# Patient Record
Sex: FEMALE | Race: WHITE | NOT HISPANIC OR LATINO | Employment: OTHER | URBAN - NONMETROPOLITAN AREA
[De-identification: names, ages, dates, MRNs, and addresses within clinical notes are randomized per-mention and may not be internally consistent; named-entity substitution may affect disease eponyms.]

---

## 2017-02-08 ENCOUNTER — OFFICE VISIT (OUTPATIENT)
Dept: GASTROENTEROLOGY | Facility: CLINIC | Age: 72
End: 2017-02-08

## 2017-02-08 VITALS
HEIGHT: 64 IN | WEIGHT: 143 LBS | BODY MASS INDEX: 24.41 KG/M2 | DIASTOLIC BLOOD PRESSURE: 58 MMHG | HEART RATE: 62 BPM | TEMPERATURE: 96.4 F | SYSTOLIC BLOOD PRESSURE: 118 MMHG | OXYGEN SATURATION: 99 %

## 2017-02-08 DIAGNOSIS — K63.5 COLON POLYPS: Primary | ICD-10-CM

## 2017-02-08 DIAGNOSIS — R20.8 RECTAL BURNING: ICD-10-CM

## 2017-02-08 DIAGNOSIS — I25.10 CORONARY ARTERY DISEASE INVOLVING NATIVE CORONARY ARTERY OF NATIVE HEART WITHOUT ANGINA PECTORIS: ICD-10-CM

## 2017-02-08 PROCEDURE — S0260 H&P FOR SURGERY: HCPCS | Performed by: NURSE PRACTITIONER

## 2017-02-08 RX ORDER — TRAZODONE HYDROCHLORIDE 50 MG/1
TABLET ORAL
COMMUNITY

## 2017-02-08 RX ORDER — CLOPIDOGREL BISULFATE 75 MG/1
75 TABLET ORAL DAILY
COMMUNITY
Start: 2017-01-22 | End: 2017-03-01 | Stop reason: HOSPADM

## 2017-02-08 RX ORDER — ATORVASTATIN CALCIUM 80 MG/1
80 TABLET, FILM COATED ORAL DAILY
COMMUNITY
Start: 2016-11-14

## 2017-02-08 RX ORDER — ISOSORBIDE MONONITRATE 120 MG/1
120 TABLET, EXTENDED RELEASE ORAL DAILY
COMMUNITY
Start: 2016-11-14

## 2017-02-08 RX ORDER — ZOLPIDEM TARTRATE 10 MG/1
TABLET ORAL
COMMUNITY

## 2017-02-08 RX ORDER — SODIUM, POTASSIUM,MAG SULFATES 17.5-3.13G
2 SOLUTION, RECONSTITUTED, ORAL ORAL ONCE
Qty: 2 BOTTLE | Refills: 0 | Status: SHIPPED | OUTPATIENT
Start: 2017-02-08 | End: 2017-02-08

## 2017-02-08 RX ORDER — AMITRIPTYLINE HYDROCHLORIDE 25 MG/1
25 TABLET, FILM COATED ORAL NIGHTLY
COMMUNITY
Start: 2017-01-31

## 2017-02-08 RX ORDER — RANOLAZINE 500 MG/1
500 TABLET, FILM COATED, EXTENDED RELEASE ORAL 2 TIMES DAILY
COMMUNITY
Start: 2016-12-14

## 2017-02-08 RX ORDER — NEBIVOLOL 5 MG/1
TABLET ORAL
COMMUNITY
Start: 2016-08-11

## 2017-02-08 NOTE — PROGRESS NOTES
Chief Complaint   Patient presents with   • Colon Cancer Screening     Patient is here today for colon recall. Patient states having pressure and burning sensation in anal area the last month. She states that normal when she passed gas there is not much odor but here recently there has been.     Subjective   HPI  Kelin Suarez is a 71 y.o. female who presents as a referral for preventative maintenance. She has no complaints of nausea or vomiting. No change in bowels. No wt loss. No BRBPR. No melena. There is n family hx for colon cancer. No abdominal pain. Last colonoscopy 7/20/12 with colon polyps and diverticulosis.   Past Medical History   Diagnosis Date   • Asthma    • CAD (coronary artery disease)    • Colon polyps    • Diabetes mellitus    • Diverticulosis    • Epigastric abdominal pain    • GERD (gastroesophageal reflux disease)    • Hyperlipidemia    • Hypertension    • Rectal bleeding      Past Surgical History   Procedure Laterality Date   • Hysterectomy     • Coronary angioplasty with stent placement     • Sinus surgery     • Appendectomy     • Cyst removal       pilonidal cyst   • Tonsillectomy     • Hemorrhoidectomy     • Cervical fusion     • Back surgery     • Nasal septum surgery     • Colonoscopy  07/20/2012   • Upper gastrointestinal endoscopy  09/20/2006       Current Outpatient Prescriptions:   •  amitriptyline (ELAVIL) 25 MG tablet, Take 25 mg by mouth Every Night., Disp: , Rfl:   •  aspirin 81 MG tablet, Take 81 mg by mouth daily.  , Disp: , Rfl:   •  atorvastatin (LIPITOR) 80 MG tablet, Take 80 mg by mouth Daily., Disp: , Rfl:   •  choline fenofibrate (TRILIPIX) 135 MG capsule, Take 135 mg by mouth daily.  , Disp: , Rfl:   •  clopidogrel (PLAVIX) 75 MG tablet, Take 75 mg by mouth Daily., Disp: , Rfl:   •  isosorbide mononitrate (IMDUR) 120 MG 24 hr tablet, Take 120 mg by mouth Daily., Disp: , Rfl:   •  metFORMIN (GLUCOPHAGE) 500 MG tablet, Take 500 mg by mouth Daily With Breakfast., Disp: ,  "Rfl:   •  nebivolol (BYSTOLIC) 5 MG tablet, Take 1 tablet by mouth daily, Disp: , Rfl:   •  RANEXA 500 MG 12 hr tablet, Take 500 mg by mouth 2 (Two) Times a Day., Disp: , Rfl:   •  traZODone (DESYREL) 50 MG tablet, Take 50 mg by mouth nightly., Disp: , Rfl:   •  zolpidem (AMBIEN) 10 MG tablet, Take 5 mg by mouth nightly , Disp: , Rfl:   •  sodium-potassium-magnesium sulfates (SUPREP BOWEL PREP) solution oral solution, Take 2 bottles by mouth 1 (One) Time for 1 dose. Split dose prep as directed by office instructions provided.  2 bottles = one kit., Disp: 2 bottle, Rfl: 0  Allergies   Allergen Reactions   • Other      narcotics   • Codeine      Social History     Social History   • Marital status: Unknown     Spouse name: N/A   • Number of children: N/A   • Years of education: N/A     Occupational History   • Not on file.     Social History Main Topics   • Smoking status: Never Smoker   • Smokeless tobacco: Not on file   • Alcohol use No   • Drug use: Not on file   • Sexual activity: Not on file     Other Topics Concern   • Not on file     Social History Narrative     Family History   Problem Relation Age of Onset   • Colon polyps Mother    • Colon polyps Maternal Grandfather        REVIEW OF SYSTEMS  General: well appearing, no fever chills or sweats, no unexplained wt loss  HEENT: no acute visual or hearing disturbances  Cardiovascular: No chest pain or palpitations  Pulmonary: No shortness of breath, coughing, wheezing or hemoptysis  : No burning, urgency, hematuria, or dysuria  Musculoskeletal: No joint pain or stiffness  Peripheral: no edema  Skin: No lesions or rashes  Neuro: No dizziness, headaches, stroke, syncope  Endocrine: No hot or cold intolerances  Hematological: No blood dyscrasias    Objective   Vitals:    02/08/17 0911   BP: 118/58   Pulse: 62   Temp: 96.4 °F (35.8 °C)   SpO2: 99%   Weight: 143 lb (64.9 kg)   Height: 64\" (162.6 cm)     Body mass index is 24.55 kg/(m^2).    PHYSICAL EXAM  General: " age appropriate well nourished well appearing, no acute distress  Head: normocephalic and atraumatic  Global assessment-supple  Neck-No JVD noted, no lymphadenopathy  Pulmonary-clear to auscultation bilaterally, normal respiratory effort  Cardiovascular-normal rate and rhythm, normal heart sounds, S1 and S2 noted  Abdomen-soft, non tender, non distended, normal bowel sounds all 4 quadrants, no hepatosplenomegaly noted  Extremities-No clubbing cyanosis or edema  Neuro-Non focal, converses appropriately, awake, alert, oriented    Imaging Results (most recent)     None        Assessment/Plan   Kelin was seen today for colon cancer screening.    Diagnoses and all orders for this visit:    Colon polyps  -     Case request    Rectal burning  -     Case request    Coronary artery disease involving native coronary artery of native heart without angina pectoris  Comments:  On Plavix for stent placement 1/25/16 per Dr.Mark Ovalles. Hx of CABG as well    Other orders  -     sodium-potassium-magnesium sulfates (SUPREP BOWEL PREP) solution oral solution; Take 2 bottles by mouth 1 (One) Time for 1 dose. Split dose prep as directed by office instructions provided.  2 bottles = one kit.      COLONOSCOPY WITH ANESTHESIA (N/A)  No Follow-up on file.  There are no Patient Instructions on file for this visit.    All risks, benefits, alternatives, and indications of colonoscopy procedure have been discussed with the patient. Risks to include perforation of the colon requiring possible surgery or colostomy, risk of bleeding from biopsies or removal of colon tissue, possibility of missing a colon polyp or cancer, or adverse drug reaction.  Benefits to include the diagnosis and management of disease of the colon and rectum. Alternatives to include barium enema, radiographic evaluation, lab testing or no intervention. Pt verbalizes understanding and agrees.

## 2017-02-10 ENCOUNTER — OFFICE VISIT (OUTPATIENT)
Dept: CARDIOLOGY | Age: 72
End: 2017-02-10
Payer: MEDICARE

## 2017-02-10 VITALS
SYSTOLIC BLOOD PRESSURE: 122 MMHG | HEIGHT: 65 IN | DIASTOLIC BLOOD PRESSURE: 58 MMHG | WEIGHT: 143 LBS | BODY MASS INDEX: 23.82 KG/M2 | HEART RATE: 82 BPM

## 2017-02-10 DIAGNOSIS — Z95.5 S/P RIGHT CORONARY ARTERY (RCA) STENT PLACEMENT: ICD-10-CM

## 2017-02-10 DIAGNOSIS — I25.10 CORONARY ARTERY DISEASE INVOLVING NATIVE CORONARY ARTERY OF NATIVE HEART WITHOUT ANGINA PECTORIS: Primary | ICD-10-CM

## 2017-02-10 DIAGNOSIS — I10 ESSENTIAL HYPERTENSION: ICD-10-CM

## 2017-02-10 PROCEDURE — 3014F SCREEN MAMMO DOC REV: CPT | Performed by: CLINICAL NURSE SPECIALIST

## 2017-02-10 PROCEDURE — G8420 CALC BMI NORM PARAMETERS: HCPCS | Performed by: CLINICAL NURSE SPECIALIST

## 2017-02-10 PROCEDURE — G8427 DOCREV CUR MEDS BY ELIG CLIN: HCPCS | Performed by: CLINICAL NURSE SPECIALIST

## 2017-02-10 PROCEDURE — 1123F ACP DISCUSS/DSCN MKR DOCD: CPT | Performed by: CLINICAL NURSE SPECIALIST

## 2017-02-10 PROCEDURE — G8399 PT W/DXA RESULTS DOCUMENT: HCPCS | Performed by: CLINICAL NURSE SPECIALIST

## 2017-02-10 PROCEDURE — G8598 ASA/ANTIPLAT THER USED: HCPCS | Performed by: CLINICAL NURSE SPECIALIST

## 2017-02-10 PROCEDURE — G8484 FLU IMMUNIZE NO ADMIN: HCPCS | Performed by: CLINICAL NURSE SPECIALIST

## 2017-02-10 PROCEDURE — 4040F PNEUMOC VAC/ADMIN/RCVD: CPT | Performed by: CLINICAL NURSE SPECIALIST

## 2017-02-10 PROCEDURE — 1036F TOBACCO NON-USER: CPT | Performed by: CLINICAL NURSE SPECIALIST

## 2017-02-10 PROCEDURE — 99213 OFFICE O/P EST LOW 20 MIN: CPT | Performed by: CLINICAL NURSE SPECIALIST

## 2017-02-10 PROCEDURE — 3017F COLORECTAL CA SCREEN DOC REV: CPT | Performed by: CLINICAL NURSE SPECIALIST

## 2017-02-10 PROCEDURE — 1090F PRES/ABSN URINE INCON ASSESS: CPT | Performed by: CLINICAL NURSE SPECIALIST

## 2017-02-16 ENCOUNTER — TELEPHONE (OUTPATIENT)
Dept: GASTROENTEROLOGY | Facility: CLINIC | Age: 72
End: 2017-02-16

## 2017-02-16 NOTE — TELEPHONE ENCOUNTER
I have received Plavix clearance from Dr. Ovalles. I have scanned it to you under media in her chart.    Dr. Ovalles wants a date so I scheduled her colon for 3/1.

## 2017-02-17 NOTE — TELEPHONE ENCOUNTER
I left her a message letting her know that I have received Plavix clearance from Dr. Ovalles. Reminded her that the last dose is 2/23 and to call back if she has any questions.

## 2017-02-17 NOTE — TELEPHONE ENCOUNTER
OK to schedule procedure now that clearance to hold antiplatelet/anticoagulatant has been obtained.    Mery Escalera MD

## 2017-02-28 ENCOUNTER — ANESTHESIA EVENT (OUTPATIENT)
Dept: GASTROENTEROLOGY | Facility: HOSPITAL | Age: 72
End: 2017-02-28

## 2017-03-01 ENCOUNTER — ANESTHESIA (OUTPATIENT)
Dept: GASTROENTEROLOGY | Facility: HOSPITAL | Age: 72
End: 2017-03-01

## 2017-03-01 ENCOUNTER — HOSPITAL ENCOUNTER (OUTPATIENT)
Facility: HOSPITAL | Age: 72
Setting detail: HOSPITAL OUTPATIENT SURGERY
Discharge: HOME | End: 2017-03-01
Attending: INTERNAL MEDICINE | Admitting: INTERNAL MEDICINE

## 2017-03-01 ENCOUNTER — TELEPHONE (OUTPATIENT)
Dept: GASTROENTEROLOGY | Facility: CLINIC | Age: 72
End: 2017-03-01

## 2017-03-01 VITALS
SYSTOLIC BLOOD PRESSURE: 146 MMHG | WEIGHT: 141 LBS | OXYGEN SATURATION: 97 % | RESPIRATION RATE: 16 BRPM | DIASTOLIC BLOOD PRESSURE: 61 MMHG | TEMPERATURE: 97 F | HEART RATE: 60 BPM | HEIGHT: 64 IN | BODY MASS INDEX: 24.07 KG/M2

## 2017-03-01 PROCEDURE — G0105 COLORECTAL SCRN; HI RISK IND: HCPCS | Performed by: INTERNAL MEDICINE

## 2017-03-01 PROCEDURE — 25010000002 PROPOFOL 10 MG/ML EMULSION: Performed by: NURSE ANESTHETIST, CERTIFIED REGISTERED

## 2017-03-01 RX ORDER — PROPOFOL 10 MG/ML
VIAL (ML) INTRAVENOUS AS NEEDED
Status: DISCONTINUED | OUTPATIENT
Start: 2017-03-01 | End: 2017-03-01 | Stop reason: SURG

## 2017-03-01 RX ORDER — SODIUM CHLORIDE 9 MG/ML
100 INJECTION, SOLUTION INTRAVENOUS CONTINUOUS
Status: DISCONTINUED | OUTPATIENT
Start: 2017-03-01 | End: 2017-03-01 | Stop reason: HOSPADM

## 2017-03-01 RX ORDER — SODIUM CHLORIDE 0.9 % (FLUSH) 0.9 %
1-10 SYRINGE (ML) INJECTION AS NEEDED
Status: DISCONTINUED | OUTPATIENT
Start: 2017-03-01 | End: 2017-03-01 | Stop reason: HOSPADM

## 2017-03-01 RX ORDER — LIDOCAINE HYDROCHLORIDE 20 MG/ML
INJECTION, SOLUTION INFILTRATION; PERINEURAL AS NEEDED
Status: DISCONTINUED | OUTPATIENT
Start: 2017-03-01 | End: 2017-03-01 | Stop reason: SURG

## 2017-03-01 RX ADMIN — PROPOFOL 30 MG: 10 INJECTION, EMULSION INTRAVENOUS at 11:12

## 2017-03-01 RX ADMIN — PROPOFOL 30 MG: 10 INJECTION, EMULSION INTRAVENOUS at 11:11

## 2017-03-01 RX ADMIN — LIDOCAINE HYDROCHLORIDE 40 MG: 20 INJECTION, SOLUTION INFILTRATION; PERINEURAL at 11:10

## 2017-03-01 RX ADMIN — PROPOFOL 30 MG: 10 INJECTION, EMULSION INTRAVENOUS at 11:17

## 2017-03-01 RX ADMIN — PROPOFOL 30 MG: 10 INJECTION, EMULSION INTRAVENOUS at 11:24

## 2017-03-01 RX ADMIN — PROPOFOL 30 MG: 10 INJECTION, EMULSION INTRAVENOUS at 11:19

## 2017-03-01 RX ADMIN — SODIUM CHLORIDE 100 ML/HR: 9 INJECTION, SOLUTION INTRAVENOUS at 11:04

## 2017-03-01 RX ADMIN — PROPOFOL 50 MG: 10 INJECTION, EMULSION INTRAVENOUS at 11:10

## 2017-03-01 RX ADMIN — PROPOFOL 30 MG: 10 INJECTION, EMULSION INTRAVENOUS at 11:15

## 2017-03-01 RX ADMIN — PROPOFOL 30 MG: 10 INJECTION, EMULSION INTRAVENOUS at 11:13

## 2017-03-01 RX ADMIN — PROPOFOL 30 MG: 10 INJECTION, EMULSION INTRAVENOUS at 11:22

## 2017-03-01 NOTE — PLAN OF CARE
Problem: GI Endoscopy (Adult)  Goal: Signs and Symptoms of Listed Potential Problems Will be Absent or Manageable (GI Endoscopy)  Outcome: Outcome(s) achieved Date Met:  03/01/17

## 2017-03-01 NOTE — ANESTHESIA PREPROCEDURE EVALUATION
Anesthesia Evaluation     Patient summary reviewed and Nursing notes reviewed   history of anesthetic complications: PONV  NPO Status: > 8 hours   Airway   Mallampati: II  TM distance: >3 FB  Neck ROM: full  Dental      Pulmonary    (+) asthma,   Cardiovascular   Exercise tolerance: excellent (>7 METS)    (+) hypertension well controlled, CAD, CABG 3-6 Months, cardiac stents within the past 12 months       Neuro/Psych  GI/Hepatic/Renal/Endo    (+)  GERD well controlled, diabetes mellitus type 2 well controlled,     Musculoskeletal (-) negative ROS    Abdominal    Substance History - negative use     OB/GYN negative ob/gyn ROS         Other - negative ROS                                   Anesthesia Plan    ASA 3     general   total IV anesthesia  intravenous induction   Anesthetic plan and risks discussed with patient.

## 2017-03-01 NOTE — PLAN OF CARE
Problem: Patient Care Overview (Adult)  Goal: Plan of Care Review  Outcome: Ongoing (interventions implemented as appropriate)    03/01/17 1123   Coping/Psychosocial Response Interventions   Plan Of Care Reviewed With patient   Patient Care Overview   Progress no change   Outcome Evaluation   Outcome Summary/Follow up Plan tolerating well

## 2017-03-01 NOTE — PLAN OF CARE
Problem: Patient Care Overview (Adult)  Goal: Plan of Care Review  Outcome: Outcome(s) achieved Date Met:  03/01/17

## 2017-03-01 NOTE — ANESTHESIA POSTPROCEDURE EVALUATION
Patient: Kelin Suarez    Procedure Summary     Date Anesthesia Start Anesthesia Stop Room / Location    03/01/17 1107 1125  [facility] /  [facility]       Procedure Diagnosis Surgeon Provider    COLONOSCOPY WITH ANESTHESIA (N/A ) Colon polyps; Rectal burning  (Colon polyps [K63.5]; Rectal burning [K62.89]) MD Ryan Hazel CRNA          Anesthesia Type: general  Last vitals  /56 (03/01/17 1135)    Temp 97 °F (36.1 °C) (03/01/17 1051)    Pulse 60 (03/01/17 1135)   Resp 16 (03/01/17 1135)    SpO2 99 % (03/01/17 1135)      Post Anesthesia Care and Evaluation    Patient location during evaluation: PHASE II  Patient participation: complete - patient participated  Level of consciousness: awake and alert  Pain score: 0  Pain management: adequate  Airway patency: patent  Anesthetic complications: No anesthetic complications  PONV Status: none  Cardiovascular status: acceptable  Respiratory status: acceptable  Hydration status: acceptable

## 2017-03-01 NOTE — PLAN OF CARE
Problem: Patient Care Overview (Adult)  Goal: Adult Individualization and Mutuality  Outcome: Outcome(s) achieved Date Met:  03/01/17

## 2017-03-01 NOTE — PLAN OF CARE
Problem: Patient Care Overview (Adult)  Goal: Discharge Needs Assessment  Outcome: Outcome(s) achieved Date Met:  03/01/17

## 2017-04-04 ENCOUNTER — HOSPITAL ENCOUNTER (OUTPATIENT)
Dept: WOMENS IMAGING | Age: 72
Discharge: HOME | End: 2017-04-04
Payer: MEDICARE

## 2017-04-04 DIAGNOSIS — Z12.31 ENCOUNTER FOR SCREENING MAMMOGRAM FOR MALIGNANT NEOPLASM OF BREAST: ICD-10-CM

## 2017-04-04 PROCEDURE — 77063 BREAST TOMOSYNTHESIS BI: CPT

## 2017-06-11 DIAGNOSIS — E78.5 HYPERLIPIDEMIA, UNSPECIFIED HYPERLIPIDEMIA TYPE: ICD-10-CM

## 2017-06-12 DIAGNOSIS — R07.9 CHEST PAIN, UNSPECIFIED TYPE: ICD-10-CM

## 2017-06-12 DIAGNOSIS — I25.10 CORONARY ARTERY DISEASE INVOLVING NATIVE HEART WITHOUT ANGINA PECTORIS, UNSPECIFIED VESSEL OR LESION TYPE: ICD-10-CM

## 2017-06-12 RX ORDER — RANOLAZINE 500 MG/1
TABLET, FILM COATED, EXTENDED RELEASE ORAL
Qty: 180 TABLET | Refills: 2 | Status: ON HOLD | OUTPATIENT
Start: 2017-06-12 | End: 2017-08-25 | Stop reason: HOSPADM

## 2017-06-12 RX ORDER — ATORVASTATIN CALCIUM 80 MG/1
TABLET, FILM COATED ORAL
Qty: 90 TABLET | Refills: 0 | Status: SHIPPED | OUTPATIENT
Start: 2017-06-12 | End: 2017-09-10 | Stop reason: SDUPTHER

## 2017-06-26 RX ORDER — NEBIVOLOL HYDROCHLORIDE 5 MG/1
TABLET ORAL
Qty: 90 TABLET | Refills: 3 | Status: SHIPPED | OUTPATIENT
Start: 2017-06-26 | End: 2018-09-10 | Stop reason: SDUPTHER

## 2017-07-03 DIAGNOSIS — R07.9 CHEST PAIN, UNSPECIFIED TYPE: ICD-10-CM

## 2017-07-03 RX ORDER — ISOSORBIDE MONONITRATE 120 MG/1
120 TABLET, EXTENDED RELEASE ORAL DAILY
Qty: 90 TABLET | Refills: 3 | Status: SHIPPED | OUTPATIENT
Start: 2017-07-03 | End: 2018-07-07 | Stop reason: SDUPTHER

## 2017-08-23 ENCOUNTER — OFFICE VISIT (OUTPATIENT)
Dept: CARDIOLOGY | Age: 72
End: 2017-08-23
Payer: MEDICARE

## 2017-08-23 ENCOUNTER — APPOINTMENT (OUTPATIENT)
Dept: GENERAL RADIOLOGY | Age: 72
End: 2017-08-23
Attending: INTERNAL MEDICINE
Payer: MEDICARE

## 2017-08-23 ENCOUNTER — HOSPITAL ENCOUNTER (OUTPATIENT)
Dept: CARDIAC CATH/INVASIVE PROCEDURES | Age: 72
Setting detail: SPECIMEN
Discharge: HOME | End: 2017-08-23
Attending: INTERNAL MEDICINE
Payer: MEDICARE

## 2017-08-23 ENCOUNTER — HOSPITAL ENCOUNTER (OUTPATIENT)
Age: 72
Setting detail: OBSERVATION
Discharge: HOME | End: 2017-08-25
Attending: INTERNAL MEDICINE | Admitting: INTERNAL MEDICINE
Payer: MEDICARE

## 2017-08-23 VITALS
WEIGHT: 144 LBS | BODY MASS INDEX: 23.99 KG/M2 | SYSTOLIC BLOOD PRESSURE: 144 MMHG | HEIGHT: 65 IN | DIASTOLIC BLOOD PRESSURE: 74 MMHG | HEART RATE: 70 BPM

## 2017-08-23 DIAGNOSIS — I25.10 ASHD (ARTERIOSCLEROTIC HEART DISEASE): ICD-10-CM

## 2017-08-23 DIAGNOSIS — R07.9 CHEST PAIN IN ADULT: Primary | ICD-10-CM

## 2017-08-23 LAB
ANION GAP SERPL CALCULATED.3IONS-SCNC: 18 MMOL/L (ref 7–19)
BASOPHILS ABSOLUTE: 0 K/UL (ref 0–0.2)
BASOPHILS RELATIVE PERCENT: 1 % (ref 0–1)
BUN BLDV-MCNC: 19 MG/DL (ref 8–23)
CALCIUM SERPL-MCNC: 9.4 MG/DL (ref 8.8–10.2)
CHLORIDE BLD-SCNC: 105 MMOL/L (ref 98–111)
CO2: 19 MMOL/L (ref 22–29)
CREAT SERPL-MCNC: 0.9 MG/DL (ref 0.5–0.9)
EOSINOPHILS ABSOLUTE: 0.1 K/UL (ref 0–0.6)
EOSINOPHILS RELATIVE PERCENT: 3.8 % (ref 0–5)
GFR NON-AFRICAN AMERICAN: >60
GLUCOSE BLD-MCNC: 134 MG/DL (ref 70–99)
GLUCOSE BLD-MCNC: 84 MG/DL (ref 74–109)
GLUCOSE BLD-MCNC: 88 MG/DL (ref 70–99)
GLUCOSE BLD-MCNC: 96 MG/DL (ref 70–99)
HCT VFR BLD CALC: 34.3 % (ref 37–47)
HEMOGLOBIN: 11.1 G/DL (ref 12–16)
LV EF: 53 %
LVEF MODALITY: NORMAL
LYMPHOCYTES ABSOLUTE: 0.8 K/UL (ref 1.1–4.5)
LYMPHOCYTES RELATIVE PERCENT: 28.3 % (ref 20–40)
MCH RBC QN AUTO: 30.2 PG (ref 27–31)
MCHC RBC AUTO-ENTMCNC: 32.4 G/DL (ref 33–37)
MCV RBC AUTO: 93.5 FL (ref 81–99)
MONOCYTES ABSOLUTE: 0.3 K/UL (ref 0–0.9)
MONOCYTES RELATIVE PERCENT: 10.8 % (ref 0–10)
NEUTROPHILS ABSOLUTE: 1.6 K/UL (ref 1.5–7.5)
NEUTROPHILS RELATIVE PERCENT: 55.1 % (ref 50–65)
PDW BLD-RTO: 13.6 % (ref 11.5–14.5)
PERFORMED ON: ABNORMAL
PERFORMED ON: NORMAL
PERFORMED ON: NORMAL
PLATELET # BLD: 162 K/UL (ref 130–400)
PMV BLD AUTO: 9.4 FL (ref 9.4–12.3)
POTASSIUM SERPL-SCNC: 4.5 MMOL/L (ref 3.5–5)
RBC # BLD: 3.67 M/UL (ref 4.2–5.4)
SODIUM BLD-SCNC: 142 MMOL/L (ref 136–145)
TROPONIN: <0.01 NG/ML (ref 0–0.03)
TROPONIN: <0.01 NG/ML (ref 0–0.03)
WBC # BLD: 2.9 K/UL (ref 4.8–10.8)

## 2017-08-23 PROCEDURE — 71020 XR CHEST STANDARD TWO VW: CPT

## 2017-08-23 PROCEDURE — 96374 THER/PROPH/DIAG INJ IV PUSH: CPT

## 2017-08-23 PROCEDURE — 3014F SCREEN MAMMO DOC REV: CPT | Performed by: INTERNAL MEDICINE

## 2017-08-23 PROCEDURE — 4040F PNEUMOC VAC/ADMIN/RCVD: CPT | Performed by: INTERNAL MEDICINE

## 2017-08-23 PROCEDURE — 1036F TOBACCO NON-USER: CPT | Performed by: INTERNAL MEDICINE

## 2017-08-23 PROCEDURE — 82948 REAGENT STRIP/BLOOD GLUCOSE: CPT

## 2017-08-23 PROCEDURE — 85025 COMPLETE CBC W/AUTO DIFF WBC: CPT

## 2017-08-23 PROCEDURE — 93923 UPR/LXTR ART STDY 3+ LVLS: CPT

## 2017-08-23 PROCEDURE — 96375 TX/PRO/DX INJ NEW DRUG ADDON: CPT

## 2017-08-23 PROCEDURE — 84484 ASSAY OF TROPONIN QUANT: CPT

## 2017-08-23 PROCEDURE — 93005 ELECTROCARDIOGRAM TRACING: CPT

## 2017-08-23 PROCEDURE — 99213 OFFICE O/P EST LOW 20 MIN: CPT | Performed by: INTERNAL MEDICINE

## 2017-08-23 PROCEDURE — 1123F ACP DISCUSS/DSCN MKR DOCD: CPT | Performed by: INTERNAL MEDICINE

## 2017-08-23 PROCEDURE — 93000 ELECTROCARDIOGRAM COMPLETE: CPT | Performed by: INTERNAL MEDICINE

## 2017-08-23 PROCEDURE — 1090F PRES/ABSN URINE INCON ASSESS: CPT | Performed by: INTERNAL MEDICINE

## 2017-08-23 PROCEDURE — 2580000003 HC RX 258: Performed by: INTERNAL MEDICINE

## 2017-08-23 PROCEDURE — 6370000000 HC RX 637 (ALT 250 FOR IP): Performed by: INTERNAL MEDICINE

## 2017-08-23 PROCEDURE — G8420 CALC BMI NORM PARAMETERS: HCPCS | Performed by: INTERNAL MEDICINE

## 2017-08-23 PROCEDURE — 2500000003 HC RX 250 WO HCPCS: Performed by: INTERNAL MEDICINE

## 2017-08-23 PROCEDURE — 80048 BASIC METABOLIC PNL TOTAL CA: CPT

## 2017-08-23 PROCEDURE — G8399 PT W/DXA RESULTS DOCUMENT: HCPCS | Performed by: INTERNAL MEDICINE

## 2017-08-23 PROCEDURE — G0378 HOSPITAL OBSERVATION PER HR: HCPCS

## 2017-08-23 PROCEDURE — G8427 DOCREV CUR MEDS BY ELIG CLIN: HCPCS | Performed by: INTERNAL MEDICINE

## 2017-08-23 PROCEDURE — G8598 ASA/ANTIPLAT THER USED: HCPCS | Performed by: INTERNAL MEDICINE

## 2017-08-23 PROCEDURE — 3017F COLORECTAL CA SCREEN DOC REV: CPT | Performed by: INTERNAL MEDICINE

## 2017-08-23 PROCEDURE — 36415 COLL VENOUS BLD VENIPUNCTURE: CPT

## 2017-08-23 PROCEDURE — 6360000002 HC RX W HCPCS: Performed by: INTERNAL MEDICINE

## 2017-08-23 PROCEDURE — 93306 TTE W/DOPPLER COMPLETE: CPT

## 2017-08-23 RX ORDER — RANOLAZINE 500 MG/1
500 TABLET, EXTENDED RELEASE ORAL 2 TIMES DAILY
Status: DISCONTINUED | OUTPATIENT
Start: 2017-08-23 | End: 2017-08-25 | Stop reason: HOSPADM

## 2017-08-23 RX ORDER — SODIUM CHLORIDE 9 MG/ML
INJECTION, SOLUTION INTRAVENOUS CONTINUOUS
Status: DISCONTINUED | OUTPATIENT
Start: 2017-08-23 | End: 2017-08-24

## 2017-08-23 RX ORDER — NITROGLYCERIN 20 MG/100ML
5 INJECTION INTRAVENOUS CONTINUOUS
Status: DISCONTINUED | OUTPATIENT
Start: 2017-08-23 | End: 2017-08-25 | Stop reason: HOSPADM

## 2017-08-23 RX ORDER — FENOFIBRATE 160 MG/1
160 TABLET ORAL DAILY
Status: DISCONTINUED | OUTPATIENT
Start: 2017-08-23 | End: 2017-08-25 | Stop reason: HOSPADM

## 2017-08-23 RX ORDER — ALPRAZOLAM 0.25 MG/1
0.25 TABLET ORAL DAILY PRN
Status: DISCONTINUED | OUTPATIENT
Start: 2017-08-23 | End: 2017-08-25 | Stop reason: HOSPADM

## 2017-08-23 RX ORDER — ALBUTEROL SULFATE 90 UG/1
2 AEROSOL, METERED RESPIRATORY (INHALATION) EVERY 6 HOURS PRN
Status: DISCONTINUED | OUTPATIENT
Start: 2017-08-23 | End: 2017-08-25 | Stop reason: HOSPADM

## 2017-08-23 RX ORDER — TRAZODONE HYDROCHLORIDE 50 MG/1
50 TABLET ORAL NIGHTLY
Status: DISCONTINUED | OUTPATIENT
Start: 2017-08-23 | End: 2017-08-25 | Stop reason: HOSPADM

## 2017-08-23 RX ORDER — NEBIVOLOL 5 MG/1
5 TABLET ORAL NIGHTLY
Status: DISCONTINUED | OUTPATIENT
Start: 2017-08-23 | End: 2017-08-25 | Stop reason: HOSPADM

## 2017-08-23 RX ORDER — AMITRIPTYLINE HYDROCHLORIDE 25 MG/1
25 TABLET, FILM COATED ORAL NIGHTLY
Status: DISCONTINUED | OUTPATIENT
Start: 2017-08-23 | End: 2017-08-25 | Stop reason: HOSPADM

## 2017-08-23 RX ORDER — ASPIRIN 81 MG/1
81 TABLET, CHEWABLE ORAL DAILY
Status: DISCONTINUED | OUTPATIENT
Start: 2017-08-23 | End: 2017-08-24

## 2017-08-23 RX ORDER — NEBIVOLOL 5 MG/1
5 TABLET ORAL DAILY
Status: DISCONTINUED | OUTPATIENT
Start: 2017-08-23 | End: 2017-08-23

## 2017-08-23 RX ORDER — NITROGLYCERIN 0.4 MG/1
0.4 TABLET SUBLINGUAL EVERY 5 MIN PRN
Status: DISCONTINUED | OUTPATIENT
Start: 2017-08-23 | End: 2017-08-25 | Stop reason: HOSPADM

## 2017-08-23 RX ORDER — CYCLOBENZAPRINE HCL 10 MG
10 TABLET ORAL 2 TIMES DAILY PRN
Status: DISCONTINUED | OUTPATIENT
Start: 2017-08-23 | End: 2017-08-25 | Stop reason: HOSPADM

## 2017-08-23 RX ORDER — ATORVASTATIN CALCIUM 80 MG/1
80 TABLET, FILM COATED ORAL NIGHTLY
Status: DISCONTINUED | OUTPATIENT
Start: 2017-08-23 | End: 2017-08-25 | Stop reason: HOSPADM

## 2017-08-23 RX ORDER — MEPERIDINE HYDROCHLORIDE 25 MG/ML
50 INJECTION INTRAMUSCULAR; INTRAVENOUS; SUBCUTANEOUS EVERY 4 HOURS PRN
Status: DISCONTINUED | OUTPATIENT
Start: 2017-08-23 | End: 2017-08-25 | Stop reason: HOSPADM

## 2017-08-23 RX ORDER — ACETAMINOPHEN 325 MG/1
650 TABLET ORAL EVERY 4 HOURS PRN
Status: DISCONTINUED | OUTPATIENT
Start: 2017-08-23 | End: 2017-08-24

## 2017-08-23 RX ORDER — ONDANSETRON 2 MG/ML
4 INJECTION INTRAMUSCULAR; INTRAVENOUS EVERY 6 HOURS PRN
Status: DISCONTINUED | OUTPATIENT
Start: 2017-08-23 | End: 2017-08-24

## 2017-08-23 RX ORDER — ZOLPIDEM TARTRATE 5 MG/1
5 TABLET ORAL NIGHTLY
Status: DISCONTINUED | OUTPATIENT
Start: 2017-08-23 | End: 2017-08-25 | Stop reason: HOSPADM

## 2017-08-23 RX ORDER — MEPERIDINE HYDROCHLORIDE 25 MG/ML
25 INJECTION INTRAMUSCULAR; INTRAVENOUS; SUBCUTANEOUS EVERY 4 HOURS PRN
Status: DISCONTINUED | OUTPATIENT
Start: 2017-08-23 | End: 2017-08-25 | Stop reason: HOSPADM

## 2017-08-23 RX ORDER — ISOSORBIDE MONONITRATE 60 MG/1
120 TABLET, EXTENDED RELEASE ORAL DAILY
Status: DISCONTINUED | OUTPATIENT
Start: 2017-08-23 | End: 2017-08-25 | Stop reason: HOSPADM

## 2017-08-23 RX ORDER — PROMETHAZINE HYDROCHLORIDE 25 MG/1
25 TABLET ORAL EVERY 6 HOURS PRN
Status: DISCONTINUED | OUTPATIENT
Start: 2017-08-23 | End: 2017-08-25 | Stop reason: HOSPADM

## 2017-08-23 RX ADMIN — NEBIVOLOL HYDROCHLORIDE 5 MG: 5 TABLET ORAL at 22:53

## 2017-08-23 RX ADMIN — SODIUM CHLORIDE: 9 INJECTION, SOLUTION INTRAVENOUS at 19:57

## 2017-08-23 RX ADMIN — RANOLAZINE 500 MG: 500 TABLET, FILM COATED, EXTENDED RELEASE ORAL at 20:12

## 2017-08-23 RX ADMIN — TRAZODONE HYDROCHLORIDE 50 MG: 50 TABLET ORAL at 22:53

## 2017-08-23 RX ADMIN — SODIUM CHLORIDE: 9 INJECTION, SOLUTION INTRAVENOUS at 12:05

## 2017-08-23 RX ADMIN — ZOLPIDEM TARTRATE 5 MG: 5 TABLET, FILM COATED ORAL at 22:53

## 2017-08-23 RX ADMIN — NITROGLYCERIN 5 MCG/MIN: 20 INJECTION INTRAVENOUS at 11:59

## 2017-08-23 RX ADMIN — ATORVASTATIN CALCIUM 80 MG: 80 TABLET, FILM COATED ORAL at 20:12

## 2017-08-23 RX ADMIN — ONDANSETRON 4 MG: 2 INJECTION INTRAMUSCULAR; INTRAVENOUS at 12:37

## 2017-08-23 RX ADMIN — MEPERIDINE HYDROCHLORIDE 50 MG: 25 INJECTION, SOLUTION INTRAMUSCULAR; INTRAVENOUS; SUBCUTANEOUS at 11:51

## 2017-08-23 RX ADMIN — AMITRIPTYLINE HYDROCHLORIDE 25 MG: 25 TABLET, FILM COATED ORAL at 20:12

## 2017-08-23 ASSESSMENT — PAIN SCALES - GENERAL
PAINLEVEL_OUTOF10: 6
PAINLEVEL_OUTOF10: 0
PAINLEVEL_OUTOF10: 2

## 2017-08-24 LAB
GLUCOSE BLD-MCNC: 101 MG/DL (ref 70–99)
GLUCOSE BLD-MCNC: 143 MG/DL (ref 70–99)
GLUCOSE BLD-MCNC: 167 MG/DL (ref 70–99)
GLUCOSE BLD-MCNC: 87 MG/DL (ref 70–99)
INR BLD: 1.09 (ref 0.88–1.18)
PERFORMED ON: ABNORMAL
PERFORMED ON: NORMAL
PROTHROMBIN TIME: 14 SEC (ref 12–14.6)
TROPONIN: <0.01 NG/ML (ref 0–0.03)

## 2017-08-24 PROCEDURE — C1887 CATHETER, GUIDING: HCPCS

## 2017-08-24 PROCEDURE — 6370000000 HC RX 637 (ALT 250 FOR IP): Performed by: INTERNAL MEDICINE

## 2017-08-24 PROCEDURE — 82948 REAGENT STRIP/BLOOD GLUCOSE: CPT

## 2017-08-24 PROCEDURE — 36415 COLL VENOUS BLD VENIPUNCTURE: CPT

## 2017-08-24 PROCEDURE — 2720000000 HC MISC SURG SUPPLY STERILE $0-50

## 2017-08-24 PROCEDURE — 84484 ASSAY OF TROPONIN QUANT: CPT

## 2017-08-24 PROCEDURE — C1760 CLOSURE DEV, VASC: HCPCS

## 2017-08-24 PROCEDURE — C1725 CATH, TRANSLUMIN NON-LASER: HCPCS

## 2017-08-24 PROCEDURE — 2720000001 HC MISC SURG SUPPLY STERILE $51-500

## 2017-08-24 PROCEDURE — 6360000002 HC RX W HCPCS

## 2017-08-24 PROCEDURE — C1769 GUIDE WIRE: HCPCS

## 2017-08-24 PROCEDURE — 92920 PRQ TRLUML C ANGIOP 1ART&/BR: CPT | Performed by: INTERNAL MEDICINE

## 2017-08-24 PROCEDURE — 2580000003 HC RX 258: Performed by: INTERNAL MEDICINE

## 2017-08-24 PROCEDURE — 85610 PROTHROMBIN TIME: CPT

## 2017-08-24 PROCEDURE — G0378 HOSPITAL OBSERVATION PER HR: HCPCS

## 2017-08-24 PROCEDURE — 93459 L HRT ART/GRFT ANGIO: CPT | Performed by: INTERNAL MEDICINE

## 2017-08-24 PROCEDURE — 2709999900 HC NON-CHARGEABLE SUPPLY

## 2017-08-24 RX ORDER — SODIUM CHLORIDE 9 MG/ML
INJECTION, SOLUTION INTRAVENOUS CONTINUOUS
Status: DISCONTINUED | OUTPATIENT
Start: 2017-08-24 | End: 2017-08-25 | Stop reason: HOSPADM

## 2017-08-24 RX ORDER — CLOPIDOGREL BISULFATE 75 MG/1
75 TABLET ORAL DAILY
Status: DISCONTINUED | OUTPATIENT
Start: 2017-08-25 | End: 2017-08-25 | Stop reason: HOSPADM

## 2017-08-24 RX ORDER — ONDANSETRON 2 MG/ML
4 INJECTION INTRAMUSCULAR; INTRAVENOUS EVERY 6 HOURS PRN
Status: DISCONTINUED | OUTPATIENT
Start: 2017-08-24 | End: 2017-08-25 | Stop reason: HOSPADM

## 2017-08-24 RX ORDER — ACETAMINOPHEN 325 MG/1
650 TABLET ORAL EVERY 4 HOURS PRN
Status: DISCONTINUED | OUTPATIENT
Start: 2017-08-24 | End: 2017-08-25 | Stop reason: HOSPADM

## 2017-08-24 RX ORDER — SODIUM CHLORIDE 0.9 % (FLUSH) 0.9 %
10 SYRINGE (ML) INJECTION PRN
Status: DISCONTINUED | OUTPATIENT
Start: 2017-08-24 | End: 2017-08-25 | Stop reason: HOSPADM

## 2017-08-24 RX ORDER — SODIUM CHLORIDE 0.9 % (FLUSH) 0.9 %
10 SYRINGE (ML) INJECTION EVERY 12 HOURS SCHEDULED
Status: DISCONTINUED | OUTPATIENT
Start: 2017-08-24 | End: 2017-08-25 | Stop reason: HOSPADM

## 2017-08-24 RX ORDER — ASPIRIN 81 MG/1
81 TABLET ORAL DAILY
Status: DISCONTINUED | OUTPATIENT
Start: 2017-08-25 | End: 2017-08-25 | Stop reason: HOSPADM

## 2017-08-24 RX ADMIN — TRAZODONE HYDROCHLORIDE 50 MG: 50 TABLET ORAL at 22:52

## 2017-08-24 RX ADMIN — ASPIRIN 81 MG CHEWABLE TABLET 81 MG: 81 TABLET CHEWABLE at 08:32

## 2017-08-24 RX ADMIN — ATORVASTATIN CALCIUM 80 MG: 80 TABLET, FILM COATED ORAL at 20:09

## 2017-08-24 RX ADMIN — ISOSORBIDE MONONITRATE 120 MG: 60 TABLET, EXTENDED RELEASE ORAL at 08:32

## 2017-08-24 RX ADMIN — SODIUM CHLORIDE: 9 INJECTION, SOLUTION INTRAVENOUS at 13:00

## 2017-08-24 RX ADMIN — RANOLAZINE 500 MG: 500 TABLET, FILM COATED, EXTENDED RELEASE ORAL at 20:09

## 2017-08-24 RX ADMIN — RANOLAZINE 500 MG: 500 TABLET, FILM COATED, EXTENDED RELEASE ORAL at 08:32

## 2017-08-24 RX ADMIN — AMITRIPTYLINE HYDROCHLORIDE 25 MG: 25 TABLET, FILM COATED ORAL at 20:09

## 2017-08-24 RX ADMIN — Medication 10 ML: at 20:09

## 2017-08-24 RX ADMIN — FENOFIBRATE 160 MG: 160 TABLET ORAL at 08:32

## 2017-08-24 RX ADMIN — ZOLPIDEM TARTRATE 5 MG: 5 TABLET, FILM COATED ORAL at 22:52

## 2017-08-24 RX ADMIN — NEBIVOLOL HYDROCHLORIDE 5 MG: 5 TABLET ORAL at 22:52

## 2017-08-24 RX ADMIN — SODIUM CHLORIDE: 9 INJECTION, SOLUTION INTRAVENOUS at 03:25

## 2017-08-24 ASSESSMENT — PAIN SCALES - GENERAL
PAINLEVEL_OUTOF10: 0
PAINLEVEL_OUTOF10: 0

## 2017-08-25 VITALS
HEART RATE: 61 BPM | DIASTOLIC BLOOD PRESSURE: 68 MMHG | HEIGHT: 64 IN | SYSTOLIC BLOOD PRESSURE: 137 MMHG | RESPIRATION RATE: 16 BRPM | WEIGHT: 145 LBS | BODY MASS INDEX: 24.75 KG/M2 | OXYGEN SATURATION: 96 % | TEMPERATURE: 98.2 F

## 2017-08-25 LAB
ANION GAP SERPL CALCULATED.3IONS-SCNC: 9 MMOL/L (ref 7–19)
BUN BLDV-MCNC: 18 MG/DL (ref 8–23)
CALCIUM SERPL-MCNC: 8.7 MG/DL (ref 8.8–10.2)
CHLORIDE BLD-SCNC: 108 MMOL/L (ref 98–111)
CO2: 24 MMOL/L (ref 22–29)
CREAT SERPL-MCNC: 1.2 MG/DL (ref 0.5–0.9)
EKG P AXIS: 72 DEGREES
EKG P-R INTERVAL: 170 MS
EKG Q-T INTERVAL: 467 MS
EKG QRS DURATION: 100 MS
EKG QTC CALCULATION (BAZETT): 455 MS
EKG T AXIS: 152 DEGREES
GFR NON-AFRICAN AMERICAN: 44
GLUCOSE BLD-MCNC: 112 MG/DL (ref 70–99)
GLUCOSE BLD-MCNC: 122 MG/DL (ref 74–109)
HCT VFR BLD CALC: 29.7 % (ref 37–47)
HEMOGLOBIN: 9.6 G/DL (ref 12–16)
MCH RBC QN AUTO: 30.3 PG (ref 27–31)
MCHC RBC AUTO-ENTMCNC: 32.3 G/DL (ref 33–37)
MCV RBC AUTO: 93.7 FL (ref 81–99)
PDW BLD-RTO: 13.6 % (ref 11.5–14.5)
PERFORMED ON: ABNORMAL
PLATELET # BLD: 165 K/UL (ref 130–400)
PMV BLD AUTO: 8.8 FL (ref 9.4–12.3)
POTASSIUM SERPL-SCNC: 4.1 MMOL/L (ref 3.5–5)
RBC # BLD: 3.17 M/UL (ref 4.2–5.4)
SODIUM BLD-SCNC: 141 MMOL/L (ref 136–145)
TROPONIN: <0.01 NG/ML (ref 0–0.03)
WBC # BLD: 3.8 K/UL (ref 4.8–10.8)

## 2017-08-25 PROCEDURE — 93005 ELECTROCARDIOGRAM TRACING: CPT

## 2017-08-25 PROCEDURE — 80048 BASIC METABOLIC PNL TOTAL CA: CPT

## 2017-08-25 PROCEDURE — G0378 HOSPITAL OBSERVATION PER HR: HCPCS

## 2017-08-25 PROCEDURE — 2580000003 HC RX 258: Performed by: INTERNAL MEDICINE

## 2017-08-25 PROCEDURE — 85027 COMPLETE CBC AUTOMATED: CPT

## 2017-08-25 PROCEDURE — 6370000000 HC RX 637 (ALT 250 FOR IP): Performed by: INTERNAL MEDICINE

## 2017-08-25 PROCEDURE — 36415 COLL VENOUS BLD VENIPUNCTURE: CPT

## 2017-08-25 PROCEDURE — 82948 REAGENT STRIP/BLOOD GLUCOSE: CPT

## 2017-08-25 PROCEDURE — 84484 ASSAY OF TROPONIN QUANT: CPT

## 2017-08-25 PROCEDURE — 99217 PR OBSERVATION CARE DISCHARGE MANAGEMENT: CPT | Performed by: INTERNAL MEDICINE

## 2017-08-25 RX ORDER — CLOPIDOGREL BISULFATE 75 MG/1
75 TABLET ORAL DAILY
Qty: 30 TABLET | Refills: 3 | Status: SHIPPED | OUTPATIENT
Start: 2017-08-25 | End: 2018-02-09 | Stop reason: ALTCHOICE

## 2017-08-25 RX ADMIN — ASPIRIN 81 MG: 81 TABLET, COATED ORAL at 08:52

## 2017-08-25 RX ADMIN — FENOFIBRATE 160 MG: 160 TABLET ORAL at 08:52

## 2017-08-25 RX ADMIN — RANOLAZINE 500 MG: 500 TABLET, FILM COATED, EXTENDED RELEASE ORAL at 08:52

## 2017-08-25 RX ADMIN — CLOPIDOGREL BISULFATE 75 MG: 75 TABLET ORAL at 08:52

## 2017-08-25 RX ADMIN — Medication 10 ML: at 08:52

## 2017-08-25 RX ADMIN — ISOSORBIDE MONONITRATE 120 MG: 60 TABLET, EXTENDED RELEASE ORAL at 08:52

## 2017-08-28 ENCOUNTER — TELEPHONE (OUTPATIENT)
Dept: CARDIOLOGY | Age: 72
End: 2017-08-28

## 2017-09-01 ENCOUNTER — HOSPITAL ENCOUNTER (OUTPATIENT)
Dept: CARDIAC REHAB | Age: 72
Setting detail: THERAPIES SERIES
Discharge: HOME | End: 2017-09-01
Payer: MEDICARE

## 2017-09-01 PROCEDURE — 93798 PHYS/QHP OP CAR RHAB W/ECG: CPT

## 2017-09-06 ENCOUNTER — HOSPITAL ENCOUNTER (OUTPATIENT)
Dept: CARDIAC REHAB | Age: 72
Setting detail: THERAPIES SERIES
Discharge: HOME | End: 2017-09-06
Payer: MEDICARE

## 2017-09-06 PROCEDURE — 93798 PHYS/QHP OP CAR RHAB W/ECG: CPT

## 2017-09-06 RX ORDER — NITROGLYCERIN 0.4 MG/1
TABLET SUBLINGUAL
Qty: 25 TABLET | Refills: 3 | Status: SHIPPED | OUTPATIENT
Start: 2017-09-06 | End: 2018-11-20 | Stop reason: SDUPTHER

## 2017-09-08 ENCOUNTER — HOSPITAL ENCOUNTER (OUTPATIENT)
Dept: CARDIAC REHAB | Age: 72
Setting detail: THERAPIES SERIES
Discharge: HOME | End: 2017-09-08
Payer: MEDICARE

## 2017-09-08 PROCEDURE — 93798 PHYS/QHP OP CAR RHAB W/ECG: CPT

## 2017-09-10 DIAGNOSIS — E78.5 HYPERLIPIDEMIA, UNSPECIFIED HYPERLIPIDEMIA TYPE: ICD-10-CM

## 2017-09-11 ENCOUNTER — HOSPITAL ENCOUNTER (OUTPATIENT)
Dept: CARDIAC REHAB | Age: 72
Setting detail: THERAPIES SERIES
Discharge: HOME | End: 2017-09-11
Payer: MEDICARE

## 2017-09-11 PROCEDURE — 93798 PHYS/QHP OP CAR RHAB W/ECG: CPT

## 2017-09-11 RX ORDER — ATORVASTATIN CALCIUM 80 MG/1
TABLET, FILM COATED ORAL
Qty: 90 TABLET | Refills: 0 | Status: SHIPPED | OUTPATIENT
Start: 2017-09-11 | End: 2017-12-11 | Stop reason: SDUPTHER

## 2017-09-13 ENCOUNTER — HOSPITAL ENCOUNTER (OUTPATIENT)
Dept: CARDIAC REHAB | Age: 72
Setting detail: THERAPIES SERIES
Discharge: HOME | End: 2017-09-13
Payer: MEDICARE

## 2017-09-13 PROCEDURE — 93798 PHYS/QHP OP CAR RHAB W/ECG: CPT

## 2017-09-15 ENCOUNTER — HOSPITAL ENCOUNTER (OUTPATIENT)
Dept: CARDIAC REHAB | Age: 72
Setting detail: THERAPIES SERIES
Discharge: HOME | End: 2017-09-15
Payer: MEDICARE

## 2017-09-15 PROCEDURE — 93798 PHYS/QHP OP CAR RHAB W/ECG: CPT

## 2017-09-18 ENCOUNTER — HOSPITAL ENCOUNTER (OUTPATIENT)
Dept: CARDIAC REHAB | Age: 72
Setting detail: THERAPIES SERIES
Discharge: HOME | End: 2017-09-18
Payer: MEDICARE

## 2017-09-18 PROCEDURE — 93798 PHYS/QHP OP CAR RHAB W/ECG: CPT

## 2017-09-22 ENCOUNTER — HOSPITAL ENCOUNTER (OUTPATIENT)
Dept: CARDIAC REHAB | Age: 72
Setting detail: THERAPIES SERIES
Discharge: HOME | End: 2017-09-22
Payer: MEDICARE

## 2017-09-22 PROCEDURE — 93798 PHYS/QHP OP CAR RHAB W/ECG: CPT

## 2017-09-25 ENCOUNTER — HOSPITAL ENCOUNTER (OUTPATIENT)
Dept: CARDIAC REHAB | Age: 72
Setting detail: THERAPIES SERIES
Discharge: HOME | End: 2017-09-25
Payer: MEDICARE

## 2017-09-25 PROCEDURE — 93798 PHYS/QHP OP CAR RHAB W/ECG: CPT

## 2017-09-27 ENCOUNTER — HOSPITAL ENCOUNTER (OUTPATIENT)
Dept: CARDIAC REHAB | Age: 72
Setting detail: THERAPIES SERIES
Discharge: HOME | End: 2017-09-27
Payer: MEDICARE

## 2017-09-27 PROCEDURE — 93798 PHYS/QHP OP CAR RHAB W/ECG: CPT

## 2017-09-29 ENCOUNTER — HOSPITAL ENCOUNTER (OUTPATIENT)
Dept: CARDIAC REHAB | Age: 72
Setting detail: THERAPIES SERIES
Discharge: HOME | End: 2017-09-29
Payer: MEDICARE

## 2017-09-29 PROCEDURE — 93798 PHYS/QHP OP CAR RHAB W/ECG: CPT

## 2017-10-02 ENCOUNTER — HOSPITAL ENCOUNTER (OUTPATIENT)
Dept: CARDIAC REHAB | Age: 72
Setting detail: THERAPIES SERIES
Discharge: HOME | End: 2017-10-02
Payer: MEDICARE

## 2017-10-02 PROCEDURE — 93798 PHYS/QHP OP CAR RHAB W/ECG: CPT

## 2017-10-04 ENCOUNTER — HOSPITAL ENCOUNTER (OUTPATIENT)
Dept: CARDIAC REHAB | Age: 72
Setting detail: THERAPIES SERIES
Discharge: HOME | End: 2017-10-04
Payer: MEDICARE

## 2017-10-04 PROCEDURE — 93798 PHYS/QHP OP CAR RHAB W/ECG: CPT

## 2017-10-06 ENCOUNTER — HOSPITAL ENCOUNTER (OUTPATIENT)
Dept: CARDIAC REHAB | Age: 72
Setting detail: THERAPIES SERIES
Discharge: HOME | End: 2017-10-06
Payer: MEDICARE

## 2017-10-06 ENCOUNTER — OFFICE VISIT (OUTPATIENT)
Dept: CARDIOLOGY | Age: 72
End: 2017-10-06
Payer: MEDICARE

## 2017-10-06 VITALS
DIASTOLIC BLOOD PRESSURE: 80 MMHG | HEART RATE: 108 BPM | WEIGHT: 146 LBS | BODY MASS INDEX: 24.32 KG/M2 | SYSTOLIC BLOOD PRESSURE: 128 MMHG | HEIGHT: 65 IN

## 2017-10-06 DIAGNOSIS — I25.118 CORONARY ARTERY DISEASE OF NATIVE ARTERY OF NATIVE HEART WITH STABLE ANGINA PECTORIS (HCC): Primary | ICD-10-CM

## 2017-10-06 DIAGNOSIS — R53.83 MALAISE AND FATIGUE: ICD-10-CM

## 2017-10-06 DIAGNOSIS — R53.81 MALAISE AND FATIGUE: ICD-10-CM

## 2017-10-06 DIAGNOSIS — E78.2 MIXED HYPERLIPIDEMIA: ICD-10-CM

## 2017-10-06 DIAGNOSIS — I10 ESSENTIAL HYPERTENSION: ICD-10-CM

## 2017-10-06 PROCEDURE — 3014F SCREEN MAMMO DOC REV: CPT | Performed by: CLINICAL NURSE SPECIALIST

## 2017-10-06 PROCEDURE — G8598 ASA/ANTIPLAT THER USED: HCPCS | Performed by: CLINICAL NURSE SPECIALIST

## 2017-10-06 PROCEDURE — 1123F ACP DISCUSS/DSCN MKR DOCD: CPT | Performed by: CLINICAL NURSE SPECIALIST

## 2017-10-06 PROCEDURE — 1036F TOBACCO NON-USER: CPT | Performed by: CLINICAL NURSE SPECIALIST

## 2017-10-06 PROCEDURE — 99214 OFFICE O/P EST MOD 30 MIN: CPT | Performed by: CLINICAL NURSE SPECIALIST

## 2017-10-06 PROCEDURE — G8420 CALC BMI NORM PARAMETERS: HCPCS | Performed by: CLINICAL NURSE SPECIALIST

## 2017-10-06 PROCEDURE — 93798 PHYS/QHP OP CAR RHAB W/ECG: CPT

## 2017-10-06 PROCEDURE — 1090F PRES/ABSN URINE INCON ASSESS: CPT | Performed by: CLINICAL NURSE SPECIALIST

## 2017-10-06 PROCEDURE — G8484 FLU IMMUNIZE NO ADMIN: HCPCS | Performed by: CLINICAL NURSE SPECIALIST

## 2017-10-06 PROCEDURE — G8428 CUR MEDS NOT DOCUMENT: HCPCS | Performed by: CLINICAL NURSE SPECIALIST

## 2017-10-06 PROCEDURE — G8399 PT W/DXA RESULTS DOCUMENT: HCPCS | Performed by: CLINICAL NURSE SPECIALIST

## 2017-10-06 PROCEDURE — 93000 ELECTROCARDIOGRAM COMPLETE: CPT | Performed by: CLINICAL NURSE SPECIALIST

## 2017-10-06 PROCEDURE — 3017F COLORECTAL CA SCREEN DOC REV: CPT | Performed by: CLINICAL NURSE SPECIALIST

## 2017-10-06 PROCEDURE — 4040F PNEUMOC VAC/ADMIN/RCVD: CPT | Performed by: CLINICAL NURSE SPECIALIST

## 2017-10-06 ASSESSMENT — ENCOUNTER SYMPTOMS
COUGH: 0
SHORTNESS OF BREATH: 1
NAUSEA: 0
ORTHOPNEA: 0
BLOOD IN STOOL: 0
HEARTBURN: 0
BLURRED VISION: 0
VOMITING: 0

## 2017-10-06 NOTE — PATIENT INSTRUCTIONS
Followup With Dr. Derinda Siemens and [facility] for now  Call for worsening symptoms    Call with any questions or concerns  Follow up with Nikkie Castaneda MD for non cardiac problems  Report any new problems  Cardiovascular Fitness-Exercise as tolerated. Strive for 15 minutes of exercise most days of the week. Cardiac / Healthy Diet  Continue current medications as directed  Continue plan of treatment  It is always recommended that you bring your medications bottles with you to each visit - this is for your safety! How to take:  NITROGLYCERIN (Nitrostat) 0.4 mg tablets, sublingual.  Nitroglycerin is in a group of drugs called nitrates. Nitroglycerin dilates (widens) blood vessels, making it easier for blood to flow through them and easier for the heart to pump. Dosing Guidelines for Nitroglycerin Tablets  · At the start of an angina (chest pain) attack, place one tablet under the tongue or between the cheek and gum. Do not swallow or chew the tablet; let it dissolve on its own. If necessary, a second and third tablet may be used, with five minutes between using each tablet. If you use a third tablet and your chest pain continues, it is time to seek immediate medical attention. Call 911 immediately and have someone drive you to the emergency room. You may be having a heart attack or other serious heart problem. · To prevent angina from exercise or stress, use 1 tablet 5 to 10 minutes before the activity. Angina: Care Instructions  Your Care Instructions    You have a problem called angina. Angina happens when there is not enough blood flow to your heart muscle. Angina is a sign of coronary artery disease (CAD). CAD occurs when blood vessels that supply the heart become narrowed. Having CAD increases your risk of a heart attack. Chest pain or pressure is the most common symptom of angina.  But some people have other symptoms, like:  · Pain, pressure, or a strange diet that is low in saturated fat and salt, and is high in fiber. Talk to your doctor or a dietitian about healthy eating. · Stay at a healthy weight. Or lose weight if you need to. Activity  · Talk to your doctor about a level of activity that is safe for you. · If an activity causes angina symptoms, stop and rest.  When should you call for help? Call 911 anytime you think you may need emergency care. For example, call if:  · You passed out (lost consciousness). · You have symptoms of a heart attack. These may include:  ¨ Chest pain or pressure, or a strange feeling in the chest.  ¨ Sweating. ¨ Shortness of breath. ¨ Nausea or vomiting. ¨ Pain, pressure, or a strange feeling in the back, neck, jaw, or upper belly or in one or both shoulders or arms. ¨ Lightheadedness or sudden weakness. ¨ A fast or irregular heartbeat. After you call 911, the  may tell you to chew 1 adult-strength or 2 to 4 low-dose aspirin. Wait for an ambulance. Do not try to drive yourself. · You have angina symptoms that do not go away with rest or are not getting better within 5 minutes after you take a dose of nitroglycerin. Call your doctor now or seek immediate medical care if:  · You are having angina symptoms more often than usual, or they are different or worse than usual.  · You feel dizzy or lightheaded, or you feel like you may faint. Watch closely for changes in your health, and be sure to contact your doctor if you have any problems. Where can you learn more? Go to https://Leafjose.[employer]. org and sign in to your BuddyBounce account. Enter H129 in the [facility] box to learn more about \"Angina: Care Instructions. \"     If you do not have an account, please click on the \"Sign Up Now\" link. Current as of: April 3, 2017  Content Version: 11.3  © 8964-1833 [employer]. Care instructions adapted under license by [facility] ([facility]).  If you have questions about a medical condition or

## 2017-10-06 NOTE — PROGRESS NOTES
[facility], [address] [place] [address], [id number]  Phone: [phone]  Fax: [phone]    OFFICE VISIT:  10/6/2017    Melo Castaneda - : 1945    Reason For Visit:  Dung Marques is a 67 y.o. female who is here for hospital follow-up status post heart cath    HPI   Patient is here for hospital follow-up status post heart cath patient had a cutting balloon angioplasty to her LAD diagonal. She states overall her anginal symptoms improved. Her Ranexa was [facility] at [facility] as well as her Imdur. She had to call the office back because she was having some chest pressure and Imdur was restarted. Symptoms seem to have improved somewhat since restarting the Imdur. She is attending cardiac rehab. She is experiencing a lot of fatigue and she states she's had to decrease the amount of exercise she is doing. She also suffers with fibromyalgia and lupus which can cause fatigue. There is no sign of fluid retention such as weight gain, orthopnea, PND, edema. She denies any palpitations    Liza Fernandez MD is PCP.   Melo Castaneda has the following history as recorded in [facility]:    Patient Active Problem List    Diagnosis Date Noted    Coronary artery disease of native artery of native heart with stable angina pectoris [facility])      Priority: High    Malaise and fatigue 10/06/2017    Chest pressure     Fatigue 2016    S/P right coronary artery (RCA) stent placement 2016    Hx of CABG 2016    CAD (coronary artery disease)     Hyperlipidemia     Hypertension     CAD (coronary artery disease)     Chest pain     Osteoarthritis     GERD (gastroesophageal reflux disease)     Asthma     Glucose intolerance (impaired glucose tolerance)     Depression with anxiety      Past Medical History:   Diagnosis Date    Asthma     CAD (coronary artery disease)     Chest pain     Depression with anxiety     Diabetes mellitus ([facility] [address]     GERD (gastroesophageal Cigarettes     Quit date: 2/26/1967    Smokeless tobacco: Never Used    Alcohol use No      Current Outpatient Prescriptions   Medication Sig Dispense Refill    atorvastatin (LIPITOR) 80 MG tablet TAKE 1 TABLET NIGHTLY 90 tablet 0    NITROSTAT 0.4 MG SL tablet DISSOLVE 1 TABLET UNDER THE TONGUE EVERY 5 MINUTES AS NEEDED FOR CHEST PAIN 25 tablet 3    clopidogrel (PLAVIX) 75 MG tablet Take 1 tablet by mouth daily 30 tablet 3    isosorbide mononitrate (IMDUR) 120 MG extended release tablet Take 1 tablet by mouth daily 90 tablet 3    BYSTOLIC 5 MG tablet TAKE 1 TABLET DAILY 90 tablet 3    Cyanocobalamin (VITAMIN B 12 PO) Take 1,000 mcg by mouth daily      amitriptyline (ELAVIL) 10 MG tablet Take 25 mg by mouth nightly       metFORMIN (GLUCOPHAGE) 500 MG tablet Take 500 mg by mouth 2 times daily (with meals)       promethazine (PHENERGAN) 25 MG tablet Take 25 mg by mouth every 6 hours as needed for Nausea      cyclobenzaprine (FLEXERIL) 10 MG tablet Take 10 mg by mouth 2 times daily as needed for Muscle spasms.  ALPRAZolam (XANAX) 0.25 MG tablet Take 0.25 mg by mouth daily as needed (takes 1/2 tab)       albuterol (PROVENTIL HFA;VENTOLIN HFA) 108 (90 BASE) MCG/ACT inhaler Inhale 2 puffs into the lungs every 6 hours as needed.  traZODone (DESYREL) 50 MG tablet Take 50 mg by mouth nightly.  choline fenofibrate (TRILIPIX) 135 MG CPDR Take 135 mg by mouth daily.  aspirin 81 MG tablet Take 81 mg by mouth daily.  zolpidem (AMBIEN) 10 MG tablet Take 5 mg by mouth nightly        No current facility-administered medications for this visit. Allergies: Codeine; Darvocet [propoxyphene n-acetaminophen]; Hydrocodone-acetaminophen; Morphine; and Percocet [oxycodone-acetaminophen]    Review of Systems  Review of Systems   Constitutional: Positive for malaise/fatigue (Increasing). Negative for chills and fever. Eyes: Negative for blurred vision.    Respiratory: Positive for shortness of

## 2017-10-06 NOTE — MR AVS SNAPSHOT
Rehab [phone]    11/6/2017 7:30 AM [facility] PHASE II PROVIDER CLASS [facility] [phone]    11/8/2017 7:30 AM [facility] PHASE II PROVIDER CLASS [facility] [phone]    11/10/2017 7:30 AM [facility] PHASE II PROVIDER CLASS [facility] [phone]    11/13/2017 7:30 AM [facility] PHASE II PROVIDER CLASS [facility] [phone]    11/15/2017 7:30 AM [facility] PHASE II PROVIDER CLASS [facility] [phone]    11/17/2017 7:30 AM [facility] PHASE II PROVIDER CLASS [facility] [phone]    11/20/2017 7:30 AM [facility] PHASE II PROVIDER CLASS [facility] [phone]    11/22/2017 7:30 AM [facility] PHASE II PROVIDER CLASS [facility] [phone]         Information from Your Visit        Department     Name Address Phone Fax    [facility] [address]      You Were Seen for:         Comments    Essential hypertension   [[id number]]         Vital Signs     Blood Pressure Pulse Height Weight Body Mass Index Smoking Status    128/80 108 5' 5\" (1.651 m) 146 lb (66.2 kg) 24.3 kg/m2 Former Smoker      Instructions    Followup With Dr. Aniyah Abrams and same meds for now  Call for worsening symptoms    Call with any questions or concerns  Follow up with Harper Berger MD for non cardiac problems  Report any new problems  Cardiovascular Fitness-Exercise as tolerated. Strive for 15 minutes of exercise most days of the week. Cardiac / Healthy Diet  Continue current medications as directed  Continue plan of treatment  It is always recommended that you bring your medications bottles with you to each visit - this is for your safety! How to take:  NITROGLYCERIN (Nitrostat) 0.4 mg tablets, sublingual.  Nitroglycerin is in a group of drugs called nitrates.  Nitroglycerin The doctor has checked you carefully, but problems can develop later. If you notice any problems or new symptoms, get medical treatment right away. Follow-up care is a key part of your treatment and safety. Be sure to make and go to all appointments, and call your doctor if you are having problems. It's also a good idea to know your test results and keep a list of the medicines you take. How can you care for yourself at home? Medicines  · If your doctor has given you nitroglycerin for angina symptoms, keep it with you at all times. If you have symptoms, sit down and rest, and take the first dose of nitroglycerin as directed. If your symptoms get worse or are not getting better within 5 minutes, call 911 right away. Stay on the phone. The emergency  will give you further instructions. · If your doctor advises it, take 1 low-dose aspirin a day to prevent heart attack. · Be safe with medicines. Take your medicines exactly as prescribed. Call your doctor if you think you are having a problem with your medicine. You will get more details on the specific medicines your doctor prescribes. Lifestyle changes  · Do not smoke. If you need help quitting, talk to your doctor about stop-smoking programs and medicines. These can increase your chances of quitting for good. · Eat a heart-healthy diet that is low in saturated fat and salt, and is high in fiber. Talk to your doctor or a dietitian about healthy eating. · Stay at a healthy weight. Or lose weight if you need to. Activity  · Talk to your doctor about a level of activity that is safe for you. · If an activity causes angina symptoms, stop and rest.  When should you call for help? Call 911 anytime you think you may need emergency care. For example, call if:  · You passed out (lost consciousness). · You have symptoms of a heart attack. These may include:  ¨ Chest pain or pressure, or a strange feeling in the chest.  ¨ Sweating. ¨ Shortness of breath. ¨ Nausea or vomiting. ¨ Pain, pressure, or a strange feeling in the back, neck, jaw, or upper belly or in one or both shoulders or arms. ¨ Lightheadedness or sudden weakness. ¨ A fast or irregular heartbeat. After you call 911, the  may tell you to chew 1 adult-strength or 2 to 4 low-dose aspirin. Wait for an ambulance. Do not try to drive yourself. · You have angina symptoms that do not go away with rest or are not getting better within 5 minutes after you take a dose of nitroglycerin. Call your doctor now or seek immediate medical care if:  · You are having angina symptoms more often than usual, or they are different or worse than usual.  · You feel dizzy or lightheaded, or you feel like you may faint. Watch closely for changes in your health, and be sure to contact your doctor if you have any problems. Where can you learn more? Go to https://Zlio.[employer]uRev. org and sign in to your Plexisoft account. Enter H129 in the [employer] box to learn more about \"Angina: Care Instructions. \"     If you do not have an account, please click on the \"Sign Up Now\" link. Current as of: April 3, 2017  Content Version: 11.3  © 8548-2415 [employer]. Care instructions adapted under license by [facility] ([facility]). If you have questions about a medical condition or this instruction, always ask your healthcare professional. [place] [address] any warranty or liability for your use of this information.               Medications and Orders      Your Current Medications Are              atorvastatin (LIPITOR) 80 MG tablet TAKE 1 TABLET NIGHTLY    NITROSTAT 0.4 MG SL tablet DISSOLVE 1 TABLET UNDER THE TONGUE EVERY 5 MINUTES AS NEEDED FOR CHEST PAIN    clopidogrel (PLAVIX) 75 MG tablet Take 1 tablet by mouth daily    isosorbide mononitrate (IMDUR) 120 MG extended release tablet Take 1 tablet by mouth daily    BYSTOLIC 5 MG tablet TAKE 1 TABLET DAILY

## 2017-10-09 ENCOUNTER — HOSPITAL ENCOUNTER (OUTPATIENT)
Dept: CARDIAC REHAB | Age: 72
Setting detail: THERAPIES SERIES
Discharge: HOME | End: 2017-10-09
Payer: MEDICARE

## 2017-10-09 PROCEDURE — 93798 PHYS/QHP OP CAR RHAB W/ECG: CPT

## 2017-10-18 ENCOUNTER — HOSPITAL ENCOUNTER (OUTPATIENT)
Dept: CARDIAC REHAB | Age: 72
Setting detail: THERAPIES SERIES
Discharge: HOME | End: 2017-10-18
Payer: MEDICARE

## 2017-10-18 PROCEDURE — 93798 PHYS/QHP OP CAR RHAB W/ECG: CPT

## 2017-10-20 ENCOUNTER — HOSPITAL ENCOUNTER (OUTPATIENT)
Dept: CARDIAC REHAB | Age: 72
Setting detail: THERAPIES SERIES
Discharge: HOME | End: 2017-10-20
Payer: MEDICARE

## 2017-10-20 PROCEDURE — 93798 PHYS/QHP OP CAR RHAB W/ECG: CPT

## 2017-10-23 ENCOUNTER — HOSPITAL ENCOUNTER (OUTPATIENT)
Dept: CARDIAC REHAB | Age: 72
Setting detail: THERAPIES SERIES
Discharge: HOME | End: 2017-10-23
Payer: MEDICARE

## 2017-10-23 PROCEDURE — 93798 PHYS/QHP OP CAR RHAB W/ECG: CPT

## 2017-10-25 ENCOUNTER — HOSPITAL ENCOUNTER (OUTPATIENT)
Dept: CARDIAC REHAB | Age: 72
Setting detail: THERAPIES SERIES
Discharge: HOME | End: 2017-10-25
Payer: MEDICARE

## 2017-10-25 PROCEDURE — 93798 PHYS/QHP OP CAR RHAB W/ECG: CPT

## 2017-10-27 ENCOUNTER — HOSPITAL ENCOUNTER (OUTPATIENT)
Dept: CARDIAC REHAB | Age: 72
Setting detail: THERAPIES SERIES
Discharge: HOME | End: 2017-10-27
Payer: MEDICARE

## 2017-10-27 PROCEDURE — 93798 PHYS/QHP OP CAR RHAB W/ECG: CPT

## 2017-10-30 ENCOUNTER — HOSPITAL ENCOUNTER (OUTPATIENT)
Dept: CARDIAC REHAB | Age: 72
Setting detail: THERAPIES SERIES
Discharge: HOME | End: 2017-10-30
Payer: MEDICARE

## 2017-10-30 PROCEDURE — 93798 PHYS/QHP OP CAR RHAB W/ECG: CPT

## 2017-11-03 ENCOUNTER — HOSPITAL ENCOUNTER (OUTPATIENT)
Dept: CARDIAC REHAB | Age: 72
Setting detail: THERAPIES SERIES
Discharge: HOME | End: 2017-11-03
Payer: MEDICARE

## 2017-11-03 PROCEDURE — 93798 PHYS/QHP OP CAR RHAB W/ECG: CPT

## 2017-11-06 ENCOUNTER — HOSPITAL ENCOUNTER (OUTPATIENT)
Dept: CARDIAC REHAB | Age: 72
Setting detail: THERAPIES SERIES
Discharge: HOME | End: 2017-11-06
Payer: MEDICARE

## 2017-11-06 PROCEDURE — 93798 PHYS/QHP OP CAR RHAB W/ECG: CPT

## 2017-11-07 ENCOUNTER — TELEPHONE (OUTPATIENT)
Dept: CARDIOLOGY | Age: 72
End: 2017-11-07

## 2017-11-07 NOTE — TELEPHONE ENCOUNTER
Talked with Dior Strickland and advised patient to hold off on cardiac rehab tomorrow due to problems she is having and come to the office at 10:15. Patient voiced understanding.

## 2017-11-08 ENCOUNTER — OFFICE VISIT (OUTPATIENT)
Dept: CARDIOLOGY | Age: 72
End: 2017-11-08
Payer: MEDICARE

## 2017-11-08 VITALS
WEIGHT: 146 LBS | BODY MASS INDEX: 24.32 KG/M2 | DIASTOLIC BLOOD PRESSURE: 88 MMHG | HEIGHT: 65 IN | SYSTOLIC BLOOD PRESSURE: 130 MMHG | HEART RATE: 74 BPM

## 2017-11-08 DIAGNOSIS — R42 DIZZINESS: ICD-10-CM

## 2017-11-08 DIAGNOSIS — R07.89 OTHER CHEST PAIN: Primary | ICD-10-CM

## 2017-11-08 PROCEDURE — G8598 ASA/ANTIPLAT THER USED: HCPCS | Performed by: INTERNAL MEDICINE

## 2017-11-08 PROCEDURE — 1036F TOBACCO NON-USER: CPT | Performed by: INTERNAL MEDICINE

## 2017-11-08 PROCEDURE — 4040F PNEUMOC VAC/ADMIN/RCVD: CPT | Performed by: INTERNAL MEDICINE

## 2017-11-08 PROCEDURE — G8420 CALC BMI NORM PARAMETERS: HCPCS | Performed by: INTERNAL MEDICINE

## 2017-11-08 PROCEDURE — 99213 OFFICE O/P EST LOW 20 MIN: CPT | Performed by: INTERNAL MEDICINE

## 2017-11-08 PROCEDURE — G8427 DOCREV CUR MEDS BY ELIG CLIN: HCPCS | Performed by: INTERNAL MEDICINE

## 2017-11-08 PROCEDURE — 3014F SCREEN MAMMO DOC REV: CPT | Performed by: INTERNAL MEDICINE

## 2017-11-08 PROCEDURE — 1090F PRES/ABSN URINE INCON ASSESS: CPT | Performed by: INTERNAL MEDICINE

## 2017-11-08 PROCEDURE — G8399 PT W/DXA RESULTS DOCUMENT: HCPCS | Performed by: INTERNAL MEDICINE

## 2017-11-08 PROCEDURE — G8484 FLU IMMUNIZE NO ADMIN: HCPCS | Performed by: INTERNAL MEDICINE

## 2017-11-08 PROCEDURE — 1123F ACP DISCUSS/DSCN MKR DOCD: CPT | Performed by: INTERNAL MEDICINE

## 2017-11-08 PROCEDURE — 3017F COLORECTAL CA SCREEN DOC REV: CPT | Performed by: INTERNAL MEDICINE

## 2017-11-08 NOTE — PROGRESS NOTES
CARD[facility]OLOG[facility]  [facility], [address] [place] [address] Drive, [address] [place] [address] Street, [address] [place]  The following was transcribed by Amor Simon M.T. Cipriano Elaine : 1945, 67 y.o. Female        Office Visit:  2017    Chief Complaint   Patient presents with    Follow-up     pt states chest pain and dizzy. close to passing out on monday     Chest Pain    Dizziness      HISTORY OF PRESENT ILLNESS  Ms. Cipriano Elaine is seen here for coronary artery disease and hypertension. Katie Ha presents having had chest pain again, starting a few weeks ago, they are exertional in nature and have been associated with exertional lightheadedness. She describes no overt heart failure and no yane syncope. She has noted no tachy- or perico-arrhythmia.        Patient Active Problem List   Diagnosis Code    Chest pain R07.9    Osteoarthritis M19.90    GERD (gastroesophageal reflux disease) K21.9    Asthma J45.909    Glucose intolerance (impaired glucose tolerance) R73.02    Depression with anxiety F41.8    CAD (coronary artery disease) I25.10    Hyperlipidemia E78.5    Hypertension I10    CAD (coronary artery disease) I25.10    Fatigue R53.83    S/P right coronary artery (RCA) stent placement Z95.5    Hx of CABG Z95.1    Chest pressure R07.89    Coronary artery disease of native artery of native heart with stable angina pectoris (HCC) I25.118    Malaise and fatigue R53.81, R53.83     Past Medical History:   Diagnosis Date    Asthma     CAD (coronary artery disease)     Chest pain     Depression with anxiety     Diabetes mellitus (HCC)     GERD (gastroesophageal reflux disease)     Glucose intolerance (impaired glucose tolerance)     Hyperlipidemia     Cholesterol management per Griselda Hills M.D.    Hypertension     Lupus (systemic lupus erythematosus) ([facility] [address]     Malaise and fatigue 10/6/2017    Movement disorder     Sees pain management for neck pain and previous surgery    Osteoarthritis     S/P CABG x 3 03/05/2013    PACABG X 3, LIMA-LAD, SVG0OM, SVG-DIAG, RT EVH, DR Raghav Agarwal     Past Surgical History:   Procedure Laterality Date    APPENDECTOMY  1965    BACK SURGERY      CARDIAC CATHETERIZATION  5/18/11    EF 60%    CARDIAC CATHETERIZATION  1/25/16    drug eluting stent to RCA    CARDIAC CATHETERIZATION  1/25/2016    CARDIAC CATHETERIZATION  2/19/16    CARDIAC CATHETERIZATION  08/24/2017    COLON SURGERY  1992    Polypectomy - 2    COLON SURGERY  2003    Polypectomy - 4    COLON SURGERY  11/2008    Polypectomy - 2    COLON SURGERY  8/2012    Polypectomy - 3    COLONOSCOPY      CORONARY ANGIOPLASTY  08/2017    cutting balloon angioplasty LAD diagonal    CORONARY ANGIOPLASTY WITH STENT PLACEMENT  3/22/2007    CORONARY ANGIOPLASTY WITH STENT PLACEMENT  8/2007    CORONARY ARTERY BYPASS GRAFT  3/5/2013    PACABG X 3, LIMA-LAD, SVG0OM, SVG-DIAG, RT EVH, DR PATEL    COSMETIC SURGERY      deviated septum and rhinoplasty    CYST REMOVAL  1970    ENDOSCOPY, COLON, DIAGNOSTIC      HEMORRHOID SURGERY  1987    HYSTERECTOMY  1983    Partial    NASAL SEPTUM SURGERY  1977    SINUS SURGERY  2003    Nasal Polyps Removed    SPINE SURGERY  3/6/2006    Lumbar Laminectomy L4&5    SPINE SURGERY  3/6/2006    Cervical Fusion - C4,5,6    TONSILLECTOMY  1968      Family History   Problem Relation Age of Onset    Heart Disease Mother     High Blood Pressure Mother     Diabetes Mother     High Cholesterol Mother     Diabetes Brother      Social History   Substance Use Topics    Smoking status: Former Smoker     Packs/day: 1.00     Years: 1.00     Types: Cigarettes     Quit date: 2/26/1967    Smokeless tobacco: Never Used    Alcohol use No      Allergies   Allergen Reactions    Codeine Nausea Only    Darvocet [Propoxyphene N-Acetaminophen]     Hydrocodone-Acetaminophen Nausea Only    Morphine Nausea Only    Percocet [Oxycodone-Acetaminophen] Nausea Only     Outpatient Widely patent coronary bypass grafts ×3 including internal mammary to the LAD and individual vein grafts to an obtuse marginal branch and diagonal branch. 6.  Successful percutaneous coronary intervention with cutting balloon angioplasty to an unbypassed but previously stented LAD diagonal branch.     REVIEW OF SYSTEMS  Constitutional:  Negative for diaphoresis, fever, appetite change or unexpected weight change. HENT:  Negative for nosebleeds, facial swelling, rhinorrhea and neck stiffness. RESPIRATORY:  Negative shortness of breath, cough or sputum production. No wheezing or stridor. CARDIOVASCULAR:  Positive for chest pain and lightheadedness. There is no overt heart failure or syncope. GASTROINTESTINAL:   Negative for abdominal distention. GENITOURINARY:  Negative for dysuria, urgency and frequency. MUSCULOSKELETAL:   Negative for myalgia, arthralgia and gait problem. SKIN:  Negative for color change, pallor, rash and wound. NEUROLOGICAL:   Negative for numbness and headaches. Negative for speech difficulty. HEMATOLOGICAL:   Negative for bruising and bleeding easily. PSYCHIATRIC/BEHAVIORAL:   No excessive anxiety or confusion. Except as noted in the HPI, all other systems are negative. PHYSICAL EXAMINATION  GENERAL:  Alert and oriented x3 in no apparent distress. Short-term and long-term memory intact. Judgment intact. Oriented to time, place and person. No depression, anxiety or agitation. Vital Signs:  /88   Pulse 74   Ht 5' 4.5\" (1.638 m)   Wt 146 lb (66.2 kg)   BMI 24.67 kg/m²    HEAD:  Normocephalic without evidence of old or recent trauma. EYES:  Sclerae clear. Conjunctivae pink. Pupils equal and round. NOSE:  Negative nasal discharge or epistaxis. THROAT:  No lesions on lips or buccal mucosa. NECK:  Supple without mass or JVD. Carotid pulses 2+ to palpation bilaterally without bruit. No thyromegaly noted.     CHEST:  Equal bilateral

## 2017-11-09 RX ORDER — RANOLAZINE 500 MG/1
500 TABLET, EXTENDED RELEASE ORAL 2 TIMES DAILY
Qty: 60 TABLET | Refills: 3 | Status: SHIPPED | OUTPATIENT
Start: 2017-11-09 | End: 2018-04-05 | Stop reason: SDUPTHER

## 2017-11-15 ENCOUNTER — OFFICE VISIT (OUTPATIENT)
Dept: CARDIOLOGY | Age: 72
End: 2017-11-15
Payer: MEDICARE

## 2017-11-15 ENCOUNTER — TELEPHONE (OUTPATIENT)
Dept: CARDIOLOGY | Age: 72
End: 2017-11-15

## 2017-11-15 VITALS
WEIGHT: 149 LBS | DIASTOLIC BLOOD PRESSURE: 68 MMHG | BODY MASS INDEX: 24.83 KG/M2 | SYSTOLIC BLOOD PRESSURE: 128 MMHG | HEART RATE: 65 BPM | HEIGHT: 65 IN

## 2017-11-15 DIAGNOSIS — G45.8 OTHER SPECIFIED TRANSIENT CEREBRAL ISCHEMIAS: Primary | ICD-10-CM

## 2017-11-15 DIAGNOSIS — I10 ESSENTIAL HYPERTENSION: ICD-10-CM

## 2017-11-15 DIAGNOSIS — I25.10 ATHEROSCLEROTIC CARDIOVASCULAR DISEASE: Primary | ICD-10-CM

## 2017-11-15 PROCEDURE — 1090F PRES/ABSN URINE INCON ASSESS: CPT | Performed by: INTERNAL MEDICINE

## 2017-11-15 PROCEDURE — 1123F ACP DISCUSS/DSCN MKR DOCD: CPT | Performed by: INTERNAL MEDICINE

## 2017-11-15 PROCEDURE — 3014F SCREEN MAMMO DOC REV: CPT | Performed by: INTERNAL MEDICINE

## 2017-11-15 PROCEDURE — G8419 CALC BMI OUT NRM PARAM NOF/U: HCPCS | Performed by: INTERNAL MEDICINE

## 2017-11-15 PROCEDURE — G8427 DOCREV CUR MEDS BY ELIG CLIN: HCPCS | Performed by: INTERNAL MEDICINE

## 2017-11-15 PROCEDURE — 4040F PNEUMOC VAC/ADMIN/RCVD: CPT | Performed by: INTERNAL MEDICINE

## 2017-11-15 PROCEDURE — 1036F TOBACCO NON-USER: CPT | Performed by: INTERNAL MEDICINE

## 2017-11-15 PROCEDURE — G8399 PT W/DXA RESULTS DOCUMENT: HCPCS | Performed by: INTERNAL MEDICINE

## 2017-11-15 PROCEDURE — G8484 FLU IMMUNIZE NO ADMIN: HCPCS | Performed by: INTERNAL MEDICINE

## 2017-11-15 PROCEDURE — 99213 OFFICE O/P EST LOW 20 MIN: CPT | Performed by: INTERNAL MEDICINE

## 2017-11-15 PROCEDURE — 3017F COLORECTAL CA SCREEN DOC REV: CPT | Performed by: INTERNAL MEDICINE

## 2017-11-15 PROCEDURE — G8598 ASA/ANTIPLAT THER USED: HCPCS | Performed by: INTERNAL MEDICINE

## 2017-11-16 ENCOUNTER — TELEPHONE (OUTPATIENT)
Dept: NEUROLOGY | Age: 72
End: 2017-11-16

## 2017-11-16 NOTE — TELEPHONE ENCOUNTER
Called and spoke with patient, advised Dr. Christina Hutchinson has put in a referral for Neuro and they will be contacting with appointment. Patient verbally understood.

## 2017-11-16 NOTE — PROGRESS NOTES
CARDIOLOGY ASSOCIATES  [facility], [address] [place] [address] Drive, [address] [place] [address] Street, [address] [place]  The following was transcribed by Gerri Howell M.T. Ihsan Castro : 1945, 67 y.o. Female        Office Visit:  11/15/2017    Chief Complaint   Patient presents with    2 Week Follow-Up     On  we re-added Ranexa.  Chest Pain    Dizziness      HISTORY OF PRESENT ILLNESS  Ms. Ihsan Castro is seen here for coronary artery disease and hypertension. Emelina Laughter presents today continuing to have chest discomfort with exertion suggestive of myocardial ischemia. Unfortunately, she is now having some problems with placing her words. She is noting some left upper extremity incoordination. She is having no overt heart failure, no syncope, and no lasting neurological changes.             Patient Active Problem List   Diagnosis Code    Chest pain R07.9    Osteoarthritis M19.90    GERD (gastroesophageal reflux disease) K21.9    Asthma J45.909    Glucose intolerance (impaired glucose tolerance) R73.02    Depression with anxiety F41.8    CAD (coronary artery disease) I25.10    Hyperlipidemia E78.5    Hypertension I10    CAD (coronary artery disease) I25.10    Fatigue R53.83    S/P right coronary artery (RCA) stent placement Z95.5    Hx of CABG Z95.1    Chest pressure R07.89    Coronary artery disease of native artery of native heart with stable angina pectoris (HCC) I25.118    Malaise and fatigue R53.81, R53.83     Past Medical History:   Diagnosis Date    Asthma     CAD (coronary artery disease)     Chest pain     Depression with anxiety     Diabetes mellitus (HCC)     GERD (gastroesophageal reflux disease)     Glucose intolerance (impaired glucose tolerance)     Hyperlipidemia     Cholesterol management per Madai Cooper M.D.    Hypertension     Lupus (systemic lupus erythematosus) ([facility] [address]     Malaise and fatigue 10/6/2017    Movement disorder     Sees pain management for neck epistaxis. THROAT:  No lesions on lips or buccal mucosa. NECK:  Supple without mass or JVD. Carotid pulses 2+ to palpation bilaterally without bruit. No thyromegaly noted. CHEST:  Equal bilateral expansion. RESPIRATORY:  The lungs are clear to auscultation. Normal respiratory effort. CARDIOVASCULAR: The heart demonstrates regular rhythm with a normal rate. No audible murmurs or gallops. ABDOMEN:  Soft, nontender. Exhibits no distension. Bowel sounds are normal.   UPPER EXTREMITY EVALUATION:  Radial pulses palpable bilaterally. No cyanosis, clubbing or edema. LOWER EXTREMITY EVALUATION:  Femoral, popliteal, dorsalis pedis, and posterior tibialis pulses 2+ to palpation bilaterally. No cyanosis, clubbing, or peripheral edema. SKIN:  Warm and dry. MUSCULOSKELETAL:  Normal muscle strength and tone. SKIN:  Warm, dry. NEUROLOGIC:  Cranial nerves II through XII are grossly intact. IMPRESSION / PLAN  1. Recent neurological changes. 2.  We've discussed discussing with Dr. Dawna Del Rosario versus going to the emergency room. They are going to speak with her and see when she gets cleared from a neurological standpoint we will proceed with cardiac catheterization. 3.  We have scheduled her to return in one month.         __________________________________  Lady Zamora. Annika Vazquez M.D., Ph.D., F.A.C.C. [address] [facility]    cc (pcp):  Nikkie Castaneda MD

## 2017-11-29 ENCOUNTER — OFFICE VISIT (OUTPATIENT)
Dept: NEUROLOGY | Age: 72
End: 2017-11-29
Payer: MEDICARE

## 2017-11-29 ENCOUNTER — TELEPHONE (OUTPATIENT)
Dept: NEUROLOGY | Age: 72
End: 2017-11-29

## 2017-11-29 ENCOUNTER — TELEPHONE (OUTPATIENT)
Dept: CARDIOLOGY | Age: 72
End: 2017-11-29

## 2017-11-29 VITALS
HEIGHT: 65 IN | WEIGHT: 147 LBS | HEART RATE: 68 BPM | DIASTOLIC BLOOD PRESSURE: 75 MMHG | SYSTOLIC BLOOD PRESSURE: 120 MMHG | BODY MASS INDEX: 24.49 KG/M2

## 2017-11-29 DIAGNOSIS — R53.83 MALAISE AND FATIGUE: ICD-10-CM

## 2017-11-29 DIAGNOSIS — I25.118 CORONARY ARTERY DISEASE OF NATIVE ARTERY OF NATIVE HEART WITH STABLE ANGINA PECTORIS (HCC): ICD-10-CM

## 2017-11-29 DIAGNOSIS — R47.01 APHASIA: ICD-10-CM

## 2017-11-29 DIAGNOSIS — R47.01 APHASIA: Primary | ICD-10-CM

## 2017-11-29 DIAGNOSIS — R42 VERTIGO: ICD-10-CM

## 2017-11-29 DIAGNOSIS — R53.81 MALAISE AND FATIGUE: ICD-10-CM

## 2017-11-29 DIAGNOSIS — R27.0 ATAXIA: ICD-10-CM

## 2017-11-29 LAB
HCT VFR BLD CALC: 38.1 % (ref 37–47)
P2Y12 RESULT: 91 PRU (ref 194–418)
PLATELET # BLD: 194 K/UL (ref 130–400)
T4 FREE: 1.3 NG/DL (ref 0.9–1.7)
TSH SERPL DL<=0.05 MIU/L-ACNC: 3.27 UIU/ML (ref 0.27–4.2)
VITAMIN B-12: 1660 PG/ML (ref 211–946)

## 2017-11-29 PROCEDURE — 99204 OFFICE O/P NEW MOD 45 MIN: CPT | Performed by: PSYCHIATRY & NEUROLOGY

## 2017-11-29 PROCEDURE — 4040F PNEUMOC VAC/ADMIN/RCVD: CPT | Performed by: PSYCHIATRY & NEUROLOGY

## 2017-11-29 PROCEDURE — 3014F SCREEN MAMMO DOC REV: CPT | Performed by: PSYCHIATRY & NEUROLOGY

## 2017-11-29 PROCEDURE — G8484 FLU IMMUNIZE NO ADMIN: HCPCS | Performed by: PSYCHIATRY & NEUROLOGY

## 2017-11-29 PROCEDURE — 1036F TOBACCO NON-USER: CPT | Performed by: PSYCHIATRY & NEUROLOGY

## 2017-11-29 PROCEDURE — G8420 CALC BMI NORM PARAMETERS: HCPCS | Performed by: PSYCHIATRY & NEUROLOGY

## 2017-11-29 PROCEDURE — G8399 PT W/DXA RESULTS DOCUMENT: HCPCS | Performed by: PSYCHIATRY & NEUROLOGY

## 2017-11-29 PROCEDURE — G8598 ASA/ANTIPLAT THER USED: HCPCS | Performed by: PSYCHIATRY & NEUROLOGY

## 2017-11-29 PROCEDURE — 1090F PRES/ABSN URINE INCON ASSESS: CPT | Performed by: PSYCHIATRY & NEUROLOGY

## 2017-11-29 PROCEDURE — 1123F ACP DISCUSS/DSCN MKR DOCD: CPT | Performed by: PSYCHIATRY & NEUROLOGY

## 2017-11-29 PROCEDURE — G8427 DOCREV CUR MEDS BY ELIG CLIN: HCPCS | Performed by: PSYCHIATRY & NEUROLOGY

## 2017-11-29 PROCEDURE — 3017F COLORECTAL CA SCREEN DOC REV: CPT | Performed by: PSYCHIATRY & NEUROLOGY

## 2017-11-29 RX ORDER — CETIRIZINE HYDROCHLORIDE 10 MG/1
10 TABLET ORAL PRN
COMMUNITY
End: 2019-08-27

## 2017-11-29 NOTE — PROGRESS NOTES
Review of Systems    Constitutional - No fever or chills. No diaphoresis Yes significant fatigue. HENT - No tinnitus Yes significant hearing loss. Eyes - Yes sudden vision change or eye pain  Respiratory - Yes significant shortness of breath or cough  Cardiovascular - Yes chest pain No palpitations or significant leg swelling  Gastrointestinal - no abdominal swelling or pain. Genitourinary - Yes difficulty urinating, dysuria  Musculoskeletal - Yes back pain or myalgia. Skin - no color change or rash  Neurologic - No seizures. Yes lateralizing weakness. Hematologic - no easy bruising or excessive bleeding. Psychiatric - no severe anxiety or nervousness. All other review of systems are negative.

## 2017-11-29 NOTE — PROGRESS NOTES
Chief Complaint   Patient presents with    New Patient     Referred by Cardiology for transient cerebral ischemia     Blurred Vision     Patient reports running into walls     Altered Mental Status     Patient reports not being able to form sentences and finding the correct words        Daily Maurice is a 67y.o. year old female who is seen for evaluation of Imbalance and aphasia. The patient and daughter indicated that she has had issues with Ménière's disease with intermittent vertigo in the past. Over the last 3 months she has noticed some worsening issues with her dizziness and some issues with word finding. She has significant coronary artery disease and has multiple coronary stents. She denies any focal weakness, numbness, or dysphagia. She does have chronic ringing in the years. When she walks he may veer to one side may fall backwards. She does get some intermittent vertigo with movement.     Active Ambulatory Problems     Diagnosis Date Noted    Chest pain     Osteoarthritis     GERD (gastroesophageal reflux disease)     Asthma     Glucose intolerance (impaired glucose tolerance)     Depression with anxiety     CAD (coronary artery disease)     Hyperlipidemia     Hypertension     CAD (coronary artery disease)     Fatigue 02/25/2016    S/P right coronary artery (RCA) stent placement 02/25/2016    Hx of CABG 02/25/2016    Chest pressure     Coronary artery disease of native artery of native heart with stable angina pectoris ([facility] [address]     Malaise and fatigue 10/06/2017    Aphasia 11/29/2017    Ataxia 11/29/2017    Vertigo 11/29/2017     Resolved Ambulatory Problems     Diagnosis Date Noted    No Resolved Ambulatory Problems     Past Medical History:   Diagnosis Date    Asthma     CAD (coronary artery disease)     Chest pain     Depression with anxiety     Diabetes mellitus (HCC)     GERD (gastroesophageal reflux disease)     Glucose intolerance (impaired glucose tolerance)     name: N/A    Number of children: N/A    Years of education: N/A     Occupational History    Not on file. Social History Main Topics    Smoking status: Former Smoker     Packs/day: 1.00     Years: 1.00     Types: Cigarettes     Quit date: 2/26/1967    Smokeless tobacco: Never Used    Alcohol use No    Drug use: No    Sexual activity: Not Currently     Other Topics Concern    Not on file     Social History Narrative    No narrative on file       Review of Systems     Constitutional - No fever or chills. No diaphoresis Yes significant fatigue. HENT - No tinnitus Yes significant hearing loss. Eyes - Yes sudden vision change or eye pain  Respiratory - Yes significant shortness of breath or cough  Cardiovascular - Yes chest pain No palpitations or significant leg swelling  Gastrointestinal - no abdominal swelling or pain. Genitourinary - Yes difficulty urinating, dysuria  Musculoskeletal - Yes back pain or myalgia. Skin - no color change or rash  Neurologic - No seizures. Yes lateralizing weakness. Hematologic - no easy bruising or excessive bleeding. Psychiatric - no severe anxiety or nervousness. All other review of systems are negative.       Current Outpatient Prescriptions   Medication Sig Dispense Refill    cetirizine (ZYRTEC) 10 MG tablet Take 10 mg by mouth daily      ranolazine (RANEXA) 500 MG extended release tablet Take 1 tablet by mouth 2 times daily 60 tablet 3    atorvastatin (LIPITOR) 80 MG tablet TAKE 1 TABLET NIGHTLY 90 tablet 0    NITROSTAT 0.4 MG SL tablet DISSOLVE 1 TABLET UNDER THE TONGUE EVERY 5 MINUTES AS NEEDED FOR CHEST PAIN 25 tablet 3    clopidogrel (PLAVIX) 75 MG tablet Take 1 tablet by mouth daily 30 tablet 3    isosorbide mononitrate (IMDUR) 120 MG extended release tablet Take 1 tablet by mouth daily 90 tablet 3    BYSTOLIC 5 MG tablet TAKE 1 TABLET DAILY 90 tablet 3    Cyanocobalamin (VITAMIN B 12 PO) Take 1,000 mcg by mouth daily      amitriptyline (ELAVIL) TSH without Reflex      T4, Free      Methylmalonic Acid, Serum   4. Coronary artery disease of native artery of native heart with stable angina pectoris (HCC) I25.118 414.01 MRI Brain W WO Contrast     413.9 CTA Head W Con And CTA Neck W Con      P2Y12      Vitamin B12      TSH without Reflex      T4, Free      Methylmalonic Acid, Serum   5. Malaise and fatigue R53.81 780.79 MRI Brain W WO Contrast    R53.83  CTA Head W Con And CTA Neck W Con      P2Y12      Vitamin B12      TSH without Reflex      T4, Free      Methylmalonic Acid, Serum       Her neurological examination today was significant for some slight slowness of movement with a cautious gait. She had no clear evidence of ataxia. She had no focal weakness or sensory abnormalities. Her speech was slightly hesitant but she was fluent. . Based upon her history and examination certainly small stroke could result in her language difficulties. Her dizziness and vertigo and imbalance I suspect her most likely related to her Ménière's disease. She does have a history of lupus. At this time she will be scheduled for an MRI of the brain, CTA of the head and neck along with blood work as noted below including Plavix inhibition assay. The patient indicated understanding of the management plan. She is to call after testing to determine if further management is warranted. Plan    Orders Placed This Encounter   Procedures    MRI Brain W WO Contrast    CTA Head W Con And CTA Neck W Con    P2Y12    Vitamin B12    TSH without Reflex    T4, Free    Methylmalonic Acid, Serum       No orders of the defined types were placed in this encounter. Return if symptoms worsen or fail to improve.

## 2017-11-29 NOTE — TELEPHONE ENCOUNTER
Her p2y12 was 94 which is indicative of platelet inhibition which is what we what it to be.  Continue plavix

## 2017-11-29 NOTE — TELEPHONE ENCOUNTER
Called patient and gave her the results. She is going to call Dr. Javan Kovacs office and see if they want her to do something different with her plavix.

## 2017-11-29 NOTE — TELEPHONE ENCOUNTER
----- Message from Nathalia Chen MD sent at 11/29/2017  1:03 PM CST -----  Can you let patient know plavix inhibition assay was low.  They should check with their cardiologist if they need to do something different with their plavix

## 2017-11-30 ENCOUNTER — TELEPHONE (OUTPATIENT)
Dept: NEUROSURGERY | Age: 72
End: 2017-11-30

## 2017-11-30 NOTE — TELEPHONE ENCOUNTER
Called patient and advised labs were reviewed by MD and the level of the p2y12 was 94 which is indicative of platelet inhibition and that is where they want it to be. Advised to continue plavix. Patient voiced understanding.

## 2017-12-01 LAB — METHYLMALONIC ACID: 0.12 UMOL/L (ref 0–0.4)

## 2017-12-11 DIAGNOSIS — E78.5 HYPERLIPIDEMIA, UNSPECIFIED HYPERLIPIDEMIA TYPE: ICD-10-CM

## 2017-12-11 RX ORDER — ATORVASTATIN CALCIUM 80 MG/1
TABLET, FILM COATED ORAL
Qty: 90 TABLET | Refills: 0 | Status: SHIPPED | OUTPATIENT
Start: 2017-12-11 | End: 2018-04-10 | Stop reason: SDUPTHER

## 2017-12-12 ENCOUNTER — HOSPITAL ENCOUNTER (OUTPATIENT)
Dept: MRI IMAGING | Age: 72
Discharge: HOME | End: 2017-12-12
Payer: MEDICARE

## 2017-12-12 ENCOUNTER — HOSPITAL ENCOUNTER (OUTPATIENT)
Dept: CT IMAGING | Age: 72
Discharge: HOME | End: 2017-12-12
Payer: MEDICARE

## 2017-12-12 DIAGNOSIS — R53.81 MALAISE AND FATIGUE: ICD-10-CM

## 2017-12-12 DIAGNOSIS — R53.83 MALAISE AND FATIGUE: ICD-10-CM

## 2017-12-12 DIAGNOSIS — R47.01 APHASIA: ICD-10-CM

## 2017-12-12 DIAGNOSIS — R42 VERTIGO: ICD-10-CM

## 2017-12-12 DIAGNOSIS — I25.118 CORONARY ARTERY DISEASE OF NATIVE ARTERY OF NATIVE HEART WITH STABLE ANGINA PECTORIS (HCC): ICD-10-CM

## 2017-12-12 DIAGNOSIS — R27.0 ATAXIA: ICD-10-CM

## 2017-12-12 DIAGNOSIS — I77.9 CAROTID ARTERY DISEASE, UNSPECIFIED LATERALITY (HCC): Primary | ICD-10-CM

## 2017-12-12 LAB
GFR NON-AFRICAN AMERICAN: 49
PERFORMED ON: ABNORMAL
POC CREATININE: 1.1 MG/DL (ref 0.3–1.3)
POC SAMPLE TYPE: ABNORMAL

## 2017-12-12 PROCEDURE — 6360000004 HC RX CONTRAST MEDICATION: Performed by: PSYCHIATRY & NEUROLOGY

## 2017-12-12 PROCEDURE — 70496 CT ANGIOGRAPHY HEAD: CPT

## 2017-12-12 PROCEDURE — A9577 INJ MULTIHANCE: HCPCS | Performed by: PSYCHIATRY & NEUROLOGY

## 2017-12-12 PROCEDURE — 82565 ASSAY OF CREATININE: CPT

## 2017-12-12 PROCEDURE — 70498 CT ANGIOGRAPHY NECK: CPT

## 2017-12-12 PROCEDURE — 70553 MRI BRAIN STEM W/O & W/DYE: CPT

## 2017-12-12 RX ADMIN — IOPAMIDOL 90 ML: 755 INJECTION, SOLUTION INTRAVENOUS at 09:06

## 2017-12-12 RX ADMIN — GADOBENATE DIMEGLUMINE 13 ML: 529 INJECTION, SOLUTION INTRAVENOUS at 10:44

## 2017-12-13 ENCOUNTER — OFFICE VISIT (OUTPATIENT)
Dept: CARDIOLOGY | Age: 72
End: 2017-12-13
Payer: MEDICARE

## 2017-12-13 VITALS
BODY MASS INDEX: 24.83 KG/M2 | WEIGHT: 149 LBS | HEIGHT: 65 IN | HEART RATE: 72 BPM | DIASTOLIC BLOOD PRESSURE: 78 MMHG | SYSTOLIC BLOOD PRESSURE: 120 MMHG

## 2017-12-13 DIAGNOSIS — I10 ESSENTIAL HYPERTENSION: ICD-10-CM

## 2017-12-13 DIAGNOSIS — I25.10 ASHD (ARTERIOSCLEROTIC HEART DISEASE): ICD-10-CM

## 2017-12-13 DIAGNOSIS — R07.89 OTHER CHEST PAIN: Primary | ICD-10-CM

## 2017-12-13 PROCEDURE — G8419 CALC BMI OUT NRM PARAM NOF/U: HCPCS | Performed by: INTERNAL MEDICINE

## 2017-12-13 PROCEDURE — 99213 OFFICE O/P EST LOW 20 MIN: CPT | Performed by: INTERNAL MEDICINE

## 2017-12-13 PROCEDURE — G8484 FLU IMMUNIZE NO ADMIN: HCPCS | Performed by: INTERNAL MEDICINE

## 2017-12-13 PROCEDURE — 3014F SCREEN MAMMO DOC REV: CPT | Performed by: INTERNAL MEDICINE

## 2017-12-13 PROCEDURE — 3017F COLORECTAL CA SCREEN DOC REV: CPT | Performed by: INTERNAL MEDICINE

## 2017-12-13 PROCEDURE — G8598 ASA/ANTIPLAT THER USED: HCPCS | Performed by: INTERNAL MEDICINE

## 2017-12-13 PROCEDURE — 1090F PRES/ABSN URINE INCON ASSESS: CPT | Performed by: INTERNAL MEDICINE

## 2017-12-13 PROCEDURE — 4040F PNEUMOC VAC/ADMIN/RCVD: CPT | Performed by: INTERNAL MEDICINE

## 2017-12-13 PROCEDURE — G8427 DOCREV CUR MEDS BY ELIG CLIN: HCPCS | Performed by: INTERNAL MEDICINE

## 2017-12-13 PROCEDURE — 1123F ACP DISCUSS/DSCN MKR DOCD: CPT | Performed by: INTERNAL MEDICINE

## 2017-12-13 PROCEDURE — G8399 PT W/DXA RESULTS DOCUMENT: HCPCS | Performed by: INTERNAL MEDICINE

## 2017-12-13 PROCEDURE — 1036F TOBACCO NON-USER: CPT | Performed by: INTERNAL MEDICINE

## 2017-12-13 NOTE — PATIENT INSTRUCTIONS
[place] at the [address] [facility] and [address] [facility] located on the first floor of [place] [address] through hospital main entrance and turn immediately to your left. Patient's contact number:  [phone] (home)      Lexiscan Stress Test      Lexiscan (regadenoson injection) is a prescription drug given through an IV line that increases blood flow through the arteries of the heart during a cardiac nuclear stress test.     There are two parts to a Lexiscan stress test: the rest portion and the exercise portion. For the rest portion, a radioactive tracer is injected into your arm through the IV. After 30 to 60 minutes, the process of imaging will begin. A nuclear camera will be placed on your chest area and images are taken for the next 15 to 20 minutes. For the exercise portion, a nurse will attach EKG electrodes to your chest to monitor your heart rate. The drug [place]ilo D[place] is administered to simulate stress on the heart. Your heart rhythm will then be monitored for the next few minutes. Your blood pressure will also be monitored throughout the exercise portion. [place] through the exercise portion, a second round of radioactive tracer is injected into your body. Your heart rate and EKG will be monitored for another few minutes after administering the drug. Test Preparation:     Bring a list of your current medications. Do not take any of your medications the morning of the test, but bring all morning medications with you as you will take them after the stress portion of the test is completed.  Do not eat Bananas 24 hours prior to test.     No caffeine 24 hours prior to the testing. This includes: coffee, pop/soda, chocolate, cold medications, etc.  Any product that might contain caffeine.  No nicotine or alcohol 12 hours prior to your test.    Nothing to eat or drink 4-6 hours prior to appointment time.   It is okay to drink small amounts of water during the four hours prior to the test.   Nitroglycerin patches must be taken off 1 hour before testing.  Wear comfortable clothing.  Please refrain from any strenuous exercise or activities the day before your test, or the day of your test.   The Nuclear Lexiscan Stress test takes about 2 ½ to 3 hours to complete. If for any reason you are unable to keep this appointment, please contact Outpatient Scheduling, [phone], as soon as possible to reschedule.

## 2017-12-14 ENCOUNTER — TELEPHONE (OUTPATIENT)
Dept: CARDIOLOGY | Age: 72
End: 2017-12-14

## 2017-12-14 NOTE — TELEPHONE ENCOUNTER
Tried contacting patient to go over day and time no answer had to leave a v/m for her to return my call. My ext is 80. Scheduled for 12/21/17 arrival of 7:00 for 7:30. [place] [facility] [address] [facility] located on the first floor of [place] [address] through hospital main entrance and turn immediately to your left. Patient's contact number:  [phone] (home)      Lexiscan Stress Test      Lexiscan (regadenoson injection) is a prescription drug given through an IV line that increases blood flow through the arteries of the heart during a cardiac nuclear stress test.     There are two parts to a Lexiscan stress test: the rest portion and the exercise portion. For the rest portion, a radioactive tracer is injected into your arm through the IV. After 30 to 60 minutes, the process of imaging will begin. A nuclear camera will be placed on your chest area and images are taken for the next 15 to 20 minutes. For the exercise portion, a nurse will attach EKG electrodes to your chest to monitor your heart rate. The drug [place] is administered to simulate stress on the heart. Your heart rhythm will then be monitored for the next few minutes. Your blood pressure will also be monitored throughout the exercise portion. [place] through the exercise portion, a second round of radioactive tracer is injected into your body. Your heart rate and EKG will be monitored for another few minutes after administering the drug. Test Preparation:     Bring a list of your current medications. Do not take any of your medications the morning of the test, but bring all morning medications with you as you will take them after the stress portion of the test is completed.  Do not eat Bananas 24 hours prior to test.     No caffeine 24 hours prior to the testing. This includes: coffee, pop/soda, chocolate, cold medications, etc.  Any product that might contain caffeine.      No nicotine or alcohol 12 hours prior to your test.    Nothing to eat or drink 4-6 hours prior to appointment time. It is okay to drink small amounts of water during the four hours prior to the test.   Nitroglycerin patches must be taken off 1 hour before testing.  Wear comfortable clothing.  Please refrain from any strenuous exercise or activities the day before your test, or the day of your test.   The Nuclear Lexiscan Stress test takes about 2 ½ to 3 hours to complete. If for any reason you are unable to keep this appointment, please contact Outpatient Scheduling, [phone], as soon as possible to reschedule.

## 2017-12-21 ENCOUNTER — HOSPITAL ENCOUNTER (OUTPATIENT)
Dept: NUCLEAR MEDICINE | Age: 72
Discharge: HOME | End: 2017-12-21
Payer: MEDICARE

## 2017-12-21 ENCOUNTER — HOSPITAL ENCOUNTER (OUTPATIENT)
Dept: NON INVASIVE DIAGNOSTICS | Age: 72
Discharge: HOME | End: 2017-12-21
Payer: MEDICARE

## 2017-12-21 DIAGNOSIS — R07.89 OTHER CHEST PAIN: ICD-10-CM

## 2017-12-21 PROCEDURE — 93017 CV STRESS TEST TRACING ONLY: CPT

## 2017-12-21 PROCEDURE — A9500 TC99M SESTAMIBI: HCPCS | Performed by: INTERNAL MEDICINE

## 2017-12-21 PROCEDURE — 3430000000 HC RX DIAGNOSTIC RADIOPHARMACEUTICAL: Performed by: INTERNAL MEDICINE

## 2017-12-21 PROCEDURE — 6360000002 HC RX W HCPCS: Performed by: INTERNAL MEDICINE

## 2017-12-21 PROCEDURE — 78452 HT MUSCLE IMAGE SPECT MULT: CPT

## 2017-12-21 RX ADMIN — TETRAKIS(2-METHOXYISOBUTYLISOCYANIDE)COPPER(I) TETRAFLUOROBORATE 30 MILLICURIE: 1 INJECTION, POWDER, LYOPHILIZED, FOR SOLUTION INTRAVENOUS at 10:21

## 2017-12-21 RX ADMIN — TETRAKIS(2-METHOXYISOBUTYLISOCYANIDE)COPPER(I) TETRAFLUOROBORATE 10 MILLICURIE: 1 INJECTION, POWDER, LYOPHILIZED, FOR SOLUTION INTRAVENOUS at 10:20

## 2017-12-21 RX ADMIN — REGADENOSON 0.4 MG: 0.08 INJECTION, SOLUTION INTRAVENOUS at 10:20

## 2017-12-21 NOTE — PROGRESS NOTES
11/29/17 120/75   11/15/17 128/68    Pulse Readings from Last 3 Encounters:   12/13/17 72   11/29/17 68   11/15/17 65        REVIEW OF SYSTEMS  Constitutional:  Negative for diaphoresis, fever, appetite change or unexpected weight change. HENT:  Negative for nosebleeds, facial swelling, rhinorrhea and neck stiffness. RESPIRATORY:  Negative shortness of breath, cough or sputum production. No wheezing or stridor. CARDIOVASCULAR:  C/o chest pain. There is no overt heart failure or syncope. GASTROINTESTINAL:   Negative for abdominal distention. GENITOURINARY:  Negative for dysuria, urgency and frequency. MUSCULOSKELETAL:   Negative for myalgia, arthralgia and gait problem. SKIN:  Negative for color change, pallor, rash and wound. NEUROLOGICAL:   Negative for dizziness, weakness, light-headedness, numbness and headaches. Negative for speech difficulty. HEMATOLOGICAL:   Negative for bruising and bleeding easily. PSYCHIATRIC/BEHAVIORAL:   No excessive anxiety or confusion. Except as noted in the HPI, all other systems are negative. PHYSICAL EXAMINATION  GENERAL:  Alert and oriented x3 in no apparent distress. Short-term and long-term memory intact. Judgment intact. Oriented to time, place and person. No depression, anxiety or agitation. Vital Signs:  /78   Pulse 72   Ht 5' 4.5\" (1.638 m)   Wt 149 lb (67.6 kg)   BMI 25.18 kg/m²    HEAD:  Normocephalic without evidence of old or recent trauma. EYES:  Sclerae clear. Conjunctivae pink. Pupils equal and round. NOSE:  Negative nasal discharge or epistaxis. THROAT:  No lesions on lips or buccal mucosa. NECK:  Supple without mass or JVD. Carotid pulses 2+ to palpation bilaterally without bruit. No thyromegaly noted. CHEST:  Equal bilateral expansion. RESPIRATORY:  The lungs are clear to auscultation. Normal respiratory effort. CARDIOVASCULAR: The heart demonstrates regular rhythm with a normal rate.   No audible murmurs

## 2017-12-22 ENCOUNTER — TELEPHONE (OUTPATIENT)
Dept: CARDIOLOGY | Age: 72
End: 2017-12-22

## 2017-12-26 ENCOUNTER — TELEPHONE (OUTPATIENT)
Dept: NEUROLOGY | Age: 72
End: 2017-12-26

## 2017-12-26 ENCOUNTER — OFFICE VISIT (OUTPATIENT)
Dept: VASCULAR SURGERY | Age: 72
End: 2017-12-26
Payer: MEDICARE

## 2017-12-26 VITALS
HEART RATE: 70 BPM | RESPIRATION RATE: 18 BRPM | SYSTOLIC BLOOD PRESSURE: 148 MMHG | DIASTOLIC BLOOD PRESSURE: 73 MMHG | TEMPERATURE: 96.3 F

## 2017-12-26 DIAGNOSIS — I65.23 BILATERAL CAROTID ARTERY STENOSIS: ICD-10-CM

## 2017-12-26 PROCEDURE — 99213 OFFICE O/P EST LOW 20 MIN: CPT | Performed by: NURSE PRACTITIONER

## 2017-12-26 PROCEDURE — 3014F SCREEN MAMMO DOC REV: CPT | Performed by: NURSE PRACTITIONER

## 2017-12-26 PROCEDURE — G8427 DOCREV CUR MEDS BY ELIG CLIN: HCPCS | Performed by: NURSE PRACTITIONER

## 2017-12-26 PROCEDURE — 1036F TOBACCO NON-USER: CPT | Performed by: NURSE PRACTITIONER

## 2017-12-26 PROCEDURE — 4040F PNEUMOC VAC/ADMIN/RCVD: CPT | Performed by: NURSE PRACTITIONER

## 2017-12-26 PROCEDURE — 1090F PRES/ABSN URINE INCON ASSESS: CPT | Performed by: NURSE PRACTITIONER

## 2017-12-26 PROCEDURE — G8484 FLU IMMUNIZE NO ADMIN: HCPCS | Performed by: NURSE PRACTITIONER

## 2017-12-26 PROCEDURE — G8419 CALC BMI OUT NRM PARAM NOF/U: HCPCS | Performed by: NURSE PRACTITIONER

## 2017-12-26 PROCEDURE — G8399 PT W/DXA RESULTS DOCUMENT: HCPCS | Performed by: NURSE PRACTITIONER

## 2017-12-26 PROCEDURE — 1123F ACP DISCUSS/DSCN MKR DOCD: CPT | Performed by: NURSE PRACTITIONER

## 2017-12-26 PROCEDURE — G8598 ASA/ANTIPLAT THER USED: HCPCS | Performed by: NURSE PRACTITIONER

## 2017-12-26 PROCEDURE — 3017F COLORECTAL CA SCREEN DOC REV: CPT | Performed by: NURSE PRACTITIONER

## 2017-12-26 NOTE — TELEPHONE ENCOUNTER
Vesna Haines from Dr. Allyson Gunter's office called to find out what Dr. Stef Joyce thought of the patient's results of recent tests. Dr. Maximilian Sanches would like to know from a neurological stand point how the patient is. Please advise.        Return call to Vesna Haines from Dr. Keith Roberson office at [phone]

## 2017-12-27 ENCOUNTER — OFFICE VISIT (OUTPATIENT)
Dept: CARDIOLOGY | Age: 72
End: 2017-12-27
Payer: MEDICARE

## 2017-12-27 VITALS
HEIGHT: 65 IN | BODY MASS INDEX: 24.66 KG/M2 | WEIGHT: 148 LBS | SYSTOLIC BLOOD PRESSURE: 120 MMHG | DIASTOLIC BLOOD PRESSURE: 68 MMHG | HEART RATE: 66 BPM

## 2017-12-27 DIAGNOSIS — I10 ESSENTIAL HYPERTENSION: ICD-10-CM

## 2017-12-27 DIAGNOSIS — I25.10 ASHD (ARTERIOSCLEROTIC HEART DISEASE): Primary | ICD-10-CM

## 2017-12-27 PROCEDURE — G8598 ASA/ANTIPLAT THER USED: HCPCS | Performed by: INTERNAL MEDICINE

## 2017-12-27 PROCEDURE — G8399 PT W/DXA RESULTS DOCUMENT: HCPCS | Performed by: INTERNAL MEDICINE

## 2017-12-27 PROCEDURE — G8484 FLU IMMUNIZE NO ADMIN: HCPCS | Performed by: INTERNAL MEDICINE

## 2017-12-27 PROCEDURE — 3014F SCREEN MAMMO DOC REV: CPT | Performed by: INTERNAL MEDICINE

## 2017-12-27 PROCEDURE — G8419 CALC BMI OUT NRM PARAM NOF/U: HCPCS | Performed by: INTERNAL MEDICINE

## 2017-12-27 PROCEDURE — 1090F PRES/ABSN URINE INCON ASSESS: CPT | Performed by: INTERNAL MEDICINE

## 2017-12-27 PROCEDURE — 3017F COLORECTAL CA SCREEN DOC REV: CPT | Performed by: INTERNAL MEDICINE

## 2017-12-27 PROCEDURE — 1036F TOBACCO NON-USER: CPT | Performed by: INTERNAL MEDICINE

## 2017-12-27 PROCEDURE — 4040F PNEUMOC VAC/ADMIN/RCVD: CPT | Performed by: INTERNAL MEDICINE

## 2017-12-27 PROCEDURE — G8427 DOCREV CUR MEDS BY ELIG CLIN: HCPCS | Performed by: INTERNAL MEDICINE

## 2017-12-27 PROCEDURE — 99213 OFFICE O/P EST LOW 20 MIN: CPT | Performed by: INTERNAL MEDICINE

## 2017-12-27 PROCEDURE — 1123F ACP DISCUSS/DSCN MKR DOCD: CPT | Performed by: INTERNAL MEDICINE

## 2017-12-27 NOTE — TELEPHONE ENCOUNTER
MRI brain with old cerebellar stroke of unclear significance. CTA some congenital variants. Labs ok except low plavix inhib assay but patient is on ASA and Plavix.  Do not suspect MRI findings are related to word finding difficulties

## 2017-12-28 PROBLEM — I65.23 BILATERAL CAROTID ARTERY STENOSIS: Status: ACTIVE | Noted: 2017-12-28

## 2017-12-28 NOTE — PROGRESS NOTES
Osteoarthritis     GERD (gastroesophageal reflux disease)     Asthma     Glucose intolerance (impaired glucose tolerance)     Depression with anxiety      Current Outpatient Prescriptions   Medication Sig Dispense Refill    atorvastatin (LIPITOR) 80 MG tablet TAKE 1 TABLET NIGHTLY (NEED LABS DRAWN BEFORE END OF NOVEMBER) 90 tablet 0    cetirizine (ZYRTEC) 10 MG tablet Take 10 mg by mouth daily      ranolazine (RANEXA) 500 MG extended release tablet Take 1 tablet by mouth 2 times daily 60 tablet 3    NITROSTAT 0.4 MG SL tablet DISSOLVE 1 TABLET UNDER THE TONGUE EVERY 5 MINUTES AS NEEDED FOR CHEST PAIN 25 tablet 3    clopidogrel (PLAVIX) 75 MG tablet Take 1 tablet by mouth daily 30 tablet 3    isosorbide mononitrate (IMDUR) 120 MG extended release tablet Take 1 tablet by mouth daily 90 tablet 3    BYSTOLIC 5 MG tablet TAKE 1 TABLET DAILY 90 tablet 3    Cyanocobalamin (VITAMIN B 12 PO) Take 1,000 mcg by mouth daily      amitriptyline (ELAVIL) 10 MG tablet Take 25 mg by mouth as needed       metFORMIN (GLUCOPHAGE) 500 MG tablet Take 500 mg by mouth 2 times daily (with meals)       promethazine (PHENERGAN) 25 MG tablet Take 25 mg by mouth every 6 hours as needed for Nausea      cyclobenzaprine (FLEXERIL) 10 MG tablet Take 10 mg by mouth 2 times daily as needed for Muscle spasms.  ALPRAZolam (XANAX) 0.25 MG tablet Take 0.25 mg by mouth daily as needed (takes 1/2 tab)       albuterol (PROVENTIL HFA;VENTOLIN HFA) 108 (90 BASE) MCG/ACT inhaler Inhale 2 puffs into the lungs every 6 hours as needed.  traZODone (DESYREL) 50 MG tablet Take 50 mg by mouth nightly.  choline fenofibrate (TRILIPIX) 135 MG CPDR Take 135 mg by mouth daily.  aspirin 81 MG tablet Take 81 mg by mouth daily.  zolpidem (AMBIEN) 10 MG tablet Take 5 mg by mouth nightly        No current facility-administered medications for this visit. Allergies:  Other; Codeine; Darvocet [propoxyphene n-acetaminophen]; Hydrocodone-acetaminophen; Morphine; and Percocet [oxycodone-acetaminophen]  Past Medical History:   Diagnosis Date    Asthma     CAD (coronary artery disease)     Chest pain     Depression with anxiety     Diabetes mellitus ([facility] [address]     GERD (gastroesophageal reflux disease)     Glucose intolerance (impaired glucose tolerance)     Hyperlipidemia     Cholesterol management per Ana Stacy Hypertension     Lupus (systemic lupus erythematosus) ([facility] [address]     Malaise and fatigue 10/6/2017    Movement disorder     Sees pain management for neck pain and previous surgery    Osteoarthritis     S/P CABG x 3 03/05/2013    PACABG X 3, LIMA-LAD, SVG0OM, SVG-DIAG, RT EVH, DR Cirilo Zaidi     Past Surgical History:   Procedure Laterality Date    APPENDECTOMY  1965    BACK SURGERY      CARDIAC CATHETERIZATION  5/18/11    EF 60%    CARDIAC CATHETERIZATION  1/25/16    drug eluting stent to RCA   [address]  1/25/2016    CARDIAC CATHETERIZATION  2/19/16    CARDIAC CATHETERIZATION  08/24/2017    COLON SURGERY  1992    Polypectomy - 2    COLON SURGERY  2003    Polypectomy - 4    COLON SURGERY  11/2008    Polypectomy - 2    COLON SURGERY  8/2012    Polypectomy - 3    COLONOSCOPY      CORONARY ANGIOPLASTY  08/2017    cutting balloon angioplasty LAD diagonal    CORONARY ANGIOPLASTY WITH STENT PLACEMENT  3/22/2007    CORONARY ANGIOPLASTY WITH STENT PLACEMENT  8/2007    CORONARY ARTERY BYPASS GRAFT  3/5/2013    PACABG X 3, LIMA-LAD, SVG0OM, SVG-DIAG, RT EVH, DR PATEL    COSMETIC SURGERY      deviated septum and rhinoplasty    CYST REMOVAL  1970    ENDOSCOPY, COLON, DIAGNOSTIC      HEMORRHOID SURGERY  1987    HYSTERECTOMY  1983    Partial    NASAL [address] [facility] SINUS SURGERY  2003    Nasal Polyps Removed    SPINE SURGERY  3/6/2006    Lumbar Laminectomy L4&5    SPINE SURGERY  3/6/2006    Cervical Fusion - C4,5,6    TONSILLECTOMY  1968     Family History   Problem Relation Age of Onset    Heart Disease Mother     High Blood Pressure Mother     Diabetes Mother     High Cholesterol Mother     Diabetes Brother      Social History   Substance Use Topics    Smoking status: Former Smoker     Packs/day: 1.00     Years: 1.00     Types: Cigarettes     Quit date: 2/26/1967    Smokeless tobacco: Never Used    Alcohol use No         Old records have been obtained from the referring provider. These records have been reviewed and summarized. Review of Systems    Constitutional - no significant activity change, appetite change, or unexpected weight change. No fever or chills. No diaphoresis or significant fatigue. HENT - no significant rhinorrhea or epistaxis. No tinnitus or significant hearing loss. Eyes - no sudden vision change or amaurosis. Respiratory - no significant shortness of breath, wheezing, or stridor. No apnea, cough, or chest tightness associated with shortness of breath. Cardiovascular - no chest pain, syncope, or significant dizziness. No palpitations or significant leg swelling. No claudication. Gastrointestinal - no abdominal swelling or pain. No blood in stool. No severe constipation, diarrhea, nausea, or vomiting. Genitourinary - No difficulty urinating, dysuria, frequency, or urgency. No flank pain or hematuria. Musculoskeletal - no back pain, gait disturbance, or myalgia. Skin - no color change, rash, pallor, or new wound. Neurologic - no dizziness, facial asymmetry, or light headedness. No seizures. No speech difficulty or lateralizing weakness. Hematologic - no easy bruising or excessive bleeding. Psychiatric - no severe anxiety or nervousness. No confusion. All other review of systems are negative. Physical Exam    BP (!) 148/73 Comment: right  Pulse 70   Temp 96.3 °F (35.7 °C)   Resp 18     Constitutional - well developed, well nourished. No diaphoresis or acute distress. HENT - head normocephalic.   Right external ear canal appears normal.  Left external ear canal appears normal.  Septum appears midline. Eyes - conjunctiva normal.  EOMS normal.  No exudate. No icterus. Neck- ROM appears normal, no tracheal deviation. Cardiovascular - Regular rate and rhythm. Heart sounds are normal.  No murmur, rub, or gallop. Carotid pulses are 2+ to palpation bilaterally without bruit. Extremities - Radial and brachial pulses are 2+ to palpation bilaterally. Right femoral pulse: present 2+; Right popliteal pulse: absent Right DP: absent; Right PT absent; Left femoral pulse: present 2+; Left popliteal pulse: absent; Left DP: absent; Left PT: absent    No cyanosis, clubbing, or significant edema. No signs atheroembolic event. Pulmonary - effort appears normal.  No respiratory distress. Lungs - Breath sounds normal. No wheezes or rales. GI - Abdomen - soft, non tender, bowel sounds X 4 quadrants. No guarding or rebound tenderness. No distension or palpable mass. Genitourinary - deferred. Musculoskeletal - ROM appears normal.  No significant edema. Neurologic - alert and oriented X 3. Physiologic. Tongue midline. Face symmetric. Skin - warm, dry, and intact. No rash, erythema, or pallor. Psychiatric - mood, affect, and behavior appear normal.  Judgment and thought processes appear normal.    Risk factors for atherosclerosis of all vascular beds have been reviewed with the patient including:  Family history, tobacco abuse in all forms, elevated cholesterol, hyperlipidemia, and diabetes. CTA -   Atheromatous changes of the carotid arteries. A large   calcific plaque in the distal left common carotid artery with 50%   focal stenosis. The internal carotid arteries and vertebral arteries   bilaterally are normal     Individual images were reviewed by myself and Dr. Verner Dubois. Results were reviewed with the patient. Options have been discussed with the patient including continued medical management.   Patient has opted to proceed with continued medical management. Assessment    1. Bilateral carotid artery stenosis          Plan    Recommend continue asa and plavix  Symptoms thought to be unlikely related to minimal plaque in CCA  If symptoms progress could consider intervention  6 months with cvls  Strongly encourage start/continue statin therapy  Recommend no smoking  Patient instructed to call or proceed to the emergency room with any symptoms of lateralizing weakness, loss of vision in one eye, or episodes slurred speech.

## 2017-12-28 NOTE — TELEPHONE ENCOUNTER
Called and spoke with Mona Ha. She voiced understanding and is going to relay the message from Dr. Janie Garcia to Dr. Devon Gunter.

## 2018-01-03 NOTE — PROGRESS NOTES
[facility]  [place] [address], [address] [place] [address], [address] [place] [address], [address] [facility]  The following was transcribed by Nadia Gillette M.T. Adrian Ibanez : 1945, 67 y.o. Female        Office Visit:  2017    Chief Complaint   Patient presents with    Follow-up     2 week follow up, ordered [facility].  Coronary Artery Disease      HISTORY OF PRESENT ILLNESS  Ms. Adrian Ibanez is seen here for coronary artery disease and hypertension. This is a 2-week follow up. Hayley Moore presents in follow up from her recent nuclear scan. This failed to reveal any evidence of reversible ischemia. She is feeling a little better but continues to have some chest discomforts. She is seeing Dr. Michael Love and the vascular specialists, there is also some question about lupus reoccurring.        Patient Active Problem List   Diagnosis Code    Chest pain R07.9    Osteoarthritis M19.90    GERD (gastroesophageal reflux disease) K21.9    Asthma J45.909    Glucose intolerance (impaired glucose tolerance) R73.02    Depression with anxiety F41.8    CAD (coronary artery disease) I25.10    Hyperlipidemia E78.5    Hypertension I10    CAD (coronary artery disease) I25.10    Fatigue R53.83    S/P right coronary artery (RCA) stent placement Z95.5    Hx of CABG Z95.1    Chest pressure R07.89    Coronary artery disease of native artery of native heart with stable angina pectoris (HCC) I25.118    Malaise and fatigue R53.81, R53.83    Aphasia R47.01    Ataxia R27.0    Vertigo R42    Bilateral carotid artery stenosis I65.23     Past Medical History:   Diagnosis Date    Asthma     CAD (coronary artery disease)     Chest pain     Depression with anxiety     Diabetes mellitus (HCC)     GERD (gastroesophageal reflux disease)     Glucose intolerance (impaired glucose tolerance)     Hyperlipidemia     Cholesterol management per Isael Vieira M.D.    Hypertension     Lupus (systemic lupus erythematosus) ([facility] [address]     Malaise and fatigue 10/6/2017    Movement disorder     Sees pain management for neck pain and previous surgery    Osteoarthritis     S/P CABG x 3 03/05/2013    PACABG X 3, LIMA-LAD, SVG0OM, SVG-DIAG, RT EVH, DR Lucina Lyles     Past Surgical History:   Procedure Laterality Date    APPENDECTOMY  1965    BACK SURGERY      CARDIAC CATHETERIZATION  5/18/11    EF 60%    CARDIAC CATHETERIZATION  1/25/16    drug eluting stent to RCA    CARDIAC CATHETERIZATION  1/25/2016    CARDIAC CATHETERIZATION  2/19/16    CARDIAC CATHETERIZATION  08/24/2017    COLON SURGERY  1992    Polypectomy - 2    COLON SURGERY  2003    Polypectomy - 4    COLON SURGERY  11/2008    Polypectomy - 2    COLON SURGERY  8/2012    Polypectomy - 3    COLONOSCOPY      CORONARY ANGIOPLASTY  08/2017    cutting balloon angioplasty LAD diagonal    CORONARY ANGIOPLASTY WITH STENT PLACEMENT  3/22/2007    CORONARY ANGIOPLASTY WITH STENT PLACEMENT  8/2007    CORONARY ARTERY BYPASS GRAFT  3/5/2013    PACABG X 3, LIMA-LAD, SVG0OM, SVG-DIAG, RT EVH, DR PATEL    COSMETIC SURGERY      deviated septum and rhinoplasty    CYST REMOVAL  1970    ENDOSCOPY, COLON, DIAGNOSTIC      HEMORRHOID SURGERY  1987    HYSTERECTOMY  1983    Partial    NASAL [address] [facility] SINUS SURGERY  2003    Nasal Polyps Removed    SPINE SURGERY  3/6/2006    Lumbar Laminectomy L4&5    SPINE SURGERY  3/6/2006    Cervical Fusion - C4,5,6    TONSILLECTOMY  1968      Family History   Problem Relation Age of Onset    Heart Disease Mother     High Blood Pressure Mother     Diabetes Mother     High Cholesterol Mother     Diabetes Brother      Social History   Substance Use Topics    Smoking status: Former Smoker     Packs/day: 1.00     Years: 1.00     Types: Cigarettes     Quit date: 2/26/1967    Smokeless tobacco: Never Used    Alcohol use No      Allergies   Allergen Reactions    Other      Other reaction(s): Nausea And Vomiting  narcotics    Codeine Nausea Only    Darvocet [Propoxyphene N-Acetaminophen]     Hydrocodone-Acetaminophen Nausea Only    Morphine Nausea Only    Percocet [Oxycodone-Acetaminophen] Nausea Only     Outpatient Prescriptions Marked as Taking for the 12/27/17 encounter (Office Visit) with Odell Reza MD   Medication Sig Dispense Refill    atorvastatin (LIPITOR) 80 MG tablet TAKE 1 TABLET NIGHTLY (NEED LABS DRAWN BEFORE END OF NOVEMBER) 90 tablet 0    cetirizine (ZYRTEC) 10 MG tablet Take 10 mg by mouth daily      ranolazine (RANEXA) 500 MG extended release tablet Take 1 tablet by mouth 2 times daily 60 tablet 3    NITROSTAT 0.4 MG SL tablet DISSOLVE 1 TABLET UNDER THE TONGUE EVERY 5 MINUTES AS NEEDED FOR CHEST PAIN 25 tablet 3    clopidogrel (PLAVIX) 75 MG tablet Take 1 tablet by mouth daily 30 tablet 3    isosorbide mononitrate (IMDUR) 120 MG extended release tablet Take 1 tablet by mouth daily 90 tablet 3    BYSTOLIC 5 MG tablet TAKE 1 TABLET DAILY 90 tablet 3    Cyanocobalamin (VITAMIN B 12 PO) Take 1,000 mcg by mouth daily      amitriptyline (ELAVIL) 10 MG tablet Take 25 mg by mouth as needed       metFORMIN (GLUCOPHAGE) 500 MG tablet Take 500 mg by mouth 2 times daily (with meals)       promethazine (PHENERGAN) 25 MG tablet Take 25 mg by mouth every 6 hours as needed for Nausea      cyclobenzaprine (FLEXERIL) 10 MG tablet Take 10 mg by mouth 2 times daily as needed for Muscle spasms.  ALPRAZolam (XANAX) 0.25 MG tablet Take 0.25 mg by mouth daily as needed (takes 1/2 tab)       albuterol (PROVENTIL HFA;VENTOLIN HFA) 108 (90 BASE) MCG/ACT inhaler Inhale 2 puffs into the lungs every 6 hours as needed.  traZODone (DESYREL) 50 MG tablet Take 50 mg by mouth nightly.  choline fenofibrate (TRILIPIX) 135 MG CPDR Take 135 mg by mouth daily.  aspirin 81 MG tablet Take 81 mg by mouth daily.         zolpidem (AMBIEN) 10 MG tablet Take 5 mg by mouth nightly        Data:  BP

## 2018-01-17 ENCOUNTER — HOSPITAL ENCOUNTER (OUTPATIENT)
Dept: CARDIAC REHAB | Age: 73
Discharge: HOME | End: 2018-01-17

## 2018-01-17 PROCEDURE — 9430000000 HC XCARDIAC REHAB PHASE 3X

## 2018-02-05 ENCOUNTER — HOSPITAL ENCOUNTER (OUTPATIENT)
Dept: CARDIAC REHAB | Age: 73
Discharge: HOME | End: 2018-02-05

## 2018-02-05 PROCEDURE — 9430000000 HC XCARDIAC REHAB PHASE 3X

## 2018-02-07 ENCOUNTER — TELEPHONE (OUTPATIENT)
Dept: CARDIOLOGY | Age: 73
End: 2018-02-07

## 2018-02-08 ENCOUNTER — OFFICE VISIT (OUTPATIENT)
Dept: CARDIOLOGY | Age: 73
End: 2018-02-08
Payer: MEDICARE

## 2018-02-08 VITALS
DIASTOLIC BLOOD PRESSURE: 60 MMHG | HEIGHT: 65 IN | HEART RATE: 70 BPM | SYSTOLIC BLOOD PRESSURE: 110 MMHG | WEIGHT: 147 LBS | BODY MASS INDEX: 24.49 KG/M2

## 2018-02-08 DIAGNOSIS — I10 ESSENTIAL HYPERTENSION: ICD-10-CM

## 2018-02-08 DIAGNOSIS — I25.10 ASHD (ARTERIOSCLEROTIC HEART DISEASE): Primary | ICD-10-CM

## 2018-02-08 PROCEDURE — G8420 CALC BMI NORM PARAMETERS: HCPCS | Performed by: INTERNAL MEDICINE

## 2018-02-08 PROCEDURE — 3017F COLORECTAL CA SCREEN DOC REV: CPT | Performed by: INTERNAL MEDICINE

## 2018-02-08 PROCEDURE — G8598 ASA/ANTIPLAT THER USED: HCPCS | Performed by: INTERNAL MEDICINE

## 2018-02-08 PROCEDURE — 1123F ACP DISCUSS/DSCN MKR DOCD: CPT | Performed by: INTERNAL MEDICINE

## 2018-02-08 PROCEDURE — G8399 PT W/DXA RESULTS DOCUMENT: HCPCS | Performed by: INTERNAL MEDICINE

## 2018-02-08 PROCEDURE — 99213 OFFICE O/P EST LOW 20 MIN: CPT | Performed by: INTERNAL MEDICINE

## 2018-02-08 PROCEDURE — G8427 DOCREV CUR MEDS BY ELIG CLIN: HCPCS | Performed by: INTERNAL MEDICINE

## 2018-02-08 PROCEDURE — G8484 FLU IMMUNIZE NO ADMIN: HCPCS | Performed by: INTERNAL MEDICINE

## 2018-02-08 PROCEDURE — 1036F TOBACCO NON-USER: CPT | Performed by: INTERNAL MEDICINE

## 2018-02-08 PROCEDURE — 4040F PNEUMOC VAC/ADMIN/RCVD: CPT | Performed by: INTERNAL MEDICINE

## 2018-02-08 PROCEDURE — 1090F PRES/ABSN URINE INCON ASSESS: CPT | Performed by: INTERNAL MEDICINE

## 2018-02-08 PROCEDURE — 3014F SCREEN MAMMO DOC REV: CPT | Performed by: INTERNAL MEDICINE

## 2018-04-05 ENCOUNTER — HOSPITAL ENCOUNTER (OUTPATIENT)
Dept: WOMENS IMAGING | Age: 73
Discharge: HOME | End: 2018-04-05
Payer: MEDICARE

## 2018-04-05 DIAGNOSIS — Z12.31 VISIT FOR SCREENING MAMMOGRAM: ICD-10-CM

## 2018-04-05 PROCEDURE — 77063 BREAST TOMOSYNTHESIS BI: CPT

## 2018-04-05 RX ORDER — RANOLAZINE 500 MG/1
500 TABLET, EXTENDED RELEASE ORAL 2 TIMES DAILY
Qty: 180 TABLET | Refills: 3 | Status: SHIPPED | OUTPATIENT
Start: 2018-04-05 | End: 2019-01-04 | Stop reason: SDUPTHER

## 2018-04-06 DIAGNOSIS — E78.2 MIXED HYPERLIPIDEMIA: Primary | ICD-10-CM

## 2018-04-09 ENCOUNTER — HOSPITAL ENCOUNTER (OUTPATIENT)
Dept: CARDIAC REHAB | Age: 73
Discharge: HOME | End: 2018-04-09

## 2018-04-09 DIAGNOSIS — E78.2 MIXED HYPERLIPIDEMIA: ICD-10-CM

## 2018-04-09 LAB
ALT SERPL-CCNC: 14 U/L (ref 5–33)
AST SERPL-CCNC: 15 U/L (ref 5–32)
CHOLESTEROL, TOTAL: 134 MG/DL (ref 160–199)
HDLC SERPL-MCNC: 51 MG/DL (ref 65–121)
LDL CHOLESTEROL CALCULATED: 59 MG/DL
TRIGL SERPL-MCNC: 121 MG/DL (ref 0–149)

## 2018-04-10 DIAGNOSIS — E78.5 HYPERLIPIDEMIA, UNSPECIFIED HYPERLIPIDEMIA TYPE: ICD-10-CM

## 2018-04-10 RX ORDER — ATORVASTATIN CALCIUM 80 MG/1
80 TABLET, FILM COATED ORAL DAILY
Qty: 90 TABLET | Refills: 3 | Status: SHIPPED | OUTPATIENT
Start: 2018-04-10 | End: 2019-04-25 | Stop reason: SDUPTHER

## 2018-05-10 ENCOUNTER — OFFICE VISIT (OUTPATIENT)
Dept: CARDIOLOGY | Age: 73
End: 2018-05-10
Payer: MEDICARE

## 2018-05-10 VITALS
BODY MASS INDEX: 24.66 KG/M2 | SYSTOLIC BLOOD PRESSURE: 116 MMHG | WEIGHT: 148 LBS | DIASTOLIC BLOOD PRESSURE: 60 MMHG | HEIGHT: 65 IN | HEART RATE: 64 BPM

## 2018-05-10 DIAGNOSIS — E78.2 MIXED HYPERLIPIDEMIA: ICD-10-CM

## 2018-05-10 DIAGNOSIS — I25.10 CORONARY ARTERY DISEASE INVOLVING NATIVE CORONARY ARTERY OF NATIVE HEART WITHOUT ANGINA PECTORIS: Primary | ICD-10-CM

## 2018-05-10 DIAGNOSIS — I10 ESSENTIAL HYPERTENSION: ICD-10-CM

## 2018-05-10 PROCEDURE — G8427 DOCREV CUR MEDS BY ELIG CLIN: HCPCS | Performed by: CLINICAL NURSE SPECIALIST

## 2018-05-10 PROCEDURE — 99213 OFFICE O/P EST LOW 20 MIN: CPT | Performed by: CLINICAL NURSE SPECIALIST

## 2018-05-10 PROCEDURE — 1090F PRES/ABSN URINE INCON ASSESS: CPT | Performed by: CLINICAL NURSE SPECIALIST

## 2018-05-10 PROCEDURE — 1123F ACP DISCUSS/DSCN MKR DOCD: CPT | Performed by: CLINICAL NURSE SPECIALIST

## 2018-05-10 PROCEDURE — G8399 PT W/DXA RESULTS DOCUMENT: HCPCS | Performed by: CLINICAL NURSE SPECIALIST

## 2018-05-10 PROCEDURE — G8598 ASA/ANTIPLAT THER USED: HCPCS | Performed by: CLINICAL NURSE SPECIALIST

## 2018-05-10 PROCEDURE — 4040F PNEUMOC VAC/ADMIN/RCVD: CPT | Performed by: CLINICAL NURSE SPECIALIST

## 2018-05-10 PROCEDURE — G8419 CALC BMI OUT NRM PARAM NOF/U: HCPCS | Performed by: CLINICAL NURSE SPECIALIST

## 2018-05-10 PROCEDURE — 1036F TOBACCO NON-USER: CPT | Performed by: CLINICAL NURSE SPECIALIST

## 2018-05-10 PROCEDURE — 3017F COLORECTAL CA SCREEN DOC REV: CPT | Performed by: CLINICAL NURSE SPECIALIST

## 2018-05-10 RX ORDER — LISINOPRIL 10 MG/1
10 TABLET ORAL DAILY
Status: ON HOLD | COMMUNITY
Start: 2018-03-17 | End: 2019-01-11 | Stop reason: HOSPADM

## 2018-05-10 ASSESSMENT — ENCOUNTER SYMPTOMS
BLURRED VISION: 0
ORTHOPNEA: 0
COUGH: 0
VOMITING: 0
BLOOD IN STOOL: 0
NAUSEA: 0
HEARTBURN: 0
SHORTNESS OF BREATH: 1

## 2018-07-02 ENCOUNTER — HOSPITAL ENCOUNTER (OUTPATIENT)
Dept: NON INVASIVE DIAGNOSTICS | Age: 73
Discharge: HOME | End: 2018-07-02
Payer: MEDICARE

## 2018-07-02 ENCOUNTER — OFFICE VISIT (OUTPATIENT)
Dept: VASCULAR SURGERY | Age: 73
End: 2018-07-02
Payer: MEDICARE

## 2018-07-02 VITALS
TEMPERATURE: 96 F | HEART RATE: 67 BPM | DIASTOLIC BLOOD PRESSURE: 77 MMHG | SYSTOLIC BLOOD PRESSURE: 152 MMHG | RESPIRATION RATE: 18 BRPM

## 2018-07-02 DIAGNOSIS — I65.23 BILATERAL CAROTID ARTERY STENOSIS: ICD-10-CM

## 2018-07-02 DIAGNOSIS — I65.23 BILATERAL CAROTID ARTERY STENOSIS: Primary | ICD-10-CM

## 2018-07-02 PROCEDURE — 93880 EXTRACRANIAL BILAT STUDY: CPT

## 2018-07-02 PROCEDURE — 1090F PRES/ABSN URINE INCON ASSESS: CPT | Performed by: PHYSICIAN ASSISTANT

## 2018-07-02 PROCEDURE — G8598 ASA/ANTIPLAT THER USED: HCPCS | Performed by: PHYSICIAN ASSISTANT

## 2018-07-02 PROCEDURE — 1036F TOBACCO NON-USER: CPT | Performed by: PHYSICIAN ASSISTANT

## 2018-07-02 PROCEDURE — 99213 OFFICE O/P EST LOW 20 MIN: CPT | Performed by: PHYSICIAN ASSISTANT

## 2018-07-02 PROCEDURE — 4040F PNEUMOC VAC/ADMIN/RCVD: CPT | Performed by: PHYSICIAN ASSISTANT

## 2018-07-02 PROCEDURE — 1123F ACP DISCUSS/DSCN MKR DOCD: CPT | Performed by: PHYSICIAN ASSISTANT

## 2018-07-02 PROCEDURE — G8399 PT W/DXA RESULTS DOCUMENT: HCPCS | Performed by: PHYSICIAN ASSISTANT

## 2018-07-02 PROCEDURE — G8427 DOCREV CUR MEDS BY ELIG CLIN: HCPCS | Performed by: PHYSICIAN ASSISTANT

## 2018-07-02 PROCEDURE — G8419 CALC BMI OUT NRM PARAM NOF/U: HCPCS | Performed by: PHYSICIAN ASSISTANT

## 2018-07-02 PROCEDURE — 3017F COLORECTAL CA SCREEN DOC REV: CPT | Performed by: PHYSICIAN ASSISTANT

## 2018-07-02 NOTE — PROGRESS NOTES
 ranolazine (RANEXA) 500 MG extended release tablet Take 1 tablet by mouth 2 times daily 180 tablet 3    cetirizine (ZYRTEC) 10 MG tablet Take 10 mg by mouth as needed       NITROSTAT 0.4 MG SL tablet DISSOLVE 1 TABLET UNDER THE TONGUE EVERY 5 MINUTES AS NEEDED FOR CHEST PAIN 25 tablet 3    isosorbide mononitrate (IMDUR) 120 MG extended release tablet Take 1 tablet by mouth daily 90 tablet 3    BYSTOLIC 5 MG tablet TAKE 1 TABLET DAILY 90 tablet 3    Cyanocobalamin (VITAMIN B 12 PO) Take 1,000 mcg by mouth daily      metFORMIN (GLUCOPHAGE) 500 MG tablet Take 500 mg by mouth 2 times daily (with meals)       promethazine (PHENERGAN) 25 MG tablet Take 25 mg by mouth every 6 hours as needed for Nausea      cyclobenzaprine (FLEXERIL) 10 MG tablet Take 10 mg by mouth 2 times daily as needed for Muscle spasms.  ALPRAZolam (XANAX) 0.25 MG tablet Take 0.25 mg by mouth daily as needed (takes 1/2 tab)       albuterol (PROVENTIL HFA;VENTOLIN HFA) 108 (90 BASE) MCG/ACT inhaler Inhale 2 puffs into the lungs every 6 hours as needed.  traZODone (DESYREL) 50 MG tablet Take 50 mg by mouth nightly.  choline fenofibrate (TRILIPIX) 135 MG CPDR Take 135 mg by mouth daily.  aspirin 81 MG tablet Take 81 mg by mouth daily.  zolpidem (AMBIEN) 10 MG tablet Take 5 mg by mouth nightly        No current facility-administered medications for this visit. Allergies: Other; Codeine; Darvocet [propoxyphene n-acetaminophen];  Hydrocodone-acetaminophen; Morphine; and Percocet [oxycodone-acetaminophen]  Past Medical History:   Diagnosis Date    Asthma     CAD (coronary artery disease)     Chest pain     Depression with anxiety     Diabetes mellitus (HCC)     GERD (gastroesophageal reflux disease)     Glucose intolerance (impaired glucose tolerance)     Hyperlipidemia     Cholesterol management per Yisel Balderas M.D.    Hypertension     Lupus (systemic lupus erythematosus) ([facility] [address]     Malaise and unexpected weight change. No fever or chills. No diaphoresis or significant fatigue. HENT - no significant rhinorrhea or epistaxis. No tinnitus or significant hearing loss. Eyes - no sudden vision change or amaurosis. Respiratory - no significant shortness of breath, wheezing, or stridor. No apnea, cough, or chest tightness associated with shortness of breath. Cardiovascular - no chest pain, syncope, or significant dizziness. No palpitations or significant leg swelling. No claudication. Gastrointestinal - no abdominal swelling or pain. No blood in stool. No severe constipation, diarrhea, nausea, or vomiting. Genitourinary - No difficulty urinating, dysuria, frequency, or urgency. No flank pain or hematuria. Musculoskeletal - no back pain, gait disturbance, or myalgia. Skin - no color change, rash, pallor, or new wound. Neurologic - no dizziness, facial asymmetry, or light headedness. No seizures. No speech difficulty or lateralizing weakness. Hematologic - no easy bruising or excessive bleeding. Psychiatric - no severe anxiety or nervousness. No confusion. All other review of systems are negative. Physical Exam    BP (!) 152/77 Comment: right  Pulse 67   Temp 96 °F (35.6 °C)   Resp 18     Constitutional - well developed, well nourished. No diaphoresis or acute distress. HENT - head normocephalic. Right external ear canal appears normal.  Left external ear canal appears normal.  Septum appears midline. Eyes - conjunctiva normal.  EOMS normal.  No exudate. No icterus. Neck- ROM appears normal, no tracheal deviation. Cardiovascular - Regular rate and rhythm. Heart sounds are normal.  No murmur, rub, or gallop. Carotid pulses are 2+ to palpation bilaterally without bruit. Extremities - Radial and brachial pulses are 2+ to palpation bilaterally. Right femoral pulse: present 2+; Right popliteal pulse: absent Right DP: absent; Right PT absent;  Left femoral pulse: present 2+; Left

## 2018-07-07 DIAGNOSIS — R07.9 CHEST PAIN, UNSPECIFIED TYPE: ICD-10-CM

## 2018-07-09 RX ORDER — ISOSORBIDE MONONITRATE 120 MG/1
120 TABLET, EXTENDED RELEASE ORAL DAILY
Qty: 90 TABLET | Refills: 3 | Status: ON HOLD | OUTPATIENT
Start: 2018-07-09 | End: 2019-01-11 | Stop reason: HOSPADM

## 2018-08-29 ENCOUNTER — APPOINTMENT (OUTPATIENT)
Dept: GENERAL RADIOLOGY | Age: 73
End: 2018-08-29
Payer: MEDICARE

## 2018-08-29 ENCOUNTER — HOSPITAL ENCOUNTER (EMERGENCY)
Age: 73
Discharge: HOME | End: 2018-08-29
Attending: EMERGENCY MEDICINE
Payer: MEDICARE

## 2018-08-29 VITALS
HEIGHT: 65 IN | RESPIRATION RATE: 18 BRPM | HEART RATE: 62 BPM | WEIGHT: 143 LBS | TEMPERATURE: 98 F | DIASTOLIC BLOOD PRESSURE: 88 MMHG | SYSTOLIC BLOOD PRESSURE: 176 MMHG | OXYGEN SATURATION: 92 % | BODY MASS INDEX: 23.82 KG/M2

## 2018-08-29 DIAGNOSIS — R07.89 ATYPICAL CHEST PAIN: Primary | ICD-10-CM

## 2018-08-29 LAB
ANION GAP SERPL CALCULATED.3IONS-SCNC: 16 MMOL/L (ref 7–19)
APTT: 30 SEC (ref 26–36.2)
BUN BLDV-MCNC: 18 MG/DL (ref 8–23)
CALCIUM SERPL-MCNC: 9.3 MG/DL (ref 8.8–10.2)
CHLORIDE BLD-SCNC: 101 MMOL/L (ref 98–111)
CO2: 23 MMOL/L (ref 22–29)
CREAT SERPL-MCNC: 0.9 MG/DL (ref 0.5–0.9)
D DIMER: 0.34 UG/ML FEU (ref 0–0.48)
GFR NON-AFRICAN AMERICAN: >60
GLUCOSE BLD-MCNC: 97 MG/DL (ref 74–109)
HCT VFR BLD CALC: 37.6 % (ref 37–47)
HEMOGLOBIN: 12.1 G/DL (ref 12–16)
INR BLD: 0.98 (ref 0.88–1.18)
MCH RBC QN AUTO: 29.3 PG (ref 27–31)
MCHC RBC AUTO-ENTMCNC: 32.2 G/DL (ref 33–37)
MCV RBC AUTO: 91 FL (ref 81–99)
PDW BLD-RTO: 13.6 % (ref 11.5–14.5)
PERFORMED ON: NORMAL
PERFORMED ON: NORMAL
PLATELET # BLD: 194 K/UL (ref 130–400)
PMV BLD AUTO: 8.8 FL (ref 9.4–12.3)
POC TROPONIN I: 0 NG/ML (ref 0–0.08)
POC TROPONIN I: 0 NG/ML (ref 0–0.08)
POTASSIUM SERPL-SCNC: 4.2 MMOL/L (ref 3.5–5)
PROTHROMBIN TIME: 12.9 SEC (ref 12–14.6)
RBC # BLD: 4.13 M/UL (ref 4.2–5.4)
SODIUM BLD-SCNC: 140 MMOL/L (ref 136–145)
WBC # BLD: 4.4 K/UL (ref 4.8–10.8)

## 2018-08-29 PROCEDURE — 85610 PROTHROMBIN TIME: CPT

## 2018-08-29 PROCEDURE — 71045 X-RAY EXAM CHEST 1 VIEW: CPT

## 2018-08-29 PROCEDURE — 99285 EMERGENCY DEPT VISIT HI MDM: CPT

## 2018-08-29 PROCEDURE — 93005 ELECTROCARDIOGRAM TRACING: CPT

## 2018-08-29 PROCEDURE — 2580000003 HC RX 258: Performed by: EMERGENCY MEDICINE

## 2018-08-29 PROCEDURE — 80048 BASIC METABOLIC PNL TOTAL CA: CPT

## 2018-08-29 PROCEDURE — 36415 COLL VENOUS BLD VENIPUNCTURE: CPT

## 2018-08-29 PROCEDURE — 84484 ASSAY OF TROPONIN QUANT: CPT

## 2018-08-29 PROCEDURE — 96374 THER/PROPH/DIAG INJ IV PUSH: CPT

## 2018-08-29 PROCEDURE — 99284 EMERGENCY DEPT VISIT MOD MDM: CPT | Performed by: EMERGENCY MEDICINE

## 2018-08-29 PROCEDURE — 85027 COMPLETE CBC AUTOMATED: CPT

## 2018-08-29 PROCEDURE — 85379 FIBRIN DEGRADATION QUANT: CPT

## 2018-08-29 PROCEDURE — 6360000002 HC RX W HCPCS: Performed by: EMERGENCY MEDICINE

## 2018-08-29 PROCEDURE — 85730 THROMBOPLASTIN TIME PARTIAL: CPT

## 2018-08-29 PROCEDURE — 96375 TX/PRO/DX INJ NEW DRUG ADDON: CPT

## 2018-08-29 RX ORDER — KETOROLAC TROMETHAMINE 30 MG/ML
15 INJECTION, SOLUTION INTRAMUSCULAR; INTRAVENOUS ONCE
Status: COMPLETED | OUTPATIENT
Start: 2018-08-29 | End: 2018-08-29

## 2018-08-29 RX ORDER — ONDANSETRON 2 MG/ML
4 INJECTION INTRAMUSCULAR; INTRAVENOUS
Status: DISCONTINUED | OUTPATIENT
Start: 2018-08-29 | End: 2018-08-30 | Stop reason: HOSPADM

## 2018-08-29 RX ORDER — MORPHINE SULFATE/0.9% NACL/PF 1 MG/ML
4 SYRINGE (ML) INJECTION
Status: DISCONTINUED | OUTPATIENT
Start: 2018-08-29 | End: 2018-08-30 | Stop reason: HOSPADM

## 2018-08-29 RX ORDER — 0.9 % SODIUM CHLORIDE 0.9 %
1000 INTRAVENOUS SOLUTION INTRAVENOUS ONCE
Status: COMPLETED | OUTPATIENT
Start: 2018-08-29 | End: 2018-08-29

## 2018-08-29 RX ADMIN — SODIUM CHLORIDE 1000 ML: 9 INJECTION, SOLUTION INTRAVENOUS at 21:09

## 2018-08-29 RX ADMIN — KETOROLAC TROMETHAMINE 15 MG: 30 INJECTION, SOLUTION INTRAMUSCULAR; INTRAVENOUS at 21:58

## 2018-08-29 RX ADMIN — ONDANSETRON HYDROCHLORIDE 4 MG: 2 INJECTION, SOLUTION INTRAMUSCULAR; INTRAVENOUS at 21:18

## 2018-08-29 RX ADMIN — Medication 4 MG: at 21:10

## 2018-08-29 ASSESSMENT — ENCOUNTER SYMPTOMS
SHORTNESS OF BREATH: 1
ABDOMINAL PAIN: 0
RHINORRHEA: 0
DIARRHEA: 0
NAUSEA: 0
SORE THROAT: 0
BACK PAIN: 0
PHOTOPHOBIA: 0
COUGH: 0
EYE PAIN: 0
CHEST TIGHTNESS: 0
VOMITING: 0

## 2018-08-29 ASSESSMENT — PAIN SCALES - GENERAL
PAINLEVEL_OUTOF10: 6

## 2018-08-29 ASSESSMENT — PAIN DESCRIPTION - DESCRIPTORS: DESCRIPTORS: PRESSURE

## 2018-08-29 ASSESSMENT — PAIN DESCRIPTION - LOCATION: LOCATION: CHEST

## 2018-08-29 ASSESSMENT — PAIN DESCRIPTION - ORIENTATION: ORIENTATION: LEFT;MID

## 2018-08-29 ASSESSMENT — PAIN DESCRIPTION - PAIN TYPE: TYPE: ACUTE PAIN

## 2018-08-30 NOTE — ED PROVIDER NOTES
CONTINUE these medications which have NOT CHANGED    Details   isosorbide mononitrate (IMDUR) 120 MG extended release tablet TAKE 1 TABLET BY MOUTH DAILY  Qty: 90 tablet, Refills: 3    Associated Diagnoses: Chest pain, unspecified type      lisinopril (PRINIVIL;ZESTRIL) 10 MG tablet Take 10 mg by mouth daily       atorvastatin (LIPITOR) 80 MG tablet Take 1 tablet by mouth daily  Qty: 90 tablet, Refills: 3    Associated Diagnoses: Hyperlipidemia, unspecified hyperlipidemia type      ranolazine (RANEXA) 500 MG extended release tablet Take 1 tablet by mouth 2 times daily  Qty: 180 tablet, Refills: 3      cetirizine (ZYRTEC) 10 MG tablet Take 10 mg by mouth as needed       NITROSTAT 0.4 MG SL tablet DISSOLVE 1 TABLET UNDER THE TONGUE EVERY 5 MINUTES AS NEEDED FOR CHEST PAIN  Qty: 25 tablet, Refills: 3      BYSTOLIC 5 MG tablet TAKE 1 TABLET DAILY  Qty: 90 tablet, Refills: 3      Cyanocobalamin (VITAMIN B 12 PO) Take 1,000 mcg by mouth daily      metFORMIN (GLUCOPHAGE) 500 MG tablet Take 500 mg by mouth 2 times daily (with meals)       promethazine (PHENERGAN) 25 MG tablet Take 25 mg by mouth every 6 hours as needed for Nausea      cyclobenzaprine (FLEXERIL) 10 MG tablet Take 10 mg by mouth 2 times daily as needed for Muscle spasms. ALPRAZolam (XANAX) 0.25 MG tablet Take 0.25 mg by mouth daily as needed (takes 1/2 tab)       albuterol (PROVENTIL HFA;VENTOLIN HFA) 108 (90 BASE) MCG/ACT inhaler Inhale 2 puffs into the lungs every 6 hours as needed. traZODone (DESYREL) 50 MG tablet Take 50 mg by mouth nightly. choline fenofibrate (TRILIPIX) 135 MG CPDR Take 135 mg by mouth daily. aspirin 81 MG tablet Take 81 mg by mouth daily. zolpidem (AMBIEN) 10 MG tablet Take 5 mg by mouth nightly              ALLERGIES     Other; Codeine; Darvocet [propoxyphene n-acetaminophen];  Hydrocodone-acetaminophen; Morphine; and Percocet [oxycodone-acetaminophen]    FAMILY HISTORY       Family History   Problem

## 2018-08-30 NOTE — ED NOTES
Patient placed on cardiac monitor, continuous pulse oximeter, and NIBP monitor. Monitor alarms on.        Nan Garcia RN  08/29/18 [id number]

## 2018-08-31 LAB
EKG P AXIS: 51 DEGREES
EKG P AXIS: 65 DEGREES
EKG P-R INTERVAL: 160 MS
EKG P-R INTERVAL: 164 MS
EKG Q-T INTERVAL: 412 MS
EKG Q-T INTERVAL: 460 MS
EKG QRS DURATION: 102 MS
EKG QRS DURATION: 102 MS
EKG QTC CALCULATION (BAZETT): 420 MS
EKG QTC CALCULATION (BAZETT): 457 MS
EKG T AXIS: 144 DEGREES
EKG T AXIS: 148 DEGREES

## 2018-09-10 RX ORDER — NEBIVOLOL 5 MG/1
TABLET ORAL
Qty: 90 TABLET | Refills: 3 | Status: SHIPPED | OUTPATIENT
Start: 2018-09-10 | End: 2018-11-20 | Stop reason: SDUPTHER

## 2018-09-18 ENCOUNTER — HOSPITAL ENCOUNTER (OUTPATIENT)
Dept: ULTRASOUND IMAGING | Age: 73
Discharge: HOME | End: 2018-09-18
Payer: MEDICARE

## 2018-09-18 DIAGNOSIS — R10.10 PAIN OF UPPER ABDOMEN: ICD-10-CM

## 2018-09-18 PROCEDURE — 76700 US EXAM ABDOM COMPLETE: CPT

## 2018-11-20 ENCOUNTER — OFFICE VISIT (OUTPATIENT)
Dept: CARDIOLOGY | Age: 73
End: 2018-11-20
Payer: MEDICARE

## 2018-11-20 VITALS
HEART RATE: 73 BPM | SYSTOLIC BLOOD PRESSURE: 138 MMHG | WEIGHT: 145 LBS | DIASTOLIC BLOOD PRESSURE: 68 MMHG | BODY MASS INDEX: 24.16 KG/M2 | HEIGHT: 65 IN

## 2018-11-20 DIAGNOSIS — Z95.1 HX OF CABG: ICD-10-CM

## 2018-11-20 DIAGNOSIS — I25.119 CORONARY ARTERY DISEASE INVOLVING NATIVE CORONARY ARTERY OF NATIVE HEART WITH ANGINA PECTORIS (HCC): Primary | ICD-10-CM

## 2018-11-20 DIAGNOSIS — I10 ESSENTIAL HYPERTENSION: ICD-10-CM

## 2018-11-20 PROCEDURE — G8484 FLU IMMUNIZE NO ADMIN: HCPCS | Performed by: CLINICAL NURSE SPECIALIST

## 2018-11-20 PROCEDURE — 1123F ACP DISCUSS/DSCN MKR DOCD: CPT | Performed by: CLINICAL NURSE SPECIALIST

## 2018-11-20 PROCEDURE — 1101F PT FALLS ASSESS-DOCD LE1/YR: CPT | Performed by: CLINICAL NURSE SPECIALIST

## 2018-11-20 PROCEDURE — 99214 OFFICE O/P EST MOD 30 MIN: CPT | Performed by: CLINICAL NURSE SPECIALIST

## 2018-11-20 PROCEDURE — 3017F COLORECTAL CA SCREEN DOC REV: CPT | Performed by: CLINICAL NURSE SPECIALIST

## 2018-11-20 PROCEDURE — G8427 DOCREV CUR MEDS BY ELIG CLIN: HCPCS | Performed by: CLINICAL NURSE SPECIALIST

## 2018-11-20 PROCEDURE — 1036F TOBACCO NON-USER: CPT | Performed by: CLINICAL NURSE SPECIALIST

## 2018-11-20 PROCEDURE — G8399 PT W/DXA RESULTS DOCUMENT: HCPCS | Performed by: CLINICAL NURSE SPECIALIST

## 2018-11-20 PROCEDURE — G8598 ASA/ANTIPLAT THER USED: HCPCS | Performed by: CLINICAL NURSE SPECIALIST

## 2018-11-20 PROCEDURE — 1090F PRES/ABSN URINE INCON ASSESS: CPT | Performed by: CLINICAL NURSE SPECIALIST

## 2018-11-20 PROCEDURE — 4040F PNEUMOC VAC/ADMIN/RCVD: CPT | Performed by: CLINICAL NURSE SPECIALIST

## 2018-11-20 PROCEDURE — G8420 CALC BMI NORM PARAMETERS: HCPCS | Performed by: CLINICAL NURSE SPECIALIST

## 2018-11-20 RX ORDER — NEBIVOLOL 10 MG/1
TABLET ORAL
Qty: 90 TABLET | Refills: 3 | Status: ON HOLD | OUTPATIENT
Start: 2018-11-20 | End: 2019-01-11 | Stop reason: HOSPADM

## 2018-11-20 RX ORDER — NITROGLYCERIN 0.4 MG/1
TABLET SUBLINGUAL
Qty: 25 TABLET | Refills: 3 | Status: SHIPPED | OUTPATIENT
Start: 2018-11-20 | End: 2019-09-16 | Stop reason: SDUPTHER

## 2018-11-20 NOTE — PROGRESS NOTES
Brother      Social History   Substance Use Topics    Smoking status: Former Smoker     Packs/day: 1.00     Years: 1.00     Types: Cigarettes     Quit date: 2/26/1967    Smokeless tobacco: Never Used    Alcohol use No      Current Outpatient Prescriptions   Medication Sig Dispense Refill    nitroGLYCERIN (NITROSTAT) 0.4 MG SL tablet DISSOLVE 1 TABLET UNDER THE TONGUE EVERY 5 MINUTES AS NEEDED FOR CHEST PAIN 25 tablet 3    nebivolol (BYSTOLIC) 10 MG tablet TAKE 1 TABLET DAILY 90 tablet 3    isosorbide mononitrate (IMDUR) 120 MG extended release tablet TAKE 1 TABLET BY MOUTH DAILY 90 tablet 3    lisinopril (PRINIVIL;ZESTRIL) 10 MG tablet Take 10 mg by mouth daily       atorvastatin (LIPITOR) 80 MG tablet Take 1 tablet by mouth daily 90 tablet 3    ranolazine (RANEXA) 500 MG extended release tablet Take 1 tablet by mouth 2 times daily 180 tablet 3    cetirizine (ZYRTEC) 10 MG tablet Take 10 mg by mouth as needed       Cyanocobalamin (VITAMIN B 12 PO) Take 1,000 mcg by mouth daily      metFORMIN (GLUCOPHAGE) 500 MG tablet Take 500 mg by mouth 2 times daily (with meals)       promethazine (PHENERGAN) 25 MG tablet Take 25 mg by mouth every 6 hours as needed for Nausea      cyclobenzaprine (FLEXERIL) 10 MG tablet Take 10 mg by mouth 2 times daily as needed for Muscle spasms.  ALPRAZolam (XANAX) 0.25 MG tablet Take 0.25 mg by mouth daily as needed (takes 1/2 tab)       albuterol (PROVENTIL HFA;VENTOLIN HFA) 108 (90 BASE) MCG/ACT inhaler Inhale 2 puffs into the lungs every 6 hours as needed.  traZODone (DESYREL) 50 MG tablet Take 50 mg by mouth nightly.  choline fenofibrate (TRILIPIX) 135 MG CPDR Take 135 mg by mouth daily.  aspirin 81 MG tablet Take 81 mg by mouth daily.  zolpidem (AMBIEN) 10 MG tablet Take 5 mg by mouth nightly        No current facility-administered medications for this visit. Allergies:  Other; Codeine; Darvocet [propoxyphene n-acetaminophen];

## 2019-01-04 ENCOUNTER — OFFICE VISIT (OUTPATIENT)
Dept: CARDIOLOGY | Age: 74
End: 2019-01-04
Payer: MEDICARE

## 2019-01-04 VITALS
SYSTOLIC BLOOD PRESSURE: 148 MMHG | BODY MASS INDEX: 23.99 KG/M2 | DIASTOLIC BLOOD PRESSURE: 72 MMHG | HEIGHT: 65 IN | WEIGHT: 144 LBS | HEART RATE: 74 BPM

## 2019-01-04 DIAGNOSIS — I10 ESSENTIAL HYPERTENSION: Primary | ICD-10-CM

## 2019-01-04 DIAGNOSIS — I25.119 CORONARY ARTERY DISEASE INVOLVING NATIVE CORONARY ARTERY OF NATIVE HEART WITH ANGINA PECTORIS (HCC): ICD-10-CM

## 2019-01-04 PROCEDURE — G8399 PT W/DXA RESULTS DOCUMENT: HCPCS | Performed by: CLINICAL NURSE SPECIALIST

## 2019-01-04 PROCEDURE — 3017F COLORECTAL CA SCREEN DOC REV: CPT | Performed by: CLINICAL NURSE SPECIALIST

## 2019-01-04 PROCEDURE — 1036F TOBACCO NON-USER: CPT | Performed by: CLINICAL NURSE SPECIALIST

## 2019-01-04 PROCEDURE — G8420 CALC BMI NORM PARAMETERS: HCPCS | Performed by: CLINICAL NURSE SPECIALIST

## 2019-01-04 PROCEDURE — 99214 OFFICE O/P EST MOD 30 MIN: CPT | Performed by: CLINICAL NURSE SPECIALIST

## 2019-01-04 PROCEDURE — 1090F PRES/ABSN URINE INCON ASSESS: CPT | Performed by: CLINICAL NURSE SPECIALIST

## 2019-01-04 PROCEDURE — G8598 ASA/ANTIPLAT THER USED: HCPCS | Performed by: CLINICAL NURSE SPECIALIST

## 2019-01-04 PROCEDURE — 4040F PNEUMOC VAC/ADMIN/RCVD: CPT | Performed by: CLINICAL NURSE SPECIALIST

## 2019-01-04 PROCEDURE — 1101F PT FALLS ASSESS-DOCD LE1/YR: CPT | Performed by: CLINICAL NURSE SPECIALIST

## 2019-01-04 PROCEDURE — G8484 FLU IMMUNIZE NO ADMIN: HCPCS | Performed by: CLINICAL NURSE SPECIALIST

## 2019-01-04 PROCEDURE — G8427 DOCREV CUR MEDS BY ELIG CLIN: HCPCS | Performed by: CLINICAL NURSE SPECIALIST

## 2019-01-04 PROCEDURE — 1123F ACP DISCUSS/DSCN MKR DOCD: CPT | Performed by: CLINICAL NURSE SPECIALIST

## 2019-01-04 RX ORDER — RANOLAZINE 1000 MG/1
1000 TABLET, EXTENDED RELEASE ORAL 2 TIMES DAILY
Qty: 180 TABLET | Refills: 3 | Status: SHIPPED | OUTPATIENT
Start: 2019-01-04 | End: 2020-07-01

## 2019-01-10 ENCOUNTER — APPOINTMENT (OUTPATIENT)
Dept: GENERAL RADIOLOGY | Age: 74
End: 2019-01-10
Payer: MEDICARE

## 2019-01-10 ENCOUNTER — TELEPHONE (OUTPATIENT)
Dept: CARDIOLOGY | Age: 74
End: 2019-01-10

## 2019-01-10 ENCOUNTER — HOSPITAL ENCOUNTER (OUTPATIENT)
Age: 74
Setting detail: OBSERVATION
Discharge: HOME | End: 2019-01-11
Attending: EMERGENCY MEDICINE | Admitting: INTERNAL MEDICINE
Payer: MEDICARE

## 2019-01-10 DIAGNOSIS — I25.110 CORONARY ARTERY DISEASE INVOLVING NATIVE CORONARY ARTERY OF NATIVE HEART WITH UNSTABLE ANGINA PECTORIS (HCC): ICD-10-CM

## 2019-01-10 DIAGNOSIS — R07.89 CHEST DISCOMFORT: Primary | ICD-10-CM

## 2019-01-10 LAB
ALBUMIN SERPL-MCNC: 4.5 G/DL (ref 3.5–5.2)
ALP BLD-CCNC: 58 U/L (ref 35–104)
ALT SERPL-CCNC: 16 U/L (ref 5–33)
ANION GAP SERPL CALCULATED.3IONS-SCNC: 12 MMOL/L (ref 7–19)
APTT: 29.9 SEC (ref 26–36.2)
AST SERPL-CCNC: 15 U/L (ref 5–32)
BASOPHILS ABSOLUTE: 0 K/UL (ref 0–0.2)
BASOPHILS RELATIVE PERCENT: 0.4 % (ref 0–1)
BILIRUB SERPL-MCNC: 0.4 MG/DL (ref 0.2–1.2)
BUN BLDV-MCNC: 17 MG/DL (ref 8–23)
CALCIUM SERPL-MCNC: 9.5 MG/DL (ref 8.8–10.2)
CHLORIDE BLD-SCNC: 103 MMOL/L (ref 98–111)
CO2: 25 MMOL/L (ref 22–29)
CREAT SERPL-MCNC: 0.8 MG/DL (ref 0.5–0.9)
EOSINOPHILS ABSOLUTE: 0.1 K/UL (ref 0–0.6)
EOSINOPHILS RELATIVE PERCENT: 2.9 % (ref 0–5)
GFR NON-AFRICAN AMERICAN: >60
GLUCOSE BLD-MCNC: 176 MG/DL (ref 74–109)
HCT VFR BLD CALC: 35.8 % (ref 37–47)
HEMOGLOBIN: 11.3 G/DL (ref 12–16)
INR BLD: 0.98 (ref 0.88–1.18)
LYMPHOCYTES ABSOLUTE: 1.1 K/UL (ref 1.1–4.5)
LYMPHOCYTES RELATIVE PERCENT: 22.9 % (ref 20–40)
MCH RBC QN AUTO: 29.3 PG (ref 27–31)
MCHC RBC AUTO-ENTMCNC: 31.6 G/DL (ref 33–37)
MCV RBC AUTO: 92.7 FL (ref 81–99)
MONOCYTES ABSOLUTE: 0.4 K/UL (ref 0–0.9)
MONOCYTES RELATIVE PERCENT: 9.2 % (ref 0–10)
NEUTROPHILS ABSOLUTE: 3.1 K/UL (ref 1.5–7.5)
NEUTROPHILS RELATIVE PERCENT: 64.2 % (ref 50–65)
PDW BLD-RTO: 14 % (ref 11.5–14.5)
PLATELET # BLD: 186 K/UL (ref 130–400)
PMV BLD AUTO: 9 FL (ref 9.4–12.3)
POTASSIUM SERPL-SCNC: 4.1 MMOL/L (ref 3.5–5)
PROTHROMBIN TIME: 12.4 SEC (ref 12–14.6)
RBC # BLD: 3.86 M/UL (ref 4.2–5.4)
SODIUM BLD-SCNC: 140 MMOL/L (ref 136–145)
TOTAL PROTEIN: 6.5 G/DL (ref 6.6–8.7)
TROPONIN: <0.01 NG/ML (ref 0–0.03)
TROPONIN: <0.01 NG/ML (ref 0–0.03)
WBC # BLD: 4.8 K/UL (ref 4.8–10.8)

## 2019-01-10 PROCEDURE — 6360000002 HC RX W HCPCS: Performed by: EMERGENCY MEDICINE

## 2019-01-10 PROCEDURE — 85610 PROTHROMBIN TIME: CPT

## 2019-01-10 PROCEDURE — 99284 EMERGENCY DEPT VISIT MOD MDM: CPT | Performed by: EMERGENCY MEDICINE

## 2019-01-10 PROCEDURE — G0378 HOSPITAL OBSERVATION PER HR: HCPCS

## 2019-01-10 PROCEDURE — 80053 COMPREHEN METABOLIC PANEL: CPT

## 2019-01-10 PROCEDURE — 84484 ASSAY OF TROPONIN QUANT: CPT

## 2019-01-10 PROCEDURE — 71045 X-RAY EXAM CHEST 1 VIEW: CPT

## 2019-01-10 PROCEDURE — 99285 EMERGENCY DEPT VISIT HI MDM: CPT

## 2019-01-10 PROCEDURE — 99220 PR INITIAL OBSERVATION CARE/DAY 70 MINUTES: CPT | Performed by: INTERNAL MEDICINE

## 2019-01-10 PROCEDURE — 36415 COLL VENOUS BLD VENIPUNCTURE: CPT

## 2019-01-10 PROCEDURE — 85730 THROMBOPLASTIN TIME PARTIAL: CPT

## 2019-01-10 PROCEDURE — 93005 ELECTROCARDIOGRAM TRACING: CPT

## 2019-01-10 PROCEDURE — 96374 THER/PROPH/DIAG INJ IV PUSH: CPT

## 2019-01-10 PROCEDURE — 85025 COMPLETE CBC W/AUTO DIFF WBC: CPT

## 2019-01-10 PROCEDURE — 6370000000 HC RX 637 (ALT 250 FOR IP): Performed by: EMERGENCY MEDICINE

## 2019-01-10 RX ORDER — ALPRAZOLAM 0.25 MG/1
0.12 TABLET ORAL DAILY PRN
Status: DISCONTINUED | OUTPATIENT
Start: 2019-01-10 | End: 2019-01-11 | Stop reason: HOSPADM

## 2019-01-10 RX ORDER — CYCLOBENZAPRINE HCL 10 MG
10 TABLET ORAL 2 TIMES DAILY PRN
Status: DISCONTINUED | OUTPATIENT
Start: 2019-01-10 | End: 2019-01-11 | Stop reason: HOSPADM

## 2019-01-10 RX ORDER — NITROGLYCERIN 0.4 MG/1
0.4 TABLET SUBLINGUAL EVERY 5 MIN PRN
Status: DISCONTINUED | OUTPATIENT
Start: 2019-01-10 | End: 2019-01-11 | Stop reason: HOSPADM

## 2019-01-10 RX ORDER — ATORVASTATIN CALCIUM 40 MG/1
80 TABLET, FILM COATED ORAL DAILY
Status: DISCONTINUED | OUTPATIENT
Start: 2019-01-11 | End: 2019-01-11 | Stop reason: HOSPADM

## 2019-01-10 RX ORDER — ASPIRIN 81 MG/1
324 TABLET, CHEWABLE ORAL ONCE
Status: COMPLETED | OUTPATIENT
Start: 2019-01-10 | End: 2019-01-10

## 2019-01-10 RX ORDER — SODIUM CHLORIDE 0.9 % (FLUSH) 0.9 %
10 SYRINGE (ML) INJECTION EVERY 12 HOURS SCHEDULED
Status: DISCONTINUED | OUTPATIENT
Start: 2019-01-10 | End: 2019-01-11 | Stop reason: HOSPADM

## 2019-01-10 RX ORDER — LISINOPRIL 10 MG/1
10 TABLET ORAL DAILY
Status: DISCONTINUED | OUTPATIENT
Start: 2019-01-11 | End: 2019-01-11

## 2019-01-10 RX ORDER — TRAZODONE HYDROCHLORIDE 50 MG/1
50 TABLET ORAL NIGHTLY
Status: DISCONTINUED | OUTPATIENT
Start: 2019-01-10 | End: 2019-01-11 | Stop reason: HOSPADM

## 2019-01-10 RX ORDER — SODIUM CHLORIDE 0.9 % (FLUSH) 0.9 %
10 SYRINGE (ML) INJECTION PRN
Status: DISCONTINUED | OUTPATIENT
Start: 2019-01-10 | End: 2019-01-11 | Stop reason: HOSPADM

## 2019-01-10 RX ORDER — ASPIRIN 81 MG/1
81 TABLET, CHEWABLE ORAL DAILY
Status: DISCONTINUED | OUTPATIENT
Start: 2019-01-11 | End: 2019-01-11 | Stop reason: HOSPADM

## 2019-01-10 RX ORDER — AMITRIPTYLINE HYDROCHLORIDE 10 MG/1
20 TABLET, FILM COATED ORAL NIGHTLY
Status: DISCONTINUED | OUTPATIENT
Start: 2019-01-10 | End: 2019-01-11 | Stop reason: HOSPADM

## 2019-01-10 RX ORDER — ISOSORBIDE MONONITRATE 60 MG/1
120 TABLET, EXTENDED RELEASE ORAL DAILY
Status: DISCONTINUED | OUTPATIENT
Start: 2019-01-11 | End: 2019-01-11

## 2019-01-10 RX ORDER — PROMETHAZINE HYDROCHLORIDE 25 MG/1
25 TABLET ORAL EVERY 6 HOURS PRN
Status: DISCONTINUED | OUTPATIENT
Start: 2019-01-10 | End: 2019-01-11 | Stop reason: HOSPADM

## 2019-01-10 RX ORDER — RANOLAZINE 500 MG/1
1000 TABLET, EXTENDED RELEASE ORAL 2 TIMES DAILY
Status: DISCONTINUED | OUTPATIENT
Start: 2019-01-10 | End: 2019-01-11 | Stop reason: HOSPADM

## 2019-01-10 RX ORDER — HYDRALAZINE HYDROCHLORIDE 20 MG/ML
10 INJECTION INTRAMUSCULAR; INTRAVENOUS ONCE
Status: COMPLETED | OUTPATIENT
Start: 2019-01-10 | End: 2019-01-10

## 2019-01-10 RX ORDER — NITROGLYCERIN 0.4 MG/1
0.4 TABLET SUBLINGUAL EVERY 5 MIN PRN
Status: DISCONTINUED | OUTPATIENT
Start: 2019-01-10 | End: 2019-01-10 | Stop reason: SDUPTHER

## 2019-01-10 RX ORDER — NEBIVOLOL 5 MG/1
10 TABLET ORAL DAILY
Status: DISCONTINUED | OUTPATIENT
Start: 2019-01-11 | End: 2019-01-11

## 2019-01-10 RX ORDER — ZOLPIDEM TARTRATE 5 MG/1
5 TABLET ORAL NIGHTLY
Status: DISCONTINUED | OUTPATIENT
Start: 2019-01-10 | End: 2019-01-11 | Stop reason: HOSPADM

## 2019-01-10 RX ORDER — AMITRIPTYLINE HYDROCHLORIDE 10 MG/1
20 TABLET, FILM COATED ORAL NIGHTLY
COMMUNITY
End: 2019-08-27 | Stop reason: DRUGHIGH

## 2019-01-10 RX ADMIN — NITROGLYCERIN 0.4 MG: 0.4 TABLET, ORALLY DISINTEGRATING SUBLINGUAL at 18:36

## 2019-01-10 RX ADMIN — HYDRALAZINE HYDROCHLORIDE 10 MG: 20 INJECTION INTRAMUSCULAR; INTRAVENOUS at 19:11

## 2019-01-10 RX ADMIN — ASPIRIN 81 MG CHEWABLE TABLET 324 MG: 81 TABLET CHEWABLE at 18:36

## 2019-01-10 RX ADMIN — NITROGLYCERIN 0.4 MG: 0.4 TABLET, ORALLY DISINTEGRATING SUBLINGUAL at 19:34

## 2019-01-10 ASSESSMENT — ENCOUNTER SYMPTOMS
BACK PAIN: 0
SHORTNESS OF BREATH: 1
VOMITING: 0
NAUSEA: 0
COUGH: 0

## 2019-01-10 ASSESSMENT — PAIN SCALES - GENERAL
PAINLEVEL_OUTOF10: 0
PAINLEVEL_OUTOF10: 7

## 2019-01-11 VITALS
HEIGHT: 65 IN | DIASTOLIC BLOOD PRESSURE: 59 MMHG | HEART RATE: 58 BPM | TEMPERATURE: 98 F | BODY MASS INDEX: 23.66 KG/M2 | WEIGHT: 142 LBS | SYSTOLIC BLOOD PRESSURE: 130 MMHG | OXYGEN SATURATION: 97 % | RESPIRATION RATE: 16 BRPM

## 2019-01-11 LAB
EKG P AXIS: 73 DEGREES
EKG P AXIS: 76 DEGREES
EKG P-R INTERVAL: 152 MS
EKG P-R INTERVAL: 168 MS
EKG Q-T INTERVAL: 412 MS
EKG Q-T INTERVAL: 466 MS
EKG QRS DURATION: 100 MS
EKG QRS DURATION: 100 MS
EKG QTC CALCULATION (BAZETT): 427 MS
EKG QTC CALCULATION (BAZETT): 461 MS
EKG T AXIS: 139 DEGREES
EKG T AXIS: 149 DEGREES

## 2019-01-11 PROCEDURE — 99152 MOD SED SAME PHYS/QHP 5/>YRS: CPT | Performed by: INTERNAL MEDICINE

## 2019-01-11 PROCEDURE — 2709999900 HC NON-CHARGEABLE SUPPLY

## 2019-01-11 PROCEDURE — 2580000003 HC RX 258: Performed by: INTERNAL MEDICINE

## 2019-01-11 PROCEDURE — 6360000002 HC RX W HCPCS

## 2019-01-11 PROCEDURE — 6370000000 HC RX 637 (ALT 250 FOR IP): Performed by: INTERNAL MEDICINE

## 2019-01-11 PROCEDURE — 93459 L HRT ART/GRFT ANGIO: CPT | Performed by: INTERNAL MEDICINE

## 2019-01-11 PROCEDURE — 93005 ELECTROCARDIOGRAM TRACING: CPT

## 2019-01-11 PROCEDURE — C1760 CLOSURE DEV, VASC: HCPCS

## 2019-01-11 PROCEDURE — 93567 NJX CAR CTH SPRVLV AORTGRPHY: CPT | Performed by: INTERNAL MEDICINE

## 2019-01-11 PROCEDURE — 99153 MOD SED SAME PHYS/QHP EA: CPT | Performed by: INTERNAL MEDICINE

## 2019-01-11 PROCEDURE — C1894 INTRO/SHEATH, NON-LASER: HCPCS

## 2019-01-11 PROCEDURE — G0378 HOSPITAL OBSERVATION PER HR: HCPCS

## 2019-01-11 RX ORDER — METOPROLOL SUCCINATE 25 MG/1
25 TABLET, EXTENDED RELEASE ORAL 2 TIMES DAILY
Qty: 30 TABLET | Refills: 5 | Status: SHIPPED | OUTPATIENT
Start: 2019-01-11 | End: 2020-05-26 | Stop reason: DRUGHIGH

## 2019-01-11 RX ORDER — SODIUM CHLORIDE 0.9 % (FLUSH) 0.9 %
10 SYRINGE (ML) INJECTION PRN
Status: DISCONTINUED | OUTPATIENT
Start: 2019-01-11 | End: 2019-01-11 | Stop reason: HOSPADM

## 2019-01-11 RX ORDER — ISOSORBIDE MONONITRATE 60 MG/1
60 TABLET, EXTENDED RELEASE ORAL 2 TIMES DAILY
Status: DISCONTINUED | OUTPATIENT
Start: 2019-01-11 | End: 2019-01-11 | Stop reason: HOSPADM

## 2019-01-11 RX ORDER — LISINOPRIL 10 MG/1
10 TABLET ORAL 2 TIMES DAILY
Qty: 30 TABLET | Refills: 5 | Status: ON HOLD | OUTPATIENT
Start: 2019-01-11 | End: 2020-07-24 | Stop reason: HOSPADM

## 2019-01-11 RX ORDER — SODIUM CHLORIDE 0.9 % (FLUSH) 0.9 %
10 SYRINGE (ML) INJECTION EVERY 12 HOURS SCHEDULED
Status: DISCONTINUED | OUTPATIENT
Start: 2019-01-11 | End: 2019-01-11 | Stop reason: HOSPADM

## 2019-01-11 RX ORDER — METOPROLOL SUCCINATE 25 MG/1
25 TABLET, EXTENDED RELEASE ORAL 2 TIMES DAILY
Status: DISCONTINUED | OUTPATIENT
Start: 2019-01-11 | End: 2019-01-11 | Stop reason: HOSPADM

## 2019-01-11 RX ORDER — LISINOPRIL 10 MG/1
10 TABLET ORAL 2 TIMES DAILY
Status: DISCONTINUED | OUTPATIENT
Start: 2019-01-11 | End: 2019-01-11 | Stop reason: HOSPADM

## 2019-01-11 RX ORDER — ISOSORBIDE MONONITRATE 60 MG/1
60 TABLET, EXTENDED RELEASE ORAL 2 TIMES DAILY
Qty: 30 TABLET | Refills: 5 | Status: SHIPPED | OUTPATIENT
Start: 2019-01-11 | End: 2019-08-27 | Stop reason: SDUPTHER

## 2019-01-11 RX ADMIN — METOPROLOL SUCCINATE 25 MG: 25 TABLET, EXTENDED RELEASE ORAL at 11:02

## 2019-01-11 RX ADMIN — TRAZODONE HYDROCHLORIDE 50 MG: 50 TABLET ORAL at 00:39

## 2019-01-11 RX ADMIN — AMITRIPTYLINE HYDROCHLORIDE 20 MG: 10 TABLET, FILM COATED ORAL at 00:38

## 2019-01-11 RX ADMIN — RANOLAZINE 1000 MG: 500 TABLET, FILM COATED, EXTENDED RELEASE ORAL at 00:38

## 2019-01-11 RX ADMIN — LISINOPRIL 10 MG: 10 TABLET ORAL at 11:01

## 2019-01-11 RX ADMIN — ALPRAZOLAM 0.12 MG: 0.25 TABLET ORAL at 00:39

## 2019-01-11 RX ADMIN — ASPIRIN 81 MG CHEWABLE TABLET 81 MG: 81 TABLET CHEWABLE at 11:01

## 2019-01-11 RX ADMIN — RANOLAZINE 1000 MG: 500 TABLET, FILM COATED, EXTENDED RELEASE ORAL at 11:02

## 2019-01-11 RX ADMIN — ISOSORBIDE MONONITRATE 60 MG: 60 TABLET, EXTENDED RELEASE ORAL at 11:02

## 2019-01-11 RX ADMIN — ATORVASTATIN CALCIUM 80 MG: 40 TABLET, FILM COATED ORAL at 11:01

## 2019-01-11 RX ADMIN — Medication 10 ML: at 00:40

## 2019-01-11 ASSESSMENT — PAIN SCALES - GENERAL: PAINLEVEL_OUTOF10: 0

## 2019-01-14 LAB
LV EF: 55 %
LVEF MODALITY: NORMAL

## 2019-01-15 LAB
EKG P AXIS: 55 DEGREES
EKG P-R INTERVAL: 158 MS
EKG Q-T INTERVAL: 440 MS
EKG QRS DURATION: 102 MS
EKG QTC CALCULATION (BAZETT): 441 MS
EKG T AXIS: 146 DEGREES

## 2019-02-15 ENCOUNTER — TELEPHONE (OUTPATIENT)
Dept: CARDIOLOGY | Age: 74
End: 2019-02-15

## 2019-02-26 ENCOUNTER — OFFICE VISIT (OUTPATIENT)
Dept: CARDIOLOGY | Age: 74
End: 2019-02-26
Payer: MEDICARE

## 2019-02-26 VITALS
HEIGHT: 65 IN | BODY MASS INDEX: 23.99 KG/M2 | SYSTOLIC BLOOD PRESSURE: 104 MMHG | HEART RATE: 68 BPM | WEIGHT: 144 LBS | DIASTOLIC BLOOD PRESSURE: 68 MMHG

## 2019-02-26 DIAGNOSIS — I10 ESSENTIAL HYPERTENSION: ICD-10-CM

## 2019-02-26 DIAGNOSIS — I25.119 CORONARY ARTERY DISEASE INVOLVING NATIVE CORONARY ARTERY OF NATIVE HEART WITH ANGINA PECTORIS (HCC): Primary | ICD-10-CM

## 2019-02-26 DIAGNOSIS — Z95.1 HX OF CABG: ICD-10-CM

## 2019-02-26 PROCEDURE — 1036F TOBACCO NON-USER: CPT | Performed by: CLINICAL NURSE SPECIALIST

## 2019-02-26 PROCEDURE — 99213 OFFICE O/P EST LOW 20 MIN: CPT | Performed by: CLINICAL NURSE SPECIALIST

## 2019-02-26 PROCEDURE — G8420 CALC BMI NORM PARAMETERS: HCPCS | Performed by: CLINICAL NURSE SPECIALIST

## 2019-02-26 PROCEDURE — G8427 DOCREV CUR MEDS BY ELIG CLIN: HCPCS | Performed by: CLINICAL NURSE SPECIALIST

## 2019-02-26 PROCEDURE — 3017F COLORECTAL CA SCREEN DOC REV: CPT | Performed by: CLINICAL NURSE SPECIALIST

## 2019-02-26 PROCEDURE — G8399 PT W/DXA RESULTS DOCUMENT: HCPCS | Performed by: CLINICAL NURSE SPECIALIST

## 2019-02-26 PROCEDURE — G8598 ASA/ANTIPLAT THER USED: HCPCS | Performed by: CLINICAL NURSE SPECIALIST

## 2019-02-26 PROCEDURE — 1101F PT FALLS ASSESS-DOCD LE1/YR: CPT | Performed by: CLINICAL NURSE SPECIALIST

## 2019-02-26 PROCEDURE — 1123F ACP DISCUSS/DSCN MKR DOCD: CPT | Performed by: CLINICAL NURSE SPECIALIST

## 2019-02-26 PROCEDURE — G8484 FLU IMMUNIZE NO ADMIN: HCPCS | Performed by: CLINICAL NURSE SPECIALIST

## 2019-02-26 PROCEDURE — 1090F PRES/ABSN URINE INCON ASSESS: CPT | Performed by: CLINICAL NURSE SPECIALIST

## 2019-02-26 PROCEDURE — 4040F PNEUMOC VAC/ADMIN/RCVD: CPT | Performed by: CLINICAL NURSE SPECIALIST

## 2019-04-08 ENCOUNTER — HOSPITAL ENCOUNTER (OUTPATIENT)
Dept: WOMENS IMAGING | Age: 74
Discharge: HOME | End: 2019-04-08
Payer: MEDICARE

## 2019-04-08 DIAGNOSIS — Z12.31 ENCOUNTER FOR SCREENING MAMMOGRAM FOR BREAST CANCER: ICD-10-CM

## 2019-04-08 PROCEDURE — 77063 BREAST TOMOSYNTHESIS BI: CPT

## 2019-04-10 ENCOUNTER — TELEPHONE (OUTPATIENT)
Dept: WOMENS IMAGING | Age: 74
End: 2019-04-10

## 2019-04-17 ENCOUNTER — HOSPITAL ENCOUNTER (OUTPATIENT)
Dept: WOMENS IMAGING | Age: 74
Discharge: HOME | End: 2019-04-17
Payer: MEDICARE

## 2019-04-17 DIAGNOSIS — R92.0 MICROCALCIFICATIONS OF THE BREAST: ICD-10-CM

## 2019-04-17 PROCEDURE — 77065 DX MAMMO INCL CAD UNI: CPT

## 2019-04-25 DIAGNOSIS — E78.5 HYPERLIPIDEMIA, UNSPECIFIED HYPERLIPIDEMIA TYPE: ICD-10-CM

## 2019-04-25 RX ORDER — ATORVASTATIN CALCIUM 80 MG/1
80 TABLET, FILM COATED ORAL DAILY
Qty: 90 TABLET | Refills: 0 | Status: ON HOLD | OUTPATIENT
Start: 2019-04-25 | End: 2020-11-11 | Stop reason: SDUPTHER

## 2019-07-09 ENCOUNTER — HOSPITAL ENCOUNTER (OUTPATIENT)
Dept: VASCULAR LAB | Age: 74
Discharge: HOME | End: 2019-07-09
Payer: MEDICARE

## 2019-07-09 DIAGNOSIS — I65.23 BILATERAL CAROTID ARTERY STENOSIS: ICD-10-CM

## 2019-07-09 PROCEDURE — 93880 EXTRACRANIAL BILAT STUDY: CPT

## 2019-07-10 DIAGNOSIS — I65.23 BILATERAL CAROTID ARTERY STENOSIS: Primary | ICD-10-CM

## 2019-07-11 ENCOUNTER — TELEPHONE (OUTPATIENT)
Dept: ULTRASOUND IMAGING | Age: 74
End: 2019-07-11

## 2019-08-27 ENCOUNTER — OFFICE VISIT (OUTPATIENT)
Dept: CARDIOLOGY | Age: 74
End: 2019-08-27
Payer: MEDICARE

## 2019-08-27 VITALS
WEIGHT: 146 LBS | HEART RATE: 75 BPM | BODY MASS INDEX: 24.32 KG/M2 | HEIGHT: 65 IN | SYSTOLIC BLOOD PRESSURE: 130 MMHG | DIASTOLIC BLOOD PRESSURE: 60 MMHG

## 2019-08-27 DIAGNOSIS — I10 ESSENTIAL HYPERTENSION: ICD-10-CM

## 2019-08-27 DIAGNOSIS — I25.119 CORONARY ARTERY DISEASE INVOLVING NATIVE CORONARY ARTERY OF NATIVE HEART WITH ANGINA PECTORIS (HCC): Primary | ICD-10-CM

## 2019-08-27 DIAGNOSIS — Z95.1 HX OF CABG: ICD-10-CM

## 2019-08-27 PROCEDURE — 1123F ACP DISCUSS/DSCN MKR DOCD: CPT | Performed by: CLINICAL NURSE SPECIALIST

## 2019-08-27 PROCEDURE — 4040F PNEUMOC VAC/ADMIN/RCVD: CPT | Performed by: CLINICAL NURSE SPECIALIST

## 2019-08-27 PROCEDURE — G8427 DOCREV CUR MEDS BY ELIG CLIN: HCPCS | Performed by: CLINICAL NURSE SPECIALIST

## 2019-08-27 PROCEDURE — G8598 ASA/ANTIPLAT THER USED: HCPCS | Performed by: CLINICAL NURSE SPECIALIST

## 2019-08-27 PROCEDURE — G8399 PT W/DXA RESULTS DOCUMENT: HCPCS | Performed by: CLINICAL NURSE SPECIALIST

## 2019-08-27 PROCEDURE — 1036F TOBACCO NON-USER: CPT | Performed by: CLINICAL NURSE SPECIALIST

## 2019-08-27 PROCEDURE — 3017F COLORECTAL CA SCREEN DOC REV: CPT | Performed by: CLINICAL NURSE SPECIALIST

## 2019-08-27 PROCEDURE — G8420 CALC BMI NORM PARAMETERS: HCPCS | Performed by: CLINICAL NURSE SPECIALIST

## 2019-08-27 PROCEDURE — 99214 OFFICE O/P EST MOD 30 MIN: CPT | Performed by: CLINICAL NURSE SPECIALIST

## 2019-08-27 PROCEDURE — 1090F PRES/ABSN URINE INCON ASSESS: CPT | Performed by: CLINICAL NURSE SPECIALIST

## 2019-08-27 RX ORDER — AMITRIPTYLINE HYDROCHLORIDE 25 MG/1
25 TABLET, FILM COATED ORAL NIGHTLY
Status: ON HOLD | COMMUNITY
End: 2020-07-24 | Stop reason: HOSPADM

## 2019-08-27 RX ORDER — ISOSORBIDE MONONITRATE 60 MG/1
60 TABLET, EXTENDED RELEASE ORAL 2 TIMES DAILY
Qty: 180 TABLET | Refills: 3 | Status: SHIPPED | OUTPATIENT
Start: 2019-08-27 | End: 2020-08-03

## 2019-08-27 NOTE — PATIENT INSTRUCTIONS
Follow up in 6 mos With Dr. Ingrid Hamlin  Call with any questions or concerns  Follow up with Viral Mariscal MD for non cardiac problems  Report any new problems  Cardiovascular Fitness-Exercise as tolerated. Strive for 30 minutes of exercise most days of the week. Cardiac / Healthy Diet  Continue current medications as directed  Continue plan of treatment  It is always recommended that you bring your medications bottles with you to each visit - this is for your safety!

## 2019-08-27 NOTE — PROGRESS NOTES
anxiety or insomnia. No confusion. All other review of systems are negative. Objective  Vital Signs - /60   Pulse 75   Ht 5' 5\" (1.651 m)   Wt 146 lb (66.2 kg)   BMI 24.30 kg/m²   General - Eulis Pilling is alert, cooperative, and pleasant. Well groomed. No acute distress. Body habitus is normal.  HEENT - The head is normocephalic. No circumoral cyanosis. Dentition is normal.   EYES -  No Xanthelasma, no arcus senilis, no conjunctival hemorrhages or discharge. Neck - Supple, without increased jugular venous pressures. No carotid bruits. No mass. Respiratory - Lungs are clear bilaterally. No wheezes or rales. Normal effort without use of accessory muscles. Cardiovascular - Heart has regular rhythm and rate. No murmurs, rubs or gallops. + pedal pulses and no varicosities. Abdominal -  Soft, nontender, nondistended. Bowel sounds are intact. Extremities - No clubbing, cyanosis, or  edema. Musculoskeletal -  No clubbing . No Osler's nodes. Gait normal .  No kyphosis or scoliosis. Skin -  no statis ulcers or dermatitis. Neurological - No focal signs are identified. Oriented to person, place and time. Psychiatric -  Appropriate affect and mood. Assessment:     Diagnosis Orders   1. Coronary artery disease involving native coronary artery of native heart with angina pectoris (HCC)  isosorbide mononitrate (IMDUR) 60 MG extended release tablet   2. Essential hypertension     3. Hx of CABG       Data:  BP Readings from Last 3 Encounters:   08/27/19 130/60   02/26/19 104/68   01/11/19 (!) 130/59    Pulse Readings from Last 3 Encounters:   08/27/19 75   02/26/19 68   01/11/19 58        Wt Readings from Last 3 Encounters:   08/27/19 146 lb (66.2 kg)   02/26/19 144 lb (65.3 kg)   01/11/19 142 lb (64.4 kg)      heart rate are well controlled. Blood pressure checked in both arms today. They were equal and controlled. Reviewed home readings that were a little elevated.   May

## 2019-09-16 ENCOUNTER — HOSPITAL ENCOUNTER (OUTPATIENT)
Dept: GENERAL RADIOLOGY | Age: 74
Discharge: HOME | End: 2019-09-16
Payer: MEDICARE

## 2019-09-16 DIAGNOSIS — M54.5 LOW BACK PAIN, UNSPECIFIED BACK PAIN LATERALITY, UNSPECIFIED CHRONICITY, WITH SCIATICA PRESENCE UNSPECIFIED: ICD-10-CM

## 2019-09-16 PROCEDURE — 72100 X-RAY EXAM L-S SPINE 2/3 VWS: CPT

## 2019-09-16 RX ORDER — NITROGLYCERIN 0.4 MG/1
TABLET SUBLINGUAL
Qty: 1 TABLET | Refills: 12 | Status: SHIPPED | OUTPATIENT
Start: 2019-09-16 | End: 2020-04-03 | Stop reason: SDUPTHER

## 2020-04-03 ENCOUNTER — OFFICE VISIT (OUTPATIENT)
Dept: CARDIOLOGY | Age: 75
End: 2020-04-03
Payer: MEDICARE

## 2020-04-03 VITALS
HEART RATE: 72 BPM | SYSTOLIC BLOOD PRESSURE: 130 MMHG | HEIGHT: 65 IN | DIASTOLIC BLOOD PRESSURE: 78 MMHG | WEIGHT: 147 LBS | BODY MASS INDEX: 24.49 KG/M2

## 2020-04-03 PROCEDURE — G8420 CALC BMI NORM PARAMETERS: HCPCS | Performed by: INTERNAL MEDICINE

## 2020-04-03 PROCEDURE — 1036F TOBACCO NON-USER: CPT | Performed by: INTERNAL MEDICINE

## 2020-04-03 PROCEDURE — 1090F PRES/ABSN URINE INCON ASSESS: CPT | Performed by: INTERNAL MEDICINE

## 2020-04-03 PROCEDURE — 3017F COLORECTAL CA SCREEN DOC REV: CPT | Performed by: INTERNAL MEDICINE

## 2020-04-03 PROCEDURE — G8399 PT W/DXA RESULTS DOCUMENT: HCPCS | Performed by: INTERNAL MEDICINE

## 2020-04-03 PROCEDURE — 99214 OFFICE O/P EST MOD 30 MIN: CPT | Performed by: INTERNAL MEDICINE

## 2020-04-03 PROCEDURE — 4040F PNEUMOC VAC/ADMIN/RCVD: CPT | Performed by: INTERNAL MEDICINE

## 2020-04-03 PROCEDURE — G8427 DOCREV CUR MEDS BY ELIG CLIN: HCPCS | Performed by: INTERNAL MEDICINE

## 2020-04-03 PROCEDURE — 1123F ACP DISCUSS/DSCN MKR DOCD: CPT | Performed by: INTERNAL MEDICINE

## 2020-04-03 RX ORDER — NITROGLYCERIN 0.4 MG/1
TABLET SUBLINGUAL
Qty: 30 TABLET | Refills: 3 | Status: SHIPPED | OUTPATIENT
Start: 2020-04-03 | End: 2022-04-07 | Stop reason: SDUPTHER

## 2020-04-03 ASSESSMENT — ENCOUNTER SYMPTOMS
VOMITING: 0
ABDOMINAL DISTENTION: 0
BLOOD IN STOOL: 0
CHEST TIGHTNESS: 1
EYE DISCHARGE: 0
COUGH: 0
CONSTIPATION: 0
BACK PAIN: 0
WHEEZING: 0
SHORTNESS OF BREATH: 0
DIARRHEA: 0

## 2020-04-03 NOTE — PROGRESS NOTES
[facility]  Cardiology Office Note  [address] [id number]  Phone: [phone]  Fax: [phone]                            Date:  4/3/2020  Patient: Carol Denton  Age:  76 y.o., 1945    Referral: No ref.  provider found      PROBLEM LIST:    Patient Active Problem List    Diagnosis Date Noted    Chest discomfort 01/10/2019     Priority: High    Bilateral carotid artery stenosis 12/28/2017     Priority: Low    Aphasia 11/29/2017     Priority: Low    Ataxia 11/29/2017     Priority: Low    Vertigo 11/29/2017     Priority: Low    Malaise and fatigue 10/06/2017     Priority: Low    Chest pressure      Priority: Low    Fatigue 02/25/2016     Priority: Low    S/P right coronary artery (RCA) stent placement 02/25/2016     Priority: Low     Overview Note:     1/25/16 MARCELINO to RCA by Dr. Danuta Lara  Patent RCA stent on 2/29/16 cath by Dr. Yon Spaulding Hx of CABG 02/25/2016     Priority: Low    Hyperlipidemia      Priority: Low     Overview Note:     Dr Delfina Sutton cks cholesterol      Hypertension      Priority: Low    CAD (coronary artery disease)      Priority: Low     Overview Note:     5/18/2011  Cath  Mild CAD, normal LVFX  2/5/2013  SE  Positive for clinical and EKG myocardial ischemia  2/8.2013  Cath  TVD with normal LVFX  3/5/2013  CABG x 3 LIMA-LAD, VG-diag, VG-Om1  07/06/2015  lexiscan  negative for myocardial ischemia, EF 84  %  1/25/16 MARCELINO to RCA by Dr. Danuta Lara  2/29/2016  Cath  Patent RCA stent on 2/29/16 cath by Dr. Galo Asher  11/19/16  lexiscan Positive for possible apical myocardial ischemia, EF 79%, 1% ischemic myocardium on stress, low risk findings  11/10/2018  ACS PANKAJ RISK Score 4 (angina, age>65, cad>50, ASA ), AUC indication 3, AUC score 8   1/11/19 cath  Patent LIMA-LAD, patent VG-diag, patent VG-OM, patent RCA, normal LVFX        Chest pain      Priority: Low    Osteoarthritis      Priority: Low    GERD (gastroesophageal reflux disease)      Priority: Low    Asthma      Priority: Low    Glucose intolerance (impaired glucose tolerance)      Priority: Low    Depression with anxiety      Priority: Low     1. Coronary artery disease, prior CABG 3/2013 with LIMA to distal LAD, SVG to first diagonal, SVG to small OM1, prior PCI to RCA 1/2016 (patent) with patent grafts by catheterization 1/2019 with distal left main significant stenosis feeding large OM 2 and septal branches, normal LV ejection fraction. 2.  Diabetes mellitus. 3.  SLE with normal renal functions. PRESENTATION: Herminio Ramirez is a 76y.o. year old female presents for evaluation. She has been having increasing symptoms in the last 2 weeks which were initially exertional when she was working in the garden, pulling weeds she would develop chest pain. She would be exhausted. She has been using nitroglycerin usually 1 tablet sublingual would resolve her symptoms. In the last week symptoms have occurred at rest when she is sitting and she uses 1-2 sublingual nitroglycerin and usually resolves with calming herself down. She feels the pain is more posterior in her upper back behind the heart like a grabbing sensation that usually lasts about 5 minutes and eases off. She has a general sense of fatigue with activity. REVIEW OF SYSTEMS:  Review of Systems   Constitutional: Positive for fatigue. Negative for activity change and fever. HENT: Negative for ear pain, hearing loss and tinnitus. Eyes: Negative for discharge and visual disturbance. Respiratory: Positive for chest tightness. Negative for cough, shortness of breath and wheezing. Cardiovascular: Positive for chest pain. Negative for palpitations and leg swelling. Gastrointestinal: Negative for abdominal distention, blood in stool, constipation, diarrhea and vomiting. Endocrine: Negative for cold intolerance, heat intolerance, polydipsia and polyuria. Genitourinary: Negative for dysuria and hematuria. High Cholesterol Mother     Diabetes Brother          Physical Examination:  /78   Pulse 72   Ht 5' 5\" (1.651 m)   Wt 147 lb (66.7 kg)   BMI 24.46 kg/m²   Physical Exam  Constitutional:       General: She is not in acute distress. Appearance: She is not diaphoretic. Comments: Blood pressure right arm sitting 120/60 mmHg, pulse 68 bpm regular  Mild obesity   HENT:      Mouth/Throat:      Pharynx: No oropharyngeal exudate. Eyes:      General: No scleral icterus. Right eye: No discharge. Left eye: No discharge. Extraocular Movements: Extraocular movements intact. Conjunctiva/sclera: Conjunctivae normal.   Neck:      Musculoskeletal: No neck rigidity. Thyroid: No thyromegaly. Vascular: No carotid bruit or JVD. Cardiovascular:      Rate and Rhythm: Normal rate and regular rhythm. No extrasystoles are present. Heart sounds: Normal heart sounds, S1 normal and S2 normal. No murmur. No systolic murmur. No diastolic murmur. No friction rub. No gallop. No S3 or S4 sounds. Comments: No JVD  No edema  Pedal pulses in both lower extremities are palpable  Pulmonary:      Effort: Pulmonary effort is normal. No respiratory distress. Breath sounds: Normal breath sounds. No wheezing or rales. Chest:      Chest wall: No tenderness. Abdominal:      General: Bowel sounds are normal. There is no distension. Palpations: Abdomen is soft. There is no mass. Tenderness: There is no abdominal tenderness. There is no guarding or rebound. Hernia: No hernia is present. Comments: No organomegaly   Musculoskeletal: Normal range of motion. Skin:     General: Skin is warm. Coloration: Skin is not pale. Findings: No rash. Neurological:      Mental Status: She is alert and oriented to person, place, and time. Cranial Nerves: No cranial nerve deficit.       Deep Tendon Reflexes: Reflexes normal.           Labs:   CBC: No results for input(s): WBC, HGB, HCT, PLT in the last 72 hours. BMP:No results for input(s): NA, K, CO2, BUN, CREATININE, LABGLOM, GLUCOSE in the last 72 hours. BNP: No results for input(s): BNP in the last 72 hours. PT/INR: No results for input(s): PROTIME, INR in the last 72 hours. APTT:No results for input(s): APTT in the last 72 hours. CARDIAC ENZYMES:No results for input(s): CKTOTAL, CKMB, CKMBINDEX, TROPONINI in the last 72 hours. FASTING LIPID PANEL:  Lab Results   Component Value Date    HDL 51 04/09/2018    LDLDIRECT 81 02/18/2016    LDLCALC 59 04/09/2018    TRIG 121 04/09/2018     LIVER PROFILE:No results for input(s): AST, ALT, LABALBU in the last 72 hours. Imaging:          ASSESSMENT and PLAN:    80-year-old female patient with past medical history of coronary artery disease, prior CABG 3/2013 with patent grafts by catheterization 1/2019 with LIMA to distal LAD, SVG to small OM1, SVG to first diagonal with prior PCI to RCA that appears patent, normal LV ejection fraction, diabetes mellitus, SLE presenting with worsening anginal symptoms in the last 2 weeks that have progressed to rest pain requiring more than 1 sublingual nitroglycerin for relief with review of angiogram from 1/2019 showing distal left main disease feeding large OM 2.    1.  Significant progression symptoms in the last 2 weeks with rest onset and relief with nitroglycerin. Patient does need urgent cardiac catheterization. Currently symptoms noe with nitroglycerin sublingually. She is both on Imdur and Ranexa. Cardiac catheterization will be arranged early next week. If symptoms worsen I have asked her to proceed to the emergency room immediately. Given the current situation COVID-19, it is preferable that she be treated as an outpatient rather than through the emergency room. 2.  A follow-up appointment has been made for 4 months    Orders:  No orders of the defined types were placed in this encounter.     Orders Placed This Encounter   Medications    nitroGLYCERIN (NITROSTAT) 0.4 MG SL tablet     Sig: up to max of 3 total doses. If no relief after 1 dose, call 911. Dispense:  30 tablet     Refill:  3             Return in about 4 months (around 8/3/2020). Electronically signed by Neila Litten, MD on 4/3/2020 at [address] [place] [address] [facility]      Thisdictation was generated by voice recognition computer software. Although all attempts are made to edit the dictation for accuracy, there may be errors in the transcription that are not intended.

## 2020-04-06 ENCOUNTER — TELEPHONE (OUTPATIENT)
Dept: CARDIOLOGY | Age: 75
End: 2020-04-06

## 2020-04-13 ENCOUNTER — HOSPITAL ENCOUNTER (OUTPATIENT)
Dept: CARDIAC CATH/INVASIVE PROCEDURES | Age: 75
Discharge: HOME | End: 2020-04-13
Attending: INTERNAL MEDICINE | Admitting: INTERNAL MEDICINE
Payer: MEDICARE

## 2020-04-13 VITALS
HEIGHT: 65 IN | HEART RATE: 73 BPM | WEIGHT: 146 LBS | BODY MASS INDEX: 24.32 KG/M2 | OXYGEN SATURATION: 95 % | RESPIRATION RATE: 25 BRPM | SYSTOLIC BLOOD PRESSURE: 143 MMHG | DIASTOLIC BLOOD PRESSURE: 77 MMHG | TEMPERATURE: 97.7 F

## 2020-04-13 LAB
ALBUMIN SERPL-MCNC: 4.6 G/DL (ref 3.5–5.2)
ALP BLD-CCNC: 46 U/L (ref 35–104)
ALT SERPL-CCNC: 13 U/L (ref 5–33)
ANION GAP SERPL CALCULATED.3IONS-SCNC: 13 MMOL/L (ref 7–19)
AST SERPL-CCNC: 15 U/L (ref 5–32)
BILIRUB SERPL-MCNC: 0.4 MG/DL (ref 0.2–1.2)
BUN BLDV-MCNC: 22 MG/DL (ref 8–23)
CALCIUM SERPL-MCNC: 9.6 MG/DL (ref 8.8–10.2)
CHLORIDE BLD-SCNC: 101 MMOL/L (ref 98–111)
CO2: 23 MMOL/L (ref 22–29)
CREAT SERPL-MCNC: 1 MG/DL (ref 0.5–0.9)
EKG P AXIS: 59 DEGREES
EKG P AXIS: 73 DEGREES
EKG P-R INTERVAL: 172 MS
EKG P-R INTERVAL: 178 MS
EKG Q-T INTERVAL: 416 MS
EKG Q-T INTERVAL: 432 MS
EKG QRS DURATION: 102 MS
EKG QRS DURATION: 106 MS
EKG QTC CALCULATION (BAZETT): 426 MS
EKG QTC CALCULATION (BAZETT): 442 MS
EKG T AXIS: 130 DEGREES
EKG T AXIS: 147 DEGREES
GFR NON-AFRICAN AMERICAN: 54
GLUCOSE BLD-MCNC: 103 MG/DL (ref 74–109)
HCT VFR BLD CALC: 36.9 % (ref 37–47)
HEMOGLOBIN: 11.9 G/DL (ref 12–16)
MCH RBC QN AUTO: 29.5 PG (ref 27–31)
MCHC RBC AUTO-ENTMCNC: 32.2 G/DL (ref 33–37)
MCV RBC AUTO: 91.3 FL (ref 81–99)
PDW BLD-RTO: 13.7 % (ref 11.5–14.5)
PLATELET # BLD: 215 K/UL (ref 130–400)
PMV BLD AUTO: 8.9 FL (ref 9.4–12.3)
POTASSIUM SERPL-SCNC: 3.9 MMOL/L (ref 3.5–5)
RBC # BLD: 4.04 M/UL (ref 4.2–5.4)
SODIUM BLD-SCNC: 137 MMOL/L (ref 136–145)
TOTAL PROTEIN: 6.5 G/DL (ref 6.6–8.7)
WBC # BLD: 5.7 K/UL (ref 4.8–10.8)

## 2020-04-13 PROCEDURE — C1887 CATHETER, GUIDING: HCPCS

## 2020-04-13 PROCEDURE — C1874 STENT, COATED/COV W/DEL SYS: HCPCS

## 2020-04-13 PROCEDURE — 92928 PRQ TCAT PLMT NTRAC ST 1 LES: CPT

## 2020-04-13 PROCEDURE — C1894 INTRO/SHEATH, NON-LASER: HCPCS

## 2020-04-13 PROCEDURE — C1760 CLOSURE DEV, VASC: HCPCS

## 2020-04-13 PROCEDURE — 99152 MOD SED SAME PHYS/QHP 5/>YRS: CPT

## 2020-04-13 PROCEDURE — 2500000003 HC RX 250 WO HCPCS

## 2020-04-13 PROCEDURE — 93005 ELECTROCARDIOGRAM TRACING: CPT | Performed by: INTERNAL MEDICINE

## 2020-04-13 PROCEDURE — 6360000002 HC RX W HCPCS

## 2020-04-13 PROCEDURE — 2709999900 HC NON-CHARGEABLE SUPPLY

## 2020-04-13 PROCEDURE — 99153 MOD SED SAME PHYS/QHP EA: CPT

## 2020-04-13 PROCEDURE — C1725 CATH, TRANSLUMIN NON-LASER: HCPCS

## 2020-04-13 PROCEDURE — 2580000003 HC RX 258: Performed by: INTERNAL MEDICINE

## 2020-04-13 PROCEDURE — 80053 COMPREHEN METABOLIC PANEL: CPT

## 2020-04-13 PROCEDURE — 93459 L HRT ART/GRFT ANGIO: CPT | Performed by: INTERNAL MEDICINE

## 2020-04-13 PROCEDURE — 36415 COLL VENOUS BLD VENIPUNCTURE: CPT

## 2020-04-13 PROCEDURE — 6360000004 HC RX CONTRAST MEDICATION: Performed by: INTERNAL MEDICINE

## 2020-04-13 PROCEDURE — 99152 MOD SED SAME PHYS/QHP 5/>YRS: CPT | Performed by: INTERNAL MEDICINE

## 2020-04-13 PROCEDURE — 93459 L HRT ART/GRFT ANGIO: CPT

## 2020-04-13 PROCEDURE — 92920 PRQ TRLUML C ANGIOP 1ART&/BR: CPT

## 2020-04-13 PROCEDURE — 85027 COMPLETE CBC AUTOMATED: CPT

## 2020-04-13 PROCEDURE — 92920 PRQ TRLUML C ANGIOP 1ART&/BR: CPT | Performed by: INTERNAL MEDICINE

## 2020-04-13 PROCEDURE — 6370000000 HC RX 637 (ALT 250 FOR IP)

## 2020-04-13 PROCEDURE — 92928 PRQ TCAT PLMT NTRAC ST 1 LES: CPT | Performed by: INTERNAL MEDICINE

## 2020-04-13 PROCEDURE — C1769 GUIDE WIRE: HCPCS

## 2020-04-13 RX ORDER — SODIUM CHLORIDE 0.9 % (FLUSH) 0.9 %
10 SYRINGE (ML) INJECTION EVERY 12 HOURS SCHEDULED
Status: DISCONTINUED | OUTPATIENT
Start: 2020-04-13 | End: 2020-04-13 | Stop reason: HOSPADM

## 2020-04-13 RX ORDER — HYDROCODONE BITARTRATE AND ACETAMINOPHEN 5; 325 MG/1; MG/1
2 TABLET ORAL EVERY 4 HOURS PRN
Status: DISCONTINUED | OUTPATIENT
Start: 2020-04-13 | End: 2020-04-13

## 2020-04-13 RX ORDER — SODIUM CHLORIDE 0.9 % (FLUSH) 0.9 %
10 SYRINGE (ML) INJECTION PRN
Status: DISCONTINUED | OUTPATIENT
Start: 2020-04-13 | End: 2020-04-13 | Stop reason: HOSPADM

## 2020-04-13 RX ORDER — ONDANSETRON 2 MG/ML
4 INJECTION INTRAMUSCULAR; INTRAVENOUS EVERY 6 HOURS PRN
Status: DISCONTINUED | OUTPATIENT
Start: 2020-04-13 | End: 2020-04-13 | Stop reason: HOSPADM

## 2020-04-13 RX ORDER — ALPRAZOLAM 0.5 MG/1
0.5 TABLET ORAL
Status: DISCONTINUED | OUTPATIENT
Start: 2020-04-13 | End: 2020-04-13 | Stop reason: HOSPADM

## 2020-04-13 RX ORDER — SODIUM CHLORIDE 9 MG/ML
INJECTION, SOLUTION INTRAVENOUS CONTINUOUS
Status: DISCONTINUED | OUTPATIENT
Start: 2020-04-13 | End: 2020-04-13 | Stop reason: HOSPADM

## 2020-04-13 RX ORDER — IODIXANOL 320 MG/ML
200 INJECTION, SOLUTION INTRAVASCULAR
Status: COMPLETED | OUTPATIENT
Start: 2020-04-13 | End: 2020-04-13

## 2020-04-13 RX ORDER — ATROPINE SULFATE 0.4 MG/ML
0.5 AMPUL (ML) INJECTION
Status: DISCONTINUED | OUTPATIENT
Start: 2020-04-13 | End: 2020-04-13 | Stop reason: HOSPADM

## 2020-04-13 RX ORDER — NITROGLYCERIN 0.4 MG/1
0.4 TABLET SUBLINGUAL EVERY 5 MIN PRN
Status: DISCONTINUED | OUTPATIENT
Start: 2020-04-13 | End: 2020-04-13 | Stop reason: HOSPADM

## 2020-04-13 RX ORDER — ACETAMINOPHEN 325 MG/1
650 TABLET ORAL EVERY 4 HOURS PRN
Status: DISCONTINUED | OUTPATIENT
Start: 2020-04-13 | End: 2020-04-13 | Stop reason: HOSPADM

## 2020-04-13 RX ORDER — CLOPIDOGREL BISULFATE 75 MG/1
75 TABLET ORAL DAILY
Qty: 90 TABLET | Refills: 3 | Status: ON HOLD | OUTPATIENT
Start: 2020-04-13 | End: 2020-11-11 | Stop reason: HOSPADM

## 2020-04-13 RX ORDER — HYDROCODONE BITARTRATE AND ACETAMINOPHEN 5; 325 MG/1; MG/1
1 TABLET ORAL EVERY 4 HOURS PRN
Status: DISCONTINUED | OUTPATIENT
Start: 2020-04-13 | End: 2020-04-13

## 2020-04-13 RX ORDER — FENTANYL CITRATE 50 UG/ML
25 INJECTION, SOLUTION INTRAMUSCULAR; INTRAVENOUS
Status: DISCONTINUED | OUTPATIENT
Start: 2020-04-13 | End: 2020-04-13 | Stop reason: HOSPADM

## 2020-04-13 RX ORDER — SODIUM CHLORIDE 9 MG/ML
INJECTION, SOLUTION INTRAVENOUS CONTINUOUS
Status: DISCONTINUED | OUTPATIENT
Start: 2020-04-13 | End: 2020-04-13

## 2020-04-13 RX ADMIN — SODIUM CHLORIDE: 9 INJECTION, SOLUTION INTRAVENOUS at 07:36

## 2020-04-13 RX ADMIN — IODIXANOL 150 ML: 320 INJECTION, SOLUTION INTRAVASCULAR at 10:08

## 2020-04-13 NOTE — H&P
Cranial Nerves: No cranial nerve deficit. Deep Tendon Reflexes: Reflexes normal.             Labs:   CBC: No results for input(s): WBC, HGB, HCT, PLT in the last 72 hours. BMP:No results for input(s): NA, K, CO2, BUN, CREATININE, LABGLOM, GLUCOSE in the last 72 hours. BNP: No results for input(s): BNP in the last 72 hours. PT/INR: No results for input(s): PROTIME, INR in the last 72 hours. APTT:No results for input(s): APTT in the last 72 hours. CARDIAC ENZYMES:No results for input(s): CKTOTAL, CKMB, CKMBINDEX, TROPONINI in the last 72 hours. FASTING LIPID PANEL:        Lab Results   Component Value Date     HDL 51 04/09/2018     LDLDIRECT 81 02/18/2016     LDLCALC 59 04/09/2018     TRIG 121 04/09/2018      LIVER PROFILE:No results for input(s): AST, ALT, LABALBU in the last 72 hours.         Imaging:              ASSESSMENT and PLAN:     80-year-old female patient with past medical history of coronary artery disease, prior CABG 3/2013 with patent grafts by catheterization 1/2019 with LIMA to distal LAD, SVG to small OM1, SVG to first diagonal with prior PCI to RCA that appears patent, normal LV ejection fraction, diabetes mellitus, SLE presenting with worsening anginal symptoms in the last 2 weeks that have progressed to rest pain requiring more than 1 sublingual nitroglycerin for relief with review of angiogram from 1/2019 showing distal left main disease feeding large OM 2.     1. Significant progression symptoms in the last 2 weeks with rest onset and relief with nitroglycerin. Patient does need urgent cardiac catheterization. Currently symptoms noe with nitroglycerin sublingually. She is both on Imdur and Ranexa. Cardiac catheterization will be arranged early next week. If symptoms worsen I have asked her to proceed to the emergency room immediately. Given the current situation COVID-19, it is preferable that she be treated as an outpatient rather than through the emergency room.   2.  A

## 2020-04-14 ENCOUNTER — CARE COORDINATION (OUTPATIENT)
Dept: CARE COORDINATION | Age: 75
End: 2020-04-14

## 2020-04-14 NOTE — CARE COORDINATION
Patient contacted regarding recent discharge and COVID-19 risk   Care Transition Nurse/ Ambulatory Care Manager contacted the patient by telephone to perform post discharge assessment. Verified name and  with patient as identifiers. Patient has following risk factors of: DM, CAD and Lupus. CTN/ACM reviewed discharge instructions, medical action plan and red flags related to discharge diagnosis. Reviewed and educated them on any new and changed medications related to discharge diagnosis. Advised obtaining a 90-day supply of all daily and as-needed medications. Education provided regarding infection prevention, and signs and symptoms of COVID-19 and when to seek medical attention with patient who verbalized understanding. Discussed exposure protocols and quarantine from [address] you at higher risk for severe illness  and given an opportunity for questions and concerns. The patient agrees to contact the COVID-19 hotline [phone] or PCP office for questions related to their healthcare. CTN/ACM provided contact information for future reference. From CDC: Are you at higher risk for severe illness?  Wash your hands often.  Avoid close contact (6 feet, which is about two arm lengths) with people who are sick.  Put distance between yourself and other people if COVID-19 is spreading in your community.  Clean and disinfect frequently touched surfaces.  Avoid all cruise travel and non-essential air travel.  Call your healthcare professional if you have concerns about COVID-19 and your underlying condition or if you are sick.     For more information on steps you can take to protect yourself, see CDC's How to Protect Yourself

## 2020-04-16 NOTE — CONSULTS
Patient was discharged prior to PTCA/STENT TEACHING being conducted. Cardiac Rehab education packet was sent to the patient's address on record. Handouts included were titled; \"Home Instructions Following a Cardiac Event\", \"Cardiac Home Exercise Program - Phase I\", \"Risk Factors for Heart Disease and Stroke\" and \"Cardiac Diet/Low Cholesterol\". Patient will be contacted with the opportunity to enroll in Phase II Outpatient Cardiac Rehab after COVID-19 temporary suspension of services has been lifted.

## 2020-04-21 ENCOUNTER — CARE COORDINATION (OUTPATIENT)
Dept: CARE COORDINATION | Age: 75
End: 2020-04-21

## 2020-04-21 NOTE — CARE COORDINATION
COVID-19 Screening Follow-up Note    Patient contacted regarding COVID-19 risk and screening. Care Transition Nurse/ Ambulatory Care Manager contacted the patient by telephone to perform follow-up assessment. Verified name and  with patient as identifiers. Patient has following risk factors of: Diabetes, CAD. Lupus        Symptoms reviewed with patient who verbalized the following symptoms: fatigue and no new/worsening symptoms       Due to no new or worsening symptoms encounter was not routed to provider for escalation. Education provided regarding infection prevention, and signs and symptoms of COVID-19 and when to seek medical attention with patient who verbalized understanding. Discussed exposure protocols and quarantine from [address] you at higher risk for severe illness  and given an opportunity for questions and concerns. The patient agrees to contact the COVID-19 hotline [phone] or PCP office for questions related to their healthcare. CTN/ACM provided contact information for future reference. From CDC: Are you at higher risk for severe illness?  Wash your hands often.  Avoid close contact (6 feet, which is about two arm lengths) with people who are sick.  Put distance between yourself and other people if COVID-19 is spreading in your community.  Clean and disinfect frequently touched surfaces.  Avoid all cruise travel and non-essential air travel.  Call your healthcare professional if you have concerns about COVID-19 and your underlying condition or if you are sick. For more information on steps you can take to protect yourself, see CDC's How to [address] [place] [address] for follow-up call in 5-7 days based on severity of symptoms and risk factors    Pt reported she used her Nitrostat x1 yesterday. She said this was the first use since before her heart cath last week. Eddie Johnson stated this is less often than before and she is hopeful of this good sign.  Pt still

## 2020-04-28 ENCOUNTER — CARE COORDINATION (OUTPATIENT)
Dept: CARE COORDINATION | Age: 75
End: 2020-04-28

## 2020-04-28 NOTE — CARE COORDINATION
pain/pressure but is impatient that she gets tired and a bit SOA with ADLs at home. Pt has a history of asthma per her report and she has a rescue inhaler but does not like to use it. Pt had been going to the gym 3x/week in the past but is not able to do that right now with COVID-19 restrictions. Nj is willing to try using her inhaler prior to her household chores as this may help her tolerate the activity and be less short of air. She continues to log her BS and BP. She agreed to a final f/u call with ACM in 1 week. ACM reminded pt to contact her cardiologist or her PCP for any concerning symptoms. Nj voiced understanding.   Electronically signed by Ramon Yo RN on 4/28/2020 at 2:31 PM

## 2020-05-05 ENCOUNTER — CARE COORDINATION (OUTPATIENT)
Dept: CARE COORDINATION | Age: 75
End: 2020-05-05

## 2020-05-05 NOTE — CARE COORDINATION
Your Patient resolved from the Care Transitions episode on 5/5/2020  Patient/family has been provided the following resources and education related to COVID-19:                         Signs, symptoms and red flags related to COVID-19            CDC exposure and quarantine guidelines            [facility] exposure contact - [phone]            Contact for their local Department of Health                 Patient currently reports that the following symptoms have improved:  no new/worsening symptoms     No further outreach scheduled with this CTN/ACM. Episode of Care resolved. Patient has this CTN/ACM contact information if future needs arise.

## 2020-05-26 ENCOUNTER — OFFICE VISIT (OUTPATIENT)
Dept: CARDIOLOGY | Age: 75
End: 2020-05-26
Payer: MEDICARE

## 2020-05-26 VITALS
SYSTOLIC BLOOD PRESSURE: 138 MMHG | DIASTOLIC BLOOD PRESSURE: 70 MMHG | HEIGHT: 65 IN | BODY MASS INDEX: 23.99 KG/M2 | WEIGHT: 144 LBS | HEART RATE: 69 BPM

## 2020-05-26 PROCEDURE — 1123F ACP DISCUSS/DSCN MKR DOCD: CPT | Performed by: CLINICAL NURSE SPECIALIST

## 2020-05-26 PROCEDURE — 1090F PRES/ABSN URINE INCON ASSESS: CPT | Performed by: CLINICAL NURSE SPECIALIST

## 2020-05-26 PROCEDURE — G8399 PT W/DXA RESULTS DOCUMENT: HCPCS | Performed by: CLINICAL NURSE SPECIALIST

## 2020-05-26 PROCEDURE — G8420 CALC BMI NORM PARAMETERS: HCPCS | Performed by: CLINICAL NURSE SPECIALIST

## 2020-05-26 PROCEDURE — 99214 OFFICE O/P EST MOD 30 MIN: CPT | Performed by: CLINICAL NURSE SPECIALIST

## 2020-05-26 PROCEDURE — G8427 DOCREV CUR MEDS BY ELIG CLIN: HCPCS | Performed by: CLINICAL NURSE SPECIALIST

## 2020-05-26 PROCEDURE — 4040F PNEUMOC VAC/ADMIN/RCVD: CPT | Performed by: CLINICAL NURSE SPECIALIST

## 2020-05-26 PROCEDURE — 1036F TOBACCO NON-USER: CPT | Performed by: CLINICAL NURSE SPECIALIST

## 2020-05-26 PROCEDURE — 3017F COLORECTAL CA SCREEN DOC REV: CPT | Performed by: CLINICAL NURSE SPECIALIST

## 2020-05-26 RX ORDER — METOPROLOL SUCCINATE 25 MG/1
25 TABLET, EXTENDED RELEASE ORAL 3 TIMES DAILY
Qty: 90 TABLET | Refills: 5 | Status: ON HOLD | OUTPATIENT
Start: 2020-05-26 | End: 2020-06-10

## 2020-05-26 NOTE — PROGRESS NOTES
[address] [address], [address] [address]  Phone: [phone]  Fax: [phone]    OFFICE VISIT:  2020    Niño Eladio - : 1945    Reason For Visit:  Sophy Kumari is a 76 y.o. female who is here for Follow-up (post cath   patient has had to take nitro ) and Coronary Artery Disease  Coronary disease with CABG x3 in . Underwent heart catheterization 2019 that showed    Patent LIMA-LAD, patent VG-diag, patent VG-OM, patent RCA, normal LVFX  She establish care with Dr. Joelle Dunlap last month. Increasing with exertional angina  Elective heart catheterization 2020  Left main and triple vessel disease. Patent LIMA to distal LAD. Patent SVG to 1st diagonal.   Patent SVG to 1st obtuse marginal (small vessel)   Left main lesion feeds large 2nd obtuse marginal.   Normal LV ejection fraction. Successful PCI to distal left main(3.5 x 12 mm resolute integrity stent)   into the ostial circumflex utilizing drug-eluting stent. Successful PCI to proximal circumflex (3.0 x 15 mm resolute integrity)   utilizing drug-eluting stent. Successful PCI with PTCA of ostial LAD. Recommendations if recurrent symptoms can consider ischemic evaluation of RCA       She continues to have angina symptoms almost daily. Walking or lying she is fine. If she vacuums or washes dishes she will have some problems. She gets relief with nitro- one usually will relieve the pain. She has     She has ongoing back pain - had MRI that showed spinal stenosis. Subjective  Sophy Kumari denies exertional chest pain, shortness of breath, orthopnea, paroxysmal nocturnal dyspnea, syncope, presyncope, arrhythmia, edema and fatigue. The patient denies numbness or weakness to suggest cerebrovascular accident or transient ischemic attack. Tammy Tilley MD is PCP and follows labs including lipids.   Niño Eladio has the following history as recorded in [facility]:    Patient Active Problem List Diagnosis Date Noted    Unstable angina [facility])      Priority: High    Chest discomfort 01/10/2019     Priority: High    Bilateral carotid artery stenosis 12/28/2017    Aphasia 11/29/2017    Ataxia 11/29/2017    Vertigo 11/29/2017    Malaise and fatigue 10/06/2017    Chest pressure     Fatigue 02/25/2016    S/P right coronary artery (RCA) stent placement 02/25/2016    Hx of CABG 02/25/2016    Hyperlipidemia     Hypertension     CAD (coronary artery disease)     Chest pain     Osteoarthritis     GERD (gastroesophageal reflux disease)     Asthma     Glucose intolerance (impaired glucose tolerance)     Depression with anxiety      Past Medical History:   Diagnosis Date    Asthma     CAD (coronary artery disease)     Chest pain     Depression with anxiety     Diabetes mellitus ([facility] [address] [facility]     GERD (gastroesophageal reflux disease)     Glucose intolerance (impaired glucose tolerance)     Hyperlipidemia     Cholesterol management per Ronnell Hartmann M.D.    Hypertension     Lupus (systemic lupus erythematosus) ([facility] [facility]     Malaise and fatigue 10/6/2017    Movement disorder     Sees pain management for neck pain and previous surgery    Osteoarthritis     S/P CABG x 3 03/05/2013    PACABG X 3, LIMA-LAD, SVG0OM, SVG-DIAG, RT EVH, DR Girish Rosales     Past Surgical History:   Procedure Laterality Date    APPENDECTOMY  1965    BACK SURGERY      CARDIAC CATHETERIZATION  5/18/11    EF 60%    CARDIAC CATHETERIZATION  1/25/16    drug eluting stent to RCA    CARDIAC CATHETERIZATION  1/25/2016    CARDIAC CATHETERIZATION  2/19/16    CARDIAC CATHETERIZATION  08/24/2017    cutting balloon angioplasty ot LAD    COLON SURGERY  1992    Polypectomy - 2    COLON SURGERY  2003    Polypectomy - 4    COLON SURGERY  11/2008    Polypectomy - 2    COLON SURGERY  8/2012    Polypectomy - 3    COLONOSCOPY      CORONARY ANGIOPLASTY  08/2017    cutting balloon angioplasty LAD diagonal    CORONARY ANGIOPLASTY WITH STENT tablet 0    lisinopril (PRINIVIL;ZESTRIL) 10 MG tablet Take 1 tablet by mouth 2 times daily 30 tablet 5    ranolazine (RANEXA) 1000 MG extended release tablet Take 1 tablet by mouth 2 times daily 180 tablet 3    Cyanocobalamin (VITAMIN B 12 PO) Take 1,000 mcg by mouth daily      promethazine (PHENERGAN) 25 MG tablet Take 25 mg by mouth every 6 hours as needed for Nausea      cyclobenzaprine (FLEXERIL) 10 MG tablet Take 10 mg by mouth 2 times daily as needed for Muscle spasms.  ALPRAZolam (XANAX) 0.25 MG tablet Take 0.25 mg by mouth daily as needed (takes 1/2 tab)       albuterol (PROVENTIL HFA;VENTOLIN HFA) 108 (90 BASE) MCG/ACT inhaler Inhale 2 puffs into the lungs every 6 hours as needed.  traZODone (DESYREL) 50 MG tablet Take 50 mg by mouth nightly.  choline fenofibrate (TRILIPIX) 135 MG CPDR Take 135 mg by mouth daily.  aspirin 81 MG tablet Take 81 mg by mouth daily.  zolpidem (AMBIEN) 10 MG tablet Take 5 mg by mouth nightly        No current facility-administered medications for this visit. Allergies: Other; Codeine; Darvocet [propoxyphene n-acetaminophen]; Hydrocodone-acetaminophen; Morphine; and Percocet [oxycodone-acetaminophen]    Review of Systems  Constitutional - no significant activity change, appetite change, or unexpected weight change. No fever, chills or diaphoresis. No fatigue. HEENT - no significant rhinorrhea or epistaxis. No tinnitus or significant hearing loss. Eyes - no sudden vision change or amaurosis. Respiratory - no significant wheezing, stridor, apnea or cough. No dyspnea on exertion or shortness of breath. Cardiovascular -+ chest pain no orthopnea or PND. No sensation of arrhythmia or slow heart rate. No claudication or leg edema. Gastrointestinal - no abdominal swelling or pain. No blood in stool. No severe constipation, diarrhea, nausea, or vomiting. Genitourinary - no difficulty urinating, dysuria, frequency, or urgency.  No flank

## 2020-05-26 NOTE — PATIENT INSTRUCTIONS
DO NOT stop the PLAVIX or ASA for a year from last stent  Increase your Toprol to 50mg in the AM and keep 25 in the PM  Will discuss with DR Nevin Burciaga your symptoms and possible repeat cath to address the RCA  Call with any questions or concerns  Follow up with Katja Fong MD for non cardiac problems  Report any new problems  Cardiovascular Fitness-Exercise as tolerated. Strive for 30 minutes of exercise most days of the week. Cardiac / Healthy Diet  Continue current medications as directed  Continue plan of treatment  It is always recommended that you bring your medications bottles with you to each visit - this is for your safety!

## 2020-05-27 ENCOUNTER — TELEPHONE (OUTPATIENT)
Dept: CARDIOLOGY | Age: 75
End: 2020-05-27

## 2020-05-28 ENCOUNTER — APPOINTMENT (OUTPATIENT)
Dept: GENERAL RADIOLOGY | Age: 75
DRG: 552 | End: 2020-05-28
Payer: MEDICARE

## 2020-05-28 ENCOUNTER — HOSPITAL ENCOUNTER (INPATIENT)
Age: 75
LOS: 7 days | Discharge: HOME HEALTH | DRG: 552 | End: 2020-06-05
Attending: EMERGENCY MEDICINE | Admitting: FAMILY MEDICINE
Payer: MEDICARE

## 2020-05-28 PROBLEM — G89.29 ACUTE EXACERBATION OF CHRONIC LOW BACK PAIN: Status: ACTIVE | Noted: 2020-05-28

## 2020-05-28 PROBLEM — M54.50 ACUTE EXACERBATION OF CHRONIC LOW BACK PAIN: Status: ACTIVE | Noted: 2020-05-28

## 2020-05-28 LAB
ALBUMIN SERPL-MCNC: 4.6 G/DL (ref 3.5–5.2)
ALP BLD-CCNC: 38 U/L (ref 35–104)
ALT SERPL-CCNC: 12 U/L (ref 5–33)
ANION GAP SERPL CALCULATED.3IONS-SCNC: 14 MMOL/L (ref 7–19)
AST SERPL-CCNC: 17 U/L (ref 5–32)
BASOPHILS ABSOLUTE: 0 K/UL (ref 0–0.2)
BASOPHILS RELATIVE PERCENT: 0.4 % (ref 0–1)
BILIRUB SERPL-MCNC: 0.4 MG/DL (ref 0.2–1.2)
BUN BLDV-MCNC: 13 MG/DL (ref 8–23)
CALCIUM SERPL-MCNC: 9 MG/DL (ref 8.8–10.2)
CHLORIDE BLD-SCNC: 102 MMOL/L (ref 98–111)
CO2: 23 MMOL/L (ref 22–29)
CREAT SERPL-MCNC: 0.8 MG/DL (ref 0.5–0.9)
EOSINOPHILS ABSOLUTE: 0.1 K/UL (ref 0–0.6)
EOSINOPHILS RELATIVE PERCENT: 1.5 % (ref 0–5)
GFR NON-AFRICAN AMERICAN: >60
GLUCOSE BLD-MCNC: 139 MG/DL (ref 74–109)
HCT VFR BLD CALC: 35.6 % (ref 37–47)
HEMOGLOBIN: 11.7 G/DL (ref 12–16)
IMMATURE GRANULOCYTES #: 0 K/UL
LYMPHOCYTES ABSOLUTE: 0.8 K/UL (ref 1.1–4.5)
LYMPHOCYTES RELATIVE PERCENT: 14.2 % (ref 20–40)
MCH RBC QN AUTO: 30 PG (ref 27–31)
MCHC RBC AUTO-ENTMCNC: 32.9 G/DL (ref 33–37)
MCV RBC AUTO: 91.3 FL (ref 81–99)
MONOCYTES ABSOLUTE: 0.4 K/UL (ref 0–0.9)
MONOCYTES RELATIVE PERCENT: 6.6 % (ref 0–10)
NEUTROPHILS ABSOLUTE: 4.2 K/UL (ref 1.5–7.5)
NEUTROPHILS RELATIVE PERCENT: 76.9 % (ref 50–65)
PDW BLD-RTO: 13.4 % (ref 11.5–14.5)
PLATELET # BLD: 203 K/UL (ref 130–400)
PMV BLD AUTO: 9.2 FL (ref 9.4–12.3)
POTASSIUM SERPL-SCNC: 4.1 MMOL/L (ref 3.5–5)
RBC # BLD: 3.9 M/UL (ref 4.2–5.4)
SODIUM BLD-SCNC: 139 MMOL/L (ref 136–145)
TOTAL PROTEIN: 6 G/DL (ref 6.6–8.7)
TROPONIN: <0.01 NG/ML (ref 0–0.03)
WBC # BLD: 5.4 K/UL (ref 4.8–10.8)

## 2020-05-28 PROCEDURE — 6360000002 HC RX W HCPCS: Performed by: EMERGENCY MEDICINE

## 2020-05-28 PROCEDURE — 80053 COMPREHEN METABOLIC PANEL: CPT

## 2020-05-28 PROCEDURE — 6360000002 HC RX W HCPCS

## 2020-05-28 PROCEDURE — 99285 EMERGENCY DEPT VISIT HI MDM: CPT

## 2020-05-28 PROCEDURE — 84484 ASSAY OF TROPONIN QUANT: CPT

## 2020-05-28 PROCEDURE — 96374 THER/PROPH/DIAG INJ IV PUSH: CPT

## 2020-05-28 PROCEDURE — 85025 COMPLETE CBC W/AUTO DIFF WBC: CPT

## 2020-05-28 PROCEDURE — 73502 X-RAY EXAM HIP UNI 2-3 VIEWS: CPT

## 2020-05-28 PROCEDURE — 36415 COLL VENOUS BLD VENIPUNCTURE: CPT

## 2020-05-28 PROCEDURE — 96372 THER/PROPH/DIAG INJ SC/IM: CPT

## 2020-05-28 PROCEDURE — 96375 TX/PRO/DX INJ NEW DRUG ADDON: CPT

## 2020-05-28 PROCEDURE — G0378 HOSPITAL OBSERVATION PER HR: HCPCS

## 2020-05-28 PROCEDURE — 93005 ELECTROCARDIOGRAM TRACING: CPT | Performed by: EMERGENCY MEDICINE

## 2020-05-28 RX ORDER — ORPHENADRINE CITRATE 30 MG/ML
60 INJECTION INTRAMUSCULAR; INTRAVENOUS ONCE
Status: COMPLETED | OUTPATIENT
Start: 2020-05-28 | End: 2020-05-28

## 2020-05-28 RX ORDER — ONDANSETRON 2 MG/ML
4 INJECTION INTRAMUSCULAR; INTRAVENOUS ONCE
Status: COMPLETED | OUTPATIENT
Start: 2020-05-28 | End: 2020-05-28

## 2020-05-28 RX ORDER — TRAMADOL HYDROCHLORIDE 50 MG/1
50 TABLET ORAL EVERY 8 HOURS PRN
Status: ON HOLD | COMMUNITY
Start: 2020-05-21 | End: 2020-06-11

## 2020-05-28 RX ORDER — ONDANSETRON 2 MG/ML
INJECTION INTRAMUSCULAR; INTRAVENOUS
Status: COMPLETED
Start: 2020-05-28 | End: 2020-05-28

## 2020-05-28 RX ORDER — HYDROMORPHONE HYDROCHLORIDE 1 MG/ML
0.5 INJECTION, SOLUTION INTRAMUSCULAR; INTRAVENOUS; SUBCUTANEOUS ONCE
Status: COMPLETED | OUTPATIENT
Start: 2020-05-28 | End: 2020-05-28

## 2020-05-28 RX ORDER — HYDROMORPHONE HYDROCHLORIDE 1 MG/ML
1 INJECTION, SOLUTION INTRAMUSCULAR; INTRAVENOUS; SUBCUTANEOUS ONCE
Status: COMPLETED | OUTPATIENT
Start: 2020-05-28 | End: 2020-05-28

## 2020-05-28 RX ORDER — HYDROMORPHONE HYDROCHLORIDE 1 MG/ML
0.5 INJECTION, SOLUTION INTRAMUSCULAR; INTRAVENOUS; SUBCUTANEOUS
Status: DISCONTINUED | OUTPATIENT
Start: 2020-05-28 | End: 2020-06-05 | Stop reason: HOSPADM

## 2020-05-28 RX ORDER — HYDROMORPHONE HYDROCHLORIDE 1 MG/ML
0.5 INJECTION, SOLUTION INTRAMUSCULAR; INTRAVENOUS; SUBCUTANEOUS ONCE
Status: COMPLETED | OUTPATIENT
Start: 2020-05-29 | End: 2020-05-29

## 2020-05-28 RX ORDER — SODIUM CHLORIDE 0.9 % (FLUSH) 0.9 %
10 SYRINGE (ML) INJECTION EVERY 12 HOURS SCHEDULED
Status: DISCONTINUED | OUTPATIENT
Start: 2020-05-29 | End: 2020-06-05 | Stop reason: HOSPADM

## 2020-05-28 RX ORDER — SODIUM CHLORIDE 0.9 % (FLUSH) 0.9 %
10 SYRINGE (ML) INJECTION PRN
Status: DISCONTINUED | OUTPATIENT
Start: 2020-05-28 | End: 2020-06-05 | Stop reason: HOSPADM

## 2020-05-28 RX ADMIN — HYDROMORPHONE HYDROCHLORIDE 1 MG: 1 INJECTION, SOLUTION INTRAMUSCULAR; INTRAVENOUS; SUBCUTANEOUS at 17:20

## 2020-05-28 RX ADMIN — ONDANSETRON HYDROCHLORIDE 4 MG: 2 INJECTION, SOLUTION INTRAMUSCULAR; INTRAVENOUS at 20:38

## 2020-05-28 RX ADMIN — ORPHENADRINE CITRATE 60 MG: 60 INJECTION INTRAMUSCULAR; INTRAVENOUS at 17:20

## 2020-05-28 RX ADMIN — ONDANSETRON HYDROCHLORIDE 4 MG: 2 INJECTION, SOLUTION INTRAMUSCULAR; INTRAVENOUS at 23:45

## 2020-05-28 RX ADMIN — ONDANSETRON 4 MG: 2 INJECTION INTRAMUSCULAR; INTRAVENOUS at 20:38

## 2020-05-28 RX ADMIN — HYDROMORPHONE HYDROCHLORIDE 0.5 MG: 1 INJECTION, SOLUTION INTRAMUSCULAR; INTRAVENOUS; SUBCUTANEOUS at 20:38

## 2020-05-28 ASSESSMENT — PAIN - FUNCTIONAL ASSESSMENT: PAIN_FUNCTIONAL_ASSESSMENT: PREVENTS OR INTERFERES SOME ACTIVE ACTIVITIES AND ADLS

## 2020-05-28 ASSESSMENT — PAIN DESCRIPTION - ORIENTATION: ORIENTATION: LOWER;LEFT

## 2020-05-28 ASSESSMENT — PAIN DESCRIPTION - PROGRESSION: CLINICAL_PROGRESSION: NOT CHANGED

## 2020-05-28 ASSESSMENT — ENCOUNTER SYMPTOMS
DIARRHEA: 0
VOMITING: 0
SHORTNESS OF BREATH: 0
NAUSEA: 0
ABDOMINAL PAIN: 0
BACK PAIN: 1

## 2020-05-28 ASSESSMENT — PAIN SCALES - GENERAL
PAINLEVEL_OUTOF10: 3
PAINLEVEL_OUTOF10: 6
PAINLEVEL_OUTOF10: 3
PAINLEVEL_OUTOF10: 8

## 2020-05-28 ASSESSMENT — PAIN DESCRIPTION - PAIN TYPE: TYPE: ACUTE PAIN

## 2020-05-28 ASSESSMENT — PAIN DESCRIPTION - FREQUENCY: FREQUENCY: CONTINUOUS

## 2020-05-28 ASSESSMENT — PAIN DESCRIPTION - DIRECTION: RADIATING_TOWARDS: HIP

## 2020-05-28 ASSESSMENT — PAIN DESCRIPTION - ONSET: ONSET: ON-GOING

## 2020-05-28 ASSESSMENT — PAIN DESCRIPTION - LOCATION: LOCATION: BACK

## 2020-05-29 PROBLEM — M54.9 INTRACTABLE BACK PAIN: Status: ACTIVE | Noted: 2020-05-29

## 2020-05-29 PROCEDURE — 2580000003 HC RX 258: Performed by: FAMILY MEDICINE

## 2020-05-29 PROCEDURE — 6360000002 HC RX W HCPCS: Performed by: FAMILY MEDICINE

## 2020-05-29 PROCEDURE — 6370000000 HC RX 637 (ALT 250 FOR IP): Performed by: FAMILY MEDICINE

## 2020-05-29 PROCEDURE — 1210000000 HC MED SURG R&B

## 2020-05-29 RX ORDER — CYCLOBENZAPRINE HCL 10 MG
10 TABLET ORAL 2 TIMES DAILY PRN
Status: DISCONTINUED | OUTPATIENT
Start: 2020-05-29 | End: 2020-05-30

## 2020-05-29 RX ORDER — ASPIRIN 81 MG/1
81 TABLET, CHEWABLE ORAL DAILY
Status: DISCONTINUED | OUTPATIENT
Start: 2020-05-29 | End: 2020-06-05 | Stop reason: HOSPADM

## 2020-05-29 RX ORDER — ALPRAZOLAM 0.5 MG/1
0.25 TABLET ORAL DAILY PRN
Status: DISCONTINUED | OUTPATIENT
Start: 2020-05-29 | End: 2020-06-05 | Stop reason: HOSPADM

## 2020-05-29 RX ORDER — TRAZODONE HYDROCHLORIDE 50 MG/1
50 TABLET ORAL NIGHTLY
Status: DISCONTINUED | OUTPATIENT
Start: 2020-05-29 | End: 2020-06-05 | Stop reason: HOSPADM

## 2020-05-29 RX ORDER — ISOSORBIDE MONONITRATE 60 MG/1
60 TABLET, EXTENDED RELEASE ORAL 2 TIMES DAILY
Status: DISCONTINUED | OUTPATIENT
Start: 2020-05-29 | End: 2020-06-05 | Stop reason: HOSPADM

## 2020-05-29 RX ORDER — ALBUTEROL SULFATE 2.5 MG/3ML
2.5 SOLUTION RESPIRATORY (INHALATION) EVERY 6 HOURS PRN
Status: DISCONTINUED | OUTPATIENT
Start: 2020-05-29 | End: 2020-06-05 | Stop reason: HOSPADM

## 2020-05-29 RX ORDER — AMITRIPTYLINE HYDROCHLORIDE 25 MG/1
25 TABLET, FILM COATED ORAL NIGHTLY
Status: DISCONTINUED | OUTPATIENT
Start: 2020-05-29 | End: 2020-06-05 | Stop reason: HOSPADM

## 2020-05-29 RX ORDER — PROMETHAZINE HYDROCHLORIDE 25 MG/1
25 TABLET ORAL EVERY 6 HOURS PRN
Status: DISCONTINUED | OUTPATIENT
Start: 2020-05-29 | End: 2020-06-05 | Stop reason: HOSPADM

## 2020-05-29 RX ORDER — RANOLAZINE 500 MG/1
1000 TABLET, EXTENDED RELEASE ORAL 2 TIMES DAILY
Status: DISCONTINUED | OUTPATIENT
Start: 2020-05-29 | End: 2020-06-05 | Stop reason: HOSPADM

## 2020-05-29 RX ORDER — ALBUTEROL SULFATE 90 UG/1
2 AEROSOL, METERED RESPIRATORY (INHALATION) EVERY 6 HOURS PRN
Status: DISCONTINUED | OUTPATIENT
Start: 2020-05-29 | End: 2020-05-29

## 2020-05-29 RX ORDER — METOPROLOL SUCCINATE 25 MG/1
25 TABLET, EXTENDED RELEASE ORAL 3 TIMES DAILY
Status: DISCONTINUED | OUTPATIENT
Start: 2020-05-29 | End: 2020-06-05 | Stop reason: HOSPADM

## 2020-05-29 RX ORDER — ATORVASTATIN CALCIUM 80 MG/1
80 TABLET, FILM COATED ORAL DAILY
Status: DISCONTINUED | OUTPATIENT
Start: 2020-05-29 | End: 2020-06-05 | Stop reason: HOSPADM

## 2020-05-29 RX ORDER — CLOPIDOGREL BISULFATE 75 MG/1
75 TABLET ORAL DAILY
Status: DISCONTINUED | OUTPATIENT
Start: 2020-05-29 | End: 2020-06-05 | Stop reason: HOSPADM

## 2020-05-29 RX ORDER — NITROGLYCERIN 0.4 MG/1
0.4 TABLET SUBLINGUAL EVERY 5 MIN PRN
Status: DISCONTINUED | OUTPATIENT
Start: 2020-05-29 | End: 2020-06-05 | Stop reason: HOSPADM

## 2020-05-29 RX ORDER — ZOLPIDEM TARTRATE 5 MG/1
5 TABLET ORAL NIGHTLY PRN
Status: DISCONTINUED | OUTPATIENT
Start: 2020-05-29 | End: 2020-06-05 | Stop reason: HOSPADM

## 2020-05-29 RX ORDER — LISINOPRIL 10 MG/1
10 TABLET ORAL 2 TIMES DAILY
Status: DISCONTINUED | OUTPATIENT
Start: 2020-05-29 | End: 2020-06-05 | Stop reason: HOSPADM

## 2020-05-29 RX ORDER — ONDANSETRON 2 MG/ML
4 INJECTION INTRAMUSCULAR; INTRAVENOUS EVERY 6 HOURS PRN
Status: DISCONTINUED | OUTPATIENT
Start: 2020-05-29 | End: 2020-06-05 | Stop reason: HOSPADM

## 2020-05-29 RX ADMIN — ISOSORBIDE MONONITRATE 60 MG: 60 TABLET, EXTENDED RELEASE ORAL at 11:23

## 2020-05-29 RX ADMIN — SODIUM CHLORIDE, PRESERVATIVE FREE 10 ML: 5 INJECTION INTRAVENOUS at 06:25

## 2020-05-29 RX ADMIN — ONDANSETRON 4 MG: 2 INJECTION INTRAMUSCULAR; INTRAVENOUS at 00:40

## 2020-05-29 RX ADMIN — METFORMIN HYDROCHLORIDE 500 MG: 500 TABLET ORAL at 20:30

## 2020-05-29 RX ADMIN — ENOXAPARIN SODIUM 40 MG: 40 INJECTION SUBCUTANEOUS at 00:40

## 2020-05-29 RX ADMIN — ENOXAPARIN SODIUM 40 MG: 40 INJECTION SUBCUTANEOUS at 14:06

## 2020-05-29 RX ADMIN — ISOSORBIDE MONONITRATE 60 MG: 60 TABLET, EXTENDED RELEASE ORAL at 20:31

## 2020-05-29 RX ADMIN — HYDROMORPHONE HYDROCHLORIDE 0.5 MG: 1 INJECTION, SOLUTION INTRAMUSCULAR; INTRAVENOUS; SUBCUTANEOUS at 00:40

## 2020-05-29 RX ADMIN — LISINOPRIL 10 MG: 10 TABLET ORAL at 20:31

## 2020-05-29 RX ADMIN — Medication 10 ML: at 10:11

## 2020-05-29 RX ADMIN — ATORVASTATIN CALCIUM 80 MG: 80 TABLET, FILM COATED ORAL at 11:23

## 2020-05-29 RX ADMIN — RANOLAZINE 1000 MG: 500 TABLET, FILM COATED, EXTENDED RELEASE ORAL at 11:22

## 2020-05-29 RX ADMIN — ASPIRIN 81 MG 81 MG: 81 TABLET ORAL at 11:23

## 2020-05-29 RX ADMIN — HYDROMORPHONE HYDROCHLORIDE 0.5 MG: 1 INJECTION, SOLUTION INTRAMUSCULAR; INTRAVENOUS; SUBCUTANEOUS at 16:39

## 2020-05-29 RX ADMIN — ONDANSETRON 4 MG: 2 INJECTION INTRAMUSCULAR; INTRAVENOUS at 12:32

## 2020-05-29 RX ADMIN — CYCLOBENZAPRINE HYDROCHLORIDE 10 MG: 10 TABLET, FILM COATED ORAL at 20:31

## 2020-05-29 RX ADMIN — HYDROMORPHONE HYDROCHLORIDE 0.5 MG: 1 INJECTION, SOLUTION INTRAMUSCULAR; INTRAVENOUS; SUBCUTANEOUS at 06:25

## 2020-05-29 RX ADMIN — CLOPIDOGREL BISULFATE 75 MG: 75 TABLET ORAL at 11:23

## 2020-05-29 RX ADMIN — AMITRIPTYLINE HYDROCHLORIDE 25 MG: 25 TABLET, FILM COATED ORAL at 20:31

## 2020-05-29 RX ADMIN — TRAZODONE HYDROCHLORIDE 50 MG: 50 TABLET ORAL at 20:30

## 2020-05-29 RX ADMIN — METFORMIN HYDROCHLORIDE 500 MG: 500 TABLET ORAL at 11:23

## 2020-05-29 RX ADMIN — ONDANSETRON 4 MG: 2 INJECTION INTRAMUSCULAR; INTRAVENOUS at 06:24

## 2020-05-29 RX ADMIN — ALPRAZOLAM 0.25 MG: 0.5 TABLET ORAL at 20:31

## 2020-05-29 RX ADMIN — HYDROMORPHONE HYDROCHLORIDE 0.5 MG: 1 INJECTION, SOLUTION INTRAMUSCULAR; INTRAVENOUS; SUBCUTANEOUS at 12:32

## 2020-05-29 RX ADMIN — METOPROLOL SUCCINATE 25 MG: 25 TABLET, EXTENDED RELEASE ORAL at 20:31

## 2020-05-29 RX ADMIN — ZOLPIDEM TARTRATE 5 MG: 5 TABLET ORAL at 20:31

## 2020-05-29 RX ADMIN — PROMETHAZINE HYDROCHLORIDE 25 MG: 25 TABLET ORAL at 15:49

## 2020-05-29 RX ADMIN — HYDROMORPHONE HYDROCHLORIDE 0.5 MG: 1 INJECTION, SOLUTION INTRAMUSCULAR; INTRAVENOUS; SUBCUTANEOUS at 20:31

## 2020-05-29 RX ADMIN — ONDANSETRON 4 MG: 2 INJECTION INTRAMUSCULAR; INTRAVENOUS at 20:31

## 2020-05-29 RX ADMIN — LISINOPRIL 10 MG: 10 TABLET ORAL at 11:22

## 2020-05-29 RX ADMIN — Medication 10 ML: at 20:31

## 2020-05-29 RX ADMIN — METOPROLOL SUCCINATE 25 MG: 25 TABLET, EXTENDED RELEASE ORAL at 14:06

## 2020-05-29 RX ADMIN — RANOLAZINE 1000 MG: 500 TABLET, FILM COATED, EXTENDED RELEASE ORAL at 20:31

## 2020-05-29 ASSESSMENT — PAIN SCALES - GENERAL
PAINLEVEL_OUTOF10: 0
PAINLEVEL_OUTOF10: 5
PAINLEVEL_OUTOF10: 5
PAINLEVEL_OUTOF10: 6
PAINLEVEL_OUTOF10: 7
PAINLEVEL_OUTOF10: 4
PAINLEVEL_OUTOF10: 6

## 2020-05-29 ASSESSMENT — PAIN DESCRIPTION - DIRECTION: RADIATING_TOWARDS: UPPER

## 2020-05-29 ASSESSMENT — PAIN DESCRIPTION - DESCRIPTORS: DESCRIPTORS: CONSTANT

## 2020-05-29 ASSESSMENT — PAIN DESCRIPTION - ONSET: ONSET: ON-GOING

## 2020-05-29 ASSESSMENT — PAIN - FUNCTIONAL ASSESSMENT: PAIN_FUNCTIONAL_ASSESSMENT: PREVENTS OR INTERFERES SOME ACTIVE ACTIVITIES AND ADLS

## 2020-05-29 ASSESSMENT — PAIN DESCRIPTION - PROGRESSION: CLINICAL_PROGRESSION: NOT CHANGED

## 2020-05-29 ASSESSMENT — PAIN DESCRIPTION - ORIENTATION: ORIENTATION: LOWER

## 2020-05-29 ASSESSMENT — PAIN DESCRIPTION - FREQUENCY: FREQUENCY: CONTINUOUS

## 2020-05-29 ASSESSMENT — PAIN DESCRIPTION - LOCATION: LOCATION: BACK

## 2020-05-29 ASSESSMENT — PAIN DESCRIPTION - PAIN TYPE: TYPE: CHRONIC PAIN

## 2020-05-29 NOTE — PROGRESS NOTES
Janella Severance arrived to room # 321. Presented with: Lower Back Pain  Mental Status: Patient is oriented, alert, coherent, logical, thought processes intact and able to concentrate and follow conversation. Vitals:    05/28/20 2342   BP: (!) 177/79   Pulse: 62   Resp: 18   Temp: 98.6 °F (37 °C)   SpO2: 97%     Patient safety contract and falls prevention contract reviewed with patient Yes. Oriented Patient and Family to room. Call light within reach. Yes.   Needs, issues or concerns expressed at this time: no.      Electronically signed by Jerod Rodriguez RN on 5/29/2020 at 12:20 AM

## 2020-05-29 NOTE — H&P
ANGIOPLASTY WITH STENT PLACEMENT  8/2007    CORONARY ARTERY BYPASS GRAFT  3/5/2013    PACABG X 3, LIMA-LAD, SVG0OM, SVG-DIAG, RT EVH, DR PATEL    COSMETIC SURGERY      deviated septum and rhinoplasty    CYST REMOVAL  1970    DIAGNOSTIC CARDIAC CATH LAB PROCEDURE      ENDOSCOPY, COLON, DIAGNOSTIC      EYE SURGERY  03/2019    cataracts   [address]    Partial    NASAL SEPTUM [address] SINUS SURGERY  2003    Nasal Polyps Removed    SPINE SURGERY  3/6/2006    Lumbar Laminectomy L4&5    SPINE SURGERY  3/6/2006    Cervical Fusion - C4,5,6    TONSILLECTOMY  1968         Medications Prior to Admission:    Medications Prior to Admission: traMADol (ULTRAM) 50 MG tablet,   metoprolol succinate (TOPROL XL) 25 MG extended release tablet, Take 1 tablet by mouth 3 times daily Take 50 in the AMa nd 25 in the PM  metFORMIN (GLUCOPHAGE) 500 MG tablet, Take 1 tablet by mouth 2 times daily Please hold Metformin for 48 hours after cardiac catheterization. May resume on 4/15/20  clopidogrel (PLAVIX) 75 MG tablet, Take 1 tablet by mouth daily  nitroGLYCERIN (NITROSTAT) 0.4 MG SL tablet, up to max of 3 total doses. If no relief after 1 dose, call 911.  amitriptyline (ELAVIL) 25 MG tablet, Take 25 mg by mouth nightly  isosorbide mononitrate (IMDUR) 60 MG extended release tablet, Take 1 tablet by mouth 2 times daily  atorvastatin (LIPITOR) 80 MG tablet, Take 1 tablet by mouth daily  lisinopril (PRINIVIL;ZESTRIL) 10 MG tablet, Take 1 tablet by mouth 2 times daily  ranolazine (RANEXA) 1000 MG extended release tablet, Take 1 tablet by mouth 2 times daily  Cyanocobalamin (VITAMIN B 12 PO), Take 1,000 mcg by mouth daily  promethazine (PHENERGAN) 25 MG tablet, Take 25 mg by mouth every 6 hours as needed for Nausea  cyclobenzaprine (FLEXERIL) 10 MG tablet, Take 10 mg by mouth 2 times daily as needed for Muscle spasms.   ALPRAZolam (XANAX) 0.25 MG tablet, Take 0.25 mg by mouth daily as needed (takes 1/2 tab)   albuterol (PROVENTIL HFA;VENTOLIN HFA) 108 (90 BASE) MCG/ACT inhaler, Inhale 2 puffs into the lungs every 6 hours as needed. traZODone (DESYREL) 50 MG tablet, Take 50 mg by mouth nightly. choline fenofibrate (TRILIPIX) 135 MG CPDR, Take 135 mg by mouth daily. aspirin 81 MG tablet, Take 81 mg by mouth daily. zolpidem (AMBIEN) 10 MG tablet, Take 5 mg by mouth nightly   DM-APAP-CPM (CORICIDIN HBP PO), Take by mouth    Allergies: Other; Codeine; Darvocet [propoxyphene n-acetaminophen]; Hydrocodone-acetaminophen; Morphine; and Percocet [oxycodone-acetaminophen]    Social History:   TOBACCO:   reports that she quit smoking about 53 years ago. Her smoking use included cigarettes. She has a 1.00 pack-year smoking history. She has never used smokeless tobacco.  ETOH:   reports no history of alcohol use. DRUGS:   reports no history of drug use. MARITAL STATUS:  Not   OCCUPATION:  Not working  Patient currently lives alone      Family History:       Problem Relation Age of Onset    Heart Disease Mother     High Blood Pressure Mother     Diabetes Mother     High Cholesterol Mother     Diabetes Brother      REVIEW OF SYSTEMS:  Constitutional: neg  CV: neg  Pulmonary: neg  GI: neg  : neg  Psych: neg  Neuro: neg  Skin: neg  MusculoSkeletal: low back pain  HEENT: neg  Joints: neg  Vitals:  BP (!) 164/70   Pulse 58   Temp 97 °F (36.1 °C) (Temporal)   Resp 18   Ht 5' 5\" (1.651 m)   Wt 145 lb 8 oz (66 kg)   SpO2 96%   BMI 24.21 kg/m²     PHYSICAL EXAM:  Gen: NAD, alert, pleasant  HEENT: WNL  Lymph: no LAD  Neck: no JVD or masses  Chest: CTA bilat  CV: RRR  Abdomen: NT/ND  Extrem: no C/C/E  Neuro: non focal  Skin: no rashes  Joints: no redness  DATA:  I have reviewed the admission labs and imaging tests.     ASSESSMENT AND PLAN:      Active Problems:    Acute exacerbation of chronic low back pain---consult Spinal Surgery for help with care, continue IV pain treatment, heat

## 2020-05-29 NOTE — ED PROVIDER NOTES
Systems   Constitutional: Negative for fever. Respiratory: Negative for shortness of breath. Cardiovascular: Positive for chest pain. Gastrointestinal: Negative for abdominal pain, diarrhea, nausea and vomiting. Musculoskeletal: Positive for back pain and gait problem. Neurological: Negative for syncope. All other systems reviewed and are negative.            PAST MEDICALHISTORY     Past Medical History:   Diagnosis Date    Asthma     CAD (coronary artery disease)     Chest pain     Depression with anxiety     Diabetes mellitus (HCC)     GERD (gastroesophageal reflux disease)     Glucose intolerance (impaired glucose tolerance)     Hyperlipidemia     Cholesterol management per Kuldip Aguilar [facility] Hypertension     Lupus (systemic lupus erythematosus) ([facility] [address]     Malaise and fatigue 10/6/2017    Movement disorder     Sees pain management for neck pain and previous surgery    Osteoarthritis     S/P CABG x 3 03/05/2013    PACABG X 3, LIMA-LAD, SVG0OM, SVG-DIAG, RT EV, DR PATEL         SURGICAL HISTORY       Past Surgical History:   Procedure Laterality Date    APPENDECTOMY  1965    BACK SURGERY      CARDIAC CATHETERIZATION  5/18/11    EF 60%    CARDIAC CATHETERIZATION  1/25/16    drug eluting stent to RCA    CARDIAC CATHETERIZATION  1/25/2016    CARDIAC CATHETERIZATION  2/19/16    CARDIAC CATHETERIZATION  08/24/2017    cutting balloon angioplasty ot LAD    COLON SURGERY  1992    Polypectomy - 2    COLON SURGERY  2003    Polypectomy - 4    COLON SURGERY  11/2008    Polypectomy - 2    COLON SURGERY  8/2012    Polypectomy - 3    COLONOSCOPY      CORONARY ANGIOPLASTY  08/2017    cutting balloon angioplasty LAD diagonal    CORONARY ANGIOPLASTY WITH STENT PLACEMENT  3/22/2007    CORONARY ANGIOPLASTY WITH STENT PLACEMENT  8/2007    CORONARY ARTERY BYPASS GRAFT  3/5/2013    PACABG X 3, LIMA-LAD, SVG0OM, SVG-DIAG, RT EVH, DR PATEL    COSMETIC SURGERY      deviated septum and rhinoplasty    CYST REMOVAL  1970    DIAGNOSTIC CARDIAC CATH LAB PROCEDURE      ENDOSCOPY, COLON, DIAGNOSTIC      EYE SURGERY  03/2019    cataracts    HEMORRHOID SURGERY  1987    HYSTERECTOMY  1983    Partial    NASAL SEPTUM SURGERY  1977    SINUS SURGERY  2003    Nasal Polyps Removed    SPINE SURGERY  3/6/2006    Lumbar Laminectomy L4&5    SPINE SURGERY  3/6/2006    Cervical Fusion - C4,5,6    TONSILLECTOMY  1968         CURRENT MEDICATIONS     Previous Medications    ALBUTEROL (PROVENTIL HFA;VENTOLIN HFA) 108 (90 BASE) MCG/ACT INHALER    Inhale 2 puffs into the lungs every 6 hours as needed. ALPRAZOLAM (XANAX) 0.25 MG TABLET    Take 0.25 mg by mouth daily as needed (takes 1/2 tab)     AMITRIPTYLINE (ELAVIL) 25 MG TABLET    Take 25 mg by mouth nightly    ASPIRIN 81 MG TABLET    Take 81 mg by mouth daily. ATORVASTATIN (LIPITOR) 80 MG TABLET    Take 1 tablet by mouth daily    CHOLINE FENOFIBRATE (TRILIPIX) 135 MG CPDR    Take 135 mg by mouth daily. CLOPIDOGREL (PLAVIX) 75 MG TABLET    Take 1 tablet by mouth daily    CYANOCOBALAMIN (VITAMIN B 12 PO)    Take 1,000 mcg by mouth daily    CYCLOBENZAPRINE (FLEXERIL) 10 MG TABLET    Take 10 mg by mouth 2 times daily as needed for Muscle spasms. DM-APAP-CPM (CORICIDIN HBP PO)    Take by mouth    ISOSORBIDE MONONITRATE (IMDUR) 60 MG EXTENDED RELEASE TABLET    Take 1 tablet by mouth 2 times daily    LISINOPRIL (PRINIVIL;ZESTRIL) 10 MG TABLET    Take 1 tablet by mouth 2 times daily    METFORMIN (GLUCOPHAGE) 500 MG TABLET    Take 1 tablet by mouth 2 times daily Please hold Metformin for 48 hours after cardiac catheterization. May resume on 4/15/20    METOPROLOL SUCCINATE (TOPROL XL) 25 MG EXTENDED RELEASE TABLET    Take 1 tablet by mouth 3 times daily Take 50 in the AMa nd 25 in the PM    NITROGLYCERIN (NITROSTAT) 0.4 MG SL TABLET    up to max of 3 total doses. If no relief after 1 dose, call 911.     PROMETHAZINE (PHENERGAN) 25 MG TABLET    Take 25 mg Concern    None   Social History Narrative    None       SCREENINGS             PHYSICAL EXAM    (up to 7 for level 4, 8 or more for level 5)     ED Triage Vitals [05/28/20 1644]   BP Temp Temp src Pulse Resp SpO2 Height Weight   (!) 194/94 98.7 °F (37.1 °C) -- 68 18 96 % 5' 5\" (1.651 m) 145 lb (65.8 kg)       Physical Exam  Vitals signs and nursing note reviewed. HENT:      Head: Atraumatic. Mouth/Throat:      Mouth: Mucous membranes are moist. Mucous membranes are not dry. Pharynx: No posterior oropharyngeal erythema. Eyes:      General: No scleral icterus. Pupils: Pupils are equal, round, and reactive to light. Neck:      Trachea: No tracheal deviation. Cardiovascular:      Rate and Rhythm: Normal rate and regular rhythm. Heart sounds: Normal heart sounds. No murmur. Pulmonary:      Effort: Pulmonary effort is normal. No respiratory distress. Breath sounds: Normal breath sounds. No stridor. Abdominal:      General: There is no distension. Palpations: Abdomen is soft. Tenderness: There is no abdominal tenderness. There is no guarding. Musculoskeletal:        Back:       Right lower leg: No edema. Left lower leg: No edema. Skin:     Capillary Refill: Capillary refill takes less than 2 seconds. Coloration: Skin is not pale. Findings: No rash. Neurological:      Mental Status: She is alert and oriented to person, place, and time. Psychiatric:         Behavior: Behavior is cooperative. DIAGNOSTIC RESULTS     EKG: All EKG's areinterpreted by the Emergency Department Physician who either signs or Co-signs this chart in the absence of a cardiologist.    1651: Normal sinus rhythm at 64, no ST elevation noted.   T wave inversion noted V4-5, improved from prior 4/2020    RADIOLOGY:  Non-plain film images such as CT, Ultrasound and MRI are read by the radiologist. Plain radiographic images are visualized and preliminarily interpreted bythe

## 2020-05-29 NOTE — PROGRESS NOTES
4 Eyes Skin Assessment    Patrick Kelly is being assessed upon: Admission    I agree that Rosas Long, along with Emmanuelle Caicedo (either 2 RN's or 1 LPN and 1 RN) have performed a thorough Head to Toe Skin Assessment on the patient. ALL assessment sites listed below have been assessed. Areas assessed by both nurses:     [x]   Head, Face, and Ears   [x]   Shoulders, Back, and Chest  [x]   Arms, Elbows, and Hands   [x]   Coccyx, Sacrum, and Ischium  [x]   Legs, Feet, and Heels    Does the Patient have Skin Breakdown?  No    Sunday Prevention initiated: Yes  Wound Care Orders initiated: No    [facility] nurse consulted for Pressure Injury (Stage 3,4, Unstageable, DTI, NWPT, and Complex wounds) and New or Established Ostomies: No        Primary Nurse eSignature: Cara Milian RN on 5/29/2020 at 12:21 AM      Co-Signer eSignature: Electronically signed by Kory Amor RN on 5/29/20 at 2:01 AM CDT

## 2020-05-30 LAB
GLUCOSE BLD-MCNC: 163 MG/DL (ref 70–99)
GLUCOSE BLD-MCNC: 313 MG/DL (ref 70–99)
PERFORMED ON: ABNORMAL
PERFORMED ON: ABNORMAL

## 2020-05-30 PROCEDURE — 6360000002 HC RX W HCPCS: Performed by: FAMILY MEDICINE

## 2020-05-30 PROCEDURE — 6370000000 HC RX 637 (ALT 250 FOR IP): Performed by: FAMILY MEDICINE

## 2020-05-30 PROCEDURE — 2580000003 HC RX 258: Performed by: FAMILY MEDICINE

## 2020-05-30 PROCEDURE — 1210000000 HC MED SURG R&B

## 2020-05-30 PROCEDURE — 82947 ASSAY GLUCOSE BLOOD QUANT: CPT

## 2020-05-30 RX ORDER — METHYLPREDNISOLONE SODIUM SUCCINATE 40 MG/ML
40 INJECTION, POWDER, LYOPHILIZED, FOR SOLUTION INTRAMUSCULAR; INTRAVENOUS EVERY 8 HOURS
Status: DISCONTINUED | OUTPATIENT
Start: 2020-05-30 | End: 2020-06-04

## 2020-05-30 RX ORDER — DEXTROSE MONOHYDRATE 50 MG/ML
100 INJECTION, SOLUTION INTRAVENOUS PRN
Status: DISCONTINUED | OUTPATIENT
Start: 2020-05-30 | End: 2020-06-05 | Stop reason: HOSPADM

## 2020-05-30 RX ORDER — CYCLOBENZAPRINE HCL 10 MG
10 TABLET ORAL 3 TIMES DAILY PRN
Status: DISCONTINUED | OUTPATIENT
Start: 2020-05-30 | End: 2020-06-05 | Stop reason: HOSPADM

## 2020-05-30 RX ORDER — NICOTINE POLACRILEX 4 MG
15 LOZENGE BUCCAL PRN
Status: DISCONTINUED | OUTPATIENT
Start: 2020-05-30 | End: 2020-06-05 | Stop reason: HOSPADM

## 2020-05-30 RX ORDER — OXYCODONE HYDROCHLORIDE AND ACETAMINOPHEN 5; 325 MG/1; MG/1
1 TABLET ORAL EVERY 4 HOURS PRN
Status: DISCONTINUED | OUTPATIENT
Start: 2020-05-30 | End: 2020-05-30

## 2020-05-30 RX ORDER — DEXTROSE MONOHYDRATE 25 G/50ML
12.5 INJECTION, SOLUTION INTRAVENOUS PRN
Status: DISCONTINUED | OUTPATIENT
Start: 2020-05-30 | End: 2020-06-05 | Stop reason: HOSPADM

## 2020-05-30 RX ADMIN — CYCLOBENZAPRINE HYDROCHLORIDE 10 MG: 10 TABLET, FILM COATED ORAL at 08:25

## 2020-05-30 RX ADMIN — ASPIRIN 81 MG 81 MG: 81 TABLET ORAL at 08:18

## 2020-05-30 RX ADMIN — CYCLOBENZAPRINE 10 MG: 10 TABLET, FILM COATED ORAL at 21:04

## 2020-05-30 RX ADMIN — RANOLAZINE 1000 MG: 500 TABLET, FILM COATED, EXTENDED RELEASE ORAL at 20:45

## 2020-05-30 RX ADMIN — METOPROLOL SUCCINATE 25 MG: 25 TABLET, EXTENDED RELEASE ORAL at 08:17

## 2020-05-30 RX ADMIN — ZOLPIDEM TARTRATE 5 MG: 5 TABLET ORAL at 23:40

## 2020-05-30 RX ADMIN — ONDANSETRON 4 MG: 2 INJECTION INTRAMUSCULAR; INTRAVENOUS at 19:02

## 2020-05-30 RX ADMIN — HYDROMORPHONE HYDROCHLORIDE 0.5 MG: 1 INJECTION, SOLUTION INTRAMUSCULAR; INTRAVENOUS; SUBCUTANEOUS at 08:25

## 2020-05-30 RX ADMIN — INSULIN LISPRO 2 UNITS: 100 INJECTION, SOLUTION INTRAVENOUS; SUBCUTANEOUS at 20:41

## 2020-05-30 RX ADMIN — METFORMIN HYDROCHLORIDE 500 MG: 500 TABLET ORAL at 08:17

## 2020-05-30 RX ADMIN — HYDROMORPHONE HYDROCHLORIDE 0.5 MG: 1 INJECTION, SOLUTION INTRAMUSCULAR; INTRAVENOUS; SUBCUTANEOUS at 12:56

## 2020-05-30 RX ADMIN — ATORVASTATIN CALCIUM 80 MG: 80 TABLET, FILM COATED ORAL at 08:17

## 2020-05-30 RX ADMIN — METOPROLOL SUCCINATE 25 MG: 25 TABLET, EXTENDED RELEASE ORAL at 12:52

## 2020-05-30 RX ADMIN — CLOPIDOGREL BISULFATE 75 MG: 75 TABLET ORAL at 08:18

## 2020-05-30 RX ADMIN — HYDROMORPHONE HYDROCHLORIDE 0.5 MG: 1 INJECTION, SOLUTION INTRAMUSCULAR; INTRAVENOUS; SUBCUTANEOUS at 23:29

## 2020-05-30 RX ADMIN — LISINOPRIL 10 MG: 10 TABLET ORAL at 08:18

## 2020-05-30 RX ADMIN — HYDROMORPHONE HYDROCHLORIDE 0.5 MG: 1 INJECTION, SOLUTION INTRAMUSCULAR; INTRAVENOUS; SUBCUTANEOUS at 19:02

## 2020-05-30 RX ADMIN — METHYLPREDNISOLONE SODIUM SUCCINATE 40 MG: 40 INJECTION, POWDER, FOR SOLUTION INTRAMUSCULAR; INTRAVENOUS at 14:17

## 2020-05-30 RX ADMIN — METFORMIN HYDROCHLORIDE 500 MG: 500 TABLET ORAL at 20:44

## 2020-05-30 RX ADMIN — TRAZODONE HYDROCHLORIDE 50 MG: 50 TABLET ORAL at 20:45

## 2020-05-30 RX ADMIN — LISINOPRIL 10 MG: 10 TABLET ORAL at 20:44

## 2020-05-30 RX ADMIN — ENOXAPARIN SODIUM 40 MG: 40 INJECTION SUBCUTANEOUS at 12:52

## 2020-05-30 RX ADMIN — RANOLAZINE 1000 MG: 500 TABLET, FILM COATED, EXTENDED RELEASE ORAL at 08:17

## 2020-05-30 RX ADMIN — METOPROLOL SUCCINATE 25 MG: 25 TABLET, EXTENDED RELEASE ORAL at 20:44

## 2020-05-30 RX ADMIN — ISOSORBIDE MONONITRATE 60 MG: 60 TABLET, EXTENDED RELEASE ORAL at 08:17

## 2020-05-30 RX ADMIN — ISOSORBIDE MONONITRATE 60 MG: 60 TABLET, EXTENDED RELEASE ORAL at 20:44

## 2020-05-30 RX ADMIN — Medication 10 ML: at 08:20

## 2020-05-30 RX ADMIN — AMITRIPTYLINE HYDROCHLORIDE 25 MG: 25 TABLET, FILM COATED ORAL at 20:44

## 2020-05-30 RX ADMIN — METHYLPREDNISOLONE SODIUM SUCCINATE 40 MG: 40 INJECTION, POWDER, FOR SOLUTION INTRAMUSCULAR; INTRAVENOUS at 20:43

## 2020-05-30 RX ADMIN — INSULIN LISPRO 1 UNITS: 100 INJECTION, SOLUTION INTRAVENOUS; SUBCUTANEOUS at 17:19

## 2020-05-30 ASSESSMENT — PAIN SCALES - GENERAL
PAINLEVEL_OUTOF10: 7
PAINLEVEL_OUTOF10: 5
PAINLEVEL_OUTOF10: 7
PAINLEVEL_OUTOF10: 0
PAINLEVEL_OUTOF10: 6

## 2020-05-30 ASSESSMENT — PAIN DESCRIPTION - PAIN TYPE: TYPE: CHRONIC PAIN

## 2020-05-30 ASSESSMENT — PAIN DESCRIPTION - LOCATION: LOCATION: BACK

## 2020-05-30 NOTE — PROGRESS NOTES
Progress Note  Kenneth Worthington  5/30/2020 1:38 PM  Subjective:   Admit Date:   5/28/2020      CC/ADMIT DX:       Interval History:   Reviewed overnight events and nursing notes. She has continued back pain. She has no vomiting. I have reviewed all labs/diagnostics from the last 24hrs. ROS:   I have done a 10 point ROS and all are negative, except what is mentioned in the HPI. DIET GENERAL; Carb Control: 4 carb choices (60 gms)/meal    Medications:      methylPREDNISolone  40 mg Intravenous Q8H    amitriptyline  25 mg Oral Nightly    aspirin  81 mg Oral Daily    atorvastatin  80 mg Oral Daily    isosorbide mononitrate  60 mg Oral BID    lisinopril  10 mg Oral BID    metFORMIN  500 mg Oral BID    metoprolol succinate  25 mg Oral TID    ranolazine  1,000 mg Oral BID    clopidogrel  75 mg Oral Daily    traZODone  50 mg Oral Nightly    enoxaparin  40 mg Subcutaneous Daily    sodium chloride flush  10 mL Intravenous 2 times per day           Objective:   Vitals: BP (!) 147/71   Pulse 63   Temp 97.3 °F (36.3 °C) (Temporal)   Resp 18   Ht 5' 5\" (1.651 m)   Wt 145 lb 8 oz (66 kg)   SpO2 95%   BMI 24.21 kg/m²      Intake/Output Summary (Last 24 hours) at 5/30/2020 1338  Last data filed at 5/29/2020 2327  Gross per 24 hour   Intake 730 ml   Output --   Net 730 ml     General appearance: alert and cooperative with exam  Lungs: clear to auscultation bilaterally  Heart: RRR  Abdomen: soft, non-tender; bowel sounds normal; no masses,  no organomegaly  Extremities: extremities normal, atraumatic, no cyanosis or edema  Neurologic:  No obvious focal neurologic deficits. Assessment and Plan: Active Problems:    Acute exacerbation of chronic low back pain    Intractable back pain  Resolved Problems:    * No resolved hospital problems. *    Nausea    HTN    CAD    HPL    DM2      Plan:  1. Continue present medication(s)   2.  Start IV steroids  3. Follow with Surgery  4. Pain treatment  5. Follow with glucose      Discharge planning:   her home     Reviewed treatment plans with the patient and/or family.              Electronically signed by Flavio Sprague MD on 5/30/2020 at 1:38 PM

## 2020-05-30 NOTE — PROGRESS NOTES
Pt seen and evaluated. MRI: Multilevel stenosis, severe at several levels. A/P:  Lumbar stenosis with exacerbation of back pain/leg pain    1. IV steroids may be helpful if ok with Dr. Naveed Jaime  2. Discussed Surgical options vs Epidural steroid injection, patient is on plavix which complicates both options   3. The best plan would  to d/c patient home when pain controlled and continue with an HARISH. The injection could be planned next week, but   plavix would need to be help prior to the injection which may not be an option.          Electronically signed by Katty Colby PA-C on 5/30/2020 at 10:17 AM

## 2020-05-31 LAB
EKG P AXIS: 78 DEGREES
EKG P-R INTERVAL: 160 MS
EKG Q-T INTERVAL: 430 MS
EKG QRS DURATION: 102 MS
EKG QTC CALCULATION (BAZETT): 437 MS
EKG T AXIS: 124 DEGREES
GLUCOSE BLD-MCNC: 140 MG/DL (ref 70–99)
GLUCOSE BLD-MCNC: 148 MG/DL (ref 70–99)
GLUCOSE BLD-MCNC: 179 MG/DL (ref 70–99)
GLUCOSE BLD-MCNC: 218 MG/DL (ref 70–99)
PERFORMED ON: ABNORMAL

## 2020-05-31 PROCEDURE — 82947 ASSAY GLUCOSE BLOOD QUANT: CPT

## 2020-05-31 PROCEDURE — 93010 ELECTROCARDIOGRAM REPORT: CPT | Performed by: INTERNAL MEDICINE

## 2020-05-31 PROCEDURE — 6370000000 HC RX 637 (ALT 250 FOR IP): Performed by: FAMILY MEDICINE

## 2020-05-31 PROCEDURE — 1210000000 HC MED SURG R&B

## 2020-05-31 PROCEDURE — 6360000002 HC RX W HCPCS: Performed by: FAMILY MEDICINE

## 2020-05-31 PROCEDURE — 2580000003 HC RX 258: Performed by: FAMILY MEDICINE

## 2020-05-31 RX ORDER — POLYETHYLENE GLYCOL 3350 17 G/17G
17 POWDER, FOR SOLUTION ORAL DAILY
Status: DISCONTINUED | OUTPATIENT
Start: 2020-05-31 | End: 2020-06-05 | Stop reason: HOSPADM

## 2020-05-31 RX ORDER — TRAMADOL HYDROCHLORIDE 100 MG/1
100 TABLET, EXTENDED RELEASE ORAL DAILY
Status: DISCONTINUED | OUTPATIENT
Start: 2020-05-31 | End: 2020-06-01 | Stop reason: CLARIF

## 2020-05-31 RX ORDER — TRAMADOL HYDROCHLORIDE 50 MG/1
50 TABLET ORAL EVERY 4 HOURS PRN
Status: DISCONTINUED | OUTPATIENT
Start: 2020-05-31 | End: 2020-06-05 | Stop reason: HOSPADM

## 2020-05-31 RX ADMIN — METOPROLOL SUCCINATE 25 MG: 25 TABLET, EXTENDED RELEASE ORAL at 21:10

## 2020-05-31 RX ADMIN — Medication 10 ML: at 21:11

## 2020-05-31 RX ADMIN — METHYLPREDNISOLONE SODIUM SUCCINATE 40 MG: 40 INJECTION, POWDER, FOR SOLUTION INTRAMUSCULAR; INTRAVENOUS at 06:00

## 2020-05-31 RX ADMIN — INSULIN LISPRO 1 UNITS: 100 INJECTION, SOLUTION INTRAVENOUS; SUBCUTANEOUS at 12:50

## 2020-05-31 RX ADMIN — METHYLPREDNISOLONE SODIUM SUCCINATE 40 MG: 40 INJECTION, POWDER, FOR SOLUTION INTRAMUSCULAR; INTRAVENOUS at 21:11

## 2020-05-31 RX ADMIN — Medication 10 ML: at 08:23

## 2020-05-31 RX ADMIN — METHYLPREDNISOLONE SODIUM SUCCINATE 40 MG: 40 INJECTION, POWDER, FOR SOLUTION INTRAMUSCULAR; INTRAVENOUS at 13:02

## 2020-05-31 RX ADMIN — ASPIRIN 81 MG 81 MG: 81 TABLET ORAL at 08:22

## 2020-05-31 RX ADMIN — AMITRIPTYLINE HYDROCHLORIDE 25 MG: 25 TABLET, FILM COATED ORAL at 21:10

## 2020-05-31 RX ADMIN — RANOLAZINE 1000 MG: 500 TABLET, FILM COATED, EXTENDED RELEASE ORAL at 21:09

## 2020-05-31 RX ADMIN — METFORMIN HYDROCHLORIDE 500 MG: 500 TABLET ORAL at 21:10

## 2020-05-31 RX ADMIN — LISINOPRIL 10 MG: 10 TABLET ORAL at 21:10

## 2020-05-31 RX ADMIN — INSULIN LISPRO 1 UNITS: 100 INJECTION, SOLUTION INTRAVENOUS; SUBCUTANEOUS at 08:34

## 2020-05-31 RX ADMIN — HYDROMORPHONE HYDROCHLORIDE 0.5 MG: 1 INJECTION, SOLUTION INTRAMUSCULAR; INTRAVENOUS; SUBCUTANEOUS at 12:56

## 2020-05-31 RX ADMIN — METFORMIN HYDROCHLORIDE 500 MG: 500 TABLET ORAL at 08:22

## 2020-05-31 RX ADMIN — HYDROMORPHONE HYDROCHLORIDE 0.5 MG: 1 INJECTION, SOLUTION INTRAMUSCULAR; INTRAVENOUS; SUBCUTANEOUS at 17:54

## 2020-05-31 RX ADMIN — CLOPIDOGREL BISULFATE 75 MG: 75 TABLET ORAL at 08:22

## 2020-05-31 RX ADMIN — HYDROMORPHONE HYDROCHLORIDE 0.5 MG: 1 INJECTION, SOLUTION INTRAMUSCULAR; INTRAVENOUS; SUBCUTANEOUS at 04:25

## 2020-05-31 RX ADMIN — INSULIN LISPRO 1 UNITS: 100 INJECTION, SOLUTION INTRAVENOUS; SUBCUTANEOUS at 21:07

## 2020-05-31 RX ADMIN — ZOLPIDEM TARTRATE 5 MG: 5 TABLET ORAL at 23:00

## 2020-05-31 RX ADMIN — ENOXAPARIN SODIUM 40 MG: 40 INJECTION SUBCUTANEOUS at 12:48

## 2020-05-31 RX ADMIN — ENOXAPARIN SODIUM 40 MG: 40 INJECTION SUBCUTANEOUS at 13:01

## 2020-05-31 RX ADMIN — METOPROLOL SUCCINATE 25 MG: 25 TABLET, EXTENDED RELEASE ORAL at 13:01

## 2020-05-31 RX ADMIN — ATORVASTATIN CALCIUM 80 MG: 80 TABLET, FILM COATED ORAL at 08:23

## 2020-05-31 RX ADMIN — ISOSORBIDE MONONITRATE 60 MG: 60 TABLET, EXTENDED RELEASE ORAL at 21:10

## 2020-05-31 RX ADMIN — METOPROLOL SUCCINATE 25 MG: 25 TABLET, EXTENDED RELEASE ORAL at 08:22

## 2020-05-31 RX ADMIN — TRAMADOL HYDROCHLORIDE 50 MG: 50 TABLET, FILM COATED ORAL at 21:09

## 2020-05-31 RX ADMIN — TRAZODONE HYDROCHLORIDE 50 MG: 50 TABLET ORAL at 21:10

## 2020-05-31 RX ADMIN — RANOLAZINE 1000 MG: 500 TABLET, FILM COATED, EXTENDED RELEASE ORAL at 08:22

## 2020-05-31 RX ADMIN — ISOSORBIDE MONONITRATE 60 MG: 60 TABLET, EXTENDED RELEASE ORAL at 08:22

## 2020-05-31 RX ADMIN — HYDROMORPHONE HYDROCHLORIDE 0.5 MG: 1 INJECTION, SOLUTION INTRAMUSCULAR; INTRAVENOUS; SUBCUTANEOUS at 08:35

## 2020-05-31 RX ADMIN — POLYETHYLENE GLYCOL (3350) 17 G: 17 POWDER, FOR SOLUTION ORAL at 12:48

## 2020-05-31 RX ADMIN — INSULIN LISPRO 1 UNITS: 100 INJECTION, SOLUTION INTRAVENOUS; SUBCUTANEOUS at 17:45

## 2020-05-31 RX ADMIN — LISINOPRIL 10 MG: 10 TABLET ORAL at 08:23

## 2020-05-31 ASSESSMENT — PAIN DESCRIPTION - PAIN TYPE
TYPE: CHRONIC PAIN
TYPE: CHRONIC PAIN

## 2020-05-31 ASSESSMENT — PAIN SCALES - GENERAL
PAINLEVEL_OUTOF10: 3
PAINLEVEL_OUTOF10: 3
PAINLEVEL_OUTOF10: 6
PAINLEVEL_OUTOF10: 1
PAINLEVEL_OUTOF10: 7
PAINLEVEL_OUTOF10: 6
PAINLEVEL_OUTOF10: 4
PAINLEVEL_OUTOF10: 6

## 2020-05-31 ASSESSMENT — PAIN DESCRIPTION - LOCATION
LOCATION: BACK
LOCATION: BACK

## 2020-05-31 NOTE — PROGRESS NOTES
Progress Note  Cara Merlin  5/31/2020 11:49 AM  Subjective:   Admit Date:   5/28/2020      CC/ADMIT DX:       Interval History:   Reviewed overnight events and nursing notes. Her pain is a bit better. She has not gotten out of bed. I have reviewed all labs/diagnostics from the last 24hrs. ROS:   I have done a 10 point ROS and all are negative, except what is mentioned in the HPI. DIET GENERAL; Carb Control: 4 carb choices (60 gms)/meal    Medications:      dextrose        traMADol  100 mg Oral Daily    polyethylene glycol  17 g Oral Daily    methylPREDNISolone  40 mg Intravenous Q8H    insulin lispro  0-6 Units Subcutaneous TID WC    insulin lispro  0-3 Units Subcutaneous Nightly    amitriptyline  25 mg Oral Nightly    aspirin  81 mg Oral Daily    atorvastatin  80 mg Oral Daily    isosorbide mononitrate  60 mg Oral BID    lisinopril  10 mg Oral BID    metFORMIN  500 mg Oral BID    metoprolol succinate  25 mg Oral TID    ranolazine  1,000 mg Oral BID    clopidogrel  75 mg Oral Daily    traZODone  50 mg Oral Nightly    enoxaparin  40 mg Subcutaneous Daily    sodium chloride flush  10 mL Intravenous 2 times per day           Objective:   Vitals: BP (!) 150/74   Pulse 67   Temp 97.5 °F (36.4 °C) (Temporal)   Resp 18   Ht 5' 5\" (1.651 m)   Wt 145 lb 8 oz (66 kg)   SpO2 94%   BMI 24.21 kg/m²    No intake or output data in the 24 hours ending 05/31/20 1149  General appearance: alert and cooperative with exam  Lungs: clear to auscultation bilaterally  Heart: RRR  Abdomen: soft, non-tender; bowel sounds normal; no masses,  no organomegaly  Extremities: extremities normal, atraumatic, no cyanosis or edema  Neurologic:  No obvious focal neurologic deficits. Assessment and Plan: Active Problems:    Acute exacerbation of chronic low back pain    Intractable back pain  Resolved Problems:    * No resolved hospital problems. *    Nausea    HTN    CAD    HPL    DM2      Plan:  1.

## 2020-06-01 LAB
GLUCOSE BLD-MCNC: 159 MG/DL (ref 70–99)
GLUCOSE BLD-MCNC: 175 MG/DL (ref 70–99)
GLUCOSE BLD-MCNC: 223 MG/DL (ref 70–99)
PERFORMED ON: ABNORMAL

## 2020-06-01 PROCEDURE — 82947 ASSAY GLUCOSE BLOOD QUANT: CPT

## 2020-06-01 PROCEDURE — 1210000000 HC MED SURG R&B

## 2020-06-01 PROCEDURE — 97530 THERAPEUTIC ACTIVITIES: CPT

## 2020-06-01 PROCEDURE — 2580000003 HC RX 258: Performed by: FAMILY MEDICINE

## 2020-06-01 PROCEDURE — 6360000002 HC RX W HCPCS: Performed by: FAMILY MEDICINE

## 2020-06-01 PROCEDURE — 6370000000 HC RX 637 (ALT 250 FOR IP): Performed by: FAMILY MEDICINE

## 2020-06-01 PROCEDURE — 6370000000 HC RX 637 (ALT 250 FOR IP): Performed by: PHYSICIAN ASSISTANT

## 2020-06-01 PROCEDURE — 97161 PT EVAL LOW COMPLEX 20 MIN: CPT

## 2020-06-01 RX ORDER — TRAMADOL HYDROCHLORIDE 50 MG/1
25 TABLET ORAL EVERY 6 HOURS SCHEDULED
Status: DISCONTINUED | OUTPATIENT
Start: 2020-06-01 | End: 2020-06-05 | Stop reason: HOSPADM

## 2020-06-01 RX ORDER — PREGABALIN 50 MG/1
50 CAPSULE ORAL 2 TIMES DAILY
Status: DISCONTINUED | OUTPATIENT
Start: 2020-06-01 | End: 2020-06-04

## 2020-06-01 RX ADMIN — AMITRIPTYLINE HYDROCHLORIDE 25 MG: 25 TABLET, FILM COATED ORAL at 22:29

## 2020-06-01 RX ADMIN — METHYLPREDNISOLONE SODIUM SUCCINATE 40 MG: 40 INJECTION, POWDER, FOR SOLUTION INTRAMUSCULAR; INTRAVENOUS at 22:29

## 2020-06-01 RX ADMIN — POLYETHYLENE GLYCOL (3350) 17 G: 17 POWDER, FOR SOLUTION ORAL at 09:11

## 2020-06-01 RX ADMIN — LISINOPRIL 10 MG: 10 TABLET ORAL at 22:31

## 2020-06-01 RX ADMIN — TRAMADOL HYDROCHLORIDE 25 MG: 50 TABLET, FILM COATED ORAL at 17:35

## 2020-06-01 RX ADMIN — TRAZODONE HYDROCHLORIDE 50 MG: 50 TABLET ORAL at 22:30

## 2020-06-01 RX ADMIN — ASPIRIN 81 MG 81 MG: 81 TABLET ORAL at 09:11

## 2020-06-01 RX ADMIN — INSULIN LISPRO 1 UNITS: 100 INJECTION, SOLUTION INTRAVENOUS; SUBCUTANEOUS at 09:19

## 2020-06-01 RX ADMIN — METHYLPREDNISOLONE SODIUM SUCCINATE 40 MG: 40 INJECTION, POWDER, FOR SOLUTION INTRAMUSCULAR; INTRAVENOUS at 05:08

## 2020-06-01 RX ADMIN — INSULIN LISPRO 1 UNITS: 100 INJECTION, SOLUTION INTRAVENOUS; SUBCUTANEOUS at 12:04

## 2020-06-01 RX ADMIN — TRAMADOL HYDROCHLORIDE 50 MG: 50 TABLET, FILM COATED ORAL at 22:30

## 2020-06-01 RX ADMIN — METOPROLOL SUCCINATE 25 MG: 25 TABLET, EXTENDED RELEASE ORAL at 09:12

## 2020-06-01 RX ADMIN — ENOXAPARIN SODIUM 40 MG: 40 INJECTION SUBCUTANEOUS at 12:03

## 2020-06-01 RX ADMIN — RANOLAZINE 1000 MG: 500 TABLET, FILM COATED, EXTENDED RELEASE ORAL at 22:30

## 2020-06-01 RX ADMIN — ATORVASTATIN CALCIUM 80 MG: 80 TABLET, FILM COATED ORAL at 09:11

## 2020-06-01 RX ADMIN — RANOLAZINE 1000 MG: 500 TABLET, FILM COATED, EXTENDED RELEASE ORAL at 09:12

## 2020-06-01 RX ADMIN — METFORMIN HYDROCHLORIDE 500 MG: 500 TABLET ORAL at 22:29

## 2020-06-01 RX ADMIN — METOPROLOL SUCCINATE 25 MG: 25 TABLET, EXTENDED RELEASE ORAL at 22:30

## 2020-06-01 RX ADMIN — ISOSORBIDE MONONITRATE 60 MG: 60 TABLET, EXTENDED RELEASE ORAL at 22:29

## 2020-06-01 RX ADMIN — METOPROLOL SUCCINATE 25 MG: 25 TABLET, EXTENDED RELEASE ORAL at 12:03

## 2020-06-01 RX ADMIN — TRAMADOL HYDROCHLORIDE 50 MG: 50 TABLET, FILM COATED ORAL at 05:08

## 2020-06-01 RX ADMIN — PREGABALIN 50 MG: 50 CAPSULE ORAL at 22:30

## 2020-06-01 RX ADMIN — METFORMIN HYDROCHLORIDE 500 MG: 500 TABLET ORAL at 09:11

## 2020-06-01 RX ADMIN — Medication 10 ML: at 22:31

## 2020-06-01 RX ADMIN — CLOPIDOGREL BISULFATE 75 MG: 75 TABLET ORAL at 09:12

## 2020-06-01 RX ADMIN — TRAMADOL HYDROCHLORIDE 50 MG: 50 TABLET, FILM COATED ORAL at 09:18

## 2020-06-01 RX ADMIN — TRAMADOL HYDROCHLORIDE 25 MG: 50 TABLET, FILM COATED ORAL at 12:25

## 2020-06-01 RX ADMIN — LISINOPRIL 10 MG: 10 TABLET ORAL at 09:11

## 2020-06-01 RX ADMIN — METHYLPREDNISOLONE SODIUM SUCCINATE 40 MG: 40 INJECTION, POWDER, FOR SOLUTION INTRAMUSCULAR; INTRAVENOUS at 12:03

## 2020-06-01 RX ADMIN — INSULIN LISPRO 1 UNITS: 100 INJECTION, SOLUTION INTRAVENOUS; SUBCUTANEOUS at 22:32

## 2020-06-01 RX ADMIN — ISOSORBIDE MONONITRATE 60 MG: 60 TABLET, EXTENDED RELEASE ORAL at 09:11

## 2020-06-01 RX ADMIN — ZOLPIDEM TARTRATE 5 MG: 5 TABLET ORAL at 22:30

## 2020-06-01 ASSESSMENT — PAIN SCALES - GENERAL
PAINLEVEL_OUTOF10: 3
PAINLEVEL_OUTOF10: 4
PAINLEVEL_OUTOF10: 5
PAINLEVEL_OUTOF10: 5
PAINLEVEL_OUTOF10: 0
PAINLEVEL_OUTOF10: 6
PAINLEVEL_OUTOF10: 5
PAINLEVEL_OUTOF10: 4
PAINLEVEL_OUTOF10: 6
PAINLEVEL_OUTOF10: 5
PAINLEVEL_OUTOF10: 4
PAINLEVEL_OUTOF10: 0
PAINLEVEL_OUTOF10: 1

## 2020-06-01 ASSESSMENT — PAIN DESCRIPTION - PAIN TYPE: TYPE: CHRONIC PAIN

## 2020-06-01 ASSESSMENT — PAIN DESCRIPTION - LOCATION
LOCATION: BACK

## 2020-06-01 ASSESSMENT — PAIN DESCRIPTION - ORIENTATION
ORIENTATION: LOWER;MID
ORIENTATION: LOWER;MID

## 2020-06-01 ASSESSMENT — PAIN DESCRIPTION - FREQUENCY: FREQUENCY: CONTINUOUS

## 2020-06-01 ASSESSMENT — PAIN DESCRIPTION - DESCRIPTORS: DESCRIPTORS: SPASM;SHARP

## 2020-06-01 ASSESSMENT — PAIN DESCRIPTION - PROGRESSION: CLINICAL_PROGRESSION: NOT CHANGED

## 2020-06-01 ASSESSMENT — PAIN DESCRIPTION - ONSET: ONSET: ON-GOING

## 2020-06-01 ASSESSMENT — PAIN - FUNCTIONAL ASSESSMENT: PAIN_FUNCTIONAL_ASSESSMENT: PREVENTS OR INTERFERES WITH MANY ACTIVE NOT PASSIVE ACTIVITIES

## 2020-06-01 NOTE — PROGRESS NOTES
Progress Note  Erin Murguia  6/1/2020 10:46 AM  Subjective:   Admit Date:   5/28/2020      CC/ADMIT DX:       Interval History:   Reviewed overnight events and nursing notes. She has continued pain, and is not able to ambulate. No N/V. I have reviewed all labs/diagnostics from the last 24hrs. ROS:   I have done a 10 point ROS and all are negative, except what is mentioned in the HPI. DIET GENERAL; Carb Control: 4 carb choices (60 gms)/meal    Medications:      dextrose        traMADol  100 mg Oral Daily    polyethylene glycol  17 g Oral Daily    methylPREDNISolone  40 mg Intravenous Q8H    insulin lispro  0-6 Units Subcutaneous TID WC    insulin lispro  0-3 Units Subcutaneous Nightly    amitriptyline  25 mg Oral Nightly    aspirin  81 mg Oral Daily    atorvastatin  80 mg Oral Daily    isosorbide mononitrate  60 mg Oral BID    lisinopril  10 mg Oral BID    metFORMIN  500 mg Oral BID    metoprolol succinate  25 mg Oral TID    ranolazine  1,000 mg Oral BID    clopidogrel  75 mg Oral Daily    traZODone  50 mg Oral Nightly    enoxaparin  40 mg Subcutaneous Daily    sodium chloride flush  10 mL Intravenous 2 times per day           Objective:   Vitals: BP (!) 142/66   Pulse 62   Temp 97.3 °F (36.3 °C) (Temporal)   Resp 16   Ht 5' 5\" (1.651 m)   Wt 145 lb 14.4 oz (66.2 kg)   SpO2 92%   BMI 24.28 kg/m²      Intake/Output Summary (Last 24 hours) at 6/1/2020 1046  Last data filed at 6/1/2020 0925  Gross per 24 hour   Intake 780 ml   Output 1100 ml   Net -320 ml     General appearance: alert and cooperative with exam  Lungs: clear to auscultation bilaterally  Heart: RRR  Abdomen: soft, non-tender; bowel sounds normal; no masses,  no organomegaly  Extremities: extremities normal, atraumatic, no cyanosis or edema  Neurologic:  No obvious focal neurologic deficits. Assessment and Plan:    Active Problems:    Acute exacerbation of chronic low back pain    Intractable back

## 2020-06-02 LAB
GLUCOSE BLD-MCNC: 166 MG/DL (ref 70–99)
GLUCOSE BLD-MCNC: 251 MG/DL (ref 70–99)
PERFORMED ON: ABNORMAL
PERFORMED ON: ABNORMAL

## 2020-06-02 PROCEDURE — 6370000000 HC RX 637 (ALT 250 FOR IP): Performed by: FAMILY MEDICINE

## 2020-06-02 PROCEDURE — 97110 THERAPEUTIC EXERCISES: CPT

## 2020-06-02 PROCEDURE — 2580000003 HC RX 258: Performed by: FAMILY MEDICINE

## 2020-06-02 PROCEDURE — 6360000002 HC RX W HCPCS: Performed by: FAMILY MEDICINE

## 2020-06-02 PROCEDURE — 1210000000 HC MED SURG R&B

## 2020-06-02 PROCEDURE — 97116 GAIT TRAINING THERAPY: CPT

## 2020-06-02 PROCEDURE — 6370000000 HC RX 637 (ALT 250 FOR IP): Performed by: PHYSICIAN ASSISTANT

## 2020-06-02 PROCEDURE — 82947 ASSAY GLUCOSE BLOOD QUANT: CPT

## 2020-06-02 RX ADMIN — METOPROLOL SUCCINATE 25 MG: 25 TABLET, EXTENDED RELEASE ORAL at 14:35

## 2020-06-02 RX ADMIN — POLYETHYLENE GLYCOL (3350) 17 G: 17 POWDER, FOR SOLUTION ORAL at 08:56

## 2020-06-02 RX ADMIN — AMITRIPTYLINE HYDROCHLORIDE 25 MG: 25 TABLET, FILM COATED ORAL at 20:52

## 2020-06-02 RX ADMIN — CYCLOBENZAPRINE 10 MG: 10 TABLET, FILM COATED ORAL at 19:16

## 2020-06-02 RX ADMIN — CYCLOBENZAPRINE 10 MG: 10 TABLET, FILM COATED ORAL at 08:57

## 2020-06-02 RX ADMIN — METOPROLOL SUCCINATE 25 MG: 25 TABLET, EXTENDED RELEASE ORAL at 20:52

## 2020-06-02 RX ADMIN — TRAMADOL HYDROCHLORIDE 25 MG: 50 TABLET, FILM COATED ORAL at 08:56

## 2020-06-02 RX ADMIN — ISOSORBIDE MONONITRATE 60 MG: 60 TABLET, EXTENDED RELEASE ORAL at 20:52

## 2020-06-02 RX ADMIN — METHYLPREDNISOLONE SODIUM SUCCINATE 40 MG: 40 INJECTION, POWDER, FOR SOLUTION INTRAMUSCULAR; INTRAVENOUS at 14:35

## 2020-06-02 RX ADMIN — RANOLAZINE 1000 MG: 500 TABLET, FILM COATED, EXTENDED RELEASE ORAL at 20:52

## 2020-06-02 RX ADMIN — Medication 10 ML: at 08:58

## 2020-06-02 RX ADMIN — TRAZODONE HYDROCHLORIDE 50 MG: 50 TABLET ORAL at 20:51

## 2020-06-02 RX ADMIN — TRAMADOL HYDROCHLORIDE 50 MG: 50 TABLET, FILM COATED ORAL at 14:35

## 2020-06-02 RX ADMIN — INSULIN LISPRO 2 UNITS: 100 INJECTION, SOLUTION INTRAVENOUS; SUBCUTANEOUS at 20:51

## 2020-06-02 RX ADMIN — TRAMADOL HYDROCHLORIDE 50 MG: 50 TABLET, FILM COATED ORAL at 10:38

## 2020-06-02 RX ADMIN — METFORMIN HYDROCHLORIDE 500 MG: 500 TABLET ORAL at 09:04

## 2020-06-02 RX ADMIN — Medication 10 ML: at 20:52

## 2020-06-02 RX ADMIN — TRAMADOL HYDROCHLORIDE 50 MG: 50 TABLET, FILM COATED ORAL at 19:17

## 2020-06-02 RX ADMIN — TRAMADOL HYDROCHLORIDE 25 MG: 50 TABLET, FILM COATED ORAL at 20:51

## 2020-06-02 RX ADMIN — ASPIRIN 81 MG 81 MG: 81 TABLET ORAL at 08:56

## 2020-06-02 RX ADMIN — METHYLPREDNISOLONE SODIUM SUCCINATE 40 MG: 40 INJECTION, POWDER, FOR SOLUTION INTRAMUSCULAR; INTRAVENOUS at 20:51

## 2020-06-02 RX ADMIN — CLOPIDOGREL BISULFATE 75 MG: 75 TABLET ORAL at 08:57

## 2020-06-02 RX ADMIN — PREGABALIN 50 MG: 50 CAPSULE ORAL at 08:56

## 2020-06-02 RX ADMIN — TRAMADOL HYDROCHLORIDE 25 MG: 50 TABLET, FILM COATED ORAL at 02:17

## 2020-06-02 RX ADMIN — RANOLAZINE 1000 MG: 500 TABLET, FILM COATED, EXTENDED RELEASE ORAL at 08:56

## 2020-06-02 RX ADMIN — LISINOPRIL 10 MG: 10 TABLET ORAL at 08:57

## 2020-06-02 RX ADMIN — ENOXAPARIN SODIUM 40 MG: 40 INJECTION SUBCUTANEOUS at 14:39

## 2020-06-02 RX ADMIN — ATORVASTATIN CALCIUM 80 MG: 80 TABLET, FILM COATED ORAL at 09:04

## 2020-06-02 RX ADMIN — LISINOPRIL 10 MG: 10 TABLET ORAL at 20:52

## 2020-06-02 RX ADMIN — PREGABALIN 50 MG: 50 CAPSULE ORAL at 20:52

## 2020-06-02 RX ADMIN — ISOSORBIDE MONONITRATE 60 MG: 60 TABLET, EXTENDED RELEASE ORAL at 08:57

## 2020-06-02 RX ADMIN — METOPROLOL SUCCINATE 25 MG: 25 TABLET, EXTENDED RELEASE ORAL at 08:57

## 2020-06-02 RX ADMIN — METFORMIN HYDROCHLORIDE 500 MG: 500 TABLET ORAL at 20:51

## 2020-06-02 RX ADMIN — METHYLPREDNISOLONE SODIUM SUCCINATE 40 MG: 40 INJECTION, POWDER, FOR SOLUTION INTRAMUSCULAR; INTRAVENOUS at 05:25

## 2020-06-02 ASSESSMENT — PAIN SCALES - GENERAL
PAINLEVEL_OUTOF10: 5
PAINLEVEL_OUTOF10: 9
PAINLEVEL_OUTOF10: 8
PAINLEVEL_OUTOF10: 6
PAINLEVEL_OUTOF10: 6
PAINLEVEL_OUTOF10: 7
PAINLEVEL_OUTOF10: 4

## 2020-06-02 ASSESSMENT — PAIN DESCRIPTION - LOCATION: LOCATION: BACK

## 2020-06-02 ASSESSMENT — PAIN DESCRIPTION - ORIENTATION: ORIENTATION: MID;LOWER

## 2020-06-02 ASSESSMENT — PAIN DESCRIPTION - PAIN TYPE: TYPE: CHRONIC PAIN

## 2020-06-02 ASSESSMENT — PAIN DESCRIPTION - ONSET: ONSET: ON-GOING

## 2020-06-02 ASSESSMENT — PAIN DESCRIPTION - DESCRIPTORS: DESCRIPTORS: SHARP;SPASM

## 2020-06-02 ASSESSMENT — PAIN - FUNCTIONAL ASSESSMENT: PAIN_FUNCTIONAL_ASSESSMENT: PREVENTS OR INTERFERES SOME ACTIVE ACTIVITIES AND ADLS

## 2020-06-02 ASSESSMENT — PAIN DESCRIPTION - FREQUENCY: FREQUENCY: CONTINUOUS

## 2020-06-02 ASSESSMENT — PAIN DESCRIPTION - PROGRESSION: CLINICAL_PROGRESSION: NOT CHANGED

## 2020-06-02 NOTE — PROGRESS NOTES
Progress Note  Carol Denton  6/2/2020 10:28 AM  Subjective:   Admit Date:   5/28/2020      CC/ADMIT DX:       Interval History:   Reviewed overnight events and nursing notes. She continues having significant pain. She is not able to ambulate. I have reviewed all labs/diagnostics from the last 24hrs. ROS:   I have done a 10 point ROS and all are negative, except what is mentioned in the HPI. DIET GENERAL; Carb Control: 4 carb choices (60 gms)/meal    Medications:      dextrose        traMADol  25 mg Oral 4 times per day    pregabalin  50 mg Oral BID    polyethylene glycol  17 g Oral Daily    methylPREDNISolone  40 mg Intravenous Q8H    insulin lispro  0-6 Units Subcutaneous TID WC    insulin lispro  0-3 Units Subcutaneous Nightly    amitriptyline  25 mg Oral Nightly    aspirin  81 mg Oral Daily    atorvastatin  80 mg Oral Daily    isosorbide mononitrate  60 mg Oral BID    lisinopril  10 mg Oral BID    metFORMIN  500 mg Oral BID    metoprolol succinate  25 mg Oral TID    ranolazine  1,000 mg Oral BID    clopidogrel  75 mg Oral Daily    traZODone  50 mg Oral Nightly    enoxaparin  40 mg Subcutaneous Daily    sodium chloride flush  10 mL Intravenous 2 times per day           Objective:   Vitals: BP (!) 163/80   Pulse 55   Temp 97 °F (36.1 °C) (Temporal)   Resp 16   Ht 5' 5\" (1.651 m)   Wt 144 lb 4.8 oz (65.5 kg)   SpO2 93%   BMI 24.01 kg/m²      Intake/Output Summary (Last 24 hours) at 6/2/2020 1028  Last data filed at 6/2/2020 1010  Gross per 24 hour   Intake 450 ml   Output 1000 ml   Net -550 ml     General appearance: alert and cooperative with exam  Lungs: clear to auscultation bilaterally  Heart: RRR  Abdomen: soft, non-tender; bowel sounds normal; no masses,  no organomegaly  Extremities: extremities normal, atraumatic, no cyanosis or edema  Neurologic:  No obvious focal neurologic deficits. Assessment and Plan:    Active Problems:    Acute exacerbation of chronic low back pain    Intractable back pain  Resolved Problems:    * No resolved hospital problems. *    Nausea    HTN    CAD    HPL    DM2      Plan:  1. Continue present medication(s)   2. Continue IV steroids  3. PT is working with patient  4. Pain treatment  5. Follow with Surgery      Discharge planning:   her home     Reviewed treatment plans with the patient and/or family.              Electronically signed by Brandan Forrest MD on 6/2/2020 at 10:28 AM

## 2020-06-03 ENCOUNTER — TELEPHONE (OUTPATIENT)
Dept: CARDIOLOGY | Age: 75
End: 2020-06-03

## 2020-06-03 LAB
GLUCOSE BLD-MCNC: 142 MG/DL (ref 70–99)
GLUCOSE BLD-MCNC: 192 MG/DL (ref 70–99)
GLUCOSE BLD-MCNC: 209 MG/DL (ref 70–99)
PERFORMED ON: ABNORMAL

## 2020-06-03 PROCEDURE — 97530 THERAPEUTIC ACTIVITIES: CPT

## 2020-06-03 PROCEDURE — 6360000002 HC RX W HCPCS: Performed by: FAMILY MEDICINE

## 2020-06-03 PROCEDURE — 6370000000 HC RX 637 (ALT 250 FOR IP): Performed by: PHYSICIAN ASSISTANT

## 2020-06-03 PROCEDURE — 82947 ASSAY GLUCOSE BLOOD QUANT: CPT

## 2020-06-03 PROCEDURE — 6370000000 HC RX 637 (ALT 250 FOR IP): Performed by: FAMILY MEDICINE

## 2020-06-03 PROCEDURE — 1210000000 HC MED SURG R&B

## 2020-06-03 PROCEDURE — 2580000003 HC RX 258: Performed by: FAMILY MEDICINE

## 2020-06-03 PROCEDURE — 97116 GAIT TRAINING THERAPY: CPT

## 2020-06-03 RX ADMIN — POLYETHYLENE GLYCOL (3350) 17 G: 17 POWDER, FOR SOLUTION ORAL at 08:55

## 2020-06-03 RX ADMIN — METOPROLOL SUCCINATE 25 MG: 25 TABLET, EXTENDED RELEASE ORAL at 13:41

## 2020-06-03 RX ADMIN — TRAZODONE HYDROCHLORIDE 50 MG: 50 TABLET ORAL at 21:06

## 2020-06-03 RX ADMIN — INSULIN LISPRO 1 UNITS: 100 INJECTION, SOLUTION INTRAVENOUS; SUBCUTANEOUS at 21:07

## 2020-06-03 RX ADMIN — TRAMADOL HYDROCHLORIDE 25 MG: 50 TABLET, FILM COATED ORAL at 08:53

## 2020-06-03 RX ADMIN — AMITRIPTYLINE HYDROCHLORIDE 25 MG: 25 TABLET, FILM COATED ORAL at 21:06

## 2020-06-03 RX ADMIN — METOPROLOL SUCCINATE 25 MG: 25 TABLET, EXTENDED RELEASE ORAL at 21:06

## 2020-06-03 RX ADMIN — TRAMADOL HYDROCHLORIDE 50 MG: 50 TABLET, FILM COATED ORAL at 18:10

## 2020-06-03 RX ADMIN — ASPIRIN 81 MG 81 MG: 81 TABLET ORAL at 08:54

## 2020-06-03 RX ADMIN — ISOSORBIDE MONONITRATE 60 MG: 60 TABLET, EXTENDED RELEASE ORAL at 08:55

## 2020-06-03 RX ADMIN — LISINOPRIL 10 MG: 10 TABLET ORAL at 21:06

## 2020-06-03 RX ADMIN — METHYLPREDNISOLONE SODIUM SUCCINATE 40 MG: 40 INJECTION, POWDER, FOR SOLUTION INTRAMUSCULAR; INTRAVENOUS at 06:13

## 2020-06-03 RX ADMIN — ONDANSETRON 4 MG: 2 INJECTION INTRAMUSCULAR; INTRAVENOUS at 06:15

## 2020-06-03 RX ADMIN — LISINOPRIL 10 MG: 10 TABLET ORAL at 08:54

## 2020-06-03 RX ADMIN — METHYLPREDNISOLONE SODIUM SUCCINATE 40 MG: 40 INJECTION, POWDER, FOR SOLUTION INTRAMUSCULAR; INTRAVENOUS at 21:06

## 2020-06-03 RX ADMIN — ATORVASTATIN CALCIUM 80 MG: 80 TABLET, FILM COATED ORAL at 08:55

## 2020-06-03 RX ADMIN — PREGABALIN 50 MG: 50 CAPSULE ORAL at 08:55

## 2020-06-03 RX ADMIN — RANOLAZINE 1000 MG: 500 TABLET, FILM COATED, EXTENDED RELEASE ORAL at 08:55

## 2020-06-03 RX ADMIN — Medication 10 ML: at 21:07

## 2020-06-03 RX ADMIN — INSULIN LISPRO 1 UNITS: 100 INJECTION, SOLUTION INTRAVENOUS; SUBCUTANEOUS at 18:07

## 2020-06-03 RX ADMIN — PREGABALIN 50 MG: 50 CAPSULE ORAL at 21:06

## 2020-06-03 RX ADMIN — Medication 10 ML: at 08:56

## 2020-06-03 RX ADMIN — TRAMADOL HYDROCHLORIDE 25 MG: 50 TABLET, FILM COATED ORAL at 01:34

## 2020-06-03 RX ADMIN — ISOSORBIDE MONONITRATE 60 MG: 60 TABLET, EXTENDED RELEASE ORAL at 21:06

## 2020-06-03 RX ADMIN — METFORMIN HYDROCHLORIDE 500 MG: 500 TABLET ORAL at 08:55

## 2020-06-03 RX ADMIN — METHYLPREDNISOLONE SODIUM SUCCINATE 40 MG: 40 INJECTION, POWDER, FOR SOLUTION INTRAMUSCULAR; INTRAVENOUS at 13:41

## 2020-06-03 RX ADMIN — MAGNESIUM HYDROXIDE 15 ML: 400 SUSPENSION ORAL at 13:41

## 2020-06-03 RX ADMIN — METFORMIN HYDROCHLORIDE 500 MG: 500 TABLET ORAL at 21:06

## 2020-06-03 RX ADMIN — METOPROLOL SUCCINATE 25 MG: 25 TABLET, EXTENDED RELEASE ORAL at 08:55

## 2020-06-03 RX ADMIN — RANOLAZINE 1000 MG: 500 TABLET, FILM COATED, EXTENDED RELEASE ORAL at 21:06

## 2020-06-03 RX ADMIN — CLOPIDOGREL BISULFATE 75 MG: 75 TABLET ORAL at 08:55

## 2020-06-03 RX ADMIN — TRAMADOL HYDROCHLORIDE 25 MG: 50 TABLET, FILM COATED ORAL at 13:41

## 2020-06-03 RX ADMIN — ENOXAPARIN SODIUM 40 MG: 40 INJECTION SUBCUTANEOUS at 13:41

## 2020-06-03 RX ADMIN — TRAMADOL HYDROCHLORIDE 25 MG: 50 TABLET, FILM COATED ORAL at 21:06

## 2020-06-03 ASSESSMENT — PAIN SCALES - GENERAL
PAINLEVEL_OUTOF10: 7
PAINLEVEL_OUTOF10: 5
PAINLEVEL_OUTOF10: 7
PAINLEVEL_OUTOF10: 5
PAINLEVEL_OUTOF10: 6
PAINLEVEL_OUTOF10: 3
PAINLEVEL_OUTOF10: 6

## 2020-06-03 ASSESSMENT — PAIN DESCRIPTION - LOCATION: LOCATION: BACK

## 2020-06-03 ASSESSMENT — PAIN - FUNCTIONAL ASSESSMENT: PAIN_FUNCTIONAL_ASSESSMENT: PREVENTS OR INTERFERES SOME ACTIVE ACTIVITIES AND ADLS

## 2020-06-03 ASSESSMENT — PAIN DESCRIPTION - ONSET: ONSET: ON-GOING

## 2020-06-03 ASSESSMENT — PAIN DESCRIPTION - ORIENTATION: ORIENTATION: MID;LOWER

## 2020-06-03 ASSESSMENT — PAIN DESCRIPTION - DESCRIPTORS: DESCRIPTORS: SPASM

## 2020-06-03 ASSESSMENT — PAIN DESCRIPTION - FREQUENCY: FREQUENCY: CONTINUOUS

## 2020-06-03 ASSESSMENT — PAIN DESCRIPTION - PAIN TYPE: TYPE: CHRONIC PAIN

## 2020-06-03 ASSESSMENT — PAIN DESCRIPTION - PROGRESSION: CLINICAL_PROGRESSION: GRADUALLY IMPROVING

## 2020-06-03 NOTE — TELEPHONE ENCOUNTER
A call was sent to my phone from Shravan Garcia with Dr. Natalia Morris on this patient. She asks that the cardiac clearance nurse call her back about patient holding plavix for an inpatient procedure. The best number to reach her at is [phone].

## 2020-06-04 LAB
GLUCOSE BLD-MCNC: 142 MG/DL (ref 70–99)
GLUCOSE BLD-MCNC: 203 MG/DL (ref 70–99)
GLUCOSE BLD-MCNC: 239 MG/DL (ref 70–99)
PERFORMED ON: ABNORMAL

## 2020-06-04 PROCEDURE — 6370000000 HC RX 637 (ALT 250 FOR IP): Performed by: PHYSICIAN ASSISTANT

## 2020-06-04 PROCEDURE — 2580000003 HC RX 258: Performed by: FAMILY MEDICINE

## 2020-06-04 PROCEDURE — 82947 ASSAY GLUCOSE BLOOD QUANT: CPT

## 2020-06-04 PROCEDURE — 6370000000 HC RX 637 (ALT 250 FOR IP): Performed by: FAMILY MEDICINE

## 2020-06-04 PROCEDURE — 1210000000 HC MED SURG R&B

## 2020-06-04 PROCEDURE — 97116 GAIT TRAINING THERAPY: CPT

## 2020-06-04 PROCEDURE — 6360000002 HC RX W HCPCS: Performed by: FAMILY MEDICINE

## 2020-06-04 RX ORDER — PREGABALIN 25 MG/1
75 CAPSULE ORAL 2 TIMES DAILY
Status: DISCONTINUED | OUTPATIENT
Start: 2020-06-04 | End: 2020-06-05 | Stop reason: HOSPADM

## 2020-06-04 RX ORDER — METHYLPREDNISOLONE SODIUM SUCCINATE 40 MG/ML
40 INJECTION, POWDER, LYOPHILIZED, FOR SOLUTION INTRAMUSCULAR; INTRAVENOUS EVERY 12 HOURS
Status: DISCONTINUED | OUTPATIENT
Start: 2020-06-04 | End: 2020-06-05 | Stop reason: HOSPADM

## 2020-06-04 RX ORDER — BISACODYL 10 MG
10 SUPPOSITORY, RECTAL RECTAL DAILY PRN
Status: DISCONTINUED | OUTPATIENT
Start: 2020-06-04 | End: 2020-06-05 | Stop reason: HOSPADM

## 2020-06-04 RX ADMIN — Medication 10 ML: at 21:13

## 2020-06-04 RX ADMIN — ISOSORBIDE MONONITRATE 60 MG: 60 TABLET, EXTENDED RELEASE ORAL at 10:16

## 2020-06-04 RX ADMIN — RANOLAZINE 1000 MG: 500 TABLET, FILM COATED, EXTENDED RELEASE ORAL at 21:13

## 2020-06-04 RX ADMIN — INSULIN LISPRO 2 UNITS: 100 INJECTION, SOLUTION INTRAVENOUS; SUBCUTANEOUS at 14:43

## 2020-06-04 RX ADMIN — ISOSORBIDE MONONITRATE 60 MG: 60 TABLET, EXTENDED RELEASE ORAL at 21:13

## 2020-06-04 RX ADMIN — ONDANSETRON 4 MG: 2 INJECTION INTRAMUSCULAR; INTRAVENOUS at 14:42

## 2020-06-04 RX ADMIN — PREGABALIN 75 MG: 25 CAPSULE ORAL at 10:15

## 2020-06-04 RX ADMIN — TRAMADOL HYDROCHLORIDE 25 MG: 50 TABLET, FILM COATED ORAL at 21:12

## 2020-06-04 RX ADMIN — TRAZODONE HYDROCHLORIDE 50 MG: 50 TABLET ORAL at 21:13

## 2020-06-04 RX ADMIN — ATORVASTATIN CALCIUM 80 MG: 80 TABLET, FILM COATED ORAL at 10:16

## 2020-06-04 RX ADMIN — PREGABALIN 75 MG: 25 CAPSULE ORAL at 21:13

## 2020-06-04 RX ADMIN — AMITRIPTYLINE HYDROCHLORIDE 25 MG: 25 TABLET, FILM COATED ORAL at 21:13

## 2020-06-04 RX ADMIN — POLYETHYLENE GLYCOL (3350) 17 G: 17 POWDER, FOR SOLUTION ORAL at 10:17

## 2020-06-04 RX ADMIN — METHYLPREDNISOLONE SODIUM SUCCINATE 40 MG: 40 INJECTION, POWDER, FOR SOLUTION INTRAMUSCULAR; INTRAVENOUS at 05:48

## 2020-06-04 RX ADMIN — METOPROLOL SUCCINATE 25 MG: 25 TABLET, EXTENDED RELEASE ORAL at 14:42

## 2020-06-04 RX ADMIN — Medication 10 ML: at 10:18

## 2020-06-04 RX ADMIN — INSULIN LISPRO 1 UNITS: 100 INJECTION, SOLUTION INTRAVENOUS; SUBCUTANEOUS at 21:13

## 2020-06-04 RX ADMIN — METFORMIN HYDROCHLORIDE 500 MG: 500 TABLET ORAL at 10:17

## 2020-06-04 RX ADMIN — ENOXAPARIN SODIUM 40 MG: 40 INJECTION SUBCUTANEOUS at 14:43

## 2020-06-04 RX ADMIN — METHYLPREDNISOLONE SODIUM SUCCINATE 40 MG: 40 INJECTION, POWDER, FOR SOLUTION INTRAMUSCULAR; INTRAVENOUS at 17:05

## 2020-06-04 RX ADMIN — TRAMADOL HYDROCHLORIDE 25 MG: 50 TABLET, FILM COATED ORAL at 14:42

## 2020-06-04 RX ADMIN — METOPROLOL SUCCINATE 25 MG: 25 TABLET, EXTENDED RELEASE ORAL at 10:17

## 2020-06-04 RX ADMIN — METFORMIN HYDROCHLORIDE 500 MG: 500 TABLET ORAL at 21:13

## 2020-06-04 RX ADMIN — ASPIRIN 81 MG 81 MG: 81 TABLET ORAL at 10:16

## 2020-06-04 RX ADMIN — TRAMADOL HYDROCHLORIDE 25 MG: 50 TABLET, FILM COATED ORAL at 10:16

## 2020-06-04 RX ADMIN — LISINOPRIL 10 MG: 10 TABLET ORAL at 21:12

## 2020-06-04 RX ADMIN — RANOLAZINE 1000 MG: 500 TABLET, FILM COATED, EXTENDED RELEASE ORAL at 10:16

## 2020-06-04 RX ADMIN — CLOPIDOGREL BISULFATE 75 MG: 75 TABLET ORAL at 10:16

## 2020-06-04 RX ADMIN — BISACODYL 10 MG: 10 SUPPOSITORY RECTAL at 14:43

## 2020-06-04 RX ADMIN — METOPROLOL SUCCINATE 25 MG: 25 TABLET, EXTENDED RELEASE ORAL at 21:13

## 2020-06-04 RX ADMIN — LISINOPRIL 10 MG: 10 TABLET ORAL at 10:16

## 2020-06-04 ASSESSMENT — PAIN DESCRIPTION - LOCATION: LOCATION: BACK;HIP;LEG

## 2020-06-04 ASSESSMENT — PAIN SCALES - GENERAL
PAINLEVEL_OUTOF10: 9
PAINLEVEL_OUTOF10: 0
PAINLEVEL_OUTOF10: 5
PAINLEVEL_OUTOF10: 6
PAINLEVEL_OUTOF10: 6

## 2020-06-04 ASSESSMENT — PAIN DESCRIPTION - PAIN TYPE: TYPE: CHRONIC PAIN

## 2020-06-04 ASSESSMENT — PAIN DESCRIPTION - ORIENTATION: ORIENTATION: LEFT

## 2020-06-04 NOTE — PROGRESS NOTES
[facility]  Rebekah Caldwell PA-C  Physician Assistant Progress Note        Subjective/Overnight Events:  Walked to the bathroom, Lyrica may be helping some.  No side effects     Hospital LOS: 6    Vitals  VITALS:  BP (!) 151/71   Pulse 77   Temp 97.2 °F (36.2 °C)   Resp 16   Ht 5' 5\" (1.651 m)   Wt 145 lb 9.6 oz (66 kg)   SpO2 95%   BMI 24.23 kg/m²          Current Facility-Administered Medications   Medication Dose Route Frequency Provider Last Rate Last Dose    pregabalin (LYRICA) capsule 75 mg  75 mg Oral BID Rebekah Caldwell PA-C        traMADol Caretha Dyke) tablet 25 mg  25 mg Oral 4 times per day Franck Chairez MD   25 mg at 06/03/20 2106    traMADol (ULTRAM) tablet 50 mg  50 mg Oral Q4H PRN Franck Chairez MD   50 mg at 06/03/20 1810    polyethylene glycol (GLYCOLAX) packet 17 g  17 g Oral Daily Franck Chairez MD   17 g at 06/03/20 0855    magnesium hydroxide (MILK OF MAGNESIA) 400 MG/5ML suspension 15 mL  15 mL Oral BID PRN Franck Chairez MD   15 mL at 06/03/20 1341    methylPREDNISolone sodium (SOLU-MEDROL) injection 40 mg  40 mg Intravenous Q8H Franck Chairez MD   40 mg at 06/04/20 0548    cyclobenzaprine (FLEXERIL) tablet 10 mg  10 mg Oral TID PRN Franck Chairez MD   10 mg at 06/02/20 1916    insulin lispro (HUMALOG) injection vial 0-6 Units  0-6 Units Subcutaneous TID WC Franck Chairez MD   1 Units at 06/03/20 1807    insulin lispro (HUMALOG) injection vial 0-3 Units  0-3 Units Subcutaneous Nightly Franck Chairez MD   1 Units at 06/03/20 2107    glucose (GLUTOSE) 40 % oral gel 15 g  15 g Oral PRN Franck Chairez MD        dextrose 50 % IV solution  12.5 g Intravenous PRN Franck Chairez MD        glucagon (rDNA) injection 1 mg  1 mg Intramuscular PRN Franck Chairez MD        dextrose 5 % solution  100 mL/hr Intravenous PRN Franck Chairez MD        ondansetron [facility]) injection 4 mg  4 mg Intravenous Q6H PRN Franck Chairez MD   4 mg at 06/03/20 0615    ALPRAZolam Alyssa Choco) tablet 0.25 mg  0.25 mg Oral Daily PRN Yoan Wilhelm MD   0.25 mg at 05/29/20 2031    amitriptyline (ELAVIL) tablet 25 mg  25 mg Oral Nightly Yoan Wilhelm MD   25 mg at 06/03/20 2106    aspirin chewable tablet 81 mg  81 mg Oral Daily Yoan Wilhelm MD   81 mg at 06/03/20 0854    atorvastatin (LIPITOR) tablet 80 mg  80 mg Oral Daily Yoan Wilhelm MD   80 mg at 06/03/20 0855    isosorbide mononitrate (IMDUR) extended release tablet 60 mg  60 mg Oral BID Yoan Wilhelm MD   60 mg at 06/03/20 2106    lisinopril (PRINIVIL;ZESTRIL) tablet 10 mg  10 mg Oral BID Yoan Wilhelm MD   10 mg at 06/03/20 2106    metFORMIN (GLUCOPHAGE) tablet 500 mg  500 mg Oral BID Yoan Wilhelm MD   500 mg at 06/03/20 2106    metoprolol succinate (TOPROL XL) extended release tablet 25 mg  25 mg Oral TID Yoan Wilhelm MD   25 mg at 06/03/20 2106    nitroGLYCERIN (NITROSTAT) SL tablet 0.4 mg  0.4 mg Sublingual Q5 Min PRPRISCILA Wilhelm MD        promethazine (PHENERGAN) tablet 25 mg  25 mg Oral Q6H PRN Yoan Wilhelm MD   25 mg at 05/29/20 1549    ranolazine (RANEXA) extended release tablet 1,000 mg  1,000 mg Oral BID Yoan Wilhelm MD   1,000 mg at 06/03/20 2106    clopidogrel (PLAVIX) tablet 75 mg  75 mg Oral Daily Yoan Wilhelm MD   75 mg at 06/03/20 0855    traZODone (DESYREL) tablet 50 mg  50 mg Oral Nightly Yoan Wilhelm MD   50 mg at 06/03/20 2106    zolpidem (AMBIEN) tablet 5 mg  5 mg Oral Nightly PRN Yoan Wilhelm MD   5 mg at 06/01/20 2230    albuterol (PROVENTIL) nebulizer solution 2.5 mg  2.5 mg Nebulization Q6H PRN Yoan Wilhelm MD        enoxaparin (LOVENOX) injection 40 mg  40 mg Subcutaneous Daily Yoan Wilhelm MD   40 mg at 06/03/20 1341    sodium chloride flush 0.9 % injection 10 mL  10 mL Intravenous 2 times per day Yoan Wilhelm MD   10 mL at 06/03/20 2107    sodium chloride flush 0.9 % injection 10 mL  10 mL Intravenous ANDREW Wilhelm MD

## 2020-06-04 NOTE — PROGRESS NOTES
Progress Note  Donald Kiet  6/4/2020 2:16 PM  Subjective:   Admit Date:   5/28/2020      CC/ADMIT DX:       Interval History:   Reviewed overnight events and nursing notes. She has been able to ambulate with PT. She has c/o nausea, constipation. I have reviewed all labs/diagnostics from the last 24hrs. ROS:   I have done a 10 point ROS and all are negative, except what is mentioned in the HPI. DIET GENERAL; Carb Control: 4 carb choices (60 gms)/meal    Medications:      dextrose        pregabalin  75 mg Oral BID    [START ON 6/5/2020] methylPREDNISolone  40 mg Intravenous Q12H    traMADol  25 mg Oral 4 times per day    polyethylene glycol  17 g Oral Daily    insulin lispro  0-6 Units Subcutaneous TID WC    insulin lispro  0-3 Units Subcutaneous Nightly    amitriptyline  25 mg Oral Nightly    aspirin  81 mg Oral Daily    atorvastatin  80 mg Oral Daily    isosorbide mononitrate  60 mg Oral BID    lisinopril  10 mg Oral BID    metFORMIN  500 mg Oral BID    metoprolol succinate  25 mg Oral TID    ranolazine  1,000 mg Oral BID    clopidogrel  75 mg Oral Daily    traZODone  50 mg Oral Nightly    enoxaparin  40 mg Subcutaneous Daily    sodium chloride flush  10 mL Intravenous 2 times per day           Objective:   Vitals: BP (!) 153/76   Pulse 60   Temp 96.8 °F (36 °C)   Resp 18   Ht 5' 5\" (1.651 m)   Wt 145 lb 9.6 oz (66 kg)   SpO2 96%   BMI 24.23 kg/m²      Intake/Output Summary (Last 24 hours) at 6/4/2020 1416  Last data filed at 6/4/2020 1333  Gross per 24 hour   Intake 2030 ml   Output 1250 ml   Net 780 ml     General appearance: alert and cooperative with exam  Lungs: clear to auscultation bilaterally  Heart: RRR  Abdomen: soft, non-tender; bowel sounds normal; no masses,  no organomegaly  Extremities: extremities normal, atraumatic, no cyanosis or edema  Neurologic:  No obvious focal neurologic deficits. Assessment and Plan:    Active Problems:    Acute exacerbation of

## 2020-06-04 NOTE — PROGRESS NOTES
06/04/20 1209   Restrictions/Precautions   Restrictions/Precautions Fall Risk   Subjective   Subjective I think I am feeling better today   Pain Assessment   Pain Assessment 0-10   Pain Level 9   Pain Type Chronic pain   Pain Location Back;Hip;Leg  (With gait)   Pain Orientation Left   Vital Signs   Level of Consciousness 0   Oxygen Therapy   O2 Device None (Room air)   Bed Mobility   Rolling Stand by assistance   Supine to Sit Modified independent   Sit to Supine Minimal assistance   Transfers   Sit to Stand Stand by assistance   Stand to sit Stand by assistance   Bed to Chair Stand by assistance;Contact guard assistance   Ambulation 1   Surface level tile   Device Rolling Walker   Assistance Stand by assistance;Contact guard assistance   Quality of Gait Slow no LOB   Gait Deviations Slow Carmenza;Decreased step length   Distance 40'   Comments Patient BTB post gait sitting is too painful   Conditions Requiring Skilled Therapeutic Intervention   Body structures, Functions, Activity limitations Decreased functional mobility ; Decreased endurance;Decreased strength; Increased pain   Activity Tolerance   Activity Tolerance Patient limited by pain   Safety Devices   Type of devices Left in bed;Call light within reach   Physical Therapy    Electronically signed by Marcin Kaiser PTA on 6/4/2020 at 12:14 PM

## 2020-06-05 VITALS
TEMPERATURE: 98.1 F | HEIGHT: 65 IN | RESPIRATION RATE: 16 BRPM | SYSTOLIC BLOOD PRESSURE: 137 MMHG | OXYGEN SATURATION: 95 % | HEART RATE: 65 BPM | WEIGHT: 152.9 LBS | BODY MASS INDEX: 25.47 KG/M2 | DIASTOLIC BLOOD PRESSURE: 73 MMHG

## 2020-06-05 LAB
GLUCOSE BLD-MCNC: 121 MG/DL (ref 70–99)
GLUCOSE BLD-MCNC: 222 MG/DL (ref 70–99)
PERFORMED ON: ABNORMAL
PERFORMED ON: ABNORMAL

## 2020-06-05 PROCEDURE — 6370000000 HC RX 637 (ALT 250 FOR IP): Performed by: PHYSICIAN ASSISTANT

## 2020-06-05 PROCEDURE — 82947 ASSAY GLUCOSE BLOOD QUANT: CPT

## 2020-06-05 PROCEDURE — 6370000000 HC RX 637 (ALT 250 FOR IP): Performed by: FAMILY MEDICINE

## 2020-06-05 PROCEDURE — 97116 GAIT TRAINING THERAPY: CPT

## 2020-06-05 PROCEDURE — 6360000002 HC RX W HCPCS: Performed by: FAMILY MEDICINE

## 2020-06-05 PROCEDURE — 2580000003 HC RX 258: Performed by: FAMILY MEDICINE

## 2020-06-05 RX ORDER — TRAMADOL HYDROCHLORIDE 100 MG/1
100 TABLET, EXTENDED RELEASE ORAL DAILY
Qty: 30 TABLET | Refills: 0 | Status: ON HOLD | OUTPATIENT
Start: 2020-06-05 | End: 2020-06-11

## 2020-06-05 RX ORDER — METHYLPREDNISOLONE 4 MG/1
TABLET ORAL
Qty: 1 KIT | Refills: 0 | Status: ON HOLD | OUTPATIENT
Start: 2020-06-05 | End: 2020-06-11

## 2020-06-05 RX ORDER — PREGABALIN 75 MG/1
75 CAPSULE ORAL 2 TIMES DAILY
Qty: 60 CAPSULE | Refills: 0 | Status: ON HOLD | OUTPATIENT
Start: 2020-06-05 | End: 2020-06-12 | Stop reason: HOSPADM

## 2020-06-05 RX ORDER — POLYETHYLENE GLYCOL 3350 17 G/17G
17 POWDER, FOR SOLUTION ORAL DAILY
Qty: 527 G | Refills: 1 | Status: SHIPPED | OUTPATIENT
Start: 2020-06-06 | End: 2020-07-06

## 2020-06-05 RX ADMIN — METOPROLOL SUCCINATE 25 MG: 25 TABLET, EXTENDED RELEASE ORAL at 14:22

## 2020-06-05 RX ADMIN — RANOLAZINE 1000 MG: 500 TABLET, FILM COATED, EXTENDED RELEASE ORAL at 08:55

## 2020-06-05 RX ADMIN — METOPROLOL SUCCINATE 25 MG: 25 TABLET, EXTENDED RELEASE ORAL at 08:55

## 2020-06-05 RX ADMIN — CLOPIDOGREL BISULFATE 75 MG: 75 TABLET ORAL at 08:55

## 2020-06-05 RX ADMIN — INSULIN LISPRO 2 UNITS: 100 INJECTION, SOLUTION INTRAVENOUS; SUBCUTANEOUS at 13:04

## 2020-06-05 RX ADMIN — TRAMADOL HYDROCHLORIDE 25 MG: 50 TABLET, FILM COATED ORAL at 08:51

## 2020-06-05 RX ADMIN — Medication 10 ML: at 08:56

## 2020-06-05 RX ADMIN — LISINOPRIL 10 MG: 10 TABLET ORAL at 08:55

## 2020-06-05 RX ADMIN — TRAMADOL HYDROCHLORIDE 25 MG: 50 TABLET, FILM COATED ORAL at 01:18

## 2020-06-05 RX ADMIN — ISOSORBIDE MONONITRATE 60 MG: 60 TABLET, EXTENDED RELEASE ORAL at 08:55

## 2020-06-05 RX ADMIN — ASPIRIN 81 MG 81 MG: 81 TABLET ORAL at 08:55

## 2020-06-05 RX ADMIN — ATORVASTATIN CALCIUM 80 MG: 80 TABLET, FILM COATED ORAL at 08:55

## 2020-06-05 RX ADMIN — TRAMADOL HYDROCHLORIDE 25 MG: 50 TABLET, FILM COATED ORAL at 14:23

## 2020-06-05 RX ADMIN — METFORMIN HYDROCHLORIDE 500 MG: 500 TABLET ORAL at 08:51

## 2020-06-05 RX ADMIN — METHYLPREDNISOLONE SODIUM SUCCINATE 40 MG: 40 INJECTION, POWDER, FOR SOLUTION INTRAMUSCULAR; INTRAVENOUS at 05:34

## 2020-06-05 RX ADMIN — PREGABALIN 75 MG: 25 CAPSULE ORAL at 08:55

## 2020-06-05 ASSESSMENT — PAIN SCALES - GENERAL
PAINLEVEL_OUTOF10: 5
PAINLEVEL_OUTOF10: 3
PAINLEVEL_OUTOF10: 4

## 2020-06-05 NOTE — PLAN OF CARE
Problem: Falls - Risk of:  Goal: Will remain free from falls  Description: Will remain free from falls  6/3/2020 0138 by Peggye Nyhan, RN  Outcome: Ongoing  6/2/2020 1202 by Candelaria Bundy RN  Outcome: Ongoing  Goal: Absence of physical injury  Description: Absence of physical injury  6/3/2020 0138 by Peggye Nyhan, RN  Outcome: Ongoing  6/2/2020 1202 by Candelaria Bundy RN  Outcome: Ongoing     Problem: Pain:  Goal: Pain level will decrease  Description: Pain level will decrease  6/3/2020 0138 by Peggye Nyhan, RN  Outcome: Ongoing  6/2/2020 1202 by Candelaria Bundy RN  Outcome: Ongoing  Goal: Control of acute pain  Description: Control of acute pain  6/3/2020 0138 by Peggye Nyhan, RN  Outcome: Ongoing  6/2/2020 1202 by Candelaria Bundy RN  Outcome: Ongoing  Goal: Control of chronic pain  Description: Control of chronic pain  6/3/2020 0138 by Peggye Nyhan, RN  Outcome: Ongoing  6/2/2020 1202 by Candelaria Bundy RN  Outcome: Ongoing  Goal: Patient's pain/discomfort is manageable  Description: Patient's pain/discomfort is manageable  6/3/2020 0138 by Peggye Nyhan, RN  Outcome: Ongoing  6/2/2020 1202 by Candelaria Bundy RN  Outcome: Ongoing     Problem: Infection:  Goal: Will remain free from infection  Description: Will remain free from infection  6/3/2020 0138 by Peggye Nyhan, RN  Outcome: Ongoing  6/2/2020 1202 by Candelaria Bundy RN  Outcome: Ongoing     Problem: Safety:  Goal: Free from accidental physical injury  Description: Free from accidental physical injury  6/3/2020 0138 by Peggye Nyhan, RN  Outcome: Ongoing  6/2/2020 1202 by Candelaria Bundy RN  Outcome: Ongoing  Goal: Free from intentional harm  Description: Free from intentional harm  6/3/2020 0138 by Peggye Nyhan, RN  Outcome: Ongoing  6/2/2020 1202 by Candelaria Bundy RN  Outcome: Ongoing  Goal: Ability to appropriately use an adaptive device for ambulation will improve  Description: Ability to appropriately use an adaptive device for ambulation will
Problem: Falls - Risk of:  Goal: Will remain free from falls  Description: Will remain free from falls  Outcome: Ongoing  Goal: Absence of physical injury  Description: Absence of physical injury  Outcome: Ongoing     Problem: Pain:  Goal: Pain level will decrease  Description: Pain level will decrease  Outcome: Ongoing  Goal: Control of acute pain  Description: Control of acute pain  Outcome: Ongoing  Goal: Control of chronic pain  Description: Control of chronic pain  Outcome: Ongoing  Goal: Patient's pain/discomfort is manageable  Description: Patient's pain/discomfort is manageable  Outcome: Ongoing     Problem: Infection:  Goal: Will remain free from infection  Description: Will remain free from infection  Outcome: Ongoing     Problem: Safety:  Goal: Free from accidental physical injury  Description: Free from accidental physical injury  Outcome: Ongoing  Goal: Free from intentional harm  Description: Free from intentional harm  Outcome: Ongoing  Goal: Ability to appropriately use an adaptive device for ambulation will improve  Description: Ability to appropriately use an adaptive device for ambulation will improve  Outcome: Ongoing  Goal: Ability to safely and independently change position in bed will improve  Description: Ability to safely and independently change position in bed will improve  Outcome: Ongoing  Goal: Ability to safely transfer will improve  Description: Ability to safely transfer will improve  Outcome: Ongoing     Problem: Daily Care:  Goal: Daily care needs are met  Description: Daily care needs are met  Outcome: Ongoing     Problem: Skin Integrity:  Goal: Skin integrity will stabilize  Description: Skin integrity will stabilize  Outcome: Ongoing     Problem: Discharge Planning:  Goal: Patients continuum of care needs are met  Description: Patients continuum of care needs are met  Outcome: Ongoing     Problem: ABCDS Injury Assessment  Goal: Absence of physical injury  Outcome: Ongoing
breakdown  Description: Absence of new skin breakdown  Outcome: Ongoing     Problem:  Activity:  Goal: Ability to tolerate increased activity will improve  Description: Ability to tolerate increased activity will improve  Outcome: Ongoing  Goal: Ability to ambulate will improve  Description: Ability to ambulate will improve  Outcome: Ongoing  Goal: Muscle strength will improve  Description: Muscle strength will improve  Outcome: Ongoing  Goal: Range of joint motion will improve  Description: Range of joint motion will improve  Outcome: Ongoing     Problem: Cardiac:  Goal: Hemodynamic stability will improve  Description: Hemodynamic stability will improve  Outcome: Ongoing  Goal: Complications related to the disease process, condition or treatment will be avoided or minimized  Description: Complications related to the disease process, condition or treatment will be avoided or minimized  Outcome: Ongoing  Goal: Cerebral tissue perfusion will improve  Description: Cerebral tissue perfusion will improve  Outcome: Ongoing     Problem: Coping:  Goal: Ability to identify and develop effective coping behavior will improve  Description: Ability to identify and develop effective coping behavior will improve  Outcome: Ongoing     Problem: Health Behavior:  Goal: Identification of resources available to assist in meeting health care needs will improve  Description: Identification of resources available to assist in meeting health care needs will improve  Outcome: Ongoing

## 2020-06-10 ENCOUNTER — HOSPITAL ENCOUNTER (OUTPATIENT)
Age: 75
Setting detail: OBSERVATION
Discharge: HOME HEALTH | End: 2020-06-12
Attending: PEDIATRICS | Admitting: FAMILY MEDICINE
Payer: MEDICARE

## 2020-06-10 ENCOUNTER — APPOINTMENT (OUTPATIENT)
Dept: CT IMAGING | Age: 75
End: 2020-06-10
Payer: MEDICARE

## 2020-06-10 PROBLEM — R41.82 ALTERED MENTAL STATUS: Status: ACTIVE | Noted: 2020-06-10

## 2020-06-10 LAB
ACETAMINOPHEN LEVEL: <15 UG/ML
ALBUMIN SERPL-MCNC: 3.1 G/DL (ref 3.5–5.2)
ALP BLD-CCNC: 42 U/L (ref 35–104)
ALT SERPL-CCNC: 25 U/L (ref 5–33)
AMPHETAMINE SCREEN, URINE: NEGATIVE
ANION GAP SERPL CALCULATED.3IONS-SCNC: 9 MMOL/L (ref 7–19)
AST SERPL-CCNC: 36 U/L (ref 5–32)
BARBITURATE SCREEN URINE: NEGATIVE
BASOPHILS ABSOLUTE: 0 K/UL (ref 0–0.2)
BASOPHILS RELATIVE PERCENT: 0.2 % (ref 0–1)
BENZODIAZEPINE SCREEN, URINE: NEGATIVE
BILIRUB SERPL-MCNC: 0.4 MG/DL (ref 0.2–1.2)
BILIRUBIN URINE: NEGATIVE
BLOOD, URINE: NEGATIVE
BUN BLDV-MCNC: 16 MG/DL (ref 8–23)
CALCIUM SERPL-MCNC: 9.2 MG/DL (ref 8.8–10.2)
CANNABINOID SCREEN URINE: NEGATIVE
CHLORIDE BLD-SCNC: 100 MMOL/L (ref 98–111)
CLARITY: CLEAR
CO2: 22 MMOL/L (ref 22–29)
COCAINE METABOLITE SCREEN URINE: NEGATIVE
COLOR: YELLOW
CREAT SERPL-MCNC: 0.9 MG/DL (ref 0.5–0.9)
EOSINOPHILS ABSOLUTE: 0.2 K/UL (ref 0–0.6)
EOSINOPHILS RELATIVE PERCENT: 2.3 % (ref 0–5)
ETHANOL: <10 MG/DL (ref 0–0.08)
GFR NON-AFRICAN AMERICAN: >60
GLUCOSE BLD-MCNC: 84 MG/DL (ref 74–109)
GLUCOSE BLD-MCNC: 87 MG/DL (ref 70–99)
GLUCOSE URINE: NEGATIVE MG/DL
HCT VFR BLD CALC: 34.8 % (ref 37–47)
HEMOGLOBIN: 11.1 G/DL (ref 12–16)
IMMATURE GRANULOCYTES #: 0.1 K/UL
KETONES, URINE: NEGATIVE MG/DL
LEUKOCYTE ESTERASE, URINE: NEGATIVE
LYMPHOCYTES ABSOLUTE: 1.1 K/UL (ref 1.1–4.5)
LYMPHOCYTES RELATIVE PERCENT: 10.6 % (ref 20–40)
Lab: NORMAL
MCH RBC QN AUTO: 29.7 PG (ref 27–31)
MCHC RBC AUTO-ENTMCNC: 31.9 G/DL (ref 33–37)
MCV RBC AUTO: 93 FL (ref 81–99)
MONOCYTES ABSOLUTE: 1.2 K/UL (ref 0–0.9)
MONOCYTES RELATIVE PERCENT: 12.1 % (ref 0–10)
NEUTROPHILS ABSOLUTE: 7.4 K/UL (ref 1.5–7.5)
NEUTROPHILS RELATIVE PERCENT: 74.1 % (ref 50–65)
NITRITE, URINE: NEGATIVE
OPIATE SCREEN URINE: NEGATIVE
PDW BLD-RTO: 13.9 % (ref 11.5–14.5)
PERFORMED ON: NORMAL
PH UA: 6.5 (ref 5–8)
PLATELET # BLD: 174 K/UL (ref 130–400)
PMV BLD AUTO: 9.8 FL (ref 9.4–12.3)
POTASSIUM SERPL-SCNC: 4.8 MMOL/L (ref 3.5–5)
PROTEIN UA: NEGATIVE MG/DL
RBC # BLD: 3.74 M/UL (ref 4.2–5.4)
SALICYLATE, SERUM: <3 MG/DL (ref 3–10)
SODIUM BLD-SCNC: 131 MMOL/L (ref 136–145)
SPECIFIC GRAVITY UA: 1.01 (ref 1–1.03)
TOTAL PROTEIN: 5.7 G/DL (ref 6.6–8.7)
UROBILINOGEN, URINE: 0.2 E.U./DL
WBC # BLD: 9.9 K/UL (ref 4.8–10.8)

## 2020-06-10 PROCEDURE — G0378 HOSPITAL OBSERVATION PER HR: HCPCS

## 2020-06-10 PROCEDURE — 36415 COLL VENOUS BLD VENIPUNCTURE: CPT

## 2020-06-10 PROCEDURE — G0480 DRUG TEST DEF 1-7 CLASSES: HCPCS

## 2020-06-10 PROCEDURE — 82947 ASSAY GLUCOSE BLOOD QUANT: CPT

## 2020-06-10 PROCEDURE — 80307 DRUG TEST PRSMV CHEM ANLYZR: CPT

## 2020-06-10 PROCEDURE — 80053 COMPREHEN METABOLIC PANEL: CPT

## 2020-06-10 PROCEDURE — 70450 CT HEAD/BRAIN W/O DYE: CPT

## 2020-06-10 PROCEDURE — 81003 URINALYSIS AUTO W/O SCOPE: CPT

## 2020-06-10 PROCEDURE — 85025 COMPLETE CBC W/AUTO DIFF WBC: CPT

## 2020-06-10 PROCEDURE — 99285 EMERGENCY DEPT VISIT HI MDM: CPT

## 2020-06-10 PROCEDURE — 93005 ELECTROCARDIOGRAM TRACING: CPT

## 2020-06-10 RX ORDER — METOPROLOL SUCCINATE 25 MG/1
50 TABLET, EXTENDED RELEASE ORAL DAILY
Status: ON HOLD | COMMUNITY
End: 2020-11-11 | Stop reason: HOSPADM

## 2020-06-10 RX ORDER — SODIUM CHLORIDE 0.9 % (FLUSH) 0.9 %
10 SYRINGE (ML) INJECTION PRN
Status: DISCONTINUED | OUTPATIENT
Start: 2020-06-10 | End: 2020-06-12 | Stop reason: HOSPADM

## 2020-06-10 RX ORDER — SODIUM CHLORIDE 0.9 % (FLUSH) 0.9 %
10 SYRINGE (ML) INJECTION EVERY 12 HOURS SCHEDULED
Status: DISCONTINUED | OUTPATIENT
Start: 2020-06-10 | End: 2020-06-12 | Stop reason: HOSPADM

## 2020-06-10 RX ORDER — ACETAMINOPHEN 325 MG/1
650 TABLET ORAL EVERY 4 HOURS PRN
Status: DISCONTINUED | OUTPATIENT
Start: 2020-06-10 | End: 2020-06-12 | Stop reason: HOSPADM

## 2020-06-10 RX ORDER — DEXTROSE, SODIUM CHLORIDE, AND POTASSIUM CHLORIDE 5; .45; .15 G/100ML; G/100ML; G/100ML
INJECTION INTRAVENOUS CONTINUOUS
Status: DISCONTINUED | OUTPATIENT
Start: 2020-06-10 | End: 2020-06-12 | Stop reason: HOSPADM

## 2020-06-11 PROBLEM — R41.82 AMS (ALTERED MENTAL STATUS): Status: ACTIVE | Noted: 2020-06-11

## 2020-06-11 LAB
GLUCOSE BLD-MCNC: 121 MG/DL (ref 70–99)
PERFORMED ON: ABNORMAL

## 2020-06-11 PROCEDURE — 2500000003 HC RX 250 WO HCPCS: Performed by: FAMILY MEDICINE

## 2020-06-11 PROCEDURE — G0378 HOSPITAL OBSERVATION PER HR: HCPCS

## 2020-06-11 PROCEDURE — 2580000003 HC RX 258: Performed by: FAMILY MEDICINE

## 2020-06-11 PROCEDURE — 6370000000 HC RX 637 (ALT 250 FOR IP): Performed by: FAMILY MEDICINE

## 2020-06-11 PROCEDURE — 96372 THER/PROPH/DIAG INJ SC/IM: CPT

## 2020-06-11 PROCEDURE — 96374 THER/PROPH/DIAG INJ IV PUSH: CPT

## 2020-06-11 PROCEDURE — 82947 ASSAY GLUCOSE BLOOD QUANT: CPT

## 2020-06-11 PROCEDURE — 99219 PR INITIAL OBSERVATION CARE/DAY 50 MINUTES: CPT | Performed by: PSYCHIATRY & NEUROLOGY

## 2020-06-11 PROCEDURE — 6360000002 HC RX W HCPCS: Performed by: FAMILY MEDICINE

## 2020-06-11 RX ORDER — ALBUTEROL SULFATE 2.5 MG/3ML
2.5 SOLUTION RESPIRATORY (INHALATION) EVERY 6 HOURS PRN
Status: DISCONTINUED | OUTPATIENT
Start: 2020-06-11 | End: 2020-06-12 | Stop reason: HOSPADM

## 2020-06-11 RX ORDER — ATORVASTATIN CALCIUM 80 MG/1
80 TABLET, FILM COATED ORAL NIGHTLY
Status: DISCONTINUED | OUTPATIENT
Start: 2020-06-11 | End: 2020-06-12 | Stop reason: HOSPADM

## 2020-06-11 RX ORDER — ALPRAZOLAM 0.25 MG/1
0.25 TABLET ORAL DAILY PRN
Status: DISCONTINUED | OUTPATIENT
Start: 2020-06-11 | End: 2020-06-12 | Stop reason: HOSPADM

## 2020-06-11 RX ORDER — LISINOPRIL 20 MG/1
20 TABLET ORAL DAILY
Status: DISCONTINUED | OUTPATIENT
Start: 2020-06-11 | End: 2020-06-12 | Stop reason: HOSPADM

## 2020-06-11 RX ORDER — PROMETHAZINE HYDROCHLORIDE 25 MG/1
25 TABLET ORAL EVERY 6 HOURS PRN
Status: DISCONTINUED | OUTPATIENT
Start: 2020-06-11 | End: 2020-06-12 | Stop reason: HOSPADM

## 2020-06-11 RX ORDER — CYCLOBENZAPRINE HCL 10 MG
10 TABLET ORAL 2 TIMES DAILY PRN
Status: DISCONTINUED | OUTPATIENT
Start: 2020-06-11 | End: 2020-06-12 | Stop reason: HOSPADM

## 2020-06-11 RX ORDER — AMITRIPTYLINE HYDROCHLORIDE 25 MG/1
25 TABLET, FILM COATED ORAL NIGHTLY
Status: DISCONTINUED | OUTPATIENT
Start: 2020-06-11 | End: 2020-06-12 | Stop reason: HOSPADM

## 2020-06-11 RX ORDER — ISOSORBIDE MONONITRATE 60 MG/1
60 TABLET, EXTENDED RELEASE ORAL 2 TIMES DAILY
Status: DISCONTINUED | OUTPATIENT
Start: 2020-06-11 | End: 2020-06-12 | Stop reason: HOSPADM

## 2020-06-11 RX ORDER — ALBUTEROL SULFATE 90 UG/1
2 AEROSOL, METERED RESPIRATORY (INHALATION) EVERY 6 HOURS PRN
Status: DISCONTINUED | OUTPATIENT
Start: 2020-06-11 | End: 2020-06-11 | Stop reason: CLARIF

## 2020-06-11 RX ORDER — CLOPIDOGREL BISULFATE 75 MG/1
75 TABLET ORAL DAILY
Status: DISCONTINUED | OUTPATIENT
Start: 2020-06-11 | End: 2020-06-12 | Stop reason: HOSPADM

## 2020-06-11 RX ORDER — ASPIRIN 81 MG/1
81 TABLET ORAL DAILY
Status: DISCONTINUED | OUTPATIENT
Start: 2020-06-11 | End: 2020-06-12 | Stop reason: HOSPADM

## 2020-06-11 RX ORDER — RANOLAZINE 500 MG/1
1000 TABLET, EXTENDED RELEASE ORAL 2 TIMES DAILY
Status: DISCONTINUED | OUTPATIENT
Start: 2020-06-11 | End: 2020-06-12 | Stop reason: HOSPADM

## 2020-06-11 RX ORDER — ZOLPIDEM TARTRATE 5 MG/1
5 TABLET ORAL NIGHTLY
Status: DISCONTINUED | OUTPATIENT
Start: 2020-06-11 | End: 2020-06-12 | Stop reason: HOSPADM

## 2020-06-11 RX ORDER — HYDROMORPHONE HYDROCHLORIDE 1 MG/ML
0.5 INJECTION, SOLUTION INTRAMUSCULAR; INTRAVENOUS; SUBCUTANEOUS EVERY 6 HOURS PRN
Status: DISCONTINUED | OUTPATIENT
Start: 2020-06-11 | End: 2020-06-12 | Stop reason: HOSPADM

## 2020-06-11 RX ORDER — POLYETHYLENE GLYCOL 3350 17 G/17G
17 POWDER, FOR SOLUTION ORAL DAILY
Status: DISCONTINUED | OUTPATIENT
Start: 2020-06-11 | End: 2020-06-12 | Stop reason: HOSPADM

## 2020-06-11 RX ORDER — CHOLECALCIFEROL (VITAMIN D3) 125 MCG
1000 CAPSULE ORAL DAILY
Status: DISCONTINUED | OUTPATIENT
Start: 2020-06-11 | End: 2020-06-12 | Stop reason: HOSPADM

## 2020-06-11 RX ORDER — TRAMADOL HYDROCHLORIDE 50 MG/1
50 TABLET ORAL EVERY 6 HOURS PRN
Status: DISCONTINUED | OUTPATIENT
Start: 2020-06-11 | End: 2020-06-12 | Stop reason: HOSPADM

## 2020-06-11 RX ORDER — METOPROLOL SUCCINATE 25 MG/1
25 TABLET, EXTENDED RELEASE ORAL NIGHTLY
Status: DISCONTINUED | OUTPATIENT
Start: 2020-06-11 | End: 2020-06-12 | Stop reason: HOSPADM

## 2020-06-11 RX ORDER — TRAZODONE HYDROCHLORIDE 50 MG/1
50 TABLET ORAL NIGHTLY
Status: DISCONTINUED | OUTPATIENT
Start: 2020-06-11 | End: 2020-06-12 | Stop reason: HOSPADM

## 2020-06-11 RX ORDER — NITROGLYCERIN 0.4 MG/1
0.4 TABLET SUBLINGUAL EVERY 5 MIN PRN
Status: DISCONTINUED | OUTPATIENT
Start: 2020-06-11 | End: 2020-06-12 | Stop reason: HOSPADM

## 2020-06-11 RX ADMIN — RANOLAZINE 1000 MG: 500 TABLET, FILM COATED, EXTENDED RELEASE ORAL at 21:02

## 2020-06-11 RX ADMIN — ASPIRIN 81 MG: 81 TABLET, COATED ORAL at 10:52

## 2020-06-11 RX ADMIN — SODIUM CHLORIDE, PRESERVATIVE FREE 10 ML: 5 INJECTION INTRAVENOUS at 08:35

## 2020-06-11 RX ADMIN — ISOSORBIDE MONONITRATE 60 MG: 60 TABLET, EXTENDED RELEASE ORAL at 21:03

## 2020-06-11 RX ADMIN — PROMETHAZINE HYDROCHLORIDE 25 MG: 25 TABLET ORAL at 11:00

## 2020-06-11 RX ADMIN — ISOSORBIDE MONONITRATE 60 MG: 60 TABLET, EXTENDED RELEASE ORAL at 10:52

## 2020-06-11 RX ADMIN — LISINOPRIL 20 MG: 20 TABLET ORAL at 10:52

## 2020-06-11 RX ADMIN — CYANOCOBALAMIN TAB 500 MCG 1000 MCG: 500 TAB at 10:52

## 2020-06-11 RX ADMIN — TRAMADOL HYDROCHLORIDE 50 MG: 50 TABLET, FILM COATED ORAL at 21:03

## 2020-06-11 RX ADMIN — PROMETHAZINE HYDROCHLORIDE 25 MG: 25 TABLET ORAL at 21:03

## 2020-06-11 RX ADMIN — AMITRIPTYLINE HYDROCHLORIDE 25 MG: 25 TABLET, FILM COATED ORAL at 21:03

## 2020-06-11 RX ADMIN — DEXTROSE MONOHYDRATE, SODIUM CHLORIDE, AND POTASSIUM CHLORIDE: 50; 4.5; 1.49 INJECTION, SOLUTION INTRAVENOUS at 00:13

## 2020-06-11 RX ADMIN — ATORVASTATIN CALCIUM 80 MG: 80 TABLET, FILM COATED ORAL at 21:03

## 2020-06-11 RX ADMIN — DEXTROSE MONOHYDRATE, SODIUM CHLORIDE, AND POTASSIUM CHLORIDE: 50; 4.5; 1.49 INJECTION, SOLUTION INTRAVENOUS at 10:52

## 2020-06-11 RX ADMIN — RANOLAZINE 1000 MG: 500 TABLET, FILM COATED, EXTENDED RELEASE ORAL at 10:52

## 2020-06-11 RX ADMIN — TRAZODONE HYDROCHLORIDE 50 MG: 50 TABLET ORAL at 21:03

## 2020-06-11 RX ADMIN — METOPROLOL SUCCINATE 25 MG: 25 TABLET, EXTENDED RELEASE ORAL at 21:03

## 2020-06-11 RX ADMIN — ZOLPIDEM TARTRATE 5 MG: 5 TABLET ORAL at 21:03

## 2020-06-11 RX ADMIN — CLOPIDOGREL BISULFATE 75 MG: 75 TABLET ORAL at 10:52

## 2020-06-11 RX ADMIN — ENOXAPARIN SODIUM 40 MG: 40 INJECTION SUBCUTANEOUS at 08:35

## 2020-06-11 RX ADMIN — SODIUM CHLORIDE, PRESERVATIVE FREE 10 ML: 5 INJECTION INTRAVENOUS at 00:13

## 2020-06-11 RX ADMIN — METFORMIN HYDROCHLORIDE 500 MG: 500 TABLET, FILM COATED ORAL at 21:03

## 2020-06-11 RX ADMIN — METFORMIN HYDROCHLORIDE 500 MG: 500 TABLET, FILM COATED ORAL at 10:52

## 2020-06-11 RX ADMIN — HYDROMORPHONE HYDROCHLORIDE 0.5 MG: 1 INJECTION, SOLUTION INTRAMUSCULAR; INTRAVENOUS; SUBCUTANEOUS at 11:01

## 2020-06-11 ASSESSMENT — ENCOUNTER SYMPTOMS
COLOR CHANGE: 0
RHINORRHEA: 0
DIARRHEA: 0
VOMITING: 0
SHORTNESS OF BREATH: 0
COUGH: 0

## 2020-06-11 ASSESSMENT — PAIN SCALES - GENERAL
PAINLEVEL_OUTOF10: 7
PAINLEVEL_OUTOF10: 7

## 2020-06-11 NOTE — ED PROVIDER NOTES
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/2013    PACABG X 3, LIMA-LAD, SVG0OM, SVG-DIAG, RT EVH, DR PATEL         SURGICAL HISTORY       Past Surgical History:   Procedure Laterality Date    APPENDECTOMY  1965    BACK SURGERY      CARDIAC CATHETERIZATION  5/18/11    EF 60%    CARDIAC CATHETERIZATION  1/25/16    drug eluting stent to RCA   Dean Ball  1/25/2016    CARDIAC CATHETERIZATION  2/19/16    CARDIAC CATHETERIZATION  08/24/2017    cutting balloon angioplasty ot LAD    CARDIAC CATHETERIZATION  04/13/2020    TWO Stents placed    COLON SURGERY  1992    Polypectomy - 2    COLON SURGERY  2003    Polypectomy - 4    COLON SURGERY  11/2008    Polypectomy - 2    COLON SURGERY  8/2012    Polypectomy - 3    COLONOSCOPY      CORONARY ANGIOPLASTY  08/2017    cutting balloon angioplasty LAD diagonal    CORONARY ANGIOPLASTY WITH STENT PLACEMENT  3/22/2007    CORONARY ANGIOPLASTY WITH STENT PLACEMENT  8/2007    CORONARY ARTERY BYPASS GRAFT  3/5/2013    PACABG X 3, LIMA-LAD, SVG0OM, SVG-DIAG, RT EVH, DR PATEL    COSMETIC SURGERY      deviated septum and rhinoplasty    CYST REMOVAL  1970    DIAGNOSTIC CARDIAC CATH LAB PROCEDURE      ENDOSCOPY, COLON, DIAGNOSTIC      EYE SURGERY  03/2019    cataracts    [address]    Partial    NASAL [address] SINUS SURGERY  2003    Nasal Polyps Removed    SPINE SURGERY  3/6/2006    Lumbar Laminectomy L4&5    SPINE SURGERY  3/6/2006    Cervical Fusion - C4,5,6   [facility] CURRENT MEDICATIONS     Current Discharge Medication List      CONTINUE these medications which have NOT CHANGED    Details   metoprolol succinate (TOPROL XL) 25 MG extended release tablet Take 25 mg by mouth nightly      pregabalin (LYRICA) 75 MG capsule Take 1 capsule by mouth 2 times daily for 30 days. Qty: 60 capsule, Refills: 0    Associated Diagnoses: Acute exacerbation of chronic low back pain      traMADol (ULTRAM ER) 100 MG extended release tablet Take 1 tablet by mouth daily for 30 days. Qty: 30 tablet, Refills: 0    Comments: Reduce doses taken as pain becomes manageable  Associated Diagnoses: Acute exacerbation of chronic low back pain      traMADol (ULTRAM) 50 MG tablet Take 50 mg by mouth every 8 hours as needed. metFORMIN (GLUCOPHAGE) 500 MG tablet Take 1 tablet by mouth 2 times daily Please hold Metformin for 48 hours after cardiac catheterization.  May resume on 4/15/20  Qty: 180 tablet, Refills: 3      clopidogrel (PLAVIX) 75 MG tablet Take 1 tablet by mouth daily  Qty: 90 tablet, Refills: 3      amitriptyline (ELAVIL) 25 MG tablet Take 25 mg by mouth nightly      isosorbide mononitrate (IMDUR) 60 MG extended release tablet Take 1 tablet by mouth 2 times daily  Qty: 180 tablet, Refills: 3    Associated Diagnoses: Coronary artery disease involving native coronary artery of native heart with angina pectoris (HCC)      atorvastatin (LIPITOR) 80 MG tablet Take 1 tablet by mouth daily  Qty: 90 tablet, Refills: 0    Comments: Patient must have labs before any further refills  Associated Diagnoses: Hyperlipidemia, unspecified hyperlipidemia type      lisinopril (PRINIVIL;ZESTRIL) 10 MG tablet Take 1 tablet by mouth 2 times daily  Qty: 30 tablet, Refills: 5      ranolazine (RANEXA) 1000 MG extended release tablet Take 1 tablet by mouth 2 times daily  Qty: 180 tablet, Refills: 3      Cyanocobalamin (VITAMIN B 12 PO) Take 1,000 mcg by mouth daily      traZODone (DESYREL) 50 MG tablet Take 50 MDM  79-year-old female presents with altered mental status, tremor, and difficulty speaking. Patient has recently started Lyrica. Lab, EKG, and radiology results reviewed. Discussed with Dr. Yulia Steel, patient's primary care physician, who will admit patient for further evaluation and treatment. CONSULTS:  None    PROCEDURES:  Unless otherwise noted below, none     Procedures    FINAL IMPRESSION      1.  Altered mental status, unspecified altered mental status type          DISPOSITION/PLAN   DISPOSITION Admitted 06/10/2020 11:03:08 PM      PATIENT REFERRED TO:  Rashaad Strange MD  [phone]. [phone]            DISCHARGE MEDICATIONS:  Current Discharge Medication List             (Please note that portions of this note were completed with a voice recognition program.  Efforts were made to edit thedictations but occasionally words are mis-transcribed.)    Deepa Jackson MD (electronically signed)  Attending Emergency Physician          Deepa Jackson MD  06/11/20 0020

## 2020-06-11 NOTE — PLAN OF CARE
Problem: Falls - Risk of:  Goal: Will remain free from falls  Description: Will remain free from falls  Outcome: Ongoing  Goal: Absence of physical injury  Description: Absence of physical injury  Outcome: Ongoing     Problem: Coping:  Goal: Ability to remain calm will improve  Description: Ability to remain calm will improve  Outcome: Ongoing

## 2020-06-11 NOTE — CONSULTS
acetaminophen (TYLENOL) tablet 650 mg, 650 mg, Oral, Q4H PRN, Jesus Garcia MD    enoxaparin (LOVENOX) injection 40 mg, 40 mg, Subcutaneous, Daily, Jesus Garcia MD, 40 mg at 06/11/20 0835    dextrose 5 % and 0.45 % NaCl with KCl 20 mEq infusion, , Intravenous, Continuous, Jesus Garcia MD, Last Rate: 100 mL/hr at 06/11/20 0013  Allergies: Other; Codeine; Darvocet [propoxyphene n-acetaminophen]; Hydrocodone-acetaminophen; Morphine; and Percocet [oxycodone-acetaminophen]  Social History:   TOBACCO:  reports that she quit smoking about 53 years ago. Her smoking use included cigarettes. She has a 1.00 pack-year smoking history. She has never used smokeless tobacco.  ETOH:  reports no history of alcohol use. Family History:       Problem Relation Age of Onset    Heart Disease Mother     High Blood Pressure Mother     Diabetes Mother     High Cholesterol Mother     Diabetes Brother      ? ? Physical Exam:    Vitals: BP (!) 149/66   Pulse 61   Temp 96.8 °F (36 °C) (Temporal)   Resp 16   Ht 5' 5\" (1.651 m)   Wt 151 lb 14.4 oz (68.9 kg)   SpO2 95%   BMI 25.28 kg/m²   Constitutional - well developed, well nourished. Eyes - conjunctiva normal. Pupils react to light  Ear, nose, throat -hearing intact to finger rub No scars, masses, or lesions over external nose or ears, no atrophy of tongue  Neck-symmetric, no masses noted, no jugular vein distension  Respiration- chest wall appears symmetric, good expansion,   normal effort without use of accessory muscles  Musculoskeletal - no significant wasting of muscles noted, no bony deformities  Extremities-no clubbing, cyanosis or edema  Skin - warm, dry, and intact. No rash, erythema, or pallor.   Psychiatric - mood, affect, and behavior appear normal.   Neurological exam  Awake, alert, fluent oriented x 3 appropriate affect  Attention and concentration appear appropriate  Recent and remote memory appears unremarkable  Speech normal without dysarthria  No

## 2020-06-12 VITALS
DIASTOLIC BLOOD PRESSURE: 69 MMHG | WEIGHT: 151.9 LBS | HEIGHT: 65 IN | OXYGEN SATURATION: 96 % | TEMPERATURE: 98.6 F | BODY MASS INDEX: 25.31 KG/M2 | HEART RATE: 64 BPM | RESPIRATION RATE: 16 BRPM | SYSTOLIC BLOOD PRESSURE: 152 MMHG

## 2020-06-12 LAB
ANION GAP SERPL CALCULATED.3IONS-SCNC: 10 MMOL/L (ref 7–19)
BILIRUBIN URINE: NEGATIVE
BLOOD, URINE: ABNORMAL
BUN BLDV-MCNC: 8 MG/DL (ref 8–23)
CALCIUM SERPL-MCNC: 8.8 MG/DL (ref 8.8–10.2)
CHLORIDE BLD-SCNC: 107 MMOL/L (ref 98–111)
CLARITY: CLEAR
CO2: 23 MMOL/L (ref 22–29)
COLOR: YELLOW
CREAT SERPL-MCNC: 0.6 MG/DL (ref 0.5–0.9)
GFR NON-AFRICAN AMERICAN: >60
GLUCOSE BLD-MCNC: 93 MG/DL (ref 74–109)
GLUCOSE URINE: NEGATIVE MG/DL
KETONES, URINE: NEGATIVE MG/DL
LEUKOCYTE ESTERASE, URINE: NEGATIVE
NITRITE, URINE: NEGATIVE
PH UA: 8.5 (ref 5–8)
POTASSIUM SERPL-SCNC: 4.2 MMOL/L (ref 3.5–5)
PROTEIN UA: 100 MG/DL
SODIUM BLD-SCNC: 140 MMOL/L (ref 136–145)
SPECIFIC GRAVITY UA: 1.02 (ref 1–1.03)
UROBILINOGEN, URINE: 0.2 E.U./DL

## 2020-06-12 PROCEDURE — G0378 HOSPITAL OBSERVATION PER HR: HCPCS

## 2020-06-12 PROCEDURE — 96372 THER/PROPH/DIAG INJ SC/IM: CPT

## 2020-06-12 PROCEDURE — 6360000002 HC RX W HCPCS: Performed by: FAMILY MEDICINE

## 2020-06-12 PROCEDURE — 36415 COLL VENOUS BLD VENIPUNCTURE: CPT

## 2020-06-12 PROCEDURE — 81003 URINALYSIS AUTO W/O SCOPE: CPT

## 2020-06-12 PROCEDURE — 80048 BASIC METABOLIC PNL TOTAL CA: CPT

## 2020-06-12 PROCEDURE — 99225 PR SBSQ OBSERVATION CARE/DAY 25 MINUTES: CPT | Performed by: PSYCHIATRY & NEUROLOGY

## 2020-06-12 PROCEDURE — 6370000000 HC RX 637 (ALT 250 FOR IP): Performed by: FAMILY MEDICINE

## 2020-06-12 RX ORDER — TRAMADOL HYDROCHLORIDE 50 MG/1
50 TABLET ORAL EVERY 6 HOURS PRN
Qty: 1 TABLET | Refills: 0
Start: 2020-06-12 | End: 2020-06-13

## 2020-06-12 RX ORDER — METHYLPREDNISOLONE 4 MG/1
TABLET ORAL
Qty: 1 KIT | Refills: 0
Start: 2020-06-12 | End: 2020-06-18

## 2020-06-12 RX ADMIN — METFORMIN HYDROCHLORIDE 500 MG: 500 TABLET, FILM COATED ORAL at 08:44

## 2020-06-12 RX ADMIN — LISINOPRIL 20 MG: 20 TABLET ORAL at 08:45

## 2020-06-12 RX ADMIN — TRAMADOL HYDROCHLORIDE 50 MG: 50 TABLET, FILM COATED ORAL at 14:16

## 2020-06-12 RX ADMIN — POLYETHYLENE GLYCOL 3350 17 G: 17 POWDER, FOR SOLUTION ORAL at 08:46

## 2020-06-12 RX ADMIN — CYANOCOBALAMIN TAB 500 MCG 1000 MCG: 500 TAB at 08:45

## 2020-06-12 RX ADMIN — RANOLAZINE 1000 MG: 500 TABLET, FILM COATED, EXTENDED RELEASE ORAL at 08:45

## 2020-06-12 RX ADMIN — CLOPIDOGREL BISULFATE 75 MG: 75 TABLET ORAL at 08:45

## 2020-06-12 RX ADMIN — ISOSORBIDE MONONITRATE 60 MG: 60 TABLET, EXTENDED RELEASE ORAL at 08:45

## 2020-06-12 RX ADMIN — ENOXAPARIN SODIUM 40 MG: 40 INJECTION SUBCUTANEOUS at 08:45

## 2020-06-12 RX ADMIN — ASPIRIN 81 MG: 81 TABLET, COATED ORAL at 08:45

## 2020-06-12 ASSESSMENT — PAIN SCALES - GENERAL: PAINLEVEL_OUTOF10: 10

## 2020-06-12 NOTE — PROGRESS NOTES
normal effort without use of accessory muscles  Cardiovascular- RRR  Musculoskeletal - no significant wasting of muscles noted, no bony deformities, gait no gross ataxia  Extremities-no clubbing, cyanosis or edema  Skin - warm, dry, and intact. No rash, erythema, or pallor. Psychiatric - mood, affect, and behavior appear normal.      Neurology  NEUROLOGICAL EXAM:      Mental status   Awake, alert, fluent oriented x 3 appropriate affect  Attention and concentration appear appropriate  Recent and remote memory appears unremarkable  Speech normal without dysarthria  No clear issues with language       Cranial Nerves   CN II- Visual fields grossly unremarkable  CN III, IV,VI-EOMI, No nystagmus, conjugate eye movements, no ptosis  CN VII-no facial asymmetry  CN VIII-Hearing intact   CN IX and X- Palate elevates in midline  CN XI-good shoulder shrug  CN XII-Tongue midline with no fasciculations or fibrillations          Motor function  Antigravity x 4     Sensory function Intact to light touch     Cerebellar F-N intact     Tremor None present     Gait                  Not tested           Nursing/pcp notes, imaging,labs and vitals reviewed.      PT,OT and/or speech notes reviewed    Lab Results   Component Value Date    WBC 9.9 06/10/2020    HGB 11.1 (L) 06/10/2020    HCT 34.8 (L) 06/10/2020    MCV 93.0 06/10/2020     06/10/2020     Lab Results   Component Value Date     06/12/2020    K 4.2 06/12/2020     06/12/2020    CO2 23 06/12/2020    BUN 8 06/12/2020    CREATININE 0.6 06/12/2020    GLUCOSE 93 06/12/2020    CALCIUM 8.8 06/12/2020    PROT 5.7 (L) 06/10/2020    LABALBU 3.1 (L) 06/10/2020    BILITOT 0.4 06/10/2020    ALKPHOS 42 06/10/2020    AST 36 (H) 06/10/2020    ALT 25 06/10/2020    LABGLOM >60 06/12/2020    GLOB 1.8 11/19/2016     Lab Results   Component Value Date    INR 0.98 01/10/2019    INR 0.98 08/29/2018    INR 1.09 08/24/2017     Lab Results   Component Value Date    CRP 0.05 05/18/2011 RECORD REVIEW: Previous medical records, medications were reviewed at today's visit    IMPRESSION:  Encephalopathy with myoclonus. Suspect this was due to the combination of Lyrica and tramadol. Would avoid both of these drugs or at least not give them together. Would avoid Lyrica altogether. Patient really wants to get back on tramadol for pain and I have consented to that this morning. She would appear to be back to baseline  and has a normal mental status and no abnormal movements. I see no reason for further neurological work-up at this time. Discharge home when okay with others. I remain available as needed.

## 2020-06-12 NOTE — PROGRESS NOTES
Patient voices she wishes to go home today. Reports having some burning on urination but that she had not reported this to Dr Alannah Hester on rounds. Call placed to Dr Martin Calderon office. Left message for nurse. Iv removed. discharge instructions and medications discussed with patient and daughter. Voiced understanding.  Awaiting return call from Dr Martin Calderon office prior to patient leaving

## 2020-06-12 NOTE — H&P
History and Physical      CHIEF COMPLAINT:  AMS, Tremor    Reason for Admission: AMS, Tremor    History Obtained From:  Patient, chart    HISTORY OF PRESENT ILLNESS:      The patient is a 76 y.o. female who came to ER with  AMS, tremor. She was recently admitted to [facility] with back pain, and started on Lyrica for chronic pain. She denies any CP or SOA. No abdominal pain or N/V. She has no focal weakness. No issues with swallowing or speech.       Past Medical History:        Diagnosis Date    Asthma     CAD (coronary artery disease)     Chest pain     Depression with anxiety     [facility] mellitus ([facility] [address]     GERD (gastroesophageal reflux disease)     Glucose intolerance (impaired glucose tolerance)     Hyperlipidemia     Cholesterol management per [place] Hypertension     Lupus (systemic lupus erythematosus) ([facility] [address]     Malaise and fatigue 10/6/2017    Movement disorder     Sees pain management for neck pain and previous surgery    Osteoarthritis     S/P CABG x 3 03/05/2013    PACABG X 3, LIMA-LAD, SVG0OM, SVG-DIAG, RT EVH, DR Vandana Garcia     Past Surgical History:        Procedure Laterality Date    APPENDECTOMY  1965    BACK SURGERY      CARDIAC CATHETERIZATION  5/18/11    EF 60%    CARDIAC CATHETERIZATION  1/25/16    drug eluting stent to RCA   [address]  1/25/2016    CARDIAC CATHETERIZATION  2/19/16    CARDIAC CATHETERIZATION  08/24/2017    cutting balloon angioplasty ot LAD    CARDIAC CATHETERIZATION  04/13/2020    TWO Stents placed    COLON SURGERY  1992    Polypectomy - 2    COLON SURGERY  2003    Polypectomy - 4    COLON SURGERY  11/2008    Polypectomy - 2    COLON SURGERY  8/2012    Polypectomy - 3    COLONOSCOPY      CORONARY ANGIOPLASTY  08/2017    cutting balloon angioplasty LAD diagonal    CORONARY ANGIOPLASTY WITH STENT PLACEMENT  3/22/2007    CORONARY ANGIOPLASTY WITH STENT PLACEMENT  8/2007    CORONARY ARTERY BYPASS GRAFT  3/5/2013    PACABG X 3,

## 2020-06-12 NOTE — PROGRESS NOTES
Patient tearful reports pain left hip is a 10 tramadol given patient resting ing in bed at this time still waiting for a return call from Dr Emmanuelle Rubio office

## 2020-06-16 LAB
EKG P AXIS: 58 DEGREES
EKG P-R INTERVAL: 170 MS
EKG Q-T INTERVAL: 434 MS
EKG QRS DURATION: 100 MS
EKG QTC CALCULATION (BAZETT): 439 MS
EKG T AXIS: 135 DEGREES

## 2020-06-17 LAB — HBA1C MFR BLD: 5.5 % (ref 4–6)

## 2020-07-01 RX ORDER — RANOLAZINE 1000 MG/1
TABLET, EXTENDED RELEASE ORAL
Qty: 180 TABLET | Refills: 3 | Status: SHIPPED | OUTPATIENT
Start: 2020-07-01 | End: 2021-06-07

## 2020-07-02 LAB
BACTERIA: ABNORMAL /HPF
BILIRUBIN URINE: NEGATIVE
BLOOD, URINE: NEGATIVE
CLARITY: CLEAR
COLOR: YELLOW
EPITHELIAL CELLS, UA: 1 /HPF (ref 0–5)
GLUCOSE URINE: NEGATIVE MG/DL
HYALINE CASTS: 0 /HPF (ref 0–8)
KETONES, URINE: NEGATIVE MG/DL
LEUKOCYTE ESTERASE, URINE: ABNORMAL
NITRITE, URINE: NEGATIVE
PH UA: 6 (ref 5–8)
PROTEIN UA: NEGATIVE MG/DL
RBC UA: 0 /HPF (ref 0–4)
SPECIFIC GRAVITY UA: 1.01 (ref 1–1.03)
UROBILINOGEN, URINE: 1 E.U./DL
WBC UA: 4 /HPF (ref 0–5)

## 2020-07-10 NOTE — DISCHARGE SUMMARY
tablet  Take 25 mg by mouth nightly             nitroGLYCERIN (NITROSTAT) 0.4 MG SL tablet  up to max of 3 total doses. If no relief after 1 dose, call 911. promethazine (PHENERGAN) 25 MG tablet  Take 25 mg by mouth every 6 hours as needed for Nausea             traZODone (DESYREL) 50 MG tablet  Take 50 mg by mouth nightly. zolpidem (AMBIEN) 10 MG tablet  Take 5 mg by mouth nightly                  Consults:  Neurology    Significant Diagnostic Studies:  See complete admission record      Disposition:  Home in stable condition  Follow up with Dank Cheatham MD in 1-2 weeks. F/U with Neurology as they recommend    Diet: as tolerated    Activity: as tolerated    Special Instructions: none       The patient or family are to call or return if there are any problems, questions, concerns or change in her condition.      Signed:  Dank Cheatham MD  7/10/2020, 3:07 AM EMS/Patient

## 2020-07-10 NOTE — DISCHARGE SUMMARY
Hospital Discharge Summary    Hernan Osman  :  1945  MRN:  [id number]    Admit date:  2020  Discharge date:  2020    Admitting Physician:    Jessy Hillman MD    Discharge Diagnoses: Active Problems:    Acute exacerbation of chronic low back pain    Intractable back pain  Resolved Problems:    * No resolved hospital problems. [facility] Course:  She was admitted with intractable back pain. She was seen by her Spinal  Surgeon, and they have helped with care. She had improvement and was able to be d/c home for further care. Her VS are stable. She is ambulating with assistance. Discharge Medications:       Srinath Morris   Home Medication Instructions UBO:[id number]    Printed on:07/10/20 0305   Medication Information                      albuterol (PROVENTIL HFA;VENTOLIN HFA) 108 (90 BASE) MCG/ACT inhaler  Inhale 2 puffs into the lungs every 6 hours as needed. ALPRAZolam (XANAX) 0.25 MG tablet  Take 0.25 mg by mouth daily as needed (takes 1/2 tab)              amitriptyline (ELAVIL) 25 MG tablet  Take 25 mg by mouth nightly             aspirin 81 MG tablet  Take 81 mg by mouth daily. atorvastatin (LIPITOR) 80 MG tablet  Take 1 tablet by mouth daily             clopidogrel (PLAVIX) 75 MG tablet  Take 1 tablet by mouth daily             Cyanocobalamin (VITAMIN B 12 PO)  Take 1,000 mcg by mouth daily             cyclobenzaprine (FLEXERIL) 10 MG tablet  Take 10 mg by mouth 2 times daily as needed for Muscle spasms. isosorbide mononitrate (IMDUR) 60 MG extended release tablet  Take 1 tablet by mouth 2 times daily             lisinopril (PRINIVIL;ZESTRIL) 10 MG tablet  Take 1 tablet by mouth 2 times daily             metFORMIN (GLUCOPHAGE) 500 MG tablet  Take 1 tablet by mouth 2 times daily Please hold Metformin for 48 hours after cardiac catheterization.  May resume on 4/15/20             nitroGLYCERIN (NITROSTAT) 0.4 MG SL tablet  up to max of 3 total

## 2020-07-20 ENCOUNTER — APPOINTMENT (OUTPATIENT)
Dept: CT IMAGING | Age: 75
DRG: 100 | End: 2020-07-20
Payer: MEDICARE

## 2020-07-20 ENCOUNTER — HOSPITAL ENCOUNTER (INPATIENT)
Age: 75
LOS: 4 days | Discharge: HOME HEALTH | DRG: 100 | End: 2020-07-24
Attending: EMERGENCY MEDICINE | Admitting: FAMILY MEDICINE
Payer: MEDICARE

## 2020-07-20 ENCOUNTER — APPOINTMENT (OUTPATIENT)
Dept: GENERAL RADIOLOGY | Age: 75
DRG: 100 | End: 2020-07-20
Payer: MEDICARE

## 2020-07-20 LAB
ACETAMINOPHEN LEVEL: <15 UG/ML
ALBUMIN SERPL-MCNC: 4.2 G/DL (ref 3.5–5.2)
ALP BLD-CCNC: 60 U/L (ref 35–104)
ALT SERPL-CCNC: 15 U/L (ref 5–33)
AMPHETAMINE SCREEN, URINE: NEGATIVE
ANION GAP SERPL CALCULATED.3IONS-SCNC: 12 MMOL/L (ref 7–19)
AST SERPL-CCNC: 15 U/L (ref 5–32)
BARBITURATE SCREEN URINE: NEGATIVE
BASE EXCESS ARTERIAL: -0.5 MMOL/L (ref -2–2)
BASOPHILS ABSOLUTE: 0 K/UL (ref 0–0.2)
BASOPHILS RELATIVE PERCENT: 0.5 % (ref 0–1)
BENZODIAZEPINE SCREEN, URINE: NEGATIVE
BILIRUB SERPL-MCNC: 0.5 MG/DL (ref 0.2–1.2)
BILIRUBIN URINE: NEGATIVE
BLOOD, URINE: NEGATIVE
BUN BLDV-MCNC: 12 MG/DL (ref 8–23)
CALCIUM SERPL-MCNC: 9.3 MG/DL (ref 8.8–10.2)
CANNABINOID SCREEN URINE: NEGATIVE
CARBOXYHEMOGLOBIN ARTERIAL: 2.6 % (ref 0–5)
CHLORIDE BLD-SCNC: 104 MMOL/L (ref 98–111)
CLARITY: CLEAR
CO2: 23 MMOL/L (ref 22–29)
COCAINE METABOLITE SCREEN URINE: NEGATIVE
COLOR: YELLOW
CREAT SERPL-MCNC: 0.7 MG/DL (ref 0.5–0.9)
EOSINOPHILS ABSOLUTE: 0.1 K/UL (ref 0–0.6)
EOSINOPHILS RELATIVE PERCENT: 2.9 % (ref 0–5)
ETHANOL: <10 MG/DL (ref 0–0.08)
GFR AFRICAN AMERICAN: >59
GFR NON-AFRICAN AMERICAN: >60
GLUCOSE BLD-MCNC: 182 MG/DL (ref 74–109)
GLUCOSE URINE: NEGATIVE MG/DL
HCO3 ARTERIAL: 25.4 MMOL/L (ref 22–26)
HCT VFR BLD CALC: 35 % (ref 37–47)
HEMOGLOBIN, ART, EXTENDED: 10.6 G/DL (ref 12–16)
HEMOGLOBIN: 11.6 G/DL (ref 12–16)
IMMATURE GRANULOCYTES #: 0 K/UL
INR BLD: 1 (ref 0.88–1.18)
KETONES, URINE: NEGATIVE MG/DL
LACTIC ACID: 1.4 MMOL/L (ref 0.5–1.9)
LACTIC ACID: 2.1 MMOL/L (ref 0.5–1.9)
LEUKOCYTE ESTERASE, URINE: NEGATIVE
LIPASE: 18 U/L (ref 13–60)
LYMPHOCYTES ABSOLUTE: 0.8 K/UL (ref 1.1–4.5)
LYMPHOCYTES RELATIVE PERCENT: 18.3 % (ref 20–40)
Lab: NORMAL
MCH RBC QN AUTO: 30.1 PG (ref 27–31)
MCHC RBC AUTO-ENTMCNC: 33.1 G/DL (ref 33–37)
MCV RBC AUTO: 90.7 FL (ref 81–99)
METHEMOGLOBIN ARTERIAL: 0.8 %
MONOCYTES ABSOLUTE: 0.3 K/UL (ref 0–0.9)
MONOCYTES RELATIVE PERCENT: 7.3 % (ref 0–10)
NEUTROPHILS ABSOLUTE: 2.9 K/UL (ref 1.5–7.5)
NEUTROPHILS RELATIVE PERCENT: 70.3 % (ref 50–65)
NITRITE, URINE: NEGATIVE
O2 CONTENT ARTERIAL: 12.7 ML/DL
O2 SAT, ARTERIAL: 85.1 %
O2 THERAPY: ABNORMAL
OPIATE SCREEN URINE: NEGATIVE
PCO2 ARTERIAL: 46 MMHG (ref 35–45)
PDW BLD-RTO: 14.8 % (ref 11.5–14.5)
PH ARTERIAL: 7.35 (ref 7.35–7.45)
PH UA: 6 (ref 5–8)
PLATELET # BLD: 164 K/UL (ref 130–400)
PMV BLD AUTO: 8.6 FL (ref 9.4–12.3)
PO2 ARTERIAL: 53 MMHG (ref 80–100)
POTASSIUM REFLEX MAGNESIUM: 4.3 MMOL/L (ref 3.5–5)
POTASSIUM, WHOLE BLOOD: 4
PRO-BNP: 372 PG/ML (ref 0–1800)
PROTEIN UA: NEGATIVE MG/DL
PROTHROMBIN TIME: 13.1 SEC (ref 12–14.6)
RBC # BLD: 3.86 M/UL (ref 4.2–5.4)
SALICYLATE, SERUM: <3 MG/DL (ref 3–10)
SARS-COV-2, NAAT: NOT DETECTED
SODIUM BLD-SCNC: 139 MMOL/L (ref 136–145)
SPECIFIC GRAVITY UA: 1.01 (ref 1–1.03)
TOTAL PROTEIN: 6.1 G/DL (ref 6.6–8.7)
TROPONIN: 0.01 NG/ML (ref 0–0.03)
UROBILINOGEN, URINE: 0.2 E.U./DL
WBC # BLD: 4.1 K/UL (ref 4.8–10.8)

## 2020-07-20 PROCEDURE — 2580000003 HC RX 258: Performed by: FAMILY MEDICINE

## 2020-07-20 PROCEDURE — 84484 ASSAY OF TROPONIN QUANT: CPT

## 2020-07-20 PROCEDURE — 99285 EMERGENCY DEPT VISIT HI MDM: CPT

## 2020-07-20 PROCEDURE — 82803 BLOOD GASES ANY COMBINATION: CPT

## 2020-07-20 PROCEDURE — G0480 DRUG TEST DEF 1-7 CLASSES: HCPCS

## 2020-07-20 PROCEDURE — 95816 EEG AWAKE AND DROWSY: CPT | Performed by: PSYCHIATRY & NEUROLOGY

## 2020-07-20 PROCEDURE — 83690 ASSAY OF LIPASE: CPT

## 2020-07-20 PROCEDURE — 74177 CT ABD & PELVIS W/CONTRAST: CPT

## 2020-07-20 PROCEDURE — 80307 DRUG TEST PRSMV CHEM ANLYZR: CPT

## 2020-07-20 PROCEDURE — 70498 CT ANGIOGRAPHY NECK: CPT

## 2020-07-20 PROCEDURE — 80053 COMPREHEN METABOLIC PANEL: CPT

## 2020-07-20 PROCEDURE — 6360000002 HC RX W HCPCS: Performed by: EMERGENCY MEDICINE

## 2020-07-20 PROCEDURE — 2000000000 HC ICU R&B

## 2020-07-20 PROCEDURE — 6360000004 HC RX CONTRAST MEDICATION: Performed by: EMERGENCY MEDICINE

## 2020-07-20 PROCEDURE — 70450 CT HEAD/BRAIN W/O DYE: CPT

## 2020-07-20 PROCEDURE — 83880 ASSAY OF NATRIURETIC PEPTIDE: CPT

## 2020-07-20 PROCEDURE — U0002 COVID-19 LAB TEST NON-CDC: HCPCS

## 2020-07-20 PROCEDURE — 85025 COMPLETE CBC W/AUTO DIFF WBC: CPT

## 2020-07-20 PROCEDURE — 85610 PROTHROMBIN TIME: CPT

## 2020-07-20 PROCEDURE — 6370000000 HC RX 637 (ALT 250 FOR IP): Performed by: FAMILY MEDICINE

## 2020-07-20 PROCEDURE — 99223 1ST HOSP IP/OBS HIGH 75: CPT | Performed by: PSYCHIATRY & NEUROLOGY

## 2020-07-20 PROCEDURE — 6360000002 HC RX W HCPCS: Performed by: FAMILY MEDICINE

## 2020-07-20 PROCEDURE — 84132 ASSAY OF SERUM POTASSIUM: CPT

## 2020-07-20 PROCEDURE — 96374 THER/PROPH/DIAG INJ IV PUSH: CPT

## 2020-07-20 PROCEDURE — 83605 ASSAY OF LACTIC ACID: CPT

## 2020-07-20 PROCEDURE — 87040 BLOOD CULTURE FOR BACTERIA: CPT

## 2020-07-20 PROCEDURE — 70496 CT ANGIOGRAPHY HEAD: CPT

## 2020-07-20 PROCEDURE — 71045 X-RAY EXAM CHEST 1 VIEW: CPT

## 2020-07-20 PROCEDURE — 95819 EEG AWAKE AND ASLEEP: CPT

## 2020-07-20 PROCEDURE — 93005 ELECTROCARDIOGRAM TRACING: CPT | Performed by: EMERGENCY MEDICINE

## 2020-07-20 PROCEDURE — 36415 COLL VENOUS BLD VENIPUNCTURE: CPT

## 2020-07-20 PROCEDURE — 81003 URINALYSIS AUTO W/O SCOPE: CPT

## 2020-07-20 PROCEDURE — 96375 TX/PRO/DX INJ NEW DRUG ADDON: CPT

## 2020-07-20 PROCEDURE — 36600 WITHDRAWAL OF ARTERIAL BLOOD: CPT

## 2020-07-20 RX ORDER — ATORVASTATIN CALCIUM 80 MG/1
80 TABLET, FILM COATED ORAL DAILY
Status: DISCONTINUED | OUTPATIENT
Start: 2020-07-20 | End: 2020-07-24 | Stop reason: HOSPADM

## 2020-07-20 RX ORDER — TRAMADOL HYDROCHLORIDE 50 MG/1
50 TABLET ORAL EVERY 6 HOURS PRN
Status: ON HOLD | COMMUNITY
End: 2020-07-24 | Stop reason: HOSPADM

## 2020-07-20 RX ORDER — ONDANSETRON 2 MG/ML
INJECTION INTRAMUSCULAR; INTRAVENOUS
Status: DISPENSED
Start: 2020-07-20 | End: 2020-07-20

## 2020-07-20 RX ORDER — NITROGLYCERIN 0.4 MG/1
0.4 TABLET SUBLINGUAL EVERY 5 MIN PRN
Status: DISCONTINUED | OUTPATIENT
Start: 2020-07-20 | End: 2020-07-24 | Stop reason: HOSPADM

## 2020-07-20 RX ORDER — CLOPIDOGREL BISULFATE 75 MG/1
75 TABLET ORAL DAILY
Status: DISCONTINUED | OUTPATIENT
Start: 2020-07-21 | End: 2020-07-24 | Stop reason: HOSPADM

## 2020-07-20 RX ORDER — ONDANSETRON 2 MG/ML
4 INJECTION INTRAMUSCULAR; INTRAVENOUS EVERY 8 HOURS PRN
Status: DISCONTINUED | OUTPATIENT
Start: 2020-07-20 | End: 2020-07-24 | Stop reason: HOSPADM

## 2020-07-20 RX ORDER — ISOSORBIDE MONONITRATE 60 MG/1
60 TABLET, EXTENDED RELEASE ORAL 2 TIMES DAILY
Status: DISCONTINUED | OUTPATIENT
Start: 2020-07-20 | End: 2020-07-24 | Stop reason: HOSPADM

## 2020-07-20 RX ORDER — METOPROLOL SUCCINATE 25 MG/1
25 TABLET, EXTENDED RELEASE ORAL NIGHTLY
Status: ON HOLD | COMMUNITY
End: 2020-11-11 | Stop reason: HOSPADM

## 2020-07-20 RX ORDER — ALBUTEROL SULFATE 2.5 MG/3ML
2.5 SOLUTION RESPIRATORY (INHALATION) EVERY 6 HOURS PRN
Status: DISCONTINUED | OUTPATIENT
Start: 2020-07-20 | End: 2020-07-24 | Stop reason: HOSPADM

## 2020-07-20 RX ORDER — LISINOPRIL 10 MG/1
10 TABLET ORAL 2 TIMES DAILY
Status: DISCONTINUED | OUTPATIENT
Start: 2020-07-20 | End: 2020-07-23

## 2020-07-20 RX ORDER — SODIUM CHLORIDE 0.9 % (FLUSH) 0.9 %
10 SYRINGE (ML) INJECTION PRN
Status: DISCONTINUED | OUTPATIENT
Start: 2020-07-20 | End: 2020-07-24 | Stop reason: HOSPADM

## 2020-07-20 RX ORDER — RANOLAZINE 500 MG/1
1000 TABLET, EXTENDED RELEASE ORAL 2 TIMES DAILY
Status: DISCONTINUED | OUTPATIENT
Start: 2020-07-20 | End: 2020-07-24 | Stop reason: HOSPADM

## 2020-07-20 RX ORDER — CLONIDINE HYDROCHLORIDE 0.1 MG/1
0.1 TABLET ORAL EVERY 4 HOURS PRN
Status: DISCONTINUED | OUTPATIENT
Start: 2020-07-20 | End: 2020-07-24 | Stop reason: HOSPADM

## 2020-07-20 RX ORDER — METOPROLOL SUCCINATE 50 MG/1
50 TABLET, EXTENDED RELEASE ORAL DAILY
Status: DISCONTINUED | OUTPATIENT
Start: 2020-07-21 | End: 2020-07-24 | Stop reason: HOSPADM

## 2020-07-20 RX ORDER — METOPROLOL SUCCINATE 25 MG/1
25 TABLET, EXTENDED RELEASE ORAL NIGHTLY
Status: DISCONTINUED | OUTPATIENT
Start: 2020-07-20 | End: 2020-07-24 | Stop reason: HOSPADM

## 2020-07-20 RX ORDER — SODIUM CHLORIDE 0.9 % (FLUSH) 0.9 %
10 SYRINGE (ML) INJECTION EVERY 12 HOURS SCHEDULED
Status: DISCONTINUED | OUTPATIENT
Start: 2020-07-20 | End: 2020-07-24 | Stop reason: HOSPADM

## 2020-07-20 RX ORDER — ASPIRIN 81 MG/1
81 TABLET ORAL DAILY
Status: DISCONTINUED | OUTPATIENT
Start: 2020-07-21 | End: 2020-07-24 | Stop reason: HOSPADM

## 2020-07-20 RX ORDER — METOCLOPRAMIDE HYDROCHLORIDE 5 MG/ML
10 INJECTION INTRAMUSCULAR; INTRAVENOUS ONCE
Status: COMPLETED | OUTPATIENT
Start: 2020-07-20 | End: 2020-07-20

## 2020-07-20 RX ORDER — ONDANSETRON 2 MG/ML
4 INJECTION INTRAMUSCULAR; INTRAVENOUS ONCE
Status: COMPLETED | OUTPATIENT
Start: 2020-07-20 | End: 2020-07-20

## 2020-07-20 RX ADMIN — RANOLAZINE 1000 MG: 500 TABLET, FILM COATED, EXTENDED RELEASE ORAL at 22:50

## 2020-07-20 RX ADMIN — ISOSORBIDE MONONITRATE 60 MG: 60 TABLET, EXTENDED RELEASE ORAL at 21:00

## 2020-07-20 RX ADMIN — METOPROLOL SUCCINATE 25 MG: 25 TABLET, EXTENDED RELEASE ORAL at 21:00

## 2020-07-20 RX ADMIN — SODIUM CHLORIDE, PRESERVATIVE FREE 10 ML: 5 INJECTION INTRAVENOUS at 21:03

## 2020-07-20 RX ADMIN — METOCLOPRAMIDE 10 MG: 5 INJECTION, SOLUTION INTRAMUSCULAR; INTRAVENOUS at 12:09

## 2020-07-20 RX ADMIN — ENOXAPARIN SODIUM 40 MG: 40 INJECTION SUBCUTANEOUS at 21:04

## 2020-07-20 RX ADMIN — ONDANSETRON HYDROCHLORIDE 4 MG: 2 SOLUTION INTRAMUSCULAR; INTRAVENOUS at 11:30

## 2020-07-20 RX ADMIN — IOPAMIDOL 90 ML: 755 INJECTION, SOLUTION INTRAVENOUS at 11:29

## 2020-07-20 RX ADMIN — LISINOPRIL 10 MG: 10 TABLET ORAL at 21:01

## 2020-07-20 ASSESSMENT — ENCOUNTER SYMPTOMS
BACK PAIN: 1
PHOTOPHOBIA: 0
COUGH: 0
SHORTNESS OF BREATH: 0
NAUSEA: 0
VOMITING: 0

## 2020-07-20 ASSESSMENT — PAIN DESCRIPTION - LOCATION: LOCATION: HEAD

## 2020-07-20 ASSESSMENT — PAIN SCALES - GENERAL: PAINLEVEL_OUTOF10: 4

## 2020-07-20 ASSESSMENT — PAIN DESCRIPTION - DESCRIPTORS: DESCRIPTORS: ACHING

## 2020-07-20 ASSESSMENT — PAIN DESCRIPTION - PAIN TYPE: TYPE: ACUTE PAIN

## 2020-07-20 ASSESSMENT — PAIN DESCRIPTION - RADICULAR PAIN: RADICULAR_PAIN: ABSENT

## 2020-07-20 NOTE — ED PROVIDER NOTES
[facility] ICU  eMERGENCY dEPARTMENT eNCOUnter      Pt Name: Jack Kessler  MRN: [id number]  [place] 1945  Date of evaluation: 7/20/2020  Provider: Shelli Lorenzo MD    CHIEF COMPLAINT       Chief Complaint   Patient presents with    Altered Mental Status     last night is last known well         HISTORY OF PRESENT ILLNESS   (Location/Symptom, Timing/Onset,Context/Setting, Quality, Duration, Modifying Factors, Severity)  Note limiting factors. Jack Kessler is a 76 y.o. female who presents to the emergency department due to altered mental status. Brought by EMS. Patient currently alert and orient x3 but is drowsy. Vomiting. Denies pain of any kind. Not giving much history. No family here at this time. Unknown when the last normal was. Report is EMS spoke with daughter said the patient was normal at some point yesterday but we do not know exactly when. Daughter arrived soon after arrival.  Roscoe Alpha that patient seemed little groggy this morning but ate breakfast and was doing fine otherwise. Said that she helped her back into bed at 9:30 AM.  Little while later said her breathing sounded unusual and went to check on her and found her altered. So the last time that she was at her baseline was at 9:30 AM this morning. HPI    NursingNotes were reviewed. REVIEW OF SYSTEMS    (2-9 systems for level 4, 10 or more for level 5)     Review of Systems   Unable to perform ROS: Mental status change       A complete review of systems was performed and is negative except as noted above in the HPI.        PAST MEDICAL HISTORY     Past Medical History:   Diagnosis Date    Asthma     CAD (coronary artery disease)     Chest pain     Depression with anxiety     Diabetes mellitus (HCC)     GERD (gastroesophageal reflux disease)     Glucose intolerance (impaired glucose tolerance)     Hyperlipidemia     Cholesterol management per Caitlin Avery M.D.    Hypertension     Lupus (systemic lupus erythematosus) Other; Codeine; Darvocet [propoxyphene n-acetaminophen]; Hydrocodone-acetaminophen; Lyrica [pregabalin]; Morphine; and Percocet [oxycodone-acetaminophen]    FAMILY HISTORY       Family History   Problem Relation Age of Onset    Heart Disease Mother     High Blood Pressure Mother     Diabetes Mother     High Cholesterol Mother     Diabetes Brother           SOCIAL HISTORY       Social History     Socioeconomic History    Marital status:      Spouse name: None    Number of children: None    Years of education: None    Highest education level: None   Occupational History    None   Social Needs    Financial resource strain: None    Food insecurity     Worry: None     Inability: None    Transportation needs     Medical: None     Non-medical: None   Tobacco Use    Smoking status: Former Smoker     Packs/day: 1.00     Years: 1.00     Pack years: 1.00     Types: Cigarettes     Last attempt to quit: 1967     Years since quittin.4    Smokeless tobacco: Never Used   Substance and Sexual Activity    Alcohol use: No    Drug use: No    Sexual activity: Not Currently   Lifestyle    Physical activity     Days per week: None     Minutes per session: None    Stress: None   Relationships    Social connections     Talks on phone: None     Gets together: None     Attends Gnosticism service: None     Active member of club or organization: None     Attends meetings of clubs or organizations: None     Relationship status: None    Intimate partner violence     Fear of current or ex partner: None     Emotionally abused: None     Physically abused: None     Forced sexual activity: None   Other Topics Concern    None   Social History Narrative    None       SCREENINGS   NIH Stroke Scale  Interval: Baseline  Level of Consciousness (1a. ): Not alert, but arousable by minor stimulation to obey  LOC Questions (1b. ):  Answers both correctly  LOC Commands (1c. ): Performs both tasks correctly  Best Gaze (2. ): Normal  Visual (3. ): No visual loss  Facial Palsy (4. ): Normal symmetrical movement  Motor Arm, Left (5a. ): No drift  Motor Arm, Right (5b. ): No drift  Motor Leg, Left (6a. ): No drift  Motor Leg, Right (6b. ): No drift  Limb Ataxia (7. ): Absent  Sensory (8. ): Normal  Best Language (9. ): No aphasia  Dysarthria (10. ): Normal  Extinction and Inattention (11): No abnormality  Total: 1Glasgow Coma Scale  Eye Opening: Spontaneous  Best Verbal Response: Oriented  Best Motor Response: Obeys commands  Alexander Coma Scale Score: 15        PHYSICAL EXAM    (up to 7 for level 4, 8 or more for level 5)     ED Triage Vitals   BP Temp Temp src Pulse Resp SpO2 Height Weight   -- -- -- -- -- -- -- --       Physical Exam  Vitals signs reviewed. Constitutional:       General: She is not in acute distress. Appearance: She is well-developed. She is not toxic-appearing. Comments: Actively vomiting upon walking into the room   HENT:      Head: Normocephalic and atraumatic. Nose: Nose normal.      Mouth/Throat:      Mouth: Mucous membranes are moist.      Pharynx: Oropharynx is clear. Eyes:      General: No scleral icterus. Extraocular Movements: Extraocular movements intact. Pupils: Pupils are equal, round, and reactive to light. Neck:      Musculoskeletal: Normal range of motion and neck supple. No neck rigidity. Vascular: No JVD. Cardiovascular:      Rate and Rhythm: Normal rate and regular rhythm. Pulses: Normal pulses. Heart sounds: Normal heart sounds. Pulmonary:      Effort: Pulmonary effort is normal. No respiratory distress. Breath sounds: Normal breath sounds. Abdominal:      General: There is no distension. Palpations: Abdomen is soft. Tenderness: There is no abdominal tenderness. There is no guarding or rebound. Musculoskeletal: Normal range of motion. General: No tenderness. Right lower leg: Edema (Trace) present.       Left lower leg: Edema (Trace) present. Lymphadenopathy:      Cervical: No cervical adenopathy. Skin:     General: Skin is warm and dry. Capillary Refill: Capillary refill takes less than 2 seconds. Neurological:      Mental Status: She is oriented to person, place, and time. She is lethargic. GCS: GCS eye subscore is 3. GCS verbal subscore is 5. GCS motor subscore is 6. Motor: Weakness (Generalized, no focal deficits) present. Comments: Drowsy but alert and oriented x3. Has generalized weakness. Do not appreciate any focal deficits. Very limited exam due to patient's drowsiness. Psychiatric:      Comments: Drowsy, unable to assess         DIAGNOSTIC RESULTS     EKG: All EKG's are interpreted by the Emergency Department Physician who either signs or Co-signs this chart in the absence of a cardiologist.    Normal sinus rhythm. Normal QT. Incomplete right bundle. Borderline T wave changes laterally. RADIOLOGY:   Non-plain film images such as CT, Ultrasound and MRI are read by the radiologist. Bal Douglass images are visualized and preliminarily interpreted by the emergency physician with the below findings:    Interpretation per the Radiologist below, if available at the time of this note:    XR CHEST PORTABLE   Final Result   1. Central vascular prominence and interstitial prominence compatible   with early failure change. Signed by Dr Jada Hampton on 7/20/2020 12:18 PM      CT ABDOMEN PELVIS W IV CONTRAST Additional Contrast? None   Final Result   1. Moderate volume of stool within the colon. Sigmoid colonic   diverticulosis. No evidence for small bowel obstruction. No free air   or abscess. Small reactive free fluid in the lower pelvis. 2. Distended bladder. 3. Diffuse vascular calcification with no aneurysm or dissection. 4. Hepatic cysts. Left renal cyst. No hydronephrosis.    5. Similar scattered sclerotic foci of the regional skeleton with no   focal destructive components:    Lactic Acid 2.1 (*)     All other components within normal limits    Narrative:     CALL  [place]  [facility] tel. ,  Chemistry results called to and read back by VREONICA WILSON AT [address], 07/20/2020  13:02, by CARMELO   CULTURE, BLOOD 1   CULTURE, BLOOD 2   TROPONIN   BRAIN NATRIURETIC PEPTIDE   LIPASE   URINE RT REFLEX TO CULTURE   PROTIME-INR   ETHANOL   SALICYLATE LEVEL   ACETAMINOPHEN LEVEL   URINE DRUG SCREEN   POTASSIUM, WHOLE BLOOD   COVID-19   LACTIC ACID, PLASMA       All other labs were within normal range or not returned as of this dictation. EMERGENCY DEPARTMENT COURSE and DIFFERENTIALDIAGNOSIS/MDM:   Vitals:    Vitals:    07/20/20 1433 07/20/20 1436 07/20/20 1600 07/20/20 1603   BP: (!) 194/76  (!) 188/79 (!) 180/72   Pulse: 66 67 67 68   Resp: 15 15 15 13   SpO2: 96% 96% 96% 97%   Weight:       Height:           MDM  Patient has no focal deficits of any kind. Just seems very drowsy. Seems to have generalized weakness. Patient's case discussed with neurologist on-call, Dr. Liam Rosales, soon after patient's arrival due to concern for the possibility of stroke. Last known normal per daughter was 9:30 AM.  Dr. Lexi Anderson said he will come and see the patient in consult. He does not feel the patient is a TPA candidate. Patient had CT scan but still vomiting. Zofran is been given. Do not want to give Phenergan because he is already drowsy. Will try Reglan. Patient is resting comfortably no distress. Doing much better. Has some borderline hypoxia. Not complaining of chest pain or dyspnea. No cough. No fever. I think is extremely unlikely her symptoms are due to a PE. I do not think that CTA of the chest is indicated at this time. She seems drowsy which would explain why her sats are just slightly low. Looks like she might have mild volume overload although her BNP is normal.  Do not see strong indication to do CTA of the chest at this time.   Furthermore, patient just received IV contrast for CTA of the head and neck and CT of the abdomen pelvis so I do not feel that it would be appropriate to give her another bolus of contrast at this time. Again, I think her mild hypoxia is due to her drowsiness. Uncertain as far as etiology of her symptoms. She is more awake and alert now in no distress. Given that it looks like she may have bit her lip suspect she may have had a seizure. Dr. Charmaine Hendrickson is seeing in consult and is planning for an EEG. Patient's case discussed with Ridge Varma, Dr. Hillary Newsome nurse, who communicated with Dr. Pool Hubbard who is agreeable with plan. I will put in bridge orders per her request.  Patient and family updated about plan. CONSULTS:  IP CONSULT TO NEUROLOGY    PROCEDURES:  Unless otherwise notedbelow, none     Procedures    FINAL IMPRESSION     1. Altered mental status, unspecified altered mental status type    2. Abnormal CT scan    3. Non-intractable vomiting with nausea, unspecified vomiting type    4. Possible Seizure ([facility] [address]    5.  Uncontrolled hypertension          DISPOSITION/PLAN   DISPOSITION Admitted 07/20/2020 02:15:04 PM      PATIENT REFERRED TO:  @FUP@    DISCHARGE MEDICATIONS:  Current Discharge Medication List             (Please note that portions of this note were completed with a voice recognition program.  Efforts were made to edit the dictations butoccasionally words are mis-transcribed.)    Sugar Fiore MD (electronically signed)  AttendingEmergency Physician        Sugar Fiore MD  07/20/20 3279

## 2020-07-20 NOTE — CONSULTS
[address], [address]  [place], [place] [address]  Phone [phone]     Neurology Consultation     Date of Admission: 2020 11:08 AM  Date of Consultation: 20    Attending Provider: Malinda Espinoza MD  Consulting Provider: Mansoor Sanford M.D. Patient: Marlene Greenwood  :  1945  Age:  76 y.o. MRN:  [id number]    CHIEF COMPLAINT:  Diminished responsiveness    History Source: History obtained from chart review, the patient and daughter. PCP: Miguel Abreu MD    HISTORY OF PRESENT ILLNESS:   Marlene Greenwood is a 76y.o. year old woman with a history of multiple medical problems who was brought to the ER this morning with altered mental status. She was admitted a month ago with somnolence and myoclonus determined from Lyrica. That has been stopped. She has chronic back pain and has not tolerated opioids in the past.  She does however, take Tramadol 50 mg TID. Her daughter works here nights and resides with her assists her. She got home from work this am and her mother was getting up. She seemed her usual self but was sleepy. She was noted to have a laceration and bruise on the left lower lip that she could not explain. She took her medications and ate breakfast and went back to sleep. The daughter lied down in a nearby bedroom. She was wakened to a noise like snoring. She went and checked on her mother and found her sideways on the bed. The daughter could not wake her mother up. Her BP was a little high and blood sugar was 200s. EMS was called she was brought to the ER. On arrival, Ms. Sahara Greene would waken briefly, answer questions and follow simple commands but was very drowsy. There were no focal neurologic signs. As the morning progressed, she has become more alert.       PAST MEDICAL HISTORY:    Medical History:      Diagnosis Date    Asthma     CAD (coronary artery disease)     Chest pain     Depression with anxiety     Diabetes mellitus ([facility] [address]  GERD (gastroesophageal reflux disease)     Glucose intolerance (impaired glucose tolerance)     Hyperlipidemia     Cholesterol management per Alessandra Posey M.D.   [facility] Hypertension     Lupus (systemic lupus erythematosus) ([place] [address]     Malaise and fatigue 10/6/2017    Movement disorder     Sees pain management for neck pain and previous surgery    Osteoarthritis     S/P CABG x 3 03/05/2013    PACABG X 3, LIMA-LAD, SVG0OM, SVG-DIAG, RT EVH, DR PATEL       Surgical History:      Procedure Laterality Date    APPENDECTOMY  1965    BACK SURGERY      CARDIAC CATHETERIZATION  5/18/11    EF 60%    CARDIAC CATHETERIZATION  1/25/16    drug eluting stent to RCA   [address]  1/25/2016    CARDIAC CATHETERIZATION  2/19/16    CARDIAC CATHETERIZATION  08/24/2017    cutting balloon angioplasty ot LAD    CARDIAC CATHETERIZATION  04/13/2020    TWO Stents placed    COLON SURGERY  1992    Polypectomy - 2    COLON SURGERY  2003    Polypectomy - 4    COLON SURGERY  11/2008    Polypectomy - 2    COLON SURGERY  8/2012    Polypectomy - 3    COLONOSCOPY      CORONARY ANGIOPLASTY  08/2017    cutting balloon angioplasty LAD diagonal    CORONARY ANGIOPLASTY WITH STENT PLACEMENT  3/22/2007    CORONARY ANGIOPLASTY WITH STENT PLACEMENT  8/2007    CORONARY ARTERY BYPASS GRAFT  3/5/2013    PACABG X 3, LIMA-LAD, SVG0OM, SVG-DIAG, RT EVH, DR PATEL    COSMETIC SURGERY      deviated septum and rhinoplasty    CYST REMOVAL  1970    DIAGNOSTIC CARDIAC CATH LAB PROCEDURE      ENDOSCOPY, COLON, DIAGNOSTIC      EYE SURGERY  03/2019    cataracts    [address]    Partial    NASAL [address] SINUS SURGERY  2003    Nasal Polyps Removed    SPINE SURGERY  3/6/2006    Lumbar Laminectomy L4&5    SPINE SURGERY  3/6/2006    Cervical Fusion - C4,5,6    TONSILLECTOMY  1968       Medications Prior to Admission:    Prior to Admission medications    Medication Sig Start mood. The patient is not nervous/anxious. PHYSICAL EXAMINATION:  Vitals: BP (!) 190/88   Pulse 79   Resp 16   Ht 5' 5\" (1.651 m)   Wt 151 lb 14.4 oz (68.9 kg)   SpO2 91%   BMI 25.28 kg/m²   General appearance:  She is awake and fairly alert. She appears in no distress. Skin: Skin color, texture, turgor normal. No rashes or lesions  HEENT: atraumatic and normocephalic. She does have a right lower lip small irregular laceration and bruise  Neck:no adenopathy, no carotid bruit, no JVD, supple, symmetrical, trachea midline and thyroid not enlarged, symmetric, no tenderness/mass/nodules  Lungs: clear to auscultation bilaterally  Heart: regular rate and rhythm, S1, S2 normal, no murmur, click, rub or gallop  Abdomen: soft, non-tender; bowel sounds normal; no masses,  no organomegaly  Extremities:  Mild peripheral edema      NEUROLOGIC EXAMINATION:  Neurologic Exam     Mental Status   Oriented to city. Disoriented to month and day. Oriented to year. Speech: speech is normal   Level of consciousness: alert  Able to name object. Able to repeat. Speech is fluent. Cranial Nerves     CN II   Visual fields full to confrontation. CN III, IV, VI   Pupils are equal, round, and reactive to light. Extraocular motions are normal.     CN VII   Facial expression full, symmetric.      CN VIII   Hearing: intact    CN IX, X   Palate: symmetric    CN XI   Right sternocleidomastoid strength: normal  Left sternocleidomastoid strength: normal  Right trapezius strength: normal  Left trapezius strength: normal    CN XII   Tongue: not atrophic  Fasciculations: absent  Tongue deviation: none    Motor Exam   Muscle bulk: normal  Overall muscle tone: normal  Right arm pronator drift: absent  Left arm pronator drift: absent    Strength   Right neck flexion: 5/5  Left neck flexion: 5/5  Right neck extension: 5/5  Left neck extension: 5/5  Right deltoid: 5/5  Left deltoid: 5/5  Right biceps: 5/5  Left biceps: 5/5  Right triceps: 5/5  Left triceps: 5/5  Right wrist flexion: 5/5  Left wrist flexion: 5/5  Right wrist extension: 5/5  Left wrist extension: 5/5  Right interossei: 5/5  Left interossei: 5/5  Right iliopsoas: 5/5  Left iliopsoas: 5/5  Right quadriceps: 5/5  Left quadriceps: 5/5  Right glutei: 5/5  Left glutei: 5/5  Right anterior tibial: 5/5  Left anterior tibial: 5/5  Right posterior tibial: 5/5  Left posterior tibial: 5/5  Right peroneal: 5/5  Left peroneal: 5/5  Right gastroc: 5/5  Left gastroc: 5/5    Sensory Exam   Light touch normal.   Vibration normal.     Gait, Coordination, and Reflexes     Coordination   Finger to nose coordination: normal    Tremor   Resting tremor: absent  Intention tremor: absent  Action tremor: absent    Reflexes   Right brachioradialis: 1+  Left brachioradialis: 1+  Right biceps: 1+  Left biceps: 1+  Right triceps: 1+  Left triceps: 1+  Right patellar: 0  Left patellar: 0  Right achilles: 0  Left achilles: 0  Right plantar: normal  Left plantar: normal  Right Moct[place]uma: absent  Left Mo[place]uma: absent  Right ankle clonus: absent  Left ankle clonus: absent  Rapid alternating movements were normal.       ADDITIONAL REVIEW:  CBC:    Recent Labs     07/20/20  1119   WBC 4.1*   HGB 11.6*        BMP:     Recent Labs     07/20/20  1119 07/20/20  1203     --    K 4.3 4.0     --    CO2 23  --    BUN 12  --    CREATININE 0.7  --    GLUCOSE 182*  --      Hepatic:  Recent Labs     07/20/20  1119   AST 15   ALT 15   BILITOT 0.5   ALKPHOS 60     Troponin T:    Recent Labs     07/20/20  1119   TROPONINI 0.01     ABGs:    Lab Results   Component Value Date    PHART 7.350 07/20/2020    PO2ART 53.0 07/20/2020    WIK1EBR 46.0 07/20/2020     INR:    Recent Labs     07/20/20  1119   INR 1.00     Narrative    CT HEAD WO CONTRAST - 7/20/2020 10:30 AM    Indication: Altered mental status, possible stroke, poor historian    Comparison: 6/10/2020    DLP: 844 mGy cm.  In order to have a CT radiation dose radiation dose as low as    reasonably achievable, Automated Exposure Control was utilized for    adjustment of the mA and/or KV according to patient size. Findings: Aortic arch branch origins are widely patent. Both subclavian arteries are widely patent. Both vertebral arteries are widely patent. RIGHT common carotid artery is widely patent. Mild atherosclerotic plaque at the RIGHT carotid bulb but no    significant stenosis. RIGHT extracranial internal carotid artery is widely patent. LEFT common carotid artery is widely patent. Moderate atherosclerotic calcification at the LEFT carotid bulb is    present but no significant stenosis is present greater than 50%. LEFT extracranial internal carotid arteries otherwise unremarkable. Dental amalgams create significant streak artifact limiting evaluation    the oral cavity. No focal asymmetry of the aerodigestive tract. Upper    lungs are clear. No focal asymmetry of the aerodigestive tract. Parotid and submandibular glands appear unremarkable. Thyroid is    uniform. No cervical lymphadenopathy. 1.3 x 1.1 cm rounded object in the upper thoracic esophagus and    similar-appearing object in the midesophagus. Mastoid air cells and    visualized paranasal sinuses are clear other than for trace LEFT    maxillary sinus mucosal thickening. Multilevel cervical spine    degenerative change and postoperative change of C4-C5-C6 ACDF.         Impression    1.  No major vascular occlusion or dissection in the neck. 2.  Moderate atherosclerotic plaque at the LEFT carotid bulb but no    significant stenosis greater than 50% according to NASCET criteria. 3.  Two small round objects in the esophagus, likely representing    retained ingested food but recommend clinical correlation and    follow-up as these also could represent esophageal polyp/neoplasm. Signed by Dr Gaetano Lawrence on 7/20/2020 12:22 PM        IMPRESSION:  1.  Possible seizure

## 2020-07-20 NOTE — PROGRESS NOTES
Results for Sandy Boss (MRN [id number]) as of 7/20/2020 12:07   Ref.  Range 7/20/2020 12:03   Hemoglobin, Art, Extended Latest Ref Range: 12.0 - 16.0 g/dL 10.6 (L)   pH, Arterial Latest Ref Range: 7.350 - 7.450  7.350   pCO2, Arterial Latest Ref Range: 35.0 - 45.0 mmHg 46.0 (H)   pO2, Arterial Latest Ref Range: 80.0 - 100.0 mmHg 53.0 (L)   HCO3, Arterial Latest Ref Range: 22.0 - 26.0 mmol/L 25.4   Base Excess, Arterial Latest Ref Range: -2.0 - 2.0 mmol/L -0.5   O2 Sat, Arterial Latest Ref Range: >92 % 85.1 (LL)   O2 Content, Arterial Latest Ref Range: Not Established mL/dL 12.7   Methemoglobin, Arterial Latest Ref Range: <1.5 % 0.8   Carboxyhgb, Arterial Latest Ref Range: 0.0 - 5.0 % 2.6   Pt on room air, rr, AT+

## 2020-07-20 NOTE — PROGRESS NOTES
Ryan Rodriguez arrived to room # 153. Presented with: AMS  Mental Status: Patient is oriented, alert, thought processes intact and able to concentrate and follow conversation. Vitals:    07/20/20 1603   BP: (!) 180/72   Pulse: 68   Resp: 13   SpO2: 97%     Patient safety contract and falls prevention contract reviewed with patient Yes. Oriented Patient and Family to room. Call light within reach. Yes.   Needs, issues or concerns expressed at this time: no.      Electronically signed by Sebas Lyons RN on 7/20/2020 at 5:29 PM

## 2020-07-20 NOTE — PROGRESS NOTES
4 Eyes Skin Assessment    Kendrick Anderson is being assessed upon: Admission    I agree that I, Abbey Simmons, along with Shruthi RN (either 2 RN's or 1 LPN and 1 RN) have performed a thorough Head to Toe Skin Assessment on the patient. ALL assessment sites listed below have been assessed. Areas assessed by both nurses:     [x]   Head, Face, and Ears   [x]   Shoulders, Back, and Chest  [x]   Arms, Elbows, and Hands   [x]   Coccyx, Sacrum, and Ischium  [x]   Legs, Feet, and Heels    Does the Patient have Skin Breakdown?  No    Sunday Prevention initiated: Yes  Wound Care Orders initiated: NA    [facility] nurse consulted for Pressure Injury (Stage 3,4, Unstageable, DTI, NWPT, and Complex wounds) and New or Established Ostomies: NA        Primary Nurse eSignature: Abbey Simmons RN on 7/20/2020 at 5:30 PM      Co-Signer eSignature: Electronically signed by Marck De Leon RN on 7/20/20 at 5:37 PM CDT

## 2020-07-21 LAB
ANION GAP SERPL CALCULATED.3IONS-SCNC: 13 MMOL/L (ref 7–19)
BASOPHILS ABSOLUTE: 0 K/UL (ref 0–0.2)
BASOPHILS RELATIVE PERCENT: 0.3 % (ref 0–1)
BUN BLDV-MCNC: 11 MG/DL (ref 8–23)
CALCIUM SERPL-MCNC: 8.6 MG/DL (ref 8.8–10.2)
CHLORIDE BLD-SCNC: 102 MMOL/L (ref 98–111)
CO2: 21 MMOL/L (ref 22–29)
CREAT SERPL-MCNC: 0.6 MG/DL (ref 0.5–0.9)
EKG P AXIS: 85 DEGREES
EKG P-R INTERVAL: 174 MS
EKG Q-T INTERVAL: 434 MS
EKG QRS DURATION: 110 MS
EKG QTC CALCULATION (BAZETT): 451 MS
EKG T AXIS: 144 DEGREES
EOSINOPHILS ABSOLUTE: 0 K/UL (ref 0–0.6)
EOSINOPHILS RELATIVE PERCENT: 0 % (ref 0–5)
GFR AFRICAN AMERICAN: >59
GFR NON-AFRICAN AMERICAN: >60
GLUCOSE BLD-MCNC: 108 MG/DL (ref 74–109)
GLUCOSE BLD-MCNC: 126 MG/DL (ref 70–99)
GLUCOSE BLD-MCNC: 98 MG/DL (ref 70–99)
HCT VFR BLD CALC: 34.6 % (ref 37–47)
HEMOGLOBIN: 10.9 G/DL (ref 12–16)
IMMATURE GRANULOCYTES #: 0 K/UL
LYMPHOCYTES ABSOLUTE: 0.7 K/UL (ref 1.1–4.5)
LYMPHOCYTES RELATIVE PERCENT: 5.8 % (ref 20–40)
MCH RBC QN AUTO: 30.4 PG (ref 27–31)
MCHC RBC AUTO-ENTMCNC: 31.5 G/DL (ref 33–37)
MCV RBC AUTO: 96.4 FL (ref 81–99)
MONOCYTES ABSOLUTE: 0.9 K/UL (ref 0–0.9)
MONOCYTES RELATIVE PERCENT: 7.7 % (ref 0–10)
NEUTROPHILS ABSOLUTE: 9.9 K/UL (ref 1.5–7.5)
NEUTROPHILS RELATIVE PERCENT: 85.9 % (ref 50–65)
PDW BLD-RTO: 14.9 % (ref 11.5–14.5)
PERFORMED ON: ABNORMAL
PERFORMED ON: NORMAL
PLATELET # BLD: 148 K/UL (ref 130–400)
PMV BLD AUTO: 9.6 FL (ref 9.4–12.3)
POTASSIUM REFLEX MAGNESIUM: 4.4 MMOL/L (ref 3.5–5)
RBC # BLD: 3.59 M/UL (ref 4.2–5.4)
SODIUM BLD-SCNC: 136 MMOL/L (ref 136–145)
WBC # BLD: 11.5 K/UL (ref 4.8–10.8)

## 2020-07-21 PROCEDURE — 1210000000 HC MED SURG R&B

## 2020-07-21 PROCEDURE — 6360000002 HC RX W HCPCS: Performed by: FAMILY MEDICINE

## 2020-07-21 PROCEDURE — 93010 ELECTROCARDIOGRAM REPORT: CPT | Performed by: INTERNAL MEDICINE

## 2020-07-21 PROCEDURE — 6370000000 HC RX 637 (ALT 250 FOR IP): Performed by: FAMILY MEDICINE

## 2020-07-21 PROCEDURE — 36415 COLL VENOUS BLD VENIPUNCTURE: CPT

## 2020-07-21 PROCEDURE — 99232 SBSQ HOSP IP/OBS MODERATE 35: CPT | Performed by: PSYCHIATRY & NEUROLOGY

## 2020-07-21 PROCEDURE — 82947 ASSAY GLUCOSE BLOOD QUANT: CPT

## 2020-07-21 PROCEDURE — 85025 COMPLETE CBC W/AUTO DIFF WBC: CPT

## 2020-07-21 PROCEDURE — 2580000003 HC RX 258: Performed by: FAMILY MEDICINE

## 2020-07-21 PROCEDURE — 80048 BASIC METABOLIC PNL TOTAL CA: CPT

## 2020-07-21 RX ADMIN — ISOSORBIDE MONONITRATE 60 MG: 60 TABLET, EXTENDED RELEASE ORAL at 08:57

## 2020-07-21 RX ADMIN — ATORVASTATIN CALCIUM 80 MG: 80 TABLET, FILM COATED ORAL at 21:28

## 2020-07-21 RX ADMIN — SODIUM CHLORIDE, PRESERVATIVE FREE 10 ML: 5 INJECTION INTRAVENOUS at 21:29

## 2020-07-21 RX ADMIN — CLOPIDOGREL BISULFATE 75 MG: 75 TABLET ORAL at 08:57

## 2020-07-21 RX ADMIN — ENOXAPARIN SODIUM 40 MG: 40 INJECTION SUBCUTANEOUS at 21:29

## 2020-07-21 RX ADMIN — METOPROLOL SUCCINATE 50 MG: 50 TABLET, EXTENDED RELEASE ORAL at 08:57

## 2020-07-21 RX ADMIN — ISOSORBIDE MONONITRATE 60 MG: 60 TABLET, EXTENDED RELEASE ORAL at 21:28

## 2020-07-21 RX ADMIN — RANOLAZINE 1000 MG: 500 TABLET, FILM COATED, EXTENDED RELEASE ORAL at 21:28

## 2020-07-21 RX ADMIN — METOPROLOL SUCCINATE 25 MG: 25 TABLET, EXTENDED RELEASE ORAL at 21:28

## 2020-07-21 RX ADMIN — ASPIRIN 81 MG: 81 TABLET, COATED ORAL at 08:57

## 2020-07-21 RX ADMIN — LISINOPRIL 10 MG: 10 TABLET ORAL at 08:57

## 2020-07-21 RX ADMIN — SODIUM CHLORIDE, PRESERVATIVE FREE 10 ML: 5 INJECTION INTRAVENOUS at 08:58

## 2020-07-21 RX ADMIN — LISINOPRIL 10 MG: 10 TABLET ORAL at 21:28

## 2020-07-21 RX ADMIN — RANOLAZINE 1000 MG: 500 TABLET, FILM COATED, EXTENDED RELEASE ORAL at 08:57

## 2020-07-21 ASSESSMENT — PAIN SCALES - GENERAL
PAINLEVEL_OUTOF10: 0
PAINLEVEL_OUTOF10: 0

## 2020-07-21 NOTE — PROGRESS NOTES
[facility] Neurology  [address] [place] [address] Drive, [address]  [place], [place] [address]  Phone [phone]     Neurology Progress Note  2020 9:58 AM  Information:   Patient Name: Tania Jama  :   1945  Age:   76 y.o. MRN:   [id number]  Account #:  [de-identified]  Admit Date:   2020  Today:  20     ADMIT DX:   Altered mental status    Subjective:     Tania Jama is a 76y.o. year old woman with a history of multiple medical problems who was admitted  with altered mental status and hypertension. Interval History:   She says she was confused early this am and had wondered out of her room. Currently she is alert, oriented to place, 2020, and situation. She says she feels closer to her normal self. She denies headache.       Objective:     Past Medical History:  Past Medical History:   Diagnosis Date    Asthma     CAD (coronary artery disease)     Chest pain     Depression with anxiety     Diabetes mellitus (HCC)     GERD (gastroesophageal reflux disease)     Glucose intolerance (impaired glucose tolerance)     Hyperlipidemia     Cholesterol management per Ernesto Carty M.D.   [facility] Hypertension     Lupus (systemic lupus erythematosus) ([facility] [address]     Malaise and fatigue 10/6/2017    Movement disorder     Sees pain management for neck pain and previous surgery    New onset seizure ([facility] [address]     Osteoarthritis     S/P CABG x 3 2013    PACABG X 3, LIMA-LAD, SVG0OM, SVG-DIAG, RT EVH, DR Sj Morillo       Past Surgical History:   Procedure Laterality Date    APPENDECTOMY  1965    BACK SURGERY      CARDIAC CATHETERIZATION  11    EF 60%    CARDIAC CATHETERIZATION  16    drug eluting stent to RCA   [address]  2016    CARDIAC CATHETERIZATION  16    CARDIAC CATHETERIZATION  2017    cutting balloon angioplasty ot LAD    CARDIAC CATHETERIZATION  2020    TWO Stents placed    COLON SURGERY      Polypectomy - 2    COLON SURGERY      Polypectomy - 4    file    Stress: Not on file   Relationships    Social connections     Talks on phone: Not on file     Gets together: Not on file     Attends Mosque service: Not on file     Active member of club or organization: Not on file     Attends meetings of clubs or organizations: Not on file     Relationship status: Not on file    Intimate partner violence     Fear of current or ex partner: Not on file     Emotionally abused: Not on file     Physically abused: Not on file     Forced sexual activity: Not on file   Other Topics Concern    Not on file   Social History Narrative    Not on file       Medications:     sodium chloride flush  10 mL Intravenous 2 times per day    enoxaparin  40 mg Subcutaneous Q24H    Tdap-Dtap  0.5 mL Intramuscular Once    isosorbide mononitrate  60 mg Oral BID    lisinopril  10 mg Oral BID    metoprolol succinate  25 mg Oral Nightly    metoprolol succinate  50 mg Oral Daily    aspirin  81 mg Oral Daily    atorvastatin  80 mg Oral Daily    clopidogrel  75 mg Oral Daily    ranolazine  1,000 mg Oral BID       Diagnostic Studies:  Reviewed all labs/diagnositcs since last 24hrs:  Recent Results (from the past 24 hour(s))   Troponin    Collection Time: 07/20/20 11:19 AM   Result Value Ref Range    Troponin 0.01 0.00 - 0.03 ng/mL   Brain Natriuretic Peptide    Collection Time: 07/20/20 11:19 AM   Result Value Ref Range    Pro- 0 - 1,800 pg/mL   CBC Auto Differential    Collection Time: 07/20/20 11:19 AM   Result Value Ref Range    WBC 4.1 (L) 4.8 - 10.8 K/uL    RBC 3.86 (L) 4.20 - 5.40 M/uL    Hemoglobin 11.6 (L) 12.0 - 16.0 g/dL    Hematocrit 35.0 (L) 37.0 - 47.0 %    MCV 90.7 81.0 - 99.0 fL    MCH 30.1 27.0 - 31.0 pg    MCHC 33.1 33.0 - 37.0 g/dL    RDW 14.8 (H) 11.5 - 14.5 %    Platelets 014 949 - 909 K/uL    MPV 8.6 (L) 9.4 - 12.3 fL    Neutrophils % 70.3 (H) 50.0 - 65.0 %    Lymphocytes % 18.3 (L) 20.0 - 40.0 %    Monocytes % 7.3 0.0 - 10.0 %    Eosinophils % 2.9 0.0 - 5.0 % Basophils % 0.5 0.0 - 1.0 %    Neutrophils Absolute 2.9 1.5 - 7.5 K/uL    Immature Granulocytes # 0.0 K/uL    Lymphocytes Absolute 0.8 (L) 1.1 - 4.5 K/uL    Monocytes Absolute 0.30 0.00 - 0.90 K/uL    Eosinophils Absolute 0.10 0.00 - 0.60 K/uL    Basophils Absolute 0.00 0.00 - 0.20 K/uL   Comprehensive Metabolic Panel w/ Reflex to MG    Collection Time: 07/20/20 11:19 AM   Result Value Ref Range    Sodium 139 136 - 145 mmol/L    Potassium reflex Magnesium 4.3 3.5 - 5.0 mmol/L    Chloride 104 98 - 111 mmol/L    CO2 23 22 - 29 mmol/L    Anion Gap 12 7 - 19 mmol/L    Glucose 182 (H) 74 - 109 mg/dL    BUN 12 8 - 23 mg/dL    CREATININE 0.7 0.5 - 0.9 mg/dL    GFR Non-African American >60 >60    GFR African American >59 >59    Calcium 9.3 8.8 - 10.2 mg/dL    Total Protein 6.1 (L) 6.6 - 8.7 g/dL    Alb 4.2 3.5 - 5.2 g/dL    Total Bilirubin 0.5 0.2 - 1.2 mg/dL    Alkaline Phosphatase 60 35 - 104 U/L    ALT 15 5 - 33 U/L    AST 15 5 - 32 U/L   Lipase    Collection Time: 07/20/20 11:19 AM   Result Value Ref Range    Lipase 18 13 - 60 U/L   Protime-INR    Collection Time: 07/20/20 11:19 AM   Result Value Ref Range    Protime 13.1 12.0 - 14.6 sec    INR 1.00 0.88 - 1.18   Ethanol    Collection Time: 07/20/20 11:19 AM   Result Value Ref Range    Ethanol Lvl <83 mg/dL   Salicylate    Collection Time: 07/20/20 11:19 AM   Result Value Ref Range    Salicylate, Serum <8.9 3.0 - 10.0 mg/dL   Acetaminophen Level    Collection Time: 07/20/20 11:19 AM   Result Value Ref Range    Acetaminophen Level <15 ug/mL   BLOOD GAS, ARTERIAL    Collection Time: 07/20/20 12:03 PM   Result Value Ref Range    pH, Arterial 7.350 7.350 - 7.450    pCO2, Arterial 46.0 (H) 35.0 - 45.0 mmHg    pO2, Arterial 53.0 (L) 80.0 - 100.0 mmHg    HCO3, Arterial 25.4 22.0 - 26.0 mmol/L    Base Excess, Arterial -0.5 -2.0 - 2.0 mmol/L    Hemoglobin, Art, Extended 10.6 (L) 12.0 - 16.0 g/dL    O2 Sat, Arterial 85.1 (LL) >92 %    Carboxyhgb, Arterial 2.6 0.0 - 5.0 % Methemoglobin, Arterial 0.8 <1.5 %    O2 Content, Arterial 12.7 Not Established mL/dL    O2 Therapy Unknown    Potassium, Whole Blood    Collection Time: 07/20/20 12:03 PM   Result Value Ref Range    Potassium, Whole Blood 4.0    Lactic Acid, Plasma    Collection Time: 07/20/20 12:14 PM   Result Value Ref Range    Lactic Acid 2.1 (HH) 0.5 - 1.9 mmol/L   COVID-19    Collection Time: 07/20/20 12:50 PM   Result Value Ref Range    SARS-CoV-2, NAAT Not Detected Not Detected   Urinalysis Reflex to Culture    Collection Time: 07/20/20 12:55 PM    Specimen: Urine, straight catheter   Result Value Ref Range    Color, UA YELLOW Straw/Yellow    Clarity, UA Clear Clear    Glucose, Ur Negative Negative mg/dL    Bilirubin Urine Negative Negative    Ketones, Urine Negative Negative mg/dL    Specific Gravity, UA 1.008 1.005 - 1.030    Blood, Urine Negative Negative    pH, UA 6.0 5.0 - 8.0    Protein, UA Negative Negative mg/dL    Urobilinogen, Urine 0.2 <2.0 E.U./dL    Nitrite, Urine Negative Negative    Leukocyte Esterase, Urine Negative Negative   Urine Drug Screen    Collection Time: 07/20/20 12:55 PM   Result Value Ref Range    Amphetamine Screen, Urine Negative Negative <1000 ng/mL    Barbiturate Screen, Ur Negative Negative < 200 ng/mL    Benzodiazepine Screen, Urine Negative Negative <100 ng/mL    Cannabinoid Scrn, Ur Negative Negative <50 ng/mL    Cocaine Metabolite Screen, Urine Negative Negative <300 ng/mL    Opiate Scrn, Ur Negative Negative < 300 ng/mL    Drug Screen Comment: see below    EKG 12 Lead    Collection Time: 07/20/20  1:15 PM   Result Value Ref Range    P-R Interval 174 ms    QRS Duration 110 ms    Q-T Interval 434 ms    QTc Calculation (Bazett) 451 ms    P Axis 85 degrees    T Axis 144 degrees   Lactic Acid, Plasma    Collection Time: 07/20/20  4:38 PM   Result Value Ref Range    Lactic Acid 1.4 0.5 - 1.9 mmol/L   Basic Metabolic Panel w/ Reflex to MG    Collection Time: 07/21/20  2:08 AM   Result Value Ref Range    Sodium 136 136 - 145 mmol/L    Potassium reflex Magnesium 4.4 3.5 - 5.0 mmol/L    Chloride 102 98 - 111 mmol/L    CO2 21 (L) 22 - 29 mmol/L    Anion Gap 13 7 - 19 mmol/L    Glucose 108 74 - 109 mg/dL    BUN 11 8 - 23 mg/dL    CREATININE 0.6 0.5 - 0.9 mg/dL    GFR Non-African American >60 >60    GFR African American >59 >59    Calcium 8.6 (L) 8.8 - 10.2 mg/dL   CBC Auto Differential    Collection Time: 07/21/20  2:08 AM   Result Value Ref Range    WBC 11.5 (H) 4.8 - 10.8 K/uL    RBC 3.59 (L) 4.20 - 5.40 M/uL    Hemoglobin 10.9 (L) 12.0 - 16.0 g/dL    Hematocrit 34.6 (L) 37.0 - 47.0 %    MCV 96.4 81.0 - 99.0 fL    MCH 30.4 27.0 - 31.0 pg    MCHC 31.5 (L) 33.0 - 37.0 g/dL    RDW 14.9 (H) 11.5 - 14.5 %    Platelets 238 725 - 347 K/uL    MPV 9.6 9.4 - 12.3 fL    Neutrophils % 85.9 (H) 50.0 - 65.0 %    Lymphocytes % 5.8 (L) 20.0 - 40.0 %    Monocytes % 7.7 0.0 - 10.0 %    Eosinophils % 0.0 0.0 - 5.0 %    Basophils % 0.3 0.0 - 1.0 %    Neutrophils Absolute 9.9 (H) 1.5 - 7.5 K/uL    Immature Granulocytes # 0.0 K/uL    Lymphocytes Absolute 0.7 (L) 1.1 - 4.5 K/uL    Monocytes Absolute 0.90 0.00 - 0.90 K/uL    Eosinophils Absolute 0.00 0.00 - 0.60 K/uL    Basophils Absolute 0.00 0.00 - 0.20 K/uL     EEG - mildly slow    Diet:  DIET GENERAL;    Examination:  Vitals: BP (!) 129/54   Pulse 75   Temp 98.5 °F (36.9 °C) (Temporal)   Resp 18   Ht 5' 5\" (1.651 m)   Wt 141 lb 8 oz (64.2 kg)   SpO2 94%   BMI 23.55 kg/m²      Intake/Output Summary (Last 24 hours) at 7/21/2020 0958  Last data filed at 7/21/2020 0942  Gross per 24 hour   Intake 860 ml   Output 25 ml   Net 835 ml     General appearance:  She is awake and fairly alert. She appears in no distress. Skin: Skin color, texture, turgor normal. No rashes or lesions  HEENT: atraumatic and normocephalic.   She does have a right lower lip small irregular laceration and bruise  Neck:no adenopathy, no carotid bruit, no JVD, supple, symmetrical, trachea midline and thyroid not enlarged, symmetric, no tenderness/mass/nodules  Lungs: clear to auscultation bilaterally  Heart: regular rate and rhythm, S1, S2 normal, no murmur, click, rub or gallop  Abdomen: soft, non-tender; bowel sounds normal; no masses,  no organomegaly  Extremities:  Mild peripheral edema  Neurologic:  Alert, oriented to [facility], 2020, not month. No dysarthria. Speech is fluent. EOMI, PERRL, VFF. No facial weakness. Limb strength is normal.  No pronator drift. Rapid alternating movements are normal.  Finger to nose testing shows no dysmetria. Assessment:   1. Possible seizure and post ictal period. She takes Tramadol that could contribute. Alternatively could have just been medication side effects or accidental overuse. 2.         Hypertension - much better  3. Chronic back pain. She has spondylosis with spondylolisthesis  4. CAD    Plan:   1. Consider alternative to Tramadol  2. At this point, I do not want to add another potentially sedating medication especially without definite diagnosis and single event. 3. Increase activity  4. Signing off. Neuro follow up PRN. Discharge planning:   Home    Reviewed treatment plans with the patient and/or family. 25 minutes spent in face to face interaction and coordination of care.      Electronically signed by Renae Cordova MD on 7/21/2020 at 9:58 AM

## 2020-07-21 NOTE — PROGRESS NOTES
Patient remains confused in regards to situation. Patient continues to remove pulse ox finger probe continuously throughout the morning (replaced 4 times) and will not leave on. Spot checks done to ensure safety for patient. Patient has attempted to climb out of bed several times with bed alarm activated. Patient is looking for her glasses in her sheets or thinks she hears somebody knocking on her door. Patient remains alert to self, time, and place at times, but forgets limitations.   Electronically signed by Michelle Ly RN on 7/21/2020 at 2:40 PM

## 2020-07-21 NOTE — H&P
History and Physical      CHIEF COMPLAINT:  AMS    Reason for Admission: AMS    History Obtained From:  Patient, chart    HISTORY OF PRESENT ILLNESS:      The patient is a 76 y.o. female who came to ER with  AMS. She was recently admitted to [address] [place] [address] a similar presentation, after being started on Lyrica for chronic pain. She is still taking Ultram for pain control. She is now back to her baseline for mentation. She denies any significant pain. She denies any CP or SOA. No abdominal pain or N/V. She has no focal weakness. No issues with swallowing or speech.       Past Medical History:        Diagnosis Date    Asthma     CAD (coronary artery disease)     Chest pain     Depression with anxiety     Diabetes mellitus ([facility]     GERD (gastroesophageal reflux disease)     Glucose intolerance (impaired glucose tolerance)     Hyperlipidemia     Cholesterol management per Antoine Jean Hypertension     Lupus (systemic lupus erythematosus) ([facility]     Malaise and fatigue 10/6/2017    Movement disorder     Sees pain management for neck pain and previous surgery    New onset seizure ([facility]     Osteoarthritis     S/P CABG x 3 03/05/2013    PACABG X 3, LIMA-LAD, SVG0OM, SVG-DIAG, RT EVH, DR Rayna Valencia     Past Surgical History:        Procedure Laterality Date    APPENDECTOMY  1965    BACK SURGERY      CARDIAC CATHETERIZATION  5/18/11    EF 60%    CARDIAC CATHETERIZATION  1/25/16    drug eluting stent to RCA   [address]  1/25/2016    CARDIAC CATHETERIZATION  2/19/16    CARDIAC CATHETERIZATION  08/24/2017    cutting balloon angioplasty ot LAD    CARDIAC CATHETERIZATION  04/13/2020    TWO Stents placed    COLON SURGERY  1992    Polypectomy - 2    COLON SURGERY  2003    Polypectomy - 4    COLON SURGERY  11/2008    Polypectomy - 2    COLON SURGERY  8/2012    Polypectomy - 3    COLONOSCOPY      CORONARY ANGIOPLASTY  08/2017    cutting balloon angioplasty LAD diagonal    CORONARY ANGIOPLASTY Muscle spasms   ALPRAZolam (XANAX) 0.25 MG tablet, Take 0.125 mg by mouth daily as needed for Anxiety (takes 1/2 tab). albuterol (PROVENTIL HFA;VENTOLIN HFA) 108 (90 BASE) MCG/ACT inhaler, Inhale 2 puffs into the lungs every 6 hours as needed. traZODone (DESYREL) 50 MG tablet, Take 50 mg by mouth nightly. aspirin 81 MG tablet, Take 81 mg by mouth daily. zolpidem (AMBIEN) 10 MG tablet, Take 5 mg by mouth nightly as needed. nitroGLYCERIN (NITROSTAT) 0.4 MG SL tablet, up to max of 3 total doses. If no relief after 1 dose, call 911. Allergies: Other; Codeine; Darvocet [propoxyphene n-acetaminophen]; Hydrocodone-acetaminophen; Lyrica [pregabalin]; Morphine; and Percocet [oxycodone-acetaminophen]    Social History:   TOBACCO:   reports that she quit smoking about 53 years ago. Her smoking use included cigarettes. She has a 1.00 pack-year smoking history. She has never used smokeless tobacco.  ETOH:   reports no history of alcohol use. DRUGS:   reports no history of drug use. MARITAL STATUS:  Not   OCCUPATION:  Not working  Patient currently living with Family      Family History:       Problem Relation Age of Onset    Heart Disease Mother     High Blood Pressure Mother     Diabetes Mother     High Cholesterol Mother     Diabetes Brother      REVIEW OF SYSTEMS:  Constitutional: AMS  CV: neg  Pulmonary: neg  GI: neg  : neg  Psych: neg  Neuro: neg  Skin: neg  MusculoSkeletal: neg  HEENT: neg  Joints: neg  Vitals:  BP (!) 159/73   Pulse 72   Temp 98.5 °F (36.9 °C) (Temporal)   Resp 15   Ht 5' 5\" (1.651 m)   Wt 141 lb 8 oz (64.2 kg)   SpO2 93%   BMI 23.55 kg/m²     PHYSICAL EXAM:  Gen: NAD, alert, pleasant  HEENT: WNL  Lymph: no LAD  Neck: no JVD or masses  Chest: CTA bilat  CV: RRR  Abdomen: NT/ND  Extrem: no C/C/E  Neuro: non focal  Skin: no rashes  Joints: no redness  DATA:  I have reviewed the admission labs and imaging tests.     ASSESSMENT AND PLAN:      Active Problems:     AMS--- Neurology is following, initial work up ok, hold Tramadol    Chronic low back pain---supportive care    CAD---continue home medicine    HTN---follow with BP    Asthma---stable    DM2        Aaron Potter MD  5:11 PM 7/21/2020

## 2020-07-21 NOTE — PROGRESS NOTES
Patient's family updated on transfer to 11th floor today.   Electronically signed by Aissatou Estrada RN on 7/21/2020 at 3:44 PM

## 2020-07-21 NOTE — CARE COORDINATION
Patient is current with [facility] for SN/ST  Services. Will follow. Please advise when patient discharges. [address] [place] [address] [phone]. [phone].     Raeanne Boeck RN, Home Care Liaison  [phone] P  Electronically signed by Scout aCll RN on 7/21/2020 at 10:08 AM

## 2020-07-22 PROBLEM — Z51.5 PALLIATIVE CARE PATIENT: Status: ACTIVE | Noted: 2020-07-22

## 2020-07-22 LAB
GLUCOSE BLD-MCNC: 101 MG/DL (ref 70–99)
GLUCOSE BLD-MCNC: 110 MG/DL (ref 70–99)
PERFORMED ON: ABNORMAL
PERFORMED ON: ABNORMAL

## 2020-07-22 PROCEDURE — 6370000000 HC RX 637 (ALT 250 FOR IP): Performed by: FAMILY MEDICINE

## 2020-07-22 PROCEDURE — 1210000000 HC MED SURG R&B

## 2020-07-22 PROCEDURE — 82947 ASSAY GLUCOSE BLOOD QUANT: CPT

## 2020-07-22 PROCEDURE — 6360000002 HC RX W HCPCS: Performed by: FAMILY MEDICINE

## 2020-07-22 PROCEDURE — 2580000003 HC RX 258: Performed by: FAMILY MEDICINE

## 2020-07-22 RX ORDER — DOCUSATE SODIUM 100 MG/1
100 CAPSULE, LIQUID FILLED ORAL 2 TIMES DAILY
Status: DISCONTINUED | OUTPATIENT
Start: 2020-07-22 | End: 2020-07-24 | Stop reason: HOSPADM

## 2020-07-22 RX ORDER — DOCUSATE SODIUM 100 MG/1
100 CAPSULE, LIQUID FILLED ORAL 2 TIMES DAILY
Status: DISCONTINUED | OUTPATIENT
Start: 2020-07-22 | End: 2020-07-22

## 2020-07-22 RX ORDER — ACETAMINOPHEN 325 MG/1
650 TABLET ORAL EVERY 4 HOURS PRN
Status: DISCONTINUED | OUTPATIENT
Start: 2020-07-22 | End: 2020-07-24 | Stop reason: HOSPADM

## 2020-07-22 RX ORDER — POLYETHYLENE GLYCOL 3350 17 G/17G
17 POWDER, FOR SOLUTION ORAL DAILY
Status: DISCONTINUED | OUTPATIENT
Start: 2020-07-22 | End: 2020-07-24 | Stop reason: HOSPADM

## 2020-07-22 RX ADMIN — ATORVASTATIN CALCIUM 80 MG: 80 TABLET, FILM COATED ORAL at 20:37

## 2020-07-22 RX ADMIN — ISOSORBIDE MONONITRATE 60 MG: 60 TABLET, EXTENDED RELEASE ORAL at 07:56

## 2020-07-22 RX ADMIN — RANOLAZINE 1000 MG: 500 TABLET, FILM COATED, EXTENDED RELEASE ORAL at 07:56

## 2020-07-22 RX ADMIN — RANOLAZINE 1000 MG: 500 TABLET, FILM COATED, EXTENDED RELEASE ORAL at 20:37

## 2020-07-22 RX ADMIN — DOCUSATE SODIUM 100 MG: 100 CAPSULE, LIQUID FILLED ORAL at 20:37

## 2020-07-22 RX ADMIN — ENOXAPARIN SODIUM 40 MG: 40 INJECTION SUBCUTANEOUS at 17:32

## 2020-07-22 RX ADMIN — METOPROLOL SUCCINATE 50 MG: 50 TABLET, EXTENDED RELEASE ORAL at 09:15

## 2020-07-22 RX ADMIN — ASPIRIN 81 MG: 81 TABLET, COATED ORAL at 07:55

## 2020-07-22 RX ADMIN — POLYETHYLENE GLYCOL 3350 17 G: 17 POWDER, FOR SOLUTION ORAL at 15:15

## 2020-07-22 RX ADMIN — LISINOPRIL 10 MG: 10 TABLET ORAL at 20:37

## 2020-07-22 RX ADMIN — SODIUM CHLORIDE, PRESERVATIVE FREE 10 ML: 5 INJECTION INTRAVENOUS at 10:18

## 2020-07-22 RX ADMIN — METOPROLOL SUCCINATE 25 MG: 25 TABLET, EXTENDED RELEASE ORAL at 20:37

## 2020-07-22 RX ADMIN — DOCUSATE SODIUM 100 MG: 100 CAPSULE, LIQUID FILLED ORAL at 11:45

## 2020-07-22 RX ADMIN — ISOSORBIDE MONONITRATE 60 MG: 60 TABLET, EXTENDED RELEASE ORAL at 20:37

## 2020-07-22 RX ADMIN — CLONIDINE HYDROCHLORIDE 0.1 MG: 0.1 TABLET ORAL at 20:42

## 2020-07-22 RX ADMIN — CLOPIDOGREL BISULFATE 75 MG: 75 TABLET ORAL at 07:56

## 2020-07-22 RX ADMIN — SODIUM CHLORIDE, PRESERVATIVE FREE 10 ML: 5 INJECTION INTRAVENOUS at 20:37

## 2020-07-22 RX ADMIN — LISINOPRIL 10 MG: 10 TABLET ORAL at 07:56

## 2020-07-22 NOTE — PROGRESS NOTES
Patient was requested a stool softener. Contacted Aicha Pierre from 's office and was told they would call me back.  Electronically signed by Claudeen Buttery, RN on 7/22/2020 at 11:01 AM

## 2020-07-22 NOTE — PROGRESS NOTES
Assessed patient this morning and she was able to state her name, location, current year, and who the president is. Pt. Stated that she has been seeing unusual things (lobsters on the ground, walls moving) but then realizes that she should not be seeing these things and wants to Clare tests run to find out what is wrong. \" Will continue to monitor.  Electronically signed by Tiarra Rojas RN on 7/22/2020 at 9:07 AM

## 2020-07-22 NOTE — PROGRESS NOTES
Progress Note  Anabela   7/22/2020 2:29 PM  Subjective:   Admit Date:   7/20/2020      CC/ADMIT DX:       Interval History:   Reviewed overnight events and nursing notes. She had a fall yesterday. She is having episodes today of confusion. No HA. She denies any CP or SOA. I have reviewed all labs/diagnostics from the last 24hrs. ROS:   I have done a 10 point ROS and all are negative, except what is mentioned in the HPI. DIET GENERAL;    Medications:      docusate sodium  100 mg Oral BID    polyethylene glycol  17 g Oral Daily    sodium chloride flush  10 mL Intravenous 2 times per day    enoxaparin  40 mg Subcutaneous Q24H    Tdap-Dtap  0.5 mL Intramuscular Once    isosorbide mononitrate  60 mg Oral BID    lisinopril  10 mg Oral BID    metoprolol succinate  25 mg Oral Nightly    metoprolol succinate  50 mg Oral Daily    aspirin  81 mg Oral Daily    atorvastatin  80 mg Oral Daily    clopidogrel  75 mg Oral Daily    ranolazine  1,000 mg Oral BID           Objective:   Vitals: BP (!) 179/83   Pulse 79   Temp 98.2 °F (36.8 °C) (Temporal)   Resp 12   Ht 5' 5\" (1.651 m)   Wt 141 lb 8 oz (64.2 kg)   SpO2 95%   BMI 23.55 kg/m²      Intake/Output Summary (Last 24 hours) at 7/22/2020 1429  Last data filed at 7/22/2020 0930  Gross per 24 hour   Intake 360 ml   Output --   Net 360 ml     General appearance: alert and cooperative with exam  Lungs: clear to auscultation bilaterally  Heart: RRR  Abdomen: soft, non-tender; bowel sounds normal; no masses,  no organomegaly  Extremities: extremities normal, atraumatic, no cyanosis or edema  Neurologic:  No obvious focal neurologic deficits. Assessment and Plan: Active Problems:    Altered mental status    New onset seizure ([facility] [address]    Spondylolisthesis at L4-L5 level    Atherosclerotic cardiovascular disease    Palliative care patient  Resolved Problems:    * No resolved hospital problems. *      Plan:  1.   Continue present medication(s) 2.  Nursing to contact Neurology to relate change in pt  3. Tylenol for pain  4. Supportive care      Discharge planning:   her home  Reviewed treatment plans with the patient and/or family.              Electronically signed by Pelon Lechuga MD on 7/22/2020 at 2:29 PM

## 2020-07-22 NOTE — PROGRESS NOTES
Patient found in floor. Abrasion to right arm, mepilex applied. Patient says she hit her back on the bed and scraped her arm but did not hit her head. Dr. Kathryn Briseno notified and informed me to keep an eye on her and watch for changes. Will continue to monitor.     Electronically signed by Rodri Graham RN on 7/21/2020 at 9:05 PM

## 2020-07-22 NOTE — ACP (ADVANCE CARE PLANNING)
Advance Care Planning     Advance Care Planning Activator (Inpatient)  Conversation Note      Date of ACP Conversation: 7/22/2020    Conversation Conducted with: Patient    ACP Activator: Deo Garcia      [facility] Decision Maker:   Primary Decision Maker: Shanon Pickett - Child - [phone]    Primary Decision Maker: Georgia Holm - Child - [phone]  . Care Preferences    Ventilation: \"If you were in your present state of health and suddenly became very ill and were unable to breathe on your own, what would your preference be about the use of a ventilator (breathing machine) if it were available to you? \"      Would the patient desire the use of ventilator (breathing machine)?: No    \"If your health worsens and it becomes clear that your chance of recovery is unlikely, what would your preference be about the use of a ventilator (breathing machine) if it were available to you? \"     Would the patient desire the use of ventilator (breathing machine)?: No      Resuscitation  \"CPR works best to restart the heart when there is a sudden event, like a heart attack, in someone who is otherwise healthy. Unfortunately, CPR does not typically restart the heart for people who have serious health conditions or who are very sick. \"    \"In the event your heart stopped as a result of an underlying serious health condition, would you want attempts to be made to restart your heart (answer \"yes\" for attempt to resuscitate) or would you prefer a natural death (answer \"no\" for do not attempt to resuscitate)? \" No         [] Yes   [x] No   Educated Patient / Jerod Carlos regarding differences between Advance Directives and portable DNR orders.     Length of ACP Conversation in minutes:      Conversation Outcomes:  [x] ACP discussion completed  [x] Existing advance directive reviewed with patient; no changes to patient's previously recorded wishes  [] New Advance Directive completed  [] Portable Do Not Rescitate prepared for Provider review and signature  [] POLST/POST/MOLST/MOST prepared for Provider review and signature    Electronically signed by Shane Bowman on 7/22/2020 at 2:03 PM

## 2020-07-22 NOTE — PROGRESS NOTES
Palliative Care/Spiritual Care: Met with pt to initiate palliative care. Pt says she had multiple falls and has fibromyalgia. Pt is known to palliative care. Advance Directives: Pt has a POA and LW. Her daughters Myrna Alcantar and Dianne Ch are HCS. Copy of document is in MR. SEE ACP NOTE. Pain/other symptoms: Pt says she is having pain but \"there is nothing they can give me. \"          Social/Spiritual: Pt says [facility] [employer] [facility] [employer]. Pt/family discussion r/t goals: Pt has two daughters, and grandchildren. Pt says up until around three months ago she was able to ambulate without assistance in her home. Pt says she lives alone and she was also able to cook a little, clean a little, and took care of herself. Pt says her goal is possibly rehab with an eventual goal of returning home. Provided spiritual care with sustaining presence, nurtured hope, and prayer. Pt expressed gratitude for spiritual care.     Electronically signed by Zenaida Lucero on 7/22/2020 at 1:53 PM

## 2020-07-23 LAB
GLUCOSE BLD-MCNC: 103 MG/DL (ref 70–99)
GLUCOSE BLD-MCNC: 155 MG/DL (ref 70–99)
GLUCOSE BLD-MCNC: 155 MG/DL (ref 70–99)
GLUCOSE BLD-MCNC: 158 MG/DL (ref 70–99)
PERFORMED ON: ABNORMAL

## 2020-07-23 PROCEDURE — 1210000000 HC MED SURG R&B

## 2020-07-23 PROCEDURE — 6370000000 HC RX 637 (ALT 250 FOR IP): Performed by: FAMILY MEDICINE

## 2020-07-23 PROCEDURE — 2580000003 HC RX 258: Performed by: FAMILY MEDICINE

## 2020-07-23 PROCEDURE — 99233 SBSQ HOSP IP/OBS HIGH 50: CPT | Performed by: PSYCHIATRY & NEUROLOGY

## 2020-07-23 PROCEDURE — 6360000002 HC RX W HCPCS: Performed by: FAMILY MEDICINE

## 2020-07-23 PROCEDURE — 82947 ASSAY GLUCOSE BLOOD QUANT: CPT

## 2020-07-23 RX ORDER — LISINOPRIL 20 MG/1
20 TABLET ORAL 2 TIMES DAILY
Status: DISCONTINUED | OUTPATIENT
Start: 2020-07-23 | End: 2020-07-24 | Stop reason: HOSPADM

## 2020-07-23 RX ADMIN — ASPIRIN 81 MG: 81 TABLET, COATED ORAL at 08:00

## 2020-07-23 RX ADMIN — CLONIDINE HYDROCHLORIDE 0.1 MG: 0.1 TABLET ORAL at 08:01

## 2020-07-23 RX ADMIN — ISOSORBIDE MONONITRATE 60 MG: 60 TABLET, EXTENDED RELEASE ORAL at 20:56

## 2020-07-23 RX ADMIN — DOCUSATE SODIUM 100 MG: 100 CAPSULE, LIQUID FILLED ORAL at 20:56

## 2020-07-23 RX ADMIN — ISOSORBIDE MONONITRATE 60 MG: 60 TABLET, EXTENDED RELEASE ORAL at 08:00

## 2020-07-23 RX ADMIN — ACETAMINOPHEN 650 MG: 325 TABLET, FILM COATED ORAL at 16:18

## 2020-07-23 RX ADMIN — RANOLAZINE 1000 MG: 500 TABLET, FILM COATED, EXTENDED RELEASE ORAL at 20:56

## 2020-07-23 RX ADMIN — SODIUM CHLORIDE, PRESERVATIVE FREE 10 ML: 5 INJECTION INTRAVENOUS at 20:58

## 2020-07-23 RX ADMIN — CLOPIDOGREL BISULFATE 75 MG: 75 TABLET ORAL at 08:00

## 2020-07-23 RX ADMIN — RANOLAZINE 1000 MG: 500 TABLET, FILM COATED, EXTENDED RELEASE ORAL at 07:59

## 2020-07-23 RX ADMIN — METOPROLOL SUCCINATE 25 MG: 25 TABLET, EXTENDED RELEASE ORAL at 20:56

## 2020-07-23 RX ADMIN — ONDANSETRON 4 MG: 2 INJECTION INTRAMUSCULAR; INTRAVENOUS at 16:14

## 2020-07-23 RX ADMIN — METOPROLOL SUCCINATE 50 MG: 50 TABLET, EXTENDED RELEASE ORAL at 07:59

## 2020-07-23 RX ADMIN — LISINOPRIL 10 MG: 10 TABLET ORAL at 07:59

## 2020-07-23 RX ADMIN — ATORVASTATIN CALCIUM 80 MG: 80 TABLET, FILM COATED ORAL at 20:56

## 2020-07-23 RX ADMIN — LISINOPRIL 20 MG: 20 TABLET ORAL at 20:56

## 2020-07-23 RX ADMIN — ACETAMINOPHEN 650 MG: 325 TABLET, FILM COATED ORAL at 20:56

## 2020-07-23 RX ADMIN — ONDANSETRON 4 MG: 2 INJECTION INTRAMUSCULAR; INTRAVENOUS at 09:22

## 2020-07-23 RX ADMIN — ENOXAPARIN SODIUM 40 MG: 40 INJECTION SUBCUTANEOUS at 16:14

## 2020-07-23 ASSESSMENT — PAIN SCALES - GENERAL
PAINLEVEL_OUTOF10: 6
PAINLEVEL_OUTOF10: 5

## 2020-07-23 NOTE — PROGRESS NOTES
[address], [address]  [place], [place] [address]  Phone [phone]     Neurology Progress Note  2020 1:07 PM  Information:   Patient Name: Chrystal Mancera  :   1945  Age:   76 y.o. MRN:   [id number]  Account #:  [de-identified]  Admit Date:   2020  Today:  20     ADMIT DX:   Altered mental status    Subjective:     Leslie Griffin a 76 y. o. year old woman with a history of multiple medical problems who was admitted  with altered mental status and hypertension. Interval History:   Ms. Howard Montemayor was seen in the ER and the day after in the ICU. I expected she would go home but she did not. She has been confused yesterday and had some visual hallucinations. She is much better today. She is alert and oriented. She had some visual hallucinations yesterday but these are better today. Her daughter relates that she has had cognitive decline for some time, 3 years form medical records.      Objective:     Past Medical History:  Past Medical History:   Diagnosis Date    Asthma     CAD (coronary artery disease)     Chest pain     Depression with anxiety     Diabetes mellitus (HCC)     GERD (gastroesophageal reflux disease)     Glucose intolerance (impaired glucose tolerance)     Hyperlipidemia     Cholesterol management per Aayush Bruno Hypertension     Lupus (systemic lupus erythematosus) ([facility] [address] [facility]     Malaise and fatigue 10/6/2017    Movement disorder     Sees pain management for neck pain and previous surgery    New onset seizure ([facility] [address]     Osteoarthritis     Palliative care patient 2020    S/P CABG x 3 2013    PACABG X 3, LIMA-LAD, SVG0OM, SVG-DIAG, RT EVH, DR Jimmy Harris       Past Surgical History:   Procedure Laterality Date    APPENDECTOMY  1965    BACK SURGERY      CARDIAC CATHETERIZATION  11    EF 60%    CARDIAC CATHETERIZATION  16    drug eluting stent to RCA    CARDIAC CATHETERIZATION  2016    CARDIAC CATHETERIZATION 2/19/16    CARDIAC CATHETERIZATION  08/24/2017    cutting balloon angioplasty ot LAD    CARDIAC CATHETERIZATION  04/13/2020    TWO Stents placed    COLON SURGERY  1992    Polypectomy - 2    COLON SURGERY  2003    Polypectomy - 4    COLON SURGERY  11/2008    Polypectomy - 2    COLON SURGERY  8/2012    Polypectomy - 3    COLONOSCOPY      CORONARY ANGIOPLASTY  08/2017    cutting balloon angioplasty LAD diagonal    CORONARY ANGIOPLASTY WITH STENT PLACEMENT  3/22/2007    CORONARY ANGIOPLASTY WITH STENT PLACEMENT  8/2007    CORONARY ARTERY BYPASS GRAFT  3/5/2013    PACABG X 3, LIMA-LAD, SVG0OM, SVG-DIAG, RT EVH, DR PATEL    COSMETIC SURGERY      deviated septum and rhinoplasty    CYST REMOVAL  1970    DIAGNOSTIC CARDIAC CATH LAB PROCEDURE      ENDOSCOPY, COLON, DIAGNOSTIC      EYE SURGERY  03/2019    cataracts    HEMORRHOID SURGERY  1987    HYSTERECTOMY  1983    Partial    NASAL SEPTUM SURGERY  1977    SINUS SURGERY  2003    Nasal Polyps Removed    SPINE SURGERY  3/6/2006    Lumbar Laminectomy L4&5    SPINE SURGERY  3/6/2006    Cervical Fusion - C4,5,6    TONSILLECTOMY  1968       Family History   Problem Relation Age of Onset    Heart Disease Mother     High Blood Pressure Mother     Diabetes Mother     High Cholesterol Mother     Diabetes Brother        Social History     Socioeconomic History    Marital status:       Spouse name: Not on file    Number of children: Not on file    Years of education: Not on file    Highest education level: Not on file   Occupational History    Not on file   Social Needs    Financial resource strain: Not on file    Food insecurity     Worry: Not on file     Inability: Not on file    Transportation needs     Medical: Not on file     Non-medical: Not on file   Tobacco Use    Smoking status: Former Smoker     Packs/day: 1.00     Years: 1.00     Pack years: 1.00     Types: Cigarettes     Last attempt to quit: 2/26/1967     Years since quitting: 32)        Assessment:   1.         Possible seizure and post ictal period.  She takes Tramadol that could contribute.  Alternatively could have just been medication side effects or accidental overuse. 2.         Encephalopathy, improving  3. Chronic dementia, likely vascular  Her history per the medical records, daughters report suggest that Ms. Keiry Dhillon has progressive dementia. 4. Hypertension - much better  5.         Chronic back pain.  She has spondylosis with spondylolisthesis  6.         CAD    Plan:   1. Eliminate potentially exacerbating medications  2. Consider use of Aricept or Namenda. Not prescribed due to multiple medications. 3. FU with neurology PRN. Discharge planning:   Home    Reviewed treatment plans with the patient and/or family. 35 minutes spent in face to face interaction and coordination of care.      Electronically signed by Melvin Gunter MD on 7/23/2020 at 1:07 PM

## 2020-07-23 NOTE — PROGRESS NOTES
Progress Note  Niki Cornell  7/23/2020 6:20 PM  Subjective:   Admit Date:   7/20/2020      CC/ADMIT DX:       Interval History:   Reviewed overnight events and nursing notes. She is feeling better. Back to baseline. No falls. No CP or SOA. I have reviewed all labs/diagnostics from the last 24hrs. ROS:   I have done a 10 point ROS and all are negative, except what is mentioned in the HPI. DIET GENERAL;    Medications:      lisinopril  20 mg Oral BID    docusate sodium  100 mg Oral BID    polyethylene glycol  17 g Oral Daily    sodium chloride flush  10 mL Intravenous 2 times per day    enoxaparin  40 mg Subcutaneous Q24H    Tdap-Dtap  0.5 mL Intramuscular Once    isosorbide mononitrate  60 mg Oral BID    metoprolol succinate  25 mg Oral Nightly    metoprolol succinate  50 mg Oral Daily    aspirin  81 mg Oral Daily    atorvastatin  80 mg Oral Daily    clopidogrel  75 mg Oral Daily    ranolazine  1,000 mg Oral BID           Objective:   Vitals: /85   Pulse 75   Temp 97.8 °F (36.6 °C) (Temporal)   Resp 18   Ht 5' 5\" (1.651 m)   Wt 141 lb 8 oz (64.2 kg)   SpO2 96%   BMI 23.55 kg/m²      Intake/Output Summary (Last 24 hours) at 7/23/2020 1820  Last data filed at 7/23/2020 1431  Gross per 24 hour   Intake 1200 ml   Output --   Net 1200 ml     General appearance: alert and cooperative with exam  Lungs: clear to auscultation bilaterally  Heart: RRR  Abdomen: soft, non-tender; bowel sounds normal; no masses,  no organomegaly  Extremities: extremities normal, atraumatic, no cyanosis or edema  Neurologic:  No obvious focal neurologic deficits. Assessment and Plan: Active Problems:    Altered mental status    New onset seizure ([address]    Spondylolisthesis at L4-L5 level    Atherosclerotic cardiovascular disease    Palliative care patient  Resolved Problems:    * No resolved hospital problems. *      Plan:  1. Continue present medication(s)   2. Increase activity  3.   Plan for d/c tomorrow if she remains stable  4. Adjust BP meds      Discharge planning:   her home  Reviewed treatment plans with the patient and/or family.              Electronically signed by Emmanuel Opitz, MD on 7/23/2020 at 6:20 PM

## 2020-07-23 NOTE — PLAN OF CARE
Problem: Falls - Risk of:  Goal: Will remain free from falls  Description: Will remain free from falls  Outcome: Ongoing  Goal: Absence of physical injury  Description: Absence of physical injury  Outcome: Ongoing     Problem: Skin Integrity:  Goal: Will show no infection signs and symptoms  Description: Will show no infection signs and symptoms  Outcome: Ongoing  Goal: Absence of new skin breakdown  Description: Absence of new skin breakdown  Outcome: Ongoing     Problem: Pain:  Goal: Pain level will decrease  Description: Pain level will decrease  Outcome: Ongoing  Goal: Control of acute pain  Description: Control of acute pain  Outcome: Ongoing  Goal: Control of chronic pain  Description: Control of chronic pain  Outcome: Ongoing     Problem: Mental Status - Impaired:  Goal: Mental status will improve  Description: Mental status will improve  Outcome: Ongoing

## 2020-07-24 VITALS
WEIGHT: 141.5 LBS | SYSTOLIC BLOOD PRESSURE: 152 MMHG | RESPIRATION RATE: 18 BRPM | TEMPERATURE: 99.2 F | DIASTOLIC BLOOD PRESSURE: 79 MMHG | HEART RATE: 79 BPM | BODY MASS INDEX: 23.57 KG/M2 | OXYGEN SATURATION: 97 % | HEIGHT: 65 IN

## 2020-07-24 LAB
GLUCOSE BLD-MCNC: 116 MG/DL (ref 70–99)
GLUCOSE BLD-MCNC: 210 MG/DL (ref 70–99)
PERFORMED ON: ABNORMAL
PERFORMED ON: ABNORMAL

## 2020-07-24 PROCEDURE — 82947 ASSAY GLUCOSE BLOOD QUANT: CPT

## 2020-07-24 PROCEDURE — 2580000003 HC RX 258: Performed by: FAMILY MEDICINE

## 2020-07-24 PROCEDURE — 99232 SBSQ HOSP IP/OBS MODERATE 35: CPT | Performed by: PSYCHIATRY & NEUROLOGY

## 2020-07-24 PROCEDURE — 6370000000 HC RX 637 (ALT 250 FOR IP): Performed by: FAMILY MEDICINE

## 2020-07-24 RX ORDER — PSEUDOEPHEDRINE HCL 30 MG
100 TABLET ORAL 2 TIMES DAILY
Qty: 60 CAPSULE | Refills: 0 | Status: ON HOLD | OUTPATIENT
Start: 2020-07-24 | End: 2020-11-11 | Stop reason: SDUPTHER

## 2020-07-24 RX ORDER — LISINOPRIL 20 MG/1
20 TABLET ORAL 2 TIMES DAILY
Qty: 30 TABLET | Refills: 3 | Status: ON HOLD | OUTPATIENT
Start: 2020-07-24 | End: 2020-11-11 | Stop reason: HOSPADM

## 2020-07-24 RX ORDER — POLYETHYLENE GLYCOL 3350 17 G/17G
17 POWDER, FOR SOLUTION ORAL DAILY
Qty: 527 G | Refills: 1 | Status: SHIPPED | OUTPATIENT
Start: 2020-07-25 | End: 2020-08-24

## 2020-07-24 RX ADMIN — CLOPIDOGREL BISULFATE 75 MG: 75 TABLET ORAL at 09:06

## 2020-07-24 RX ADMIN — METOPROLOL SUCCINATE 50 MG: 50 TABLET, EXTENDED RELEASE ORAL at 09:06

## 2020-07-24 RX ADMIN — LISINOPRIL 20 MG: 20 TABLET ORAL at 09:06

## 2020-07-24 RX ADMIN — RANOLAZINE 1000 MG: 500 TABLET, FILM COATED, EXTENDED RELEASE ORAL at 09:06

## 2020-07-24 RX ADMIN — ASPIRIN 81 MG: 81 TABLET, COATED ORAL at 09:06

## 2020-07-24 RX ADMIN — ACETAMINOPHEN 650 MG: 325 TABLET, FILM COATED ORAL at 09:09

## 2020-07-24 RX ADMIN — SODIUM CHLORIDE, PRESERVATIVE FREE 10 ML: 5 INJECTION INTRAVENOUS at 09:10

## 2020-07-24 RX ADMIN — DOCUSATE SODIUM 100 MG: 100 CAPSULE, LIQUID FILLED ORAL at 09:06

## 2020-07-24 RX ADMIN — ISOSORBIDE MONONITRATE 60 MG: 60 TABLET, EXTENDED RELEASE ORAL at 09:06

## 2020-07-24 ASSESSMENT — PAIN SCALES - GENERAL: PAINLEVEL_OUTOF10: 4

## 2020-07-24 NOTE — PROGRESS NOTES
Polypectomy - 3    COLONOSCOPY      CORONARY ANGIOPLASTY  2017    cutting balloon angioplasty LAD diagonal    CORONARY ANGIOPLASTY WITH STENT PLACEMENT  3/22/2007    CORONARY ANGIOPLASTY WITH STENT PLACEMENT  2007    CORONARY ARTERY BYPASS GRAFT  3/5/2013    PACABG X 3, LIMA-LAD, SVG0OM, SVG-DIAG, RT EVH, DR PATEL    COSMETIC SURGERY      deviated septum and rhinoplasty    CYST REMOVAL  1970    DIAGNOSTIC CARDIAC CATH LAB PROCEDURE      ENDOSCOPY, COLON, DIAGNOSTIC      EYE SURGERY  2019    cataracts    HEMORRHOID SURGERY  1987    HYSTERECTOMY  1983    Partial    NASAL SEPTUM [address] SINUS SURGERY      Nasal Polyps Removed    SPINE SURGERY  3/6/2006    Lumbar Laminectomy L4&5    SPINE SURGERY  3/6/2006    Cervical Fusion - C4,5,6    TONSILLECTOMY  1968       Family History   Problem Relation Age of Onset    Heart Disease Mother     High Blood Pressure Mother     Diabetes Mother     High Cholesterol Mother     Diabetes Brother        Social History     Socioeconomic History    Marital status:       Spouse name: Not on file    Number of children: Not on file    Years of education: Not on file    Highest education level: Not on file   Occupational History    Not on file   Social Needs    Financial resource strain: Not on file    Food insecurity     Worry: Not on file     Inability: Not on file    Transportation needs     Medical: Not on file     Non-medical: Not on file   Tobacco Use    Smoking status: Former Smoker     Packs/day: 1.00     Years: 1.00     Pack years: 1.00     Types: Cigarettes     Last attempt to quit: 1967     Years since quittin.4    Smokeless tobacco: Never Used   Substance and Sexual Activity    Alcohol use: No    Drug use: No    Sexual activity: Not Currently   Lifestyle    Physical activity     Days per week: Not on file     Minutes per session: Not on file    Stress: Not on file   Relationships    Social connections Talks on phone: Not on file     Gets together: Not on file     Attends Spiritism service: Not on file     Active member of club or organization: Not on file     Attends meetings of clubs or organizations: Not on file     Relationship status: Not on file    Intimate partner violence     Fear of current or ex partner: Not on file     Emotionally abused: Not on file     Physically abused: Not on file     Forced sexual activity: Not on file   Other Topics Concern    Not on file   Social History Narrative    Not on file       Medications:     lisinopril  20 mg Oral BID    docusate sodium  100 mg Oral BID    polyethylene glycol  17 g Oral Daily    sodium chloride flush  10 mL Intravenous 2 times per day    enoxaparin  40 mg Subcutaneous Q24H    Tdap-Dtap  0.5 mL Intramuscular Once    isosorbide mononitrate  60 mg Oral BID    metoprolol succinate  25 mg Oral Nightly    metoprolol succinate  50 mg Oral Daily    aspirin  81 mg Oral Daily    atorvastatin  80 mg Oral Daily    clopidogrel  75 mg Oral Daily    ranolazine  1,000 mg Oral BID       Diagnostic Studies:  Reviewed all labs/diagnositcs since last 24hrs:  Recent Results (from the past 24 hour(s))   POCT Glucose    Collection Time: 07/23/20  4:36 PM   Result Value Ref Range    POC Glucose 155 (H) 70 - 99 mg/dl    Performed on [employer]    POCT Glucose    Collection Time: 07/23/20  9:25 PM   Result Value Ref Range    POC Glucose 158 (H) 70 - 99 mg/dl    Performed on [employer]    POCT Glucose    Collection Time: 07/24/20  7:52 AM   Result Value Ref Range    POC Glucose 116 (H) 70 - 99 mg/dl    Performed on [employer]    POCT Glucose    Collection Time: 07/24/20 12:15 PM   Result Value Ref Range    POC Glucose 210 (H) 70 - 99 mg/dl    Performed on [employer]        Diet:  DIET GENERAL;    Examination:  Vitals: BP (!) 152/79   Pulse 79   Temp 99.2 °F (37.3 °C) (Temporal)   Resp 18   Ht 5' 5\" (1.651 m)   Wt 141 lb 8 oz (64.2 kg)   SpO2 97%   BMI 23.55 kg/m² Intake/Output Summary (Last 24 hours) at 7/24/2020 1414  Last data filed at 7/24/2020 1000  Gross per 24 hour   Intake 1110 ml   Output --   Net 1110 ml     General appearance:  She is awake and fairly alert.  She appears in no distress. Skin: Skin color, texture, turgor normal. No rashes or lesions  HEENT: atraumatic and normocephalic.  She does have a right lower lip small irregular laceration and bruise  Neck:no adenopathy, no carotid bruit, no JVD, supple, symmetrical, trachea midline and thyroid not enlarged, symmetric, no tenderness/mass/nodules  Lungs: clear to auscultation bilaterally  Heart: regular rate and rhythm, S1, S2 normal, no murmur, click, rub or gallop  Abdomen: soft, non-tender; bowel sounds normal; no masses,  no organomegaly  Extremities:  Mild peripheral edema  Neurologic:  Alert, oriented to , 2020, not month.  No dysarthria.  Speech is fluent.  EOMI, PERRL, VFF.  No facial weakness.  Limb strength is normal.  No pronator drift.  Rapid alternating movements are normal.  Finger to nose testing shows no dysmetria. Assessment:   1.         Possible seizure and post ictal period.  She takes Tramadol that could contribute.  Alternatively could have just been medication side effects or accidental overuse. 2.         Encephalopathy, improving  3. Chronic dementia, likely vascular  Her history per the medical records, daughters report suggest that Ms. Cassandra Meza has progressive dementia. 4.         Hypertension - much better  5.         Chronic back pain.  She has spondylosis with spondylolisthesis  6.         CAD    Plan:   1. Home today. FU with me in a month  2. Advised no driving for now     Discharge planning:   Home with [facility]    Reviewed treatment plans with the patient and/or family. 25 minutes spent in face to face interaction and coordination of care.      Electronically signed by Richard Manjarrez MD on 7/24/2020 at 2:14 PM

## 2020-07-24 NOTE — CARE COORDINATION
[facility] notified of patient discharge today. Called Dr Harjit Strong office for [facility] orders. Hospital information faxed. PLEASE FAX DC SUMMARY AND MED LIST WHEN AVAILABLE [phone].    Electronically signed by Bryan Rodriguez RN on 7/24/20 at 3:22 PM CDT

## 2020-07-25 LAB
BLOOD CULTURE, ROUTINE: NORMAL
CULTURE, BLOOD 2: NORMAL

## 2020-08-03 RX ORDER — ISOSORBIDE MONONITRATE 60 MG/1
TABLET, EXTENDED RELEASE ORAL
Qty: 180 TABLET | Refills: 3 | Status: SHIPPED | OUTPATIENT
Start: 2020-08-03 | End: 2021-09-20

## 2020-08-04 ENCOUNTER — HOSPITAL ENCOUNTER (OUTPATIENT)
Dept: PAIN MANAGEMENT | Age: 75
Discharge: HOME | End: 2020-08-04
Payer: MEDICARE

## 2020-08-04 VITALS
OXYGEN SATURATION: 98 % | RESPIRATION RATE: 20 BRPM | DIASTOLIC BLOOD PRESSURE: 77 MMHG | SYSTOLIC BLOOD PRESSURE: 174 MMHG | HEART RATE: 68 BPM | TEMPERATURE: 96.7 F

## 2020-08-04 PROCEDURE — 6360000002 HC RX W HCPCS

## 2020-08-04 PROCEDURE — G0260 INJ FOR SACROILIAC JT ANESTH: HCPCS

## 2020-08-04 PROCEDURE — 2580000003 HC RX 258

## 2020-08-04 PROCEDURE — 2500000003 HC RX 250 WO HCPCS

## 2020-08-04 PROCEDURE — 3209999900 FLUORO FOR SURGICAL PROCEDURES

## 2020-08-04 RX ORDER — SODIUM CHLORIDE 9 MG/ML
INJECTION INTRAVENOUS
Status: COMPLETED | OUTPATIENT
Start: 2020-08-04 | End: 2020-08-04

## 2020-08-04 RX ORDER — BUPIVACAINE HYDROCHLORIDE 5 MG/ML
INJECTION, SOLUTION EPIDURAL; INTRACAUDAL
Status: COMPLETED | OUTPATIENT
Start: 2020-08-04 | End: 2020-08-04

## 2020-08-04 RX ORDER — LIDOCAINE HYDROCHLORIDE 10 MG/ML
INJECTION, SOLUTION EPIDURAL; INFILTRATION; INTRACAUDAL; PERINEURAL
Status: COMPLETED | OUTPATIENT
Start: 2020-08-04 | End: 2020-08-04

## 2020-08-04 RX ORDER — METHYLPREDNISOLONE ACETATE 80 MG/ML
INJECTION, SUSPENSION INTRA-ARTICULAR; INTRALESIONAL; INTRAMUSCULAR; SOFT TISSUE
Status: COMPLETED | OUTPATIENT
Start: 2020-08-04 | End: 2020-08-04

## 2020-08-04 RX ADMIN — SODIUM CHLORIDE 3 ML: 9 INJECTION INTRAVENOUS at 10:05

## 2020-08-04 RX ADMIN — LIDOCAINE HYDROCHLORIDE 7 ML: 10 INJECTION, SOLUTION EPIDURAL; INFILTRATION; INTRACAUDAL; PERINEURAL at 10:04

## 2020-08-04 RX ADMIN — BUPIVACAINE HYDROCHLORIDE 2 ML: 5 INJECTION, SOLUTION EPIDURAL; INTRACAUDAL at 10:04

## 2020-08-04 RX ADMIN — METHYLPREDNISOLONE ACETATE 80 MG: 80 INJECTION, SUSPENSION INTRA-ARTICULAR; INTRALESIONAL; INTRAMUSCULAR; SOFT TISSUE at 10:05

## 2020-08-04 ASSESSMENT — PAIN DESCRIPTION - FREQUENCY: FREQUENCY: CONTINUOUS

## 2020-08-04 ASSESSMENT — PAIN DESCRIPTION - ONSET: ONSET: AWAKENED FROM SLEEP

## 2020-08-04 ASSESSMENT — PAIN - FUNCTIONAL ASSESSMENT
PAIN_FUNCTIONAL_ASSESSMENT: PREVENTS OR INTERFERES SOME ACTIVE ACTIVITIES AND ADLS
PAIN_FUNCTIONAL_ASSESSMENT: 0-10

## 2020-08-04 ASSESSMENT — PAIN SCALES - GENERAL: PAINLEVEL_OUTOF10: 6

## 2020-08-04 ASSESSMENT — PAIN DESCRIPTION - DESCRIPTORS: DESCRIPTORS: ACHING;CONSTANT

## 2020-08-04 ASSESSMENT — PAIN DESCRIPTION - PAIN TYPE: TYPE: CHRONIC PAIN

## 2020-08-04 ASSESSMENT — PAIN DESCRIPTION - ORIENTATION: ORIENTATION: MID;LEFT

## 2020-08-04 ASSESSMENT — PAIN DESCRIPTION - PROGRESSION: CLINICAL_PROGRESSION: GRADUALLY WORSENING

## 2020-08-04 ASSESSMENT — PAIN DESCRIPTION - LOCATION: LOCATION: BACK;LEG;BUTTOCKS

## 2020-08-04 NOTE — INTERVAL H&P NOTE
Update History & Physical    The patient's History and Physical was reviewed with the patient and I examined the patient. There was NO CHANGE:[id number]}. The surgical site was confirmed by the patient and me. Plan: The risks, benefits, expected outcome, and alternative to the recommended procedure have been discussed with the patient. Patient understands and wants to proceed with the procedure.      Electronically signed by Hattie Boyd MD on 8/4/2020 at 10:06 AM

## 2020-08-04 NOTE — PROGRESS NOTES
Procedure:  Level of Consciousness: [x]Alert [x]Oriented []Disoriented []Lethargic  Anxiety Level: [x]Calm []Anxious []Depressed []Other  Skin: [x]Warm [x]Dry []Cool []Moist []Intact []Other  Cardiovascular: []Palpitations: [x]Never []Occasionally []Frequently  Chest Pain: []No [x]Yes  Respiratory:  [x]Unlabored []Labored []Cough ([] Productive []Unproductive)  HCG Required: [x]No []Yes   Results: []Negative []Positive  Knowledge Level:        []Patient/Other verbalized understanding of pre-procedure instructions. []Assessment of post-op care needs (transportation, responsible caregiver)        []Able to discuss health care problems and how to deal with it.   Factors that Affect Teaching:        Language Barrier: [x]No []Yes - why:        Hearing Loss:        [x]No []Yes            Corrective Device:  []Yes []No        Vision Loss:           [x]No []Yes            Corrective Device:  []Yes []No        Memory Loss:       [x]No []Yes            []Short Term []Long Term  Motivational Level:  []Asks Questions                  []Extremely Anxious       []Seems Interested               []Seems Uninterested                  []Denies need for Education  Risk for Injury:  []Patient oriented to person, place and time  []History of frequent falls/loss of balance  Nutritional:  []Change in appetite   []Weight Gain   []Weight Loss  Functional:  []Requires assistance with ADL's

## 2020-08-05 NOTE — DISCHARGE SUMMARY
Hospital Discharge Summary    Marixa Lloyd  :  1945  MRN:  [id number]    Admit date:  2020  Discharge date:  2020    Admitting Physician:    Alana Gibbons MD    Discharge Diagnoses: Active Problems:    Altered mental status    New onset seizure ([address]    Spondylolisthesis at L4-L5 level    Atherosclerotic cardiovascular disease    Palliative care patient  Resolved Problems:    * No resolved hospital problems. [facility] Course:   She was admitted with AMS, ? Seizure. She was stabilized and seen by Neurology. They have recommend to d/c Tramadol and monitor as an outpatient. She is stable on d/c and back to baseline. Discharge Medications:       Lori Nascimento   Home Medication Instructions OTN:[id number]    Printed on:20 0003   Medication Information                      albuterol (PROVENTIL HFA;VENTOLIN HFA) 108 (90 BASE) MCG/ACT inhaler  Inhale 2 puffs into the lungs every 6 hours as needed. ALPRAZolam (XANAX) 0.25 MG tablet  Take 0.125 mg by mouth daily as needed for Anxiety (takes 1/2 tab). aspirin 81 MG tablet  Take 81 mg by mouth daily. atorvastatin (LIPITOR) 80 MG tablet  Take 1 tablet by mouth daily             clopidogrel (PLAVIX) 75 MG tablet  Take 1 tablet by mouth daily             Cyanocobalamin (VITAMIN B 12 PO)  Take 1,000 mcg by mouth daily             docusate sodium (COLACE, DULCOLAX) 100 MG CAPS  Take 100 mg by mouth 2 times daily             lisinopril (PRINIVIL;ZESTRIL) 20 MG tablet  Take 1 tablet by mouth 2 times daily             metFORMIN (GLUCOPHAGE) 500 MG tablet  Take 1 tablet by mouth 2 times daily Please hold Metformin for 48 hours after cardiac catheterization.  May resume on 4/15/20             metoprolol succinate (TOPROL XL) 25 MG extended release tablet  Take 50 mg by mouth daily              metoprolol succinate (TOPROL XL) 25 MG extended release tablet  Take 25 mg by mouth nightly nitroGLYCERIN (NITROSTAT) 0.4 MG SL tablet  up to max of 3 total doses. If no relief after 1 dose, call 911. polyethylene glycol (GLYCOLAX) 17 g packet  Take 17 g by mouth daily             promethazine (PHENERGAN) 25 MG tablet  Take 25 mg by mouth every 6 hours as needed for Nausea             ranolazine (RANEXA) 1000 MG extended release tablet  TAKE 1 TABLET TWICE A DAY             traZODone (DESYREL) 50 MG tablet  Take 50 mg by mouth nightly. Consults:  Neurology    Significant Diagnostic Studies:  See complete admission record      Disposition:   Home in stable condition  Follow up with Estevan Deutsch MD in 1-2 weeks. F/U with Neurology as they recommend    Diet: as tolerated    Activity: no driving    Special Instructions: no driving      The patient or family are to call or return if there are any problems, questions, concerns or change in her condition.      Signed:  Estevan Deutsch MD  8/5/2020, 12:03 AM

## 2020-09-17 ENCOUNTER — HOSPITAL ENCOUNTER (OUTPATIENT)
Dept: VASCULAR LAB | Age: 75
Discharge: HOME | End: 2020-09-17
Payer: MEDICARE

## 2020-09-17 PROCEDURE — 93880 EXTRACRANIAL BILAT STUDY: CPT

## 2020-10-06 ENCOUNTER — HOSPITAL ENCOUNTER (OUTPATIENT)
Dept: PAIN MANAGEMENT | Age: 75
Discharge: HOME | End: 2020-10-06
Payer: MEDICARE

## 2020-10-06 VITALS
HEART RATE: 60 BPM | OXYGEN SATURATION: 98 % | TEMPERATURE: 97.6 F | RESPIRATION RATE: 18 BRPM | DIASTOLIC BLOOD PRESSURE: 80 MMHG | SYSTOLIC BLOOD PRESSURE: 181 MMHG

## 2020-10-06 PROCEDURE — 62323 NJX INTERLAMINAR LMBR/SAC: CPT

## 2020-10-06 PROCEDURE — 2580000003 HC RX 258

## 2020-10-06 PROCEDURE — 6360000002 HC RX W HCPCS

## 2020-10-06 PROCEDURE — 2500000003 HC RX 250 WO HCPCS

## 2020-10-06 PROCEDURE — 3209999900 FLUORO FOR SURGICAL PROCEDURES

## 2020-10-06 RX ORDER — METHYLPREDNISOLONE ACETATE 80 MG/ML
INJECTION, SUSPENSION INTRA-ARTICULAR; INTRALESIONAL; INTRAMUSCULAR; SOFT TISSUE
Status: COMPLETED | OUTPATIENT
Start: 2020-10-06 | End: 2020-10-06

## 2020-10-06 RX ORDER — LIDOCAINE HYDROCHLORIDE 10 MG/ML
INJECTION, SOLUTION EPIDURAL; INFILTRATION; INTRACAUDAL; PERINEURAL
Status: COMPLETED | OUTPATIENT
Start: 2020-10-06 | End: 2020-10-06

## 2020-10-06 RX ORDER — SODIUM CHLORIDE 9 MG/ML
INJECTION INTRAVENOUS
Status: COMPLETED | OUTPATIENT
Start: 2020-10-06 | End: 2020-10-06

## 2020-10-06 RX ADMIN — METHYLPREDNISOLONE ACETATE 80 MG: 80 INJECTION, SUSPENSION INTRA-ARTICULAR; INTRALESIONAL; INTRAMUSCULAR; SOFT TISSUE at 10:39

## 2020-10-06 RX ADMIN — SODIUM CHLORIDE 5 ML: 9 INJECTION INTRAVENOUS at 10:38

## 2020-10-06 RX ADMIN — LIDOCAINE HYDROCHLORIDE 5 ML: 10 INJECTION, SOLUTION EPIDURAL; INFILTRATION; INTRACAUDAL; PERINEURAL at 10:38

## 2020-10-06 ASSESSMENT — PAIN DESCRIPTION - ONSET: ONSET: GRADUAL

## 2020-10-06 ASSESSMENT — PAIN DESCRIPTION - FREQUENCY: FREQUENCY: CONTINUOUS

## 2020-10-06 ASSESSMENT — PAIN DESCRIPTION - ORIENTATION: ORIENTATION: MID;LEFT

## 2020-10-06 ASSESSMENT — PAIN DESCRIPTION - PAIN TYPE: TYPE: CHRONIC PAIN

## 2020-10-06 ASSESSMENT — PAIN DESCRIPTION - PROGRESSION: CLINICAL_PROGRESSION: GRADUALLY WORSENING

## 2020-10-06 ASSESSMENT — PAIN - FUNCTIONAL ASSESSMENT: PAIN_FUNCTIONAL_ASSESSMENT: PREVENTS OR INTERFERES SOME ACTIVE ACTIVITIES AND ADLS

## 2020-10-06 ASSESSMENT — PAIN SCALES - GENERAL: PAINLEVEL_OUTOF10: 7

## 2020-10-06 ASSESSMENT — PAIN DESCRIPTION - DESCRIPTORS: DESCRIPTORS: TIGHTNESS;CONSTANT

## 2020-10-06 ASSESSMENT — PAIN DESCRIPTION - LOCATION: LOCATION: HIP

## 2020-10-06 NOTE — INTERVAL H&P NOTE
Update History & Physical    The patient's History and Physical   was reviewed with the patient and I examined the patient. There was  NO CHANGE:[id number]}. The surgical site was confirmed by the patient and me. Plan: The risks, benefits, expected outcome, and alternative to the recommended procedure have been discussed with the patient. Patient understands and wants to proceed with the procedure.      Electronically signed by Juan Ramirez MD on 10/6/2020 at 10:47 AM

## 2020-10-14 ENCOUNTER — VIRTUAL VISIT (OUTPATIENT)
Dept: VASCULAR SURGERY | Age: 75
End: 2020-10-14
Payer: MEDICARE

## 2020-10-14 PROCEDURE — 99441 PR PHYS/QHP TELEPHONE EVALUATION 5-10 MIN: CPT | Performed by: PHYSICIAN ASSISTANT

## 2020-10-14 NOTE — PROGRESS NOTES
Leslye Hooper is a 76 y.o. female evaluated via telephone on 10/14/2020. Consent:  She and/or health care decision maker is aware that that she may receive a bill for this telephone service, depending on her insurance coverage, and has provided verbal consent to proceed: Yes      Documentation:  I communicated with the patient and/or health care decision maker about due to the covid-19 outbreak, we are trying to limit exposures to our patients whom have elective follow ups. We are calling each individual patient to make sure they are doing okay.         Leslye Hooper is a 76 y.o. female with the following history [facility] [place]:  Patient Active Problem List    Diagnosis Date Noted    Unstable angina [facility])      Priority: High    Chest discomfort 01/10/2019     Priority: High    Palliative care patient 07/22/2020    New onset seizure [facility])     Spondylolisthesis at L4-L5 level     Atherosclerotic cardiovascular disease     AMS (altered mental status) 06/11/2020    Altered mental status 06/10/2020    Intractable back pain 05/29/2020    Acute exacerbation of chronic low back pain 05/28/2020    Bilateral carotid artery stenosis 12/28/2017    Aphasia 11/29/2017    Ataxia 11/29/2017    Vertigo 11/29/2017    Malaise and fatigue 10/06/2017    Chest pressure     Fatigue 02/25/2016    S/P right coronary artery (RCA) stent placement 02/25/2016    Hx of CABG 02/25/2016    Hyperlipidemia     Hypertension     CAD (coronary artery disease)     Chest pain     Osteoarthritis     GERD (gastroesophageal reflux disease)     Asthma     Glucose intolerance (impaired glucose tolerance)     Depression with anxiety      Current Outpatient Medications   Medication Sig Dispense Refill    isosorbide mononitrate (IMDUR) 60 MG extended release tablet TAKE 1 TABLET TWICE A  tablet 3    lisinopril (PRINIVIL;ZESTRIL) 20 MG tablet Take 1 tablet by mouth 2 times daily 30 tablet 3    docusate sodium (COLACE, DULCOLAX) 100 MG CAPS Take 100 mg by mouth 2 times daily 60 capsule 0    metoprolol succinate (TOPROL XL) 25 MG extended release tablet Take 25 mg by mouth nightly      ranolazine (RANEXA) 1000 MG extended release tablet TAKE 1 TABLET TWICE A  tablet 3    metoprolol succinate (TOPROL XL) 25 MG extended release tablet Take 50 mg by mouth daily       metFORMIN (GLUCOPHAGE) 500 MG tablet Take 1 tablet by mouth 2 times daily Please hold Metformin for 48 hours after cardiac catheterization. May resume on 4/15/20 180 tablet 3    clopidogrel (PLAVIX) 75 MG tablet Take 1 tablet by mouth daily 90 tablet 3    nitroGLYCERIN (NITROSTAT) 0.4 MG SL tablet up to max of 3 total doses. If no relief after 1 dose, call 911. 30 tablet 3    atorvastatin (LIPITOR) 80 MG tablet Take 1 tablet by mouth daily 90 tablet 0    Cyanocobalamin (VITAMIN B 12 PO) Take 1,000 mcg by mouth daily      promethazine (PHENERGAN) 25 MG tablet Take 25 mg by mouth every 6 hours as needed for Nausea      ALPRAZolam (XANAX) 0.25 MG tablet Take 0.125 mg by mouth daily as needed for Anxiety (takes 1/2 tab).  albuterol (PROVENTIL HFA;VENTOLIN HFA) 108 (90 BASE) MCG/ACT inhaler Inhale 2 puffs into the lungs every 6 hours as needed.  traZODone (DESYREL) 50 MG tablet Take 50 mg by mouth nightly.  aspirin 81 MG tablet Take 81 mg by mouth daily. No current facility-administered medications for this visit. Allergies: Other; Codeine; Darvocet [propoxyphene n-acetaminophen]; Hydrocodone-acetaminophen; Lyrica [pregabalin];  Morphine; and Percocet [oxycodone-acetaminophen]  Past Medical History:   Diagnosis Date    Asthma     CAD (coronary artery disease)     Chest pain     Depression with anxiety     Diabetes mellitus (HCC)     GERD (gastroesophageal reflux disease)     Glucose intolerance (impaired glucose tolerance)     Hyperlipidemia     Cholesterol management per Michael Ramos Hypertension  Lupus (systemic lupus erythematosus) (HCC)     Malaise and fatigue 10/6/2017    Movement disorder     Sees pain management for neck pain and previous surgery    New onset seizure [address] [address]     Osteoarthritis     Palliative care patient 07/22/2020    S/P CABG x 3 03/05/2013    PACABG X 3, LIMA-LAD, SVG0OM, SVG-DIAG, RT EVH, DR Laura Gonzales     Past Surgical History:   Procedure Laterality Date    APPENDECTOMY  1965    BACK SURGERY      CARDIAC CATHETERIZATION  5/18/11    EF 60%    CARDIAC CATHETERIZATION  1/25/16    drug eluting stent to RCA   [address]  1/25/2016    CARDIAC CATHETERIZATION  2/19/16    CARDIAC CATHETERIZATION  08/24/2017    cutting balloon angioplasty ot LAD    CARDIAC CATHETERIZATION  04/13/2020    TWO Stents placed    COLON SURGERY  1992    Polypectomy - 2    COLON SURGERY  2003    Polypectomy - 4    COLON SURGERY  11/2008    Polypectomy - 2    COLON SURGERY  8/2012    Polypectomy - 3    COLONOSCOPY      CORONARY ANGIOPLASTY  08/2017    cutting balloon angioplasty LAD diagonal    CORONARY ANGIOPLASTY WITH STENT PLACEMENT  3/22/2007    CORONARY ANGIOPLASTY WITH STENT PLACEMENT  8/2007    CORONARY ARTERY BYPASS GRAFT  3/5/2013    PACABG X 3, LIMA-LAD, SVG0OM, SVG-DIAG, RT EVH, DR PATEL    COSMETIC SURGERY      deviated septum and rhinoplasty    CYST REMOVAL  1970    DIAGNOSTIC CARDIAC CATH LAB PROCEDURE      ENDOSCOPY, COLON, DIAGNOSTIC      EYE SURGERY  03/2019    cataracts    HEMORRHOID SURGERY  1987    HYSTERECTOMY  1983    Partial    NASAL [facility] SINUS SURGERY  2003    Nasal Polyps Removed    SPINE SURGERY  3/6/2006    Lumbar Laminectomy L4&5    SPINE SURGERY  3/6/2006    Cervical Fusion - C4,5,6    TONSILLECTOMY  1968     Family History   Problem Relation Age of Onset    Heart Disease Mother     High Blood Pressure Mother     Diabetes Mother     High Cholesterol Mother     Diabetes Brother      Social History     Tobacco Use       !    +------------+-------+-------+--------+-------+------------+---------------+    ! Prox ICA    !74.1   !14     !58      !0.81   !            !               !    +------------+-------+-------+--------+-------+------------+---------------+    ! Mid ICA     !74.1   !14.7   !58      !0. 8    !            !               !    +------------+-------+-------+--------+-------+------------+---------------+    ! Dist ICA    !70.9   !21.7   !48      !0.69   !            !               !    +------------+-------+-------+--------+-------+------------+---------------+    ! Prox ECA    !88.5   !       !60      !       !            !               !    +------------+-------+-------+--------+-------+------------+---------------+    ! Vertebral   !56.4   !8.06   !60      !0.86   !            !               !    +------------+-------+-------+--------+-------+------------+---------------+           - There is antegrade vertebral flow noted on the right side.           - Additional Measurements:ICAPSV/CCAPSV 1.2. ICAEDV/CCAEDV 2.69.         Carotid Left Measurements    +------------+-------+-------+--------+-------+------------+---------------+    ! Location    !PSV    !EDV    ! Angle   !RI     !%Stenosis   ! Tortuosity     !    +------------+-------+-------+--------+-------+------------+---------------+    ! Prox CCA    !73.5   !       !60      !       !            !               !    +------------+-------+-------+--------+-------+------------+---------------+    ! Mid CCA     !70.2   !9.48   !60      !0.86   !            !               !    +------------+-------+-------+--------+-------+------------+---------------+    ! Prox ICA    !67.3   !10.4   !60      !0.85   !            !               !    +------------+-------+-------+--------+-------+------------+---------------+    ! Mid ICA     !68.3   !16.6   !60      !0.76   !            !               !    +------------+-------+-------+--------+-------+------------+---------------+ !Dist ICA    !66.9   !16.1   !60      !0.76   !            !               !    +------------+-------+-------+--------+-------+------------+---------------+    ! Prox ECA   [id number]    !12     !58      !0.89   !            !               !    +------------+-------+-------+--------+-------+------------+---------------+    ! Vertebral   !41.2   !10.9   !60      !0.74   !            !               !    +------------+-------+-------+--------+-------+------------+---------------+           - There is antegrade vertebral flow noted on the left side.           - Additional Measurements:ICAPSV/CCAPSV 0.93.             Assessment/Plan -      1. Carotid Artery Stenosis      Recommend she continue ASA, Plavix and statin daily  Recommend no smoking  Recommend good BP and glycemic control  Follow up in 12 months with a repeat CDU. Patient was instructed to go to ER or call office immediately with any new onset of partial or complete loss of vision affecting only one eye, speech difficulty or lateralizing weakness, numbness/tingling. I affirm this is a Patient Initiated Episode with a Patient who has not had a related appointment within my department in the past 7 days or scheduled within the next 24 hours.     Patient identification was verified at the start of the visit: Yes    Total Time: minutes: 5-10 minutes    Note: not billable if this call serves to triage the patient into an appointment for the relevant concern      Jen Rowe

## 2020-10-24 ENCOUNTER — APPOINTMENT (OUTPATIENT)
Dept: GENERAL RADIOLOGY | Age: 75
DRG: 087 | End: 2020-10-24
Payer: MEDICARE

## 2020-10-24 ENCOUNTER — APPOINTMENT (OUTPATIENT)
Dept: CT IMAGING | Age: 75
DRG: 087 | End: 2020-10-24
Payer: MEDICARE

## 2020-10-24 ENCOUNTER — HOSPITAL ENCOUNTER (INPATIENT)
Age: 75
LOS: 2 days | Discharge: NURSING, REHAB OR LONG-TERM CARE | DRG: 087 | End: 2020-10-27
Attending: EMERGENCY MEDICINE | Admitting: FAMILY MEDICINE
Payer: MEDICARE

## 2020-10-24 LAB
ALBUMIN SERPL-MCNC: 4.4 G/DL (ref 3.5–5.2)
ALP BLD-CCNC: 66 U/L (ref 35–104)
ALT SERPL-CCNC: 16 U/L (ref 5–33)
ANION GAP SERPL CALCULATED.3IONS-SCNC: 9 MMOL/L (ref 7–19)
AST SERPL-CCNC: 13 U/L (ref 5–32)
BASOPHILS ABSOLUTE: 0 K/UL (ref 0–0.2)
BASOPHILS RELATIVE PERCENT: 0.3 % (ref 0–1)
BILIRUB SERPL-MCNC: 0.5 MG/DL (ref 0.2–1.2)
BUN BLDV-MCNC: 17 MG/DL (ref 8–23)
CALCIUM SERPL-MCNC: 9.5 MG/DL (ref 8.8–10.2)
CHLORIDE BLD-SCNC: 101 MMOL/L (ref 98–111)
CO2: 24 MMOL/L (ref 22–29)
CREAT SERPL-MCNC: 0.7 MG/DL (ref 0.5–0.9)
EOSINOPHILS ABSOLUTE: 0.1 K/UL (ref 0–0.6)
EOSINOPHILS RELATIVE PERCENT: 1.3 % (ref 0–5)
GFR AFRICAN AMERICAN: >59
GFR NON-AFRICAN AMERICAN: >60
GLUCOSE BLD-MCNC: 106 MG/DL (ref 74–109)
GLUCOSE BLD-MCNC: 116 MG/DL (ref 70–99)
HCT VFR BLD CALC: 40.1 % (ref 37–47)
HEMOGLOBIN: 13.3 G/DL (ref 12–16)
IMMATURE GRANULOCYTES #: 0 K/UL
INR BLD: 0.96 (ref 0.88–1.18)
LYMPHOCYTES ABSOLUTE: 1 K/UL (ref 1.1–4.5)
LYMPHOCYTES RELATIVE PERCENT: 15.5 % (ref 20–40)
MCH RBC QN AUTO: 30.5 PG (ref 27–31)
MCHC RBC AUTO-ENTMCNC: 33.2 G/DL (ref 33–37)
MCV RBC AUTO: 92 FL (ref 81–99)
MONOCYTES ABSOLUTE: 0.5 K/UL (ref 0–0.9)
MONOCYTES RELATIVE PERCENT: 7.2 % (ref 0–10)
NEUTROPHILS ABSOLUTE: 4.8 K/UL (ref 1.5–7.5)
NEUTROPHILS RELATIVE PERCENT: 75.2 % (ref 50–65)
PDW BLD-RTO: 13.4 % (ref 11.5–14.5)
PERFORMED ON: ABNORMAL
PLATELET # BLD: 189 K/UL (ref 130–400)
PMV BLD AUTO: 8.4 FL (ref 9.4–12.3)
POTASSIUM REFLEX MAGNESIUM: 4.7 MMOL/L (ref 3.5–5)
PROTHROMBIN TIME: 12.7 SEC (ref 12–14.6)
RBC # BLD: 4.36 M/UL (ref 4.2–5.4)
SODIUM BLD-SCNC: 134 MMOL/L (ref 136–145)
TOTAL PROTEIN: 6.4 G/DL (ref 6.6–8.7)
WBC # BLD: 6.4 K/UL (ref 4.8–10.8)

## 2020-10-24 PROCEDURE — 6370000000 HC RX 637 (ALT 250 FOR IP): Performed by: NURSE PRACTITIONER

## 2020-10-24 PROCEDURE — 96375 TX/PRO/DX INJ NEW DRUG ADDON: CPT

## 2020-10-24 PROCEDURE — 70450 CT HEAD/BRAIN W/O DYE: CPT

## 2020-10-24 PROCEDURE — 96374 THER/PROPH/DIAG INJ IV PUSH: CPT

## 2020-10-24 PROCEDURE — 90715 TDAP VACCINE 7 YRS/> IM: CPT | Performed by: NURSE PRACTITIONER

## 2020-10-24 PROCEDURE — 96365 THER/PROPH/DIAG IV INF INIT: CPT

## 2020-10-24 PROCEDURE — 99204 OFFICE O/P NEW MOD 45 MIN: CPT | Performed by: NEUROLOGICAL SURGERY

## 2020-10-24 PROCEDURE — G0378 HOSPITAL OBSERVATION PER HR: HCPCS

## 2020-10-24 PROCEDURE — 73502 X-RAY EXAM HIP UNI 2-3 VIEWS: CPT

## 2020-10-24 PROCEDURE — 72100 X-RAY EXAM L-S SPINE 2/3 VWS: CPT

## 2020-10-24 PROCEDURE — 72125 CT NECK SPINE W/O DYE: CPT

## 2020-10-24 PROCEDURE — 2500000003 HC RX 250 WO HCPCS: Performed by: NURSE PRACTITIONER

## 2020-10-24 PROCEDURE — 85025 COMPLETE CBC W/AUTO DIFF WBC: CPT

## 2020-10-24 PROCEDURE — 80053 COMPREHEN METABOLIC PANEL: CPT

## 2020-10-24 PROCEDURE — 82947 ASSAY GLUCOSE BLOOD QUANT: CPT

## 2020-10-24 PROCEDURE — 6360000002 HC RX W HCPCS: Performed by: NURSE PRACTITIONER

## 2020-10-24 PROCEDURE — 96366 THER/PROPH/DIAG IV INF ADDON: CPT

## 2020-10-24 PROCEDURE — 99283 EMERGENCY DEPT VISIT LOW MDM: CPT

## 2020-10-24 PROCEDURE — 2580000003 HC RX 258: Performed by: NURSE PRACTITIONER

## 2020-10-24 PROCEDURE — 36415 COLL VENOUS BLD VENIPUNCTURE: CPT

## 2020-10-24 PROCEDURE — 90471 IMMUNIZATION ADMIN: CPT | Performed by: NURSE PRACTITIONER

## 2020-10-24 PROCEDURE — 96372 THER/PROPH/DIAG INJ SC/IM: CPT

## 2020-10-24 PROCEDURE — 85610 PROTHROMBIN TIME: CPT

## 2020-10-24 PROCEDURE — 99999 PR OFFICE/OUTPT VISIT,PROCEDURE ONLY: CPT | Performed by: EMERGENCY MEDICINE

## 2020-10-24 RX ORDER — AMITRIPTYLINE HYDROCHLORIDE 25 MG/1
25 TABLET, FILM COATED ORAL NIGHTLY
Status: ON HOLD | COMMUNITY
End: 2020-11-11 | Stop reason: HOSPADM

## 2020-10-24 RX ORDER — METOPROLOL SUCCINATE 50 MG/1
50 TABLET, EXTENDED RELEASE ORAL DAILY
Status: DISCONTINUED | OUTPATIENT
Start: 2020-10-25 | End: 2020-10-27 | Stop reason: HOSPADM

## 2020-10-24 RX ORDER — SODIUM CHLORIDE 0.9 % (FLUSH) 0.9 %
10 SYRINGE (ML) INJECTION EVERY 12 HOURS SCHEDULED
Status: DISCONTINUED | OUTPATIENT
Start: 2020-10-24 | End: 2020-10-27 | Stop reason: HOSPADM

## 2020-10-24 RX ORDER — ALPRAZOLAM 0.25 MG/1
0.12 TABLET ORAL DAILY PRN
Status: DISCONTINUED | OUTPATIENT
Start: 2020-10-24 | End: 2020-10-27 | Stop reason: HOSPADM

## 2020-10-24 RX ORDER — NITROGLYCERIN 0.4 MG/1
0.4 TABLET SUBLINGUAL EVERY 5 MIN PRN
Status: DISCONTINUED | OUTPATIENT
Start: 2020-10-24 | End: 2020-10-27 | Stop reason: HOSPADM

## 2020-10-24 RX ORDER — ACETAMINOPHEN 325 MG/1
650 TABLET ORAL EVERY 8 HOURS PRN
Status: DISCONTINUED | OUTPATIENT
Start: 2020-10-24 | End: 2020-10-27 | Stop reason: HOSPADM

## 2020-10-24 RX ORDER — TRAZODONE HYDROCHLORIDE 50 MG/1
50 TABLET ORAL NIGHTLY
Status: DISCONTINUED | OUTPATIENT
Start: 2020-10-24 | End: 2020-10-27 | Stop reason: HOSPADM

## 2020-10-24 RX ORDER — ONDANSETRON 2 MG/ML
4 INJECTION INTRAMUSCULAR; INTRAVENOUS ONCE
Status: COMPLETED | OUTPATIENT
Start: 2020-10-24 | End: 2020-10-24

## 2020-10-24 RX ORDER — FENTANYL CITRATE 50 UG/ML
25 INJECTION, SOLUTION INTRAMUSCULAR; INTRAVENOUS ONCE
Status: COMPLETED | OUTPATIENT
Start: 2020-10-24 | End: 2020-10-24

## 2020-10-24 RX ORDER — SODIUM CHLORIDE 0.9 % (FLUSH) 0.9 %
10 SYRINGE (ML) INJECTION PRN
Status: DISCONTINUED | OUTPATIENT
Start: 2020-10-24 | End: 2020-10-26 | Stop reason: SDUPTHER

## 2020-10-24 RX ORDER — SODIUM CHLORIDE 0.9 % (FLUSH) 0.9 %
10 SYRINGE (ML) INJECTION PRN
Status: DISCONTINUED | OUTPATIENT
Start: 2020-10-24 | End: 2020-10-27 | Stop reason: HOSPADM

## 2020-10-24 RX ORDER — PROMETHAZINE HYDROCHLORIDE 25 MG/1
12.5 TABLET ORAL EVERY 6 HOURS PRN
Status: DISCONTINUED | OUTPATIENT
Start: 2020-10-24 | End: 2020-10-27 | Stop reason: HOSPADM

## 2020-10-24 RX ORDER — SENNA PLUS 8.6 MG/1
1 TABLET ORAL DAILY PRN
Status: DISCONTINUED | OUTPATIENT
Start: 2020-10-24 | End: 2020-10-27 | Stop reason: HOSPADM

## 2020-10-24 RX ORDER — SODIUM CHLORIDE 0.9 % (FLUSH) 0.9 %
10 SYRINGE (ML) INJECTION PRN
Status: DISCONTINUED | OUTPATIENT
Start: 2020-10-24 | End: 2020-10-26

## 2020-10-24 RX ORDER — SODIUM CHLORIDE 0.9 % (FLUSH) 0.9 %
10 SYRINGE (ML) INJECTION EVERY 12 HOURS SCHEDULED
Status: DISCONTINUED | OUTPATIENT
Start: 2020-10-24 | End: 2020-10-26

## 2020-10-24 RX ORDER — ONDANSETRON 2 MG/ML
4 INJECTION INTRAMUSCULAR; INTRAVENOUS EVERY 6 HOURS PRN
Status: DISCONTINUED | OUTPATIENT
Start: 2020-10-24 | End: 2020-10-27 | Stop reason: HOSPADM

## 2020-10-24 RX ORDER — LISINOPRIL 20 MG/1
20 TABLET ORAL 2 TIMES DAILY
Status: DISCONTINUED | OUTPATIENT
Start: 2020-10-24 | End: 2020-10-27 | Stop reason: HOSPADM

## 2020-10-24 RX ORDER — POLYETHYLENE GLYCOL 3350 17 G/17G
17 POWDER, FOR SOLUTION ORAL DAILY PRN
Status: DISCONTINUED | OUTPATIENT
Start: 2020-10-24 | End: 2020-10-27 | Stop reason: HOSPADM

## 2020-10-24 RX ORDER — SODIUM CHLORIDE 0.9 % (FLUSH) 0.9 %
10 SYRINGE (ML) INJECTION EVERY 12 HOURS SCHEDULED
Status: DISCONTINUED | OUTPATIENT
Start: 2020-10-24 | End: 2020-10-26 | Stop reason: SDUPTHER

## 2020-10-24 RX ORDER — CLOPIDOGREL BISULFATE 75 MG/1
75 TABLET ORAL DAILY
Status: DISCONTINUED | OUTPATIENT
Start: 2020-10-24 | End: 2020-10-24

## 2020-10-24 RX ORDER — KETOROLAC TROMETHAMINE 30 MG/ML
15 INJECTION, SOLUTION INTRAMUSCULAR; INTRAVENOUS ONCE
Status: COMPLETED | OUTPATIENT
Start: 2020-10-24 | End: 2020-10-24

## 2020-10-24 RX ORDER — ZOLPIDEM TARTRATE 10 MG/1
10 TABLET ORAL NIGHTLY PRN
COMMUNITY
End: 2021-06-13

## 2020-10-24 RX ORDER — ISOSORBIDE MONONITRATE 60 MG/1
60 TABLET, EXTENDED RELEASE ORAL DAILY
Status: DISCONTINUED | OUTPATIENT
Start: 2020-10-25 | End: 2020-10-27 | Stop reason: HOSPADM

## 2020-10-24 RX ADMIN — ONDANSETRON 4 MG: 2 INJECTION INTRAMUSCULAR; INTRAVENOUS at 17:08

## 2020-10-24 RX ADMIN — TETANUS TOXOID, REDUCED DIPHTHERIA TOXOID AND ACELLULAR PERTUSSIS VACCINE, ADSORBED 0.5 ML: 5; 2.5; 8; 8; 2.5 SUSPENSION INTRAMUSCULAR at 17:08

## 2020-10-24 RX ADMIN — DEXTROSE MONOHYDRATE 3 MG/HR: 50 INJECTION, SOLUTION INTRAVENOUS at 20:58

## 2020-10-24 RX ADMIN — SODIUM CHLORIDE, PRESERVATIVE FREE 10 ML: 5 INJECTION INTRAVENOUS at 21:00

## 2020-10-24 RX ADMIN — TRAZODONE HYDROCHLORIDE 50 MG: 50 TABLET ORAL at 22:04

## 2020-10-24 RX ADMIN — ACETAMINOPHEN 650 MG: 325 TABLET ORAL at 20:59

## 2020-10-24 RX ADMIN — FENTANYL CITRATE 25 MCG: 50 INJECTION, SOLUTION INTRAMUSCULAR; INTRAVENOUS at 17:08

## 2020-10-24 RX ADMIN — KETOROLAC TROMETHAMINE 15 MG: 30 INJECTION, SOLUTION INTRAMUSCULAR at 15:21

## 2020-10-24 RX ADMIN — LISINOPRIL 20 MG: 20 TABLET ORAL at 20:59

## 2020-10-24 ASSESSMENT — PAIN DESCRIPTION - LOCATION
LOCATION: HEAD
LOCATION: HEAD

## 2020-10-24 ASSESSMENT — PAIN DESCRIPTION - ORIENTATION
ORIENTATION: OTHER (COMMENT)
ORIENTATION: OTHER (COMMENT)

## 2020-10-24 ASSESSMENT — PAIN SCALES - GENERAL
PAINLEVEL_OUTOF10: 4
PAINLEVEL_OUTOF10: 3
PAINLEVEL_OUTOF10: 7
PAINLEVEL_OUTOF10: 6
PAINLEVEL_OUTOF10: 8

## 2020-10-24 ASSESSMENT — ENCOUNTER SYMPTOMS
BACK PAIN: 1
ABDOMINAL PAIN: 0

## 2020-10-24 ASSESSMENT — PAIN DESCRIPTION - PAIN TYPE
TYPE: ACUTE PAIN
TYPE: ACUTE PAIN

## 2020-10-24 ASSESSMENT — PAIN DESCRIPTION - PROGRESSION: CLINICAL_PROGRESSION: GRADUALLY IMPROVING

## 2020-10-24 ASSESSMENT — PAIN DESCRIPTION - FREQUENCY
FREQUENCY: CONTINUOUS
FREQUENCY: CONTINUOUS

## 2020-10-24 ASSESSMENT — PAIN DESCRIPTION - DESCRIPTORS
DESCRIPTORS: PRESSURE
DESCRIPTORS: PRESSURE

## 2020-10-24 NOTE — PROGRESS NOTES
Patient states that she is a DNR and it is confirmed by daughter Deepak Cortez that is at bedisde. Copy of DNR paperwork and power of  placed in soft chart.

## 2020-10-24 NOTE — ED PROVIDER NOTES
[facility]  eMERGENCY dEPARTMENT eNCOUnter      Pt Name: Jerona Merlin  MRN: [id number]  [place] 1945  Date of evaluation: 10/24/2020  Provider: LEILA Denise    CHIEF COMPLAINT       Chief Complaint   Patient presents with   Sofiya Pastrana     presents past fall from chair, c/o h/a         HISTORY OF PRESENT ILLNESS   (Location/Symptom, Timing/Onset,Context/Setting, Quality, Duration, Modifying Factors, Severity)  Note limiting factors. Prince Vyas a 76 y.o. female who presents to the emergency department for evaluation of fall. Pt tells me that she fell backwards describing mechanical fall while using rolator walker with her daughter while voting today. She tells me that she struck the back of her head during fall and reports headache. She tells me that her neck hurts. She has pain along right lower back/right hip. She denies loss of consciousness. She has had no chest or abdominal pain. She denies other extremity injury. Pt is unsure of her last tetanus update. HPI    Nursing Notes were reviewed. REVIEW OF SYSTEMS    (2-9 systems for level 4, 10 or more for level 5)     Review of Systems   Constitutional: Negative. Cardiovascular: Negative for chest pain. Gastrointestinal: Negative for abdominal pain. Musculoskeletal: Positive for arthralgias (right hip), back pain and neck pain. Neurological: Positive for headaches (after head injury today). A complete review of systems was performed and is negative except as noted above in the HPI.        PAST MEDICAL HISTORY     Past Medical History:   Diagnosis Date    Asthma     CAD (coronary artery disease)     Chest pain     Depression with anxiety     Diabetes mellitus (HCC)     GERD (gastroesophageal reflux disease)     Glucose intolerance (impaired glucose tolerance)     Hyperlipidemia     Cholesterol management per Cheryle Box M.D.    Hypertension     Lupus (systemic lupus erythematosus) (HCC)     Malaise and fatigue 10/6/2017    Movement disorder     Sees pain management for neck pain and previous surgery    New onset seizure (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    PACABG X 3, LIMA-LAD, SVG0OM, SVG-DIAG, RT EVH, DR PATEL         SURGICAL HISTORY       Past Surgical History:   Procedure Laterality Date    APPENDECTOMY  1965    BACK SURGERY      CARDIAC CATHETERIZATION  5/18/11    EF 60%    CARDIAC CATHETERIZATION  1/25/16    drug eluting stent to RCA   [address]  1/25/2016    CARDIAC CATHETERIZATION  2/19/16    CARDIAC CATHETERIZATION  08/24/2017    cutting balloon angioplasty ot LAD    CARDIAC CATHETERIZATION  04/13/2020    TWO Stents placed    COLON SURGERY  1992    Polypectomy - 2    COLON SURGERY  2003    Polypectomy - 4    COLON SURGERY  11/2008    Polypectomy - 2    COLON SURGERY  8/2012    Polypectomy - 3    COLONOSCOPY      CORONARY ANGIOPLASTY  08/2017    cutting balloon angioplasty LAD diagonal    CORONARY ANGIOPLASTY WITH STENT PLACEMENT  3/22/2007    CORONARY ANGIOPLASTY WITH STENT PLACEMENT  8/2007    CORONARY ARTERY BYPASS GRAFT  3/5/2013    PACABG X 3, LIMA-LAD, SVG0OM, SVG-DIAG, RT EVH, DR PATEL    COSMETIC SURGERY      deviated septum and rhinoplasty    CYST REMOVAL  1970    DIAGNOSTIC CARDIAC CATH LAB PROCEDURE      ENDOSCOPY, COLON, DIAGNOSTIC      EYE SURGERY  03/2019    cataracts   [address]    Partial    NASAL [facility] SINUS SURGERY  2003    Nasal Polyps Removed    SPINE SURGERY  3/6/2006    Lumbar Laminectomy L4&5    SPINE SURGERY  3/6/2006    Cervical Fusion - C4,5,6    TONSILLECTOMY  1968         CURRENT MEDICATIONS       Current Discharge Medication List      CONTINUE these medications which have NOT CHANGED    Details   isosorbide mononitrate (IMDUR) 60 MG extended release tablet TAKE 1 TABLET TWICE A DAY  Qty: 180 tablet, Refills: 3 Other; Codeine; Darvocet [propoxyphene n-acetaminophen]; Hydrocodone-acetaminophen; Lyrica [pregabalin]; Morphine; and Percocet [oxycodone-acetaminophen]    FAMILY HISTORY       Family History   Problem Relation Age of Onset    Heart Disease Mother     High Blood Pressure Mother     Diabetes Mother     High Cholesterol Mother     Diabetes Brother           SOCIAL HISTORY       Social History     Socioeconomic History    Marital status:       Spouse name: None    Number of children: None    Years of education: None    Highest education level: None   Occupational History    None   Social Needs    Financial resource strain: None    Food insecurity     Worry: None     Inability: None    Transportation needs     Medical: None     Non-medical: None   Tobacco Use    Smoking status: Former Smoker     Packs/day: 1.00     Years: 1.00     Pack years: 1.00     Types: Cigarettes     Last attempt to quit: 1967     Years since quittin.6    Smokeless tobacco: Never Used   Substance and Sexual Activity    Alcohol use: No    Drug use: No    Sexual activity: Not Currently   Lifestyle    Physical activity     Days per week: None     Minutes per session: None    Stress: None   Relationships    Social connections     Talks on phone: None     Gets together: None     Attends Ten[employer] service: None     Active member of club or organization: None     Attends meetings of clubs or organizations: None     Relationship status: None    Intimate partner violence     Fear of current or ex partner: None     Emotionally abused: None     Physically abused: None     Forced sexual activity: None   Other Topics Concern    None   Social History Narrative    None       SCREENINGS    [place] Coma Scale  Eye Opening: Spontaneous  Best Verbal Response: Oriented  Best Motor Response: Obeys commands  Bree Coma Scale Score: 15        PHYSICAL EXAM    (up to 7 for level 4, 8 or more for level 5)     ED Triage Vitals [10/24/20 1410]   BP Temp Temp src Pulse Resp SpO2 Height Weight   (!) 212/88 98.5 °F (36.9 °C) -- 65 18 96 % 5' 5\" (1.651 m) 145 lb (65.8 kg)     Vitals:    10/24/20 1600 10/24/20 1700 10/24/20 1730 10/24/20 1755   BP:  (!) 182/76  (!) 185/87   Pulse: 66 68 66 66   Resp: 18 18 17 18   Temp:  98.2 °F (36.8 °C)  97.3 °F (36.3 °C)   TempSrc:    Temporal   SpO2:    95%   Weight:       Height:             Physical Exam  Vitals signs reviewed. HENT:      Head: Normocephalic. Comments: Occipital scalp with ttp and abrasion  No scalp laceration noted     Right Ear: External ear normal.      Left Ear: External ear normal.   Eyes:      Conjunctiva/sclera: Conjunctivae normal.      Pupils: Pupils are equal, round, and reactive to light. Neck:      Musculoskeletal: Normal range of motion. Comments: c-collar in place  Cardiovascular:      Rate and Rhythm: Normal rate and regular rhythm. Heart sounds: Normal heart sounds. Pulmonary:      Effort: Pulmonary effort is normal.      Breath sounds: Normal breath sounds. Chest:      Chest wall: No tenderness. Abdominal:      General: Bowel sounds are normal.      Palpations: Abdomen is soft. Tenderness: There is no abdominal tenderness. Musculoskeletal: Normal range of motion. Comments: Moves extremities equally x4  CMS intact bilateral upper/lower extremities   Skin:     General: Skin is warm and dry. Neurological:      General: No focal deficit present. Mental Status: She is alert and oriented to person, place, and time.          DIAGNOSTIC RESULTS     EKG: All EKG's are interpreted by the Emergency Department Physician who either signs or Co-signs this chart in the absence of acardiologist.        RADIOLOGY:   Non-plain film images such as CT, Ultrasound andMRI are read by the radiologist. Plain radiographic images are visualized and preliminarily interpreted by the emergency physician with the below findings:        Interpretation per the Radiologist below, if available at the time of this note:    CT HEAD WO CONTRAST   Final Result   There is a very subtle petechial or subarachnoid hemorrhage in the   right frontal lobe just lateral to the frontal horn right lateral   ventricle. No other regions of abnormal increased attenuation are   identified. 2. Changes of aging with chronic microvascular ischemic change in the   white matter. Signed by Dr Juliana Guerrero on 10/24/2020 4:05 PM      CT Cervical Spine WO Contrast   Final Result   1. Degenerative and postsurgical changes noted in the cervical spine. No acute abnormality is identified   Signed by Dr Juliana Guerrero on 10/24/2020 4:13 PM      XR LUMBAR SPINE (2-3 VIEWS)   Final Result   Impression:    1. Degenerative spondylosis is noted in the lumbar spine similar to   September 16, 2019. No acute abnormality is identified. .   Signed by Dr Juliana Guerrero on 10/24/2020 3:14 PM      XR HIP 2-3 VW W PELVIS RIGHT   Final Result   Impression:    1. Mild degenerative change with some narrowing of the right hip joint   space. No acute abnormalities identified. Signed by Dr Juliana Guerrero on 10/24/2020 3:16 PM            ED BEDSIDE ULTRASOUND:   Performed by ED Physician - none    LABS:  Labs Reviewed   CBC WITH AUTO DIFFERENTIAL - Abnormal; Notable for the following components:       Result Value    MPV 8.4 (*)     Neutrophils % 75.2 (*)     Lymphocytes % 15.5 (*)     Lymphocytes Absolute 1.0 (*)     All other components within normal limits   COMPREHENSIVE METABOLIC PANEL W/ REFLEX TO MG FOR LOW K - Abnormal; Notable for the following components:    Sodium 134 (*)     Total Protein 6.4 (*)     All other components within normal limits   PROTIME-INR   COMPREHENSIVE METABOLIC PANEL W/ REFLEX TO MG FOR LOW K   CBC WITH AUTO DIFFERENTIAL   PROTIME-INR   APTT       All other labs were within normal range or not returned as of this dictation.     RE-ASSESSMENT     Discussed with neurosurgeon Dr. Rhonda Claire who will consult. Discussed with Breanna Leon taking call for Dr. Miles Goodwin who accepts patient for admission. EMERGENCY DEPARTMENT COURSE and DIFFERENTIALDIAGNOSIS/MDM:   Vitals:    Vitals:    10/24/20 1600 10/24/20 1700 10/24/20 1730 10/24/20 1755   BP:  (!) 182/76  (!) 185/87   Pulse: 66 68 66 66   Resp: 18 18 17 18   Temp:  98.2 °F (36.8 °C)  97.3 °F (36.3 °C)   TempSrc:    Temporal   SpO2:    95%   Weight:       Height:           MDM      CONSULTS:  IP CONSULT TO NEUROSURGERY    PROCEDURES:  Unless otherwise notedbelow, none     Procedures    FINAL IMPRESSION     1. Closed head injury, initial encounter    2. Subarachnoid hematoma, without loss of consciousness, initial encounter ([facility] [address]    3. Fall, initial encounter          DISPOSITION/PLAN   DISPOSITION Admitted 10/24/2020 05:29:22 PM      PATIENT REFERRED TO:  No follow-up provider specified.     DISCHARGE MEDICATIONS:       Current Discharge Medication List          (Pleasenote that portions of this note were completed with a voice recognition program.  Efforts were made to edit the dictations but occasionally words are mis-transcribed.)              Kevan Whitt, LEILA  10/24/20 2721

## 2020-10-24 NOTE — ED NOTES
Bed: 15  Expected date:   Expected time:   Means of arrival:   Comments:  Ramo Armando RN  10/24/20 2009

## 2020-10-25 ENCOUNTER — APPOINTMENT (OUTPATIENT)
Dept: CT IMAGING | Age: 75
DRG: 087 | End: 2020-10-25
Payer: MEDICARE

## 2020-10-25 PROBLEM — I61.9 INTRAPARENCHYMAL HEMORRHAGE OF BRAIN (HCC): Status: ACTIVE | Noted: 2020-10-24

## 2020-10-25 PROBLEM — I60.9 SUBARACHNOID HEMORRHAGE (HCC): Status: ACTIVE | Noted: 2020-10-24

## 2020-10-25 PROBLEM — M32.9 SLE (SYSTEMIC LUPUS ERYTHEMATOSUS) (HCC): Status: ACTIVE | Noted: 2020-10-25

## 2020-10-25 LAB
ALBUMIN SERPL-MCNC: 4.3 G/DL (ref 3.5–5.2)
ALP BLD-CCNC: 51 U/L (ref 35–104)
ALT SERPL-CCNC: 13 U/L (ref 5–33)
ANION GAP SERPL CALCULATED.3IONS-SCNC: 19 MMOL/L (ref 7–19)
APTT: 27.1 SEC (ref 26–36.2)
AST SERPL-CCNC: 14 U/L (ref 5–32)
BASOPHILS ABSOLUTE: 0 K/UL (ref 0–0.2)
BASOPHILS RELATIVE PERCENT: 0.3 % (ref 0–1)
BILIRUB SERPL-MCNC: 0.5 MG/DL (ref 0.2–1.2)
BUN BLDV-MCNC: 17 MG/DL (ref 8–23)
CALCIUM SERPL-MCNC: 9.2 MG/DL (ref 8.8–10.2)
CHLORIDE BLD-SCNC: 102 MMOL/L (ref 98–111)
CO2: 15 MMOL/L (ref 22–29)
CREAT SERPL-MCNC: 0.7 MG/DL (ref 0.5–0.9)
EOSINOPHILS ABSOLUTE: 0.1 K/UL (ref 0–0.6)
EOSINOPHILS RELATIVE PERCENT: 2.3 % (ref 0–5)
GFR AFRICAN AMERICAN: >59
GFR NON-AFRICAN AMERICAN: >60
GLUCOSE BLD-MCNC: 100 MG/DL (ref 74–109)
GLUCOSE BLD-MCNC: 113 MG/DL (ref 70–99)
GLUCOSE BLD-MCNC: 129 MG/DL (ref 70–99)
GLUCOSE BLD-MCNC: 160 MG/DL (ref 70–99)
HCT VFR BLD CALC: 39.7 % (ref 37–47)
HEMOGLOBIN: 11.9 G/DL (ref 12–16)
IMMATURE GRANULOCYTES #: 0 K/UL
INR BLD: 0.99 (ref 0.88–1.18)
LYMPHOCYTES ABSOLUTE: 1.3 K/UL (ref 1.1–4.5)
LYMPHOCYTES RELATIVE PERCENT: 31.5 % (ref 20–40)
MCH RBC QN AUTO: 30.2 PG (ref 27–31)
MCHC RBC AUTO-ENTMCNC: 30 G/DL (ref 33–37)
MCV RBC AUTO: 100.8 FL (ref 81–99)
MONOCYTES ABSOLUTE: 0.4 K/UL (ref 0–0.9)
MONOCYTES RELATIVE PERCENT: 9 % (ref 0–10)
NEUTROPHILS ABSOLUTE: 2.3 K/UL (ref 1.5–7.5)
NEUTROPHILS RELATIVE PERCENT: 56.6 % (ref 50–65)
PDW BLD-RTO: 13.4 % (ref 11.5–14.5)
PERFORMED ON: ABNORMAL
PLATELET # BLD: 165 K/UL (ref 130–400)
PMV BLD AUTO: 9.1 FL (ref 9.4–12.3)
POTASSIUM REFLEX MAGNESIUM: 4.7 MMOL/L (ref 3.5–5)
PROTHROMBIN TIME: 12.9 SEC (ref 12–14.6)
RBC # BLD: 3.94 M/UL (ref 4.2–5.4)
SODIUM BLD-SCNC: 136 MMOL/L (ref 136–145)
TOTAL PROTEIN: 5.9 G/DL (ref 6.6–8.7)
WBC # BLD: 4 K/UL (ref 4.8–10.8)

## 2020-10-25 PROCEDURE — 2580000003 HC RX 258: Performed by: NURSE PRACTITIONER

## 2020-10-25 PROCEDURE — 99231 SBSQ HOSP IP/OBS SF/LOW 25: CPT | Performed by: NEUROLOGICAL SURGERY

## 2020-10-25 PROCEDURE — 6370000000 HC RX 637 (ALT 250 FOR IP): Performed by: FAMILY MEDICINE

## 2020-10-25 PROCEDURE — 70450 CT HEAD/BRAIN W/O DYE: CPT

## 2020-10-25 PROCEDURE — 96366 THER/PROPH/DIAG IV INF ADDON: CPT

## 2020-10-25 PROCEDURE — 85610 PROTHROMBIN TIME: CPT

## 2020-10-25 PROCEDURE — 51702 INSERT TEMP BLADDER CATH: CPT

## 2020-10-25 PROCEDURE — 36415 COLL VENOUS BLD VENIPUNCTURE: CPT

## 2020-10-25 PROCEDURE — 6370000000 HC RX 637 (ALT 250 FOR IP): Performed by: NURSE PRACTITIONER

## 2020-10-25 PROCEDURE — 2580000003 HC RX 258: Performed by: NEUROLOGICAL SURGERY

## 2020-10-25 PROCEDURE — 82947 ASSAY GLUCOSE BLOOD QUANT: CPT

## 2020-10-25 PROCEDURE — 85025 COMPLETE CBC W/AUTO DIFF WBC: CPT

## 2020-10-25 PROCEDURE — 2500000003 HC RX 250 WO HCPCS: Performed by: NURSE PRACTITIONER

## 2020-10-25 PROCEDURE — 85730 THROMBOPLASTIN TIME PARTIAL: CPT

## 2020-10-25 PROCEDURE — 2000000000 HC ICU R&B

## 2020-10-25 PROCEDURE — 6370000000 HC RX 637 (ALT 250 FOR IP): Performed by: NEUROLOGICAL SURGERY

## 2020-10-25 PROCEDURE — 80053 COMPREHEN METABOLIC PANEL: CPT

## 2020-10-25 PROCEDURE — 2500000003 HC RX 250 WO HCPCS: Performed by: NEUROLOGICAL SURGERY

## 2020-10-25 RX ORDER — NICOTINE POLACRILEX 4 MG
15 LOZENGE BUCCAL PRN
Status: DISCONTINUED | OUTPATIENT
Start: 2020-10-25 | End: 2020-10-27 | Stop reason: HOSPADM

## 2020-10-25 RX ORDER — LEVETIRACETAM 500 MG/1
500 TABLET ORAL 2 TIMES DAILY
Status: DISCONTINUED | OUTPATIENT
Start: 2020-10-25 | End: 2020-10-27 | Stop reason: HOSPADM

## 2020-10-25 RX ORDER — CYCLOBENZAPRINE HCL 10 MG
10 TABLET ORAL 3 TIMES DAILY PRN
Status: DISCONTINUED | OUTPATIENT
Start: 2020-10-25 | End: 2020-10-27 | Stop reason: HOSPADM

## 2020-10-25 RX ORDER — TRAMADOL HYDROCHLORIDE 50 MG/1
50 TABLET ORAL EVERY 6 HOURS PRN
Status: DISCONTINUED | OUTPATIENT
Start: 2020-10-25 | End: 2020-10-27 | Stop reason: HOSPADM

## 2020-10-25 RX ORDER — LEVETIRACETAM 500 MG/1
500 TABLET ORAL 2 TIMES DAILY
Status: DISCONTINUED | OUTPATIENT
Start: 2020-10-25 | End: 2020-10-25

## 2020-10-25 RX ORDER — DEXTROSE MONOHYDRATE 50 MG/ML
100 INJECTION, SOLUTION INTRAVENOUS PRN
Status: DISCONTINUED | OUTPATIENT
Start: 2020-10-25 | End: 2020-10-27 | Stop reason: HOSPADM

## 2020-10-25 RX ORDER — HYDROCHLOROTHIAZIDE 25 MG/1
25 TABLET ORAL DAILY
Status: DISCONTINUED | OUTPATIENT
Start: 2020-10-25 | End: 2020-10-27 | Stop reason: HOSPADM

## 2020-10-25 RX ORDER — DEXTROSE MONOHYDRATE 25 G/50ML
12.5 INJECTION, SOLUTION INTRAVENOUS PRN
Status: DISCONTINUED | OUTPATIENT
Start: 2020-10-25 | End: 2020-10-27 | Stop reason: HOSPADM

## 2020-10-25 RX ADMIN — DEXTROSE MONOHYDRATE 1.5 MG/HR: 50 INJECTION, SOLUTION INTRAVENOUS at 21:03

## 2020-10-25 RX ADMIN — SODIUM CHLORIDE, PRESERVATIVE FREE 10 ML: 5 INJECTION INTRAVENOUS at 20:28

## 2020-10-25 RX ADMIN — TRAMADOL HYDROCHLORIDE 50 MG: 50 TABLET, FILM COATED ORAL at 23:34

## 2020-10-25 RX ADMIN — HYDROCHLOROTHIAZIDE 25 MG: 25 TABLET ORAL at 08:47

## 2020-10-25 RX ADMIN — ISOSORBIDE MONONITRATE 60 MG: 60 TABLET, EXTENDED RELEASE ORAL at 08:47

## 2020-10-25 RX ADMIN — INSULIN LISPRO 1 UNITS: 100 INJECTION, SOLUTION INTRAVENOUS; SUBCUTANEOUS at 20:24

## 2020-10-25 RX ADMIN — SODIUM CHLORIDE, PRESERVATIVE FREE 10 ML: 5 INJECTION INTRAVENOUS at 20:25

## 2020-10-25 RX ADMIN — ACETAMINOPHEN 650 MG: 325 TABLET ORAL at 14:28

## 2020-10-25 RX ADMIN — CYCLOBENZAPRINE 10 MG: 10 TABLET, FILM COATED ORAL at 20:23

## 2020-10-25 RX ADMIN — TRAZODONE HYDROCHLORIDE 50 MG: 50 TABLET ORAL at 20:24

## 2020-10-25 RX ADMIN — LEVETIRACETAM 500 MG: 500 TABLET ORAL at 17:16

## 2020-10-25 RX ADMIN — LISINOPRIL 20 MG: 20 TABLET ORAL at 20:23

## 2020-10-25 RX ADMIN — ACETAMINOPHEN 650 MG: 325 TABLET ORAL at 04:25

## 2020-10-25 RX ADMIN — LISINOPRIL 20 MG: 20 TABLET ORAL at 08:47

## 2020-10-25 RX ADMIN — METOPROLOL SUCCINATE 50 MG: 50 TABLET, EXTENDED RELEASE ORAL at 08:47

## 2020-10-25 ASSESSMENT — ENCOUNTER SYMPTOMS
ABDOMINAL PAIN: 0
VOMITING: 0
BACK PAIN: 0
NAUSEA: 0
SHORTNESS OF BREATH: 0
DIARRHEA: 0
SORE THROAT: 0
COUGH: 0

## 2020-10-25 ASSESSMENT — PAIN DESCRIPTION - DESCRIPTORS
DESCRIPTORS: ACHING
DESCRIPTORS: PRESSURE

## 2020-10-25 ASSESSMENT — PAIN DESCRIPTION - PROGRESSION
CLINICAL_PROGRESSION: GRADUALLY WORSENING
CLINICAL_PROGRESSION: GRADUALLY IMPROVING

## 2020-10-25 ASSESSMENT — PAIN SCALES - GENERAL
PAINLEVEL_OUTOF10: 8
PAINLEVEL_OUTOF10: 7
PAINLEVEL_OUTOF10: 4
PAINLEVEL_OUTOF10: 6
PAINLEVEL_OUTOF10: 7
PAINLEVEL_OUTOF10: 6
PAINLEVEL_OUTOF10: 7
PAINLEVEL_OUTOF10: 5
PAINLEVEL_OUTOF10: 8
PAINLEVEL_OUTOF10: 6

## 2020-10-25 ASSESSMENT — PAIN DESCRIPTION - PAIN TYPE
TYPE: ACUTE PAIN

## 2020-10-25 ASSESSMENT — PAIN DESCRIPTION - LOCATION
LOCATION: HEAD

## 2020-10-25 ASSESSMENT — PAIN DESCRIPTION - ONSET
ONSET: ON-GOING

## 2020-10-25 ASSESSMENT — PAIN DESCRIPTION - FREQUENCY
FREQUENCY: CONTINUOUS
FREQUENCY: CONTINUOUS

## 2020-10-25 NOTE — PROGRESS NOTES
/62. HR sinus 63. Pt Sats 98% on RA . Lungs CTA. Pulses palpable. No edema. = strength all extemities. Pupils = reactive to light. Pt has HA that has been unrelieved but is lessened some with ice pack to back of neck. Rates at 6-7 on scale at present. Pt has had Tylenol at 0425. Pt denies blurring or vision changes. Pt states she had some dementia, but not changed since this admission. Pt is alert and oriented.  Electronically signed by Suzan Serrano RN on 10/25/2020 at 10:32 AM

## 2020-10-25 NOTE — PROGRESS NOTES
Dr Joseph Cisneros has ordered Keppra to start. Order was starting at 2100. I have texted message and Dr Akilah Reed wants first dose now. Will notify pharmacy.  Electronically signed by Leandro De Los Santos RN on 10/25/2020 at 4:33 PM

## 2020-10-25 NOTE — PROGRESS NOTES
Pt's speech is improving. Was able to name fingers, hand , arm, elbow, name , yr, without delay. Will cont to monitor for changes. Pt's daughter states she had an episode in July similar to this and doctors thought related to several pain medications and Elavil that pt was on at that time, which were dc'd at that time.  Electronically signed by Chaya Gottron, RN on 10/25/2020 at 2:42 PM

## 2020-10-25 NOTE — H&P
[facility]  History and Physical    Patient:  Sam Jones  MRN: [id number]    CHIEF COMPLAINT: Fall      PCP: Fransisco Ramirez MD    HISTORY OF PRESENT ILLNESS:   The patient is a 76 y.o. female with past medical history significant for lupus, diet-controlled diabetes, GERD, coronary artery disease status post CABG and recent stenting, hypertension, hyperlipidemia who presented after a fall. She has had a significant amount of weakness and debility since an admission back in July and was using a Rollator walker. Her daughter was pushing her while she was sitting on the Rollator walker when she fell backwards and hit her head on the asphalt. Patient did not lose consciousness but had immediate headache that has persisted. She is on Plavix and aspirin after PCI in April with  stent placement. Patient also has a history of a CABG x3. In the ER CT head showed small right frontal area of traumatic subarachnoid hemorrhage. Patient had significantly elevated blood pressure with systolics in the 1  range. She was admitted to the ICU overnight for monitoring and Cardene drip for blood pressure control.     REVIEW OF SYSTEMS:    Past Medical History:      Diagnosis Date    Asthma     CAD (coronary artery disease)     Chest pain     Depression with anxiety     Diabetes mellitus (HCC)     GERD (gastroesophageal reflux disease)     Glucose intolerance (impaired glucose tolerance)     Hyperlipidemia     Cholesterol management per Ruthann Judd M.D.   [facility] Hypertension     Lupus (systemic lupus erythematosus) [facility] [address]     Malaise and fatigue 10/6/2017    Movement disorder     Sees pain management for neck pain and previous surgery    New onset seizure ([facility] [address]     Osteoarthritis     Palliative care patient 07/22/2020    S/P CABG x 3 03/05/2013    PACABG X 3, LIMA-LAD, SVG0OM, SVG-DIAG, RT EVH, DR MEEHAN       Past Surgical History:      Procedure Laterality Date   [address] BACK SURGERY      CARDIAC CATHETERIZATION  5/18/11    EF 60%    CARDIAC CATHETERIZATION  1/25/16    drug eluting stent to RCA    CARDIAC CATHETERIZATION  1/25/2016    CARDIAC CATHETERIZATION  2/19/16    CARDIAC CATHETERIZATION  08/24/2017    cutting balloon angioplasty ot LAD    CARDIAC CATHETERIZATION  04/13/2020    TWO Stents placed    COLON SURGERY  1992    Polypectomy - 2    COLON SURGERY  2003    Polypectomy - 4    COLON SURGERY  11/2008    Polypectomy - 2    COLON SURGERY  8/2012    Polypectomy - 3    COLONOSCOPY      CORONARY ANGIOPLASTY  08/2017    cutting balloon angioplasty LAD diagonal    CORONARY ANGIOPLASTY WITH STENT PLACEMENT  3/22/2007    CORONARY ANGIOPLASTY WITH STENT PLACEMENT  8/2007    CORONARY ARTERY BYPASS GRAFT  3/5/2013    PACABG X 3, LIMA-LAD, SVG0OM, SVG-DIAG, RT EVH, DR PATEL    COSMETIC SURGERY      deviated septum and rhinoplasty    CYST REMOVAL  1970    DIAGNOSTIC CARDIAC CATH LAB PROCEDURE      ENDOSCOPY, COLON, DIAGNOSTIC      EYE SURGERY  03/2019    cataracts   [address]    Partial    NASAL SEPTUM [address] SINUS SURGERY  2003    Nasal Polyps Removed    SPINE SURGERY  3/6/2006    Lumbar Laminectomy L4&5    SPINE SURGERY  3/6/2006    Cervical Fusion - C4,5,6    TONSILLECTOMY  1968       Medications Prior to Admission:    Prior to Admission medications    Medication Sig Start Date End Date Taking? Authorizing Provider   zolpidem (AMBIEN) 10 MG tablet Take 10 mg by mouth nightly as needed for Sleep.  Half a tablet   Yes Historical Provider, MD   amitriptyline (ELAVIL) 25 MG tablet Take 25 mg by mouth nightly   Yes Historical Provider, MD   isosorbide mononitrate (IMDUR) 60 MG extended release tablet TAKE 1 TABLET TWICE A DAY 8/3/20  Yes LEILA Garcia NP   lisinopril (PRINIVIL;ZESTRIL) 20 MG tablet Take 1 tablet by mouth 2 times daily 7/24/20  Yes Rose Romero MD   docusate sodium (COLSelvin [address]) 100 MG CAPS Take 100 mg by mouth 2 times daily 7/24/20  Yes Liudmila France MD   metoprolol succinate (TOPROL XL) 25 MG extended release tablet Take 25 mg by mouth nightly   Yes Historical Provider, MD   ranolazine (RANEXA) 1000 MG extended release tablet TAKE 1 TABLET TWICE A DAY 7/1/20  Yes LEILA Turner   metoprolol succinate (TOPROL XL) 25 MG extended release tablet Take 50 mg by mouth daily    Yes Historical Provider, MD   clopidogrel (PLAVIX) 75 MG tablet Take 1 tablet by mouth daily 4/13/20  Yes Mariel Roman MD   atorvastatin (LIPITOR) 80 MG tablet Take 1 tablet by mouth daily  Patient taking differently: Take 80 mg by mouth nightly  4/25/19  Yes LEILA Lin   promethazine (PHENERGAN) 25 MG tablet Take 25 mg by mouth every 6 hours as needed for Nausea   Yes Historical Provider, MD   ALPRAZolam (XANAX) 0.25 MG tablet Take 0.125 mg by mouth daily as needed for Anxiety (takes 1/2 tab). Yes Historical Provider, MD   albuterol (PROVENTIL HFA;VENTOLIN HFA) 108 (90 BASE) MCG/ACT inhaler Inhale 2 puffs into the lungs every 6 hours as needed. Yes Historical Provider, MD   traZODone (DESYREL) 50 MG tablet Take 50 mg by mouth nightly. Yes Historical Provider, MD   aspirin 81 MG tablet Take 81 mg by mouth daily. Yes Historical Provider, MD   metFORMIN (GLUCOPHAGE) 500 MG tablet Take 1 tablet by mouth 2 times daily Please hold Metformin for 48 hours after cardiac catheterization. May resume on 4/15/20 4/13/20   Mariel Roman MD   nitroGLYCERIN (NITROSTAT) 0.4 MG SL tablet up to max of 3 total doses. If no relief after 1 dose, call 911. 4/3/20   Mariel Roman MD   Cyanocobalamin (VITAMIN B 12 PO) Take 1,000 mcg by mouth daily    Historical Provider, MD       Allergies: Other; Codeine; Darvocet [propoxyphene n-acetaminophen]; Hydrocodone-acetaminophen; Lyrica [pregabalin];  Morphine; and Percocet [oxycodone-acetaminophen]    Social History:   Social History     Socioeconomic History    Marital status:      Spouse name: Not on file    Number of children: Not on file    Years of education: Not on file    Highest education level: Not on file   Occupational History    Not on file   Social Needs    Financial resource strain: Not on file    Food insecurity     Worry: Not on file     Inability: Not on file    Transportation needs     Medical: Not on file     Non-medical: Not on file   Tobacco Use    Smoking status: Former Smoker     Packs/day: 1.00     Years: 1.00     Pack years: 1.00     Types: Cigarettes     Last attempt to quit: 1967     Years since quittin.6    Smokeless tobacco: Never Used   Substance and Sexual Activity    Alcohol use: No    Drug use: No    Sexual activity: Not Currently   Lifestyle    Physical activity     Days per week: Not on file     Minutes per session: Not on file    Stress: Not on file   Relationships    Social connections     Talks on phone: Not on file     Gets together: Not on file     Attends [employer] service: Not on file     Active member of club or organization: Not on file     Attends meetings of clubs or organizations: Not on file     Relationship status: Not on file    Intimate partner violence     Fear of current or ex partner: Not on file     Emotionally abused: Not on file     Physically abused: Not on file     Forced sexual activity: Not on file   Other Topics Concern    Not on file   Social History Narrative    Not on file       Family History:       Problem Relation Age of Onset    Heart Disease Mother     High Blood Pressure Mother     Diabetes Mother     High Cholesterol Mother     Diabetes Brother         Review of systems:  Review of Systems   Constitutional: Negative for chills and fever. HENT: Negative for congestion and sore throat. Respiratory: Negative for cough and shortness of breath. Cardiovascular: Negative for chest pain and palpitations.    Gastrointestinal: Negative for abdominal pain, diarrhea, nausea and vomiting. Genitourinary: Negative for dysuria and urgency. Musculoskeletal: Negative for back pain and neck pain. Skin: Negative for rash. Neurological: Positive for headaches. Negative for syncope, facial asymmetry and speech difficulty. Psychiatric/Behavioral: Negative for behavioral problems and confusion. A full 14 point review of systems is otherwise negative outside listed above and HPI      Physical Exam:        Physical Exam  Constitutional:       Appearance: Normal appearance. HENT:      Head: Normocephalic. Comments: Abrasion on posterior scalp     Nose: Nose normal.      Mouth/Throat:      Mouth: Mucous membranes are moist.   Eyes:      Extraocular Movements: Extraocular movements intact. Pupils: Pupils are equal, round, and reactive to light. Neck:      Musculoskeletal: Normal range of motion and neck supple. Cardiovascular:      Rate and Rhythm: Normal rate and regular rhythm. Heart sounds: No murmur. Pulmonary:      Effort: Pulmonary effort is normal. No respiratory distress. Breath sounds: Normal breath sounds. Abdominal:      General: Abdomen is flat. There is no distension. Palpations: Abdomen is soft. Tenderness: There is no abdominal tenderness. Musculoskeletal: Normal range of motion. General: No swelling. Skin:     General: Skin is warm and dry. Capillary Refill: Capillary refill takes less than 2 seconds. Neurological:      General: No focal deficit present. Mental Status: She is alert and oriented to person, place, and time.    Psychiatric:         Mood and Affect: Mood normal.         Behavior: Behavior normal.                 CBC:   Recent Labs     10/24/20  1655 10/25/20  0334   WBC 6.4 4.0*   HGB 13.3 11.9*    165     BMP:    Recent Labs     10/24/20  1655 10/25/20  0334   * 136   K 4.7 4.7    102   CO2 24 15*   BUN 17 17   CREATININE 0.7 0.7   GLUCOSE 106 100     Hepatic:   Recent Labs 10/24/20  1655 10/25/20  0334   AST 13 14   ALT 16 13   BILITOT 0.5 0.5   ALKPHOS 66 51     Troponin T: No results for input(s): TROPONINI in the last 72 hours. Pro-BNP: No results for input(s): BNP in the last 72 hours. Lipids: No results for input(s): CHOL, HDL in the last 72 hours. Invalid input(s): LDLCALCU  ABGs:   Lab Results   Component Value Date    PHART 7.350 07/20/2020    PO2ART 53.0 07/20/2020    THU4GGA 46.0 07/20/2020     INR:   Recent Labs     10/24/20  1655 10/25/20  0334   INR 0.96 0.99     -----------------------------------------------------------------  EKG: Sinus rhythm with anterior lateral ST changes seen prior  Radiology:     Xr Lumbar Spine (2-3 Views)    Result Date: 10/24/2020  EXAMINATION: XR LUMBAR SPINE (2-3 VIEWS) 10/24/2020 3:12 PM HISTORY: XR LUMBAR SPINE (2-3 VIEWS) 10/24/2020 2:05 PM HISTORY: Patient fell low back pain Comparison: September 16, 2019 Findings: Frontal and lateral radiographs of the lumbar spine were obtained. There is no acute fracture. Mild degenerative anterolisthesis of L4 on L5 is noted unchanged from September 16, 2019. . Vertebral bodies are normal in height. Anterior hypertrophic degenerative changes present at the L1-2 level and L2-3 level and L3-4 level. This is unchanged from September 16, 2019. . Mild scoliosis is present with the primary convexity to the right at the L2-3 level. The pedicles are intact. . Vascular calcifications noted abdominal aorta. The SI joints are normal..     Impression: 1. Degenerative spondylosis is noted in the lumbar spine similar to September 16, 2019. No acute abnormality is identified. . Signed by Dr María Ornelas on 10/24/2020 3:14 PM    Ct Head Wo Contrast    Result Date: 10/24/2020  EXAMINATION: CT HEAD WO CONTRAST 10/24/2020 4:01 PM HISTORY: CT BRAIN without contrast 10/24/2020 2:07 PM HISTORY: Head injury patient fell COMPARISON: July 20, 2020 DLP: 760 mGy cm TECHNIQUE: Serial axial tomographic images of the brain were obtained without the use of intravenous contrast. FINDINGS: The midline structures are nondisplaced. There is moderate cerebral and cerebellar volume loss, with an associated increase in the prominence of the ventricles and sulci. The basilar cisterns are normal in size and configuration. On axial image 16 there is subtle increased attenuation in the right frontal lobe. This may be a subtle petechial or subarachnoid hemorrhage. There are no other regions of increased attenuation to suggest hemorrhage. . There is low attenuation in the periventricular white matter, consistent with chronic ischemic change. There are no abnormal extra-axial fluid collections. There is no evidence of tonsillar herniation. The included orbits and their contents are unremarkable. The visualized paranasal sinuses, mastoid air cells and middle ear cavities are clear. The visualized osseous structures and overlying soft tissues of the skull and face are intact. There is a very subtle petechial or subarachnoid hemorrhage in the right frontal lobe just lateral to the frontal horn right lateral ventricle. No other regions of abnormal increased attenuation are identified. 2. Changes of aging with chronic microvascular ischemic change in the white matter. Signed by Dr Zahra Maldonado on 10/24/2020 4:05 PM    Ct Cervical Spine Wo Contrast    Result Date: 10/24/2020  EXAMINATION: CT CERVICAL SPINE WO CONTRAST 10/24/2020 4:09 PM HISTORY: CT CERVICAL SPINE without contrast dated 10/24/2020 2:07 PM HISTORY: Neck pain after a fall COMPARISON: July 20, 2020 DOSE LENGTH PRODUCT: 439 mGy cm TECHNIQUE: Serial helical tomographic images of the cervical spine were obtained without the use of intravenous contrast. Additionally, sagittal and coronal reformatted images were also provided for review. FINDINGS: Postsurgical changes are seen in the cervical spine. Surgical plate is present anteriorly with fixation screws at C4, C5 and C6.  Interbody disc space devices are noted at the C4-5 and C5-6 disc space level. Prominent anterior hypertrophic osteophytes present associated with the anterior inferior endplate of C3. The odontoid process is intact. There is no evidence of acute fracture or subluxation. The prevertebral soft tissues are within normal limits. The posterior elements are intact. Vertebral body heights are maintained. The visualized skull base is intact. The visualized thorax demonstrates no acute abnormality. 1. Degenerative and postsurgical changes noted in the cervical spine. No acute abnormality is identified Signed by Dr Sergey Brenner on 10/24/2020 4:13 PM    Xr Hip 2-3 Vw W Pelvis Right    Result Date: 10/24/2020  EXAMINATION: XR HIP 2-3 VW W PELVIS RIGHT 10/24/2020 3:15 PM HISTORY: XR HIP 2-3 VW W PELVIS RIGHT 10/24/2020 2:05 PM HISTORY: Patient fell Comparison: AP view the pelvis May 28 2020 Findings: Frontal and lateral views of the right hip and AP view the pelvis were obtained. There is no fracture or dislocation. Mild narrowing of the right hip joint space. SI joints are normal. The left hip is unremarkable. . The remaining visualized osseous structures are intact. No gross soft tissue abnormality is visualized. Impression: 1. Mild degenerative change with some narrowing of the right hip joint space. No acute abnormalities identified. Signed by Dr Sergey Brenner on 10/24/2020 3:16 PM      Assessment and Plan   Subarachnoid hemorrhage  Traumatic subarachnoid hemorrhage on antiplatelet therapy. Small with no neurologic deficit. Blood pressure within goal on Cardene.   Plan:  -Repeat CT read pending this morning, if stable can likely transition to the floor  -Neurosurgery consulted appreciate their recommendations especially in regards to resuming Plavix and aspirin as she does have drug-eluting stent that is 10 months old  -Continue home lisinopril and metoprolol, add HCTZ to try to wean Cardene drip for transition to the floor  -Goal blood

## 2020-10-25 NOTE — PROGRESS NOTES
Naveed Hickey received from PCU to room # 141 . Mental Status: Patient is oriented, alert, coherent, logical, thought processes intact and able to concentrate and follow conversation. Vitals:    10/24/20 2200   BP: (!) 127/59   Pulse: 63   Resp: 12   Temp:    SpO2: 93%     Placed on cardiac monitor: Yes. Bedside monitor. Belongings: Glasses with patient at bedside . Family at bedside Yes. Oriented Patient and Family to room. Call light within reach. Yes. Transfer was: Well tolerated by patient. .    Electronically signed by Sophia Paredes RN on 10/24/2020 at 11:07 PM

## 2020-10-25 NOTE — PROGRESS NOTES
Return to 141 on bed from CT with RN and transporter. Will give Tylenol for HA now.  Electronically signed by Ronaldo Chin RN on 10/25/2020 at 2:27 PM

## 2020-10-25 NOTE — PROGRESS NOTES
Pt with sudden on set of inability to find words. Had been reading newspaper in room and conversing with daughter. I had gone in to assess and take lunch at 1300. Pt was unable to tell me she had ink on her fingers from reading the paper, couldn't find words to tell me her HA was the same or better or worse. Pt was aware of this inability. accucheck 129 at present. Dr Galen Mercedes was notified and repeat CT head ordered. Notified Dr Lees Daily also as pt was going to move to floor today.  Electronically signed by Bry Soares RN on 10/25/2020 at 1:22 PM

## 2020-10-25 NOTE — PROGRESS NOTES
Dutch stopped. /59. Hydrodiuril started po.  Electronically signed by Faye Kim RN on 10/25/2020 at 10:12 AM

## 2020-10-25 NOTE — PROGRESS NOTES
Neurosurgery  Repeat CT reviewed - no significant increase in hemorrhage or evidence of other acute intracranial abnormality  Speech issues intermittent in nature, now improving  Keppra and MRI orders placed  Will remain in ICU overnight

## 2020-10-25 NOTE — CONSULTS
[facility]  Consult Note    CHIEF COMPLAINT:  Fall    HISTORY OF PRESENT ILLNESS:      The patient is a 76 y.o. female who presents with a fall backwards earlier today while voting. Posterior head trauma. No LOC.  + HA, No N/V or changes in vision    CT head revealed a small right frontal area of traumatic subarachnoid hemorrhage for which I was asked to evaluate. The patient does take anticoagulation medication.  Plavix - cardiac stents this past April      Past Medical History:   Diagnosis Date    Asthma     CAD (coronary artery disease)     Chest pain     Depression with anxiety     Diabetes mellitus ([address]     GERD (gastroesophageal reflux disease)     Glucose intolerance (impaired glucose tolerance)     Hyperlipidemia     Cholesterol management per Theo Goodson Hypertension     Lupus (systemic lupus erythematosus) ([address]     Malaise and fatigue 10/6/2017    Movement disorder     Sees pain management for neck pain and previous surgery    New onset seizure ([address]     Osteoarthritis     Palliative care patient 07/22/2020    S/P CABG x 3 03/05/2013    PACABG X 3, LIMA-LAD, SVG0OM, SVG-DIAG, RT EVH, DR Janet Edmondson       Past Surgical History:   Procedure Laterality Date    APPENDECTOMY  1965    BACK SURGERY      CARDIAC CATHETERIZATION  5/18/11    EF 60%    CARDIAC CATHETERIZATION  1/25/16    drug eluting stent to RCA   [address]  1/25/2016    CARDIAC CATHETERIZATION  2/19/16    CARDIAC CATHETERIZATION  08/24/2017    cutting balloon angioplasty ot LAD    CARDIAC CATHETERIZATION  04/13/2020    TWO Stents placed    COLON SURGERY  1992    Polypectomy - 2    COLON SURGERY  2003    Polypectomy - 4    COLON SURGERY  11/2008    Polypectomy - 2    COLON SURGERY  8/2012    Polypectomy - 3    COLONOSCOPY      CORONARY ANGIOPLASTY  08/2017    cutting balloon angioplasty LAD diagonal    CORONARY ANGIOPLASTY WITH STENT PLACEMENT  3/22/2007    CORONARY ANGIOPLASTY WITH STENT PLACEMENT  8/2007    CORONARY ARTERY BYPASS GRAFT  3/5/2013    PACABG X 3, LIMA-LAD, SVG0OM, SVG-DIAG, RT EV, DR PATEL    COSMETIC SURGERY      deviated septum and rhinoplasty    CYST REMOVAL  1970    DIAGNOSTIC CARDIAC CATH LAB PROCEDURE      ENDOSCOPY, COLON, DIAGNOSTIC      EYE SURGERY  03/2019    cataracts   [address]    Partial    NASAL SEPTUM [address] SINUS SURGERY  2003    Nasal Polyps Removed    SPINE SURGERY  3/6/2006    Lumbar Laminectomy L4&5    SPINE SURGERY  3/6/2006    Cervical Fusion - C4,5,6    TONSILLECTOMY  1968        Medications    Current Facility-Administered Medications:     ALPRAZolam (XANAX) tablet 0.125 mg, 0.125 mg, Oral, Daily PRN, Nguyen Hoang, APRN    [START ON 10/25/2020] isosorbide mononitrate (IMDUR) extended release tablet 60 mg, 60 mg, Oral, Daily, Nguyen Hoang, APRN    lisinopril (PRINIVIL;ZESTRIL) tablet 20 mg, 20 mg, Oral, BID, Nguyen Hoang, APRN, 20 mg at 10/24/20 2059    [START ON 10/25/2020] metoprolol succinate (TOPROL XL) extended release tablet 50 mg, 50 mg, Oral, Daily, Nguyen Hoang, APRN    nitroGLYCERIN (NITROSTAT) SL tablet 0.4 mg, 0.4 mg, Sublingual, Q5 Min PRN, Nguyen Hoang, APRN    traZODone (DESYREL) tablet 50 mg, 50 mg, Oral, Nightly, Nguyen Hoang, APRN    sodium chloride flush 0.9 % injection 10 mL, 10 mL, Intravenous, 2 times per day, Nguyen Huron, APRN    sodium chloride flush 0.9 % injection 10 mL, 10 mL, Intravenous, PRN, Nguyen Huron, APRN    polyethylene glycol (GLYCOLAX) packet 17 g, 17 g, Oral, Daily PRN, Nguyen Hoang, APRN    promethazine (PHENERGAN) tablet 12.5 mg, 12.5 mg, Oral, Q6H PRN **OR** ondansetron (ZOFRAN) injection 4 mg, 4 mg, Intravenous, Q6H PRN, Nguyen Hoang, APRN    sodium chloride flush 0.9 % injection 10 mL, 10 mL, Intravenous, 2 times per day, Nguyen Hoang, APRN    sodium chloride flush 0.9 % injection 10 mL, 10 mL, Intravenous, PRN, Doy Males, APRN    niCARdipine (CARDENE) 25 mg in dextrose 5 % 250 mL infusion, 3 mg/hr, Intravenous, Continuous, LEILA Ojeda CNP, Last Rate: 30 mL/hr at 10/24/20 2058, 3 mg/hr at 10/24/20 2058    sodium chloride flush 0.9 % injection 10 mL, 10 mL, Intravenous, 2 times per day, Doy Males, APRN, 10 mL at 10/24/20 2100    sodium chloride flush 0.9 % injection 10 mL, 10 mL, Intravenous, PRN, Doy Males, APRN    senna (SENOKOT) tablet 8.6 mg, 1 tablet, Oral, Daily PRN, Doy Males, APRN    acetaminophen (TYLENOL) tablet 650 mg, 650 mg, Oral, Q8H PRN, LEILA Ojeda CNP, 650 mg at 10/24/20 2059    insulin lispro (HUMALOG) injection vial 0-6 Units, 0-6 Units, Subcutaneous, BID, LEILA Ojeda CNP    insulin lispro (HUMALOG) injection vial 0-3 Units, 0-3 Units, Subcutaneous, Nightly, LEILA Ojeda CNP  Other; Codeine; Darvocet [propoxyphene n-acetaminophen]; Hydrocodone-acetaminophen; Lyrica [pregabalin];  Morphine; and Percocet [oxycodone-acetaminophen]    Social History  Social History     Tobacco Use   Smoking Status Former Smoker    Packs/day: 1.00    Years: 1.00    Pack years: 1.00    Types: Cigarettes    Last attempt to quit: 1967    Years since quittin.6   Smokeless Tobacco Never Used     Social History     Substance and Sexual Activity   Alcohol Use No         Family History   Problem Relation Age of Onset    Heart Disease Mother     High Blood Pressure Mother     Diabetes Mother     High Cholesterol Mother     Diabetes Brother          REVIEW OF SYSTEMS:  Constitutional: No fevers No chills  Neck:No stiffness  Respiratory: No shortness of breath  Cardiovascular: No chest pain No palpitations  Gastrointestinal: No abdominal pain    Genitourinary: No Dysuria  Neurological: + headache, no confusion    PHYSICAL EXAM:  Vitals:    10/24/20 1755   BP: (!) 185/87 Pulse: 66   Resp: 18   Temp: 97.3 °F (36.3 °C)   SpO2: 95%       Constitutional: The patient appears well-developed and well-nourished. Eyes - conjunctiva normal.  Conjugate Gaze  Ear, nose, throat - No scars, masses, or lesions over external nose or ears, no atrophy of tongue  Neck-symmetric, no masses noted, no jugular vein distension  Respiration- chest wall appears symmetric, good expansion, normal effort without use of accessory muscles  Musculoskeletal - no significant wasting of muscles noted, no bony deformities  Extremities-no clubbing, cyanosis or edema  Skin - warm, dry, and intact. No rash, erythema, or pallor. Psychiatric - mood, affect, and behavior appear normal.     Neurologic Examination  Awake, Alert and oriented x 4  PERRL, EOMI, CN II-XII intact  Normal speech pattern, following commands  Motor 5/5 all extremities  No deficits to light touch      DATA:  Nursing/pcp notes, imaging,labs and vitals reviewed.      PT,OT and/or speech notes reviewed    Lab Results   Component Value Date    WBC 6.4 10/24/2020    HGB 13.3 10/24/2020    HCT 40.1 10/24/2020    MCV 92.0 10/24/2020     10/24/2020     Lab Results   Component Value Date     (L) 10/24/2020    K 4.7 10/24/2020     10/24/2020    CO2 24 10/24/2020    BUN 17 10/24/2020    CREATININE 0.7 10/24/2020    GLUCOSE 106 10/24/2020    CALCIUM 9.5 10/24/2020    PROT 6.4 (L) 10/24/2020    LABALBU 4.4 10/24/2020    BILITOT 0.5 10/24/2020    ALKPHOS 66 10/24/2020    AST 13 10/24/2020    ALT 16 10/24/2020    LABGLOM >60 10/24/2020    GFRAA >59 10/24/2020    GLOB 1.8 11/19/2016     Lab Results   Component Value Date    INR 0.96 10/24/2020    INR 1.00 07/20/2020    INR 0.98 01/10/2019    PROTIME 12.7 10/24/2020    PROTIME 13.1 07/20/2020    PROTIME 12.4 01/10/2019     EXAMINATION: CT HEAD WO CONTRAST 10/24/2020 4:01 PM    HISTORY: CT BRAIN without contrast 10/24/2020 2:07 PM    HISTORY: Head injury patient fell    COMPARISON: July 20, 2020 DLP: 760 mGy cm    TECHNIQUE: Serial axial tomographic images of the brain were obtained    without the use of intravenous contrast.    FINDINGS:    The midline structures are nondisplaced. There is moderate cerebral    and cerebellar volume loss, with an associated increase in the    prominence of the ventricles and sulci. The basilar cisterns are    normal in size and configuration. On axial image 16 there is subtle    increased attenuation in the right frontal lobe. This may be a subtle    petechial or subarachnoid hemorrhage. There are no other regions of    increased attenuation to suggest hemorrhage. . There is low attenuation    in the periventricular white matter, consistent with chronic ischemic    change. There are no abnormal extra-axial fluid collections. There is    no evidence of tonsillar herniation. The included orbits and their contents are unremarkable. The    visualized paranasal sinuses, mastoid air cells and middle ear    cavities are clear. The visualized osseous structures and overlying    soft tissues of the skull and face are intact.         Impression    There is a very subtle petechial or subarachnoid hemorrhage in the    right frontal lobe just lateral to the frontal horn right lateral    ventricle. No other regions of abnormal increased attenuation are    identified. 2. Changes of aging with chronic microvascular ischemic change in the    white matter. Signed by Dr Sergey Brenner on 10/24/2020 4:05 PM         IMPRESSION  76year old female, s/p fall on Plavix, neurologically stable with small right frontal traumatic subarachnoid hemorrhage    RECOMMENDATIONS:    Admit to ICU  Repeat CT head in AM  SBP   Will hold ASA for now, if CT head tomorrow stable will consider resuming since she has had relatively recent cardiac stents placed.             [facility] Hospital[facility]

## 2020-10-25 NOTE — PROGRESS NOTES
SURGERY  8/2012    Polypectomy - 3    COLONOSCOPY      CORONARY ANGIOPLASTY  08/2017    cutting balloon angioplasty LAD diagonal    CORONARY ANGIOPLASTY WITH STENT PLACEMENT  3/22/2007    CORONARY ANGIOPLASTY WITH STENT PLACEMENT  8/2007    CORONARY ARTERY BYPASS GRAFT  3/5/2013    PACABG X 3, LIMA-LAD, SVG0OM, SVG-DIAG, RT EV, DR PATEL    COSMETIC SURGERY      deviated septum and rhinoplasty    CYST REMOVAL  1970    DIAGNOSTIC CARDIAC CATH LAB PROCEDURE      ENDOSCOPY, COLON, DIAGNOSTIC      EYE SURGERY  03/2019    cataracts   [address]    Partial    NASAL SEPTUM [facility]    SINUS SURGERY  2003    Nasal Polyps Removed    SPINE SURGERY  3/6/2006    Lumbar Laminectomy L4&5    SPINE SURGERY  3/6/2006    Cervical Fusion - C4,5,6    TONSILLECTOMY  1968        Medications    Current Facility-Administered Medications:     hydroCHLOROthiazide (HYDRODIURIL) tablet 25 mg, 25 mg, Oral, Daily, Yaona Loja MD, 25 mg at 10/25/20 0847    ALPRAZolam (XANAX) tablet 0.125 mg, 0.125 mg, Oral, Daily PRN, Agustín Lass, APRN    isosorbide mononitrate (IMDUR) extended release tablet 60 mg, 60 mg, Oral, Daily, Agustín Lass, APRN, 60 mg at 10/25/20 0847    lisinopril (PRINIVIL;ZESTRIL) tablet 20 mg, 20 mg, Oral, BID, Agustín Lass, APRN, 20 mg at 10/25/20 0847    metoprolol succinate (TOPROL XL) extended release tablet 50 mg, 50 mg, Oral, Daily, Agustín Lass, APRN, 50 mg at 10/25/20 0847    nitroGLYCERIN (NITROSTAT) SL tablet 0.4 mg, 0.4 mg, Sublingual, Q5 Min PRN, Agustín Lass, APRN    traZODone (DESYREL) tablet 50 mg, 50 mg, Oral, Nightly, Agustín Lass, APRN, 50 mg at 10/24/20 2204    sodium chloride flush 0.9 % injection 10 mL, 10 mL, Intravenous, 2 times per day, Agustín Lass, APRN    sodium chloride flush 0.9 % injection 10 mL, 10 mL, Intravenous, PRN, Agustín Lass, APRN    polyethylene glycol [facility]) packet 17 g, 17 g, Oral, Daily PRN, Anya Deng, APRN    promethazine (PHENERGAN) tablet 12.5 mg, 12.5 mg, Oral, Q6H PRN **OR** ondansetron (ZOFRAN) injection 4 mg, 4 mg, Intravenous, Q6H PRN, Anya Deng, APRN    sodium chloride flush 0.9 % injection 10 mL, 10 mL, Intravenous, 2 times per day, Anya Deng, APRN    sodium chloride flush 0.9 % injection 10 mL, 10 mL, Intravenous, PRN, Anya Deng, APRN    niCARdipine (CARDENE) 25 mg in dextrose 5 % 250 mL infusion, 3 mg/hr, Intravenous, Continuous, Elenora Marking, DO, Stopped at 10/25/20 0850    sodium chloride flush 0.9 % injection 10 mL, 10 mL, Intravenous, 2 times per day, Anya Deng, APRN, 10 mL at 10/24/20 2100    sodium chloride flush 0.9 % injection 10 mL, 10 mL, Intravenous, PRN, Anya Deng, APRN    senna (SENOKOT) tablet 8.6 mg, 1 tablet, Oral, Daily PRN, Anya Deng, APRN    acetaminophen (TYLENOL) tablet 650 mg, 650 mg, Oral, Q8H PRN, LEILA Kelsey - CNP, 650 mg at 10/25/20 0425    insulin lispro (HUMALOG) injection vial 0-6 Units, 0-6 Units, Subcutaneous, BID, LEILA Kelsey - CNP  Other; Codeine; Darvocet [propoxyphene n-acetaminophen]; Hydrocodone-acetaminophen; Lyrica [pregabalin];  Morphine; and Percocet [oxycodone-acetaminophen]    Social History  Social History     Tobacco Use   Smoking Status Former Smoker    Packs/day: 1.00    Years: 1.00    Pack years: 1.00    Types: Cigarettes    Last attempt to quit: 1967    Years since quittin.6   Smokeless Tobacco Never Used     Social History     Substance and Sexual Activity   Alcohol Use No         Family History   Problem Relation Age of Onset    Heart Disease Mother     High Blood Pressure Mother     Diabetes Mother     High Cholesterol Mother     Diabetes Brother          REVIEW OF SYSTEMS:  Constitutional: No fevers No chills  Neck:No stiffness  Respiratory: No shortness of breath  Cardiovascular: No chest pain No palpitations  Gastrointestinal: No abdominal pain    Genitourinary: No Dysuria  Neurological: + headache, no confusion    PHYSICAL EXAM:  Vitals:    10/25/20 1030   BP: 139/62   Pulse: 64   Resp: 15   Temp:    SpO2: 98%       Constitutional: The patient appears well-developed and well-nourished. Eyes - conjunctiva normal.  Conjugate Gaze  Ear, nose, throat - No scars, masses, or lesions over external nose or ears, no atrophy of tongue  Neck-symmetric, no masses noted, no jugular vein distension  Respiration- chest wall appears symmetric, good expansion, normal effort without use of accessory muscles  Musculoskeletal - no significant wasting of muscles noted, no bony deformities  Extremities-no clubbing, cyanosis or edema  Skin - warm, dry, and intact. No rash, erythema, or pallor. Psychiatric - mood, affect, and behavior appear normal.     Neurologic Examination  Awake, Alert and oriented x 4  PERRL, EOMI, CN II-XII intact  Normal speech pattern, following commands  Motor 5/5 all extremities  No deficits to light touch      DATA:  Nursing/pcp notes, imaging,labs and vitals reviewed.      PT,OT and/or speech notes reviewed    Lab Results   Component Value Date    WBC 4.0 (L) 10/25/2020    HGB 11.9 (L) 10/25/2020    HCT 39.7 10/25/2020    .8 (H) 10/25/2020     10/25/2020     Lab Results   Component Value Date     10/25/2020    K 4.7 10/25/2020     10/25/2020    CO2 15 (L) 10/25/2020    BUN 17 10/25/2020    CREATININE 0.7 10/25/2020    GLUCOSE 100 10/25/2020    CALCIUM 9.2 10/25/2020    PROT 5.9 (L) 10/25/2020    LABALBU 4.3 10/25/2020    BILITOT 0.5 10/25/2020    ALKPHOS 51 10/25/2020    AST 14 10/25/2020    ALT 13 10/25/2020    LABGLOM >60 10/25/2020    GFRAA >59 10/25/2020    GLOB 1.8 11/19/2016     Lab Results   Component Value Date    INR 0.99 10/25/2020    INR 0.96 10/24/2020    INR 1.00 07/20/2020    PROTIME 12.9 10/25/2020    PROTIME 12.7 10/24/2020    PROTIME 13.1 07/20/2020 EXAMINATION: CT HEAD WO CONTRAST 10/24/2020 4:01 PM    HISTORY: CT BRAIN without contrast 10/24/2020 2:07 PM    HISTORY: Head injury patient fell    COMPARISON: July 20, 2020    DLP: 760 mGy cm    TECHNIQUE: Serial axial tomographic images of the brain were obtained    without the use of intravenous contrast.    FINDINGS:    The midline structures are nondisplaced. There is moderate cerebral    and cerebellar volume loss, with an associated increase in the    prominence of the ventricles and sulci. The basilar cisterns are    normal in size and configuration. On axial image 16 there is subtle    increased attenuation in the right frontal lobe. This may be a subtle    petechial or subarachnoid hemorrhage. There are no other regions of    increased attenuation to suggest hemorrhage. . There is low attenuation    in the periventricular white matter, consistent with chronic ischemic    change. There are no abnormal extra-axial fluid collections. There is    no evidence of tonsillar herniation. The included orbits and their contents are unremarkable. The    visualized paranasal sinuses, mastoid air cells and middle ear    cavities are clear. The visualized osseous structures and overlying    soft tissues of the skull and face are intact.         Impression    There is a very subtle petechial or subarachnoid hemorrhage in the    right frontal lobe just lateral to the frontal horn right lateral    ventricle. No other regions of abnormal increased attenuation are    identified. 2. Changes of aging with chronic microvascular ischemic change in the    white matter.     Signed by Dr William Potter on 10/24/2020 4:05 PM     EXAMINATION: CT HEAD WO CONTRAST 10/25/2020 8:08 AM    HISTORY: CT BRAIN without contrast 10/25/2020 5:01 AM    HISTORY: Small petechial right frontal hemorrhage    COMPARISON: October 24, 2020    DLP: 764 mGy cm    TECHNIQUE: Serial axial tomographic images of the brain were obtained    without the use of intravenous contrast.    FINDINGS:    The midline structures are nondisplaced. There is mild cerebral and    cerebellar volume loss, with an associated increase in the prominence    of the ventricles and sulci. The basilar cisterns are normal in size    and configuration. There is increased CT attenuation in the right    frontal lobe just lateral to frontal horn the right lateral ventricle. This is a small petechial hemorrhage and this is unchanged from    October 24, 2020. There is low attenuation in the periventricular    white matter, consistent with chronic ischemic change. There are no    abnormal extra-axial fluid collections. There is no evidence of    tonsillar herniation. The included orbits and their contents are unremarkable. The    visualized paranasal sinuses, mastoid air cells and middle ear    cavities are clear. The visualized osseous structures and overlying    soft tissues of the skull and face are intact.         Impression    Stable small focal petechial hemorrhage right frontal lobe. 2. Changes of aging also stable from October 24, 2020    Signed by Dr Zofia Prasad on 10/25/2020 8:10 AM          IMPRESSION  76year old female, s/p fall on Plavix, neurologically stable with small right frontal traumatic subarachnoid hemorrhage    RECOMMENDATIONS:    CT head today is stable. No neurosurgical intervention warranted. Cleared for transfer to floor.   Continue to hold Plavix for now, will likely resume in 3-5 more days        Chelsey Nicolas,

## 2020-10-25 NOTE — PROGRESS NOTES
4 Eyes Skin Assessment    Estil Belts is being assessed upon: Transfer to New Unit    I agree that Marry Burgos, along with Robert Mueller RN (either 2 RN's or 1 LPN and 1 RN) have performed a thorough Head to Toe Skin Assessment on the patient. ALL assessment sites listed below have been assessed. Areas assessed by both nurses:     [x]   Head, Face, and Ears   [x]   Shoulders, Back, and Chest  [x]   Arms, Elbows, and Hands   [x]   Coccyx, Sacrum, and Ischium  [x]   Legs, Feet, and Heels    Does the Patient have Skin Breakdown?  No    Sunday Prevention initiated: No  Wound Care Orders initiated: No    [facility] nurse consulted for Pressure Injury (Stage 3,4, Unstageable, DTI, NWPT, and Complex wounds) and New or Established Ostomies: NA        Primary Nurse eSignature: Bry Garcia RN on 10/24/2020 at 11:06 PM      Co-Signer eSignature: Electronically signed by Robert Mueller RN on 10/25/20 at 6:32 AM CDT

## 2020-10-25 NOTE — PROGRESS NOTES
To CT on bed with transport and nurse . Pt on portable monitor.  Electronically signed by Ronaldo Chin RN on 10/25/2020 at 2:26 PM

## 2020-10-26 ENCOUNTER — APPOINTMENT (OUTPATIENT)
Dept: MRI IMAGING | Age: 75
DRG: 087 | End: 2020-10-26
Payer: MEDICARE

## 2020-10-26 PROBLEM — S06.6X0A SUBARACHNOID HEMORRHAGE FOLLOWING INJURY, NO LOSS OF CONSCIOUSNESS (HCC): Status: ACTIVE | Noted: 2020-10-25

## 2020-10-26 LAB
GLUCOSE BLD-MCNC: 104 MG/DL (ref 70–99)
GLUCOSE BLD-MCNC: 106 MG/DL (ref 70–99)
GLUCOSE BLD-MCNC: 122 MG/DL (ref 70–99)
GLUCOSE BLD-MCNC: 96 MG/DL (ref 70–99)
PERFORMED ON: ABNORMAL
PERFORMED ON: NORMAL

## 2020-10-26 PROCEDURE — 82947 ASSAY GLUCOSE BLOOD QUANT: CPT

## 2020-10-26 PROCEDURE — 92610 EVALUATE SWALLOWING FUNCTION: CPT

## 2020-10-26 PROCEDURE — 6360000004 HC RX CONTRAST MEDICATION: Performed by: NEUROLOGICAL SURGERY

## 2020-10-26 PROCEDURE — 2580000003 HC RX 258: Performed by: NURSE PRACTITIONER

## 2020-10-26 PROCEDURE — A9577 INJ MULTIHANCE: HCPCS | Performed by: NEUROLOGICAL SURGERY

## 2020-10-26 PROCEDURE — 6370000000 HC RX 637 (ALT 250 FOR IP): Performed by: NEUROLOGICAL SURGERY

## 2020-10-26 PROCEDURE — 92523 SPEECH SOUND LANG COMPREHEN: CPT

## 2020-10-26 PROCEDURE — 99232 SBSQ HOSP IP/OBS MODERATE 35: CPT | Performed by: NEUROLOGICAL SURGERY

## 2020-10-26 PROCEDURE — 6370000000 HC RX 637 (ALT 250 FOR IP): Performed by: FAMILY MEDICINE

## 2020-10-26 PROCEDURE — 6370000000 HC RX 637 (ALT 250 FOR IP): Performed by: NURSE PRACTITIONER

## 2020-10-26 PROCEDURE — 6360000002 HC RX W HCPCS: Performed by: NURSE PRACTITIONER

## 2020-10-26 PROCEDURE — 70553 MRI BRAIN STEM W/O & W/DYE: CPT

## 2020-10-26 PROCEDURE — 6360000002 HC RX W HCPCS: Performed by: FAMILY MEDICINE

## 2020-10-26 PROCEDURE — 1210000000 HC MED SURG R&B

## 2020-10-26 PROCEDURE — 2580000003 HC RX 258: Performed by: FAMILY MEDICINE

## 2020-10-26 RX ORDER — DONEPEZIL HYDROCHLORIDE 5 MG/1
5 TABLET, FILM COATED ORAL NIGHTLY
Status: DISCONTINUED | OUTPATIENT
Start: 2020-10-26 | End: 2020-10-27 | Stop reason: HOSPADM

## 2020-10-26 RX ORDER — CYCLOBENZAPRINE HCL 10 MG
10 TABLET ORAL 3 TIMES DAILY PRN
Status: DISCONTINUED | OUTPATIENT
Start: 2020-10-26 | End: 2020-10-26 | Stop reason: SDUPTHER

## 2020-10-26 RX ORDER — RANOLAZINE 500 MG/1
1000 TABLET, EXTENDED RELEASE ORAL 2 TIMES DAILY
Status: DISCONTINUED | OUTPATIENT
Start: 2020-10-26 | End: 2020-10-27 | Stop reason: HOSPADM

## 2020-10-26 RX ORDER — TRAMADOL HYDROCHLORIDE 50 MG/1
50 TABLET ORAL EVERY 6 HOURS PRN
Status: DISCONTINUED | OUTPATIENT
Start: 2020-10-26 | End: 2020-10-27 | Stop reason: HOSPADM

## 2020-10-26 RX ADMIN — LEVETIRACETAM 500 MG: 500 TABLET ORAL at 08:30

## 2020-10-26 RX ADMIN — ONDANSETRON 4 MG: 2 INJECTION INTRAMUSCULAR; INTRAVENOUS at 04:04

## 2020-10-26 RX ADMIN — PROMETHAZINE HYDROCHLORIDE 12.5 MG: 25 TABLET ORAL at 17:07

## 2020-10-26 RX ADMIN — HYDROCHLOROTHIAZIDE 25 MG: 25 TABLET ORAL at 07:29

## 2020-10-26 RX ADMIN — RANOLAZINE 1000 MG: 500 TABLET, FILM COATED, EXTENDED RELEASE ORAL at 21:11

## 2020-10-26 RX ADMIN — TRAZODONE HYDROCHLORIDE 50 MG: 50 TABLET ORAL at 21:12

## 2020-10-26 RX ADMIN — METOPROLOL SUCCINATE 50 MG: 50 TABLET, EXTENDED RELEASE ORAL at 07:29

## 2020-10-26 RX ADMIN — ISOSORBIDE MONONITRATE 60 MG: 60 TABLET, EXTENDED RELEASE ORAL at 07:28

## 2020-10-26 RX ADMIN — SODIUM CHLORIDE, PRESERVATIVE FREE 10 ML: 5 INJECTION INTRAVENOUS at 08:30

## 2020-10-26 RX ADMIN — ACETAMINOPHEN 650 MG: 325 TABLET ORAL at 12:39

## 2020-10-26 RX ADMIN — LEVETIRACETAM 500 MG: 500 TABLET ORAL at 21:11

## 2020-10-26 RX ADMIN — GADOBENATE DIMEGLUMINE 13 ML: 529 INJECTION, SOLUTION INTRAVENOUS at 09:42

## 2020-10-26 RX ADMIN — LISINOPRIL 20 MG: 20 TABLET ORAL at 08:30

## 2020-10-26 RX ADMIN — DONEPEZIL HYDROCHLORIDE 5 MG: 5 TABLET, FILM COATED ORAL at 21:11

## 2020-10-26 RX ADMIN — LISINOPRIL 20 MG: 20 TABLET ORAL at 21:11

## 2020-10-26 RX ADMIN — SODIUM CHLORIDE, PRESERVATIVE FREE 10 ML: 5 INJECTION INTRAVENOUS at 21:12

## 2020-10-26 ASSESSMENT — PAIN SCALES - GENERAL
PAINLEVEL_OUTOF10: 3
PAINLEVEL_OUTOF10: 6
PAINLEVEL_OUTOF10: 3
PAINLEVEL_OUTOF10: 0
PAINLEVEL_OUTOF10: 3
PAINLEVEL_OUTOF10: 5
PAINLEVEL_OUTOF10: 0

## 2020-10-26 ASSESSMENT — PAIN DESCRIPTION - ONSET: ONSET: ON-GOING

## 2020-10-26 ASSESSMENT — PAIN DESCRIPTION - FREQUENCY
FREQUENCY: CONTINUOUS
FREQUENCY: CONTINUOUS

## 2020-10-26 ASSESSMENT — PAIN DESCRIPTION - PAIN TYPE
TYPE: ACUTE PAIN
TYPE: ACUTE PAIN

## 2020-10-26 ASSESSMENT — PAIN DESCRIPTION - LOCATION
LOCATION: HEAD
LOCATION: HEAD

## 2020-10-26 ASSESSMENT — PAIN DESCRIPTION - DESCRIPTORS: DESCRIPTORS: ACHING

## 2020-10-26 NOTE — PROGRESS NOTES
Transported to [facility] per w/c with transportation . Pt also accompanied by her daughter. Daughter packed belongings. She has pts glasses. No other devices were in room to send up .

## 2020-10-26 NOTE — PROGRESS NOTES
[facility]  Progress Note    INTERVAL HISTORY:  Intermittent episodes of difficulty with speech. Obtained another repeat head CT that was stable. This AM she does have some mild difficulty with repetition and object naming. CHIEF COMPLAINT:  Fall    HISTORY OF PRESENT ILLNESS:      The patient is a 76 y.o. female who presented with a fall backwards on the day of admission while voting. Posterior head trauma. No LOC.  + HA, No N/V or changes in vision    CT head revealed a small right frontal area of traumatic subarachnoid hemorrhage for which I was asked to evaluate. The patient does take anticoagulation medication.  Plavix - cardiac stents this past April      Past Medical History:   Diagnosis Date    Asthma     CAD (coronary artery disease)     Chest pain     Depression with anxiety     Diabetes mellitus ([address]     GERD (gastroesophageal reflux disease)     Glucose intolerance (impaired glucose tolerance)     Hyperlipidemia     Cholesterol management per Cayla Voss Hypertension     [facility]     Malaise and fatigue 10/6/2017    Movement disorder     Sees pain management for neck pain and previous surgery    New onset seizure ([facility]     Osteoarthritis     Palliative care patient 07/22/2020    S/P CABG x 3 03/05/2013    PACABG X 3, LIMA-LAD, SVG0OM, SVG-DIAG, RT EVH, DR Rola Wright       Past Surgical History:   Procedure Laterality Date    APPENDECTOMY  1965    BACK SURGERY      CARDIAC CATHETERIZATION  5/18/11    EF 60%    CARDIAC CATHETERIZATION  1/25/16    drug eluting stent to RCA   [address]  1/25/2016    CARDIAC CATHETERIZATION  2/19/16    CARDIAC CATHETERIZATION  08/24/2017    cutting balloon angioplasty ot LAD    CARDIAC CATHETERIZATION  04/13/2020    TWO Stents placed    COLON SURGERY  1992    Polypectomy - 2    COLON SURGERY  2003    Polypectomy - 4    COLON SURGERY  11/2008    Polypectomy - 2    COLON SURGERY  8/2012    Polypectomy - 3    COLONOSCOPY      CORONARY ANGIOPLASTY  08/2017    cutting balloon angioplasty LAD diagonal    CORONARY ANGIOPLASTY WITH STENT PLACEMENT  3/22/2007    CORONARY ANGIOPLASTY WITH STENT PLACEMENT  8/2007    CORONARY ARTERY BYPASS GRAFT  3/5/2013    PACABG X 3, LIMA-LAD, SVG0OM, SVG-DIAG, RT EV, DR PATEL    COSMETIC SURGERY      deviated septum and rhinoplasty    CYST REMOVAL  1970    DIAGNOSTIC CARDIAC CATH LAB PROCEDURE      ENDOSCOPY, COLON, DIAGNOSTIC      EYE SURGERY  03/2019    cataracts   [address]    Partial    NASAL SEPTUM [facility]    SINUS SURGERY  2003    Nasal Polyps Removed    SPINE SURGERY  3/6/2006    Lumbar Laminectomy L4&5    SPINE SURGERY  3/6/2006    Cervical Fusion - C4,5,6    TONSILLECTOMY  1968        Medications    Current Facility-Administered Medications:     hydroCHLOROthiazide (HYDRODIURIL) tablet 25 mg, 25 mg, Oral, Daily, Jeremy Qureshi MD, 25 mg at 10/25/20 0847    glucose (GLUTOSE) 40 % oral gel 15 g, 15 g, Oral, PRN, Jeremy Qureshi MD    dextrose 50 % IV solution, 12.5 g, Intravenous, PRN, Jeremy Qureshi MD    glucagon (rDNA) injection 1 mg, 1 mg, Intramuscular, PRN, Jeremy Qureshi MD    dextrose 5 % solution, 100 mL/hr, Intravenous, PRN, Jeremy Qureshi MD    levETIRAcetam (KEPPRA) tablet 500 mg, 500 mg, Oral, BID, Rohan Edgar, DO, 500 mg at 10/25/20 1716    cyclobenzaprine (FLEXERIL) tablet 10 mg, 10 mg, Oral, TID PRN, Jeremy Qureshi MD, 10 mg at 10/25/20 2023    traMADol (ULTRAM) tablet 50 mg, 50 mg, Oral, Q6H PRN, Jeremy Qureshi MD, 50 mg at 10/25/20 2334    ALPRAZolam Shruthi Hickman) tablet 0.125 mg, 0.125 mg, Oral, Daily PRN, Doy Males, APRN    isosorbide mononitrate (IMDUR) extended release tablet 60 mg, 60 mg, Oral, Daily, Doy Males, APRN, 60 mg at 10/25/20 0847    lisinopril (PRINIVIL;ZESTRIL) tablet 20 mg, 20 mg, Oral, BID, Castillo Price, APRN, 20 mg at 10/25/20 2023    metoprolol succinate (TOPROL XL) extended release tablet 50 mg, 50 mg, Oral, Daily, Castillo Price APRN, 50 mg at 10/25/20 0847    nitroGLYCERIN (NITROSTAT) SL tablet 0.4 mg, 0.4 mg, Sublingual, Q5 Min PRN, Castillo Price APRN    traZODone (DESYREL) tablet 50 mg, 50 mg, Oral, Nightly, Castillo Price APRN, 50 mg at 10/25/20 2024    sodium chloride flush 0.9 % injection 10 mL, 10 mL, Intravenous, 2 times per day, Castillo Price APRN, 10 mL at 10/25/20 2028    sodium chloride flush 0.9 % injection 10 mL, 10 mL, Intravenous, PRN, Castillo Price APRN    polyethylene glycol (GLYCOLAX) packet 17 g, 17 g, Oral, Daily PRN, Castillo Price APRN    promethazine (PHENERGAN) tablet 12.5 mg, 12.5 mg, Oral, Q6H PRN **OR** ondansetron (ZOFRAN) injection 4 mg, 4 mg, Intravenous, Q6H PRN, Castillo Price APRN, 4 mg at 10/26/20 0404    sodium chloride flush 0.9 % injection 10 mL, 10 mL, Intravenous, 2 times per day, Castillo Price APRN, 10 mL at 10/25/20 2028    sodium chloride flush 0.9 % injection 10 mL, 10 mL, Intravenous, PRN, Castillo Price APRN    niCARdipine (CARDENE) 25 mg in dextrose 5 % 250 mL infusion, 3 mg/hr, Intravenous, Continuous, Lorrie Goel, DO, Stopped at 10/26/20 0205    sodium chloride flush 0.9 % injection 10 mL, 10 mL, Intravenous, 2 times per day, Castillo Price APRN, 10 mL at 10/25/20 2025    sodium chloride flush 0.9 % injection 10 mL, 10 mL, Intravenous, PRN, Castillo Price APRN    senna (SENOKOT) tablet 8.6 mg, 1 tablet, Oral, Daily PRN, Dellis Greenhouse, APRN    acetaminophen (TYLENOL) tablet 650 mg, 650 mg, Oral, Q8H PRN, Robert Sea, APRN - CNP, 650 mg at 10/25/20 1428    insulin lispro (HUMALOG) injection vial 0-6 Units, 0-6 Units, Subcutaneous, BID, Robert Sea, APRN - CNP, 1 Units at 10/25/20 2024  Other; Codeine; Darvocet [propoxyphene n-acetaminophen]; Hydrocodone-acetaminophen; Lyrica [pregabalin]; Morphine; and Percocet [oxycodone-acetaminophen]    Social History  Social History     Tobacco Use   Smoking Status Former Smoker    Packs/day: 1.00    Years: 1.00    Pack years: 1.00    Types: Cigarettes    Last attempt to quit: 1967    Years since quittin.7   Smokeless Tobacco Never Used     Social History     Substance and Sexual Activity   Alcohol Use No         Family History   Problem Relation Age of Onset    Heart Disease Mother     High Blood Pressure Mother     Diabetes Mother     High Cholesterol Mother     Diabetes Brother          REVIEW OF SYSTEMS:  Constitutional: No fevers No chills  Neck:No stiffness  Respiratory: No shortness of breath  Cardiovascular: No chest pain No palpitations  Gastrointestinal: No abdominal pain    Genitourinary: No Dysuria  Neurological: + headache, no confusion    PHYSICAL EXAM:  Vitals:    10/26/20 0600   BP: 135/64   Pulse: 71   Resp: 16   Temp:    SpO2: 99%       Constitutional: The patient appears well-developed and well-nourished. Eyes - conjunctiva normal.  Conjugate Gaze  Ear, nose, throat - No scars, masses, or lesions over external nose or ears, no atrophy of tongue  Neck-symmetric, no masses noted, no jugular vein distension  Respiration- chest wall appears symmetric, good expansion, normal effort without use of accessory muscles  Musculoskeletal - no significant wasting of muscles noted, no bony deformities  Extremities-no clubbing, cyanosis or edema  Skin - warm, dry, and intact. No rash, erythema, or pallor. Psychiatric - mood, affect, and behavior appear normal.     Neurologic Examination  Awake, Alert and oriented x 3, unable to state correct year  PERRL, EOMI, CN II-XII intact  Some difficulty with repetition and object naming.   Speech fluent and she comprehends language    Motor 5/5 all extremities  No deficits to light touch      DATA:  Nursing/pcp notes, imaging,labs and vitals reviewed. PT,OT and/or speech notes reviewed    Lab Results   Component Value Date    WBC 4.0 (L) 10/25/2020    HGB 11.9 (L) 10/25/2020    HCT 39.7 10/25/2020    .8 (H) 10/25/2020     10/25/2020     Lab Results   Component Value Date     10/25/2020    K 4.7 10/25/2020     10/25/2020    CO2 15 (L) 10/25/2020    BUN 17 10/25/2020    CREATININE 0.7 10/25/2020    GLUCOSE 100 10/25/2020    CALCIUM 9.2 10/25/2020    PROT 5.9 (L) 10/25/2020    LABALBU 4.3 10/25/2020    BILITOT 0.5 10/25/2020    ALKPHOS 51 10/25/2020    AST 14 10/25/2020    ALT 13 10/25/2020    LABGLOM >60 10/25/2020    GFRAA >59 10/25/2020    GLOB 1.8 11/19/2016     Lab Results   Component Value Date    INR 0.99 10/25/2020    INR 0.96 10/24/2020    INR 1.00 07/20/2020    PROTIME 12.9 10/25/2020    PROTIME 12.7 10/24/2020    PROTIME 13.1 07/20/2020     EXAMINATION: CT HEAD WO CONTRAST 10/24/2020 4:01 PM    HISTORY: CT BRAIN without contrast 10/24/2020 2:07 PM    HISTORY: Head injury patient fell    COMPARISON: July 20, 2020    DLP: 760 mGy cm    TECHNIQUE: Serial axial tomographic images of the brain were obtained    without the use of intravenous contrast.    FINDINGS:    The midline structures are nondisplaced. There is moderate cerebral    and cerebellar volume loss, with an associated increase in the    prominence of the ventricles and sulci. The basilar cisterns are    normal in size and configuration. On axial image 16 there is subtle    increased attenuation in the right frontal lobe. This may be a subtle    petechial or subarachnoid hemorrhage. There are no other regions of    increased attenuation to suggest hemorrhage. . There is low attenuation    in the periventricular white matter, consistent with chronic ischemic    change. There are no abnormal extra-axial fluid collections. There is    no evidence of tonsillar herniation. The included orbits and their contents are unremarkable.  The    visualized paranasal sinuses, mastoid air cells and middle ear    cavities are clear. The visualized osseous structures and overlying    soft tissues of the skull and face are intact.         Impression    There is a very subtle petechial or subarachnoid hemorrhage in the    right frontal lobe just lateral to the frontal horn right lateral    ventricle. No other regions of abnormal increased attenuation are    identified. 2. Changes of aging with chronic microvascular ischemic change in the    white matter. Signed by Dr Grissom on 10/24/2020 4:05 PM     EXAMINATION: CT HEAD WO CONTRAST 10/25/2020 8:08 AM    HISTORY: CT BRAIN without contrast 10/25/2020 5:01 AM    HISTORY: Small petechial right frontal hemorrhage    COMPARISON: October 24, 2020    DLP: 764 mGy cm    TECHNIQUE: Serial axial tomographic images of the brain were obtained    without the use of intravenous contrast.    FINDINGS:    The midline structures are nondisplaced. There is mild cerebral and    cerebellar volume loss, with an associated increase in the prominence    of the ventricles and sulci. The basilar cisterns are normal in size    and configuration. There is increased CT attenuation in the right    frontal lobe just lateral to frontal horn the right lateral ventricle. This is a small petechial hemorrhage and this is unchanged from    October 24, 2020. There is low attenuation in the periventricular    white matter, consistent with chronic ischemic change. There are no    abnormal extra-axial fluid collections. There is no evidence of    tonsillar herniation. The included orbits and their contents are unremarkable. The    visualized paranasal sinuses, mastoid air cells and middle ear    cavities are clear. The visualized osseous structures and overlying    soft tissues of the skull and face are intact.         Impression    Stable small focal petechial hemorrhage right frontal lobe.     2. Changes of aging also stable from October 24, 2020 Signed by Dr Hudson Wakefield on 10/25/2020 8:10 AM          IMPRESSION  76year old female, s/p fall on Plavix, neurologically stable with small right frontal traumatic subarachnoid hemorrhage    RECOMMENDATIONS:    Small intracranial hemorrhage not associated with the traumatic subarachnoid hemorrhage  MRI brain with and without contrast prior to transfer to floor  Continue to hold Plavix for now, will likely resume in 2-4 more days        Ryann Vaughn DO

## 2020-10-26 NOTE — PROGRESS NOTES
MRI is not here on Sunday, must be called in. Notified Dr Juan F Major and his is OK with waiting until morning. Will change date on order for tomorrow.  Electronically signed by Triston Patten RN on 10/25/2020 at 7:59 PM

## 2020-10-26 NOTE — PROGRESS NOTES
Report called to [employer] on 5th. Pt sitting up on bedside eating lunch with daughter there at bedside with her.

## 2020-10-26 NOTE — CONSULTS
Palliative Care:   Pt comes to ED after a fall hitting the back of her head. She was with her dtr, voting, when this occurred. Pt asleep when this nurse enters. Dtr is at bedside. Dtr talks with me about pt fall last week. Pt does awaken during this visit and engages with dtr and this nurse in conversation. She is alert and oriented to person, place, able to tell me why she is here. Possible move to floor today if ok with MD after visit today. Past Medical History:        Past Medical History:   Diagnosis Date    Asthma     CAD (coronary artery disease)     Chest pain     Depression with anxiety     Diabetes mellitus (HCC)     GERD (gastroesophageal reflux disease)     Glucose intolerance (impaired glucose tolerance)     Hyperlipidemia     Cholesterol management per Shannan Ohara Hypertension     Lupus [facility]ic lupus erythematosus) [facility]     Malaise and fatigue 10/6/2017    Movement disorder     Sees pain management for neck pain and previous surgery    New onset seizure ([facility]     Osteoarthritis     Palliative care patient 07/22/2020    S/P CABG x 3 03/05/2013    PACABG X 3, LIMA-LAD, SVG0OM, SVG-DIAG, RT EV, DR Omayra Teague       Advance Directives:   DNR-CC      Pain/Other Symptoms:    States \"some head ache pain\" back of head. Encouraged pt to request meds as needed. Activity:   As violet[place]          Psychological/Spiritual:    Good family and spiritual support. Unable to attend [employer] but watches her services on t.v.    Dtr take turns staying with pt. Plan:    MRI this morning. Awaiting Dr. bNa Nguyen visit. Possible move to floor today floor    Patient/family discussion r/t goals:  Dtr talks with me about pt recent fall as well [place] last admission to the hospital.  Pt here in July, dc'd home with [facility] services.  Dtr states pt completed [facility] service in Sept.  Family states pt has been able to manage ADL's with little assist.  Dtrs take turns staying with pt so she is not alone.  Dtr tells me she and pt have had \"ventured out\" a few times. They have gone to [place] and to [employer] within the past few weeks. Dtr states goal would be for pt to return to her home with dtrs to care for her. Palliative will follow for support, goals.                 Electronically signed by Casandra Rosales RN on 10/26/2020 at 11:25 AM

## 2020-10-26 NOTE — PROGRESS NOTES
Speech Language Pathology  Facility/Department: [facility] SERVICES  Initial Speech/Language/Cognitive Assessment  Bedside Swallow Evaluation     NAME: Naveed Hickey  : 1945   MRN: [id number]  ADMISSION DATE: 10/24/2020  ADMITTING DIAGNOSIS: has Chest pain; Osteoarthritis; GERD (gastroesophageal reflux disease); Asthma; Glucose intolerance (impaired glucose tolerance); Depression with anxiety; CAD (coronary artery disease); Hyperlipidemia; Hypertension; Fatigue; S/P right coronary artery (RCA) stent placement; Hx of CABG; Chest pressure; Malaise and fatigue; Aphasia; Ataxia; Vertigo; Bilateral carotid artery stenosis; Chest discomfort; Unstable angina ([address] Acute exacerbation of chronic low back pain; Intractable back pain; Altered mental status; AMS (altered mental status); New onset seizure ([address] Spondylolisthesis at L4-L5 level; Atherosclerotic cardiovascular disease; Palliative care patient; Subarachnoid hemorrhage ([address] SLE (systemic lupus erythematosus) ([address] and Subarachnoid hemorrhage following injury, no loss of consciousness ([address] on their problem list.    Date of Eval: 10/26/2020   Evaluating Therapist: PARKER Gratn    Assessment:  Completed assessment. Patient exhibited decreased volume of speech and mildly slowed, decreased lingual movements during verbalizations. SLP still ranked functional intelligibility for unfamiliar listeners at 100% in utterances without background noise present. Patient also exhibited slow processing, delayed and decreased auditory comprehension (particularly as length and complexity of information increases), frequently delayed verbalizations with mild fragmentations noted where the patient started expressions but then discontinued, decreased immediate/short-term memory, and slowed verbal reasoning/problem solving. Also evaluated patient's swallowing function.  Patient exhibits slow, decreased oral prep of more solid consistencies, inconsistently fast oral transit and suspected swallow delay with thin liquids, and sluggish, mild-moderately decreased laryngeal elevation for swallow airway protection. Even so, no outward S/S penetration/aspiration was observed with any puree consistency trial, regular solid consistency trial, nectar thick liquid trial, or thin H2O trial administered during assessment this date. At this time, would trial bite sized consistency and nectar thick liquids. Recommend meds whole in pudding/applesauce. If patient receives mouth care prior to intake, okay for ice chips and small sips thin H2O IN BETWEEN MEALS for comfort. Will continue to follow. Thank you for this consult. Recommendations:  Requires SLP Intervention: Yes    Goals:  Timeframe for Short-term Goals: 1-2x/day for 3 days  Goal 1: Patient will tolerate bite sized consistency and nectar thick liquids with min S/S penetration/aspiration during PO intake. Goal 2: Re-assessment of swallow function for potential diet upgrade. Goal 3: Patient will complete processing tasks with 80% accuracy and with provision of mod cues/prompts. Goal 4: Patient will complete comprehension tasks with 80% accuracy and with provision of min cues/prompts. Goal 5: Patient will complete verbal expression tasks with 80% accuracy and with provision of min cues/prompts. Goal 6: Patient will complete memory functioning tasks with 75% accuracy and with provision of mod cues/prompts. Subjective:  General  Chart Reviewed: Yes  Patient assessed for rehabilitation services?: Yes  Family / Caregiver Present: No    Objective:  Oral Motor:   Labial ROM: Decreased, on the left, during labial retraction trials and labial protrusion trials. Labial Strength: Adequate during labial compression trials. Labial Coordination: Adequate movements were noted.   Lingual ROM: Adequate during lingual extension trials with full point achieved; decreased during lingual elevation trials without use of accessory jaw Assessments:  Assessed patient's swallowing function. Oral transit of puree consistency trials, presented by SLP, varied from 2-3 seconds in length and no oral cavity residue was noted post swallows. With regular solid consistency trials presented by SLP, patient exhibited slow oral prep with decreased rotary jaw movement observed. Oral transit of regular solid consistency varied from 2-3 seconds in length and min-moderate oral cavity residue was noted post swallows; all oral cavity residue cleared from the mouth with additional dry swallows. Oral transit of nectar thick liquid  trials, presented via cup by SLP, primarily measured 1-2 seconds in length. Patient exhibited inconsistently fast oral transit of thin H2O trials presented via cup by SLP. Laryngeal elevation during swallow initiation was considered to be sluggish and mild-moderately decreased for swallow airway protection. Even so, no outward S/S penetration/aspiration was observed with any puree consistency trial, regular solid consistency trial, nectar thick liquid trial, or thin H2O trial administered during evaluation this date. At this time, would trial bite sized consistency and nectar thick liquids. Recommend meds whole in pudding/applesauce. If patient receives mouth care prior to intake, okay for ice chips and small sips thin H2O IN BETWEEN MEALS for comfort. Will continue to follow.     Electronically signed by PARKER Hernandez on 10/26/2020 at 2:43 PM

## 2020-10-26 NOTE — PROGRESS NOTES
Message to Dr Lees Daily re: Tramadol and Flexeril pt normally takes at home. Message received back he thought that he had started. They were not started. Order now started.  Electronically signed by Bry Soares RN on 10/25/2020 at 8:01 PM

## 2020-10-27 ENCOUNTER — HOSPITAL ENCOUNTER (INPATIENT)
Age: 75
LOS: 15 days | Discharge: HOME | DRG: 949 | End: 2020-11-11
Attending: PSYCHIATRY & NEUROLOGY | Admitting: PSYCHIATRY & NEUROLOGY
Payer: MEDICARE

## 2020-10-27 VITALS
SYSTOLIC BLOOD PRESSURE: 137 MMHG | HEIGHT: 65 IN | TEMPERATURE: 97.9 F | RESPIRATION RATE: 20 BRPM | HEART RATE: 64 BPM | WEIGHT: 144.2 LBS | BODY MASS INDEX: 24.03 KG/M2 | DIASTOLIC BLOOD PRESSURE: 75 MMHG | OXYGEN SATURATION: 93 %

## 2020-10-27 PROBLEM — I62.9 INTRACRANIAL BLEED (HCC): Status: ACTIVE | Noted: 2020-10-27

## 2020-10-27 PROBLEM — G44.309 POST-TRAUMATIC HEADACHE: Status: ACTIVE | Noted: 2020-10-27

## 2020-10-27 LAB
BACTERIA: ABNORMAL /HPF
BILIRUBIN URINE: NEGATIVE
BLOOD, URINE: ABNORMAL
CLARITY: ABNORMAL
COLOR: ABNORMAL
CRYSTALS, UA: ABNORMAL /HPF
EPITHELIAL CELLS, UA: 2 /HPF (ref 0–5)
GLUCOSE BLD-MCNC: 101 MG/DL (ref 70–99)
GLUCOSE BLD-MCNC: 118 MG/DL (ref 70–99)
GLUCOSE BLD-MCNC: 169 MG/DL (ref 70–99)
GLUCOSE BLD-MCNC: 95 MG/DL (ref 70–99)
GLUCOSE URINE: NEGATIVE MG/DL
HYALINE CASTS: 5 /HPF (ref 0–8)
INR BLD: 0.97 (ref 0.88–1.18)
KETONES, URINE: NEGATIVE MG/DL
LEUKOCYTE ESTERASE, URINE: ABNORMAL
NITRITE, URINE: POSITIVE
PERFORMED ON: ABNORMAL
PERFORMED ON: NORMAL
PH UA: 5.5 (ref 5–8)
PREALBUMIN: 29 MG/DL (ref 20–40)
PROTEIN UA: ABNORMAL MG/DL
PROTHROMBIN TIME: 12.7 SEC (ref 12–14.6)
RBC UA: 11 /HPF (ref 0–4)
SARS-COV-2, NAAT: NOT DETECTED
SPECIFIC GRAVITY UA: 1.02 (ref 1–1.03)
TSH REFLEX FT4: 1.93 UIU/ML (ref 0.35–5.5)
UROBILINOGEN, URINE: 1 E.U./DL
VITAMIN B-12: >2000 PG/ML (ref 211–946)
WBC UA: 137 /HPF (ref 0–5)

## 2020-10-27 PROCEDURE — 87086 URINE CULTURE/COLONY COUNT: CPT

## 2020-10-27 PROCEDURE — 6370000000 HC RX 637 (ALT 250 FOR IP): Performed by: PSYCHIATRY & NEUROLOGY

## 2020-10-27 PROCEDURE — 97530 THERAPEUTIC ACTIVITIES: CPT

## 2020-10-27 PROCEDURE — 6370000000 HC RX 637 (ALT 250 FOR IP): Performed by: FAMILY MEDICINE

## 2020-10-27 PROCEDURE — 82947 ASSAY GLUCOSE BLOOD QUANT: CPT

## 2020-10-27 PROCEDURE — 85610 PROTHROMBIN TIME: CPT

## 2020-10-27 PROCEDURE — 36415 COLL VENOUS BLD VENIPUNCTURE: CPT

## 2020-10-27 PROCEDURE — 99231 SBSQ HOSP IP/OBS SF/LOW 25: CPT | Performed by: NEUROLOGICAL SURGERY

## 2020-10-27 PROCEDURE — 6360000002 HC RX W HCPCS: Performed by: FAMILY MEDICINE

## 2020-10-27 PROCEDURE — 84443 ASSAY THYROID STIM HORMONE: CPT

## 2020-10-27 PROCEDURE — 84134 ASSAY OF PREALBUMIN: CPT

## 2020-10-27 PROCEDURE — 82607 VITAMIN B-12: CPT

## 2020-10-27 PROCEDURE — 81001 URINALYSIS AUTO W/SCOPE: CPT

## 2020-10-27 PROCEDURE — 51798 US URINE CAPACITY MEASURE: CPT

## 2020-10-27 PROCEDURE — 6370000000 HC RX 637 (ALT 250 FOR IP): Performed by: NEUROLOGICAL SURGERY

## 2020-10-27 PROCEDURE — 97165 OT EVAL LOW COMPLEX 30 MIN: CPT

## 2020-10-27 PROCEDURE — 92526 ORAL FUNCTION THERAPY: CPT

## 2020-10-27 PROCEDURE — 1180000000 HC REHAB R&B

## 2020-10-27 PROCEDURE — U0002 COVID-19 LAB TEST NON-CDC: HCPCS

## 2020-10-27 PROCEDURE — 87077 CULTURE AEROBIC IDENTIFY: CPT

## 2020-10-27 PROCEDURE — 97162 PT EVAL MOD COMPLEX 30 MIN: CPT

## 2020-10-27 PROCEDURE — 87186 SC STD MICRODIL/AGAR DIL: CPT

## 2020-10-27 PROCEDURE — 2580000003 HC RX 258: Performed by: FAMILY MEDICINE

## 2020-10-27 RX ORDER — POLYETHYLENE GLYCOL 3350 17 G/17G
17 POWDER, FOR SOLUTION ORAL DAILY PRN
Status: CANCELLED | OUTPATIENT
Start: 2020-10-27

## 2020-10-27 RX ORDER — ACETAMINOPHEN 325 MG/1
650 TABLET ORAL EVERY 4 HOURS PRN
Status: DISCONTINUED | OUTPATIENT
Start: 2020-10-27 | End: 2020-10-27

## 2020-10-27 RX ORDER — TRAMADOL HYDROCHLORIDE 50 MG/1
50 TABLET ORAL EVERY 6 HOURS PRN
Status: DISCONTINUED | OUTPATIENT
Start: 2020-10-27 | End: 2020-11-11 | Stop reason: HOSPADM

## 2020-10-27 RX ORDER — RANOLAZINE 500 MG/1
1000 TABLET, EXTENDED RELEASE ORAL 2 TIMES DAILY
Status: CANCELLED | OUTPATIENT
Start: 2020-10-27

## 2020-10-27 RX ORDER — TRAMADOL HYDROCHLORIDE 50 MG/1
50 TABLET ORAL EVERY 6 HOURS PRN
Status: CANCELLED | OUTPATIENT
Start: 2020-10-27

## 2020-10-27 RX ORDER — DONEPEZIL HYDROCHLORIDE 5 MG/1
5 TABLET, FILM COATED ORAL NIGHTLY
Status: DISCONTINUED | OUTPATIENT
Start: 2020-10-27 | End: 2020-10-28

## 2020-10-27 RX ORDER — SENNA PLUS 8.6 MG/1
1 TABLET ORAL DAILY PRN
Status: CANCELLED | OUTPATIENT
Start: 2020-10-27

## 2020-10-27 RX ORDER — POLYETHYLENE GLYCOL 3350 17 G/17G
17 POWDER, FOR SOLUTION ORAL DAILY PRN
Status: DISCONTINUED | OUTPATIENT
Start: 2020-10-27 | End: 2020-11-11 | Stop reason: HOSPADM

## 2020-10-27 RX ORDER — METOPROLOL SUCCINATE 50 MG/1
50 TABLET, EXTENDED RELEASE ORAL DAILY
Status: DISCONTINUED | OUTPATIENT
Start: 2020-10-28 | End: 2020-11-11 | Stop reason: HOSPADM

## 2020-10-27 RX ORDER — TRAZODONE HYDROCHLORIDE 50 MG/1
50 TABLET ORAL NIGHTLY
Status: CANCELLED | OUTPATIENT
Start: 2020-10-27

## 2020-10-27 RX ORDER — HYDROCHLOROTHIAZIDE 25 MG/1
25 TABLET ORAL DAILY
Status: DISCONTINUED | OUTPATIENT
Start: 2020-10-28 | End: 2020-11-01

## 2020-10-27 RX ORDER — CYCLOBENZAPRINE HCL 10 MG
10 TABLET ORAL 3 TIMES DAILY PRN
Status: DISCONTINUED | OUTPATIENT
Start: 2020-10-27 | End: 2020-11-11 | Stop reason: HOSPADM

## 2020-10-27 RX ORDER — RANOLAZINE 500 MG/1
1000 TABLET, EXTENDED RELEASE ORAL 2 TIMES DAILY
Status: DISCONTINUED | OUTPATIENT
Start: 2020-10-27 | End: 2020-11-11 | Stop reason: HOSPADM

## 2020-10-27 RX ORDER — SENNA PLUS 8.6 MG/1
1 TABLET ORAL DAILY PRN
Status: DISCONTINUED | OUTPATIENT
Start: 2020-10-27 | End: 2020-11-11 | Stop reason: HOSPADM

## 2020-10-27 RX ORDER — POLYETHYLENE GLYCOL 3350 17 G/17G
17 POWDER, FOR SOLUTION ORAL DAILY PRN
Status: DISCONTINUED | OUTPATIENT
Start: 2020-10-27 | End: 2020-10-27

## 2020-10-27 RX ORDER — ALPRAZOLAM 0.25 MG/1
0.12 TABLET ORAL DAILY PRN
Status: DISCONTINUED | OUTPATIENT
Start: 2020-10-27 | End: 2020-11-11 | Stop reason: HOSPADM

## 2020-10-27 RX ORDER — HYDROCHLOROTHIAZIDE 25 MG/1
25 TABLET ORAL DAILY
Status: CANCELLED | OUTPATIENT
Start: 2020-10-28

## 2020-10-27 RX ORDER — ISOSORBIDE MONONITRATE 60 MG/1
60 TABLET, EXTENDED RELEASE ORAL DAILY
Status: CANCELLED | OUTPATIENT
Start: 2020-10-28

## 2020-10-27 RX ORDER — NITROGLYCERIN 0.4 MG/1
0.4 TABLET SUBLINGUAL EVERY 5 MIN PRN
Status: DISCONTINUED | OUTPATIENT
Start: 2020-10-27 | End: 2020-11-11 | Stop reason: HOSPADM

## 2020-10-27 RX ORDER — LISINOPRIL 20 MG/1
20 TABLET ORAL 2 TIMES DAILY
Status: DISCONTINUED | OUTPATIENT
Start: 2020-10-27 | End: 2020-11-01

## 2020-10-27 RX ORDER — NITROGLYCERIN 0.4 MG/1
0.4 TABLET SUBLINGUAL EVERY 5 MIN PRN
Status: CANCELLED | OUTPATIENT
Start: 2020-10-27

## 2020-10-27 RX ORDER — CYCLOBENZAPRINE HCL 10 MG
10 TABLET ORAL 3 TIMES DAILY PRN
Status: CANCELLED | OUTPATIENT
Start: 2020-10-27

## 2020-10-27 RX ORDER — NICOTINE POLACRILEX 4 MG
15 LOZENGE BUCCAL PRN
Status: CANCELLED | OUTPATIENT
Start: 2020-10-27

## 2020-10-27 RX ORDER — TRAMADOL HYDROCHLORIDE 50 MG/1
50 TABLET ORAL EVERY 6 HOURS PRN
Status: DISCONTINUED | OUTPATIENT
Start: 2020-10-27 | End: 2020-10-27

## 2020-10-27 RX ORDER — ACETAMINOPHEN 325 MG/1
650 TABLET ORAL EVERY 8 HOURS PRN
Status: DISCONTINUED | OUTPATIENT
Start: 2020-10-27 | End: 2020-11-11 | Stop reason: HOSPADM

## 2020-10-27 RX ORDER — TRAZODONE HYDROCHLORIDE 50 MG/1
50 TABLET ORAL NIGHTLY
Status: DISCONTINUED | OUTPATIENT
Start: 2020-10-27 | End: 2020-11-11 | Stop reason: HOSPADM

## 2020-10-27 RX ORDER — ISOSORBIDE MONONITRATE 60 MG/1
60 TABLET, EXTENDED RELEASE ORAL DAILY
Status: DISCONTINUED | OUTPATIENT
Start: 2020-10-28 | End: 2020-11-11 | Stop reason: HOSPADM

## 2020-10-27 RX ORDER — NICOTINE POLACRILEX 4 MG
15 LOZENGE BUCCAL PRN
Status: DISCONTINUED | OUTPATIENT
Start: 2020-10-27 | End: 2020-11-11 | Stop reason: HOSPADM

## 2020-10-27 RX ORDER — DONEPEZIL HYDROCHLORIDE 5 MG/1
5 TABLET, FILM COATED ORAL NIGHTLY
Status: CANCELLED | OUTPATIENT
Start: 2020-10-27

## 2020-10-27 RX ORDER — LISINOPRIL 20 MG/1
20 TABLET ORAL 2 TIMES DAILY
Status: CANCELLED | OUTPATIENT
Start: 2020-10-27

## 2020-10-27 RX ORDER — LEVETIRACETAM 500 MG/1
500 TABLET ORAL 2 TIMES DAILY
Status: CANCELLED | OUTPATIENT
Start: 2020-10-27

## 2020-10-27 RX ORDER — METOPROLOL SUCCINATE 50 MG/1
50 TABLET, EXTENDED RELEASE ORAL DAILY
Status: CANCELLED | OUTPATIENT
Start: 2020-10-28

## 2020-10-27 RX ORDER — ACETAMINOPHEN 325 MG/1
650 TABLET ORAL EVERY 8 HOURS PRN
Status: CANCELLED | OUTPATIENT
Start: 2020-10-27

## 2020-10-27 RX ORDER — LEVETIRACETAM 500 MG/1
500 TABLET ORAL 2 TIMES DAILY
Status: DISCONTINUED | OUTPATIENT
Start: 2020-10-27 | End: 2020-11-11 | Stop reason: HOSPADM

## 2020-10-27 RX ORDER — ALPRAZOLAM 0.25 MG/1
0.12 TABLET ORAL DAILY PRN
Status: CANCELLED | OUTPATIENT
Start: 2020-10-27

## 2020-10-27 RX ADMIN — HYDROCHLOROTHIAZIDE 25 MG: 25 TABLET ORAL at 09:20

## 2020-10-27 RX ADMIN — ACETAMINOPHEN 650 MG: 325 TABLET ORAL at 09:24

## 2020-10-27 RX ADMIN — RANOLAZINE 1000 MG: 500 TABLET, FILM COATED, EXTENDED RELEASE ORAL at 09:20

## 2020-10-27 RX ADMIN — ISOSORBIDE MONONITRATE 60 MG: 60 TABLET, EXTENDED RELEASE ORAL at 09:20

## 2020-10-27 RX ADMIN — METOPROLOL SUCCINATE 50 MG: 50 TABLET, EXTENDED RELEASE ORAL at 09:20

## 2020-10-27 RX ADMIN — ACETAMINOPHEN 650 MG: 325 TABLET ORAL at 18:45

## 2020-10-27 RX ADMIN — PROMETHAZINE HYDROCHLORIDE 12.5 MG: 25 TABLET ORAL at 14:51

## 2020-10-27 RX ADMIN — TRAZODONE HYDROCHLORIDE 50 MG: 50 TABLET ORAL at 20:17

## 2020-10-27 RX ADMIN — LISINOPRIL 20 MG: 20 TABLET ORAL at 09:20

## 2020-10-27 RX ADMIN — TRAMADOL HYDROCHLORIDE 50 MG: 50 TABLET, FILM COATED ORAL at 20:17

## 2020-10-27 RX ADMIN — LISINOPRIL 20 MG: 20 TABLET ORAL at 20:17

## 2020-10-27 RX ADMIN — LEVETIRACETAM 500 MG: 500 TABLET ORAL at 20:17

## 2020-10-27 RX ADMIN — LEVETIRACETAM 500 MG: 500 TABLET ORAL at 09:20

## 2020-10-27 RX ADMIN — SODIUM CHLORIDE, PRESERVATIVE FREE 10 ML: 5 INJECTION INTRAVENOUS at 09:20

## 2020-10-27 RX ADMIN — DONEPEZIL HYDROCHLORIDE 5 MG: 5 TABLET, FILM COATED ORAL at 20:17

## 2020-10-27 RX ADMIN — RANOLAZINE 1000 MG: 500 TABLET, FILM COATED, EXTENDED RELEASE ORAL at 20:17

## 2020-10-27 ASSESSMENT — PAIN DESCRIPTION - DESCRIPTORS
DESCRIPTORS: HEADACHE
DESCRIPTORS: HEADACHE

## 2020-10-27 ASSESSMENT — PAIN DESCRIPTION - PAIN TYPE
TYPE: ACUTE PAIN

## 2020-10-27 ASSESSMENT — PAIN DESCRIPTION - PROGRESSION
CLINICAL_PROGRESSION: GRADUALLY IMPROVING
CLINICAL_PROGRESSION: GRADUALLY IMPROVING

## 2020-10-27 ASSESSMENT — PAIN SCALES - GENERAL
PAINLEVEL_OUTOF10: 2
PAINLEVEL_OUTOF10: 0
PAINLEVEL_OUTOF10: 4
PAINLEVEL_OUTOF10: 10
PAINLEVEL_OUTOF10: 2
PAINLEVEL_OUTOF10: 4
PAINLEVEL_OUTOF10: 0
PAINLEVEL_OUTOF10: 7

## 2020-10-27 ASSESSMENT — PAIN DESCRIPTION - LOCATION
LOCATION: HEAD

## 2020-10-27 ASSESSMENT — PAIN DESCRIPTION - FREQUENCY: FREQUENCY: CONTINUOUS

## 2020-10-27 ASSESSMENT — PAIN - FUNCTIONAL ASSESSMENT
PAIN_FUNCTIONAL_ASSESSMENT: PREVENTS OR INTERFERES SOME ACTIVE ACTIVITIES AND ADLS
PAIN_FUNCTIONAL_ASSESSMENT: PREVENTS OR INTERFERES SOME ACTIVE ACTIVITIES AND ADLS

## 2020-10-27 ASSESSMENT — PAIN DESCRIPTION - ONSET: ONSET: ON-GOING

## 2020-10-27 NOTE — PROGRESS NOTES
Patient removed her own IV. Report called to 8th floor. The patient's belongings were packed in a canvas bag, including her glasses. The patient was taken by transport to 826.

## 2020-10-27 NOTE — PROGRESS NOTES
Linda Bernard arrived to room # 18   Presented with: sah s/p fall  Mental Status: Patient is oriented, alert, coherent and logical.   Vitals:    10/27/20 1539   BP: 133/73   Pulse: 72   Resp: 16   Temp: 97.3 °F (36.3 °C)   SpO2: 92%     Patient safety contract and falls prevention contract reviewed with patient Yes. Oriented Patient to room. Call light within reach. Yes. Needs, issues or concerns expressed at this time: yes, .       Electronically signed by Keo Knox RN on 10/27/2020 at 3:51 PM

## 2020-10-27 NOTE — PROGRESS NOTES
Progress Note  Gabi Trevizo  10/26/2020 7:31 PM  Subjective:   Admit Date:   10/24/2020      CC/ADMIT DX:       Interval History:   Reviewed overnight events and nursing notes. No new complaints. No CP or SOA. No GI issues. She has a HA. I have reviewed all labs/diagnostics from the last 24hrs. ROS:   I have done a 10 point ROS and all are negative, except what is mentioned in the HPI. DIET DYSPHAGIA SOFT AND BITE-SIZED; Mildly Thick (Nectar)    Medications:      dextrose        ranolazine  1,000 mg Oral BID    donepezil  5 mg Oral Nightly    hydroCHLOROthiazide  25 mg Oral Daily    levETIRAcetam  500 mg Oral BID    isosorbide mononitrate  60 mg Oral Daily    lisinopril  20 mg Oral BID    metoprolol succinate  50 mg Oral Daily    traZODone  50 mg Oral Nightly    sodium chloride flush  10 mL Intravenous 2 times per day    insulin lispro  0-6 Units Subcutaneous BID           Objective:   Vitals: /66   Pulse 66   Temp 96.4 °F (35.8 °C) (Temporal)   Resp 18   Ht 5' 5\" (1.651 m)   Wt 144 lb 3.2 oz (65.4 kg)   SpO2 91%   BMI 24.00 kg/m²      Intake/Output Summary (Last 24 hours) at 10/26/2020 1931  Last data filed at 10/26/2020 1400  Gross per 24 hour   Intake 250 ml   Output 1160 ml   Net -910 ml     General appearance: alert and cooperative with exam  Lungs: clear to auscultation bilaterally  Heart: RRR  Abdomen: soft, non-tender; bowel sounds normal; no masses,  no organomegaly  Extremities: extremities normal, atraumatic, no cyanosis or edema  Neurologic:  No obvious focal neurologic deficits.     Assessment and Plan:   Principal Problem:    Subarachnoid hemorrhage (HCC)  Active Problems:    GERD (gastroesophageal reflux disease)    CAD (coronary artery disease)    Hyperlipidemia    Hypertension    S/P right coronary artery (RCA) stent placement    Hx of CABG    Bilateral carotid artery stenosis    SLE (systemic lupus erythematosus) (HCC)    Subarachnoid hemorrhage following

## 2020-10-27 NOTE — PROGRESS NOTES
Simple   Dentition: Adequate  Patient Positioning: Upright in bed  Consistencies Administered: Dysphagia Pureed (Dysphagia I); Regular solid; Thin - straw    Re-assessed patient's swallowing function with the following observations noted:     Oral Phase  Mastication: Regular solid (Patient exhibited decreased rotary jaw movement during oral prep of regular solid consistency trials presented by SLP.)  Suspected Premature Bolus Loss: Thin - straw (Patient exhibited inconsistently fast oral transit of thin H2O trials presented via straw by SLP.)  Oral Transit: Regular solid (Min-moderate oral cavity residue was noted post swallows; residue was slow but cleared from the mouth with additional dry swallows.)  Oral Phase - Comment: Oral transit of puree presentations, administered independently, primarily measured 1-2 seconds in length and no oral cavity residue was noted post swallows. Oral transit of regular solid consistency primarily measured 1-2 seconds in length. Pharyngeal Phase  Suspected Swallow Delay: Thin - straw (Suspect secondary to oral transit times.)  Decreased Laryngeal Elevation: (Patient exhibited sluggish, mildly decreased laryngeal elevation for swallow airway protection.)  Pharyngeal Phase - Comment: No outward S/S penetration/aspiration was noted with any puree consistency presentation, regular solid consistency trial, or thin H2O trial administered during treatment session this date. At this time, would trial regular solid consistency and thin liquids. Assist in meal set-up. Recommend meds in pudding/applesauce.  Will continue to follow    Electronically signed by PARKER Guerra on 10/27/2020 at 10:34 AM

## 2020-10-27 NOTE — PROGRESS NOTES
(2/19/16); Cardiac catheterization (08/24/2017); Coronary angioplasty (08/2017); Diagnostic Cardiac Cath Lab Procedure; eye surgery (03/2019); and Cardiac catheterization (04/13/2020). Treatment Diagnosis: Fall with s/ subarachnoid hemorrhage      Restrictions  Restrictions/Precautions  Restrictions/Precautions: Fall Risk  Required Braces or Orthoses?: No    Subjective   General  Chart Reviewed: Yes  Patient assessed for rehabilitation services?: Yes  Family / Caregiver Present: No  Diagnosis: Fall with s/p subarachnoid hemorrhage  Patient Currently in Pain: Yes  Pain Assessment  Pain Assessment: 0-10  Pain Level: 2  Pain Type: Acute pain  Pain Location: Head  Pain Descriptors: Headache  Functional Pain Assessment: Prevents or interferes some active activities and ADLs  Non-Pharmaceutical Pain Intervention(s): Ambulation/Increased Activity;Repositioned  Response to Pain Intervention: Patient Satisfied  Vital Signs  Patient Currently in Pain: Yes  Social/Functional History  Social/Functional History  Lives With: Daughter  Type of Home: House  Bathroom Shower/Tub: Tub/Shower unit  Bathroom Toilet: Standard  Home Equipment: Rolling walker, 4 wheeled walker  ADL Assistance: Independent  Ambulation Assistance: Independent  Transfer Assistance: Independent  Active : No  Additional Comments: states she has been unable to live alone since July. Cannot recall the details of her house/home set up.        Objective   Vision: Impaired  Vision Exceptions: Wears glasses for reading  Hearing: Within functional limits    Orientation  Overall Orientation Status: Within Normal Limits  Observation/Palpation  Posture: Good     ADL  Feeding: Independent  Grooming: Independent  UE Bathing: Supervision  LE Bathing: Minimal assistance  UE Dressing: Supervision  LE Dressing: Minimal assistance  Toileting: Minimal assistance        Bed mobility  Supine to Sit: Minimal assistance  Transfers  Stand Step Transfers: Contact guard assistance  Sit to stand: Contact guard assistance  Transfer Comments: RW     Cognition  Overall Cognitive Status: Exceptions  Following Commands: Follows one step commands with increased time; Follows one step commands with repetition  Attention Span: Attends with cues to redirect  Memory: Decreased recall of biographical Information  Safety Judgement: Good awareness of safety precautions  Problem Solving: Assistance required to generate solutions;Assistance required to implement solutions  Insights: Fully aware of deficits  Initiation: Requires cues for some  Sequencing: Requires cues for some        Sensation  Overall Sensation Status: Impaired(head)        LUE AROM (degrees)  LUE AROM : WFL  RUE AROM (degrees)  RUE AROM : WFL  LUE Strength  Gross LUE Strength: Exceptions to [facility]  L Shoulder Flex: 4-/5  L Shoulder ABduction: 4-/5  L Elbow Flex: 4-/5  L Elbow Ext: 4-/5  RUE Strength  Gross RUE Strength: Exceptions to [facility]  R Shoulder Flex: 4-/5  R Shoulder ABduction: 4-/5  R Elbow Flex: 4-/5  R Elbow Ext: 4-/5                   Plan   Plan  Times per week: 3-5x/week  Times per day: Daily    G-Code     OutComes Score                                                  AM-PAC Score             Goals  Short term goals  Time Frame for Short term goals: 1 week  Short term goal 1: Supervision with toilet tfers  Short term goal 2: Supervision with LB dsg  Short term goal 3: Supervision with light ambulatory ADLs  Long term goals  Long term goal 1: Return to PLOF       Therapy Time   Individual Concurrent Group Co-treatment   Time In           Time Out           Minutes                   Brigette Stern OTR/L

## 2020-10-27 NOTE — PROGRESS NOTES
Physical Therapy    Facility/Department: [facility] SURG SERVICES  Initial Assessment    NAME: Mikal Aguilar  : 1945  MRN: [id number]    Date of Service: 10/27/2020    Discharge Recommendations:  Continue to assess pending progress, Patient would benefit from continued therapy after discharge        Assessment   Body structures, Functions, Activity limitations: Decreased functional mobility ; Decreased cognition; Increased pain;Decreased balance;Decreased coordination  Assessment: Pt. will benefit from additional PT while in acute care. Pt. a fall risk and should not attempt mobility on her own at this time due to listed impairments. Pt. needs to use gait belt, RW and have staff A for any mobility in room. Pt. cooperative and pleasant during eval but did seem to have difficulty finding words with her speech. Treatment Diagnosis: impaired gait and mobility  Prognosis: Good  Decision Making: Medium Complexity  PT Education: General Safety;PT Role;Functional Mobility Training  Patient Education: use of call light for A  Barriers to Learning: congition  REQUIRES PT FOLLOW UP: Yes  Activity Tolerance  Activity Tolerance: Patient Tolerated treatment well;Patient limited by cognitive status       Patient Diagnosis(es): The primary encounter diagnosis was Closed head injury, initial encounter. Diagnoses of Subarachnoid hematoma, without loss of consciousness, initial encounter ([address] and Fall, initial encounter were also pertinent to this visit.      has a past medical history of Asthma, CAD (coronary artery disease), Chest pain, Depression with anxiety, Diabetes mellitus ([address] GERD (gastroesophageal reflux disease), Glucose intolerance (impaired glucose tolerance), Hyperlipidemia, Hypertension, Lupus (systemic lupus erythematosus) ([address] Malaise and fatigue, Movement disorder, New onset seizure ([address] Osteoarthritis, Palliative care patient, and S/P CABG x 3.   has a past surgical history that includes Cardiac catheterization (5/18/11); Appendectomy (1965); Tonsillectomy (1968); cyst removal (1970); Nasal septum surgery (1977); Hysterectomy (1983); Hemorrhoid surgery (1987); sinus surgery (2003); Spine surgery (3/6/2006); Spine surgery (3/6/2006); Coronary angioplasty with stent (3/22/2007); Coronary angioplasty with stent (8/2007); Colon surgery (1992); Colon surgery (2003); Colon surgery (11/2008); Colon surgery (8/2012); Coronary artery bypass graft (3/5/2013); back surgery; Colonoscopy; Endoscopy, colon, diagnostic; Cosmetic surgery; Cardiac catheterization (1/25/16); Cardiac catheterization (1/25/2016); Cardiac catheterization (2/19/16); Cardiac catheterization (08/24/2017); Coronary angioplasty (08/2017); Diagnostic Cardiac Cath Lab Procedure; eye surgery (03/2019); and Cardiac catheterization (04/13/2020). Restrictions  Restrictions/Precautions  Restrictions/Precautions: Fall Risk  Required Braces or Orthoses?: No  Vision/Hearing  Vision: Impaired(reports blurry vision)  Vision Exceptions: Wears glasses for reading  Hearing: Within functional limits     Subjective  General  Chart Reviewed: Yes  Patient assessed for rehabilitation services?: Yes  Response To Previous Treatment: Not applicable  Family / Caregiver Present: No  Referring Practitioner: Ibis Bates MD  Referral Date : 10/26/20  Diagnosis: fall, [facility], [facility]  Follows Commands: Impaired  Other (Comment): needs simple cues and time to process  General Comment  Comments: RN, josé Villar PT. Subjective  Subjective: Pt. willing to work with therapy. States she might need to go to the bathroom since she hasn't gone since last night. States she did have a catheter in. Wants to use a RW to walk since she feels unsure of herself on her feet.   Pain Screening  Patient Currently in Pain: Yes  Pain Assessment  Pain Assessment: 0-10  Pain Level: 4  Pain Type: Acute pain  Pain Location: Head  Pain Descriptors: Headache  Functional Pain Assessment: Prevents or interferes some active activities and ADLs  Non-Pharmaceutical Pain Intervention(s): Ambulation/Increased Activity;Repositioned  Response to Pain Intervention: Patient Satisfied  Vital Signs  Patient Currently in Pain: Yes  Pre Treatment Pain Screening  Intervention List: Patient able to continue with treatment    Orientation  Orientation  Overall Orientation Status: Within Functional Limits  Social/Functional History  Social/Functional History  Lives With: Daughter(daughters live with her)  Bathroom Shower/Tub: Tub/Shower unit  Home Equipment: Rolling walker, 4 wheeled walker  ADL Assistance: Independent  Ambulation Assistance: Independent  Transfer Assistance: Independent  Active : No  Additional Comments: states she has been unable to live alone since July. Cannot recall the details of her house/home set up. Cognition   Cognition  Overall Cognitive Status: Exceptions  Following Commands: Follows one step commands with increased time; Follows one step commands with repetition  Attention Span: Attends with cues to redirect  Memory: Decreased recall of biographical Information  Safety Judgement: Good awareness of safety precautions  Problem Solving: Assistance required to generate solutions;Assistance required to implement solutions  Insights: Fully aware of deficits  Initiation: Requires cues for some  Sequencing: Requires cues for some    Objective     Observation/Palpation  Posture: Good    AROM RLE (degrees)  RLE AROM: WFL  AROM LLE (degrees)  LLE AROM : WFL  Strength RLE  Strength RLE: WFL  Comment: grossly 4-/5  Strength LLE  Strength LLE: WFL  Comment: grossly 4-/5     Sensation  Overall Sensation Status: Impaired(head)  Bed mobility  Supine to Sit: Minimal assistance  Sit to Supine: Unable to assess  Comment: pt sat on EOB x 4 mins prior to standing, feeling a little dizzy  Transfers  Sit to Stand: Minimal Assistance  Stand to sit: Minimal Assistance  Comment: Min A on and off toilet, using grab bar. Unable to urinate, so pt stood and amb. to chair  Ambulation  Ambulation?: Yes  Ambulation 1  Surface: level tile  Device: Rolling Walker  Assistance: Minimal assistance  Quality of Gait: unsteady, unequal step length, narrow CHECO at times  Gait Deviations: Slow [place];Decreased step length;Decreased step height  Distance: 12' x 2  Comments: amb. to the bathroom was with HHA x 2, and amb. back to chair was with RW and Min A     Balance  Posture: Good  Sitting - Static: Good;+  Sitting - Dynamic: Good;-  Standing - Static: Fair;-  Standing - Dynamic: Poor;+        Plan   Plan  Times per week: 5-7  Plan weeks: 2  Current Treatment Recommendations: Strengthening, Balance Training, Functional Mobility Training, Transfer Training, Gait Training, Safety Education & Training, Positioning, Equipment Evaluation, Education, & procurement, Patient/Caregiver Education & Training  Plan Comment: cont PT per POC.   Safety Devices  Type of devices: Gait belt, Patient at risk for falls, Nurse notified, Left in chair, Chair alarm in place, Call light within reach(left seated upright in chair)    G-Code       OutComes Score                                                  AM-PAC Score             Goals  Short term goals  Time Frame for Short term goals: 2 wks  Short term goal 1: supine to sit indep  Short term goal 2: sit to stand indep  Short term goal 3: amb. 200' with RW SBA  Patient Goals   Patient goals : go home       Therapy Time   Individual Concurrent Group Co-treatment   Time In           Time Out           Minutes                   Curt Severs, PT    Electronically signed by Curt Severs, PT on 10/27/2020 at 9:18 AM

## 2020-10-27 NOTE — CARE COORDINATION
[facility] [place]  Notification of Admission Decision      [x] Patient has been accepted for admit to [facility] on : 10/27/20 Room 826      Please write discharge orders and summary prior to discharge. [] Patient acceptance to Rehab pending the following :    [] Eval in progress       [] Patient determined to be ineligible for services at [facility] because : We recommend you consider        Thank you for your referral, we appreciate you.     Electronically Signed by Andrea Rice Admissions Coordinator 10/27/2020 10:44 AM

## 2020-10-27 NOTE — PLAN OF CARE
during hospitalization   [] Complete education with patient/family with understanding demonstrated for:  [] Adjustment   [] Other:   Nursing interventions may include bowel/bladder training, education for medical assistive devices, medication education, O2 saturation management, energy conservation, stress management techniques, fall prevention, alarms protocol, seating and positioning, skin/wound care, pressure relief instruction,dressing changes,  infection protection, DVT prophylaxis, and/or assistance with in room safety with transfers to bed, toilet, wheelchair, shower as well as bathroom activities and hygiene.      Patient/caregiver education for:   [x] Disease/sustained injury/management      [x] Medication Use   [] Surgical intervention   [x] Safety   [x] Body mechanics and or joint protection   [x] Health maintenance       IRF-SULEMA  Bladder and Bowel Continence  Bladder Continence: Always continent  Bowel Continence: Always continent  Swallowing/Nutritional Status  Swallowing/Nutritional Status: Regular food    PHYSICAL THERAPY:  Goals:                  Short term goals  Time Frame for Short term goals: 1-2 weeks  Short term goal 1: Pt will perform all on bed mobility with supervision  Short term goal 2: Pt will perform sit to stand and stand to sit transfer with RW and supervision  Short term goal 3: Pt will propel W/C 76' with supervision  Short term goal 4: Pt will ambulate 76' with RW and SBA  Short term goal 5: Pt will navigate one step with CGA            Long term goals  Time Frame for Long term goals : 2-3 weeks  Long term goal 1: Pt will perform all on bed mobility independently  Long term goal 2: Pt will perform sit to stand and stand to sit transfer with RW independently  Long term goal 3: Pt will propel W/C 150' windependently  Long term goal 4: Pt will ambulate 150' with RW independently  Long term goal 5: Pt will navigate one step with supervision  These goals were reviewed with this patient at the time of assessment and Jerona Merlin is in agreement.      Plan of Care: Frequency:  [] 5 days per week, 90 minutes per day                             [x]  5 days per week, 60 minutes per day               Current Treatment Recommendations: Strengthening, ROM, Balance Training, Functional Mobility Training, Transfer Training, ADL/Self-care Training, Cognitive/Perceptual Training, Endurance Training, Gait Training, Stair training, Wheelchair Mobility Training, Safety Education & Training    IRF-SULEMA  Roll Left and Right  Assistance Needed: Supervision or touching assistance  CARE Score: 4  Discharge Goal: Independent  Sit to Lying  Assistance Needed: Supervision or touching assistance  CARE Score: 4  Discharge Goal: Independent  Lying to Sitting on Side of Bed  Assistance Needed: Supervision or touching assistance  CARE Score: 4  Discharge Goal: Independent  Sit to Stand  Assistance Needed: Supervision or touching assistance  CARE Score: 4  Discharge Goal: Independent  Chair/Bed-to-Chair Transfer  Assistance Needed: Supervision or touching assistance  CARE Score: 4  Discharge Goal: Independent  Car Transfer  Reason if not Attempted: Not attempted due to medical condition or safety concerns  CARE Score: 88  Discharge Goal: Independent  Walk 10 Feet  Reason if not Attempted: Not attempted due to medical condition or safety concerns  CARE Score: 88  Discharge Goal: Not Attempted  Walk 50 Feet with Two Turns  Reason if not Attempted: Not attempted due to medical condition or safety concerns  CARE Score: 88  Discharge Goal: Not Attempted  Walk 150 Feet  Reason if not Attempted: Not attempted due to medical condition or safety concerns  CARE Score: 88  Discharge Goal: Not Attempted  Walking 10 Feet on Uneven Surfaces  Reason if not Attempted: Not attempted due to medical condition or safety concerns  CARE Score: 88  Discharge Goal: Not Attempted  1 Step (Curb)  Reason if not Attempted: Not attempted due to medical condition or safety concerns  CARE Score: 88  Discharge Goal: Not Attempted  4 Steps  Reason if not Attempted: Not attempted due to medical condition or safety concerns  CARE Score: 88  Discharge Goal: Not Attempted  12 Steps  Reason if not Attempted: Not attempted due to medical condition or safety concerns  CARE Score: 88  Discharge Goal: Independent  Wheel 50 Feet with Two Turns  Reason if not Attempted: Not attempted due to medical condition or safety concerns  CARE Score: 88  Discharge Goal: Independent  Wheel 150 Feet  Reason if not Attempted: Not attempted due to medical condition or safety concerns  CARE Score: 88  Discharge Goal: Independent    OCCUPATIONAL THERAPY:  Goals:             Short term goals  Time Frame for Short term goals: 1 week  Short term goal 1: Supervision with toilet hygiene. Short term goal 2: Supervision with showering/bathing. Short term goal 3: Supervision with UB dressing. Short term goal 4: Supervision with LB dressing. Short term goal 5: Supervision with putting on/taking off footwear. Short term goal 6: Supervision with toilet transfers. Short term goal 7: Min A with picking up objects from floor with AE as needed. :  Long term goals  Time Frame for Long term goals : 2 weeks  Long term goal 1: Modified Independent with toilet hygiene. Long term goal 2: Modified Independent with showering/bathing. Long term goal 3: Independent with UB dressing. Long term goal 4: Modified Independent with LB dressing. Long term goal 5: Modified Independent with putting on/taking off footwear. Long term goals 6: Modified Independent with toilet transfers. Long term goal 8: Modified Independent with picking up objects from floor with AE as needed. Long term goal 9: Verbalize DME needs.   Long term goal 10: Complete IADL/homemaking task Modified [place]. :    These goals were reviewed with this patient at the time of assessment and Bridgette Whipple is in agreement    Plan of Care: Frequency:   [] 5 days per week, 90 minutes per day     [x] 5 days per week, 60 minutes per day                Plan  Current Treatment Recommendations: Gait Training, Strengthening, Patient/Caregiver Education & Training, Home Management Training, Equipment Evaluation, Education, & procurement, Balance Training, Functional Mobility Training, Endurance Training, Self-Care / ADL, Safety Education & Training            IRF-SULEMA  Eating  Assistance Needed: Setup or clean-up assistance  CARE Score: 5  Discharge Goal: Independent  Oral Hygiene  Assistance Needed: Setup or clean-up assistance  CARE Score: 5  Discharge Goal: Independent  Toileting Hygiene  Assistance Needed: Partial/moderate assistance  Comment: Min A  CARE Score: 3  Discharge Goal: Independent  Shower/Bathe Self  Assistance Needed: Partial/moderate assistance  Comment: Min A  CARE Score: 3  Discharge Goal: Independent  Upper Body Dressing  Assistance Needed: Supervision or touching assistance  Comment: CGA  CARE Score: 4  Discharge Goal: Independent  Lower Body Dressing  Assistance Needed: Partial/moderate assistance  Comment: Min A  CARE Score: 3  Discharge Goal: Independent  Putting On/Taking Off Footwear  Assistance Needed: Supervision or touching assistance  Comment: CGA  CARE Score: 4  Discharge Goal: Independent  Toilet Transfer  Assistance Needed: Partial/moderate assistance  Comment: Min A  CARE Score: 3  Discharge Goal: Independent  Picking Up Object  Assistance Needed: Substantial/maximal assistance  CARE Score: 2  Discharge Goal: Independent  Treatments may include IADL retraining, strengthening, safety education and training, patient/caregiver education and training, equipment evaluation/ training/procurement, neuromuscular reeducation, wheelchair mobility training.     SPEECH THERAPY:   Plan of Care and Goals:   LTG Goal 1: The patient will develop functional, cognitive-linguistic based skills and utilize compensatory strategies to communicate wants and needs effectively to different conversational partners, maintain safety and participate socially in functional living environment. LTg 2: The patient will maintain adequate hydration/nutrition with optimum safety and efficiency of swallowing function with P.O. intake without overt signs and symptoms of aspiration for the highest appropriate diet level. Short-term Goals  Timeframe for Short-term Goals: 2-3 weeks  Goal 1: The patient will complete simple/complex naming tasks with min verbal cues at 80% accuracy in order to improve verbal expression and thought organization for functional communication. Goal 2: The patient will follow multi step commands during daily activities with min verbal cues at 100% accuracy in order to improve safety with functional ADL's and decreased assistance from caregivers. Goal 3: The patient will complete attention/processing tasks with 80% accuracy with min verbal cues. Goal 4: The patient will complete executive function tasks (planning, organizing, self monitoring, etc) with min verbal cues at 80% accuracy in order to improve safety awareness with functional ADL's. Goal 5: The patient will demonstrate functional problem solving and safety awareness with min verbal cues at 80% accuracy for daily living tasks in order to increase safe interaction with environment and decreased assistance from caregivers. not met        Short-term Goals  Goal 1: The patient will tolerate regular diet with thin liquids with min/no overt s/s of aspiration. Timeframe for Short-term Goals: 2-3 weeks    IRF-SULEMA  Hearing, Speech, and Vision  Expression of Ideas and Wants: Without difficulty  Understanding Verbal and Non-Verbal Content: Understands  Cognitive Patterns  Cognitive Pattern Assessment Used: BIMS  Repetition of Three Words (First Attempt): 3  Temporal Orientation: Year: Correct  Temporal Orientation: Month:  Accurate within 5 days  Temporal Orientation: Day: Correct  Able to recall \"sock: Yes, no cue required  Able to recall \"blue\": Yes, no cue required  Able to recall \"bed\": Yes, no cue required  BIMS Summary Score: 15          Plan of Care:  Frequency:   [x] 5 days per week, 60 minutes per day                            [] Not appropriate for Speech Therapy  Treatments may include speech/language/communication therapy, cognitive training, group therapy, education, and/or dysphagia therapy based on the above goals. These goals were reviewed with this patient at the time of assessment and Lennis Holter is in agreement. CASE MANAGEMENT:  Goals:   Assist patient/family with discharge planning, patient/family counseling,  and coordination with insurance during ARU stay. Patient Goals: stop throwing up             Activities Prior to Admit:   Homemaking Responsibilities: No  Active : No     Occupation: Retired  Leisure & Hobbies: [address] will be seen a minimum of 3 hours of therapy per day/a minimum of 5 out of 7 days per week. [] In this rare instance due to the nature of this patient's medical involvement, this patient will be seen 15 hours per week (900 minutes within a 7 day period). Treatments may include therapeutic exercises, gait training, neuromuscular re-ed, transfer training, community reintegration, bed mobility, w/c mobility and training, self care, home mgmt, cognitive training, energy conservation,dysphagia tx, speech/language/communication therapy, group therapy, and patient/family education. In addition, dietician/nutritionist may monitor calorie count as well as intake and collaboratively work with SLP on dietary upgrades. Neuropsychology/Psychology may evaluate and provide necessary support.     Medical issues being managed closely and that require 24 hour availability of a physician:   [x] Swallowing Precautions  [x] Bowel/Bladder Fx  [] Weight bearing precautions   [] Wound Care [] Pain Mgmt   [] Infection Protection   [x] DVT Prophylaxis   [x] Fall Precautions  [x] Fluid/Electrolyte/Nutrition Balance   [] Voice Protection   [] Respiratory  [] Other:    Medical Prognosis: [] Good  [x] Fair    [] Guarded   Total expected IRF days:  16  Anticipated discharge destination:    [] Home Independently   [x] Home with supervision    []SNF     [] Other                                           Physician anticipated functional outcomes: Independent household ambulation with assistive device  IPOC brief synthesis: Acute inpatient rehabilitation with occupational   physical therapy and SLP, 180 minutes, 5 every 7   days will address basic and advancing mobility with self-care   instruction and adaptive equipment training. Caregiver education will   be offered. Expected length of stay prior to the supervised level of   functional discharge to home with home health services is 16 days. Assessment and Plan:  . S/P Traumatic subarachnoid hemorrhage-off ASA/Plavix-monitor  2. HTN-on meds monitor  3. DM-monitor BS  4. Seizures-on Keppra  5. CAD-on Ramexa-Plavix and ASA on hold  6. Nausea and vomiting post ICH-repeat CT stable-Zofran/Phenergan PRN  7. GI-bowel regimen/PPI  8. Insomnia-Trazodone qhs  9. Headache-Tramadol/Tylenol PRN  10. Anxiety-Xanax PRN  11. History of lupus-monitor  12. PT/OT/Speech  13.  UTI-complete abx             Case Mgmt: Electronically signed by Fortino Duverney, LSW on 10/28/2020 at 1:29 PM      OT: Electronically signed by Brice Orellana OT on 10/29/20 at 2:58 PM CDT      PT:Electronically signed by Falguni Ndiaye PT on 10/29/2020 at 2:55 PM      RN: Electronically signed by Roslyn Langley RN on 10/27/2020 at 3:54 PM    ST: Electronically signed by PARKER Horvath on 10/28/2020 at 10:03 AM

## 2020-10-27 NOTE — PROGRESS NOTES
[facility]  Progress Note    INTERVAL HISTORY:  Intermittent episodes of difficulty with speech, seems to be improving. MRI Brain did not reveal any acute intracranial abnormalities. Mrs. Kameron Haynes states that she feels better today, her headache is gone. She had no trouble repeating phrases or naming objects this morning. CHIEF COMPLAINT:  Fall    HISTORY OF PRESENT ILLNESS:      The patient is a 76 y.o. female who presented with a fall backwards on the day of admission while voting. Posterior head trauma. No LOC.  + HA, No N/V or changes in vision    CT head revealed a small right frontal area of traumatic subarachnoid hemorrhage for which I was asked to evaluate. The patient does take anticoagulation medication.  Plavix - cardiac stents this past April      Past Medical History:   Diagnosis Date    Asthma     CAD (coronary artery disease)     Chest pain     Depression with anxiety     Diabetes mellitus ([address]     GERD (gastroesophageal reflux disease)     Glucose intolerance (impaired glucose tolerance)     Hyperlipidemia     Cholesterol management per Geronimo Perez M.D.   [facility] Hypertension     Lupus (systemic lupus erythematosus) ([address]     Malaise and fatigue 10/6/2017    Movement disorder     Sees pain management for neck pain and previous surgery    New onset seizure ([address]     Osteoarthritis     Palliative care patient 07/22/2020    S/P CABG x 3 03/05/2013    PACABG X 3, LIMA-LAD, SVG0OM, SVG-DIAG, RT EVH, DR Vik Tavares       Past Surgical History:   Procedure Laterality Date    APPENDECTOMY  1965    BACK SURGERY      CARDIAC CATHETERIZATION  5/18/11    EF 60%    CARDIAC CATHETERIZATION  1/25/16    drug eluting stent to RCA   [address]  1/25/2016    CARDIAC CATHETERIZATION  2/19/16    CARDIAC CATHETERIZATION  08/24/2017    cutting balloon angioplasty ot LAD    CARDIAC CATHETERIZATION  04/13/2020    TWO Stents placed   [address] Polypectomy - 2    COLON SURGERY  2003    Polypectomy - 4    COLON SURGERY  11/2008    Polypectomy - 2    COLON SURGERY  8/2012    Polypectomy - 3    COLONOSCOPY      CORONARY ANGIOPLASTY  08/2017    cutting balloon angioplasty LAD diagonal    CORONARY ANGIOPLASTY WITH STENT PLACEMENT  3/22/2007    CORONARY ANGIOPLASTY WITH STENT PLACEMENT  8/2007    CORONARY ARTERY BYPASS GRAFT  3/5/2013    PACABG X 3, LIMA-LAD, SVG0OM, SVG-DIAG, RT EVH, DR PATEL    COSMETIC SURGERY      deviated septum and rhinoplasty    CYST REMOVAL  1970    DIAGNOSTIC CARDIAC CATH LAB PROCEDURE      ENDOSCOPY, COLON, DIAGNOSTIC      EYE SURGERY  03/2019    cataracts   [address]    Partial    NASAL SEPTUM [address] SINUS SURGERY  2003    Nasal Polyps Removed    SPINE SURGERY  3/6/2006    Lumbar Laminectomy L4&5    SPINE SURGERY  3/6/2006    Cervical Fusion - C4,5,6    TONSILLECTOMY  1968        Medications    Current Facility-Administered Medications:     ranolazine (RANEXA) extended release tablet 1,000 mg, 1,000 mg, Oral, BID, Joe Hawley MD, 1,000 mg at 10/26/20 2111    traMADol (ULTRAM) tablet 50 mg, 50 mg, Oral, Q6H PRN, Joe Hawley MD    donepezil (ARICEPT) tablet 5 mg, 5 mg, Oral, Nightly, Tyler Damon MD, 5 mg at 10/26/20 2111    hydroCHLOROthiazide (HYDRODIURIL) tablet 25 mg, 25 mg, Oral, Daily, Joe Hawley MD, 25 mg at 10/26/20 0729    glucose (GLUTOSE) 40 % oral gel 15 g, 15 g, Oral, PRN, Joe Hawley MD    dextrose 50 % IV solution, 12.5 g, Intravenous, PRN, Joe Hawley MD    glucagon (rDNA) injection 1 mg, 1 mg, Intramuscular, PRN, Joe Hawley MD    dextrose 5 % solution, 100 mL/hr, Intravenous, PRN, Joe Hawley MD    levETIRAcetam (KEPPRA) tablet 500 mg, 500 mg, Oral, BID, Shantell Lies, DO, 500 mg at 10/26/20 2111    cyclobenzaprine (FLEXERIL) tablet 10 mg, 10 mg, Oral, TID PRN, Joe Hawley MD, 10 mg at 10/25/20 2023    traMADol (ULTRAM) tablet 50 mg, 50 mg, Oral, Q6H PRN, Tiesha Alberto MD, 50 mg at 10/25/20 2334    ALPRAZolam Brookkarene Ljster) tablet 0.125 mg, 0.125 mg, Oral, Daily PRN, Tiesha Alberto MD    isosorbide mononitrate (IMDUR) extended release tablet 60 mg, 60 mg, Oral, Daily, Tiesha Alberto MD, 60 mg at 10/26/20 0728    lisinopril (PRINIVIL;ZESTRIL) tablet 20 mg, 20 mg, Oral, BID, Tiesha Alberto MD, 20 mg at 10/26/20 2111    metoprolol succinate (TOPROL XL) extended release tablet 50 mg, 50 mg, Oral, Daily, Tiesha Alberto MD, 50 mg at 10/26/20 0729    nitroGLYCERIN (NITROSTAT) SL tablet 0.4 mg, 0.4 mg, Sublingual, Q5 Min PRN, Tiesha Alberto MD    traZODone (DESYREL) tablet 50 mg, 50 mg, Oral, Nightly, Tiesha Alberto MD, 50 mg at 10/26/20 2112    polyethylene glycol (GLYCOLAX) packet 17 g, 17 g, Oral, Daily PRN, Tiesha Alberto MD    promethazine (PHENERGAN) tablet 12.5 mg, 12.5 mg, Oral, Q6H PRN, 12.5 mg at 10/26/20 1707 **OR** ondansetron (ZOFRAN) injection 4 mg, 4 mg, Intravenous, Q6H PRN, Tiesha Alberto MD, 4 mg at 10/26/20 0404    sodium chloride flush 0.9 % injection 10 mL, 10 mL, Intravenous, 2 times per day, Tiesha Alberto MD, 10 mL at 10/26/20 2112    sodium chloride flush 0.9 % injection 10 mL, 10 mL, Intravenous, PRN, Tiesha Alberto MD    senna (SENOKOT) tablet 8.6 mg, 1 tablet, Oral, Daily PRN, Tiesha Alberto MD    acetaminophen (TYLENOL) tablet 650 mg, 650 mg, Oral, Q8H PRN, Tiesha Alberto MD, 650 mg at 10/26/20 1239    insulin lispro (HUMALOG) injection vial 0-6 Units, 0-6 Units, Subcutaneous, BID, Tiesha Alberto MD, 1 Units at 10/25/20 2024  Other; Codeine; Darvocet [propoxyphene n-acetaminophen]; Hydrocodone-acetaminophen; Lyrica [pregabalin];  Morphine; and Percocet [oxycodone-acetaminophen]    Social History  Social History     Tobacco Use   Smoking Status Former Smoker    Packs/day: 1.00    Years: 1.00    Pack years: 1.00    Types: Cigarettes    Last attempt to quit: 1967    Years since quittin.7   Smokeless Tobacco Never Used     Social History     Substance and Sexual Activity   Alcohol Use No         Family History   Problem Relation Age of Onset    Heart Disease Mother     High Blood Pressure Mother     Diabetes Mother     High Cholesterol Mother     Diabetes Brother          REVIEW OF SYSTEMS:  Constitutional: No fevers No chills  Neck:No stiffness  Respiratory: No shortness of breath  Cardiovascular: No chest pain No palpitations  Gastrointestinal: No abdominal pain    Genitourinary: No Dysuria  Neurological: no headache, no confusion    PHYSICAL EXAM:  Vitals:    10/27/20 0715   BP: 116/64   Pulse: 58   Resp: 20   Temp: 96.7 °F (35.9 °C)   SpO2: 93%       Constitutional: The patient appears well-developed and well-nourished. Eyes - conjunctiva normal.  Conjugate Gaze  Ear, nose, throat - No scars, masses, or lesions over external nose or ears, no atrophy of tongue  Neck-symmetric, no masses noted, no jugular vein distension  Respiration- chest wall appears symmetric, good expansion, normal effort without use of accessory muscles  Musculoskeletal - no significant wasting of muscles noted, no bony deformities  Extremities-no clubbing, cyanosis or edema  Skin - warm, dry, and intact. No rash, erythema, or pallor. Psychiatric - mood, affect, and behavior appear normal.     Neurologic Examination  Awake, Alert and oriented x 3  PERRL, EOMI, CN II-XII intact  No difficulty today with repetition and object naming. Speech fluent and she comprehends language    Motor 5/5 all extremities  No deficits to light touch      DATA:  Nursing/pcp notes, imaging,labs and vitals reviewed.      PT,OT and/or speech notes reviewed    Lab Results   Component Value Date    WBC 4.0 (L) 10/25/2020    HGB 11.9 (L) 10/25/2020    HCT 39.7 10/25/2020    .8 (H) 10/25/2020     10/25/2020     Lab Results   Component Value Date    NA frontal lobe just lateral to the frontal horn right lateral    ventricle. No other regions of abnormal increased attenuation are    identified. 2. Changes of aging with chronic microvascular ischemic change in the    white matter. Signed by Dr Allie Atwood on 10/24/2020 4:05 PM     EXAMINATION: CT HEAD WO CONTRAST 10/25/2020 8:08 AM    HISTORY: CT BRAIN without contrast 10/25/2020 5:01 AM    HISTORY: Small petechial right frontal hemorrhage    COMPARISON: October 24, 2020    DLP: 764 mGy cm    TECHNIQUE: Serial axial tomographic images of the brain were obtained    without the use of intravenous contrast.    FINDINGS:    The midline structures are nondisplaced. There is mild cerebral and    cerebellar volume loss, with an associated increase in the prominence    of the ventricles and sulci. The basilar cisterns are normal in size    and configuration. There is increased CT attenuation in the right    frontal lobe just lateral to frontal horn the right lateral ventricle. This is a small petechial hemorrhage and this is unchanged from    October 24, 2020. There is low attenuation in the periventricular    white matter, consistent with chronic ischemic change. There are no    abnormal extra-axial fluid collections. There is no evidence of    tonsillar herniation. The included orbits and their contents are unremarkable. The    visualized paranasal sinuses, mastoid air cells and middle ear    cavities are clear. The visualized osseous structures and overlying    soft tissues of the skull and face are intact.         Impression    Stable small focal petechial hemorrhage right frontal lobe.     2. Changes of aging also stable from October 24, 2020    Signed by Dr Allie Atwood on 10/25/2020 8:10 AM          IMPRESSION  76year old female, s/p fall on Plavix, neurologically stable with small right frontal traumatic subarachnoid hemorrhage    RECOMMENDATIONS:    MRI brain did not reveal any intracranial abnormalities  Okay to resume Plavix from a neurosurgical standpoint  Okay to be discharge from our standpoint as well   Follow up in our clinic 2-3 weeks        LEILA Salazar    Neurosurgery Attending  I have reviewed this case thoroughly with the provider above. I have seen and evaluated this patient myself. I have reviewed all the imaging studies, labs, notes etc. within the chart. I agree. MRI without evidence of ischemia. Speech normal this AM.  Cleared for discharge home. OK to resume plavix. Follow up in 2-3 weeks.       Gabby Alert, DO

## 2020-10-27 NOTE — DISCHARGE SUMMARY
Discharge Summary    Date: 10/27/2020  Patient Name: Sam Jones YOB: 1945 Age: 76 y.o. Admit Date: 10/24/2020  Discharge Date: 6/24/2020  Discharge Condition:    Admission Diagnosis  Intraparenchymal hematoma of brain due to trauma with loss of consciousness and death due to brain injury prior to regaining consciousness, initial encounter (B14.992V); Intraparenchymal hematoma of brain due to trauma with loss of consciousness and death due to brain injury prior to regaining consciousness, initial encounter (U20.685U)     Discharge Diagnosis  Principal Problem: Subarachnoid hemorrhage (HCC)Active Problems: GERD (gastroesophageal reflux disease) CAD (coronary artery disease) Hyperlipidemia Hypertension S/P right coronary artery (RCA) stent placement Hx of CABG Bilateral carotid artery stenosis SLE (systemic lupus erythematosus) (HCC) Subarachnoid hemorrhage following injury, no loss of consciousness (HCC)Resolved Problems: * No resolved hospital problems. [facility] Stay  Narrative of Hospital Course:      Consultants:  [facility]DA CONSULT TO [address] [place] [address] CONSULT TO REHAB/TCU ADMISSION COORDINATOR    Surgeries/procedures Performed:       Treatments:            Discharge Plan/Disposition:  Home    Hospital/Incidental Findings Requiring Follow Up:    Patient Instructions:    Diet:    Activity:  For number of days (if applicable): Other Instructions:    Provider Follow-Up:   No follow-ups on file.      Significant Diagnostic Studies:    Recent Labs:  Admission on 10/24/2020Protime                                       Date: 10/24/2020Value: 12.7        Ref range: 12.0 - 14.6 sec    Status: FinalINR                                           Date: 10/24/2020Value: 0.96        Ref range: 0.88 - 1.18        Status: Final              Comment: INR  < or = 1.3  NormalINR = 2.0 - 3.0  TherapeuticINR = 2.5 - 3.5  Therapeutic for patients with mechanicalprosthetic heart valve & MI 1.0 %        Status: FinalNeutrophils Absolute                          Date: 10/24/2020Value: 4.8         Ref range: 1.5 - 7.5 K/uL     Status: FinalImmature Granulocytes #                       Date: 10/24/2020Value: 0.0         Ref range: K/uL               Status: FinalLymphocytes Absolute                          Date: 10/24/2020Value: 1.0*        Ref range: 1.1 - 4.5 K/uL     Status: FinalMonocytes Absolute                            Date: 10/24/2020Value: 0.50        Ref range: 0.00 - 0.90 K/uL   Status: FinalEosinophils Absolute                          Date: 10/24/2020Value: 0.10        Ref range: 0.00 - 0.60 K/uL   Status: FinalBasophils Absolute                            Date: 10/24/2020Value: 0.00        Ref range: 0.00 - 0.20 K/uL   Status: FinalSodium                                        Date: 10/24/2020Value: 134*        Ref range: 136 - 145 mmol/L   Status: FinalPotassium reflex Magnesium                    Date: 10/24/2020Value: 4.7         Ref range: 3.5 - 5.0 mmol/L   Status: FinalChloride                                      Date: 10/24/2020Value: 101         Ref range: 98 - 111 mmol/L    Status: FinalCO2                                           Date: 10/24/2020Value: 24          Ref range: 22 - 29 mmol/L     Status: FinalAnion Gap                                     Date: 10/24/2020Value: 9           Ref range: 7 - 19 mmol/L      Status: FinalGlucose                                       Date: 10/24/2020Value: 106         Ref range: 74 - 109 mg/dL     Status: FinalBUN                                           Date: 10/24/2020Value: 17          Ref range: 8 - 23 mg/dL       Status: FinalCREATININE                                    Date: 10/24/2020Value: 0.7         Ref range: 0.5 - 0.9 mg/dL    Status: FinalGFR Non-                      Date: 10/24/2020Value: >60         Ref range: >60                Status: Final              Comment:  This calculation may be inaccurate for patients under the age of 25 years. For ages 25 and older, a GFR >60 mL/min/1.73m2 (not corrected for weight) isvalid for stable renal function. GFR                           Date: 10/24/2020Value: >59         Ref range: >59                Status: Final              Comment: Chronic Kidney Disease: less than 60 ml/min/1.73 sq.m. Kidney Failure: less than 15 ml/min/1.73 sq. m. Results valid for patients 18 years and older. Calcium                                       Date: 10/24/2020Value: 9.5         Ref range: 8.8 - 10.2 mg/dL   Status: FinalTotal Protein                                 Date: 10/24/2020Value: 6.4*        Ref range: 6.6 - 8.7 g/dL     Status: FinalAlb                                           Date: 10/24/2020Value: 4.4         Ref range: 3.5 - 5.2 g/dL     Status: FinalTotal Bilirubin                               Date: 10/24/2020Value: 0.5         Ref range: 0.2 - 1.2 mg/dL    Status: FinalAlkaline Phosphatase                          Date: 10/24/2020Value: 66          Ref range: 35 - 104 U/L       Status: FinalALT                                           Date: 10/24/2020Value: 16          Ref range: 5 - 33 U/L         Status: FinalAST                                           Date: 10/24/2020Value: 13          Ref range: 5 - 32 U/L         Status: FinalSodium                                        Date: 10/25/2020Value: 136         Ref range: 136 - 145 mmol/L   Status: FinalPotassium reflex Magnesium                    Date: 10/25/2020Value: 4.7         Ref range: 3.5 - 5.0 mmol/L   Status: FinalChloride                                      Date: 10/25/2020Value: 102         Ref range: 98 - 111 mmol/L    Status: FinalCO2                                           Date: 10/25/2020Value: 15*         Ref range: 22 - 29 mmol/L     Status: FinalAnion Gap                                     Date: 10/25/2020Value: 19          Ref range: 7 - 19 mmol/L      Status: FinalGlucose Date: 10/25/2020Value: 100         Ref range: 74 - 109 mg/dL     Status: FinalBUN                                           Date: 10/25/2020Value: 17          Ref range: 8 - 23 mg/dL       Status: FinalCREATININE                                    Date: 10/25/2020Value: 0.7         Ref range: 0.5 - 0.9 mg/dL    Status: FinalGFR Non-                      Date: 10/25/2020Value: >60         Ref range: >60                Status: Final              Comment: This calculation may be inaccurate for patients under the age of 25 years. For ages 25 and older, a GFR >60 mL/min/1.73m2 (not corrected for weight) isvalid for stable renal function. GFR                           Date: 10/25/2020Value: >59         Ref range: >59                Status: Final              Comment: Chronic Kidney Disease: less than 60 ml/min/1.73 sq.m. Kidney Failure: less than 15 ml/min/1.73 sq. m. Results valid for patients 18 years and older. Calcium                                       Date: 10/25/2020Value: 9.2         Ref range: 8.8 - 10.2 mg/dL   Status: FinalTotal Protein                                 Date: 10/25/2020Value: 5.9*        Ref range: 6.6 - 8.7 g/dL     Status: FinalAlb                                           Date: 10/25/2020Value: 4.3         Ref range: 3.5 - 5.2 g/dL     Status: FinalTotal Bilirubin                               Date: 10/25/2020Value: 0.5         Ref range: 0.2 - 1.2 mg/dL    Status: FinalAlkaline Phosphatase                          Date: 10/25/2020Value: 51          Ref range: 35 - 104 U/L       Status: FinalALT                                           Date: 10/25/2020Value: 13          Ref range: 5 - 33 U/L         Status: FinalAST                                           Date: 10/25/2020Value: 14          Ref range: 5 - 32 U/L         Status: [address] [facility]                                           Date: 10/25/2020Value: 4.0*        Ref range: 4.8 - 10.8 K/uL    Status: FinalRBC                                           Date: 10/25/2020Value: 3.94*       Ref range: 4.20 - 5.40 M/uL   Status: FinalHemoglobin                                    Date: 10/25/2020Value: 11.9*       Ref range: 12.0 - 16.0 g/dL   Status: FinalHematocrit                                    Date: 10/25/2020Value: 39.7        Ref range: 37.0 - 47.0 %      Status: FinalMCV                                           Date: 10/25/2020Value: 100.8*      Ref range: 81.0 - 99.0 fL     Status: [address]                                           Date: 10/25/2020Value: 30.2        Ref range: 27.0 - 31.0 pg     Status: [address]                                          Date: 10/25/2020Value: 30.0*       Ref range: 33.0 - 37.0 g/dL   Status: FinalRDW                                           Date: 10/25/2020Value: 13.4        Ref range: 11.5 - 14.5 %      Status: FinalPlatelets                                     Date: 10/25/2020Value: 165         Ref range: 130 - 400 K/uL     Status: FinalMPV                                           Date: 10/25/2020Value: 9.1*        Ref range: 9.4 - 12.3 fL      Status: FinalNeutrophils %                                 Date: 10/25/2020Value: 56.6        Ref range: 50.0 - 65.0 %      Status: FinalLymphocytes %                                 Date: 10/25/2020Value: 31.5        Ref range: 20.0 - 40.0 %      Status: FinalMonocytes %                                   Date: 10/25/2020Value: 9.0         Ref range: 0.0 - 10.0 %       Status: FinalEosinophils %                                 Date: 10/25/2020Value: 2.3         Ref range: 0.0 - 5.0 %        Status: FinalBasophils %                                   Date: 10/25/2020Value: 0.3         Ref range: 0.0 - 1.0 %        Status: FinalNeutrophils Absolute                          Date: 10/25/2020Value: 2.3         Ref range: 1.5 - 7.5 K/uL     Status: FinalImmature Granulocytes #                       Date: 10/25/2020Value: 0.0         Ref range: K/uL               Status: FinalLymphocytes Absolute                          Date: 10/25/2020Value: 1.3         Ref range: 1.1 - 4.5 K/uL     Status: FinalMonocytes Absolute                            Date: 10/25/2020Value: 0.40        Ref range: 0.00 - 0.90 K/uL   Status: FinalEosinophils Absolute                          Date: 10/25/2020Value: 0.10        Ref range: 0.00 - 0.60 K/uL   Status: FinalBasophils Absolute                            Date: 10/25/2020Value: 0.00        Ref range: 0.00 - 0.20 K/uL   Status: FinalProtime                                       Date: 10/25/2020Value: 12.9        Ref range: 12.0 - 14.6 sec    Status: FinalINR                                           Date: 10/25/2020Value: 0.99        Ref range: 0.88 - 1.18        Status: Final              Comment: INR  < or = 1.3  NormalINR = 2.0 - 3.0  TherapeuticINR = 2.5 - 3.5  Therapeutic for patients with mechanicalprosthetic heart valve & MI prophylaxisINR  > or = 3.5  Abnormal/ElevatedINR  > or = 5.0  Critical (requires immediate physiciannotification)aPTT                                          Date: 10/25/2020Value: 27.1        Ref range: 26.0 - 36.2 sec    Status: FinalPOC Glucose                                   Date: 10/24/2020Value: 116*        Ref range: 70 - 99 mg/dl      Status: FinalPerformed on                                  Date: 10/24/2020Value: AccuChek      Status: FinalPOC Glucose                                   Date: 10/25/2020Value: 113*        Ref range: 70 - 99 mg/dl      Status: FinalPerformed on                                  Date: 10/25/2020Value: AccuChek      Status: FinalPOC Glucose                                   Date: 10/25/2020Value: 129*        Ref range: 70 - 99 mg/dl      Status: FinalPerformed on                                  Date: 10/25/2020Value: AccuChek      Status: [address] Glucose                                   Date: 10/25/2020Value: 160* Ref range: 70 - 99 mg/dl      Status: FinalPerformed on                                  Date: 10/25/2020Value: AccuChek      Status: [address] Glucose                                   Date: 10/26/2020Value: 122*        Ref range: 70 - 99 mg/dl      Status: FinalPerformed on                                  Date: 10/26/2020Value: AccuChek      Status: [address] Glucose                                   Date: 10/26/2020Value: 96          Ref range: 70 - 99 mg/dl      Status: FinalPerformed on                                  Date: 10/26/2020Value: AccuChek      Status: [address] Glucose                                   Date: 10/26/2020Value: 106*        Ref range: 70 - 99 mg/dl      Status: FinalPerformed on                                  Date: 10/26/2020Value: AccuChek      Status: [address] Glucose                                   Date: 10/26/2020Value: 104*        Ref range: 70 - 99 mg/dl      Status: FinalPerformed on                                  Date: 10/26/2020Value: AccuChek      Status: [address] Glucose                                   Date: 10/27/2020Value: 95          Ref range: 70 - 99 mg/dl      Status: FinalPerformed on                                  Date: 10/27/2020Value: AccuChek      Status: FinalPOC Glucose                                   Date: 10/27/2020Value: 118*        Ref range: 70 - 99 mg/dl      Status: FinalPerformed on                                  Date: 10/27/2020Value: AccuChek      Status: Final------------    Radiology last 7 days:  Xr Lumbar Spine (2-3 Views)Result Date: 10/24/2020Impression: 1. Degenerative spondylosis is noted in the lumbar spine similar to September 16, 2019. No acute abnormality is identified. . Signed by Dr Eleno Marcial on 10/24/2020 3:14 PMCt Head Wo ContrastResult Date: 10/25/2020Stable subtle petechial hemorrhage in right frontal lobe. This is unchanged from prior exam of the same date at 6:00 AM 2.  Changes of aging also stable Signed by  Neftaly Poe on 10/25/2020 2:42 PMCt Head Wo ContrastResult Date: 10/25/2020Stable small focal petechial hemorrhage right frontal lobe. 2. Changes of aging also stable from October 24, 2020 Signed by Dr Neftaly Poe on 10/25/2020 8:10 AMCt Head Wo ContrastResult Date: 10/24/2020There is a very subtle petechial or subarachnoid hemorrhage in the right frontal lobe just lateral to the frontal horn right lateral ventricle. No other regions of abnormal increased attenuation are identified. 2. Changes of aging with chronic microvascular ischemic change in the white matter. Signed by Dr Neftaly Poe on 10/24/2020 4:05 PMCt Cervical Spine Wo ContrastResult Date: 10/24/21697. Degenerative and postsurgical changes noted in the cervical spine. No acute abnormality is identified Signed by Dr Neftaly Poe on 10/24/2020 4:13 PMMri Brain W [place] Kb ContrastResult Date: 10/26/11436. Mild atrophy and moderate small vessel disease. 2. Punctate focus of old hemorrhage or calcification within the right frontal lobe correlating with the tiny hyperdensity noted on recent CT. 3. No mass or infarct. Signed by Dr Raul Michele on 10/26/2020 10:14 AMXr Hip 2-3 Vw W Pelvis RightResult Date: 10/24/2020Impression: 1. Mild degenerative change with some narrowing of the right hip joint space. No acute abnormalities identified. Signed by Dr Neftaly Poe on 10/24/2020 3:16 PM     [unfilled]    Discharge Medications    Current Discharge Medication List    Current Discharge Medication List    Current Discharge Medication ListCONTINUE these medications which have NOT CHANGEDzolpidem (AMBIEN) 10 MG tabletTake 10 mg by mouth nightly as needed for Sleep.  Half a tabletamitriptyline (ELAVIL) 25 MG tabletTake 25 mg by mouth nightlyisosorbide mononitrate (IMDUR) 60 MG extended release tabletTAKE 1 TABLET TWICE A DAYQty: 180 tablet Refills: 3Associated Diagnoses:Coronary artery disease involving native coronary artery of native heart with angina pectoris (HCC)lisinopril (PRINIVIL;ZESTRIL) 20 MG tabletTake 1 tablet by mouth 2 times dailyQty: 30 tablet Refills: 3docusate sodium (COLACE, DULCOLAX) 100 MG CAPSTake 100 mg by mouth 2 times dailyQty: 60 capsule Refills: 0!! metoprolol succinate (TOPROL XL) 25 MG extended release tabletTake 25 mg by mouth nightlyranolazine (RANEXA) 1000 MG extended release tabletTAKE 1 TABLET TWICE A DAYQty: 180 tablet Refills: 3!! metoprolol succinate (TOPROL XL) 25 MG extended release tabletTake 50 mg by mouth daily clopidogrel (PLAVIX) 75 MG tabletTake 1 tablet by mouth dailyQty: 90 tablet Refills: 3atorvastatin (LIPITOR) 80 MG tabletTake 1 tablet by mouth dailyQty: 90 tablet Refills: 0Comments: Patient must have labs before any further refillsAssociated Diagnoses:Hyperlipidemia, unspecified hyperlipidemia typepromethazine (PHENERGAN) 25 MG tabletTake 25 mg by mouth every 6 hours as needed for NauseaALPRAZolam (XANAX) 0.25 MG tabletTake 0.125 mg by mouth daily as needed for Anxiety (takes 1/2 tab). albuterol (PROVENTIL HFA;VENTOLIN HFA) 108 (90 BASE) MCG/ACT inhalerInhale 2 puffs into the lungs every 6 hours as needed. traZODone (DESYREL) 50 MG tabletTake 50 mg by mouth nightly. aspirin 81 MG tabletTake 81 mg by mouth daily. metFORMIN (GLUCOPHAGE) 500 MG tabletTake 1 tablet by mouth 2 times daily Please hold Metformin for 48 hours after cardiac catheterization. May resume on 4/15/20Qty: 180 tablet Refills: 3nitroGLYCERIN (NITROSTAT) 0.4 MG SL tabletup to max of 3 total doses. If no relief after 1 dose, call 911. Qty: 30 tablet Refills: 3Cyanocobalamin (VITAMIN B 12 PO)Take 1,000 mcg by mouth daily! ! - Potential duplicate medications found. Please discuss with provider. Current Discharge Medication List    Time Spent on Discharge:E] minutes were spent in patient examination, evaluation, counseling as well as medication reconciliation, prescriptions for required medications, discharge plan, and follow up.     Electronically signed by Gary Lott MD on 10/27/20 at 12:45 PM CDT

## 2020-10-28 ENCOUNTER — APPOINTMENT (OUTPATIENT)
Dept: CT IMAGING | Age: 75
DRG: 949 | End: 2020-10-28
Attending: PSYCHIATRY & NEUROLOGY
Payer: MEDICARE

## 2020-10-28 PROBLEM — N30.00 ACUTE CYSTITIS WITHOUT HEMATURIA: Status: ACTIVE | Noted: 2020-10-28

## 2020-10-28 LAB
ALBUMIN SERPL-MCNC: 4.1 G/DL (ref 3.5–5.2)
ALP BLD-CCNC: 49 U/L (ref 35–104)
ALT SERPL-CCNC: 13 U/L (ref 5–33)
ANION GAP SERPL CALCULATED.3IONS-SCNC: 13 MMOL/L (ref 7–19)
AST SERPL-CCNC: 13 U/L (ref 5–32)
BILIRUB SERPL-MCNC: 0.6 MG/DL (ref 0.2–1.2)
BUN BLDV-MCNC: 21 MG/DL (ref 8–23)
CALCIUM SERPL-MCNC: 9 MG/DL (ref 8.8–10.2)
CHLORIDE BLD-SCNC: 96 MMOL/L (ref 98–111)
CO2: 23 MMOL/L (ref 22–29)
CREAT SERPL-MCNC: 0.7 MG/DL (ref 0.5–0.9)
GFR AFRICAN AMERICAN: >59
GFR NON-AFRICAN AMERICAN: >60
GLUCOSE BLD-MCNC: 101 MG/DL (ref 70–99)
GLUCOSE BLD-MCNC: 102 MG/DL (ref 70–99)
GLUCOSE BLD-MCNC: 120 MG/DL (ref 74–109)
GLUCOSE BLD-MCNC: 127 MG/DL (ref 70–99)
GLUCOSE BLD-MCNC: 137 MG/DL (ref 70–99)
HCT VFR BLD CALC: 36.8 % (ref 37–47)
HEMOGLOBIN: 12.3 G/DL (ref 12–16)
MCH RBC QN AUTO: 30.3 PG (ref 27–31)
MCHC RBC AUTO-ENTMCNC: 33.4 G/DL (ref 33–37)
MCV RBC AUTO: 90.6 FL (ref 81–99)
PDW BLD-RTO: 13.4 % (ref 11.5–14.5)
PERFORMED ON: ABNORMAL
PLATELET # BLD: 160 K/UL (ref 130–400)
PMV BLD AUTO: 9.3 FL (ref 9.4–12.3)
POTASSIUM SERPL-SCNC: 3.8 MMOL/L (ref 3.5–5)
PREALBUMIN: 28 MG/DL (ref 20–40)
RBC # BLD: 4.06 M/UL (ref 4.2–5.4)
SODIUM BLD-SCNC: 132 MMOL/L (ref 136–145)
TOTAL PROTEIN: 6.1 G/DL (ref 6.6–8.7)
WBC # BLD: 6.2 K/UL (ref 4.8–10.8)

## 2020-10-28 PROCEDURE — 70450 CT HEAD/BRAIN W/O DYE: CPT

## 2020-10-28 PROCEDURE — 6370000000 HC RX 637 (ALT 250 FOR IP): Performed by: FAMILY MEDICINE

## 2020-10-28 PROCEDURE — 1180000000 HC REHAB R&B

## 2020-10-28 PROCEDURE — 6370000000 HC RX 637 (ALT 250 FOR IP): Performed by: PSYCHIATRY & NEUROLOGY

## 2020-10-28 PROCEDURE — 51701 INSERT BLADDER CATHETER: CPT

## 2020-10-28 PROCEDURE — 36415 COLL VENOUS BLD VENIPUNCTURE: CPT

## 2020-10-28 PROCEDURE — 82947 ASSAY GLUCOSE BLOOD QUANT: CPT

## 2020-10-28 PROCEDURE — 99223 1ST HOSP IP/OBS HIGH 75: CPT | Performed by: PSYCHIATRY & NEUROLOGY

## 2020-10-28 PROCEDURE — 80053 COMPREHEN METABOLIC PANEL: CPT

## 2020-10-28 PROCEDURE — 85027 COMPLETE CBC AUTOMATED: CPT

## 2020-10-28 PROCEDURE — 6360000002 HC RX W HCPCS: Performed by: PSYCHIATRY & NEUROLOGY

## 2020-10-28 PROCEDURE — 51798 US URINE CAPACITY MEASURE: CPT

## 2020-10-28 PROCEDURE — 84134 ASSAY OF PREALBUMIN: CPT

## 2020-10-28 PROCEDURE — 92522 EVALUATE SPEECH PRODUCTION: CPT

## 2020-10-28 PROCEDURE — 96125 COGNITIVE TEST BY HC PRO: CPT

## 2020-10-28 PROCEDURE — 92610 EVALUATE SWALLOWING FUNCTION: CPT

## 2020-10-28 RX ORDER — PANTOPRAZOLE SODIUM 40 MG/1
40 TABLET, DELAYED RELEASE ORAL
Status: DISCONTINUED | OUTPATIENT
Start: 2020-10-29 | End: 2020-11-11 | Stop reason: HOSPADM

## 2020-10-28 RX ORDER — NITROFURANTOIN 25; 75 MG/1; MG/1
100 CAPSULE ORAL EVERY 12 HOURS SCHEDULED
Status: DISCONTINUED | OUTPATIENT
Start: 2020-10-28 | End: 2020-10-30

## 2020-10-28 RX ORDER — ONDANSETRON 2 MG/ML
4 INJECTION INTRAMUSCULAR; INTRAVENOUS EVERY 4 HOURS PRN
Status: DISCONTINUED | OUTPATIENT
Start: 2020-10-28 | End: 2020-11-11 | Stop reason: HOSPADM

## 2020-10-28 RX ORDER — PROMETHAZINE HYDROCHLORIDE 12.5 MG/1
12.5 TABLET ORAL EVERY 6 HOURS PRN
Status: DISCONTINUED | OUTPATIENT
Start: 2020-10-28 | End: 2020-11-11 | Stop reason: HOSPADM

## 2020-10-28 RX ADMIN — PROMETHAZINE HYDROCHLORIDE 12.5 MG: 12.5 TABLET ORAL at 09:28

## 2020-10-28 RX ADMIN — ISOSORBIDE MONONITRATE 60 MG: 60 TABLET, EXTENDED RELEASE ORAL at 08:41

## 2020-10-28 RX ADMIN — LISINOPRIL 20 MG: 20 TABLET ORAL at 21:44

## 2020-10-28 RX ADMIN — TRAZODONE HYDROCHLORIDE 50 MG: 50 TABLET ORAL at 21:44

## 2020-10-28 RX ADMIN — LISINOPRIL 20 MG: 20 TABLET ORAL at 08:41

## 2020-10-28 RX ADMIN — LEVETIRACETAM 500 MG: 500 TABLET ORAL at 08:41

## 2020-10-28 RX ADMIN — TRAMADOL HYDROCHLORIDE 50 MG: 50 TABLET, FILM COATED ORAL at 05:30

## 2020-10-28 RX ADMIN — RANOLAZINE 1000 MG: 500 TABLET, FILM COATED, EXTENDED RELEASE ORAL at 21:43

## 2020-10-28 RX ADMIN — PROMETHAZINE HYDROCHLORIDE 12.5 MG: 12.5 TABLET ORAL at 02:36

## 2020-10-28 RX ADMIN — ONDANSETRON 4 MG: 2 INJECTION INTRAMUSCULAR; INTRAVENOUS at 15:35

## 2020-10-28 RX ADMIN — LEVETIRACETAM 500 MG: 500 TABLET ORAL at 21:44

## 2020-10-28 RX ADMIN — METOPROLOL SUCCINATE 50 MG: 50 TABLET, EXTENDED RELEASE ORAL at 08:41

## 2020-10-28 RX ADMIN — RANOLAZINE 1000 MG: 500 TABLET, FILM COATED, EXTENDED RELEASE ORAL at 08:41

## 2020-10-28 RX ADMIN — HYDROCHLOROTHIAZIDE 25 MG: 25 TABLET ORAL at 08:41

## 2020-10-28 RX ADMIN — NITROFURANTOIN MONOHYDRATE/MACROCRYSTALLINE 100 MG: 25; 75 CAPSULE ORAL at 21:43

## 2020-10-28 RX ADMIN — ACETAMINOPHEN 650 MG: 325 TABLET ORAL at 05:30

## 2020-10-28 ASSESSMENT — PAIN DESCRIPTION - LOCATION: LOCATION: HEAD

## 2020-10-28 ASSESSMENT — PAIN SCALES - GENERAL
PAINLEVEL_OUTOF10: 7
PAINLEVEL_OUTOF10: 0
PAINLEVEL_OUTOF10: 0

## 2020-10-28 NOTE — PROGRESS NOTES
Speech Language Pathology  Facility/Department: [facility] 8 REHAB UNIT   CLINICAL BEDSIDE SWALLOW EVALUATION    NAME: Naveed Hickey  : 1945  MRN: [id number]    ADMISSION DATE: 10/27/2020      Date of Eval: 10/28/2020  Evaluating Therapist: Erinn Kessler    Current Diet level:  Current Diet : Regular  Current Liquid Diet : Thin    Pain:   Pain Assessment  Pain Assessment: 0-10  Pain Level: 0  Patient's Stated Pain Goal: No pain  Pain Type: Acute pain  Pain Location: Head  Pain Descriptors: Headache  Pain Frequency: Continuous  Pain Onset: On-going  Clinical Progression: Gradually improving  Functional Pain Assessment: Prevents or interferes some active activities and ADLs  Non-Pharmaceutical Pain Intervention(s): Repositioned, Rest  Response to Pain Intervention: Patient Satisfied  Multiple Pain Sites: No    Reason for Referral  Naveed Hickey was referred for a bedside swallow evaluation to assess the efficiency of her swallow function, identify signs and symptoms of aspiration, and make recommendations regarding safe dietary consistencies, effective compensatory strategies, and safe eating environment. Impression  Patients swallow function partially assessed. Was unable to assess with regular solids secondary to vomitting/nausea. Patient presenting with mild sluggish/decreased laryngeal elevation, however no overt s/s of aspiration were observed with thin liquids. Swallow function was also assessed with medications whoel with water. Patient tolerating well. At this time recommend continuation of regular diet with thin liquids. Will continue to monitor swallow function. Treatment Plan  Requires SLP Intervention: Yes      Recommended Diet and Intervention  Solid: Diet Solids Recommendation: Regular  Liquid: Liquid Consistency Recommendation:  Thin  Medication:Recommended Form of Meds: Whole with water       Compensatory Swallowing Strategies Attempted  Compensatory Swallowing Strategies:

## 2020-10-28 NOTE — CONSULTS
Consult Note      CHIEF COMPLAINT:  Fall    Reason for Admission:  Traumatic SAH after Fall    History Obtained From:  Patient, chart    HISTORY OF PRESENT ILLNESS:      The patient is a 76 y.o. female who was admitted to [address] [place] [address] after a fall, and sustaining a SAH. She has no c/o CP or SOA. She has no abdominal pain. She has c/o nausea today. Sh has some generalized weakness. No dysuria. No new pain complaints. No fevers.   Past Medical History:        Diagnosis Date    Acute cystitis without hematuria 10/28/2020    Asthma     CAD (coronary artery disease)     Chest pain     Depression with anxiety     Diabetes mellitus (HCC)     GERD (gastroesophageal reflux disease)     Glucose intolerance (impaired glucose tolerance)     Hyperlipidemia     Cholesterol management per Shannan Ward M.D.   [facility] Hypertension     Lupus ([facility]ic lupus erythematosus) [facility]     Malaise and fatigue 10/6/2017    Movement disorder     Sees pain management for neck pain and previous surgery    New onset seizure ([facility]     Osteoarthritis     Palliative care patient 07/22/2020    S/P CABG x 3 03/05/2013    PACABG X 3, LIMA-LAD, SVG0OM, SVG-DIAG, RT EVH, DR Omayra Teague     Past Surgical History:        Procedure Laterality Date    APPENDECTOMY  1965    BACK SURGERY      CARDIAC CATHETERIZATION  5/18/11    EF 60%    CARDIAC CATHETERIZATION  1/25/16    drug eluting stent to RCA   [address]  1/25/2016    CARDIAC CATHETERIZATION  2/19/16    CARDIAC CATHETERIZATION  08/24/2017    cutting balloon angioplasty ot LAD    CARDIAC CATHETERIZATION  04/13/2020    TWO Stents placed    COLON SURGERY  1992    Polypectomy - 2    COLON SURGERY  2003    Polypectomy - 4    COLON SURGERY  11/2008    Polypectomy - 2    COLON SURGERY  8/2012    Polypectomy - 3    COLONOSCOPY      CORONARY ANGIOPLASTY  08/2017    cutting balloon angioplasty LAD diagonal    CORONARY ANGIOPLASTY WITH STENT PLACEMENT  3/22/2007    CORONARY ANGIOPLASTY WITH STENT PLACEMENT  8/2007    CORONARY ARTERY BYPASS GRAFT  3/5/2013    PACABG X 3, LIMA-LAD, SVG0OM, SVG-DIAG, RT EV, DR PATEL    COSMETIC SURGERY      deviated septum and rhinoplasty    CYST REMOVAL  1970    DIAGNOSTIC CARDIAC CATH LAB PROCEDURE      ENDOSCOPY, COLON, DIAGNOSTIC      EYE SURGERY  03/2019    cataracts   [address]    Partial    NASAL SEPTUM [facility]    SINUS SURGERY  2003    Nasal Polyps Removed    SPINE SURGERY  3/6/2006    Lumbar Laminectomy L4&5    SPINE SURGERY  3/6/2006    Cervical Fusion - C4,5,6    TONSILLECTOMY  1968         Medications Prior to Admission:    Medications Prior to Admission: zolpidem (AMBIEN) 10 MG tablet, Take 10 mg by mouth nightly as needed for Sleep. Half a tablet  amitriptyline (ELAVIL) 25 MG tablet, Take 25 mg by mouth nightly  isosorbide mononitrate (IMDUR) 60 MG extended release tablet, TAKE 1 TABLET TWICE A DAY  lisinopril (PRINIVIL;ZESTRIL) 20 MG tablet, Take 1 tablet by mouth 2 times daily  docusate sodium (COLACE, DULCOLAX) 100 MG CAPS, Take 100 mg by mouth 2 times daily  metoprolol succinate (TOPROL XL) 25 MG extended release tablet, Take 25 mg by mouth nightly  ranolazine (RANEXA) 1000 MG extended release tablet, TAKE 1 TABLET TWICE A DAY  metoprolol succinate (TOPROL XL) 25 MG extended release tablet, Take 50 mg by mouth daily   metFORMIN (GLUCOPHAGE) 500 MG tablet, Take 1 tablet by mouth 2 times daily Please hold Metformin for 48 hours after cardiac catheterization. May resume on 4/15/20  clopidogrel (PLAVIX) 75 MG tablet, Take 1 tablet by mouth daily  nitroGLYCERIN (NITROSTAT) 0.4 MG SL tablet, up to max of 3 total doses.  If no relief after 1 dose, call 911.  atorvastatin (LIPITOR) 80 MG tablet, Take 1 tablet by mouth daily (Patient taking differently: Take 80 mg by mouth nightly )  Cyanocobalamin (VITAMIN B 12 PO), Take 1,000 mcg by mouth daily  promethazine (PHENERGAN) 25 MG tablet, Take 25 mg by mouth every 6 hours as needed for Nausea  ALPRAZolam (XANAX) 0.25 MG tablet, Take 0.125 mg by mouth daily as needed for Anxiety (takes 1/2 tab). albuterol (PROVENTIL HFA;VENTOLIN HFA) 108 (90 BASE) MCG/ACT inhaler, Inhale 2 puffs into the lungs every 6 hours as needed. traZODone (DESYREL) 50 MG tablet, Take 50 mg by mouth nightly. aspirin 81 MG tablet, Take 81 mg by mouth daily. Allergies: Other; Codeine; Darvocet [propoxyphene n-acetaminophen]; Hydrocodone-acetaminophen; Lyrica [pregabalin]; Morphine; and Percocet [oxycodone-acetaminophen]  Social History:   TOBACCO:   reports that she quit smoking about 53 years ago. Her smoking use included cigarettes. She has a 1.00 pack-year smoking history. She has never used smokeless tobacco.  ETOH:   reports no history of alcohol use. DRUGS:   reports no history of drug use. MARITAL STATUS:  single  OCCUPATION:  Not working        Family History:       Problem Relation Age of Onset    Heart Disease Mother     High Blood Pressure Mother     Diabetes Mother     High Cholesterol Mother     Diabetes Brother      REVIEW OF SYSTEMS:  Constitutional: weakness  CV: neg  Pulmonary: neg  GI: nausea  : neg  Psych: neg  Neuro: neg  Skin: neg  MusculoSkeletal: neg  HEENT: neg  Joints: neg  Vitals:  BP (!) 109/59   Pulse 70   Temp 97.1 °F (36.2 °C) (Temporal)   Resp 16   Ht 5' 5\" (1.651 m)   Wt 144 lb 12.8 oz (65.7 kg)   SpO2 96%   BMI 24.10 kg/m²     PHYSICAL EXAM:  Gen: NAD, alert, pleasant  HEENT: WNL  Lymph: no LAD  Neck: no JVD or masses  Chest: CTA bilat  CV: RRR  Abdomen: NT/ND  Extrem: no C/C/E  Neuro: non focal  Skin: no rashes  Joints: no redness  DATA:  I have reviewed the admission labs and imaging tests.     ASSESSMENT AND PLAN:      Principal Problem:    Subarachnoid hemorrhage following injury, no loss of consciousness---continue supportive care     CAD--continue medical treatment     HTN---monitor BP     SLE    Nausea----d/c Aricept ( new medicine), Phenergan prn    Pyuria----follow for culture results        Neda Lopez MD  6:32 PM 10/28/2020

## 2020-10-28 NOTE — PROGRESS NOTES
4 Eyes Skin Assessment    Estrellita Bingham is being assessed upon: Admission    I agree that I, Nafisa Jc, along with Rosa Mendenhall (either 2 RN's or 1 LPN and 1 RN) have performed a thorough Head to Toe Skin Assessment on the patient. ALL assessment sites listed below have been assessed. Areas assessed by both nurses:     [x]   Head, Face, and Ears   [x]   Shoulders, Back, and Chest  [x]   Arms, Elbows, and Hands   [x]   Coccyx, Sacrum, and Ischium  [x]   Legs, Feet, and Heels    Does the Patient have Skin Breakdown?  No    Sunday Prevention initiated: Yes  Wound Care Orders initiated: No    [facility] nurse consulted for Pressure Injury (Stage 3,4, Unstageable, DTI, NWPT, and Complex wounds) and New or Established Ostomies: No        Primary Nurse eSignature: Nafisa Jc RN on 10/27/2020 at 7:21 PM      Co-Signer eSignature: Electronically signed by Rosa Mendenhall RN on 10/27/20 at 8:01 PM CDT

## 2020-10-28 NOTE — PLAN OF CARE
Problem: Nutrition  Goal: Optimal nutrition therapy  Outcome: Ongoing   Nutrition Problem #1: Inadequate oral intake  Intervention: Food and/or Nutrient Delivery: Continue Current Diet  Nutritional Goals: PO intake >50%, wt stable, improvement in N/V

## 2020-10-28 NOTE — PROGRESS NOTES
Occupational Therapy       10/28/20 1000   OT Individual Minutes   Time In 1000   Time Out 1000   Minutes 0   Minute Variance   Reason Med Hold   Time Code Minutes    Timed Code Treatment Minutes 0 Minutes

## 2020-10-28 NOTE — PROGRESS NOTES
Patient complaints of nausea and vomiting since yesterday AM which has gotten progressively worse since last night. Attempted to work with therapy this morning and preceded to throw up. Complaints of dizziness along with nausea. Dr. Destiny Orellana ordered STAT CT of head without contrast to rule out worsening bleed.

## 2020-10-28 NOTE — PROGRESS NOTES
[facility]  SPEECH THERAPY  [facility] 8 REHAB UNIT  Speech - Language - Cognitive Evaluation    TIME   Time In: 0830  Time Out: 0930  Minutes: 60         Name: Mauro Scott  YOB: 1945  Gender: female  Referring Physician: Carlitos Dave    Diagnosis:    ADMISSION DATE: 10/24/2020  ADMITTING DIAGNOSIS: has Chest pain; Osteoarthritis; GERD (gastroesophageal reflux disease); Asthma; Glucose intolerance (impaired glucose tolerance); Depression with anxiety; CAD (coronary artery disease); Hyperlipidemia; Hypertension; Fatigue; S/P right coronary artery (RCA) stent placement; Hx of CABG; Chest pressure; Malaise and fatigue; Aphasia; Ataxia; Vertigo; Bilateral carotid artery stenosis; Chest discomfort; Unstable angina ([address] Acute exacerbation of chronic low back pain; Intractable back pain; Altered mental status; AMS (altered mental status); New onset seizure ([address] Spondylolisthesis at L4-L5 level; Atherosclerotic cardiovascular disease; Palliative care patient; Subarachnoid hemorrhage ([address] SLE (systemic lupus erythematosus) ([address] and Subarachnoid hemorrhage following injury, no loss of consciousness ([address] on their problem list.     History of Present Illness/Injury:    The patient is a 76 y.o. female who presents with a fall backwards earlier today while voting. Posterior head trauma. No LOC.  + HA, No N/V or changes in vision     CT head revealed a small right frontal area of traumatic subarachnoid hemorrhage for which I was asked to evaluate. Prior Level of Functioning: Patient's daughters living with her. Patient was completing ADL's independently. Patient was still managing her medications independently. Additional Assessments:    The Cognitive Linguistic Quick Test (CLQT) assists you in quickly determining severity ratings (normal, mild, moderate, severe) for five primary domains of cognition (Attention, Memory, [employer], Language, and Visuo-spatial Skills) and a composite severity rating for adults with known or suspected neurological impairment (e.g., as a result of stroke, traumatic brain injury, or dementia). · A Clock Drawing Severity Rating can serve as a quick monitor of progress or decline. · CLQT results can be used to target areas for direct treatment or everyday management of impaired skills to identify the need for more in-depth testing, or to help determine a different diagnosis.       Attention: 150 mild impairment  Memory: 144 within functional limits  Executive functions: 10 moderate impairment   Language: 26 mild impairment   Visuospatial skills: 60 mild impairment     Overall severity rating range: 3 mild impairment      OVERALL IMPRESSION: Patient presents with mild/mod expressive/receptive language impairment. Expressive language impairment in areas of convergent/divergent naming and thought organization. Receptive language impairment in areas of following commands, and understanding of more complex concepts. Moderate cognitive impairment in areas of attention, executive functions, visuospatial skills, memory, problem solving/reasoning, and safety awareness. Precautions: Fall/aspiration     Subjective:  Patient was alert and cooperative with evaluation tasks. Patients daughter present for portion of evaluation. At the end of the session patient began vomiting. Nursing staff was notified. Receptive: Auditory Comprehension:  Word level: WFL  Phrase/sentence level: WFL  Simple yes/no questions: Yes/No Questions: (WFL)  One Step Basic Commands: (WFL)  Two Step Basic Commands: Mild  Multistep Basic Commands: Moderate  Basic Questions: (WFL)  Conversation: Moderate     Visual/Reading Comprehension:   Scanning from L to R appeared to be [facility]. Expressive Skills/ Verbal Expression:  Primary Mode of Expression: Verbal  Initiation: (WFL)  Repetition: (WFL)  Automatic Speech: (WFL)  Confrontation: (WFL)  Convergent:  Moderate  Divergent: Severe  Responsive: Moderate  Conversation: Moderate  Interfering Components: Impaired thought organization    Patient's inititiation, repetition, automatic speech are all WFL. Patient completed convergent/divergent naming tasks with 50% accuracy or less. Within CLQT patient completed generative naming task with a score of 2 out of possible 9. Motor Speech/Dysarthria:   Motor Speech: Within Functional Limits    Oral Motor:  Appears to be [facility]    Attention/ Orientation/ Memory:    Overall Orientation Status: Within Functional Limits  Attention/concentration: Attention: Exceptions to [facility]  Patient oriented to person, place, time and biographical information. Patient completed short paragraph recall with a score of 6 out of possible 10. Patient was able to recall 3 unrelated items with distraction/delay with 100% accuracy. Patient reports short term recall difficulties for a few months. Problem Solving:   Problem Solving: Exceptions to [facility]    Abstract reasoning:    Abstract Reasoning: Exceptions to [facility]    Safety Awareness:   Safety/Judgement: Exceptions to [facility]  Patient will be at fall risk secondary to cognition. Patient was educated on use of call light before SLP left room. Patient demonstrating understanding, however do believe patient will have difficulty recalling to use it. Pragmatics: Pragmatics: Within functional limits    Writing:   Dominant Hand: Right  Within functional limits      VISION/HEARING:    Hearing: Within functional limits    REHAB POTENTIAL: [] Excellent [x] Good [] Fair  [] Poor    EDUCATION:   Learner: [x]Patient [] Significant other [x] Son/Daughter [] Parent     [] Other:   Education: [x] Reviewed results and recommendations of this evaluation     [] Reviewed diet and strategies     [] Reviewed signs, symptoms and risk of aspiration     [] Demonstrated how to thick liquid appropriately.      [x] Reviewed goals and Plan of Care     [] OTHER:   Method: [x] Discussion [x] Demonstration [] Hand-out     [] OTHER:   Evaluation of Education:     [] Verbalizes understanding [x] Demonstrates with assistance     [] Demonstrates without assistance [x]Needs further instruction     [] No evidence of learning  [] Family not present    PLAN / TREATMENT RECOMMENDATIONS:  [] No further speech therapy services indicated. [x]  Further Speech therapy services reccommended       See POC for Goals.

## 2020-10-28 NOTE — PLAN OF CARE
Problem: Falls - Risk of:  Goal: Will remain free from falls  Description: Will remain free from falls  Outcome: Ongoing  Goal: Absence of physical injury  Description: Absence of physical injury  Outcome: Ongoing     Problem: Skin Integrity:  Goal: Will show no infection signs and symptoms  Description: Will show no infection signs and symptoms  Outcome: Ongoing  Goal: Absence of new skin breakdown  Description: Absence of new skin breakdown  Outcome: Ongoing     Problem: Infection:  Goal: Will remain free from infection  Description: Will remain free from infection  Outcome: Ongoing     Problem: Safety:  Goal: Free from accidental physical injury  Description: Free from accidental physical injury  Outcome: Ongoing  Goal: Free from intentional harm  Description: Free from intentional harm  Outcome: Ongoing     Problem: Daily Care:  Goal: Daily care needs are met  Description: Daily care needs are met  Outcome: Ongoing     Problem: Pain:  Goal: Patient's pain/discomfort is manageable  Description: Patient's pain/discomfort is manageable  Outcome: Ongoing  Goal: Pain level will decrease  Description: Pain level will decrease  Outcome: Ongoing     Problem: Skin Integrity:  Goal: Skin integrity will stabilize  Description: Skin integrity will stabilize  Outcome: Ongoing     Problem: Discharge Planning:  Goal: Patients continuum of care needs are met  Description: Patients continuum of care needs are met  Outcome: Ongoing     Problem: Serum Glucose Level - Abnormal:  Goal: Ability to maintain appropriate glucose levels has stabilized  Description: Ability to maintain appropriate glucose levels has stabilized  Outcome: Ongoing     Problem: Mood - Altered:  Goal: Mood stable  Description: Mood stable  Outcome: Ongoing     Problem: Anxiety:  Goal: Level of anxiety will decrease  Description: Level of anxiety will decrease  Outcome: Ongoing

## 2020-10-28 NOTE — PROGRESS NOTES
Comprehensive Nutrition Assessment    Type and Reason for Visit:  Initial, Positive Nutrition Screen    Nutrition Assessment:  Positive nutrition screen for poor intake. Pt has had poor intake r/t N/V. Today, she complains of nausea making it difficult to consume lunch. Pt's current wt is consistent with her usual wt. Pt requested applesauce at time of visit. Provided to pt and she was going to try to consume it. Obtained preferences. Will continue to monitor nutrition progression. Malnutrition Assessment:  Malnutrition Status:  No malnutrition    Context:  Acute Illness       Estimated Daily Nutrient Needs:  Energy (kcal):  0818-9966; Weight Used for Energy Requirements:  Current     Protein (g):  ; Weight Used for Protein Requirements:  Ideal(1.0-2.0)        Fluid (ml/day):  3006-6443; Method Used for Fluid Requirements:  1 ml/kcal      Wounds:  None       Current Nutrition Therapies:    DIET GENERAL;     Anthropometric Measures:  · Height: 5' 5\" (165.1 cm)  · Current Body Weight: 144 lb 12.8 oz (65.7 kg)   · Admission Body Weight: 144 lb 12.8 oz (65.7 kg)    · Usual Body Weight: 146 lb (66.2 kg)(4/2020)     · Ideal Body Weight: 125 lbs; % Ideal Body Weight 115.8 %   · BMI: 24.1  · BMI Categories: Normal Weight (BMI 22.0 to 24.9) age over 72       Nutrition Diagnosis:   · Inadequate oral intake related to acute injury/trauma as evidenced by nausea, vomiting      Nutrition Interventions:   Food and/or Nutrient Delivery:  Continue Current Diet  Nutrition Education/Counseling:  No recommendation at this time   Coordination of Nutrition Care:  Continue to monitor while inpatient    Goals:  PO intake >50%, wt stable, improvement in N/V       Nutrition Monitoring and Evaluation:   Behavioral-Environmental Outcomes:  None Identified   Food/Nutrient Intake Outcomes:  Food and Nutrient Intake  Physical Signs/Symptoms Outcomes:  Biochemical Data, Nausea or Vomiting, Weight, Nutrition Focused Physical Findings Discharge Planning:     Too soon to determine     Electronically signed by Charlotte Melchor RD, LD on 10/28/20 at 12:47 PM CDT    Contact: [phone]

## 2020-10-28 NOTE — H&P
[facility]   History and Physical        Patient:   Nidhi Hensley  MR#:    [id number]  Account Number:                   [id number]      Room:    [facility]/826-01   YOB: 1945  Date of Progress Note: 10/28/2020  Time of Note                           1:22 PM  Attending Physician:  Melania Green MD        Admitting diagnosis:Traumatic subarachnoid hemorrhage without loss of consciousness, initial encounter    Secondary diagnoses:Gastro-esophageal reflux disease without esophagitis,Atherosclerotic heart disease of native coronary artery without angina pectoris,Hyperlipidemia, unspecified,Essential (primary) hypertension,Systemic lupus erythematosus, unspecified,Anxiety disorder, unspecified,Major depressive disorder, single episode, unspecified,Unspecified asthma, uncomplicated,Aphasia,Type 2 diabetes mellitus without complications,Dysphagia, unspecified    CHIEF COMPLAINT:  S/p Traumatic subarachnoid hemorrhage        HISTORY OF PRESENT ILLNESS:   This 76 y.o. female  with history of Lupus,DM diet controlled, GERD, CAD s/p CABG x 3 and stenting, HTN and hyperlipidemia. Patient had had some weakness since July and had been using a rollator walker and her daughters had been alternating staying with her. She presented to [facility] ER on 10/24/20 after having a fall. Her daughter was pushing her while she was sitting on her rollator and she fell backwards, hitting her head on the asphalt. She did not lose consciousness but did have immediate onset of headache that has persisted. CT done revealed a small right frontal area of traumatic subarachnoid hemorrhage. Her B/P was elevated with systolic in 146-727 range. She was admitted to her PCP Dr. Steffi Sandhoff with consult for neurosurgery. She was seen by neurosurgeon Dr. Musa Templeton and not felt to need any surgical intervention. Repeat CTs have been stable.  She was evaluated by SPT for swallow and was initially placed on bite size consistency diet with nectar thick liquids but has now been upgraded to regular with thin liquids with medicines whole in pudding or applesauce. Patient also found to have difficulty with word finding and short-term memory. Patient is also participating with both PT/OT. She is felt to need a stay on Rehab for continued therapy to work towards her goal of returning home with assist of her daughters. She is now felt ready to start the Rehab program. Overnight vomiting . Repeat CT head stable and improved. REVIEW OF SYSTEMS:  Constitutional - No fever or chills. No diaphoresis or significant fatigue. HENT -  No tinnitus or significant hearing loss. Eyes - no sudden vision change or eye pain  Respiratory - no significant shortness of breath or cough  Cardiovascular - no chest pain No palpitations or significant leg swelling  Gastrointestinal - no abdominal swelling or pain. Genitourinary - No difficulty urinating, dysuria  Musculoskeletal - no back pain or myalgia. Skin - no color change or rash  Neurologic - No seizures. No lateralizing weakness. Hematologic - no easy bruising or excessive bleeding. Psychiatric - no severe anxiety or nervousness. All other review of systems are negative.       Past Medical History:      Diagnosis Date    Asthma     CAD (coronary artery disease)     Chest pain     Depression with anxiety     Diabetes mellitus (HCC)     GERD (gastroesophageal reflux disease)     Glucose intolerance (impaired glucose tolerance)     Hyperlipidemia     Cholesterol management per Nuzhat Yun M.D.   [facility] Hypertension     Lupus (systemic lupus erythematosus) ([facility] [address]     Malaise and fatigue 10/6/2017    Movement disorder     Sees pain management for neck pain and previous surgery    New onset seizure ([address]     Osteoarthritis     Palliative care patient 07/22/2020    S/P CABG x 3 03/05/2013    PACABG X 3, LIMA-LAD, SVG0OM, SVG-DIAG, RT EVH, DR PALAFOX       Past Surgical History:      Procedure Laterality Date    APPENDECTOMY  1965    BACK SURGERY      CARDIAC CATHETERIZATION  5/18/11    EF 60%    CARDIAC CATHETERIZATION  1/25/16    drug eluting stent to RCA   [address]  1/25/2016    CARDIAC CATHETERIZATION  2/19/16    CARDIAC CATHETERIZATION  08/24/2017    cutting balloon angioplasty ot LAD    CARDIAC CATHETERIZATION  04/13/2020    TWO Stents placed    COLON SURGERY  1992    Polypectomy - 2    COLON SURGERY  2003    Polypectomy - 4    COLON SURGERY  11/2008    Polypectomy - 2    COLON SURGERY  8/2012    Polypectomy - 3    COLONOSCOPY      CORONARY ANGIOPLASTY  08/2017    cutting balloon angioplasty LAD diagonal    CORONARY ANGIOPLASTY WITH STENT PLACEMENT  3/22/2007    CORONARY ANGIOPLASTY WITH STENT PLACEMENT  8/2007    CORONARY ARTERY BYPASS GRAFT  3/5/2013    PACABG X 3, LIMA-LAD, SVG0OM, SVG-DIAG, RT EVH, DR PATEL    COSMETIC SURGERY      deviated septum and rhinoplasty    CYST REMOVAL  1970    DIAGNOSTIC CARDIAC CATH LAB PROCEDURE      ENDOSCOPY, COLON, DIAGNOSTIC      EYE SURGERY  03/2019    cataracts   [address]    Partial    NASAL SEPTUM [address] SINUS SURGERY  2003    Nasal Polyps Removed    SPINE SURGERY  3/6/2006    Lumbar Laminectomy L4&5    SPINE SURGERY  3/6/2006    Cervical Fusion - C4,5,6    TONSILLECTOMY  1968       Medications in Hospital:      Current Facility-Administered Medications:     promethazine (PHENERGAN) tablet 12.5 mg, 12.5 mg, Oral, Q6H PRN, Misha Griffiths MD, 12.5 mg at 10/28/20 9683    senna (SENOKOT) tablet 8.6 mg, 1 tablet, Oral, Daily PRN, Para MD Nelson    glucagon (rDNA) injection 1 mg, 1 mg, Intramuscular, PRN, Para MD Nelson    glucose (GLUTOSE) 40 % oral gel 15 g, 15 g, Oral, PRN, Para MD Nelson    acetaminophen (TYLENOL) tablet 650 mg, 650 mg, Oral, Q8H PRN, Tyler Damon MD, 650 mg at 10/28/20 0530    ALPRAZolam (XANAX) tablet 0.125 mg, 0.125 mg, Oral, Daily PRN, Neda Lopez MD    cyclobenzaprine (FLEXERIL) tablet 10 mg, 10 mg, Oral, TID PRN, Neda Lopez MD    donepezil (ARICEPT) tablet 5 mg, 5 mg, Oral, Nightly, Neda Lopez MD, 5 mg at 10/27/20 2017    hydroCHLOROthiazide (HYDRODIURIL) tablet 25 mg, 25 mg, Oral, Daily, Neda Lopez MD, 25 mg at 10/28/20 0841    insulin lispro (HUMALOG) injection vial 0-6 Units, 0-6 Units, Subcutaneous, BID, Neda Lopez MD    isosorbide mononitrate (IMDUR) extended release tablet 60 mg, 60 mg, Oral, Daily, Neda Lopez MD, 60 mg at 10/28/20 0841    levETIRAcetam (KEPPRA) tablet 500 mg, 500 mg, Oral, BID, Neda Lopez MD, 500 mg at 10/28/20 0841    lisinopril (PRINIVIL;ZESTRIL) tablet 20 mg, 20 mg, Oral, BID, Neda Lopez MD, 20 mg at 10/28/20 0841    metoprolol succinate (TOPROL XL) extended release tablet 50 mg, 50 mg, Oral, Daily, Neda Lopez MD, 50 mg at 10/28/20 0841    nitroGLYCERIN (NITROSTAT) SL tablet 0.4 mg, 0.4 mg, Sublingual, Q5 Min PRN, Neda Lopez MD    ranolazine [facility]) extended release tablet 1,000 mg, 1,000 mg, Oral, BID, Neda Lopez MD, 1,000 mg at 10/28/20 0841    traZODone (DESYREL) tablet 50 mg, 50 mg, Oral, Nightly, Neda Lopez MD, 50 mg at 10/27/20 2017    polyethylene glycol (GLYCOLAX) packet 17 g, 17 g, Oral, Daily PRN, Liam Galvin MD    traMADol (ULTRAM) tablet 50 mg, 50 mg, Oral, Q6H PRN, Liam Galvin MD, 50 mg at 10/28/20 0530    Allergies: Other; Codeine; Darvocet [propoxyphene n-acetaminophen]; Hydrocodone-acetaminophen; Lyrica [pregabalin]; Morphine; and Percocet [oxycodone-acetaminophen]    Social History:   TOBACCO:   reports that she quit smoking about 53 years ago. Her smoking use included cigarettes. She has a 1.00 pack-year smoking history. She has never used smokeless tobacco.  ETOH:   reports no history of alcohol use.     Family History:       Problem Relation Age of Onset    Heart Disease Mother     High Blood Pressure Mother  Diabetes Mother     High Cholesterol Mother     Diabetes Brother            Physical Exam:    Vitals: /60   Pulse 55   Temp 97 °F (36.1 °C) (Temporal)   Resp 16   Ht 5' 5\" (1.651 m)   Wt 144 lb 12.8 oz (65.7 kg)   SpO2 93%   BMI 24.10 kg/m²     Constitutional - well developed, well nourished. Eyes - conjunctiva normal.  Pupils not tested  Ear, nose, throat -hearing intact to finger rub No scars, masses, or lesions over external nose or ears, no atrophy of tongue  Neck-symmetric, no masses noted, no jugular vein distension  Respiration- chest wall appears symmetric, good expansion,   normal effort without use of accessory muscles  Musculoskeletal - no significant wasting of muscles noted, no bony deformities  Extremities-no clubbing, cyanosis or edema  Skin - warm, dry, and intact. No rash, erythema, or pallor.   Psychiatric - mood, affect, and behavior appear normal.      Neurological exam  Awake, slightly slow, fluent oriented   Attention and concentration appear mildly impaired  Recent and remote memory appears mildly impaired  Speech with dysarthria  No clear issues with language of fund of knowledge    Cranial Nerve Exam   CN II- Visual fields grossly unremarkable  CN III, IV,VI-EOMI, No nystagmus, conjugate eye movements, no ptosis  CN V-sensation intact to LT over face  CN VII-no facial assymetry  CN VIII-Hearing intact to finger rub  CN IX and X- Palate not tested  CN XI-not test shoulder shrug  CN XII-Tongue midline with no fasciculations or fibrillations    Motor Exam  Antigravity with resistance throughout upper and lower extremities bilaterally, no cogwheeling, normal tone    Sensory Exam  Sensation intact to light touch and temperature upper and lower extremities bilaterally    Reflexes   Not tested  Tremors- no tremors in hands or head noted    Gait  Not tested    Coordination  Finger to nose-slowed      CBC:   Recent Labs     10/28/20  0441   WBC 6.2   HGB 12.3    BMP:    Recent Labs     10/28/20  0441   *   K 3.8   CL 96*   CO2 23   BUN 21   CREATININE 0.7   GLUCOSE 120*       Hepatic:   Recent Labs     10/28/20  0441   AST 13   ALT 13   BILITOT 0.6   ALKPHOS 49       Lipids: No results for input(s): CHOL, HDL in the last 72 hours. Invalid input(s): LDLCALCU    INR:   Recent Labs     10/27/20  1606   INR 0.97           Assessment and Plan     1. S/P Traumatic subarachnoid hemorrhage-off ASA/Plavix-monitor  2. HTN-on meds monitor  3. DM-monitor BS  4. Seizures-on Keppra  5. CAD-on Ramexa-Plavix and ASA on hold  6. Nausea and vomiting post ICH-repeat CT stable-Zofran/Phenergan PRN  7. GI-bowel regimen/PPI  8. Insomnia-Trazodone qhs  9. Headache-Tramadol/Tylenol PRN  10. Anxiety-Xanax PRN  11. History of lupus-monitor  12. PT/OT/Speech  13. UTI-complete abx    Hold therapy today due to nausea and vomiting          Patient's functional assessment  Prior to hospitalization the patient was continent of bowel and incontinent bladder    Current therapy  Requires PT, OT and/or speech therapy    Anticipated discharge approximately 16 days    Functional assessment  All notes from reehab data were reviewed regarding the patient's functional status.         Anticipated discharge setting  Home with home care    No clear barriers to discharge    The patient appears to be an appropriate candidate for inpatient rehabilitation    Sufficiently stable: Patient's condition is sufficiently stable at the time of admission to allow the patient to actively participate in an intensive rehabilitation program    Close medical supervision: A rehabilitation physician, or other licensed treating physician with specialized training and experience in inpatient rehabilitation, will conduct face-to-face visits with the patient a minimum of at least 3 days per week throughout the patient's stay    This patient requires close medical supervision for the active management of the ongoing conditions and potential complications stated in the admission note    Intensive rehabilitation nursing: The patient demonstrates the need for 24-hour rehabilitation nursing care for active management of the multiple medical issues documented in the admission note    Appropriate therapy needed: The patient requires the active and ongoing therapeutic intervention of at least 2 therapeutic disciplines, one of which must be physical or occupational therapy and/or speech therapy    Intensive therapy: The patient requires and is reasonably expected to actively participate in at least 3 hours of therapy per day at least 5 days per week, and expected to make measurable improvements that will be of practical value to improve the patient's functional capacity or adaptation to impairments. In addition, therapy treatments will begin within 36 hours from midnight of the day of the patient's admission to the inpatient rehabilitation facility    Expected duration and frequency therapy: 180 minutes per day, 5 days per week    Interdisciplinary team: The patient demonstrates the need for an interdisciplinary team for active management of the following medical issues including ataxia, motor planning, balance, disease management, elimination, endurance, family training, education, independent ADLs, pain management, precautions, range of motion, safety, strength, and transfers    I have reviewed the preadmission screening documents and concur with the findings. I believe the patient meets criteria and is sufficiently stable to allow participation in the program. This requires an intensive level of therapy, close medical supervision, and an interdisciplinary team approach provided through an individualized plan of care.  I approve admitting this patient for an intensive inpatient rehabilitation program.    Please feel free to call with any questions   [phone] (cell phone)    Dr Danii Morris certified in Neurology  Board Certified in Clinical Neurophysiology  Fellowship Trained in Clinical Neurophysiology      EMR Dragon/transcription disclaimer:Significant part of this  encounter note is electronic transcription/translation of spoken language to printed text. The electronic translation of spoken language may be erroneous, or at times, nonsensical words or phrases may be inadvertently transcribed.  Although I have reviewed the note for such errors, some may still exist.

## 2020-10-29 LAB
ALBUMIN SERPL-MCNC: 3.8 G/DL (ref 3.5–5.2)
ALP BLD-CCNC: 50 U/L (ref 35–104)
ALT SERPL-CCNC: 13 U/L (ref 5–33)
ANION GAP SERPL CALCULATED.3IONS-SCNC: 10 MMOL/L (ref 7–19)
AST SERPL-CCNC: 15 U/L (ref 5–32)
BASOPHILS ABSOLUTE: 0 K/UL (ref 0–0.2)
BASOPHILS RELATIVE PERCENT: 0.6 % (ref 0–1)
BILIRUB SERPL-MCNC: 0.5 MG/DL (ref 0.2–1.2)
BUN BLDV-MCNC: 17 MG/DL (ref 8–23)
CALCIUM SERPL-MCNC: 9.4 MG/DL (ref 8.8–10.2)
CHLORIDE BLD-SCNC: 97 MMOL/L (ref 98–111)
CO2: 25 MMOL/L (ref 22–29)
CREAT SERPL-MCNC: 0.6 MG/DL (ref 0.5–0.9)
EOSINOPHILS ABSOLUTE: 0.2 K/UL (ref 0–0.6)
EOSINOPHILS RELATIVE PERCENT: 3 % (ref 0–5)
GFR AFRICAN AMERICAN: >59
GFR NON-AFRICAN AMERICAN: >60
GLUCOSE BLD-MCNC: 110 MG/DL (ref 70–99)
GLUCOSE BLD-MCNC: 148 MG/DL (ref 70–99)
GLUCOSE BLD-MCNC: 166 MG/DL (ref 70–99)
GLUCOSE BLD-MCNC: 96 MG/DL (ref 74–109)
GLUCOSE BLD-MCNC: 99 MG/DL (ref 70–99)
HCT VFR BLD CALC: 37.5 % (ref 37–47)
HEMOGLOBIN: 12.7 G/DL (ref 12–16)
IMMATURE GRANULOCYTES #: 0 K/UL
LYMPHOCYTES ABSOLUTE: 1.3 K/UL (ref 1.1–4.5)
LYMPHOCYTES RELATIVE PERCENT: 26.2 % (ref 20–40)
MCH RBC QN AUTO: 30.5 PG (ref 27–31)
MCHC RBC AUTO-ENTMCNC: 33.9 G/DL (ref 33–37)
MCV RBC AUTO: 90.1 FL (ref 81–99)
MONOCYTES ABSOLUTE: 0.6 K/UL (ref 0–0.9)
MONOCYTES RELATIVE PERCENT: 11.7 % (ref 0–10)
NEUTROPHILS ABSOLUTE: 2.9 K/UL (ref 1.5–7.5)
NEUTROPHILS RELATIVE PERCENT: 58.1 % (ref 50–65)
PDW BLD-RTO: 13.2 % (ref 11.5–14.5)
PERFORMED ON: ABNORMAL
PERFORMED ON: NORMAL
PLATELET # BLD: 166 K/UL (ref 130–400)
PMV BLD AUTO: 8.7 FL (ref 9.4–12.3)
POTASSIUM REFLEX MAGNESIUM: 3.7 MMOL/L (ref 3.5–5)
RBC # BLD: 4.16 M/UL (ref 4.2–5.4)
SODIUM BLD-SCNC: 132 MMOL/L (ref 136–145)
TOTAL PROTEIN: 6.1 G/DL (ref 6.6–8.7)
WBC # BLD: 5 K/UL (ref 4.8–10.8)

## 2020-10-29 PROCEDURE — 97535 SELF CARE MNGMENT TRAINING: CPT

## 2020-10-29 PROCEDURE — 51798 US URINE CAPACITY MEASURE: CPT

## 2020-10-29 PROCEDURE — 80053 COMPREHEN METABOLIC PANEL: CPT

## 2020-10-29 PROCEDURE — 36415 COLL VENOUS BLD VENIPUNCTURE: CPT

## 2020-10-29 PROCEDURE — 1180000000 HC REHAB R&B

## 2020-10-29 PROCEDURE — 82947 ASSAY GLUCOSE BLOOD QUANT: CPT

## 2020-10-29 PROCEDURE — 97161 PT EVAL LOW COMPLEX 20 MIN: CPT

## 2020-10-29 PROCEDURE — 6360000002 HC RX W HCPCS: Performed by: PSYCHIATRY & NEUROLOGY

## 2020-10-29 PROCEDURE — 85025 COMPLETE CBC W/AUTO DIFF WBC: CPT

## 2020-10-29 PROCEDURE — 51701 INSERT BLADDER CATHETER: CPT

## 2020-10-29 PROCEDURE — 97110 THERAPEUTIC EXERCISES: CPT

## 2020-10-29 PROCEDURE — 6370000000 HC RX 637 (ALT 250 FOR IP): Performed by: PSYCHIATRY & NEUROLOGY

## 2020-10-29 PROCEDURE — 97165 OT EVAL LOW COMPLEX 30 MIN: CPT

## 2020-10-29 PROCEDURE — 97129 THER IVNTJ 1ST 15 MIN: CPT

## 2020-10-29 PROCEDURE — 99232 SBSQ HOSP IP/OBS MODERATE 35: CPT | Performed by: PSYCHIATRY & NEUROLOGY

## 2020-10-29 PROCEDURE — 6370000000 HC RX 637 (ALT 250 FOR IP): Performed by: FAMILY MEDICINE

## 2020-10-29 PROCEDURE — 97130 THER IVNTJ EA ADDL 15 MIN: CPT

## 2020-10-29 RX ADMIN — NITROFURANTOIN MONOHYDRATE/MACROCRYSTALLINE 100 MG: 25; 75 CAPSULE ORAL at 21:12

## 2020-10-29 RX ADMIN — ONDANSETRON 4 MG: 2 INJECTION INTRAMUSCULAR; INTRAVENOUS at 08:43

## 2020-10-29 RX ADMIN — PANTOPRAZOLE SODIUM 40 MG: 40 TABLET, DELAYED RELEASE ORAL at 05:38

## 2020-10-29 RX ADMIN — LISINOPRIL 20 MG: 20 TABLET ORAL at 08:31

## 2020-10-29 RX ADMIN — LISINOPRIL 20 MG: 20 TABLET ORAL at 21:13

## 2020-10-29 RX ADMIN — ONDANSETRON 4 MG: 2 INJECTION INTRAMUSCULAR; INTRAVENOUS at 12:51

## 2020-10-29 RX ADMIN — ISOSORBIDE MONONITRATE 60 MG: 60 TABLET, EXTENDED RELEASE ORAL at 08:31

## 2020-10-29 RX ADMIN — METOPROLOL SUCCINATE 50 MG: 50 TABLET, EXTENDED RELEASE ORAL at 08:30

## 2020-10-29 RX ADMIN — TRAZODONE HYDROCHLORIDE 50 MG: 50 TABLET ORAL at 21:12

## 2020-10-29 RX ADMIN — ONDANSETRON 4 MG: 2 INJECTION INTRAMUSCULAR; INTRAVENOUS at 21:17

## 2020-10-29 RX ADMIN — RANOLAZINE 1000 MG: 500 TABLET, FILM COATED, EXTENDED RELEASE ORAL at 08:30

## 2020-10-29 RX ADMIN — NITROFURANTOIN MONOHYDRATE/MACROCRYSTALLINE 100 MG: 25; 75 CAPSULE ORAL at 08:31

## 2020-10-29 RX ADMIN — HYDROCHLOROTHIAZIDE 25 MG: 25 TABLET ORAL at 08:30

## 2020-10-29 RX ADMIN — LEVETIRACETAM 500 MG: 500 TABLET ORAL at 21:12

## 2020-10-29 RX ADMIN — RANOLAZINE 1000 MG: 500 TABLET, FILM COATED, EXTENDED RELEASE ORAL at 21:12

## 2020-10-29 RX ADMIN — LEVETIRACETAM 500 MG: 500 TABLET ORAL at 08:30

## 2020-10-29 ASSESSMENT — PAIN SCALES - GENERAL: PAINLEVEL_OUTOF10: 7

## 2020-10-29 ASSESSMENT — PAIN DESCRIPTION - LOCATION: LOCATION: NECK

## 2020-10-29 NOTE — PROGRESS NOTES
Occupational Therapy   Occupational Therapy Initial Assessment  Date: 10/29/2020   Patient Name: Elsa Fitch  MRN: [id number]     : 1945    Date of Service: 10/29/2020    Discharge Recommendations:  Home with Home health OT     Assessment   Performance deficits / Impairments: Decreased ADL status; Decreased functional mobility ; Decreased endurance;Decreased balance;Decreased strength;Decreased safe awareness;Decreased high-level IADLs  Assessment: Pt's hemorrhage has led to a decrease in her strength, balance, endurance, and functional mobility. This has impacted her ability to perfrom ADLs/IADLs safely. Pt requires skilled-OT to address the above deficits and return to her PLOF and return home safely. Treatment Diagnosis: subarachnoid hemorrhage  Prognosis: Good  Decision Making: Low Complexity  OT Education: Orientation;OT Role;Plan of Care  Activity Tolerance  Activity Tolerance: Patient Tolerated treatment well  Safety Devices  Safety Devices in place: Yes  Type of devices: Left in bed;Bed alarm in place;Call light within reach(secondary bed alarm also in place)           Patient Diagnosis(es): There were no encounter diagnoses. has a past medical history of Acute cystitis without hematuria, Asthma, CAD (coronary artery disease), Chest pain, Depression with anxiety, Diabetes mellitus ([address] GERD (gastroesophageal reflux disease), Glucose intolerance (impaired glucose tolerance), Hyperlipidemia, Hypertension, Lupus (systemic lupus erythematosus) ([address] Malaise and fatigue, Movement disorder, New onset seizure ([address] Osteoarthritis, Palliative care patient, and S/P CABG x 3.   has a past surgical history that includes Cardiac catheterization (11); Appendectomy (); Tonsillectomy (); cyst removal (); Nasal septum surgery (); Hysterectomy (); Hemorrhoid surgery (); sinus surgery (); Spine surgery (3/6/2006); Spine surgery (3/6/2006);  Coronary angioplasty with stent (3/22/2007); Coronary angioplasty with stent (8/2007); Colon surgery (1992); Colon surgery (2003); Colon surgery (11/2008); Colon surgery (8/2012); Coronary artery bypass graft (3/5/2013); back surgery; Colonoscopy; Endoscopy, colon, diagnostic; Cosmetic surgery; Cardiac catheterization (1/25/16); Cardiac catheterization (1/25/2016); Cardiac catheterization (2/19/16); Cardiac catheterization (08/24/2017); Coronary angioplasty (08/2017); Diagnostic Cardiac Cath Lab Procedure; eye surgery (03/2019); and Cardiac catheterization (04/13/2020). Treatment Diagnosis: subarachnoid hemorrhage      Restrictions  Restrictions/Precautions  Restrictions/Precautions: Fall Risk, Seizure  Required Braces or Orthoses?: No    Subjective   General  Chart Reviewed: Yes  Patient assessed for rehabilitation services?: Yes  Diagnosis: Subarachnoid hemorrhage     Social/Functional History  Social/Functional History  Lives With: (2 daughters take turn staying with her)  Type of Home: House  Home Layout: One level(some steps in house)  Home Access: Ramped entrance  Bathroom Shower/Tub: Tub/Shower unit  Bathroom Toilet: Standard  Bathroom Equipment: Grab bars in shower, Shower chair(grab bars are succioned to wall)  Bathroom Accessibility: Walker accessible  Home Equipment: Rolling walker, 4 wheeled walker  Receives Help From: Family  ADL Assistance: Needs assistance(needed help getting in and out of shower)  Homemaking Assistance: Needs assistance  Homemaking Responsibilities: No  Ambulation Assistance: Independent(rollator)  Transfer Assistance: Needs assistance(needed assistance getting in/out of shower)  Active : No  Occupation: Retired  Leisure & Hobbies: quilting  Additional Comments: Daughters take care of cooking and cleaning.      Objective   Vision: Impaired  Vision Exceptions: Wears glasses for reading  Hearing: Within functional limits    Orientation  Overall Orientation Status: Within Functional Limits     Balance  Sitting Balance: Stand by assistance  Standing Balance: Minimal assistance      Bed mobility  Supine to Sit: Minimal assistance  Sit to Supine: Contact guard assistance  Scooting: Contact guard assistance  Transfers  Sit to stand: Minimal assistance  Stand to sit: Contact guard assistance  Transfer Comments: Sanding for LB dressing with RW     LUE AROM (degrees)  LUE AROM : WFL  Left Hand AROM (degrees)  Left Hand AROM: WFL  RUE AROM (degrees)  RUE AROM : WFL  Right Hand AROM (degrees)  Right Hand AROM: WFL  LUE Strength  L Hand General: 4/5  LUE Strength Comment: General: 4/5  RUE Strength  R Hand General: 4/5  RUE Strength Comment: General: 4/5      Plan   Plan  Current Treatment Recommendations: Gait Training, Strengthening, Patient/Caregiver Education & Training, Home Management Training, Equipment Evaluation, Education, & procurement, Balance Training, Functional Mobility Training, Endurance Training, Self-Care / ADL, Safety Education & Training    OutComes Score       10/29/20 1320   Eating   Assistance Needed Setup or clean-up assistance   CARE Score 5   Discharge Goal 6   Oral Hygiene   Assistance Needed Setup or clean-up assistance   CARE Score 5   Discharge Goal 6   [address] Needed Partial/moderate assistance   Comment Min A   CARE Score 3   Discharge Goal 6   Shower/Bathe Self   Assistance Needed Partial/moderate assistance   Comment Min A   CARE Score 3   Discharge Goal 6   Upper Body Dressing   Assistance Needed Supervision or touching assistance   Comment CGA   CARE Score 4   Discharge Goal 6   Lower Body Dressing   Assistance Needed Partial/moderate assistance   Comment Min A   CARE Score 3   Discharge Goal 6   Putting On/Taking Off Footwear   Assistance Needed Supervision or touching assistance   Comment CGA   CARE Score 4   Discharge Goal 6          10/29/20 1320   Toilet Transfer   Assistance Needed Partial/moderate assistance   Comment Min A   CARE Score 3   Discharge Goal 6 10/29/20 1320   Picking Up Object   Assistance Needed Substantial/maximal assistance   CARE Score 2   Discharge Goal 6               Goals  Short term goals  Time Frame for Short term goals: 1 week  Short term goal 1: Supervision with toilet hygiene. Short term goal 2: Supervision with showering/bathing. Short term goal 3: Supervision with UB dressing. Short term goal 4: Supervision with LB dressing. Short term goal 5: Supervision with putting on/taking off footwear. Short term goal 6: Supervision with toilet transfers. Short term goal 7: Min A with picking up objects from floor with AE as needed. Long term goals  Time Frame for Long term goals : 2 weeks  Long term goal 1: Modified Independent with toilet hygiene. Long term goal 2: Modified Independent with showering/bathing. Long term goal 3: Independent with UB dressing. Long term goal 4: Modified Independent with LB dressing. Long term goal 5: Modified Independent with putting on/taking off footwear. Long term goals 6: Modified Independent with toilet transfers. Long term goal 8: Modified Independent with picking up objects from floor with AE as needed. Long term goal 9: Verbalize DME needs. Long term goal 10: Complete IADL/homemaking task Modified [place].        Therapy Time   Individual Concurrent Group Co-treatment   Time In 1320         Time Out 1420         Minutes 60         Timed Code Treatment Minutes: Mamie

## 2020-10-29 NOTE — PROGRESS NOTES
Patient:   Sam Jones  MR#:    [id number]   Room:    0826/826-01   YOB: 1945  Date of Progress Note: 10/29/2020  Time of Note                           3:22 PM  Consulting Physician:   Melissa Ferguson M.D. Attending Physician:  Melissa Ferguson MD     Chief complaint S/p Traumatic subarachnoid hemorrhage      S:This 76 y.o. female  with history of Lupus,DM diet controlled, GERD, CAD s/p CABG x 3 and stenting, HTN and hyperlipidemia. Patient had had some weakness since July and had been using a rollator walker and her daughters had been alternating staying with her. She presented to [address] [place] [address] on 10/24/20 after having a fall. Her daughter was pushing her while she was sitting on her rollator and she fell backwards, hitting her head on the asphalt. She did not lose consciousness but did have immediate onset of headache that has persisted. CT done revealed a small right frontal area of traumatic subarachnoid hemorrhage. Her B/P was elevated with systolic in 057-966 range. She was admitted to her PCP Dr. Marci Dillon with consult for neurosurgery. She was seen by neurosurgeon Dr. Yenny Cisneros and not felt to need any surgical intervention. Repeat CTs have been stable. She was evaluated by SPT for swallow and was initially placed on bite size consistency diet with nectar thick liquids but has now been upgraded to regular with thin liquids with medicines whole in pudding or applesauce. Patient also found to have difficulty with word finding and short-term memory. Patient is also participating with both PT/OT. She is felt to need a stay on Rehab for continued therapy to work towards her goal of returning home with assist of her daughters. She is now felt ready to start the Rehab program. Overnight vomiting . Repeat CT head stable and improved.  Feels better this am.    REVIEW OF SYSTEMS:  Constitutional: No fevers No chills  Neck:No stiffness  Respiratory: No shortness of breath  Cardiovascular: No chest pain No palpitations  Gastrointestinal: No abdominal pain    Genitourinary: No Dysuria  Neurological: +headache, no confusion    Past Medical History:      Diagnosis Date    Acute cystitis without hematuria 10/28/2020    Asthma     CAD (coronary artery disease)     Chest pain     Depression with anxiety     Diabetes mellitus [facility]     GERD (gastroesophageal reflux disease)     Glucose intolerance (impaired glucose tolerance)     Hyperlipidemia     Cholesterol management per Nayely Canales M.D.   [facility] Hypertension     Lupus (systemic lupus erythematosus) ([facility]     Malaise and fatigue 10/6/2017    Movement disorder     Sees pain management for neck pain and previous surgery    New onset seizure ([facility]     Osteoarthritis     Palliative care patient 07/22/2020    S/P CABG x 3 03/05/2013    PACABG X 3, LIMA-LAD, SVG0OM, SVG-DIAG, RT EV, DR Maureen Thomas       Past Surgical History:      Procedure Laterality Date    APPENDECTOMY  1965    BACK SURGERY      CARDIAC CATHETERIZATION  5/18/11    EF 60%    CARDIAC CATHETERIZATION  1/25/16    drug eluting stent to RCA    CARDIAC CATHETERIZATION  1/25/2016    CARDIAC CATHETERIZATION  2/19/16    CARDIAC CATHETERIZATION  08/24/2017    cutting balloon angioplasty ot LAD    CARDIAC CATHETERIZATION  04/13/2020    TWO Stents placed    COLON SURGERY  1992    Polypectomy - 2    COLON SURGERY  2003    Polypectomy - 4    COLON SURGERY  11/2008    Polypectomy - 2    COLON SURGERY  8/2012    Polypectomy - 3    COLONOSCOPY      CORONARY ANGIOPLASTY  08/2017    cutting balloon angioplasty LAD diagonal    CORONARY ANGIOPLASTY WITH STENT PLACEMENT  3/22/2007    CORONARY ANGIOPLASTY WITH STENT PLACEMENT  8/2007    CORONARY ARTERY BYPASS GRAFT  3/5/2013    PACABG X 3, LIMA-LAD, SVG0OM, SVG-DIAG, RT EVH, DR PATEL    COSMETIC SURGERY      deviated septum and rhinoplasty    CYST REMOVAL  1970    DIAGNOSTIC CARDIAC CATH LAB PROCEDURE      ENDOSCOPY, COLON, DIAGNOSTIC      EYE SURGERY  03/2019    cataracts   [address]    Partial    NASAL SEPTUM SURGERY  1977    SINUS SURGERY  2003    Nasal Polyps Removed    SPINE SURGERY  3/6/2006    Lumbar Laminectomy L4&5    SPINE SURGERY  3/6/2006    Cervical Fusion - C4,5,6    TONSILLECTOMY  1968       Medications in Hospital:      Current Facility-Administered Medications:     promethazine (PHENERGAN) tablet 12.5 mg, 12.5 mg, Oral, Q6H PRN, Ariana Plata MD, 12.5 mg at 10/28/20 0928    pantoprazole (PROTONIX) tablet 40 mg, 40 mg, Oral, QAM AC, Ariana Plata MD, 40 mg at 10/29/20 0538    nitrofurantoin (macrocrystal-monohydrate) (MACROBID) capsule 100 mg, 100 mg, Oral, 2 times per day, Ariana Plata MD, 100 mg at 10/29/20 0831    ondansetron (ZOFRAN) injection 4 mg, 4 mg, Intravenous, Q4H PRN, Ariana Plata MD, 4 mg at 10/29/20 1251    senna (SENOKOT) tablet 8.6 mg, 1 tablet, Oral, Daily PRN, Marco Antonio Lennon MD    glucagon (rDNA) injection 1 mg, 1 mg, Intramuscular, PRN, Marco Antonio Lennon MD    glucose (GLUTOSE) 40 % oral gel 15 g, 15 g, Oral, PRN, Marco Antonio Lennon MD    acetaminophen (TYLENOL) tablet 650 mg, 650 mg, Oral, Q8H PRN, Marco Antonio Lennon MD, 650 mg at 10/28/20 0530    ALPRAZolam (XANAX) tablet 0.125 mg, 0.125 mg, Oral, Daily PRN, Marco Antonio Lennon MD    cyclobenzaprine (FLEXERIL) tablet 10 mg, 10 mg, Oral, TID PRN, Marco Antonio Lennon MD    hydroCHLOROthiazide (HYDRODIURIL) tablet 25 mg, 25 mg, Oral, Daily, Marco Antonio Lennon MD, 25 mg at 10/29/20 0830    insulin lispro (HUMALOG) injection vial 0-6 Units, 0-6 Units, Subcutaneous, BID, Marco Antonio Lennon MD    isosorbide mononitrate (IMDUR) extended release tablet 60 mg, 60 mg, Oral, Daily, Marco Antonio Lennon MD, 60 mg at 10/29/20 0831    levETIRAcetam (KEPPRA) tablet 500 mg, 500 mg, Oral, BID, Marco Antonio Lennon MD, 500 mg at 10/29/20 0830    lisinopril (PRINIVIL;ZESTRIL) tablet 20 mg, 20 mg, Oral, BID, Marco Antonio Lennon MD, 20 mg at 10/29/20 0831    metoprolol succinate (TOPROL XL) extended release tablet 50 mg, 50 mg, Oral, Daily, Ibrahima Peña MD, 50 mg at 10/29/20 0830    nitroGLYCERIN (NITROSTAT) SL tablet 0.4 mg, 0.4 mg, Sublingual, Q5 Min PRN, Ibrahima Peña MD    ranolazine [facility]) extended release tablet 1,000 mg, 1,000 mg, Oral, BID, Ibrahima Peña MD, 1,000 mg at 10/29/20 0830    traZODone (DESYREL) tablet 50 mg, 50 mg, Oral, Nightly, Ibrahima Peña MD, 50 mg at 10/28/20 2144    polyethylene glycol (GLYCOLAX) packet 17 g, 17 g, Oral, Daily PRN, Melania Green MD    traMADol (ULTRAM) tablet 50 mg, 50 mg, Oral, Q6H PRN, Melania Green MD, 50 mg at 10/28/20 0530    Allergies: Other; Codeine; Darvocet [propoxyphene n-acetaminophen]; Hydrocodone-acetaminophen; Lyrica [pregabalin]; Morphine; and Percocet [oxycodone-acetaminophen]    Social History:   TOBACCO:   reports that she quit smoking about 53 years ago. Her smoking use included cigarettes. She has a 1.00 pack-year smoking history. She has never used smokeless tobacco.  ETOH:   reports no history of alcohol use. Family History:       Problem Relation Age of Onset    Heart Disease Mother     High Blood Pressure Mother     Diabetes Mother     High Cholesterol Mother     Diabetes Brother          PHYSICAL EXAM:  /69   Pulse 78   Temp 96.2 °F (35.7 °C) (Temporal)   Resp 17   Ht 5' 5\" (1.651 m)   Wt 142 lb 9.6 oz (64.7 kg)   SpO2 91%   BMI 23.73 kg/m²       Constitutional - well developed, well nourished. Eyes - conjunctiva normal.   Ear, nose, throat - No scars, masses, or lesions over external nose or ears, no atrophy of tongue  Neck-symmetric, no masses noted, no jugular vein distension  Respiration- chest wall appears symmetric, good expansion,   normal effort without use of accessory muscles  Musculoskeletal - no significant wasting of muscles noted, no bony deformities  Extremities-no clubbing, cyanosis or edema  Skin - warm, dry, and intact. No rash, erythema, or pallor. Psychiatric - mood, affect, and behavior appear normal.      Neurological exam  Awake,  fluent oriented slow  Attention and concentration appear mildly impaired  Recent and remote memory appears mildly impaired  Speech normal without dysarthria  No clear issues with language of fund of knowledge     Cranial Nerve Exam     CN III, IV,VI-EOMI, No nystagmus, conjugate eye movements, no ptosis    CN VII-no facial assymetry       Motor Exam  antigravity throughout upper and lower extremities bilaterally      Tremors- no tremors in hands or head noted     Gait  Not tested      Nursing/pcp notes, imaging,labs and vitals reviewed. PT,OT and/or speech notes reviewed    Lab Results   Component Value Date    WBC 5.0 10/29/2020    HGB 12.7 10/29/2020    HCT 37.5 10/29/2020    MCV 90.1 10/29/2020     10/29/2020     Lab Results   Component Value Date     (L) 10/29/2020    K 3.7 10/29/2020    CL 97 (L) 10/29/2020    CO2 25 10/29/2020    BUN 17 10/29/2020    CREATININE 0.6 10/29/2020    GLUCOSE 96 10/29/2020    CALCIUM 9.4 10/29/2020    PROT 6.1 (L) 10/29/2020    LABALBU 3.8 10/29/2020    BILITOT 0.5 10/29/2020    ALKPHOS 50 10/29/2020    AST 15 10/29/2020    ALT 13 10/29/2020    LABGLOM >60 10/29/2020    GFRAA >59 10/29/2020    GLOB 1.8 11/19/2016     Lab Results   Component Value Date    INR 0.97 10/27/2020    INR 0.99 10/25/2020    INR 0.96 10/24/2020    PROTIME 12.7 10/27/2020    PROTIME 12.9 10/25/2020    PROTIME 12.7 10/24/2020             RECORD REVIEW: Previous medical records, medications were reviewed at today's visit    IMPRESSION:   1. S/P Traumatic subarachnoid hemorrhage-off ASA/Plavix-monitor  2. HTN-on meds monitor  3. DM-monitor BS  4. Seizures-on Keppra  5. CAD-on Ramexa-Plavix and ASA on hold  6. Nausea and vomiting post ICH-repeat CT stable-Zofran/Phenergan PRN  7. GI-bowel regimen/PPI  8. Insomnia-Trazodone qhs  9. Headache-Tramadol/Tylenol PRN  10.  Anxiety-Xanax

## 2020-10-29 NOTE — PROGRESS NOTES
Physical Therapy Evaluation Note  DATE:  10/29/2020  NAME:  Joseph Fritz  :  1945  (75 y.o.,female)  MRN:  [id number]    HEIGHT:  Height: 5' 5\" (165.1 cm)  WEIGHT:  Weight: 142 lb 9.6 oz (64.7 kg)    PATIENT DIAGNOSIS(ES):    Diagnosis: TBI, subarachnoid hemorrhage R frontal area  Additional Pertinent Hx: Asthma, CAD, CP, depression, anxiety, DM, GERD, hyperlipidemia, HTN, lupus, malaise and fatigue, movement disorder, seizure, OA  RESTRICTIONS/PRECAUTIONS:    Restrictions/Precautions  Restrictions/Precautions: Fall Risk, Seizure  Required Braces or Orthoses?: No     OVERALL  ORIENTATION STATUS:     PAIN:  Pain Level: 7  Pain Type: Acute pain    Pain Location: Neck            NEUROLOGICAL                       Sensation  Overall Sensation Status: WNL(Intact light touch B L3-S2)  STRENGTH  Strength RLE  R Hip Flexion: 3+/5  R Hip ABduction: 3+/5  R Hip ADduction: 3+/5  R Knee Extension: 3+/5  Strength LLE  L Hip Flexion: 3+/5  L Hip ABduction: 3+/5  L Hip ADduction: 3+/5  L Knee Extension: 3+/5  ROM  AROM RLE (degrees)  RLE AROM: WFL  AROM LLE (degrees)  LLE AROM : WFL  POSTURE/BALANCE  Balance  Sitting - Static: Good  Sitting - Dynamic: Good  Standing - Static: Fair  Standing - Dynamic: Fair(Pt able to don pants with CGA)       ACTIVITY TOLERANCE  Activity Tolerance  Activity Tolerance: Treatment limited secondary to medical complications (free text)      BED MOBILITY  Bed Mobility  Rolling: Stand by assistance  Supine to Sit: Stand by assistance  Sit to Supine: Stand by assistance  Scooting: Contact guard assistance  TRANSFERS  Sit to Stand: Contact guard assistance      Bed to Chair: Contact guard assistance        WHEELCHAIR PROPULSION 1     WHEELCHAIR PROPULSION 2     AMBULATION 1  Ambulation 1  Surface: level tile  Device: Rolling Walker  Other Apparatus: Wheelchair follow  Assistance: Contact guard assistance  Distance: 3 steps  Comments: Pt began feeling nauseated and could not tolerate any further ambulation  AMBULATION 2     STAIRS       GOALS:  Short term goals  Time Frame for Short term goals: 1-2 weeks  Short term goal 1: Pt will perform all on bed mobility with supervision  Short term goal 2: Pt will perform sit to stand and stand to sit transfer with RW and supervision  Short term goal 3: Pt will propel W/C 76' with supervision  Short term goal 4: Pt will ambulate 76' with RW and SBA  Short term goal 5: Pt will navigate one step with CGA    Long term goals  Time Frame for Long term goals : 2-3 weeks  Long term goal 1: Pt will perform all on bed mobility independently  Long term goal 2: Pt will perform sit to stand and stand to sit transfer with RW independently  Long term goal 3: Pt will propel W/C 150' windependently  Long term goal 4: Pt will ambulate 150' with RW independently  Long term goal 5: Pt will navigate one step with supervision  HOME LIVING:     Type of Home: House  Home Layout: One level(some steps in house)  Home Access: Ramped entrance        ASSESSMENT (IMPAIRMENTS/BARRIERS): Body structures, Functions, Activity limitations: Decreased functional mobility , Decreased ADL status, Decreased ROM, Decreased strength, Decreased cognition, Decreased endurance, Decreased balance  Assessment: Pt unable to complete evaluation d/t nausea and vomiting. Pt began vomiting after ambulation.   Activity Tolerance: Treatment limited secondary to medical complications (free text)     PLAN:  Plan  Times per week: 5-6  Times per day: Twice a day  Plan weeks: 2-3  Current Treatment Recommendations: Strengthening, ROM, Balance Training, Functional Mobility Training, Transfer Training, ADL/Self-care Training, Cognitive/Perceptual Training, Endurance Training, Gait Training, Stair training, Wheelchair Mobility Training, Safety Education & Training  Discharge Recommendations: Home with Home health PT  PATIENT REQUIRES AND IS REASONABLY EXPECTED TO ACTIVELY PARTICIPATE IN AT LEAST 3 HOURS OF INTENSIVE THERAPY PER DAY AT LEAST 5 DAYS PER WEEK, AND BE EXPECTED TO MAKE MEASURABLE IMPROVEMENT THAT WILL BE OF PRACTICAL VALUE TO IMPROVE THE PATIENT'S FUNCTIONAL CAPACITY OR ADAPTATION TO IMPAIRMENTS.    PATIENT GOAL FOR REHAB:  RETURN TO PRIOR LEVEL OF FUNCTION       IRF/SULEMA  Roll Left and Right  Assistance Needed: Supervision or touching assistance  CARE Score: 4  Discharge Goal: Independent    Sit to Lying  Assistance Needed: Supervision or touching assistance  CARE Score: 4  Discharge Goal: Independent    Lying to Sitting on Side of Bed  Assistance Needed: Supervision or touching assistance  CARE Score: 4  Discharge Goal: Independent    Sit to Stand  Assistance Needed: Supervision or touching assistance  CARE Score: 4  Discharge Goal: Independent    Chair/Bed-to-Chair Transfer  Assistance Needed: Supervision or touching assistance  CARE Score: 4  Discharge Goal: Independent    Car Transfer  Reason if not Attempted: Not attempted due to medical condition or safety concerns  CARE Score: 88  Discharge Goal: Independent    Walk 10 Feet  Reason if not Attempted: Not attempted due to medical condition or safety concerns  CARE Score: 88  Discharge Goal: Not Attempted    Walk 50 Feet with Two Turns  Reason if not Attempted: Not attempted due to medical condition or safety concerns  CARE Score: 88  Discharge Goal: Not Attempted    Walk 150 Feet  Reason if not Attempted: Not attempted due to medical condition or safety concerns  CARE Score: 88  Discharge Goal: Not Attempted    Walking 10 Feet on Uneven Surfaces  Reason if not Attempted: Not attempted due to medical condition or safety concerns  CARE Score: 88  Discharge Goal: Not Attempted    1 Step (Curb)  Reason if not Attempted: Not attempted due to medical condition or safety concerns  CARE Score: 88  Discharge Goal: Not Attempted    4 Steps  Reason if not Attempted: Not attempted due to medical condition or safety concerns  CARE Score: 88  Discharge Goal: Not Attempted    12 Steps  Reason if not Attempted: Not attempted due to medical condition or safety concerns  CARE Score: 88  Discharge Goal: Independent    Wheel 50 Feet with Two Turns  Reason if not Attempted: Not attempted due to medical condition or safety concerns  CARE Score: 88  Discharge Goal: Independent    Wheel 150 Feet  Reason if not Attempted: Not attempted due to medical condition or safety concerns  CARE Score: 88  Discharge Goal: Independent      LAST TREATMENT TIME

## 2020-10-29 NOTE — PROGRESS NOTES
[facility]  INPATIENT SPEECH THERAPY  [facility] 8 REHAB UNIT        TIME   Time In: 0900  Time Out: 1000  Minutes: 60       [x]Daily Note  []Progress Note    Date: 10/29/2020  Patient Name: Lennis Holter        MRN: [id number]    Account #: [de-identified]  : 1945  (76 y.o.)  Gender: female   Primary Provider: Valdemar Sanchez MD  Swallowing Status/Diet: General diet, thin liquids      Rehabilitation Diagnosis:    Intracranial bleed ([address] [I62.9]      Precautions:  Nausea, dizziness      Subjective:   She complains of nausea and dizziness this morning. She denied any emesis. Objective:  She was able to recall the month and year. She was able to state her location. She was able to recall her demographic information. She was able to recall information regarding her [address] that she owned in [facility]. 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 She states she does regularly quilt at home. She did receive home health speech therapy services for cognition in 2020 and was given a home exercise program which she did complete. She worked with Leopoldo Ivy with [facility]. This therapist will contact [facility] to discuss her  progress with home health. The patient's daughter ordered several cognitive stimulation workbooks. She completed concrete divergent naming with four items named in one minute. She completed simple problem solving tasks with minimal cues. Recommended Diet and Intervention  Solid: Diet Solids Recommendation: Regular  Liquid: Liquid Consistency Recommendation: Thin  Medication:Recommended Form of Meds: Whole with water     Compensatory Swallowing Strategies Attempted  Compensatory Swallowing Strategies:  Alternate solids and liquids;Eat/Feed slowly;Upright as possible for all oral intake;Remain upright for 30-45 minutes after meals;Small bites/sips      SHORT TERM GOAL #1:  Goal 1: The patient will complete simple/complex naming tasks with min verbal cues at 80% accuracy in order to improve verbal expression and thought organization for functional communication. SHORT TERM GOAL #2:  Goal 2: The patient will follow multi step commands during daily activities with min verbal cues at 100% accuracy in order to improve safety with functional ADL's and decreased assistance from caregivers. SHORT TERM GOAL #3:  Goal 3: The patient will complete attention/processing tasks with 80% accuracy with min verbal cues. SHORT TERM GOAL #4:  Goal 4: The patient will complete executive function tasks (planning, organizing, self monitoring, etc) with min verbal cues at 80% accuracy in order to improve safety awareness with functional ADL's. SHORT TERM GOAL #5:  Goal 5: The patient will demonstrate functional problem solving and safety awareness with min verbal cues at 80% accuracy for daily living tasks in order to increase safe interaction with environment and decreased assistance from caregivers. not met    Swallowing Short Term Goals  Short-term Goals  Goal 1: The patient will tolerate regular diet with thin liquids with min/no overt s/s of aspiration.       ASSESSMENT:  Assessment: [x]Progressing towards goals          []Not Progressing towards goals    Patient Tolerance of Treatment:   [x]Tolerated well []Tolerated fair []Required rest breaks []Fatigued    Education:  Learner:  [x]Patient          []Significant Other          []Other  Education provided regarding:  [x]Goals and POC   []Diet and swallowing precautions    []Home Exercise Program  []Progress and/or discharge information  Method of Education:  [x]Discussion          []Demonstration          []Handout          []Other  Evaluation of Education:   [x]Verbalized understanding   []Demonstrates without assistance  []Demonstrates with assistance  []Needs further instruction     []No evidence of learning                  []No family present      Plan: [x]Continue with current plan of care    []Modify current plan of care as follows:    []Discharge patient    Discharge Location:    Services/Supervision Recommended:      [x]Patient continues to require treatment by a licensed therapist to address functional deficits as outlined in the established plan of care.                       Electronically Signed By:  Sarah Kaur M.S., CCC-SLP  10/29/2020,9:17 AM.

## 2020-10-30 LAB
ALBUMIN SERPL-MCNC: 4.5 G/DL (ref 3.5–5.2)
ALP BLD-CCNC: 57 U/L (ref 35–104)
ALT SERPL-CCNC: 14 U/L (ref 5–33)
ANION GAP SERPL CALCULATED.3IONS-SCNC: 14 MMOL/L (ref 7–19)
AST SERPL-CCNC: 16 U/L (ref 5–32)
BILIRUB SERPL-MCNC: 0.6 MG/DL (ref 0.2–1.2)
BUN BLDV-MCNC: 17 MG/DL (ref 8–23)
CALCIUM SERPL-MCNC: 9.4 MG/DL (ref 8.8–10.2)
CHLORIDE BLD-SCNC: 97 MMOL/L (ref 98–111)
CO2: 24 MMOL/L (ref 22–29)
CREAT SERPL-MCNC: 0.7 MG/DL (ref 0.5–0.9)
GFR AFRICAN AMERICAN: >59
GFR NON-AFRICAN AMERICAN: >60
GLUCOSE BLD-MCNC: 109 MG/DL (ref 74–109)
GLUCOSE BLD-MCNC: 123 MG/DL (ref 70–99)
GLUCOSE BLD-MCNC: 132 MG/DL (ref 70–99)
GLUCOSE BLD-MCNC: 174 MG/DL (ref 70–99)
LIPASE: 27 U/L (ref 13–60)
ORGANISM: ABNORMAL
ORGANISM: ABNORMAL
PERFORMED ON: ABNORMAL
POTASSIUM SERPL-SCNC: 4 MMOL/L (ref 3.5–5)
SODIUM BLD-SCNC: 135 MMOL/L (ref 136–145)
TOTAL PROTEIN: 6.5 G/DL (ref 6.6–8.7)
URINE CULTURE, ROUTINE: ABNORMAL

## 2020-10-30 PROCEDURE — 97130 THER IVNTJ EA ADDL 15 MIN: CPT

## 2020-10-30 PROCEDURE — 1180000000 HC REHAB R&B

## 2020-10-30 PROCEDURE — 51798 US URINE CAPACITY MEASURE: CPT

## 2020-10-30 PROCEDURE — 6370000000 HC RX 637 (ALT 250 FOR IP): Performed by: PSYCHIATRY & NEUROLOGY

## 2020-10-30 PROCEDURE — 97530 THERAPEUTIC ACTIVITIES: CPT

## 2020-10-30 PROCEDURE — 97535 SELF CARE MNGMENT TRAINING: CPT

## 2020-10-30 PROCEDURE — 6370000000 HC RX 637 (ALT 250 FOR IP): Performed by: FAMILY MEDICINE

## 2020-10-30 PROCEDURE — 83690 ASSAY OF LIPASE: CPT

## 2020-10-30 PROCEDURE — 80053 COMPREHEN METABOLIC PANEL: CPT

## 2020-10-30 PROCEDURE — 87040 BLOOD CULTURE FOR BACTERIA: CPT

## 2020-10-30 PROCEDURE — 82947 ASSAY GLUCOSE BLOOD QUANT: CPT

## 2020-10-30 PROCEDURE — 36415 COLL VENOUS BLD VENIPUNCTURE: CPT

## 2020-10-30 PROCEDURE — 99232 SBSQ HOSP IP/OBS MODERATE 35: CPT | Performed by: PSYCHIATRY & NEUROLOGY

## 2020-10-30 PROCEDURE — 97129 THER IVNTJ 1ST 15 MIN: CPT

## 2020-10-30 PROCEDURE — 6360000002 HC RX W HCPCS: Performed by: FAMILY MEDICINE

## 2020-10-30 PROCEDURE — 97110 THERAPEUTIC EXERCISES: CPT

## 2020-10-30 PROCEDURE — 2580000003 HC RX 258: Performed by: FAMILY MEDICINE

## 2020-10-30 PROCEDURE — 6360000002 HC RX W HCPCS: Performed by: PSYCHIATRY & NEUROLOGY

## 2020-10-30 RX ORDER — UREA 10 %
1 LOTION (ML) TOPICAL NIGHTLY PRN
Status: DISCONTINUED | OUTPATIENT
Start: 2020-10-30 | End: 2020-11-11 | Stop reason: HOSPADM

## 2020-10-30 RX ADMIN — TRAZODONE HYDROCHLORIDE 50 MG: 50 TABLET ORAL at 19:57

## 2020-10-30 RX ADMIN — LISINOPRIL 20 MG: 20 TABLET ORAL at 08:52

## 2020-10-30 RX ADMIN — PIPERACILLIN SODIUM AND TAZOBACTAM SODIUM 3.38 G: 3; .375 INJECTION, POWDER, LYOPHILIZED, FOR SOLUTION INTRAVENOUS at 19:56

## 2020-10-30 RX ADMIN — PANTOPRAZOLE SODIUM 40 MG: 40 TABLET, DELAYED RELEASE ORAL at 05:50

## 2020-10-30 RX ADMIN — LEVETIRACETAM 500 MG: 500 TABLET ORAL at 19:57

## 2020-10-30 RX ADMIN — METOPROLOL SUCCINATE 50 MG: 50 TABLET, EXTENDED RELEASE ORAL at 08:52

## 2020-10-30 RX ADMIN — ONDANSETRON 4 MG: 2 INJECTION INTRAMUSCULAR; INTRAVENOUS at 11:43

## 2020-10-30 RX ADMIN — PIPERACILLIN SODIUM AND TAZOBACTAM SODIUM 3.38 G: 3; .375 INJECTION, POWDER, LYOPHILIZED, FOR SOLUTION INTRAVENOUS at 11:43

## 2020-10-30 RX ADMIN — HYDROCHLOROTHIAZIDE 25 MG: 25 TABLET ORAL at 08:51

## 2020-10-30 RX ADMIN — NITROFURANTOIN MONOHYDRATE/MACROCRYSTALLINE 100 MG: 25; 75 CAPSULE ORAL at 08:52

## 2020-10-30 RX ADMIN — LISINOPRIL 20 MG: 20 TABLET ORAL at 19:57

## 2020-10-30 RX ADMIN — LEVETIRACETAM 500 MG: 500 TABLET ORAL at 08:52

## 2020-10-30 RX ADMIN — ISOSORBIDE MONONITRATE 60 MG: 60 TABLET, EXTENDED RELEASE ORAL at 08:52

## 2020-10-30 RX ADMIN — RANOLAZINE 1000 MG: 500 TABLET, FILM COATED, EXTENDED RELEASE ORAL at 08:52

## 2020-10-30 RX ADMIN — RANOLAZINE 1000 MG: 500 TABLET, FILM COATED, EXTENDED RELEASE ORAL at 19:57

## 2020-10-30 ASSESSMENT — PAIN - FUNCTIONAL ASSESSMENT: PAIN_FUNCTIONAL_ASSESSMENT: ACTIVITIES ARE NOT PREVENTED

## 2020-10-30 ASSESSMENT — PAIN DESCRIPTION - PROGRESSION: CLINICAL_PROGRESSION: GRADUALLY IMPROVING

## 2020-10-30 ASSESSMENT — PAIN DESCRIPTION - DESCRIPTORS: DESCRIPTORS: HEADACHE

## 2020-10-30 ASSESSMENT — PAIN SCALES - GENERAL: PAINLEVEL_OUTOF10: 4

## 2020-10-30 ASSESSMENT — PAIN DESCRIPTION - ONSET: ONSET: ON-GOING

## 2020-10-30 ASSESSMENT — PAIN DESCRIPTION - LOCATION: LOCATION: HEAD;NECK

## 2020-10-30 ASSESSMENT — PAIN DESCRIPTION - FREQUENCY: FREQUENCY: CONTINUOUS

## 2020-10-30 ASSESSMENT — PAIN DESCRIPTION - PAIN TYPE: TYPE: ACUTE PAIN

## 2020-10-30 NOTE — PROGRESS NOTES
Occupational Therapy  Facility/Department: [facility] 8 REHAB UNIT  Daily Treatment Note  NAME: Hui Lennon  : 1945  MRN: [id number]    Date of Service: 10/30/2020    Discharge Recommendations:  Home with Home health OT     Assessment   Performance deficits / Impairments: Decreased ADL status; Decreased functional mobility ; Decreased endurance;Decreased balance;Decreased strength;Decreased safe awareness;Decreased high-level IADLs  Assessment: Pt still complains of dizziness. Pt complains of double vision. Pt given eye patch and told to switch it every 2 hours. Treatment Diagnosis: subarachnoid hemorrhage  Activity Tolerance  Activity Tolerance: Patient Tolerated treatment well  Safety Devices  Safety Devices in place: Yes(secondary alarm also on)  Type of devices: Left in bed;Bed alarm in place;Call light within reach       Patient Diagnosis(es): There were no encounter diagnoses. has a past medical history of Acute cystitis without hematuria, Asthma, CAD (coronary artery disease), Chest pain, Depression with anxiety, Diabetes mellitus ([address] GERD (gastroesophageal reflux disease), Glucose intolerance (impaired glucose tolerance), Hyperlipidemia, Hypertension, Lupus (systemic lupus erythematosus) ([address] Malaise and fatigue, Movement disorder, New onset seizure ([address] Osteoarthritis, Palliative care patient, and S/P CABG x 3.   has a past surgical history that includes Cardiac catheterization (11); Appendectomy (); Tonsillectomy (); cyst removal (); Nasal septum surgery (); Hysterectomy (); Hemorrhoid surgery (); sinus surgery (); Spine surgery (3/6/2006); Spine surgery (3/6/2006); Coronary angioplasty with stent (3/22/2007); Coronary angioplasty with stent (2007); Colon surgery (); Colon surgery (); Colon surgery (2008); Colon surgery (2012); Coronary artery bypass graft (3/5/2013); back surgery; Colonoscopy; Endoscopy, colon, diagnostic;  Cosmetic surgery; Cardiac catheterization (1/25/16); Cardiac catheterization (1/25/2016); Cardiac catheterization (2/19/16); Cardiac catheterization (08/24/2017); Coronary angioplasty (08/2017); Diagnostic Cardiac Cath Lab Procedure; eye surgery (03/2019); and Cardiac catheterization (04/13/2020).     Restrictions  Restrictions/Precautions  Restrictions/Precautions: Fall Risk, Seizure  Required Braces or Orthoses?: No      Objective    Balance  Sitting Balance: Stand by assistance  Standing Balance: Contact guard assistance  Standing Balance  Time: 4:15 minutes  Activity: pt completed two handed static standing task CGA  Functional Mobility  Functional - Mobility Device: Rolling Walker  Activity: To/from bathroom  Assist Level: Contact guard assistance  Bed mobility  Supine to Sit: Minimal assistance  Sit to Supine: Contact guard assistance  Scooting: Contact guard assistance  Transfers  Stand Pivot Transfers: Contact guard assistance  Sit to stand: Contact guard assistance  Stand to sit: Contact guard assistance  Transfer Comments: up/down from bed, w/c,   stand pivot transfer: w/c to bed     Type of ROM/Therapeutic Exercise  Type of ROM/Therapeutic Exercise: Flores  Comment: JUAN, 5#, 3 sets of 10       Plan   Plan  Current Treatment Recommendations: Gait Training, Strengthening, Patient/Caregiver Education & Training, Home Management Training, Equipment Evaluation, Education, & procurement, Balance Training, Functional Mobility Training, Endurance Training, Self-Care / ADL, Safety Education & Training    OutComes Score       10/30/20 1400   Oral Hygiene   Assistance Needed Setup or clean-up assistance   Comment Setup   CARE Score 5   [address] Needed Supervision or touching assistance   Comment CGA   CARE Score 4      10/30/20 1400   Toilet Transfer   Assistance Needed Supervision or touching assistance   Comment CGA   CARE Score 4       Goals  Short term goals  Time Frame for Short term goals: 1 week  Short term goal 1: Supervision with toilet hygiene. Short term goal 2: Supervision with showering/bathing. Short term goal 3: Supervision with UB dressing. Short term goal 4: Supervision with LB dressing. Short term goal 5: Supervision with putting on/taking off footwear. Short term goal 6: Supervision with toilet transfers. Short term goal 7: Min A with picking up objects from floor with AE as needed. Long term goals  Time Frame for Long term goals : 2 weeks  Long term goal 1: Modified Independent with toilet hygiene. Long term goal 2: Modified Independent with showering/bathing. Long term goal 3: Independent with UB dressing. Long term goal 4: Modified Independent with LB dressing. Long term goal 5: Modified Independent with putting on/taking off footwear. Long term goals 6: Modified Independent with toilet transfers. Long term goal 8: Modified Independent with picking up objects from floor with AE as needed. Long term goal 9: Verbalize DME needs. Long term goal 10: Complete IADL/homemaking task Modified [place].        Therapy Time   Individual Concurrent Group Co-treatment   Time In 1400         Time Out 1500         Minutes 60         Timed Code Treatment Minutes: [address]

## 2020-10-30 NOTE — PROGRESS NOTES
[facility]  INPATIENT SPEECH THERAPY  [facility] 8 REHAB UNIT  TIME   Time In: 1200  Time Out: 1300  Minutes: 60  Total Treatment Time: 60    [x]Daily Note  []Progress Note    Date: 10/30/2020  Patient Name: Jerona Merlin        MRN: [id number]    Account #: [de-identified]  : 1945  (76 y.o.)  Gender: female   Primary Provider: Brooklyn Elliott MD  Precautions: fall risk  Swallowing Status/Diet: regular and thin       Subjective: Pt resting in bed with nausea when SLP arrived. RN was administering medications to ease nauseous feeling. Pt participated in simple cognitive tasks and structured discourse without distraction from nausea. Pt reported previous hx of speech therapy and discussed areas of concern with her cognition. Objective: Pt completed simple naming tasks given minimal verbal prompts with 80-90% accuracy. If complexity was increased pt would have shown more difficulty with word finding skills. Pt demonstrated word finding deficits 4-5x in simple discourse. Attention tasks including sustained attention and auditory processing. Pt sustained attention and re-directed attention to a topic when distracted independently. Pt demonstrated functional auditory processing skills, answering simple and 2-step 'wh' questions with no verbal repetition. 1x verbal repetition required with questions, d/t pt stating mask interfered with 'hearing' the question. Pt is demonstrating general executive functioning abilities including planning, and self-monitoring. As she discussed these processes while explaining her episode of 'walking to the bathroom. \"     SHORT TERM GOAL #1:  Goal 1: The patient will complete simple/complex naming tasks with min verbal cues at 80% accuracy in order to improve verbal expression and thought organization for functional communication.     SHORT TERM GOAL #2:  Goal 2: The patient will follow multi step commands during daily activities with min verbal cues at 100% accuracy in order to improve safety with functional ADL's and decreased assistance from caregivers. SHORT TERM GOAL #3:  Goal 3: The patient will complete attention/processing tasks with 80% accuracy with min verbal cues. SHORT TERM GOAL #4:  Goal 4: The patient will complete executive function tasks (planning, organizing, self monitoring, etc) with min verbal cues at 80% accuracy in order to improve safety awareness with functional ADL's. SHORT TERM GOAL #5:  Goal 5: The patient will demonstrate functional problem solving and safety awareness with min verbal cues at 80% accuracy for daily living tasks in order to increase safe interaction with environment and decreased assistance from caregivers. not met    Swallowing Short Term Goals  Short-term Goals  Goal 1: The patient will tolerate regular diet with thin liquids with min/no overt s/s of aspiration.          ASSESSMENT:  Assessment: [x]Progressing towards goals          []Not Progressing towards goals    Patient Tolerance of Treatment:   [x]Tolerated well []Tolerated fair []Required rest breaks []Fatigued    Education:  Learner:  [x]Patient          []Significant Other          []Other  Education provided regarding:  [x]Goals and POC   []Diet and swallowing precautions    []Home Exercise Program  [x]Progress and/or discharge information  Method of Education:  []Discussion          []Demonstration          []Handout          []Other  Evaluation of Education:   [x]Verbalized understanding   []Demonstrates without assistance  []Demonstrates with assistance  []Needs further instruction     []No evidence of learning                  []No family present      Plan: [x]Continue with current plan of care    []Modify current plan of care as follows:    []Discharge patient    Discharge Location:    Services/Supervision Recommended:      [x]Patient continues to require treatment by a licensed therapist to address functional deficits as outlined in the established plan of care.    Electronically signed by PARKER Urrutia on 10/30/2020 at 1:24 PM

## 2020-10-30 NOTE — PROGRESS NOTES
CABG    Bilateral carotid artery stenosis    Altered mental status    Subarachnoid hemorrhage (HCC)    SLE (systemic lupus erythematosus) (HCC)    Intracranial bleed (HCC)    Post-traumatic headache    Acute cystitis without hematuria  Resolved Problems:    * No resolved hospital problems. *     Nausea    Plan:  1. Continue present medication(s)   2. Supportive care for Nausea  3. Follow for sensitivities on urine  4. Continue therapy      Discharge planning:   her home     Reviewed treatment plans with the patient and/or family.              Electronically signed by Danii Tavares MD on 10/29/2020 at 8:09 PM

## 2020-10-30 NOTE — PROGRESS NOTES
Patient:   Henry Pardo  MR#:    [id number]   Room:    0826/826-01   YOB: 1945  Date of Progress Note: 10/30/2020  Time of Note                           11:47 AM  Consulting Physician:   Regina Skinner M.D. Attending Physician:  Regina Skinner MD     Chief complaint S/p Traumatic subarachnoid hemorrhage      S:This 76 y.o. female  with history of Lupus,DM diet controlled, GERD, CAD s/p CABG x 3 and stenting, HTN and hyperlipidemia. Patient had had some weakness since July and had been using a rollator walker and her daughters had been alternating staying with her. She presented to [facility] ER on 10/24/20 after having a fall. Her daughter was pushing her while she was sitting on her rollator and she fell backwards, hitting her head on the asphalt. She did not lose consciousness but did have immediate onset of headache that has persisted. CT done revealed a small right frontal area of traumatic subarachnoid hemorrhage. Her B/P was elevated with systolic in 932-422 range. She was admitted to her PCP Dr. Turner Sung with consult for neurosurgery. She was seen by neurosurgeon Dr. Nancy Crews and not felt to need any surgical intervention. Repeat CTs have been stable. She was evaluated by SPT for swallow and was initially placed on bite size consistency diet with nectar thick liquids but has now been upgraded to regular with thin liquids with medicines whole in pudding or applesauce. Patient also found to have difficulty with word finding and short-term memory. Patient is also participating with both PT/OT. She is felt to need a stay on Rehab for continued therapy to work towards her goal of returning home with assist of her daughters. She is now felt ready to start the Rehab program. Overnight vomiting . Repeat CT head stable and improved. Feels dizziness and nauseous when moving. Headache better.       REVIEW OF SYSTEMS:  Constitutional: No fevers No chills  Neck:No stiffness  Respiratory: No shortness of tablet 500 mg, 500 mg, Oral, BID, Alec Ruiz MD, 500 mg at 10/30/20 7788    lisinopril (PRINIVIL;ZESTRIL) tablet 20 mg, 20 mg, Oral, BID, Alec Ruiz MD, 20 mg at 10/30/20 8585    metoprolol succinate (TOPROL XL) extended release tablet 50 mg, 50 mg, Oral, Daily, Alec Ruiz MD, 50 mg at 10/30/20 9361    nitroGLYCERIN (NITROSTAT) SL tablet 0.4 mg, 0.4 mg, Sublingual, Q5 Min PRN, lAec Ruiz MD    ranolazine [facility]) extended release tablet 1,000 mg, 1,000 mg, Oral, BID, Alec Ruiz MD, 1,000 mg at 10/30/20 8711    traZODone (DESYREL) tablet 50 mg, 50 mg, Oral, Nightly, Alec Ruiz MD, 50 mg at 10/29/20 2112    polyethylene glycol (GLYCOLAX) packet 17 g, 17 g, Oral, Daily PRN, Hernan Marcano MD    traMADol (ULTRAM) tablet 50 mg, 50 mg, Oral, Q6H PRN, Hernan Marcano MD, 50 mg at 10/28/20 0530    Allergies: Other; Codeine; Darvocet [propoxyphene n-acetaminophen]; Hydrocodone-acetaminophen; Lyrica [pregabalin]; Morphine; and Percocet [oxycodone-acetaminophen]    Social History:   TOBACCO:   reports that she quit smoking about 53 years ago. Her smoking use included cigarettes. She has a 1.00 pack-year smoking history. She has never used smokeless tobacco.  ETOH:   reports no history of alcohol use. Family History:       Problem Relation Age of Onset    Heart Disease Mother     High Blood Pressure Mother     Diabetes Mother     High Cholesterol Mother     Diabetes Brother          PHYSICAL EXAM:  /68   Pulse 65   Temp 97.2 °F (36.2 °C) (Temporal)   Resp 16   Ht 5' 5\" (1.651 m)   Wt 141 lb 14.4 oz (64.4 kg)   SpO2 98%   BMI 23.61 kg/m²       Constitutional - well developed, well nourished.    Eyes - conjunctiva normal.   Ear, nose, throat - No scars, masses, or lesions over external nose or ears, no atrophy of tongue  Neck-symmetric, no masses noted, no jugular vein distension  Respiration- chest wall appears symmetric, good expansion,   normal effort without use of accessory muscles  Musculoskeletal - no significant wasting of muscles noted, no bony deformities  Extremities-no clubbing, cyanosis or edema  Skin - warm, dry, and intact. No rash, erythema, or pallor. Psychiatric - mood, affect, and behavior appear normal.      Neurological exam  Awake,  fluent oriented slow  Attention and concentration appear mildly impaired  Recent and remote memory appears mildly impaired  Speech normal without dysarthria  No clear issues with language of fund of knowledge     Cranial Nerve Exam     CN III, IV,VI-EOMI, No nystagmus, conjugate eye movements, no ptosis    CN VII-no facial assymetry       Motor Exam  antigravity throughout upper and lower extremities bilaterally      Tremors- no tremors in hands or head noted     Gait  Not tested      Nursing/pcp notes, imaging,labs and vitals reviewed.      PT,OT and/or speech notes reviewed    Lab Results   Component Value Date    WBC 5.0 10/29/2020    HGB 12.7 10/29/2020    HCT 37.5 10/29/2020    MCV 90.1 10/29/2020     10/29/2020     Lab Results   Component Value Date     (L) 10/30/2020    K 4.0 10/30/2020    CL 97 (L) 10/30/2020    CO2 24 10/30/2020    BUN 17 10/30/2020    CREATININE 0.7 10/30/2020    GLUCOSE 109 10/30/2020    CALCIUM 9.4 10/30/2020    PROT 6.5 (L) 10/30/2020    LABALBU 4.5 10/30/2020    BILITOT 0.6 10/30/2020    ALKPHOS 57 10/30/2020    AST 16 10/30/2020    ALT 14 10/30/2020    LABGLOM >60 10/30/2020    GFRAA >59 10/30/2020    GLOB 1.8 11/19/2016     Lab Results   Component Value Date    INR 0.97 10/27/2020    INR 0.99 10/25/2020    INR 0.96 10/24/2020    PROTIME 12.7 10/27/2020    PROTIME 12.9 10/25/2020    PROTIME 12.7 10/24/2020       Endy Valentino    Student Physical Therapist    Physical Therapy    Progress Notes    Signed    Date of Service:  10/29/2020  2:43 PM                Signed              Show:Clear all  [x]Manual[x]Template[]Copied    Added by:  Sofie Giron    []Elliot wesley details     Physical Therapy Evaluation Note  DATE:  10/29/2020  NAME:  Merlin Cruz  :  1945  (75 y.o.,female)  MRN:  [id number]     HEIGHT:  Height: 5' 5\" (165.1 cm)  WEIGHT:  Weight: 142 lb 9.6 oz (64.7 kg)     PATIENT DIAGNOSIS(ES):    Diagnosis: TBI, subarachnoid hemorrhage R frontal area  Additional Pertinent Hx: Asthma, CAD, CP, depression, anxiety, DM, GERD, hyperlipidemia, HTN, lupus, malaise and fatigue, movement disorder, seizure, OA  RESTRICTIONS/PRECAUTIONS:    Restrictions/Precautions  Restrictions/Precautions: Fall Risk, Seizure  Required Braces or Orthoses?: No  OVERALL  ORIENTATION STATUS:  PAIN:  Pain Level: 7  Pain Type: Acute pain    Pain Location: Neck            NEUROLOGICAL                 Sensation  Overall Sensation Status: WNL(Intact light touch B L3-S2)  STRENGTH  Strength RLE  R Hip Flexion: 3+/5  R Hip ABduction: 3+/5  R Hip ADduction: 3+/5  R Knee Extension: 3+/5  Strength LLE  L Hip Flexion: 3+/5  L Hip ABduction: 3+/5  L Hip ADduction: 3+/5  L Knee Extension: 3+/5  ROM  AROM RLE (degrees)  RLE AROM: WFL  AROM LLE (degrees)  LLE AROM : WFL  POSTURE/BALANCE  Balance  Sitting - Static: Good  Sitting - Dynamic: Good  Standing - Static: Fair  Standing - Dynamic: Fair(Pt able to don pants with CGA)       ACTIVITY TOLERANCE  Activity Tolerance  Activity Tolerance: Treatment limited secondary to medical complications (free text)      BED MOBILITY  Bed Mobility  Rolling: Stand by assistance  Supine to Sit: Stand by assistance  Sit to Supine: Stand by assistance  Scooting: Contact guard assistance  TRANSFERS  Sit to Stand: Contact guard assistance      Bed to Chair: Contact guard assistance        WHEELCHAIR PROPULSION 1  WHEELCHAIR PROPULSION 2  AMBULATION 1  Ambulation 1  Surface: level tile  Device: Rolling Walker  Other Apparatus: Wheelchair follow  Assistance: Contact guard assistance  Distance: 3 steps  Comments: Pt began feeling nauseated and could not tolerate any further Steps  Reason if not Attempted: Not attempted due to medical condition or safety concerns  CARE Score: 88  Discharge Goal: Independent     Wheel 50 Feet with Two Turns  Reason if not Attempted: Not attempted due to medical condition or safety concerns  CARE Score: 88  Discharge Goal: Independent     Wheel 150 Feet  Reason if not Attempted: Not attempted due to medical condition or safety concerns  CARE Score: 88  Discharge Goal: Independent        LAST TREATMENT TIME                                                    Cosigned by: Laney Fowler, PT at 10/29/2020  3:02 PM    Revision History                             RECORD REVIEW: Previous medical records, medications were reviewed at today's visit    IMPRESSION:   1. S/P Traumatic subarachnoid hemorrhage-off ASA/Plavix-monitor  2. HTN-on meds monitor  3. DM-monitor BS  4. Seizures-on Keppra  5. CAD-on Ramexa-Plavix and ASA on hold  6. Nausea and vomiting post ICH-repeat CT stable-Zofran/Phenergan PRN  7. GI-bowel regimen/PPI  8. Insomnia-Trazodone qhs  9. Headache-Tramadol/Tylenol PRN  10. Anxiety-Xanax PRN  11. History of lupus-monitor  12. PT/OT/Speech  13.  UTI-complete abx     Continue care      Expected duration and frequency therapy: 180 minutes per day, 5 days per week    [address] [facility]  [phone] CELL  Dr Cari Ravi

## 2020-10-30 NOTE — PLAN OF CARE
Ongoing  10/29/2020 1145 by Michael Preciado LPN  Outcome: Ongoing  Goal: Pain level will decrease  Description: Pain level will decrease  10/30/2020 0020 by Maria Dolores Warren RN  Outcome: Ongoing  10/29/2020 1145 by Michael Preciado LPN  Outcome: Ongoing     Problem: Skin Integrity:  Goal: Skin integrity will stabilize  Description: Skin integrity will stabilize  10/30/2020 0020 by Maria Dolores Warren RN  Outcome: Ongoing  10/29/2020 1145 by Michael Preciado LPN  Outcome: Ongoing     Problem: Discharge Planning:  Goal: Patients continuum of care needs are met  Description: Patients continuum of care needs are met  10/30/2020 0020 by Maria Dolores Warren RN  Outcome: Ongoing  10/29/2020 1145 by Michael Preciado LPN  Outcome: Ongoing     Problem: Nutrition  Goal: Optimal nutrition therapy  Outcome: Ongoing     Problem: Serum Glucose Level - Abnormal:  Goal: Ability to maintain appropriate glucose levels has stabilized  Description: Ability to maintain appropriate glucose levels has stabilized  10/30/2020 0020 by Maria Dolores Warren RN  Outcome: Ongoing  10/29/2020 1145 by Michael Preciado LPN  Outcome: Ongoing     Problem: Mood - Altered:  Goal: Mood stable  Description: Mood stable  10/30/2020 0020 by Maria Dolores Warren RN  Outcome: Ongoing  10/29/2020 1145 by Michael Preciado LPN  Outcome: Ongoing     Problem: Anxiety:  Goal: Level of anxiety will decrease  Description: Level of anxiety will decrease  10/30/2020 0020 by Maria Dolores Warren RN  Outcome: Ongoing  10/29/2020 1145 by Michael Preciado LPN  Outcome: Ongoing

## 2020-10-30 NOTE — PROGRESS NOTES
10/30/20 1100   Restrictions/Precautions   Restrictions/Precautions Fall Risk;Seizure   General Comment   Comments Pt needed bandage, bed dressing, clothing change d/t bleeding. Subjective   Subjective Pt states she was able to walk to bathroom earlier with RW and nursing without nausea. States she has not taken nausea medication since yesterday. Bed Mobility   Rolling Stand by assistance   Supine to Sit Stand by assistance   Sit to Supine Contact guard assistance  (assisted feet back onto bed)   Transfers   Sit to Stand Contact guard assistance   Stand to sit Contact guard assistance   Bed to Chair Contact guard assistance   Wheelchair Activities   Wheelchair Type Standard   Wheelchair Cushion Standard   Propulsion Yes   Propulsion 1   Propulsion Manual   Level Level Tile   Method RUE;LUE   Level of Assistance Stand by assistance   Description/ Details Pt limited by nausea, stated the hallway was too bright and had to cover eyes   Distance 40'   Exercises   Knee Long Arc Quad x20   Ankle Pumps x20   Conditions Requiring Skilled Therapeutic Intervention   Body structures, Functions, Activity limitations Decreased functional mobility ; Decreased ADL status; Decreased ROM; Decreased strength;Decreased cognition;Decreased endurance;Decreased balance   Assessment Treatment limited by nausea. Activity Tolerance   Activity Tolerance Treatment limited secondary to medical complications (free text)   Safety Devices   Type of devices All fall risk precautions in place;Call light within reach;Gait belt;Patient at risk for falls; Left in bed;Nurse notified  (Nurse present in room at end of session to administer meds)

## 2020-10-30 NOTE — PROGRESS NOTES
Progress Note  Mauro Scott  10/30/2020 10:42 AM  Subjective:   Admit Date:   10/27/2020      CC/ADMIT DX:       Interval History:   Reviewed overnight events and nursing notes. Her nausea is improved. No CP or SOA. No HA. I have reviewed all labs/diagnostics from the last 24hrs. ROS:   I have done a 10 point ROS and all are negative, except what is mentioned in the HPI. DIET GENERAL;    Medications:      piperacillin-tazobactam  3.375 g Intravenous Q8H    pantoprazole  40 mg Oral QAM AC    hydroCHLOROthiazide  25 mg Oral Daily    insulin lispro  0-6 Units Subcutaneous BID    isosorbide mononitrate  60 mg Oral Daily    levETIRAcetam  500 mg Oral BID    lisinopril  20 mg Oral BID    metoprolol succinate  50 mg Oral Daily    ranolazine  1,000 mg Oral BID    traZODone  50 mg Oral Nightly           Objective:   Vitals: /68   Pulse 65   Temp 97.2 °F (36.2 °C) (Temporal)   Resp 16   Ht 5' 5\" (1.651 m)   Wt 141 lb 14.4 oz (64.4 kg)   SpO2 98%   BMI 23.61 kg/m²      Intake/Output Summary (Last 24 hours) at 10/30/2020 1042  Last data filed at 10/30/2020 0911  Gross per 24 hour   Intake 600 ml   Output --   Net 600 ml     General appearance: alert and cooperative with exam  Lungs: clear to auscultation bilaterally  Heart: RRR  Abdomen: soft, non-tender; bowel sounds normal; no masses,  no organomegaly  Extremities: extremities normal, atraumatic, no cyanosis or edema  Neurologic:  No obvious focal neurologic deficits.     Assessment and Plan:   Principal Problem:    Subarachnoid hemorrhage following injury, no loss of consciousness (HCC)  Active Problems:    Coronary artery disease of native artery of native heart with stable angina pectoris (HCC)    Osteoarthritis    GERD (gastroesophageal reflux disease)    Glucose intolerance (impaired glucose tolerance)    Depression with anxiety    CAD (coronary artery disease)    Hyperlipidemia    Hypertension    Hx of CABG    Bilateral carotid artery stenosis    Altered mental status    Subarachnoid hemorrhage (HCC)    SLE (systemic lupus erythematosus) (HCC)    Intracranial bleed (HCC)    Post-traumatic headache    Acute cystitis without hematuria  Resolved Problems:    * No resolved hospital problems. *     Nausea    Plan:  1. Continue present medication(s)   2. Supportive care for Nausea  3. Start Zosyn for polymicrobial UTI   4. Continue therapy      Discharge planning:   her home     Reviewed treatment plans with the patient and/or family.              Electronically signed by Ophelia Delgadillo MD on 10/30/2020 at 10:42 AM

## 2020-10-31 LAB
GLUCOSE BLD-MCNC: 113 MG/DL (ref 70–99)
GLUCOSE BLD-MCNC: 128 MG/DL (ref 70–99)
GLUCOSE BLD-MCNC: 142 MG/DL (ref 70–99)
GLUCOSE BLD-MCNC: 157 MG/DL (ref 70–99)
PERFORMED ON: ABNORMAL

## 2020-10-31 PROCEDURE — 97535 SELF CARE MNGMENT TRAINING: CPT

## 2020-10-31 PROCEDURE — 97130 THER IVNTJ EA ADDL 15 MIN: CPT

## 2020-10-31 PROCEDURE — 6360000002 HC RX W HCPCS: Performed by: FAMILY MEDICINE

## 2020-10-31 PROCEDURE — 51798 US URINE CAPACITY MEASURE: CPT

## 2020-10-31 PROCEDURE — 2580000003 HC RX 258: Performed by: FAMILY MEDICINE

## 2020-10-31 PROCEDURE — 6370000000 HC RX 637 (ALT 250 FOR IP): Performed by: FAMILY MEDICINE

## 2020-10-31 PROCEDURE — 82947 ASSAY GLUCOSE BLOOD QUANT: CPT

## 2020-10-31 PROCEDURE — 1180000000 HC REHAB R&B

## 2020-10-31 PROCEDURE — 99232 SBSQ HOSP IP/OBS MODERATE 35: CPT | Performed by: PSYCHIATRY & NEUROLOGY

## 2020-10-31 PROCEDURE — 97530 THERAPEUTIC ACTIVITIES: CPT

## 2020-10-31 PROCEDURE — 6370000000 HC RX 637 (ALT 250 FOR IP): Performed by: PSYCHIATRY & NEUROLOGY

## 2020-10-31 PROCEDURE — 97116 GAIT TRAINING THERAPY: CPT

## 2020-10-31 PROCEDURE — 97129 THER IVNTJ 1ST 15 MIN: CPT

## 2020-10-31 PROCEDURE — 97110 THERAPEUTIC EXERCISES: CPT

## 2020-10-31 RX ORDER — ONDANSETRON 4 MG/1
4 TABLET, FILM COATED ORAL
Status: DISCONTINUED | OUTPATIENT
Start: 2020-10-31 | End: 2020-11-07

## 2020-10-31 RX ADMIN — RANOLAZINE 1000 MG: 500 TABLET, FILM COATED, EXTENDED RELEASE ORAL at 20:37

## 2020-10-31 RX ADMIN — PANTOPRAZOLE SODIUM 40 MG: 40 TABLET, DELAYED RELEASE ORAL at 05:52

## 2020-10-31 RX ADMIN — PIPERACILLIN SODIUM AND TAZOBACTAM SODIUM 3.38 G: 3; .375 INJECTION, POWDER, LYOPHILIZED, FOR SOLUTION INTRAVENOUS at 02:54

## 2020-10-31 RX ADMIN — ISOSORBIDE MONONITRATE 60 MG: 60 TABLET, EXTENDED RELEASE ORAL at 08:20

## 2020-10-31 RX ADMIN — METOPROLOL SUCCINATE 50 MG: 50 TABLET, EXTENDED RELEASE ORAL at 08:20

## 2020-10-31 RX ADMIN — PIPERACILLIN SODIUM AND TAZOBACTAM SODIUM 3.38 G: 3; .375 INJECTION, POWDER, LYOPHILIZED, FOR SOLUTION INTRAVENOUS at 20:37

## 2020-10-31 RX ADMIN — LEVETIRACETAM 500 MG: 500 TABLET ORAL at 20:37

## 2020-10-31 RX ADMIN — HYDROCHLOROTHIAZIDE 25 MG: 25 TABLET ORAL at 08:20

## 2020-10-31 RX ADMIN — TRAZODONE HYDROCHLORIDE 50 MG: 50 TABLET ORAL at 20:37

## 2020-10-31 RX ADMIN — ONDANSETRON HYDROCHLORIDE 4 MG: 4 TABLET, FILM COATED ORAL at 16:45

## 2020-10-31 RX ADMIN — LEVETIRACETAM 500 MG: 500 TABLET ORAL at 08:20

## 2020-10-31 RX ADMIN — ALPRAZOLAM 0.12 MG: 0.25 TABLET ORAL at 20:37

## 2020-10-31 RX ADMIN — PIPERACILLIN SODIUM AND TAZOBACTAM SODIUM 3.38 G: 3; .375 INJECTION, POWDER, LYOPHILIZED, FOR SOLUTION INTRAVENOUS at 12:21

## 2020-10-31 RX ADMIN — LISINOPRIL 20 MG: 20 TABLET ORAL at 08:20

## 2020-10-31 RX ADMIN — LISINOPRIL 20 MG: 20 TABLET ORAL at 20:37

## 2020-10-31 RX ADMIN — RANOLAZINE 1000 MG: 500 TABLET, FILM COATED, EXTENDED RELEASE ORAL at 08:20

## 2020-10-31 ASSESSMENT — PAIN DESCRIPTION - PROGRESSION: CLINICAL_PROGRESSION: GRADUALLY IMPROVING

## 2020-10-31 ASSESSMENT — PAIN SCALES - GENERAL
PAINLEVEL_OUTOF10: 1
PAINLEVEL_OUTOF10: 0

## 2020-10-31 ASSESSMENT — PAIN DESCRIPTION - ORIENTATION: ORIENTATION: RIGHT;LEFT

## 2020-10-31 ASSESSMENT — PAIN DESCRIPTION - DESCRIPTORS: DESCRIPTORS: SORE

## 2020-10-31 ASSESSMENT — PAIN DESCRIPTION - LOCATION: LOCATION: LEG

## 2020-10-31 NOTE — PROGRESS NOTES
Subarachnoid hemorrhage following injury, no loss of consciousness (HCC)  Active Problems:    Coronary artery disease of native artery of native heart with stable angina pectoris (HCC)    Osteoarthritis    GERD (gastroesophageal reflux disease)    Glucose intolerance (impaired glucose tolerance)    Depression with anxiety    CAD (coronary artery disease)    Hyperlipidemia    Hypertension    Hx of CABG    Bilateral carotid artery stenosis    Altered mental status    Subarachnoid hemorrhage (HCC)    SLE (systemic lupus erythematosus) (HCC)    Intracranial bleed (HCC)    Post-traumatic headache    Acute cystitis without hematuria  Resolved Problems:    * No resolved hospital problems. *          Plan:  Continue with rehab on eighth floor. Reassurance given since symptoms are slowly but progressively improving. No new symptoms to warrant additional imaging at the present. Continue tight glucose control as tolerated. Also maintain good control of blood pressure.       Electronically signed by Martin Staples MD on 10/31/2020 at 11:15 AM

## 2020-10-31 NOTE — PROGRESS NOTES
Patient:   Lennis Holter  MR#:    [id number]   Room:    0826/826-01   YOB: 1945  Date of Progress Note: 10/31/2020  Time of Note                           2:56 PM  Consulting Physician:   Valdemar Sanchez M.D. Attending Physician:  Valdemar Sanchez MD     Chief complaint S/p Traumatic subarachnoid hemorrhage      S:This 76 y.o. female  with history of Lupus,DM diet controlled, GERD, CAD s/p CABG x 3 and stenting, HTN and hyperlipidemia. Patient had had some weakness since July and had been using a rollator walker and her daughters had been alternating staying with her. She presented to [facility] ER on 10/24/20 after having a fall. Her daughter was pushing her while she was sitting on her rollator and she fell backwards, hitting her head on the asphalt. She did not lose consciousness but did have immediate onset of headache that has persisted. CT done revealed a small right frontal area of traumatic subarachnoid hemorrhage. Her B/P was elevated with systolic in 728-824 range. She was admitted to her PCP Dr. Victoria Rodriguez with consult for neurosurgery. She was seen by neurosurgeon Dr. Ana Rosa Royal and not felt to need any surgical intervention. Repeat CTs have been stable. She was evaluated by SPT for swallow and was initially placed on bite size consistency diet with nectar thick liquids but has now been upgraded to regular with thin liquids with medicines whole in pudding or applesauce. Patient also found to have difficulty with word finding and short-term memory. Patient is also participating with both PT/OT. She is felt to need a stay on Rehab for continued therapy to work towards her goal of returning home with assist of her daughters. She is now felt ready to start the Rehab program. Overnight vomiting . Repeat CT head stable and improved. Feels dizziness and nauseous when moving. No new issues.       REVIEW OF SYSTEMS:  Constitutional: No fevers No chills  Neck:No stiffness  Respiratory: No shortness of breath  Cardiovascular: No chest pain No palpitations  Gastrointestinal: No abdominal pain    Genitourinary: No Dysuria  Neurological: +headache, no confusion    Past Medical History:      Diagnosis Date    Acute cystitis without hematuria 10/28/2020    Asthma     CAD (coronary artery disease)     Chest pain     Depression with anxiety     [facility] [address]     GERD (gastroesophageal reflux disease)     Glucose intolerance (impaired glucose tolerance)     Hyperlipidemia     Cholesterol management per Sami Lemus Hypertension     Lupus (systemic lupus erythematosus) ([facility] [address]     Malaise and fatigue 10/6/2017    Movement disorder     Sees pain management for neck pain and previous surgery    New onset seizure ([facility] [address]     Osteoarthritis     Palliative care patient 07/22/2020    S/P CABG x 3 03/05/2013    PACABG X 3, LIMA-LAD, SVG0OM, SVG-DIAG, RT EV, DR Graciela Sims       Past Surgical History:      Procedure Laterality Date    APPENDECTOMY  1965    BACK SURGERY      CARDIAC CATHETERIZATION  5/18/11    EF 60%    CARDIAC CATHETERIZATION  1/25/16    drug eluting stent to RCA    CARDIAC CATHETERIZATION  1/25/2016    CARDIAC CATHETERIZATION  2/19/16    CARDIAC CATHETERIZATION  08/24/2017    cutting balloon angioplasty ot LAD    CARDIAC CATHETERIZATION  04/13/2020    TWO Stents placed    COLON SURGERY  1992    Polypectomy - 2    COLON SURGERY  2003    Polypectomy - 4    COLON SURGERY  11/2008    Polypectomy - 2    COLON SURGERY  8/2012    Polypectomy - 3    COLONOSCOPY      CORONARY ANGIOPLASTY  08/2017    cutting balloon angioplasty LAD diagonal    CORONARY ANGIOPLASTY WITH STENT PLACEMENT  3/22/2007    CORONARY ANGIOPLASTY WITH STENT PLACEMENT  8/2007    CORONARY ARTERY BYPASS GRAFT  3/5/2013    PACABG X 3, LIMA-LAD, SVG0OM, SVG-DIAG, RT EVH, DR PATEL    COSMETIC SURGERY      deviated septum and rhinoplasty    CYST REMOVAL  1970    DIAGNOSTIC CARDIAC CATH LAB PROCEDURE      ENDOSCOPY, COLON, DIAGNOSTIC      EYE SURGERY  03/2019    cataracts   [address]    Partial    NASAL SEPTUM SURGERY  1977    SINUS SURGERY  2003    Nasal Polyps Removed    SPINE SURGERY  3/6/2006    Lumbar Laminectomy L4&5    SPINE SURGERY  3/6/2006    Cervical Fusion - C4,5,6    TONSILLECTOMY  1968       Medications in Hospital:      Current Facility-Administered Medications:     piperacillin-tazobactam (ZOSYN) 3.375 g in dextrose 5 % 50 mL IVPB extended infusion (mini-bag), 3.375 g, Intravenous, Q8H, Idris Robison MD, Last Rate: 12.5 mL/hr at 10/31/20 1221, 3.375 g at 10/31/20 1221    melatonin tablet 1 mg, 1 mg, Oral, Nightly PRN, Idris Robison MD    promethazine (PHENERGAN) tablet 12.5 mg, 12.5 mg, Oral, Q6H PRN, Nadia Flores MD, 12.5 mg at 10/28/20 0928    pantoprazole (PROTONIX) tablet 40 mg, 40 mg, Oral, QAM AC, Nadia Flores MD, 40 mg at 10/31/20 0552    ondansetron (ZOFRAN) injection 4 mg, 4 mg, Intravenous, Q4H PRN, Nadia Flores MD, 4 mg at 10/30/20 1143    senna (SENOKOT) tablet 8.6 mg, 1 tablet, Oral, Daily PRN, Idris Robison MD    glucagon (rDNA) injection 1 mg, 1 mg, Intramuscular, PRN, Idris Robison MD    glucose (GLUTOSE) 40 % oral gel 15 g, 15 g, Oral, PRN, Idris Robison MD    acetaminophen (TYLENOL) tablet 650 mg, 650 mg, Oral, Q8H PRN, Idris Robison MD, 650 mg at 10/28/20 0530    ALPRAZolam (XANAX) tablet 0.125 mg, 0.125 mg, Oral, Daily PRN, Idris Robison MD    cyclobenzaprine (FLEXERIL) tablet 10 mg, 10 mg, Oral, TID PRN, Idris Robison MD    hydroCHLOROthiazide (HYDRODIURIL) tablet 25 mg, 25 mg, Oral, Daily, Idris Robison MD, 25 mg at 10/31/20 0820    insulin lispro (HUMALOG) injection vial 0-6 Units, 0-6 Units, Subcutaneous, BID, Idris Robison MD    isosorbide mononitrate (IMDUR) extended release tablet 60 mg, 60 mg, Oral, Daily, Idris Robison MD, 60 mg at 10/31/20 0820    levETIRAcetam (KEPPRA) accessory muscles  Musculoskeletal - no significant wasting of muscles noted, no bony deformities  Extremities-no clubbing, cyanosis or edema  Skin - warm, dry, and intact. No rash, erythema, or pallor. Psychiatric - mood, affect, and behavior appear normal.      Neurological exam  Awake,  fluent oriented slow  Attention and concentration appear mildly impaired  Recent and remote memory appears mildly impaired  Speech normal without dysarthria  No clear issues with language of fund of knowledge     Cranial Nerve Exam     CN III, IV,VI-EOMI, No nystagmus, conjugate eye movements, no ptosis    CN VII-no facial assymetry       Motor Exam  antigravity throughout upper and lower extremities bilaterally      Tremors- no tremors in hands or head noted     Gait  Not tested      Nursing/pcp notes, imaging,labs and vitals reviewed.      PT,OT and/or speech notes reviewed    Lab Results   Component Value Date    WBC 5.0 10/29/2020    HGB 12.7 10/29/2020    HCT 37.5 10/29/2020    MCV 90.1 10/29/2020     10/29/2020     Lab Results   Component Value Date     (L) 10/30/2020    K 4.0 10/30/2020    CL 97 (L) 10/30/2020    CO2 24 10/30/2020    BUN 17 10/30/2020    CREATININE 0.7 10/30/2020    GLUCOSE 109 10/30/2020    CALCIUM 9.4 10/30/2020    PROT 6.5 (L) 10/30/2020    LABALBU 4.5 10/30/2020    BILITOT 0.6 10/30/2020    ALKPHOS 57 10/30/2020    AST 16 10/30/2020    ALT 14 10/30/2020    LABGLOM >60 10/30/2020    GFRAA >59 10/30/2020    GLOB 1.8 11/19/2016     Lab Results   Component Value Date    INR 0.97 10/27/2020    INR 0.99 10/25/2020    INR 0.96 10/24/2020    PROTIME 12.7 10/27/2020    PROTIME 12.9 10/25/2020    PROTIME 12.7 10/24/2020       Endy Valentino    Student Physical Therapist    Physical Therapy    Progress Notes    Signed    Date of Service:  10/29/2020  2:43 PM                Signed              Show:Clear all  [x]Manual[x]Template[]Copied    Added by:  Sofie Giron    []Elliot wesley details     Physical Therapy Evaluation Note  DATE:  10/29/2020  NAME:  Linda Bernard  :  1945  (75 y.o.,female)  MRN:  [id number]     HEIGHT:  Height: 5' 5\" (165.1 cm)  WEIGHT:  Weight: 142 lb 9.6 oz (64.7 kg)     PATIENT DIAGNOSIS(ES):    Diagnosis: TBI, subarachnoid hemorrhage R frontal area  Additional Pertinent Hx: Asthma, CAD, CP, depression, anxiety, DM, GERD, hyperlipidemia, HTN, lupus, malaise and fatigue, movement disorder, seizure, OA  RESTRICTIONS/PRECAUTIONS:    Restrictions/Precautions  Restrictions/Precautions: Fall Risk, Seizure  Required Braces or Orthoses?: No  OVERALL  ORIENTATION STATUS:  PAIN:  Pain Level: 7  Pain Type: Acute pain    Pain Location: Neck            NEUROLOGICAL                 Sensation  Overall Sensation Status: WNL(Intact light touch B L3-S2)  STRENGTH  Strength RLE  R Hip Flexion: 3+/5  R Hip ABduction: 3+/5  R Hip ADduction: 3+/5  R Knee Extension: 3+/5  Strength LLE  L Hip Flexion: 3+/5  L Hip ABduction: 3+/5  L Hip ADduction: 3+/5  L Knee Extension: 3+/5  ROM  AROM RLE (degrees)  RLE AROM: WFL  AROM LLE (degrees)  LLE AROM : WFL  POSTURE/BALANCE  Balance  Sitting - Static: Good  Sitting - Dynamic: Good  Standing - Static: Fair  Standing - Dynamic: Fair(Pt able to don pants with CGA)       ACTIVITY TOLERANCE  Activity Tolerance  Activity Tolerance: Treatment limited secondary to medical complications (free text)      BED MOBILITY  Bed Mobility  Rolling: Stand by assistance  Supine to Sit: Stand by assistance  Sit to Supine: Stand by assistance  Scooting: Contact guard assistance  TRANSFERS  Sit to Stand: Contact guard assistance      Bed to Chair: Contact guard assistance        WHEELCHAIR PROPULSION 1  WHEELCHAIR PROPULSION 2  AMBULATION 1  Ambulation 1  Surface: level tile  Device: Rolling Walker  Other Apparatus: Wheelchair follow  Assistance: Contact guard assistance  Distance: 3 steps  Comments: Pt began feeling nauseated and could not tolerate any further ambulation  AMBULATION 2  STAIRS     GOALS:  Short term goals  Time Frame for Short term goals: 1-2 weeks  Short term goal 1: Pt will perform all on bed mobility with supervision  Short term goal 2: Pt will perform sit to stand and stand to sit transfer with RW and supervision  Short term goal 3: Pt will propel W/C 76' with supervision  Short term goal 4: Pt will ambulate 76' with RW and SBA  Short term goal 5: Pt will navigate one step with CGA     Long term goals  Time Frame for Long term goals : 2-3 weeks  Long term goal 1: Pt will perform all on bed mobility independently  Long term goal 2: Pt will perform sit to stand and stand to sit transfer with RW independently  Long term goal 3: Pt will propel W/C 150' windependently  Long term goal 4: Pt will ambulate 150' with RW independently  Long term goal 5: Pt will navigate one step with supervision  HOME LIVING:  Type of Home: House  Home Layout: One level(some steps in house)  Home Access: Ramped entrance  ASSESSMENT (IMPAIRMENTS/BARRIERS): Body structures, Functions, Activity limitations: Decreased functional mobility , Decreased ADL status, Decreased ROM, Decreased strength, Decreased cognition, Decreased endurance, Decreased balance  Assessment: Pt unable to complete evaluation d/t nausea and vomiting. Pt began vomiting after ambulation.   Activity Tolerance: Treatment limited secondary to medical complications (free text)  PLAN:  Plan  Times per week: 5-6  Times per day: Twice a day  Plan weeks: 2-3  Current Treatment Recommendations: Strengthening, ROM, Balance Training, Functional Mobility Training, Transfer Training, ADL/Self-care Training, Cognitive/Perceptual Training, Endurance Training, Gait Training, Stair training, Wheelchair Mobility Training, Safety Education & Training  Discharge Recommendations: Home with Home health PT  PATIENT REQUIRES AND IS REASONABLY EXPECTED TO ACTIVELY PARTICIPATE IN AT LEAST 3 HOURS OF INTENSIVE THERAPY PER DAY AT LEAST 5 DAYS PER WEEK, AND BE EXPECTED TO MAKE MEASURABLE IMPROVEMENT THAT WILL BE OF PRACTICAL VALUE TO IMPROVE THE PATIENT'S FUNCTIONAL CAPACITY OR ADAPTATION TO IMPAIRMENTS.    PATIENT GOAL FOR REHAB:  RETURN TO PRIOR LEVEL OF FUNCTION         IRF/SULEMA  Roll Left and Right  Assistance Needed: Supervision or touching assistance  CARE Score: 4  Discharge Goal: Independent     Sit to Lying  Assistance Needed: Supervision or touching assistance  CARE Score: 4  Discharge Goal: Independent     Lying to Sitting on Side of Bed  Assistance Needed: Supervision or touching assistance  CARE Score: 4  Discharge Goal: Independent     Sit to Stand  Assistance Needed: Supervision or touching assistance  CARE Score: 4  Discharge Goal: Independent     Chair/Bed-to-Chair Transfer  Assistance Needed: Supervision or touching assistance  CARE Score: 4  Discharge Goal: Independent     Car Transfer  Reason if not Attempted: Not attempted due to medical condition or safety concerns  CARE Score: 88  Discharge Goal: Independent     Walk 10 Feet  Reason if not Attempted: Not attempted due to medical condition or safety concerns  CARE Score: 88  Discharge Goal: Not Attempted     Walk 50 Feet with Two Turns  Reason if not Attempted: Not attempted due to medical condition or safety concerns  CARE Score: 88  Discharge Goal: Not Attempted     Walk 150 Feet  Reason if not Attempted: Not attempted due to medical condition or safety concerns  CARE Score: 88  Discharge Goal: Not Attempted     Walking 10 Feet on Uneven Surfaces  Reason if not Attempted: Not attempted due to medical condition or safety concerns  CARE Score: 88  Discharge Goal: Not Attempted     1 Step (Curb)  Reason if not Attempted: Not attempted due to medical condition or safety concerns  CARE Score: 88  Discharge Goal: Not Attempted     4 Steps  Reason if not Attempted: Not attempted due to medical condition or safety concerns  CARE Score: 88  Discharge Goal: Not Attempted     12 Steps  Reason if not Attempted: Not attempted due to medical condition or safety concerns  CARE Score: 88  Discharge Goal: Independent     Wheel 50 Feet with Two Turns  Reason if not Attempted: Not attempted due to medical condition or safety concerns  CARE Score: 88  Discharge Goal: Independent     Wheel 150 Feet  Reason if not Attempted: Not attempted due to medical condition or safety concerns  CARE Score: 88  Discharge Goal: Independent        LAST TREATMENT TIME                                                    Cosigned by: Oumou Almanza, PT at 10/29/2020  3:02 PM    Revision History                             RECORD REVIEW: Previous medical records, medications were reviewed at today's visit    IMPRESSION:   1. S/P Traumatic subarachnoid hemorrhage-off ASA/Plavix-monitor  2. HTN-on meds monitor  3. DM-monitor BS  4. Seizures-on Keppra  5. CAD-on Ramexa-Plavix and ASA on hold  6. Nausea and vomiting post ICH-repeat CT stable-Zofran/Phenergan PRN  7. GI-bowel regimen/PPI  8. Insomnia-Trazodone qhs  9. Headache-Tramadol/Tylenol PRN  10. Anxiety-Xanax PRN  11. History of lupus-monitor  12. PT/OT/Speech  13.  UTI-complete abx     Dizziness/nausea with movement-Zofran TID  Continue current care      Expected duration and frequency therapy: 180 minutes per day, 5 days per week    [address] [facility]  [phone] CELL  Dr Melania Green

## 2020-10-31 NOTE — PROGRESS NOTES
Name: Giovana Rose  MRN:  [id number]  Date of service:  10/31/2020     10/31/20 0920   Restrictions/Precautions   Restrictions/Precautions Fall Risk;Seizure   Required Braces or Orthoses? No   General   Chart Reviewed Yes   Subjective   Subjective Pt states that she is feeling a little nauseous this morning but it has been manageable so far. Pain Screening   Patient Currently in Pain Yes   Intervention List Patient able to continue with treatment   Pain Assessment   Pain Assessment 0-10   Pain Level 1   Pain Location Leg   Pain Orientation Right;Left   Pain Descriptors Sore  (sore from ex yesterday)   Bed Mobility   Supine to Sit Stand by assistance   Sit to Supine Stand by assistance   Scooting Stand by assistance   Transfers   Sit to Stand Contact guard assistance   Stand to sit Contact guard assistance   Bed to Chair Contact guard assistance   Comment Pt able to perform toileting and clean up IND just req CGA for standing balance to pull up pants and to wash hands at sink. Ambulation   Ambulation? Yes   Ambulation 1   Surface level tile   Device Rolling Walker   Other Apparatus Wheelchair follow   Assistance Contact guard assistance   Gait Deviations Slow Carmenza;Decreased step length;Decreased step height   Distance 50'   Comments No nausea with amb,pt feeling very fatigued towards end of amb   Exercises   Comments Seated BLE ther ex in all planes x20 reps AROM   Short term goals   Time Frame for Short term goals 1-2 weeks   Short term goal 1 Pt will perform all on bed mobility with supervision   Short term goal 2 Pt will perform sit to stand and stand to sit transfer with RW and supervision   Short term goal 3 Pt will propel W/C 76' with supervision   Short term goal 4 Pt will ambulate 76' with RW and SBA   Short term goal 5 Pt will navigate one step with CGA   Conditions Requiring Skilled Therapeutic Intervention   Body structures, Functions, Activity limitations Decreased functional mobility ; Decreased ADL status; Decreased ROM; Decreased strength;Decreased cognition;Decreased endurance;Decreased balance   Assessment Pt having some nausea noted upon first sitting up but able to tolerate entire tx without increased nausea. Pt able to increase amb distance to 50' CGA with rwx but becomes very fatigued following amb. Pt able to perform toilet TF at the end of tx and successfully void, performs all clean up IND. Pt would cont to benefit from skilled therapy services to address remaining impairments.    Activity Tolerance   Activity Tolerance Patient Tolerated treatment well;Patient limited by fatigue   Safety Devices   Type of devices Bed alarm in place;Call light within reach;Gait belt;Left in bed;Nurse notified         Electronically signed by Abilio Wallace PTA on 10/31/2020 at 10:03 AM

## 2020-10-31 NOTE — PLAN OF CARE
Problem: Falls - Risk of:  Goal: Will remain free from falls  Description: Will remain free from falls  Outcome: Ongoing  Goal: Absence of physical injury  Description: Absence of physical injury  Outcome: Ongoing     Problem: Skin Integrity:  Goal: Will show no infection signs and symptoms  Description: Will show no infection signs and symptoms  Outcome: Ongoing  Goal: Absence of new skin breakdown  Description: Absence of new skin breakdown  Outcome: Ongoing     Problem: Infection:  Goal: Will remain free from infection  Description: Will remain free from infection  Outcome: Ongoing     Problem: Safety:  Goal: Free from accidental physical injury  Description: Free from accidental physical injury  Outcome: Ongoing  Goal: Free from intentional harm  Description: Free from intentional harm  Outcome: Ongoing     Problem: Daily Care:  Goal: Daily care needs are met  Description: Daily care needs are met  Outcome: Ongoing     Problem: Pain:  Goal: Patient's pain/discomfort is manageable  Description: Patient's pain/discomfort is manageable  Outcome: Ongoing  Goal: Pain level will decrease  Description: Pain level will decrease  Outcome: Ongoing     Problem: Skin Integrity:  Goal: Skin integrity will stabilize  Description: Skin integrity will stabilize  Outcome: Ongoing     Problem: Discharge Planning:  Goal: Patients continuum of care needs are met  Description: Patients continuum of care needs are met  Outcome: Ongoing     Problem: Nutrition  Goal: Optimal nutrition therapy  Outcome: Ongoing     Problem: Serum Glucose Level - Abnormal:  Goal: Ability to maintain appropriate glucose levels has stabilized  Description: Ability to maintain appropriate glucose levels has stabilized  Outcome: Ongoing     Problem: Mood - Altered:  Goal: Mood stable  Description: Mood stable  Outcome: Ongoing     Problem: Anxiety:  Goal: Level of anxiety will decrease  Description: Level of anxiety will decrease  Outcome: Ongoing

## 2020-10-31 NOTE — PROGRESS NOTES
catheterization (1/25/2016); Cardiac catheterization (2/19/16); Cardiac catheterization (08/24/2017); Coronary angioplasty (08/2017); Diagnostic Cardiac Cath Lab Procedure; eye surgery (03/2019); and Cardiac catheterization (04/13/2020). Restrictions  Restrictions/Precautions  Restrictions/Precautions: Fall Risk, Seizure  Required Braces or Orthoses?: No  Subjective   General  Chart Reviewed: Yes  Patient assessed for rehabilitation services?: Yes  Diagnosis: Subarachnoid hemorrhage  Vital Signs  Patient Currently in Pain: No   Orientation     Objective             Standing Balance  Time: 5 mins  Activity: 2 handed task with beginning level tasks with SBA  Functional Mobility  Functional - Mobility Device: Rolling Walker  Assist Level: Contact guard assistance  Functional Mobility Comments: difficulty with turns, modulates her speed well during activities, performed 5 short term tasks  Bed mobility  Supine to Sit: Supervision  Sit to Supine: Supervision          10/31/20 1530   Lower Body Dressing   Assistance Needed Supervision or touching assistance   CARE Score 4   Putting On/Taking Off Footwear   Assistance Needed Supervision or touching assistance   CARE Score 4                                            Type of ROM/Therapeutic Exercise  Type of ROM/Therapeutic Exercise: Free weights(2# for multiple exercises, adapted for LUE)                    Plan   Plan  Current Treatment Recommendations: Gait Training, Strengthening, Patient/Caregiver Education & Training, Home Management Training, Equipment Evaluation, Education, & procurement, Balance Training, Functional Mobility Training, Endurance Training, Self-Care / ADL, Safety Education & Training  G-Code     OutComes Score                                                  AM-PAC Score             Goals  Short term goals  Time Frame for Short term goals: 1 week  Short term goal 1: Supervision with toilet hygiene.   Short term goal 2: Supervision with showering/bathing. Short term goal 3: Supervision with UB dressing. Short term goal 4: Supervision with LB dressing. Short term goal 5: Supervision with putting on/taking off footwear. Short term goal 6: Supervision with toilet transfers. Short term goal 7: Min A with picking up objects from floor with AE as needed. Long term goals  Time Frame for Long term goals : 2 weeks  Long term goal 1: Modified Independent with toilet hygiene. Long term goal 2: Modified Independent with showering/bathing. Long term goal 3: Independent with UB dressing. Long term goal 4: Modified Independent with LB dressing. Long term goal 5: Modified Independent with putting on/taking off footwear. Long term goals 6: Modified Independent with toilet transfers. Long term goal 8: Modified Independent with picking up objects from floor with AE as needed. Long term goal 9: Verbalize DME needs. Long term goal 10: Complete IADL/homemaking task Modified St[place].        Therapy Time   Individual Concurrent Group Co-treatment   Time In 1300         Time Out 1400         Minutes 60         Timed Code Treatment Minutes: 75 Hugh Tripathi OT Electronically signed by Yan Hoyt OT on 10/31/2020 at 3:33 PM

## 2020-10-31 NOTE — PROGRESS NOTES
[facility]  INPATIENT SPEECH THERAPY  [facility] 8 REHAB UNIT  TIME   Time In: 1100  Time Out: 1200  Minutes: 60       [x]Daily Note  []Progress Note    Date: 10/31/2020  Patient Name: Mauro Scott        MRN: [id number]    Account #: [de-identified]  : 1945  (76 y.o.)  Gender: female   Primary Provider: Carlitos Dave MD  Precautions: Fall/aspiration   Swallowing Status/Diet: Regular diet with thin liquids      Subjective: Patient alert and cooperative with all therapy tasks. Patients daughter present for therapy. Objective:  Patient completed sequencing task with picture cards related to functional ADL's. Patient completed with 100% accuracy, independently. Patient completed category organization task to address naming, and processing. Patient completed in timely manner and attention was [facility] with several environmental and auditory distractions present. Patient following simple commands within structured task at 100% accuracy. Patient tolerating sips of thin liquids via straw throughout the session with no overt s/sof aspiration. Patient continues to progress towards goals. SHORT TERM GOAL #1:  Goal 1: The patient will complete simple/complex naming tasks with min verbal cues at 80% accuracy in order to improve verbal expression and thought organization for functional communication. SHORT TERM GOAL #2:  Goal 2: The patient will follow multi step commands during daily activities with min verbal cues at 100% accuracy in order to improve safety with functional ADL's and decreased assistance from caregivers. SHORT TERM GOAL #3:  Goal 3: The patient will complete attention/processing tasks with 80% accuracy with min verbal cues. SHORT TERM GOAL #4:  Goal 4: The patient will complete executive function tasks (planning, organizing, self monitoring, etc) with min verbal cues at 80% accuracy in order to improve safety awareness with functional ADL's. SHORT TERM GOAL #5:  Goal 5:  The

## 2020-11-01 LAB
GLUCOSE BLD-MCNC: 103 MG/DL (ref 70–99)
GLUCOSE BLD-MCNC: 121 MG/DL (ref 70–99)
GLUCOSE BLD-MCNC: 122 MG/DL (ref 70–99)
GLUCOSE BLD-MCNC: 257 MG/DL (ref 70–99)
PERFORMED ON: ABNORMAL

## 2020-11-01 PROCEDURE — 1180000000 HC REHAB R&B

## 2020-11-01 PROCEDURE — 6370000000 HC RX 637 (ALT 250 FOR IP): Performed by: FAMILY MEDICINE

## 2020-11-01 PROCEDURE — 2580000003 HC RX 258: Performed by: FAMILY MEDICINE

## 2020-11-01 PROCEDURE — 82947 ASSAY GLUCOSE BLOOD QUANT: CPT

## 2020-11-01 PROCEDURE — 51798 US URINE CAPACITY MEASURE: CPT

## 2020-11-01 PROCEDURE — 99232 SBSQ HOSP IP/OBS MODERATE 35: CPT | Performed by: PSYCHIATRY & NEUROLOGY

## 2020-11-01 PROCEDURE — 6360000002 HC RX W HCPCS: Performed by: FAMILY MEDICINE

## 2020-11-01 PROCEDURE — 6370000000 HC RX 637 (ALT 250 FOR IP): Performed by: PSYCHIATRY & NEUROLOGY

## 2020-11-01 RX ADMIN — ISOSORBIDE MONONITRATE 60 MG: 60 TABLET, EXTENDED RELEASE ORAL at 08:30

## 2020-11-01 RX ADMIN — LEVETIRACETAM 500 MG: 500 TABLET ORAL at 08:30

## 2020-11-01 RX ADMIN — ONDANSETRON HYDROCHLORIDE 4 MG: 4 TABLET, FILM COATED ORAL at 08:30

## 2020-11-01 RX ADMIN — PIPERACILLIN SODIUM AND TAZOBACTAM SODIUM 3.38 G: 3; .375 INJECTION, POWDER, LYOPHILIZED, FOR SOLUTION INTRAVENOUS at 19:05

## 2020-11-01 RX ADMIN — PIPERACILLIN SODIUM AND TAZOBACTAM SODIUM 3.38 G: 3; .375 INJECTION, POWDER, LYOPHILIZED, FOR SOLUTION INTRAVENOUS at 12:02

## 2020-11-01 RX ADMIN — RANOLAZINE 1000 MG: 500 TABLET, FILM COATED, EXTENDED RELEASE ORAL at 08:30

## 2020-11-01 RX ADMIN — LEVETIRACETAM 500 MG: 500 TABLET ORAL at 20:30

## 2020-11-01 RX ADMIN — ONDANSETRON HYDROCHLORIDE 4 MG: 4 TABLET, FILM COATED ORAL at 12:02

## 2020-11-01 RX ADMIN — METOPROLOL SUCCINATE 50 MG: 50 TABLET, EXTENDED RELEASE ORAL at 08:30

## 2020-11-01 RX ADMIN — PIPERACILLIN SODIUM AND TAZOBACTAM SODIUM 3.38 G: 3; .375 INJECTION, POWDER, LYOPHILIZED, FOR SOLUTION INTRAVENOUS at 02:34

## 2020-11-01 RX ADMIN — TRAZODONE HYDROCHLORIDE 50 MG: 50 TABLET ORAL at 20:30

## 2020-11-01 RX ADMIN — ONDANSETRON HYDROCHLORIDE 4 MG: 4 TABLET, FILM COATED ORAL at 17:44

## 2020-11-01 RX ADMIN — PANTOPRAZOLE SODIUM 40 MG: 40 TABLET, DELAYED RELEASE ORAL at 05:29

## 2020-11-01 RX ADMIN — RANOLAZINE 1000 MG: 500 TABLET, FILM COATED, EXTENDED RELEASE ORAL at 20:30

## 2020-11-01 RX ADMIN — ALPRAZOLAM 0.12 MG: 0.25 TABLET ORAL at 20:30

## 2020-11-01 ASSESSMENT — PAIN SCALES - GENERAL: PAINLEVEL_OUTOF10: 0

## 2020-11-01 ASSESSMENT — PAIN DESCRIPTION - PROGRESSION: CLINICAL_PROGRESSION: GRADUALLY IMPROVING

## 2020-11-01 NOTE — PROGRESS NOTES
Family Medicine Progress Note    Patient:  Nai Reeder  YOB: 1945    MRN: [id number]     Acct: [de-identified]     Admit date: 10/27/2020    Patient Seen, Chart, Consults notes, Labs, Radiology studies reviewed. Subjective: Day 5 of stay with subarachnoid hemorrhage and most recent (in last 24 hours) has had pretty good day yesterday. Participated in therapy 3 times. Continues have intermittent dizziness. Blood pressure adjusted today by rehab attending. Past, Family, Social History unchanged from admission. Diet:  DIET GENERAL;    Medications:  Scheduled Meds:   ondansetron  4 mg Oral TID WC    piperacillin-tazobactam  3.375 g Intravenous Q8H    pantoprazole  40 mg Oral QAM AC    insulin lispro  0-6 Units Subcutaneous BID    isosorbide mononitrate  60 mg Oral Daily    levETIRAcetam  500 mg Oral BID    metoprolol succinate  50 mg Oral Daily    ranolazine  1,000 mg Oral BID    traZODone  50 mg Oral Nightly     Continuous Infusions:  PRN Meds:melatonin, promethazine, ondansetron, senna, glucagon (rDNA), glucose, acetaminophen, ALPRAZolam, cyclobenzaprine, nitroGLYCERIN, polyethylene glycol, traMADol    Objective:    Vitals: /74   Pulse 74   Temp 97.2 °F (36.2 °C)   Resp 20   Ht 5' 5\" (1.651 m)   Wt 148 lb 9.6 oz (67.4 kg)   SpO2 97%   BMI 24.73 kg/m²   24 hour intake/output:    Intake/Output Summary (Last 24 hours) at 11/1/2020 1046  Last data filed at 11/1/2020 1032  Gross per 24 hour   Intake 870 ml   Output --   Net 870 ml     Last 3 weights: Wt Readings from Last 3 Encounters:   11/01/20 148 lb 9.6 oz (67.4 kg)   10/25/20 144 lb 3.2 oz (65.4 kg)   07/21/20 141 lb 8 oz (64.2 kg)       Physical Exam:    General Appearance:  awake, alert, oriented, in no acute distress  Skin:  negatives: mobility and turgor normal  Eyes:  No gross abnormalities. Neck:  neck- supple, no mass, non-tender  Lungs:  Normal expansion. Clear to auscultation.   No rales, rhonchi, or wheezing. Heart:  Heart regular rate and rhythm  Abdomen: Auscultation: Normal bowel sounds. No bruits. Palpation: No masses, tenderness or organomegally. Extremities: Extremities warm to touch, pink, with no edema.   Musculoskeletal:  negative  Neurologic: No gross focal deficits    CBC with Differential:    Lab Results   Component Value Date    WBC 5.0 10/29/2020    RBC 4.16 10/29/2020    HGB 12.7 10/29/2020    HCT 37.5 10/29/2020    HCT 32.4 05/19/2011     10/29/2020     05/19/2011    MCV 90.1 10/29/2020    MCH 30.5 10/29/2020    MCHC 33.9 10/29/2020    RDW 13.2 10/29/2020    LYMPHOPCT 26.2 10/29/2020    MONOPCT 11.7 10/29/2020    EOSPCT 2.8 05/19/2011    BASOPCT 0.6 10/29/2020    MONOSABS 0.60 10/29/2020    LYMPHSABS 1.3 10/29/2020    EOSABS 0.20 10/29/2020    BASOSABS 0.00 10/29/2020     CMP:    Lab Results   Component Value Date     10/30/2020     05/19/2011    K 4.0 10/30/2020    K 3.7 10/29/2020    K 4.2 05/19/2011    CL 97 10/30/2020     05/19/2011    CO2 24 10/30/2020    BUN 17 10/30/2020    CREATININE 0.7 10/30/2020    CREATININE 1.0 05/19/2011    GFRAA >59 10/30/2020    LABGLOM >60 10/30/2020    GLUCOSE 109 10/30/2020    PROT 6.5 10/30/2020    PROT 7.2 03/21/2013    LABALBU 4.5 10/30/2020    LABALBU 4.1 05/19/2011    CALCIUM 9.4 10/30/2020    BILITOT 0.6 10/30/2020    ALKPHOS 57 10/30/2020    ALKPHOS 37 05/19/2011    AST 16 10/30/2020    ALT 14 10/30/2020     Last 3 Troponin:    Lab Results   Component Value Date    TROPONINI 0.01 07/20/2020    TROPONINI <0.01 05/28/2020    TROPONINI <0.01 01/10/2019     Urine Culture:  No components found for: CURINE  Blood Culture:  No components found for: CBLOOD, CFUNGUSBL  Stool Culture:  No components found for: CSTOOL    Assessment:    Principal Problem:    Subarachnoid hemorrhage following injury, no loss of consciousness ([facility] [address]  Active Problems:    Coronary artery disease of native artery of native heart with stable angina pectoris (HCC)    Osteoarthritis    GERD (gastroesophageal reflux disease)    Glucose intolerance (impaired glucose tolerance)    Depression with anxiety    CAD (coronary artery disease)    Hyperlipidemia    Hypertension    Hx of CABG    Bilateral carotid artery stenosis    Altered mental status    Subarachnoid hemorrhage (HCC)    SLE (systemic lupus erythematosus) (HCC)    Intracranial bleed (HCC)    Post-traumatic headache    Acute cystitis without hematuria  Resolved Problems:    * No resolved hospital problems. *          Plan:  Agree with adjustment of blood pressure medicine. Continue with rehab protocols. Overall she reports gradual improvement and I agree.       Electronically signed by Tomer Meredith MD on 11/1/2020 at 10:46 AM

## 2020-11-01 NOTE — PROGRESS NOTES
ENDOSCOPY, COLON, DIAGNOSTIC      EYE SURGERY  03/2019    cataracts   [address]    Partial    NASAL SEPTUM SURGERY  1977    SINUS SURGERY  2003    Nasal Polyps Removed    SPINE SURGERY  3/6/2006    Lumbar Laminectomy L4&5    SPINE SURGERY  3/6/2006    Cervical Fusion - C4,5,6    TONSILLECTOMY  1968       Medications in Hospital:      Current Facility-Administered Medications:     ondansetron (ZOFRAN) tablet 4 mg, 4 mg, Oral, TID WC, Regina Skinner MD, 4 mg at 11/01/20 1202    piperacillin-tazobactam (ZOSYN) 3.375 g in dextrose 5 % 50 mL IVPB extended infusion (mini-bag), 3.375 g, Intravenous, Q8H, Phil Chopra MD, Stopped at 11/01/20 1632    melatonin tablet 1 mg, 1 mg, Oral, Nightly PRN, Phil Chopra MD    promethazine (PHENERGAN) tablet 12.5 mg, 12.5 mg, Oral, Q6H PRN, Regina Skinner MD, 12.5 mg at 10/28/20 0928    pantoprazole (PROTONIX) tablet 40 mg, 40 mg, Oral, QAM AC, Regina Skinner MD, 40 mg at 11/01/20 0529    ondansetron (ZOFRAN) injection 4 mg, 4 mg, Intravenous, Q4H PRN, Regina Skinner MD, 4 mg at 10/30/20 1143    senna (SENOKOT) tablet 8.6 mg, 1 tablet, Oral, Daily PRN, Phil Chopra MD    glucagon (rDNA) injection 1 mg, 1 mg, Intramuscular, PRN, Phil Chopra MD    glucose (GLUTOSE) 40 % oral gel 15 g, 15 g, Oral, PRN, Phil Chopra MD    acetaminophen (TYLENOL) tablet 650 mg, 650 mg, Oral, Q8H PRN, Phil Chopra MD, 650 mg at 10/28/20 0530    ALPRAZolam [facility]) tablet 0.125 mg, 0.125 mg, Oral, Daily PRN, Phil Chorpa MD, 0.125 mg at 10/31/20 2037    cyclobenzaprine (FLEXERIL) tablet 10 mg, 10 mg, Oral, TID PRN, Phil Chopra MD    insulin lispro (HUMALOG) injection vial 0-6 Units, 0-6 Units, Subcutaneous, BID, Phil Chopra MD    isosorbide mononitrate (IMDUR) extended release tablet 60 mg, 60 mg, Oral, Daily, Phil Chopra MD, 60 mg at 11/01/20 0830    levETIRAcetam (KEPPRA) tablet 500 mg, 500 mg, Oral, BID, Vero Wolf MD, 500 mg at 11/01/20 0830    metoprolol succinate (TOPROL XL) extended release tablet 50 mg, 50 mg, Oral, Daily, Vero Wolf MD, 50 mg at 11/01/20 0830    nitroGLYCERIN (NITROSTAT) SL tablet 0.4 mg, 0.4 mg, Sublingual, Q5 Min PRN, Vero Wolf MD    ranolazine [facility]) extended release tablet 1,000 mg, 1,000 mg, Oral, BID, Vero Wolf MD, 1,000 mg at 11/01/20 0830    traZODone (DESYREL) tablet 50 mg, 50 mg, Oral, Nightly, Vero Wolf MD, 50 mg at 10/31/20 2037    polyethylene glycol (GLYCOLAX) packet 17 g, 17 g, Oral, Daily PRN, Kyler Schneider MD    traMADol (ULTRAM) tablet 50 mg, 50 mg, Oral, Q6H PRN, Kyler Schneider MD, 50 mg at 10/28/20 0530    Allergies: Other; Codeine; Darvocet [propoxyphene n-acetaminophen]; Hydrocodone-acetaminophen; Lyrica [pregabalin]; Morphine; and Percocet [oxycodone-acetaminophen]    Social History:   TOBACCO:   reports that she quit smoking about 53 years ago. Her smoking use included cigarettes. She has a 1.00 pack-year smoking history. She has never used smokeless tobacco.  ETOH:   reports no history of alcohol use. Family History:       Problem Relation Age of Onset    Heart Disease Mother     High Blood Pressure Mother     Diabetes Mother     High Cholesterol Mother     Diabetes Brother          PHYSICAL EXAM:  /74   Pulse 74   Temp 97.2 °F (36.2 °C)   Resp 20   Ht 5' 5\" (1.651 m)   Wt 148 lb 9.6 oz (67.4 kg)   SpO2 97%   BMI 24.73 kg/m²       Constitutional - well developed, well nourished.    Eyes - conjunctiva normal.   Ear, nose, throat - No scars, masses, or lesions over external nose or ears, no atrophy of tongue  Neck-symmetric, no masses noted, no jugular vein distension  Respiration- chest wall appears symmetric, good expansion,   normal effort without use of accessory muscles  Musculoskeletal - no significant wasting of muscles noted, no bony deformities  Extremities-no clubbing, cyanosis or edema  Skin - warm, dry, and intact. No rash, erythema, or pallor. Psychiatric - mood, affect, and behavior appear normal.      Neurological exam  Awake,  fluent oriented slow  Attention and concentration appear mildly impaired  Recent and remote memory appears mildly impaired  Speech normal without dysarthria  No clear issues with language of fund of knowledge     Cranial Nerve Exam     CN III, IV,VI-EOMI, No nystagmus, conjugate eye movements, no ptosis    CN VII-no facial assymetry       Motor Exam  antigravity throughout upper and lower extremities bilaterally      Tremors- no tremors in hands or head noted     Gait  Not tested      Nursing/pcp notes, imaging,labs and vitals reviewed.      PT,OT and/or speech notes reviewed    Lab Results   Component Value Date    WBC 5.0 10/29/2020    HGB 12.7 10/29/2020    HCT 37.5 10/29/2020    MCV 90.1 10/29/2020     10/29/2020     Lab Results   Component Value Date     (L) 10/30/2020    K 4.0 10/30/2020    CL 97 (L) 10/30/2020    CO2 24 10/30/2020    BUN 17 10/30/2020    CREATININE 0.7 10/30/2020    GLUCOSE 109 10/30/2020    CALCIUM 9.4 10/30/2020    PROT 6.5 (L) 10/30/2020    LABALBU 4.5 10/30/2020    BILITOT 0.6 10/30/2020    ALKPHOS 57 10/30/2020    AST 16 10/30/2020    ALT 14 10/30/2020    LABGLOM >60 10/30/2020    GFRAA >59 10/30/2020    GLOB 1.8 2016     Lab Results   Component Value Date    INR 0.97 10/27/2020    INR 0.99 10/25/2020    INR 0.96 10/24/2020    PROTIME 12.7 10/27/2020    PROTIME 12.9 10/25/2020    PROTIME 12.7 10/24/2020       Mickel Severance    Student Physical Therapist    Physical Therapy    Progress Notes    Signed    Date of Service:  10/29/2020  2:43 PM                Signed              Show:Clear all  [x]Manual[x]Template[]Copied    Added by:  Swathi Rausch    []Elliot for details     Physical Therapy Evaluation Note  DATE:  10/29/2020  NAME:  Jose León  :  1945  (75 y.o.,female)  MRN:  [id number]     HEIGHT:  Height: 5' 5\" IMPAIRMENTS.    PATIENT GOAL FOR REHAB:  RETURN TO PRIOR LEVEL OF FUNCTION         IRF/SULEMA  Roll Left and Right  Assistance Needed: Supervision or touching assistance  CARE Score: 4  Discharge Goal: Independent     Sit to Lying  Assistance Needed: Supervision or touching assistance  CARE Score: 4  Discharge Goal: Independent     Lying to Sitting on Side of Bed  Assistance Needed: Supervision or touching assistance  CARE Score: 4  Discharge Goal: Independent     Sit to Stand  Assistance Needed: Supervision or touching assistance  CARE Score: 4  Discharge Goal: Independent     Chair/Bed-to-Chair Transfer  Assistance Needed: Supervision or touching assistance  CARE Score: 4  Discharge Goal: Independent     Car Transfer  Reason if not Attempted: Not attempted due to medical condition or safety concerns  CARE Score: 88  Discharge Goal: Independent     Walk 10 Feet  Reason if not Attempted: Not attempted due to medical condition or safety concerns  CARE Score: 88  Discharge Goal: Not Attempted     Walk 50 Feet with Two Turns  Reason if not Attempted: Not attempted due to medical condition or safety concerns  CARE Score: 88  Discharge Goal: Not Attempted     Walk 150 Feet  Reason if not Attempted: Not attempted due to medical condition or safety concerns  CARE Score: 88  Discharge Goal: Not Attempted     Walking 10 Feet on Uneven Surfaces  Reason if not Attempted: Not attempted due to medical condition or safety concerns  CARE Score: 88  Discharge Goal: Not Attempted     1 Step (Curb)  Reason if not Attempted: Not attempted due to medical condition or safety concerns  CARE Score: 88  Discharge Goal: Not Attempted     4 Steps  Reason if not Attempted: Not attempted due to medical condition or safety concerns  CARE Score: 88  Discharge Goal: Not Attempted     12 Steps  Reason if not Attempted: Not attempted due to medical condition or safety concerns  CARE Score: 88  Discharge Goal: Independent     Wheel 50 Feet with Two Turns  Reason if not Attempted: Not attempted due to medical condition or safety concerns  CARE Score: 88  Discharge Goal: Independent     Wheel 150 Feet  Reason if not Attempted: Not attempted due to medical condition or safety concerns  CARE Score: 88  Discharge Goal: Independent        LAST TREATMENT TIME                                                    Cosigned by: Eleazar Bolaños, PT at 10/29/2020  3:02 PM    Revision History                             RECORD REVIEW: Previous medical records, medications were reviewed at today's visit    IMPRESSION:   1. S/P Traumatic subarachnoid hemorrhage-off ASA/Plavix-monitor  2. HTN-on meds monitor  3. DM-monitor BS  4. Seizures-on Keppra  5. CAD-on Ramexa-Plavix and ASA on hold  6. Nausea and vomiting post ICH-repeat CT stable-Zofran/Phenergan PRN  7. GI-bowel regimen/PPI  8. Insomnia-Trazodone qhs  9. Headache-Tramadol/Tylenol PRN  10. Anxiety-Xanax PRN  11. History of lupus-monitor  12. PT/OT/Speech  13.  UTI-complete abx     Dizziness/nausea with movement-Zofran TID  Continue present care      Expected duration and frequency therapy: 180 minutes per day, 5 days per week    [address] [facility]  [phone] CELL  Dr Brooklyn Elliott

## 2020-11-02 LAB
ALBUMIN SERPL-MCNC: 3.7 G/DL (ref 3.5–5.2)
ALP BLD-CCNC: 52 U/L (ref 35–104)
ALT SERPL-CCNC: 11 U/L (ref 5–33)
ANION GAP SERPL CALCULATED.3IONS-SCNC: 10 MMOL/L (ref 7–19)
AST SERPL-CCNC: 12 U/L (ref 5–32)
BASOPHILS ABSOLUTE: 0 K/UL (ref 0–0.2)
BASOPHILS RELATIVE PERCENT: 0.9 % (ref 0–1)
BILIRUB SERPL-MCNC: 0.3 MG/DL (ref 0.2–1.2)
BUN BLDV-MCNC: 18 MG/DL (ref 8–23)
CALCIUM SERPL-MCNC: 9 MG/DL (ref 8.8–10.2)
CHLORIDE BLD-SCNC: 100 MMOL/L (ref 98–111)
CO2: 25 MMOL/L (ref 22–29)
CREAT SERPL-MCNC: 0.9 MG/DL (ref 0.5–0.9)
EOSINOPHILS ABSOLUTE: 0.2 K/UL (ref 0–0.6)
EOSINOPHILS RELATIVE PERCENT: 3.4 % (ref 0–5)
GFR AFRICAN AMERICAN: >59
GFR NON-AFRICAN AMERICAN: >60
GLUCOSE BLD-MCNC: 101 MG/DL (ref 74–109)
GLUCOSE BLD-MCNC: 106 MG/DL (ref 70–99)
GLUCOSE BLD-MCNC: 115 MG/DL (ref 70–99)
GLUCOSE BLD-MCNC: 131 MG/DL (ref 70–99)
GLUCOSE BLD-MCNC: 148 MG/DL (ref 70–99)
HCT VFR BLD CALC: 32 % (ref 37–47)
HEMOGLOBIN: 10.8 G/DL (ref 12–16)
IMMATURE GRANULOCYTES #: 0 K/UL
LYMPHOCYTES ABSOLUTE: 1.5 K/UL (ref 1.1–4.5)
LYMPHOCYTES RELATIVE PERCENT: 34 % (ref 20–40)
MCH RBC QN AUTO: 30.3 PG (ref 27–31)
MCHC RBC AUTO-ENTMCNC: 33.8 G/DL (ref 33–37)
MCV RBC AUTO: 89.9 FL (ref 81–99)
MONOCYTES ABSOLUTE: 0.4 K/UL (ref 0–0.9)
MONOCYTES RELATIVE PERCENT: 9.8 % (ref 0–10)
NEUTROPHILS ABSOLUTE: 2.3 K/UL (ref 1.5–7.5)
NEUTROPHILS RELATIVE PERCENT: 51.7 % (ref 50–65)
PDW BLD-RTO: 13.1 % (ref 11.5–14.5)
PERFORMED ON: ABNORMAL
PLATELET # BLD: 167 K/UL (ref 130–400)
PMV BLD AUTO: 9 FL (ref 9.4–12.3)
POTASSIUM REFLEX MAGNESIUM: 3.9 MMOL/L (ref 3.5–5)
RBC # BLD: 3.56 M/UL (ref 4.2–5.4)
SODIUM BLD-SCNC: 135 MMOL/L (ref 136–145)
TOTAL PROTEIN: 5.2 G/DL (ref 6.6–8.7)
WBC # BLD: 4.5 K/UL (ref 4.8–10.8)

## 2020-11-02 PROCEDURE — 6360000002 HC RX W HCPCS: Performed by: FAMILY MEDICINE

## 2020-11-02 PROCEDURE — 97129 THER IVNTJ 1ST 15 MIN: CPT

## 2020-11-02 PROCEDURE — 6370000000 HC RX 637 (ALT 250 FOR IP): Performed by: PSYCHIATRY & NEUROLOGY

## 2020-11-02 PROCEDURE — 97535 SELF CARE MNGMENT TRAINING: CPT

## 2020-11-02 PROCEDURE — 85025 COMPLETE CBC W/AUTO DIFF WBC: CPT

## 2020-11-02 PROCEDURE — 1180000000 HC REHAB R&B

## 2020-11-02 PROCEDURE — 80053 COMPREHEN METABOLIC PANEL: CPT

## 2020-11-02 PROCEDURE — 6370000000 HC RX 637 (ALT 250 FOR IP): Performed by: FAMILY MEDICINE

## 2020-11-02 PROCEDURE — 2580000003 HC RX 258: Performed by: FAMILY MEDICINE

## 2020-11-02 PROCEDURE — 82947 ASSAY GLUCOSE BLOOD QUANT: CPT

## 2020-11-02 PROCEDURE — 97130 THER IVNTJ EA ADDL 15 MIN: CPT

## 2020-11-02 PROCEDURE — 97116 GAIT TRAINING THERAPY: CPT

## 2020-11-02 PROCEDURE — 97110 THERAPEUTIC EXERCISES: CPT

## 2020-11-02 PROCEDURE — 99232 SBSQ HOSP IP/OBS MODERATE 35: CPT | Performed by: PSYCHIATRY & NEUROLOGY

## 2020-11-02 PROCEDURE — 36415 COLL VENOUS BLD VENIPUNCTURE: CPT

## 2020-11-02 RX ADMIN — RANOLAZINE 1000 MG: 500 TABLET, FILM COATED, EXTENDED RELEASE ORAL at 20:15

## 2020-11-02 RX ADMIN — LEVETIRACETAM 500 MG: 500 TABLET ORAL at 20:14

## 2020-11-02 RX ADMIN — ONDANSETRON HYDROCHLORIDE 4 MG: 4 TABLET, FILM COATED ORAL at 11:30

## 2020-11-02 RX ADMIN — METOPROLOL SUCCINATE 50 MG: 50 TABLET, EXTENDED RELEASE ORAL at 08:06

## 2020-11-02 RX ADMIN — LEVETIRACETAM 500 MG: 500 TABLET ORAL at 08:06

## 2020-11-02 RX ADMIN — RANOLAZINE 1000 MG: 500 TABLET, FILM COATED, EXTENDED RELEASE ORAL at 08:06

## 2020-11-02 RX ADMIN — TRAZODONE HYDROCHLORIDE 50 MG: 50 TABLET ORAL at 20:14

## 2020-11-02 RX ADMIN — PIPERACILLIN SODIUM AND TAZOBACTAM SODIUM 3.38 G: 3; .375 INJECTION, POWDER, LYOPHILIZED, FOR SOLUTION INTRAVENOUS at 11:30

## 2020-11-02 RX ADMIN — PIPERACILLIN SODIUM AND TAZOBACTAM SODIUM 3.38 G: 3; .375 INJECTION, POWDER, LYOPHILIZED, FOR SOLUTION INTRAVENOUS at 02:49

## 2020-11-02 RX ADMIN — ONDANSETRON HYDROCHLORIDE 4 MG: 4 TABLET, FILM COATED ORAL at 08:06

## 2020-11-02 RX ADMIN — ALPRAZOLAM 0.12 MG: 0.25 TABLET ORAL at 20:15

## 2020-11-02 RX ADMIN — PANTOPRAZOLE SODIUM 40 MG: 40 TABLET, DELAYED RELEASE ORAL at 05:41

## 2020-11-02 RX ADMIN — ONDANSETRON HYDROCHLORIDE 4 MG: 4 TABLET, FILM COATED ORAL at 17:05

## 2020-11-02 RX ADMIN — ISOSORBIDE MONONITRATE 60 MG: 60 TABLET, EXTENDED RELEASE ORAL at 08:06

## 2020-11-02 RX ADMIN — SODIUM CHLORIDE, PRESERVATIVE FREE 2 G: 5 INJECTION INTRAVENOUS at 17:05

## 2020-11-02 ASSESSMENT — PAIN - FUNCTIONAL ASSESSMENT: PAIN_FUNCTIONAL_ASSESSMENT: ACTIVITIES ARE NOT PREVENTED

## 2020-11-02 ASSESSMENT — PAIN DESCRIPTION - PROGRESSION
CLINICAL_PROGRESSION: NOT CHANGED
CLINICAL_PROGRESSION: NOT CHANGED

## 2020-11-02 ASSESSMENT — PAIN DESCRIPTION - ORIENTATION: ORIENTATION: LOWER

## 2020-11-02 ASSESSMENT — PAIN SCALES - GENERAL
PAINLEVEL_OUTOF10: 2
PAINLEVEL_OUTOF10: 0

## 2020-11-02 ASSESSMENT — PAIN DESCRIPTION - LOCATION: LOCATION: BACK

## 2020-11-02 ASSESSMENT — PAIN DESCRIPTION - FREQUENCY: FREQUENCY: CONTINUOUS

## 2020-11-02 ASSESSMENT — PAIN DESCRIPTION - ONSET: ONSET: ON-GOING

## 2020-11-02 ASSESSMENT — PAIN DESCRIPTION - DESCRIPTORS: DESCRIPTORS: ACHING

## 2020-11-02 ASSESSMENT — PAIN DESCRIPTION - PAIN TYPE: TYPE: CHRONIC PAIN

## 2020-11-02 NOTE — PROGRESS NOTES
11/02/20 1000   Restrictions/Precautions   Restrictions/Precautions Fall Risk;Seizure   Required Braces or Orthoses? No   Subjective   Subjective Pt states that so far this is one of the best days she's had in the last week. Minimal dizziness and nausea noted at start of session. Bed Mobility   Scooting Supervision   Transfers   Sit to Stand Stand by assistance   Stand to sit Stand by assistance   Bed to Chair Stand by assistance   Ambulation   Ambulation? Yes   WB Status WBAT   More Ambulation?  Yes   Ambulation 1   Surface level tile   Device Rolling Walker   Other Apparatus Wheelchair follow   Assistance Contact guard assistance   Quality of Gait Often walks w/narrow CHECO, inc endurance shown w/amb today, shows good reciprocal gait, step length is a little more equal   Gait Deviations Slow Carmenza;Deviated path   Distance 250'x2   Comments No increased dizziness or nausea w/amb   Wheelchair Activities   Propulsion Yes   Propulsion 1   Propulsion Manual   Level Level Tile   Method RUE;LUE   Level of Assistance Stand by assistance   Description/ Details Demonstrated how to turn w/DERICK UE   Distance 150'x2 w/multiple turns   Balance   Posture Good   Sitting - Static Good   Sitting - Dynamic Good   Standing - Static Fair   Standing - Dynamic Fair   Conditions Requiring Skilled Therapeutic Intervention   Assessment Pt upgraded SBA for transfers w/RW, increased endurance demonstrated w/both amb and WC propulsion, no increased dizziness or nausea noted w/activity today   Safety Devices   Type of devices   (Left w/nurses aid)     Electronically signed by MATT Mead on 11/2/2020 at 11:07 AM

## 2020-11-02 NOTE — PROGRESS NOTES
[facility]  INPATIENT SPEECH THERAPY  [facility] 8 REHAB UNIT  TIME   Time In: 1400  Time Out: 1500  Minutes: 60       [x]Daily Note  []Progress Note    Date: 2020  Patient Name: Bard Aguilar        MRN: [id number]    Account #: [de-identified]  : 1945  (76 y.o.)  Gender: female   Primary Provider: Jessy Das MD  Precautions: Fall/aspiration   Swallowing Status/Diet: Regular diet with thin liquids      Subjective: Patient alert and cooperative with all therapy tasks. Patient seen bedside for treatment. Patient stating \"I feel much better today. \"    Objective:  Patient presenting with increased recall of recent/daily events. Patient able to recall what she had completed in both OT and PT sessions. Patient also able to recall some of the staff members names independently. Patient completed simple reasoning task within structured activity. She completed independently with 92% accuracy and with min verbal cues she completed with 96% accuracy. Expressive language targeted with structured specific word finding task. Patient given a glue and she had to determine the correct item. Patient completing with 100% accuracy and in a timely manner. Divergent naming task completed where patient had to list 10 items for a grocery list. Patient able to name 10 items with 100% accuracy and timely response. After the 10 items were determined she was then asked to recall as many of the items as possible. Patient able to recall 7 of the 10 items independently and 10/10 items with min verbal cues. Functional memory task completed. Patient was given a physicians appoint to recall. She had to recall what type of appointment, what time it was, what day of the week, and what to bring. After 15 min delay she recalled 4/4 details. After 30 min delay she recalled 4/4 details. Improve noted for both expressive language and cognition today. Continue speech services at this time to continue to address cognition. SHORT TERM GOAL #1:  Goal 1: The patient will complete simple/complex naming tasks with min verbal cues at 80% accuracy in order to improve verbal expression and thought organization for functional communication. SHORT TERM GOAL #2:  Goal 2: The patient will follow multi step commands during daily activities with min verbal cues at 100% accuracy in order to improve safety with functional ADL's and decreased assistance from caregivers. SHORT TERM GOAL #3:  Goal 3: The patient will complete attention/processing tasks with 80% accuracy with min verbal cues. SHORT TERM GOAL #4:  Goal 4: The patient will complete executive function tasks (planning, organizing, self monitoring, etc) with min verbal cues at 80% accuracy in order to improve safety awareness with functional ADL's. SHORT TERM GOAL #5:  Goal 5: The patient will demonstrate functional problem solving and safety awareness with min verbal cues at 80% accuracy for daily living tasks in order to increase safe interaction with environment and decreased assistance from caregivers. not met    Swallowing Short Term Goals  Short-term Goals  Goal 1: The patient will tolerate regular diet with thin liquids with min/no overt s/s of aspiration.      ASSESSMENT:  Assessment: [x]Progressing towards goals          []Not Progressing towards goals    Patient Tolerance of Treatment:   [x]Tolerated well []Tolerated fair []Required rest breaks []Fatigued    Education:  Learner:  [x]Patient          []Significant Other          []Other  Education provided regarding:  [x]Goals and POC   []Diet and swallowing precautions    []Home Exercise Program  []Progress and/or discharge information  Method of Education:  [x]Discussion          []Demonstration          []Handout          []Other  Evaluation of Education:   []Verbalized understanding   []Demonstrates without assistance  [x]Demonstrates with assistance  []Needs further instruction     []No evidence of learning []No family present      Plan: [x]Continue with current plan of care    []Modify current plan of care as follows:    []Discharge patient    Discharge Location:    Services/Supervision Recommended:      [x]Patient continues to require treatment by a licensed therapist to address functional deficits as outlined in the established plan of care.

## 2020-11-02 NOTE — PROGRESS NOTES
Comprehensive Nutrition Assessment    Type and Reason for Visit:  Reassess    Nutrition Recommendations/Plan: Start ONS    Nutrition Assessment:  Pt improving from a nutritional standpoint with increased PO intake. Intake ranging 25-75% over the last few days. About one third of lunch meal had been consumed. We discussed pt's current eating habits at home and her lack of appetite. Pt is interesting in trying ONS here and considering using them at home as she feels she does not eat enough. Will order and continue to monitor nutrition progression. Malnutrition Assessment:  Malnutrition Status:  No malnutrition    Context:  Acute Illness       Estimated Daily Nutrient Needs:  Energy (kcal):  0862-7267; Weight Used for Energy Requirements:  Current     Protein (g):  ; Weight Used for Protein Requirements:  Ideal(1.0-2.0)        Fluid (ml/day):  1923-0719; Method Used for Fluid Requirements:  1 ml/kcal      Nutrition Related Findings:  well-nourished      Wounds:  None       Current Nutrition Therapies:    DIET GENERAL;     Anthropometric Measures:  · Height: 5' 5\" (165.1 cm)  · Current Body Weight: 148 lb 14.4 oz (67.5 kg)   · Admission Body Weight: 144 lb 12.8 oz (65.7 kg)    · Usual Body Weight: 146 lb (66.2 kg)(4/2020)     · Ideal Body Weight: 125 lbs; % Ideal Body Weight 115.8 %   · BMI: 24.8  · BMI Categories: Normal Weight (BMI 22.0 to 24.9) age over 72       Nutrition Diagnosis:   · Inadequate oral intake related to acute injury/trauma, early satiety as evidenced by intake 26-50%, poor intake prior to admission      Nutrition Interventions:   Food and/or Nutrient Delivery:  Continue Current Diet, Start Oral Nutrition Supplement  Nutrition Education/Counseling:  No recommendation at this time   Coordination of Nutrition Care:  Continue to monitor while inpatient    Goals:  PO intake >50%, wt stable, improvement in N/V       Nutrition Monitoring and Evaluation:   Behavioral-Environmental Outcomes:  None

## 2020-11-02 NOTE — PROGRESS NOTES
Occupational Therapy  Facility/Department: [facility] [place] 8 REHAB UNIT  Daily Treatment Note  NAME: Jordana Arnold  : 1945  MRN: [id number]    Date of Service: 2020    Discharge Recommendations:  Continue to assess pending progress     Assessment   Performance deficits / Impairments: Decreased ADL status; Decreased functional mobility ; Decreased endurance;Decreased balance;Decreased strength;Decreased safe awareness;Decreased high-level IADLs  Treatment Diagnosis: subarachnoid hemorrhage  Activity Tolerance  Activity Tolerance: Patient Tolerated treatment well  Safety Devices  Safety Devices in place: Yes  Type of devices: Left in chair;Call light within reach; Chair alarm in place         Patient Diagnosis(es): There were no encounter diagnoses. has a past medical history of Acute cystitis without hematuria, Asthma, CAD (coronary artery disease), Chest pain, Depression with anxiety, Diabetes mellitus ([address] GERD (gastroesophageal reflux disease), Glucose intolerance (impaired glucose tolerance), Hyperlipidemia, Hypertension, Lupus (systemic lupus erythematosus) ([address] Malaise and fatigue, Movement disorder, New onset seizure ([address] Osteoarthritis, Palliative care patient, and S/P CABG x 3.   has a past surgical history that includes Cardiac catheterization (11); Appendectomy (); Tonsillectomy (); cyst removal (); Nasal septum surgery (); Hysterectomy (); Hemorrhoid surgery (); sinus surgery (); Spine surgery (3/6/2006); Spine surgery (3/6/2006); Coronary angioplasty with stent (3/22/2007); Coronary angioplasty with stent (2007); Colon surgery (); Colon surgery (); Colon surgery (2008); Colon surgery (2012); Coronary artery bypass graft (3/5/2013); back surgery; Colonoscopy; Endoscopy, colon, diagnostic; Cosmetic surgery; Cardiac catheterization (16); Cardiac catheterization (2016); Cardiac catheterization (16);  Cardiac catheterization (2017); Coronary angioplasty (08/2017); Diagnostic Cardiac Cath Lab Procedure; eye surgery (03/2019); and Cardiac catheterization (04/13/2020). Restrictions  Restrictions/Precautions  Restrictions/Precautions: Fall Risk, Seizure  Required Braces or Orthoses?: No  Subjective   General  Chart Reviewed: Yes  Patient assessed for rehabilitation services?: Yes  Diagnosis: Subarachnoid hemorrhage      Objective    Balance  Sitting Balance: Stand by assistance  Standing Balance: Stand by assistance  Functional Mobility  Functional - Mobility Device: Rolling Walker  Activity: To/from bathroom  Assist Level: Stand by assistance  Bed mobility  Supine to Sit: Supervision  Scooting: Supervision  Transfers  Sit to stand: Stand by assistance  Stand to sit: Stand by assistance  Transfer Comments: up/down from bed, shower bench, recliner     Plan   Plan  Current Treatment Recommendations: Gait Training, Strengthening, Patient/Caregiver Education & Training, Home Management Training, Equipment Evaluation, Education, & procurement, Balance Training, Functional Mobility Training, Endurance Training, Self-Care / ADL, Safety Education & Training    OutComes Score       11/02/20 0815   Oral Hygiene   Assistance Needed Independent   CARE Score 6   [address] Needed Supervision or touching assistance   Comment SBA   CARE Score 4   Shower/Bathe Self   Assistance Needed Supervision or touching assistance   Comment SBA   CARE Score 4   Upper Body Dressing   Assistance Needed Independent   CARE Score 6   Lower Body Dressing   Assistance Needed Supervision or touching assistance   Comment SBA   CARE Score 4   Putting On/Taking Off Footwear   Assistance Needed Supervision or touching assistance   Comment SBA   CARE Score 4     Goals  Short term goals  Time Frame for Short term goals: 1 week  Short term goal 1: Supervision with toilet hygiene. Short term goal 2: Supervision with showering/bathing.   Short term goal 3: MET  Short term goal 4: Supervision with LB dressing. Short term goal 5: Supervision with putting on/taking off footwear. Short term goal 6: Supervision with toilet transfers. Short term goal 7: Min A with picking up objects from floor with AE as needed. Long term goals  Time Frame for Long term goals : 2 weeks  Long term goal 1: Modified Independent with toilet hygiene. Long term goal 2: Modified Independent with showering/bathing. Long term goal 3: MET  Long term goal 4: Modified Independent with LB dressing. Long term goal 5: Modified Independent with putting on/taking off footwear. Long term goals 6: Modified Independent with toilet transfers. Long term goal 8: Modified Independent with picking up objects from floor with AE as needed. Long term goal 9: Verbalize DME needs. Long term goal 10: Complete IADL/homemaking task Modified [place].        Therapy Time   Individual Concurrent Group Co-treatment   Time In 0815         Time Out 0915         Minutes 60         Timed Code Treatment Minutes: [address]

## 2020-11-02 NOTE — PATIENT CARE CONFERENCE
[facility] ACUTE INPATIENT REHABILITATION  TEAM CONFERENCE NOTE    Date: 2020  Patient Name: Rm Williamson        MRN: [id number]    : 1945  (76 y.o.)  Gender: female      Diagnosis: TBI, subarachnoid hemorrhage R frontal area      PHYSICAL THERAPY  STRENGTH  Strength RLE  R Hip Flexion: 3+/5  R Hip ABduction: 3+/5  R Hip ADduction: 3+/5  R Knee Extension: 3+/5  Strength LLE  L Hip Flexion: 3+/5  L Hip ABduction: 3+/5  L Hip ADduction: 3+/5  L Knee Extension: 3+/5  ROM  AROM RLE (degrees)  RLE AROM: WFL  AROM LLE (degrees)  LLE AROM : WFL  BED MOBILITY  Bed Mobility  Rolling: Stand by assistance  Supine to Sit: Stand by assistance  Sit to Supine: Stand by assistance  Scooting: Supervision  TRANSFERS  Transfers  Sit to Stand: Stand by assistance  Stand to sit: Stand by assistance  Bed to Chair: Stand by assistance  Comment: Pt able to perform toileting and clean up IND just req CGA for standing balance to pull up pants and to wash hands at sink.   WHEELCHAIR PROPULSION  Propulsion 1  Propulsion: Manual  Level: Level Tile  Method: GUANACO FONTENOT  Level of Assistance: Stand by assistance  Description/ Details: Demonstrated how to turn w/DERICK UE  Distance: 150'x2 w/multiple turns  AMBULATION  Ambulation 1  Surface: level tile  Device: Rolling Walker  Other Apparatus: Wheelchair follow  Assistance: Contact guard assistance  Quality of Gait: Often walks w/narrow CHECO, inc endurance shown w/amb today, shows good reciprocal gait, step length is a little more equal  Gait Deviations: Slow [place], Deviated path  Distance: 250'x2  Comments: No increased dizziness or nausea w/amb  STAIRS     GOALS:  Short term goals  Time Frame for Short term goals: 1-2 weeks  Short term goal 1: Pt will perform all on bed mobility with supervision  Short term goal 2: Pt will perform sit to stand and stand to sit transfer with RW and supervision  Short term goal 3: Pt will propel W/C 76' with supervision  Short term goal 4: Pt will ambulate 76' with RW and SBA  Short term goal 5: Pt will navigate one step with CGA    Long term goals  Time Frame for Long term goals : 2-3 weeks  Long term goal 1: Pt will perform all on bed mobility independently  Long term goal 2: Pt will perform sit to stand and stand to sit transfer with RW independently  Long term goal 3: Pt will propel W/C 150' windependently  Long term goal 4: Pt will ambulate 150' with RW independently  Long term goal 5: Pt will navigate one step with supervision    ASSESSMENT:  Assessment: Pt upgraded SBA for transfers w/RW, increased endurance demonstrated w/both amb and WC propulsion, no increased dizziness or nausea noted w/activity today      SPEECH THERAPY  Precautions: Fall/aspiration   Swallowing Status/Diet: Regular diet with thin liquids        Subjective: Patient alert and cooperative with all therapy tasks. Patient seen bedside for treatment. Patient stating \"I feel much better today. \"     Objective:  Patient presenting with increased recall of recent/daily events. Patient able to recall what she had completed in both OT and PT sessions. Patient also able to recall some of the staff members names independently.      Patient completed simple reasoning task within structured activity. She completed independently with 92% accuracy and with min verbal cues she completed with 96% accuracy.      Expressive language targeted with structured specific word finding task. Patient given a glue and she had to determine the correct item. Patient completing with 100% accuracy and in a timely manner. Divergent naming task completed where patient had to list 10 items for a grocery list. Patient able to name 10 items with 100% accuracy and timely response. After the 10 items were determined she was then asked to recall as many of the items as possible. Patient able to recall 7 of the 10 items independently and 10/10 items with min verbal cues.      Functional memory task completed.  Patient was given a physicians appoint to recall. She had to recall what type of appointment, what time it was, what day of the week, and what to bring. After 15 min delay she recalled 4/4 details. After 30 min delay she recalled 4/4 details.      Improve noted for both expressive language and cognition today.      Continue speech services at this time to continue to address cognition.      SHORT TERM GOAL #1:  Goal 1: The patient will complete simple/complex naming tasks with min verbal cues at 80% accuracy in order to improve verbal expression and thought organization for functional communication.     SHORT TERM GOAL #2:  Goal 2: The patient will follow multi step commands during daily activities with min verbal cues at 100% accuracy in order to improve safety with functional ADL's and decreased assistance from caregivers.     SHORT TERM GOAL #3:  Goal 3: The patient will complete attention/processing tasks with 80% accuracy with min verbal cues.     SHORT TERM GOAL #4:  Goal 4: The patient will complete executive function tasks (planning, organizing, self monitoring, etc) with min verbal cues at 80% accuracy in order to improve safety awareness with functional ADL's.     SHORT TERM GOAL #5:  Goal 5: The patient will demonstrate functional problem solving and safety awareness with min verbal cues at 80% accuracy for daily living tasks in order to increase safe interaction with environment and decreased assistance from caregivers. not met     Swallowing Short Term Goals  Short-term Goals  Goal 1: The patient will tolerate regular diet with thin liquids with min/no overt s/s of aspiration.  MET    Long-term Goals  Goal 1: The patient will develop functional, cognitive-linguistic based skills and utilize compensatory strategies to communicate wants and needs effectively to different conversational partners, maintain safety and participate socially in functional living environment.       ASSESSMENT:  Assessment: GENERAL  Oral Nutrition Supplement (ONS) Orders:  Standard High Calorie Oral Supplement BID  Pt's intake has improved since admit, now averaging 25-75%. Wt is stable. Started ONS per pt request. Please see nutrition note for details. NURSING    Wounds/Incisions/Ulcers: Healing abrasion back of head. Sunday Scale Score: 17    Pain: Patient's pain is currently controlled with Ultram/Tylenol. Consultations/Labs/X-rays: Dr. Vesna Antonio. CT head (-). Family Education: Family available and participating in education     Fall Risk:  [place] Siemens Score: 76    Fall in the last week?no      Other Nursing Issues: A/O x4. Pills whole. Zosyn IV IID LFA. BM 1. DNR. General diet. Accucheks qid. SOCIAL WORK/CASE MANAGEMENT  Assessment: Cooperative, good family support-two daughters alternating to stay with her and assist    Discharge Plan   Estimated Length of Stay: 11/11/20  Destination: home health    Pass: No    Services at Discharge: [address] [facility]  -determine based on progress/needs    Equipment at Discharge: to be determined    Progress made in the prior week:  1. IMPROVED AMBULATORY DISTANCE  2. Improved processing time. 3. Ind UB ADLs  4.  5.      Goals for following week:  1. INDEP AMBULATION COMMUNITY DISTANCE   2. Increase following of multi step commands with functional ADL's  3.  Distant Supervision for ADLs  4.   5.     Factors facilitating achievement of predicted outcomes: Family support, Caregiver support and Friend support    Barriers to the achievement of predicted outcomes: Decreased endurance and Lower extremity weakness    Team Members Present at Conference:  : [facility]   Occupational Therapist: Mark Jenkins OTR/L  Physical Therapist: Liz Conrad PT  Speech Therapist: Mina Pastrana MS,CCC-SLP  Nurse: Makenna Royal, RN,BSN   Nurse Manager:  Makenna Royal, RN, BSN  Dietitian:  Qian Rosales RD  Rehab Director:  Maria Isabel Nam approve the established interdisciplinary plan of care as documented within the medical record of Donzetta Decree.

## 2020-11-02 NOTE — PROGRESS NOTES
Progress Note  Leslye Hooper  11/2/2020 12:39 PM  Subjective:   Admit Date:   10/27/2020      CC/ADMIT DX:       Interval History:   Reviewed overnight events and nursing notes. She is feeling good and has no complaints. I have reviewed all labs/diagnostics from the last 24hrs. ROS:   I have done a 10 point ROS and all are negative, except what is mentioned in the HPI. DIET GENERAL;    Medications:      ondansetron  4 mg Oral TID WC    piperacillin-tazobactam  3.375 g Intravenous Q8H    pantoprazole  40 mg Oral QAM AC    insulin lispro  0-6 Units Subcutaneous BID    isosorbide mononitrate  60 mg Oral Daily    levETIRAcetam  500 mg Oral BID    metoprolol succinate  50 mg Oral Daily    ranolazine  1,000 mg Oral BID    traZODone  50 mg Oral Nightly           Objective:   Vitals: /60   Pulse 73   Temp 97.1 °F (36.2 °C) (Temporal)   Resp 16   Ht 5' 5\" (1.651 m)   Wt 148 lb 14.4 oz (67.5 kg)   SpO2 96%   BMI 24.78 kg/m²      Intake/Output Summary (Last 24 hours) at 11/2/2020 1239  Last data filed at 11/2/2020 0901  Gross per 24 hour   Intake 870 ml   Output 2 ml   Net 868 ml     General appearance: alert and cooperative with exam  Lungs: clear to auscultation bilaterally  Heart: RRR  Abdomen: soft, non-tender; bowel sounds normal; no masses,  no organomegaly  Extremities: extremities normal, atraumatic, no cyanosis or edema  Neurologic:  No obvious focal neurologic deficits.     Assessment and Plan:   Principal Problem:    Subarachnoid hemorrhage following injury, no loss of consciousness (HCC)  Active Problems:    Coronary artery disease of native artery of native heart with stable angina pectoris (HCC)    Osteoarthritis    GERD (gastroesophageal reflux disease)    Glucose intolerance (impaired glucose tolerance)    Depression with anxiety    CAD (coronary artery disease)    Hyperlipidemia    Hypertension    Hx of CABG    Bilateral carotid artery stenosis    Altered mental status Subarachnoid hemorrhage (HCC)    SLE (systemic lupus erythematosus) (HCC)    Intracranial bleed (HCC)    Post-traumatic headache    Acute cystitis without hematuria  Resolved Problems:    * No resolved hospital problems. *     Nausea    Plan:  1. Continue present medication(s)   2. Follow with treatment  3. Continue therapy      Discharge planning:   her home     Reviewed treatment plans with the patient and/or family.              Electronically signed by Dustin Jones MD on 11/2/2020 at 12:39 PM

## 2020-11-02 NOTE — PROGRESS NOTES
Patient:   Jorge Ryan  MR#:    [id number]   Room:    [facility]/826-01   YOB: 1945  Date of Progress Note: 11/2/2020  Time of Note                           9:57 AM  Consulting Physician:   Shreya Ramos M.D. Attending Physician:  Shreya Ramos MD     Chief complaint S/p Traumatic subarachnoid hemorrhage      S:This 76 y.o. female  with history of Lupus,DM diet controlled, GERD, CAD s/p CABG x 3 and stenting, HTN and hyperlipidemia. Patient had had some weakness since July and had been using a rollator walker and her daughters had been alternating staying with her. She presented to [facility] ER on 10/24/20 after having a fall. Her daughter was pushing her while she was sitting on her rollator and she fell backwards, hitting her head on the asphalt. She did not lose consciousness but did have immediate onset of headache that has persisted. CT done revealed a small right frontal area of traumatic subarachnoid hemorrhage. Her B/P was elevated with systolic in 982-447 range. She was admitted to her PCP Dr. Aury Nieto with consult for neurosurgery. She was seen by neurosurgeon Dr. Shawn Márquez and not felt to need any surgical intervention. Repeat CTs have been stable. She was evaluated by SPT for swallow and was initially placed on bite size consistency diet with nectar thick liquids but has now been upgraded to regular with thin liquids with medicines whole in pudding or applesauce. Patient also found to have difficulty with word finding and short-term memory. Patient is also participating with both PT/OT. She is felt to need a stay on Rehab for continued therapy to work towards her goal of returning home with assist of her daughters. She is now felt ready to start the Rehab program. Overnight vomiting . Repeat CT head stable and improved. Feels dizziness and nauseous when moving. No new issues.     REVIEW OF SYSTEMS:  Constitutional: No fevers No chills  Neck:No stiffness  Respiratory: No shortness of breath  Cardiovascular: No chest pain No palpitations  Gastrointestinal: No abdominal pain    Genitourinary: No Dysuria  Neurological: +headache, no confusion    Past Medical History:      Diagnosis Date    Acute cystitis without hematuria 10/28/2020    Asthma     CAD (coronary artery disease)     Chest pain     Depression with anxiety     Diabetes mellitus [facility]     GERD (gastroesophageal reflux disease)     Glucose intolerance (impaired glucose tolerance)     Hyperlipidemia     Cholesterol management per Jason Hinds Hypertension     Lupus (systemic lupus erythematosus) ([facility]     Malaise and fatigue 10/6/2017    Movement disorder     Sees pain management for neck pain and previous surgery    New onset seizure ([facility]     Osteoarthritis     Palliative care patient 07/22/2020    S/P CABG x 3 03/05/2013    PACABG X 3, LIMA-LAD, SVG0OM, SVG-DIAG, RT EVH, DR Daniel Fernández       Past Surgical History:      Procedure Laterality Date    APPENDECTOMY  1965    BACK SURGERY      CARDIAC CATHETERIZATION  5/18/11    EF 60%    CARDIAC CATHETERIZATION  1/25/16    drug eluting stent to RCA    CARDIAC CATHETERIZATION  1/25/2016    CARDIAC CATHETERIZATION  2/19/16    CARDIAC CATHETERIZATION  08/24/2017    cutting balloon angioplasty ot LAD    CARDIAC CATHETERIZATION  04/13/2020    TWO Stents placed    COLON SURGERY  1992    Polypectomy - 2    COLON SURGERY  2003    Polypectomy - 4    COLON SURGERY  11/2008    Polypectomy - 2    COLON SURGERY  8/2012    Polypectomy - 3    COLONOSCOPY      CORONARY ANGIOPLASTY  08/2017    cutting balloon angioplasty LAD diagonal    CORONARY ANGIOPLASTY WITH STENT PLACEMENT  3/22/2007    CORONARY ANGIOPLASTY WITH STENT PLACEMENT  8/2007    CORONARY ARTERY BYPASS GRAFT  3/5/2013    PACABG X 3, LIMA-LAD, SVG0OM, SVG-DIAG, RT EVH, DR PATEL    COSMETIC SURGERY      deviated septum and rhinoplasty    CYST REMOVAL  1970    DIAGNOSTIC CARDIAC CATH LAB PROCEDURE      ENDOSCOPY, COLON, DIAGNOSTIC      EYE SURGERY  03/2019    cataracts   [address]    Partial    NASAL SEPTUM SURGERY  1977    SINUS SURGERY  2003    Nasal Polyps Removed    SPINE SURGERY  3/6/2006    Lumbar Laminectomy L4&5    SPINE SURGERY  3/6/2006    Cervical Fusion - C4,5,6    TONSILLECTOMY  1968       Medications in Hospital:      Current Facility-Administered Medications:     ondansetron (ZOFRAN) tablet 4 mg, 4 mg, Oral, TID WC, Norma Jauregui MD, 4 mg at 11/02/20 0806    piperacillin-tazobactam (ZOSYN) 3.375 g in dextrose 5 % 50 mL IVPB extended infusion (mini-bag), 3.375 g, Intravenous, Q8H, Genoveva Zarate MD, Stopped at 11/02/20 0807    melatonin tablet 1 mg, 1 mg, Oral, Nightly PRN, Genoveva Zarate MD    promethazine (PHENERGAN) tablet 12.5 mg, 12.5 mg, Oral, Q6H PRN, Norma Jauregui MD, 12.5 mg at 10/28/20 0928    pantoprazole (PROTONIX) tablet 40 mg, 40 mg, Oral, QAM AC, Norma Jauregui MD, 40 mg at 11/02/20 0541    ondansetron (ZOFRAN) injection 4 mg, 4 mg, Intravenous, Q4H PRN, Norma Jauregui MD, 4 mg at 10/30/20 1143    senna (SENOKOT) tablet 8.6 mg, 1 tablet, Oral, Daily PRN, Genoveva Zarate MD    glucagon (rDNA) injection 1 mg, 1 mg, Intramuscular, PRN, Genoveva Zarate MD    glucose (GLUTOSE) 40 % oral gel 15 g, 15 g, Oral, PRN, Genoveva Zarate MD    acetaminophen (TYLENOL) tablet 650 mg, 650 mg, Oral, Q8H PRN, Genoveva Zarate MD, 650 mg at 10/28/20 0530    ALPRAZolam Colby Nasuti) tablet 0.125 mg, 0.125 mg, Oral, Daily PRN, Genoveva Zarate MD, 0.125 mg at 11/01/20 2030    cyclobenzaprine (FLEXERIL) tablet 10 mg, 10 mg, Oral, TID PRN, Genoveva Zarate MD    insulin lispro (HUMALOG) injection vial 0-6 Units, 0-6 Units, Subcutaneous, BID, Genoveva Zarate MD    isosorbide mononitrate (IMDUR) extended release tablet 60 mg, 60 mg, Oral, Daily, Genoveva Zarate MD, 60 mg at 11/02/20 0806    levETIRAcetam (KEPPRA) tablet 500 mg, 500 mg, Oral, BID, Paty Swift MD, 500 mg at 11/02/20 7111    metoprolol succinate (TOPROL XL) extended release tablet 50 mg, 50 mg, Oral, Daily, Paty Swift MD, 50 mg at 11/02/20 0806    nitroGLYCERIN (NITROSTAT) SL tablet 0.4 mg, 0.4 mg, Sublingual, Q5 Min PRN, Paty Swift MD    ranolazine [facility]) extended release tablet 1,000 mg, 1,000 mg, Oral, BID, Paty Swift MD, 1,000 mg at 11/02/20 0806    traZODone (DESYREL) tablet 50 mg, 50 mg, Oral, Nightly, Paty Swift MD, 50 mg at 11/01/20 2030    polyethylene glycol (GLYCOLAX) packet 17 g, 17 g, Oral, Daily PRN, Nai Grover MD    traMADol (ULTRAM) tablet 50 mg, 50 mg, Oral, Q6H PRN, Nai Grover MD, 50 mg at 10/28/20 0530    Allergies: Other; Codeine; Darvocet [propoxyphene n-acetaminophen]; Hydrocodone-acetaminophen; Lyrica [pregabalin]; Morphine; and Percocet [oxycodone-acetaminophen]    Social History:   TOBACCO:   reports that she quit smoking about 53 years ago. Her smoking use included cigarettes. She has a 1.00 pack-year smoking history. She has never used smokeless tobacco.  ETOH:   reports no history of alcohol use. Family History:       Problem Relation Age of Onset    Heart Disease Mother     High Blood Pressure Mother     Diabetes Mother     High Cholesterol Mother     Diabetes Brother          PHYSICAL EXAM:  /60   Pulse 73   Temp 97.1 °F (36.2 °C) (Temporal)   Resp 16   Ht 5' 5\" (1.651 m)   Wt 148 lb 14.4 oz (67.5 kg)   SpO2 96%   BMI 24.78 kg/m²       Constitutional - well developed, well nourished.    Eyes - conjunctiva normal.   Ear, nose, throat - No scars, masses, or lesions over external nose or ears, no atrophy of tongue  Neck-symmetric, no masses noted, no jugular vein distension  Respiration- chest wall appears symmetric, good expansion,   normal effort without use of accessory muscles  Musculoskeletal - no significant wasting of muscles noted, no bony deformities  Extremities-no clubbing, cyanosis or edema  Skin - warm, dry, and intact. No rash, erythema, or pallor. Psychiatric - mood, affect, and behavior appear normal.      Neurological exam  Awake,  fluent oriented slow  Attention and concentration appear mildly impaired  Recent and remote memory appears mildly impaired  Speech normal without dysarthria  No clear issues with language of fund of knowledge     Cranial Nerve Exam     CN III, IV,VI-EOMI, No nystagmus, conjugate eye movements, no ptosis    CN VII-no facial assymetry       Motor Exam  antigravity throughout upper and lower extremities bilaterally      Tremors- no tremors in hands or head noted     Gait  Not tested      Nursing/pcp notes, imaging,labs and vitals reviewed.      PT,OT and/or speech notes reviewed    Lab Results   Component Value Date    WBC 4.5 (L) 11/02/2020    HGB 10.8 (L) 11/02/2020    HCT 32.0 (L) 11/02/2020    MCV 89.9 11/02/2020     11/02/2020     Lab Results   Component Value Date     (L) 11/02/2020    K 3.9 11/02/2020     11/02/2020    CO2 25 11/02/2020    BUN 18 11/02/2020    CREATININE 0.9 11/02/2020    GLUCOSE 101 11/02/2020    CALCIUM 9.0 11/02/2020    PROT 5.2 (L) 11/02/2020    LABALBU 3.7 11/02/2020    BILITOT 0.3 11/02/2020    ALKPHOS 52 11/02/2020    AST 12 11/02/2020    ALT 11 11/02/2020    LABGLOM >60 11/02/2020    GFRAA >59 11/02/2020    GLOB 1.8 11/19/2016     Lab Results   Component Value Date    INR 0.97 10/27/2020    INR 0.99 10/25/2020    INR 0.96 10/24/2020    PROTIME 12.7 10/27/2020    PROTIME 12.9 10/25/2020    PROTIME 12.7 10/24/2020         Abilio Wallace PTA    Physical Therapist Assistant    Physical Therapy    Progress Notes    Cosign Needed    Date of Service:  10/31/2020 10:03 AM                Cosign Needed              Show:Clear all  []Manual[x]Template[]Copied    Added by:  [x]Barrie Eden PTA    []Hover for details  Name: William Leyva  MRN:  [id number]  Date of service:  10/31/2020       10/31/20 0920 Restrictions/Precautions   Restrictions/Precautions Fall Risk;Seizure   Required Braces or Orthoses? No   General   Chart Reviewed Yes   Subjective   Subjective Pt states that she is feeling a little nauseous this morning but it has been manageable so far. Pain Screening   Patient Currently in Pain Yes   Intervention List Patient able to continue with treatment   Pain Assessment   Pain Assessment 0-10   Pain Level 1   Pain Location Leg   Pain Orientation Right;Left   Pain Descriptors Sore  (sore from ex yesterday)   Bed Mobility   Supine to Sit Stand by assistance   Sit to Supine Stand by assistance   Scooting Stand by assistance   Transfers   Sit to Stand Contact guard assistance   Stand to sit Contact guard assistance   Bed to Chair Contact guard assistance   Comment Pt able to perform toileting and clean up IND just req CGA for standing balance to pull up pants and to wash hands at sink. Ambulation   Ambulation? Yes   Ambulation 1   Surface level tile   Device Rolling Walker   Other Apparatus Wheelchair follow   Assistance Contact guard assistance   Gait Deviations Slow Carm[place];Decreased step length;Decreased step height   Distance 50'   Comments No nausea with amb,pt feeling very fatigued towards end of amb   Exercises   Comments Seated BLE ther ex in all planes x20 reps AROM   Short term goals   Time Frame for Short term goals 1-2 weeks   Short term goal 1 Pt will perform all on bed mobility with supervision   Short term goal 2 Pt will perform sit to stand and stand to sit transfer with RW and supervision   Short term goal 3 Pt will propel W/C 76' with supervision   Short term goal 4 Pt will ambulate 76' with RW and SBA   Short term goal 5 Pt will navigate one step with CGA   Conditions Requiring Skilled Therapeutic Intervention   Body structures, Functions, Activity limitations Decreased functional mobility ; Decreased ADL status; Decreased ROM; Decreased strength;Decreased cognition;Decreased endurance;Decreased balance   Assessment Pt having some nausea noted upon first sitting up but able to tolerate entire tx without increased nausea. Pt able to increase amb distance to 50' CGA with rwx but becomes very fatigued following amb. Pt able to perform toilet TF at the end of tx and successfully void, performs all clean up IND. Pt would cont to benefit from skilled therapy services to address remaining impairments. Activity Tolerance   Activity Tolerance Patient Tolerated treatment well;Patient limited by fatigue   Safety Devices   Type of devices Bed alarm in place;Call light within reach;Gait belt;Left in bed;Nurse notified            Electronically signed by Sonja Bui PTA on 10/31/2020 at 10:03 AM                    RECORD REVIEW: Previous medical records, medications were reviewed at today's visit    IMPRESSION:   1. S/P Traumatic subarachnoid hemorrhage-off ASA/Plavix-monitor  2. HTN-on meds monitor  3. DM-monitor BS  4. Seizures-on Keppra  5. CAD-on Ramexa-Plavix and ASA on hold  6. Nausea and vomiting post ICH-repeat CT stable-Zofran/Phenergan PRN  7. GI-bowel regimen/PPI  8. Insomnia-Trazodone qhs  9. Headache-Tramadol/Tylenol PRN  10. Anxiety-Xanax PRN  11. History of lupus-monitor  12. PT/OT/Speech  13.  UTI-complete abx     Dizziness/nausea with movement-Zofran TID  Continue current care      Expected duration and frequency therapy: 180 minutes per day, 5 days per week    [address] [facility]  [phone] CELL  Dr Aaliyah Morillo

## 2020-11-02 NOTE — PLAN OF CARE
Problem: Nutrition  Goal: Optimal nutrition therapy  Outcome: Ongoing   Nutrition Problem #1: Inadequate oral intake  Intervention: Food and/or Nutrient Delivery: Continue Current Diet, Start Oral Nutrition Supplement  Nutritional Goals: PO intake >50%, wt stable, improvement in N/V

## 2020-11-03 LAB
GLUCOSE BLD-MCNC: 111 MG/DL (ref 70–99)
GLUCOSE BLD-MCNC: 116 MG/DL (ref 70–99)
GLUCOSE BLD-MCNC: 145 MG/DL (ref 70–99)
GLUCOSE BLD-MCNC: 205 MG/DL (ref 70–99)
PERFORMED ON: ABNORMAL

## 2020-11-03 PROCEDURE — 99233 SBSQ HOSP IP/OBS HIGH 50: CPT | Performed by: PSYCHIATRY & NEUROLOGY

## 2020-11-03 PROCEDURE — 2580000003 HC RX 258: Performed by: FAMILY MEDICINE

## 2020-11-03 PROCEDURE — 97129 THER IVNTJ 1ST 15 MIN: CPT

## 2020-11-03 PROCEDURE — 97530 THERAPEUTIC ACTIVITIES: CPT

## 2020-11-03 PROCEDURE — 6370000000 HC RX 637 (ALT 250 FOR IP): Performed by: FAMILY MEDICINE

## 2020-11-03 PROCEDURE — 6370000000 HC RX 637 (ALT 250 FOR IP): Performed by: PSYCHIATRY & NEUROLOGY

## 2020-11-03 PROCEDURE — 6360000002 HC RX W HCPCS: Performed by: FAMILY MEDICINE

## 2020-11-03 PROCEDURE — 82947 ASSAY GLUCOSE BLOOD QUANT: CPT

## 2020-11-03 PROCEDURE — 1180000000 HC REHAB R&B

## 2020-11-03 PROCEDURE — 97116 GAIT TRAINING THERAPY: CPT

## 2020-11-03 PROCEDURE — 6360000002 HC RX W HCPCS: Performed by: PSYCHIATRY & NEUROLOGY

## 2020-11-03 PROCEDURE — 97535 SELF CARE MNGMENT TRAINING: CPT

## 2020-11-03 PROCEDURE — 97110 THERAPEUTIC EXERCISES: CPT

## 2020-11-03 RX ADMIN — LEVETIRACETAM 500 MG: 500 TABLET ORAL at 21:42

## 2020-11-03 RX ADMIN — ALPRAZOLAM 0.12 MG: 0.25 TABLET ORAL at 21:46

## 2020-11-03 RX ADMIN — LEVETIRACETAM 500 MG: 500 TABLET ORAL at 08:02

## 2020-11-03 RX ADMIN — PANTOPRAZOLE SODIUM 40 MG: 40 TABLET, DELAYED RELEASE ORAL at 05:20

## 2020-11-03 RX ADMIN — SODIUM CHLORIDE, PRESERVATIVE FREE 2 G: 5 INJECTION INTRAVENOUS at 17:16

## 2020-11-03 RX ADMIN — TRAZODONE HYDROCHLORIDE 50 MG: 50 TABLET ORAL at 21:42

## 2020-11-03 RX ADMIN — ONDANSETRON 4 MG: 2 INJECTION INTRAMUSCULAR; INTRAVENOUS at 11:46

## 2020-11-03 RX ADMIN — RANOLAZINE 1000 MG: 500 TABLET, FILM COATED, EXTENDED RELEASE ORAL at 21:42

## 2020-11-03 RX ADMIN — SODIUM CHLORIDE, PRESERVATIVE FREE 2 G: 5 INJECTION INTRAVENOUS at 05:21

## 2020-11-03 RX ADMIN — ONDANSETRON HYDROCHLORIDE 4 MG: 4 TABLET, FILM COATED ORAL at 08:01

## 2020-11-03 RX ADMIN — ISOSORBIDE MONONITRATE 60 MG: 60 TABLET, EXTENDED RELEASE ORAL at 08:02

## 2020-11-03 RX ADMIN — RANOLAZINE 1000 MG: 500 TABLET, FILM COATED, EXTENDED RELEASE ORAL at 08:01

## 2020-11-03 RX ADMIN — ONDANSETRON HYDROCHLORIDE 4 MG: 4 TABLET, FILM COATED ORAL at 17:16

## 2020-11-03 RX ADMIN — ONDANSETRON HYDROCHLORIDE 4 MG: 4 TABLET, FILM COATED ORAL at 10:33

## 2020-11-03 RX ADMIN — METOPROLOL SUCCINATE 50 MG: 50 TABLET, EXTENDED RELEASE ORAL at 08:01

## 2020-11-03 RX ADMIN — TRAMADOL HYDROCHLORIDE 50 MG: 50 TABLET, FILM COATED ORAL at 05:20

## 2020-11-03 ASSESSMENT — PAIN DESCRIPTION - DESCRIPTORS
DESCRIPTORS: HEADACHE
DESCRIPTORS: ACHING

## 2020-11-03 ASSESSMENT — PAIN DESCRIPTION - PROGRESSION
CLINICAL_PROGRESSION: NOT CHANGED
CLINICAL_PROGRESSION: GRADUALLY WORSENING
CLINICAL_PROGRESSION: NOT CHANGED

## 2020-11-03 ASSESSMENT — PAIN - FUNCTIONAL ASSESSMENT: PAIN_FUNCTIONAL_ASSESSMENT: ACTIVITIES ARE NOT PREVENTED

## 2020-11-03 ASSESSMENT — PAIN DESCRIPTION - FREQUENCY
FREQUENCY: CONTINUOUS
FREQUENCY: CONTINUOUS

## 2020-11-03 ASSESSMENT — PAIN DESCRIPTION - ORIENTATION: ORIENTATION: MID

## 2020-11-03 ASSESSMENT — PAIN DESCRIPTION - PAIN TYPE
TYPE: ACUTE PAIN
TYPE: ACUTE PAIN

## 2020-11-03 ASSESSMENT — PAIN DESCRIPTION - ONSET
ONSET: ON-GOING
ONSET: ON-GOING

## 2020-11-03 ASSESSMENT — PAIN SCALES - GENERAL
PAINLEVEL_OUTOF10: 0
PAINLEVEL_OUTOF10: 0
PAINLEVEL_OUTOF10: 6
PAINLEVEL_OUTOF10: 7

## 2020-11-03 ASSESSMENT — PAIN DESCRIPTION - LOCATION
LOCATION: HEAD
LOCATION: HEAD

## 2020-11-03 NOTE — PROGRESS NOTES
Occupational Therapy     11/03/20 1100   Subjective   Subjective c/o nausea and headache, vomited at the end of tx session, BP taken, nursing notified. Pain Assessment   Patient Currently in Pain Yes   Pain Assessment 0-10   Pain Level 6   Pain Type Acute pain   Pain Location Head   Pain Descriptors Headache  (And nausea.)   Pain Frequency Continuous   Clinical Progression Gradually worsening   Response to Pain Intervention None;Patient Satisfied   Vital Signs   BP (!) 156/78   BP Location Right Arm   Patient Position Sitting   ADL   Grooming Independent   Toileting Stand by assistance   Balance   Sitting Balance Stand by assistance   Standing Balance Stand by assistance   Standing Balance   Time 3 minutes   Activity Grooming tasks at sink. Functional Mobility   Functional - Mobility Device Rolling Walker   Activity To/from bathroom   Assist Level Stand by assistance   Bed mobility   Sit to Supine Supervision   Transfers   Sit to stand Stand by assistance   Stand to sit Stand by assistance   Toilet Transfers   Toilet - Technique Ambulating   Equipment Used Grab bars   Toilet Transfer Stand by assistance   Type of ROM/Therapeutic Exercise   Type of ROM/Therapeutic Exercise Free weights   Comment 2# BUE 2 sets x 10 reps, 3 planes. Assessment   Performance deficits / Impairments Decreased ADL status; Decreased functional mobility ; Decreased endurance;Decreased balance;Decreased strength;Decreased safe awareness;Decreased high-level IADLs   Treatment Diagnosis subarachnoid hemorrhage   Timed Code Treatment Minutes 60 Minutes   Activity Tolerance   Activity Tolerance Treatment limited secondary to medical complications (free text); Patient limited by pain   Activity Tolerance c/o of headache and nausea, vomited at the end of the tx session. Safety Devices   Safety Devices in place Yes   Type of devices Call light within reach; Bed alarm in place   Plan   Current Treatment Recommendations Gait

## 2020-11-03 NOTE — PROGRESS NOTES
Patient:   Chadwick Ramsay  MR#:    [id number]   Room:    0826/826-01   YOB: 1945  Date of Progress Note: 11/3/2020  Time of Note                           8:32 AM  Consulting Physician:   Jose Edouard M.D. Attending Physician:  Jose Edouard MD     Chief complaint S/p Traumatic subarachnoid hemorrhage      S:This 76 y.o. female  with history of Lupus,DM diet controlled, GERD, CAD s/p CABG x 3 and stenting, HTN and hyperlipidemia. Patient had had some weakness since July and had been using a rollator walker and her daughters had been alternating staying with her. She presented to [facility] on 10/24/20 after having a fall. Her daughter was pushing her while she was sitting on her rollator and she fell backwards, hitting her head on the asphalt. She did not lose consciousness but did have immediate onset of headache that has persisted. CT done revealed a small right frontal area of traumatic subarachnoid hemorrhage. Her B/P was elevated with systolic in 227-568 range. She was admitted to her PCP Dr. Dionne Grimes with consult for neurosurgery. She was seen by neurosurgeon Dr. Hany Ortiz and not felt to need any surgical intervention. Repeat CTs have been stable. She was evaluated by SPT for swallow and was initially placed on bite size consistency diet with nectar thick liquids but has now been upgraded to regular with thin liquids with medicines whole in pudding or applesauce. Patient also found to have difficulty with word finding and short-term memory. Patient is also participating with both PT/OT. She is felt to need a stay on Rehab for continued therapy to work towards her goal of returning home with assist of her daughters. She is now felt ready to start the Rehab program. Overnight vomiting . Repeat CT head stable and improved. Feels dizziness and nauseous when moving. No acute issues. Dizziness improved.     REVIEW OF SYSTEMS:  Constitutional: No fevers No chills  Neck:No stiffness  Respiratory: No shortness of breath  Cardiovascular: No chest pain No palpitations  Gastrointestinal: No abdominal pain    Genitourinary: No Dysuria  Neurological: +headache, no confusion    Past Medical History:      Diagnosis Date    Acute cystitis without hematuria 10/28/2020    Asthma     CAD (coronary artery disease)     Chest pain     Depression with anxiety     Diabetes mellitus [facility]     GERD (gastroesophageal reflux disease)     Glucose intolerance (impaired glucose tolerance)     Hyperlipidemia     Cholesterol management per Kate Serrato Hypertension     Lupus (systemic lupus erythematosus) ([facility]     Malaise and fatigue 10/6/2017    Movement disorder     Sees pain management for neck pain and previous surgery    New onset seizure ([facility]     Osteoarthritis     Palliative care patient 07/22/2020    S/P CABG x 3 03/05/2013    PACABG X 3, LIMA-LAD, SVG0OM, SVG-DIAG, RT EV, DR Radha Strickland       Past Surgical History:      Procedure Laterality Date    APPENDECTOMY  1965    BACK SURGERY      CARDIAC CATHETERIZATION  5/18/11    EF 60%    CARDIAC CATHETERIZATION  1/25/16    drug eluting stent to RCA    CARDIAC CATHETERIZATION  1/25/2016    CARDIAC CATHETERIZATION  2/19/16    CARDIAC CATHETERIZATION  08/24/2017    cutting balloon angioplasty ot LAD    CARDIAC CATHETERIZATION  04/13/2020    TWO Stents placed    COLON SURGERY  1992    Polypectomy - 2    COLON SURGERY  2003    Polypectomy - 4    COLON SURGERY  11/2008    Polypectomy - 2    COLON SURGERY  8/2012    Polypectomy - 3    COLONOSCOPY      CORONARY ANGIOPLASTY  08/2017    cutting balloon angioplasty LAD diagonal    CORONARY ANGIOPLASTY WITH STENT PLACEMENT  3/22/2007    CORONARY ANGIOPLASTY WITH STENT PLACEMENT  8/2007    CORONARY ARTERY BYPASS GRAFT  3/5/2013    PACABG X 3, LIMA-LAD, SVG0OM, SVG-DIAG, RT EVH, DR PATEL    COSMETIC SURGERY      deviated septum and rhinoplasty    CYST REMOVAL  1970    DIAGNOSTIC CARDIAC CATH LAB PROCEDURE      ENDOSCOPY, COLON, DIAGNOSTIC      EYE SURGERY  03/2019    cataracts   [address]    Partial    NASAL SEPTUM SURGERY  1977    SINUS SURGERY  2003    Nasal Polyps Removed    SPINE SURGERY  3/6/2006    Lumbar Laminectomy L4&5    SPINE SURGERY  3/6/2006    Cervical Fusion - C4,5,6    TONSILLECTOMY  1968       Medications in Hospital:      Current Facility-Administered Medications:     ceFEPIme (MAXIPIME) 2 g in sodium chloride (PF) 20 mL IV syringe, 2 g, Intravenous, Q12H, Guero Churchill MD, 2 g at 11/03/20 0521    ondansetron (ZOFRAN) tablet 4 mg, 4 mg, Oral, TID WC, Aaliyah Morillo MD, 4 mg at 11/03/20 0801    melatonin tablet 1 mg, 1 mg, Oral, Nightly PRN, Guero Churchill MD    promethazine (PHENERGAN) tablet 12.5 mg, 12.5 mg, Oral, Q6H PRN, Aaliyah Morillo MD, 12.5 mg at 10/28/20 0928    pantoprazole (PROTONIX) tablet 40 mg, 40 mg, Oral, QAM AC, Aaliyah Morillo MD, 40 mg at 11/03/20 0520    ondansetron (ZOFRAN) injection 4 mg, 4 mg, Intravenous, Q4H PRN, Aaliyah Morillo MD, 4 mg at 10/30/20 1143    senna (SENOKOT) tablet 8.6 mg, 1 tablet, Oral, Daily PRN, Guero Churchill MD    glucagon (rDNA) injection 1 mg, 1 mg, Intramuscular, PRN, Guero Churchill MD    glucose (GLUTOSE) 40 % oral gel 15 g, 15 g, Oral, PRN, Guero Churchill MD    acetaminophen (TYLENOL) tablet 650 mg, 650 mg, Oral, Q8H PRN, Guero Churchill MD, 650 mg at 10/28/20 0530    ALPRAZolam Rich Templeer) tablet 0.125 mg, 0.125 mg, Oral, Daily PRN, Guero Churchill MD, 0.125 mg at 11/02/20 2015    cyclobenzaprine (FLEXERIL) tablet 10 mg, 10 mg, Oral, TID PRN, Guero Churchill MD    insulin lispro (HUMALOG) injection vial 0-6 Units, 0-6 Units, Subcutaneous, BID, Guero Churchill MD    isosorbide mononitrate (IMDUR) extended release tablet 60 mg, 60 mg, Oral, Daily, Guero Churchill MD, 60 mg at 11/03/20 0802    levETIRAcetam (KEPPRA) tablet 500 mg, 500 mg, Oral, BID, Guero Churchill MD, Fall Risk;Seizure   Required Braces or Orthoses? No   General   Chart Reviewed Yes   Subjective   Subjective Pt states that she is feeling a little nauseous this morning but it has been manageable so far. Pain Screening   Patient Currently in Pain Yes   Intervention List Patient able to continue with treatment   Pain Assessment   Pain Assessment 0-10   Pain Level 1   Pain Location Leg   Pain Orientation Right;Left   Pain Descriptors Sore  (sore from ex yesterday)   Bed Mobility   Supine to Sit Stand by assistance   Sit to Supine Stand by assistance   Scooting Stand by assistance   Transfers   Sit to Stand Contact guard assistance   Stand to sit Contact guard assistance   Bed to Chair Contact guard assistance   Comment Pt able to perform toileting and clean up IND just req CGA for standing balance to pull up pants and to wash hands at sink. Ambulation   Ambulation? Yes   Ambulation 1   Surface level tile   Device Rolling Walker   Other Apparatus Wheelchair follow   Assistance Contact guard assistance   Gait Deviations Slow Carm[place];Decreased step length;Decreased step height   Distance 50'   Comments No nausea with amb,pt feeling very fatigued towards end of amb   Exercises   Comments Seated BLE ther ex in all planes x20 reps AROM   Short term goals   Time Frame for Short term goals 1-2 weeks   Short term goal 1 Pt will perform all on bed mobility with supervision   Short term goal 2 Pt will perform sit to stand and stand to sit transfer with RW and supervision   Short term goal 3 Pt will propel W/C 76' with supervision   Short term goal 4 Pt will ambulate 76' with RW and SBA   Short term goal 5 Pt will navigate one step with CGA   Conditions Requiring Skilled Therapeutic Intervention   Body structures, Functions, Activity limitations Decreased functional mobility ; Decreased ADL status; Decreased ROM; Decreased strength;Decreased cognition;Decreased endurance;Decreased balance   Assessment Pt having some nausea noted upon first sitting up but able to tolerate entire tx without increased nausea. Pt able to increase amb distance to 50' CGA with rwx but becomes very fatigued following amb. Pt able to perform toilet TF at the end of tx and successfully void, performs all clean up IND. Pt would cont to benefit from skilled therapy services to address remaining impairments. Activity Tolerance   Activity Tolerance Patient Tolerated treatment well;Patient limited by fatigue   Safety Devices   Type of devices Bed alarm in place;Call light within reach;Gait belt;Left in bed;Nurse notified            Electronically signed by Abilio Wallace PTA on 10/31/2020 at 10:03 AM                    RECORD REVIEW: Previous medical records, medications were reviewed at today's visit    IMPRESSION:   1. S/P Traumatic subarachnoid hemorrhage-off ASA/Plavix-monitor  2. HTN-on meds monitor  3. DM-monitor BS  4. Seizures-on Keppra  5. CAD-on Ramexa-Plavix and ASA on hold  6. Nausea and vomiting post ICH-repeat CT stable-Zofran/Phenergan PRN  7. GI-bowel regimen/PPI  8. Insomnia-Trazodone qhs  9. Headache-Tramadol/Tylenol PRN  10. Anxiety-Xanax PRN  11. History of lupus-monitor  12. PT/OT/Speech  13.  UTI-complete abx     Dizziness/nausea with movement-Zofran TID  Continue current care    Team conference with PT/OT/speech/nursing/Care coordinator to review in depth patients care and discharge planning    WC supervision 150 ft  Ambulation 250 RW CGA    ELOS November 11       Expected duration and frequency therapy: 180 minutes per day, 5 days per week    Roque Spence  [phone] CELL  Dr Elias Jaramillo

## 2020-11-03 NOTE — PROGRESS NOTES
[facility]  INPATIENT SPEECH THERAPY  [facility] 8 REHAB UNIT  TIME   Time In: 1300  Time Out: 4045  Minutes: 15       [x]Daily Note  []Progress Note    Date: 11/3/2020  Patient Name: Lindi Baumgarten        MRN: [id number]    Account #: [de-identified]  : 1945  (76 y.o.)  Gender: female   Primary Provider: Tersa Fregoso MD  Precautions: Fall  Swallowing Status/Diet: Regular diet with thin liquids    Subjective: Patient nauseous and not feeling well. Patient completed a portion of therapy, however then requested to rest.     Objective: Patient presenting with increased recall of recent/daily events. Patient was able to recall details from both OT and PT sessions with no difficulty. Increased processing and thought organization was noted for conversational discourse. Patient began to not feel well during therapy session and session was discontinued. SHORT TERM GOAL #1:  Goal 1: The patient will complete simple/complex naming tasks with min verbal cues at 80% accuracy in order to improve verbal expression and thought organization for functional communication. SHORT TERM GOAL #2:  Goal 2: The patient will follow multi step commands during daily activities with min verbal cues at 100% accuracy in order to improve safety with functional ADL's and decreased assistance from caregivers. SHORT TERM GOAL #3:  Goal 3: The patient will complete attention/processing tasks with 80% accuracy with min verbal cues. SHORT TERM GOAL #4:  Goal 4: The patient will complete executive function tasks (planning, organizing, self monitoring, etc) with min verbal cues at 80% accuracy in order to improve safety awareness with functional ADL's. SHORT TERM GOAL #5:  Goal 5: The patient will demonstrate functional problem solving and safety awareness with min verbal cues at 80% accuracy for daily living tasks in order to increase safe interaction with environment and decreased assistance from caregivers.  not met    Swallowing

## 2020-11-03 NOTE — PROGRESS NOTES
Occupational Therapy  Facility/Department: [facility] 8 REHAB UNIT  Daily Treatment Note  NAME: Sam Jones  : 1945  MRN: [id number]    Date of Service: 11/3/2020    Discharge Recommendations:  Continue to assess pending progress     Assessment   Performance deficits / Impairments: Decreased ADL status; Decreased functional mobility ; Decreased endurance;Decreased balance;Decreased strength;Decreased safe awareness;Decreased high-level IADLs  Treatment Diagnosis: subarachnoid hemorrhage     20 0815   Activity Tolerance   Activity Tolerance Patient Tolerated treatment well      20 0815   Safety Devices   Safety Devices in place Yes   Type of devices Call light within reach; Left in chair;Chair alarm in place     Patient Diagnosis(es): There were no encounter diagnoses. has a past medical history of Acute cystitis without hematuria, Asthma, CAD (coronary artery disease), Chest pain, Depression with anxiety, Diabetes mellitus ([address] GERD (gastroesophageal reflux disease), Glucose intolerance (impaired glucose tolerance), Hyperlipidemia, Hypertension, Lupus (systemic lupus erythematosus) ([address] Malaise and fatigue, Movement disorder, New onset seizure ([address] Osteoarthritis, Palliative care patient, and S/P CABG x 3.   has a past surgical history that includes Cardiac catheterization (11); Appendectomy (); Tonsillectomy (); cyst removal (); Nasal septum surgery (); Hysterectomy (); Hemorrhoid surgery (); sinus surgery (); Spine surgery (3/6/2006); Spine surgery (3/6/2006); Coronary angioplasty with stent (3/22/2007); Coronary angioplasty with stent (2007); Colon surgery (); Colon surgery (); Colon surgery (2008); Colon surgery (2012); Coronary artery bypass graft (3/5/2013); back surgery; Colonoscopy; Endoscopy, colon, diagnostic; Cosmetic surgery; Cardiac catheterization (16); Cardiac catheterization (2016);  Cardiac catheterization (16); Cardiac catheterization (08/24/2017); Coronary angioplasty (08/2017); Diagnostic Cardiac Cath Lab Procedure; eye surgery (03/2019); and Cardiac catheterization (04/13/2020). Restrictions  Restrictions/Precautions  Restrictions/Precautions: Fall Risk, Seizure  Required Braces or Orthoses?: No    Objective       Instrumental ADL's  Instrumental ADLs: Yes  Meal Prep  Meal Prep Level: Walker  Meal Prep Level of Assistance: Stand by assistance  Meal Preparation: Pt completed simple meal prep task that included retrieving and transporting items ~ 10 minutes. Balance  Sitting Balance: Stand by assistance  Standing Balance: Stand by assistance  Functional Mobility  Functional - Mobility Device: 4-Wheeled Walker  Activity: To/From therapy gym; Other  Assist Level: Stand by assistance  Functional Mobility Comments: SBA with rollator from therapy to pt's room and around kitchen. SBA with RW to theapy room. Bed mobility  Supine to Sit: Stand by assistance  Scooting: Stand by assistance  Transfers  Sit to stand: Stand by assistance  Stand to sit: Stand by assistance  Transfer Comments: up/down from bed, w/c, recliner     Plan   Plan  Current Treatment Recommendations: Gait Training, Strengthening, Patient/Caregiver Education & Training, Home Management Training, Equipment Evaluation, Education, & procurement, Balance Training, Functional Mobility Training, Endurance Training, Self-Care / ADL, Safety Education & Training                  Goals  Short term goals  Time Frame for Short term goals: 1 week  Short term goal 1: Supervision with toilet hygiene. Short term goal 2: Supervision with showering/bathing. Short term goal 3: MET  Short term goal 4: Supervision with LB dressing. Short term goal 5: Supervision with putting on/taking off footwear. Short term goal 6: Supervision with toilet transfers. Short term goal 7: Min A with picking up objects from floor with AE as needed.   Long term goals  Time Frame for Long term goals : 2 weeks  Long term goal 1: Modified Independent with toilet hygiene. Long term goal 2: Modified Independent with showering/bathing. Long term goal 3: MET  Long term goal 4: Modified Independent with LB dressing. Long term goal 5: Modified Independent with putting on/taking off footwear. Long term goals 6: Modified Independent with toilet transfers. Long term goal 8: Modified Independent with picking up objects from floor with AE as needed. Long term goal 9: Verbalize DME needs. Long term goal 10: Complete IADL/homemaking task Modified [place].         Therapy Time   Individual Concurrent Group Co-treatment   Time In 0815         Time Out 0900         Minutes 45         Timed Code Treatment Minutes: Mamie

## 2020-11-03 NOTE — PROGRESS NOTES
Occupational Therapy     11/03/20 1100   Subjective   Subjective c/o nausea and headache, vomited at the end of tx session, BP taken, nursing notified. Pain Assessment   Patient Currently in Pain Yes   Pain Assessment 0-10   Pain Level 6   Pain Type Acute pain   Pain Location Head   Pain Descriptors Headache  (And nausea.)   Pain Frequency Continuous   Clinical Progression Gradually worsening   Response to Pain Intervention None;Patient Satisfied   Vital Signs   BP (!) 156/78   BP Location Right Arm   Patient Position Sitting   ADL   Grooming Independent   Toileting Stand by assistance   Balance   Sitting Balance Stand by assistance   Standing Balance Stand by assistance   Standing Balance   Time 3 minutes   Activity Grooming tasks at sink. Functional Mobility   Functional - Mobility Device Rolling Walker   Activity To/from bathroom   Assist Level Stand by assistance   Bed mobility   Sit to Supine Supervision   Transfers   Sit to stand Stand by assistance   Stand to sit Stand by assistance   Toilet Transfers   Toilet - Technique Ambulating   Equipment Used Grab bars   Toilet Transfer Stand by assistance   Type of ROM/Therapeutic Exercise   Type of ROM/Therapeutic Exercise Free weights   Comment 2# BUE 2 sets x 10 reps, 3 planes. Assessment   Performance deficits / Impairments Decreased ADL status; Decreased functional mobility ; Decreased endurance;Decreased balance;Decreased strength;Decreased safe awareness;Decreased high-level IADLs   Treatment Diagnosis subarachnoid hemorrhage   Timed Code Treatment Minutes 60 Minutes   Activity Tolerance   Activity Tolerance Treatment limited secondary to medical complications (free text); Patient limited by pain   Activity Tolerance c/o of headache and nausea, vomited at the end of the tx session. Safety Devices   Safety Devices in place Yes   Type of devices Call light within reach; Bed alarm in place   Plan   Current Treatment Recommendations Gait Training;Strengthening;Patient/Caregiver Education & Training;Home Management Training;Equipment Evaluation, Education, & procurement;Balance Training;Functional Mobility Training; Endurance Training;Self-Care / ADL; Safety Education & Training

## 2020-11-03 NOTE — PROGRESS NOTES
Progress Note  Hui Lennon  11/3/2020 12:00 PM  Subjective:   Admit Date:   10/27/2020      CC/ADMIT DX:       Interval History:   Reviewed overnight events and nursing notes. She has c/o dizziness and nausea with activity. I have reviewed all labs/diagnostics from the last 24hrs. ROS:   I have done a 10 point ROS and all are negative, except what is mentioned in the HPI. Dietary Nutrition Supplements: Standard High Calorie Oral Supplement  DIET GENERAL;    Medications:      cefepime  2 g Intravenous Q12H    ondansetron  4 mg Oral TID WC    pantoprazole  40 mg Oral QAM AC    insulin lispro  0-6 Units Subcutaneous BID    isosorbide mononitrate  60 mg Oral Daily    levETIRAcetam  500 mg Oral BID    metoprolol succinate  50 mg Oral Daily    ranolazine  1,000 mg Oral BID    traZODone  50 mg Oral Nightly           Objective:   Vitals: /68   Pulse 61   Temp 96.9 °F (36.1 °C) (Temporal)   Resp 20   Ht 5' 5\" (1.651 m)   Wt 150 lb 4.8 oz (68.2 kg)   SpO2 95%   BMI 25.01 kg/m²      Intake/Output Summary (Last 24 hours) at 11/3/2020 1200  Last data filed at 11/3/2020 0848  Gross per 24 hour   Intake 720 ml   Output --   Net 720 ml     General appearance: alert and cooperative with exam  Lungs: clear to auscultation bilaterally  Heart: RRR  Abdomen: soft, non-tender; bowel sounds normal; no masses,  no organomegaly  Extremities: extremities normal, atraumatic, no cyanosis or edema  Neurologic:  No obvious focal neurologic deficits.     Assessment and Plan:   Principal Problem:    Subarachnoid hemorrhage following injury, no loss of consciousness (HCC)  Active Problems:    Coronary artery disease of native artery of native heart with stable angina pectoris (HCC)    Osteoarthritis    GERD (gastroesophageal reflux disease)    Glucose intolerance (impaired glucose tolerance)    Depression with anxiety    CAD (coronary artery disease)    Hyperlipidemia    Hypertension    Hx of CABG    Bilateral carotid artery stenosis    Altered mental status    Subarachnoid hemorrhage (HCC)    SLE (systemic lupus erythematosus) (HCC)    Intracranial bleed (HCC)    Post-traumatic headache    Acute cystitis without hematuria  Resolved Problems:    * No resolved hospital problems. *     Nausea    Plan:  1. Continue present medication(s)   2. Follow with therapy  3. Continue supportive care      Discharge planning:   her home     Reviewed treatment plans with the patient and/or family.              Electronically signed by Phil Chopra MD on 11/3/2020 at 12:00 PM

## 2020-11-04 LAB
BLOOD CULTURE, ROUTINE: NORMAL
CULTURE, BLOOD 2: NORMAL
GLUCOSE BLD-MCNC: 101 MG/DL (ref 70–99)
GLUCOSE BLD-MCNC: 139 MG/DL (ref 70–99)
GLUCOSE BLD-MCNC: 142 MG/DL (ref 70–99)
GLUCOSE BLD-MCNC: 190 MG/DL (ref 70–99)
PERFORMED ON: ABNORMAL

## 2020-11-04 PROCEDURE — 1180000000 HC REHAB R&B

## 2020-11-04 PROCEDURE — 97535 SELF CARE MNGMENT TRAINING: CPT

## 2020-11-04 PROCEDURE — 6370000000 HC RX 637 (ALT 250 FOR IP): Performed by: PSYCHIATRY & NEUROLOGY

## 2020-11-04 PROCEDURE — 99232 SBSQ HOSP IP/OBS MODERATE 35: CPT | Performed by: PSYCHIATRY & NEUROLOGY

## 2020-11-04 PROCEDURE — 6360000002 HC RX W HCPCS: Performed by: FAMILY MEDICINE

## 2020-11-04 PROCEDURE — 97110 THERAPEUTIC EXERCISES: CPT

## 2020-11-04 PROCEDURE — 97130 THER IVNTJ EA ADDL 15 MIN: CPT

## 2020-11-04 PROCEDURE — 97116 GAIT TRAINING THERAPY: CPT

## 2020-11-04 PROCEDURE — 97530 THERAPEUTIC ACTIVITIES: CPT

## 2020-11-04 PROCEDURE — 97129 THER IVNTJ 1ST 15 MIN: CPT

## 2020-11-04 PROCEDURE — 6370000000 HC RX 637 (ALT 250 FOR IP): Performed by: FAMILY MEDICINE

## 2020-11-04 PROCEDURE — 2580000003 HC RX 258: Performed by: FAMILY MEDICINE

## 2020-11-04 PROCEDURE — 82947 ASSAY GLUCOSE BLOOD QUANT: CPT

## 2020-11-04 RX ADMIN — RANOLAZINE 1000 MG: 500 TABLET, FILM COATED, EXTENDED RELEASE ORAL at 08:42

## 2020-11-04 RX ADMIN — METOPROLOL SUCCINATE 50 MG: 50 TABLET, EXTENDED RELEASE ORAL at 08:42

## 2020-11-04 RX ADMIN — SODIUM CHLORIDE, PRESERVATIVE FREE 2 G: 5 INJECTION INTRAVENOUS at 17:33

## 2020-11-04 RX ADMIN — ONDANSETRON HYDROCHLORIDE 4 MG: 4 TABLET, FILM COATED ORAL at 12:00

## 2020-11-04 RX ADMIN — LEVETIRACETAM 500 MG: 500 TABLET ORAL at 08:43

## 2020-11-04 RX ADMIN — ONDANSETRON HYDROCHLORIDE 4 MG: 4 TABLET, FILM COATED ORAL at 17:33

## 2020-11-04 RX ADMIN — LEVETIRACETAM 500 MG: 500 TABLET ORAL at 21:26

## 2020-11-04 RX ADMIN — SODIUM CHLORIDE, PRESERVATIVE FREE 2 G: 5 INJECTION INTRAVENOUS at 06:20

## 2020-11-04 RX ADMIN — PANTOPRAZOLE SODIUM 40 MG: 40 TABLET, DELAYED RELEASE ORAL at 06:20

## 2020-11-04 RX ADMIN — ONDANSETRON HYDROCHLORIDE 4 MG: 4 TABLET, FILM COATED ORAL at 08:42

## 2020-11-04 RX ADMIN — ALPRAZOLAM 0.12 MG: 0.25 TABLET ORAL at 21:26

## 2020-11-04 RX ADMIN — RANOLAZINE 1000 MG: 500 TABLET, FILM COATED, EXTENDED RELEASE ORAL at 21:25

## 2020-11-04 RX ADMIN — ISOSORBIDE MONONITRATE 60 MG: 60 TABLET, EXTENDED RELEASE ORAL at 08:42

## 2020-11-04 RX ADMIN — TRAZODONE HYDROCHLORIDE 50 MG: 50 TABLET ORAL at 21:26

## 2020-11-04 ASSESSMENT — PAIN DESCRIPTION - ONSET: ONSET: ON-GOING

## 2020-11-04 ASSESSMENT — PAIN DESCRIPTION - PAIN TYPE
TYPE: ACUTE PAIN
TYPE: ACUTE PAIN

## 2020-11-04 ASSESSMENT — PAIN DESCRIPTION - FREQUENCY: FREQUENCY: CONTINUOUS

## 2020-11-04 ASSESSMENT — PAIN DESCRIPTION - LOCATION
LOCATION: HEAD
LOCATION: HEAD

## 2020-11-04 ASSESSMENT — PAIN DESCRIPTION - PROGRESSION: CLINICAL_PROGRESSION: NOT CHANGED

## 2020-11-04 ASSESSMENT — PAIN SCALES - GENERAL
PAINLEVEL_OUTOF10: 4
PAINLEVEL_OUTOF10: 6

## 2020-11-04 ASSESSMENT — PAIN DESCRIPTION - DESCRIPTORS
DESCRIPTORS: HEADACHE;PRESSURE
DESCRIPTORS: HEADACHE;PRESSURE

## 2020-11-04 NOTE — PROGRESS NOTES
[facility]  INPATIENT SPEECH THERAPY  [facility] 8 [facility] UNIT  TIME           [x]Daily Note  []Progress Note    Date: 2020  Patient Name: Bridgette Whipple        MRN: [id number]    Account #: [de-identified]  : 1945  (76 y.o.)  Gender: female   Primary Provider: Nadia Flores MD  Precautions: fall  Swallowing Status/Diet: regular and thin      Subjective: Pt alert and sitting in room. No visitors were with pt or accompanied her to therapy. Pt left room and came to therapy gym to participate in skilled ST. Several activities were presented to pt prior to finding an activity that interested her mind and satisfied the SLP to challenge her mentation. Pt had various questions during tx about her mentation and cognitive abilities. SLP provided appropriate answers. Objective:   SLP presented a multi-step table-top activity. Pt completed each step requiring minimal verbal prompts. Pt demonstrated, organizing, planning, self-monitoring, initiation, and completion independently. Problem solving cards were presented. Pt identified three problems and provided 2-3 solutions given moderate verbal prompts. Pt demonstrated safety awareness with all problem solving, but reasoning skills could be improved. SHORT TERM GOAL #1:  Goal 1: The patient will complete simple/complex naming tasks with min verbal cues at 80% accuracy in order to improve verbal expression and thought organization for functional communication. SHORT TERM GOAL #2:  Goal 2: The patient will follow multi step commands during daily activities with min verbal cues at 100% accuracy in order to improve safety with functional ADL's and decreased assistance from caregivers. SHORT TERM GOAL #3:  Goal 3: The patient will complete attention/processing tasks with 80% accuracy with min verbal cues.     SHORT TERM GOAL #4:  Goal 4: The patient will complete executive function tasks (planning, organizing, self monitoring, etc) with min verbal cues at 80% accuracy in order to improve safety awareness with functional ADL's. SHORT TERM GOAL #5:  Goal 5: The patient will demonstrate functional problem solving and safety awareness with min verbal cues at 80% accuracy for daily living tasks in order to increase safe interaction with environment and decreased assistance from caregivers. not met    Swallowing Short Term Goals  Short-term Goals  Goal 1: The patient will tolerate regular diet with thin liquids with min/no overt s/s of aspiration. ASSESSMENT:  Assessment: [x]Progressing towards goals          []Not Progressing towards goals    Patient Tolerance of Treatment:   [x]Tolerated well []Tolerated fair []Required rest breaks []Fatigued    Education:  Learner:  [x]Patient          []Significant Other          []Other  Education provided regarding:  [x]Goals and POC   []Diet and swallowing precautions    []Home Exercise Program  []Progress and/or discharge information  Method of Education:  [x]Discussion          []Demonstration          []Handout          []Other  Evaluation of Education:   [x]Verbalized understanding   []Demonstrates without assistance  []Demonstrates with assistance  []Needs further instruction     []No evidence of learning                  []No family present      Plan: [x]Continue with current plan of care    []Modify current plan of care as follows:    []Discharge patient    Discharge Location:    Services/Supervision Recommended:      [x]Patient continues to require treatment by a licensed therapist to address functional deficits as outlined in the established plan of care.     Electronically signed by PARKER Rocha on 11/4/2020 at 3:23 PM

## 2020-11-04 NOTE — PROGRESS NOTES
Occupational Therapy  Facility/Department: [facility] 8 REHAB UNIT  Daily Treatment Note  NAME: Ambelry Gauthier  : 1945  MRN: [id number]    Date of Service: 2020    Discharge Recommendations:  Continue to assess pending progress     Assessment   Performance deficits / Impairments: Decreased ADL status; Decreased functional mobility ; Decreased endurance;Decreased balance;Decreased strength;Decreased safe awareness;Decreased high-level IADLs  Treatment Diagnosis: subarachnoid hemorrhage  Prognosis: Good  OT Education: ADL Adaptive Strategies;Transfer Training  Activity Tolerance  Activity Tolerance: Patient Tolerated treatment well  Safety Devices  Safety Devices in place: Yes  Type of devices: Call light within reach; Bed alarm in place; Left in bed       Patient Diagnosis(es): There were no encounter diagnoses. has a past medical history of Acute cystitis without hematuria, Asthma, CAD (coronary artery disease), Chest pain, Depression with anxiety, Diabetes mellitus ([address] GERD (gastroesophageal reflux disease), Glucose intolerance (impaired glucose tolerance), Hyperlipidemia, Hypertension, Lupus (systemic lupus erythematosus) ([address] Malaise and fatigue, Movement disorder, New onset seizure ([address] Osteoarthritis, Palliative care patient, and S/P CABG x 3.   has a past surgical history that includes Cardiac catheterization (11); Appendectomy (); Tonsillectomy (); cyst removal (); Nasal septum surgery (); Hysterectomy (); Hemorrhoid surgery (); sinus surgery (); Spine surgery (3/6/2006); Spine surgery (3/6/2006); Coronary angioplasty with stent (3/22/2007); Coronary angioplasty with stent (2007); Colon surgery (); Colon surgery (); Colon surgery (2008); Colon surgery (2012); Coronary artery bypass graft (3/5/2013); back surgery; Colonoscopy; Endoscopy, colon, diagnostic; Cosmetic surgery; Cardiac catheterization (16); Cardiac catheterization (2016);  Cardiac catheterization (2/19/16); Cardiac catheterization (08/24/2017); Coronary angioplasty (08/2017); Diagnostic Cardiac Cath Lab Procedure; eye surgery (03/2019); and Cardiac catheterization (04/13/2020). Restrictions  Restrictions/Precautions  Restrictions/Precautions: Fall Risk, Seizure  Required Braces or Orthoses?: No    Objective    Balance  Sitting Balance: Independent  Standing Balance: Supervision  Standing Balance  Time: 12 minutes  Activity: 2 handed static standing task  Functional Mobility  Functional - Mobility Device: 4-Wheeled Walker  Activity: Other  Assist Level: Supervision  Functional Mobility Comments: short distance in room to bed     Transfers  Sit to stand: Supervision  Stand to sit: Supervision  Transfer Comments: up/down from w/c,  bed     Type of ROM/Therapeutic Exercise  Type of ROM/Therapeutic Exercise: Free weights  Comment: 2# 1 set x 15 reps, all planes       Plan   Plan  Current Treatment Recommendations: Gait Training, Strengthening, Patient/Caregiver Education & Training, Home Management Training, Equipment Evaluation, Education, & procurement, Balance Training, Functional Mobility Training, Endurance Training, Self-Care / ADL, Safety Education & Training    Goals  Short term goals  Time Frame for Short term goals: 1 week  Short term goal 1: Supervision with toilet hygiene. Short term goal 2: Supervision with showering/bathing. Short term goal 3: MET  Short term goal 4: Supervision with LB dressing. Short term goal 5: Supervision with putting on/taking off footwear. Short term goal 6: Supervision with toilet transfers. Short term goal 7: Min A with picking up objects from floor with AE as needed. Long term goals  Time Frame for Long term goals : 2 weeks  Long term goal 1: Modified Independent with toilet hygiene. Long term goal 2: Modified Independent with showering/bathing. Long term goal 3: MET  Long term goal 4: Modified Independent with LB dressing.   Long term goal 5: Modified Independent with putting on/taking off footwear. Long term goals 6: Modified Independent with toilet transfers. Long term goal 8: Modified Independent with picking up objects from floor with AE as needed. Long term goal 9: Verbalize DME needs. Long term goal 10: Complete IADL/homemaking task Modified [place].        Therapy Time   Individual Concurrent Group Co-treatment   Time In 6156         Time Out 1500         Minutes 45         Timed Code Treatment Minutes: Mamie

## 2020-11-04 NOTE — PROGRESS NOTES
Patient:   Merlin Cruz  MR#:    [id number]   Room:    0826/826-01   YOB: 1945  Date of Progress Note: 11/4/2020  Time of Note                           11:51 AM  Consulting Physician:   Maverick Barraza M.D. Attending Physician:  Maverick Barraza MD     Chief complaint S/p Traumatic subarachnoid hemorrhage      S:This 76 y.o. female  with history of Lupus,DM diet controlled, GERD, CAD s/p CABG x 3 and stenting, HTN and hyperlipidemia. Patient had had some weakness since July and had been using a rollator walker and her daughters had been alternating staying with her. She presented to [facility] ER on 10/24/20 after having a fall. Her daughter was pushing her while she was sitting on her rollator and she fell backwards, hitting her head on the asphalt. She did not lose consciousness but did have immediate onset of headache that has persisted. CT done revealed a small right frontal area of traumatic subarachnoid hemorrhage. Her B/P was elevated with systolic in 601-458 range. She was admitted to her PCP Dr. LOVE with consult for neurosurgery. She was seen by neurosurgeon Dr. Joseph Cisneros and not felt to need any surgical intervention. Repeat CTs have been stable. She was evaluated by SPT for swallow and was initially placed on bite size consistency diet with nectar thick liquids but has now been upgraded to regular with thin liquids with medicines whole in pudding or applesauce. Patient also found to have difficulty with word finding and short-term memory. Patient is also participating with both PT/OT. She is felt to need a stay on Rehab for continued therapy to work towards her goal of returning home with assist of her daughters. She is now felt ready to start the Rehab program. Overnight vomiting . Repeat CT head stable and improved. Feels dizziness and nauseous when moving. Still dizziness at times.      REVIEW OF SYSTEMS:  Constitutional: No fevers No chills  Neck:No stiffness  Respiratory: No shortness of breath  Cardiovascular: No chest pain No palpitations  Gastrointestinal: No abdominal pain    Genitourinary: No Dysuria  Neurological: +headache, no confusion    Past Medical History:      Diagnosis Date    Acute cystitis without hematuria 10/28/2020    Asthma     CAD (coronary artery disease)     Chest pain     Depression with anxiety     Diabetes mellitus [address] [address]     GERD (gastroesophageal reflux disease)     Glucose intolerance (impaired glucose tolerance)     Hyperlipidemia     Cholesterol management per Lincoln Javier Hypertension     Lupus (systemic lupus erythematosus) ([facility]     Malaise and fatigue 10/6/2017    Movement disorder     Sees pain management for neck pain and previous surgery    New onset seizure ([facility]     Osteoarthritis     Palliative care patient 07/22/2020    S/P CABG x 3 03/05/2013    PACABG X 3, LIMA-LAD, SVG0OM, SVG-DIAG, RT EV, DR Harini Cervantes       Past Surgical History:      Procedure Laterality Date    APPENDECTOMY  1965    BACK SURGERY      CARDIAC CATHETERIZATION  5/18/11    EF 60%    CARDIAC CATHETERIZATION  1/25/16    drug eluting stent to RCA    CARDIAC CATHETERIZATION  1/25/2016    CARDIAC CATHETERIZATION  2/19/16    CARDIAC CATHETERIZATION  08/24/2017    cutting balloon angioplasty ot LAD    CARDIAC CATHETERIZATION  04/13/2020    TWO Stents placed    COLON SURGERY  1992    Polypectomy - 2    COLON SURGERY  2003    Polypectomy - 4    COLON SURGERY  11/2008    Polypectomy - 2    COLON SURGERY  8/2012    Polypectomy - 3    COLONOSCOPY      CORONARY ANGIOPLASTY  08/2017    cutting balloon angioplasty LAD diagonal    CORONARY ANGIOPLASTY WITH STENT PLACEMENT  3/22/2007    CORONARY ANGIOPLASTY WITH STENT PLACEMENT  8/2007    CORONARY ARTERY BYPASS GRAFT  3/5/2013    PACABG X 3, LIMA-LAD, SVG0OM, SVG-DIAG, RT EVH, DR PATEL    COSMETIC SURGERY      deviated septum and rhinoplasty    CYST REMOVAL  1970    DIAGNOSTIC CARDIAC CATH LAB 500 mg at 11/04/20 0843    metoprolol succinate (TOPROL XL) extended release tablet 50 mg, 50 mg, Oral, Daily, Fransisco Ramirez MD, 50 mg at 11/04/20 0842    nitroGLYCERIN (NITROSTAT) SL tablet 0.4 mg, 0.4 mg, Sublingual, Q5 Min PRN, Fransisco Ramirez MD    ranolazine [facility]) extended release tablet 1,000 mg, 1,000 mg, Oral, BID, Fransisco Ramirez MD, 1,000 mg at 11/04/20 3580    traZODone (DESYREL) tablet 50 mg, 50 mg, Oral, Nightly, Fransisco Ramirez MD, 50 mg at 11/03/20 2142    polyethylene glycol (GLYCOLAX) packet 17 g, 17 g, Oral, Daily PRN, Melissa Ferguson MD    traMADol (ULTRAM) tablet 50 mg, 50 mg, Oral, Q6H PRN, Melissa Ferguson MD, 50 mg at 11/03/20 0520    Allergies: Other; Codeine; Darvocet [propoxyphene n-acetaminophen]; Hydrocodone-acetaminophen; Lyrica [pregabalin]; Morphine; and Percocet [oxycodone-acetaminophen]    Social History:   TOBACCO:   reports that she quit smoking about 53 years ago. Her smoking use included cigarettes. She has a 1.00 pack-year smoking history. She has never used smokeless tobacco.  ETOH:   reports no history of alcohol use. Family History:       Problem Relation Age of Onset    Heart Disease Mother     High Blood Pressure Mother     Diabetes Mother     High Cholesterol Mother     Diabetes Brother          PHYSICAL EXAM:  /74   Pulse 64   Temp 97.2 °F (36.2 °C) (Temporal)   Resp 16   Ht 5' 5\" (1.651 m)   Wt 151 lb 9.6 oz (68.8 kg)   SpO2 93%   BMI 25.23 kg/m²       Constitutional - well developed, well nourished.    Eyes - conjunctiva normal.   Ear, nose, throat - No scars, masses, or lesions over external nose or ears, no atrophy of tongue  Neck-symmetric, no masses noted, no jugular vein distension  Respiration- chest wall appears symmetric, good expansion,   normal effort without use of accessory muscles  Musculoskeletal - no significant wasting of muscles noted, no bony deformities  Extremities-no clubbing, cyanosis or edema  Skin - warm, dry, and intact. No rash, erythema, or pallor. Psychiatric - mood, affect, and behavior appear normal.      Neurological exam  Awake,  fluent oriented slow  Attention and concentration appear mildly impaired  Recent and remote memory appears mildly impaired  Speech normal without dysarthria  No clear issues with language of fund of knowledge     Cranial Nerve Exam     CN III, IV,VI-EOMI, No nystagmus, conjugate eye movements, no ptosis    CN VII-no facial assymetry       Motor Exam  antigravity throughout upper and lower extremities bilaterally      Tremors- no tremors in hands or head noted     Gait  Not tested      Nursing/pcp notes, imaging,labs and vitals reviewed. PT,OT and/or speech notes reviewed    Lab Results   Component Value Date    WBC 4.5 (L) 11/02/2020    HGB 10.8 (L) 11/02/2020    HCT 32.0 (L) 11/02/2020    MCV 89.9 11/02/2020     11/02/2020     Lab Results   Component Value Date     (L) 11/02/2020    K 3.9 11/02/2020     11/02/2020    CO2 25 11/02/2020    BUN 18 11/02/2020    CREATININE 0.9 11/02/2020    GLUCOSE 101 11/02/2020    CALCIUM 9.0 11/02/2020    PROT 5.2 (L) 11/02/2020    LABALBU 3.7 11/02/2020    BILITOT 0.3 11/02/2020    ALKPHOS 52 11/02/2020    AST 12 11/02/2020    ALT 11 11/02/2020    LABGLOM >60 11/02/2020    GFRAA >59 11/02/2020    GLOB 1.8 11/19/2016     Lab Results   Component Value Date    INR 0.97 10/27/2020    INR 0.99 10/25/2020    INR 0.96 10/24/2020    PROTIME 12.7 10/27/2020    PROTIME 12.9 10/25/2020    PROTIME 12.7 10/24/2020         PARKER Alex    Speech and Language Pathologist    Speech Therapy    Progress Notes    Signed    Date of Service:  11/3/2020  2:03 PM                Signed              Show:Clear all  [x]Manual[x]Template[]Copied    Added by:  [B]GTQWY Ar Quinn SLP    []Elliot for details  [facility]  INPATIENT SPEECH [facility]ERAPY  [facility] 8 REHAB UNIT  TIME   Time In: 1300  Time Out: 1730  Minutes: 15     [x]? Daily Note  []? Progress Note     Date: 11/3/2020  Patient Name: Nidhi Hensley        MRN:   [id number]    Account #: [de-identified]  : 1945  (76 y.o.)  Gender: female   Primary Provider: Melania Green MD  Precautions: Fall  Swallowing Status/Diet: Regular diet with thin liquids     Subjective: Patient nauseous and not feeling well. Patient completed a portion of therapy, however then requested to rest.      Objective: Patient presenting with increased recall of recent/daily events. Patient was able to recall details from both OT and PT sessions with no difficulty.      Increased processing and thought organization was noted for conversational discourse.      Patient began to not feel well during therapy session and session was discontinued.      SHORT TERM GOAL #1:  Goal 1: The patient will complete simple/complex naming tasks with min verbal cues at 80% accuracy in order to improve verbal expression and thought organization for functional communication.     SHORT TERM GOAL #2:  Goal 2: The patient will follow multi step commands during daily activities with min verbal cues at 100% accuracy in order to improve safety with functional ADL's and decreased assistance from caregivers.     SHORT TERM GOAL #3:  Goal 3: The patient will complete attention/processing tasks with 80% accuracy with min verbal cues.     SHORT TERM GOAL #4:  Goal 4: The patient will complete executive function tasks (planning, organizing, self monitoring, etc) with min verbal cues at 80% accuracy in order to improve safety awareness with functional ADL's.     SHORT TERM GOAL #5:  Goal 5: The patient will demonstrate functional problem solving and safety awareness with min verbal cues at 80% accuracy for daily living tasks in order to increase safe interaction with environment and decreased assistance from caregivers.  not met     Swallowing Short Term Goals  Short-term Goals  Goal 1: The patient will tolerate regular diet with thin liquids with min/no overt s/s of abx     Dizziness/nausea with movement-Zofran TID  Continue current care as noted        ELOS November 11       Expected duration and frequency therapy: 180 minutes per day, 5 days per week    [address] [facility]  [phone] CELL  Dr Stephenie Madden

## 2020-11-04 NOTE — PROGRESS NOTES
Occupational Therapy     11/04/20 1100   General   Diagnosis Subarachnoid hemorrhage   Pain Assessment   Patient Currently in Pain Yes   Pain Assessment 0-10   Pain Level 6   Pain Type Acute pain   Pain Location Head   Pain Descriptors Headache;Pressure  (And nausea.)   Pain Frequency Continuous   Clinical Progression Not changed   Response to Pain Intervention None;Patient Satisfied   Balance   Sitting Balance Independent   Standing Balance Stand by assistance   Functional Mobility   Functional - Mobility Device 4-Wheeled Walker   Activity To/from bathroom; Retrieve items   Assist Level Stand by assistance   Transfers   Sit to stand Stand by assistance   Stand to sit Stand by assistance   Type of ROM/Therapeutic Exercise   Type of ROM/Therapeutic Exercise Cane/Wand   Comment 2# 1 set x 15 reps. Assessment   Performance deficits / Impairments Decreased ADL status; Decreased functional mobility ; Decreased endurance;Decreased balance;Decreased strength;Decreased safe awareness;Decreased high-level IADLs   Treatment Diagnosis subarachnoid hemorrhage   Prognosis Good   Timed Code Treatment Minutes 60 Minutes   Activity Tolerance   Activity Tolerance Patient Tolerated treatment well   Safety Devices   Safety Devices in place Yes   Type of devices Call light within reach; Bed alarm in place   Plan   Current Treatment Recommendations Gait Training;Strengthening;Patient/Caregiver Education & Training;Home Management Training;Equipment Evaluation, Education, & procurement;Balance Training;Functional Mobility Training; Endurance Training;Self-Care / ADL; Safety Education & Training      11/04/20 1100   Oral Hygiene   Assistance Needed Independent   CARE Score 6   Shower/Bathe Self   Assistance Needed Supervision or touching assistance   Comment Shower.    CARE Score 4   Upper Body Dressing   Assistance Needed Independent   CARE Score 6   Lower Body Dressing   Assistance Needed Supervision or touching assistance   CARE Score 4 Putting On/Taking Off Footwear   Assistance Needed Setup or clean-up assistance   CARE Score 5

## 2020-11-04 NOTE — PLAN OF CARE
decrease  Description: Pain level will decrease  11/3/2020 2352 by Ludy Jalloh LPN  Outcome: Ongoing  11/3/2020 1514 by David Donovan RN  Outcome: Ongoing     Problem: Skin Integrity:  Goal: Skin integrity will stabilize  Description: Skin integrity will stabilize  11/3/2020 2352 by Ludy Jalloh LPN  Outcome: Ongoing  11/3/2020 1514 by David Donovan RN  Outcome: Ongoing     Problem: Discharge Planning:  Goal: Patients continuum of care needs are met  Description: Patients continuum of care needs are met  11/3/2020 2352 by Ludy Jalloh LPN  Outcome: Ongoing  11/3/2020 1514 by David Donovan RN  Outcome: Ongoing     Problem: Nutrition  Goal: Optimal nutrition therapy  11/3/2020 2352 by Ludy Jalloh LPN  Outcome: Ongoing  11/3/2020 1514 by David Donovan RN  Outcome: Ongoing     Problem: Serum Glucose Level - Abnormal:  Goal: Ability to maintain appropriate glucose levels has stabilized  Description: Ability to maintain appropriate glucose levels has stabilized  11/3/2020 2352 by Ludy Jalloh LPN  Outcome: Ongoing  11/3/2020 1514 by David Donovan RN  Outcome: Ongoing     Problem: Mood - Altered:  Goal: Mood stable  Description: Mood stable  11/3/2020 2352 by Ludy Jalloh LPN  Outcome: Ongoing  11/3/2020 1514 by David Donovan RN  Outcome: Ongoing     Problem: Anxiety:  Goal: Level of anxiety will decrease  Description: Level of anxiety will decrease  11/3/2020 2352 by Ludy Jalloh LPN  Outcome: Ongoing  11/3/2020 1514 by David Donovan RN  Outcome: Ongoing

## 2020-11-04 NOTE — PROGRESS NOTES
Name: Jerona Merlin  MRN:  [id number]  Date of service:  11/4/2020 11/04/20 1550   Restrictions/Precautions   Restrictions/Precautions Fall Risk;Seizure   Required Braces or Orthoses? No   General   Chart Reviewed Yes   Subjective   Subjective Pt in bed, agreeable to therapy. Says she is feeling better this afternoon than she has been, only had a little nausea this morning with OT but has been feeling okay since then. Pain Screening   Patient Currently in Pain Yes   Intervention List Patient able to continue with treatment   Pain Assessment   Pain Assessment 0-10   Pain Level 4   Pain Type Acute pain   Pain Location Head   Pain Descriptors Headache;Pressure   Bed Mobility   Supine to Sit Stand by assistance   Sit to Supine Stand by assistance   Scooting Stand by assistance   Transfers   Sit to Stand Stand by assistance   Stand to sit Stand by assistance   Bed to Chair Stand by assistance   Ambulation   Ambulation? Yes   WB Status WBAT   Ambulation 1   Surface level tile   Device Rollator   Assistance Stand by assistance   Quality of Gait Good reciprocal gait pattern   Distance 500'   Comments No noted increase in nausea with amb   Ambulation 2   Surface - 2 level tile   Device 2 Rollator   Assistance 2 Stand by assistance   Quality of Gait 2 Same as above   Distance 500'   Comments Fatigued toward end of amb but no increased nausea   Propulsion 1   Propulsion Manual   Level Level Tile   Method RUE;LUE   Level of Assistance Stand by assistance   Distance 150'   Exercises   Comments Standing BLE ther ex in II bars in all planes x 10 reps each; tandem and retro walking in II bars   Other exercises   Other exercises? Yes   Conditions Requiring Skilled Therapeutic Intervention   Body structures, Functions, Activity limitations Decreased functional mobility ; Decreased ADL status; Decreased ROM; Decreased strength;Decreased cognition;Decreased endurance;Decreased balance   Assessment Pt tolerates tx session well with no c/o increased nausea throughout. Pt able to perform all standing ex and tandem/retro walking in II bars with no increased nausea as was the case in yesterday's tx. Frequent rest breaks required throughout tx d/t fatigue. Pt able to increase amb distance with rollator this date with noted fatigue following second bout of amb. Pt would cont to benefit from skilled therapy services to address remaining impairments.    Activity Tolerance   Activity Tolerance Treatment limited secondary to medical complications (free text)  (nausea)   Safety Devices   Type of devices Bed alarm in place;Call light within reach;Gait belt;Left in bed  (Left sitting EOB)         Electronically signed by Mitchel Arce PTA on 11/4/2020 at 4:34 PM

## 2020-11-05 LAB
ALBUMIN SERPL-MCNC: 4.1 G/DL (ref 3.5–5.2)
ALP BLD-CCNC: 55 U/L (ref 35–104)
ALT SERPL-CCNC: 20 U/L (ref 5–33)
ANION GAP SERPL CALCULATED.3IONS-SCNC: 11 MMOL/L (ref 7–19)
AST SERPL-CCNC: 15 U/L (ref 5–32)
BACTERIA: NEGATIVE /HPF
BASOPHILS ABSOLUTE: 0 K/UL (ref 0–0.2)
BASOPHILS RELATIVE PERCENT: 0.8 % (ref 0–1)
BILIRUB SERPL-MCNC: 0.4 MG/DL (ref 0.2–1.2)
BILIRUBIN URINE: NEGATIVE
BLOOD, URINE: NEGATIVE
BUN BLDV-MCNC: 13 MG/DL (ref 8–23)
CALCIUM SERPL-MCNC: 9.5 MG/DL (ref 8.8–10.2)
CHLORIDE BLD-SCNC: 102 MMOL/L (ref 98–111)
CLARITY: CLEAR
CO2: 25 MMOL/L (ref 22–29)
COLOR: YELLOW
CREAT SERPL-MCNC: 0.6 MG/DL (ref 0.5–0.9)
CRYSTALS, UA: ABNORMAL /HPF
EOSINOPHILS ABSOLUTE: 0.2 K/UL (ref 0–0.6)
EOSINOPHILS RELATIVE PERCENT: 4.6 % (ref 0–5)
EPITHELIAL CELLS, UA: 0 /HPF (ref 0–5)
GFR AFRICAN AMERICAN: >59
GFR NON-AFRICAN AMERICAN: >60
GLUCOSE BLD-MCNC: 100 MG/DL (ref 74–109)
GLUCOSE BLD-MCNC: 105 MG/DL (ref 70–99)
GLUCOSE BLD-MCNC: 138 MG/DL (ref 70–99)
GLUCOSE BLD-MCNC: 140 MG/DL (ref 70–99)
GLUCOSE BLD-MCNC: 91 MG/DL (ref 70–99)
GLUCOSE URINE: NEGATIVE MG/DL
HCT VFR BLD CALC: 35.5 % (ref 37–47)
HEMOGLOBIN: 11.9 G/DL (ref 12–16)
HYALINE CASTS: 1 /HPF (ref 0–8)
IMMATURE GRANULOCYTES #: 0 K/UL
KETONES, URINE: NEGATIVE MG/DL
LEUKOCYTE ESTERASE, URINE: ABNORMAL
LYMPHOCYTES ABSOLUTE: 1.2 K/UL (ref 1.1–4.5)
LYMPHOCYTES RELATIVE PERCENT: 29.3 % (ref 20–40)
MCH RBC QN AUTO: 30.4 PG (ref 27–31)
MCHC RBC AUTO-ENTMCNC: 33.5 G/DL (ref 33–37)
MCV RBC AUTO: 90.8 FL (ref 81–99)
MONOCYTES ABSOLUTE: 0.3 K/UL (ref 0–0.9)
MONOCYTES RELATIVE PERCENT: 8.7 % (ref 0–10)
NEUTROPHILS ABSOLUTE: 2.2 K/UL (ref 1.5–7.5)
NEUTROPHILS RELATIVE PERCENT: 56.1 % (ref 50–65)
NITRITE, URINE: NEGATIVE
PDW BLD-RTO: 13.2 % (ref 11.5–14.5)
PERFORMED ON: ABNORMAL
PERFORMED ON: NORMAL
PH UA: 7 (ref 5–8)
PLATELET # BLD: 161 K/UL (ref 130–400)
PMV BLD AUTO: 8.9 FL (ref 9.4–12.3)
POTASSIUM REFLEX MAGNESIUM: 4.4 MMOL/L (ref 3.5–5)
PROTEIN UA: NEGATIVE MG/DL
RBC # BLD: 3.91 M/UL (ref 4.2–5.4)
RBC UA: 0 /HPF (ref 0–4)
SODIUM BLD-SCNC: 138 MMOL/L (ref 136–145)
SPECIFIC GRAVITY UA: 1.01 (ref 1–1.03)
TOTAL PROTEIN: 6 G/DL (ref 6.6–8.7)
UROBILINOGEN, URINE: 1 E.U./DL
WBC # BLD: 3.9 K/UL (ref 4.8–10.8)
WBC UA: 2 /HPF (ref 0–5)

## 2020-11-05 PROCEDURE — 97116 GAIT TRAINING THERAPY: CPT

## 2020-11-05 PROCEDURE — 97535 SELF CARE MNGMENT TRAINING: CPT

## 2020-11-05 PROCEDURE — 81001 URINALYSIS AUTO W/SCOPE: CPT

## 2020-11-05 PROCEDURE — 36415 COLL VENOUS BLD VENIPUNCTURE: CPT

## 2020-11-05 PROCEDURE — 85025 COMPLETE CBC W/AUTO DIFF WBC: CPT

## 2020-11-05 PROCEDURE — 97129 THER IVNTJ 1ST 15 MIN: CPT

## 2020-11-05 PROCEDURE — 97530 THERAPEUTIC ACTIVITIES: CPT

## 2020-11-05 PROCEDURE — 1180000000 HC REHAB R&B

## 2020-11-05 PROCEDURE — 82947 ASSAY GLUCOSE BLOOD QUANT: CPT

## 2020-11-05 PROCEDURE — 99232 SBSQ HOSP IP/OBS MODERATE 35: CPT | Performed by: PSYCHIATRY & NEUROLOGY

## 2020-11-05 PROCEDURE — 97130 THER IVNTJ EA ADDL 15 MIN: CPT

## 2020-11-05 PROCEDURE — 6370000000 HC RX 637 (ALT 250 FOR IP): Performed by: PSYCHIATRY & NEUROLOGY

## 2020-11-05 PROCEDURE — 6360000002 HC RX W HCPCS: Performed by: FAMILY MEDICINE

## 2020-11-05 PROCEDURE — 80053 COMPREHEN METABOLIC PANEL: CPT

## 2020-11-05 PROCEDURE — 2580000003 HC RX 258: Performed by: FAMILY MEDICINE

## 2020-11-05 PROCEDURE — 6370000000 HC RX 637 (ALT 250 FOR IP): Performed by: FAMILY MEDICINE

## 2020-11-05 RX ADMIN — ISOSORBIDE MONONITRATE 60 MG: 60 TABLET, EXTENDED RELEASE ORAL at 08:41

## 2020-11-05 RX ADMIN — METOPROLOL SUCCINATE 50 MG: 50 TABLET, EXTENDED RELEASE ORAL at 08:41

## 2020-11-05 RX ADMIN — ONDANSETRON HYDROCHLORIDE 4 MG: 4 TABLET, FILM COATED ORAL at 16:44

## 2020-11-05 RX ADMIN — ONDANSETRON HYDROCHLORIDE 4 MG: 4 TABLET, FILM COATED ORAL at 12:10

## 2020-11-05 RX ADMIN — LEVETIRACETAM 500 MG: 500 TABLET ORAL at 20:48

## 2020-11-05 RX ADMIN — ONDANSETRON HYDROCHLORIDE 4 MG: 4 TABLET, FILM COATED ORAL at 08:41

## 2020-11-05 RX ADMIN — TRAZODONE HYDROCHLORIDE 50 MG: 50 TABLET ORAL at 20:48

## 2020-11-05 RX ADMIN — LEVETIRACETAM 500 MG: 500 TABLET ORAL at 08:41

## 2020-11-05 RX ADMIN — PANTOPRAZOLE SODIUM 40 MG: 40 TABLET, DELAYED RELEASE ORAL at 05:48

## 2020-11-05 RX ADMIN — RANOLAZINE 1000 MG: 500 TABLET, FILM COATED, EXTENDED RELEASE ORAL at 20:48

## 2020-11-05 RX ADMIN — RANOLAZINE 1000 MG: 500 TABLET, FILM COATED, EXTENDED RELEASE ORAL at 08:41

## 2020-11-05 RX ADMIN — ALPRAZOLAM 0.12 MG: 0.25 TABLET ORAL at 20:50

## 2020-11-05 RX ADMIN — SODIUM CHLORIDE, PRESERVATIVE FREE 2 G: 5 INJECTION INTRAVENOUS at 05:51

## 2020-11-05 ASSESSMENT — PAIN DESCRIPTION - FREQUENCY: FREQUENCY: CONTINUOUS

## 2020-11-05 ASSESSMENT — PAIN DESCRIPTION - PAIN TYPE: TYPE: ACUTE PAIN

## 2020-11-05 ASSESSMENT — PAIN SCALES - GENERAL
PAINLEVEL_OUTOF10: 3
PAINLEVEL_OUTOF10: 0

## 2020-11-05 ASSESSMENT — PAIN DESCRIPTION - LOCATION: LOCATION: HEAD

## 2020-11-05 ASSESSMENT — PAIN DESCRIPTION - PROGRESSION: CLINICAL_PROGRESSION: NOT CHANGED

## 2020-11-05 ASSESSMENT — PAIN DESCRIPTION - DESCRIPTORS: DESCRIPTORS: HEADACHE

## 2020-11-05 ASSESSMENT — PAIN DESCRIPTION - ONSET: ONSET: ON-GOING

## 2020-11-05 NOTE — PROGRESS NOTES
Occupational Therapy     11/05/20 1000   Pain Assessment   Patient Currently in Pain Yes   Pain Assessment 0-10   Pain Level 3   Pain Type Acute pain   Pain Location Head   Pain Descriptors Headache   Pain Frequency Continuous   Clinical Progression Not changed   Response to Pain Intervention Patient Satisfied   Balance   Sitting Balance Independent   Standing Balance Supervision   Functional Mobility   Functional - Mobility Device 4-Wheeled Walker   Activity To/From therapy gym; To/from bathroom; Retrieve items   Assist Level Supervision   Functional Mobility Comments Picked up lightweight items from floor using reacher. Bed mobility   Supine to Sit Modified independent   Sit to Supine Modified independent   Transfers   Sit to stand Supervision   Stand to sit Supervision   Type of ROM/Therapeutic Exercise   Type of ROM/Therapeutic Exercise Flores   Comment 10# BUE 3 sets x 10 reps. Short term goals   Short term goal 4 MET   Short term goal 5 MET   Short term goal 7 MET   Assessment   Performance deficits / Impairments Decreased ADL status; Decreased functional mobility ; Decreased endurance;Decreased balance;Decreased strength;Decreased safe awareness;Decreased high-level IADLs   Treatment Diagnosis subarachnoid hemorrhage   Prognosis Good   Timed Code Treatment Minutes 60 Minutes   Activity Tolerance   Activity Tolerance Patient Tolerated treatment well   Safety Devices   Safety Devices in place Yes   Type of devices Call light within reach; Bed alarm in place   Plan   Current Treatment Recommendations Gait Training;Strengthening;Patient/Caregiver Education & Training;Home Management Training;Equipment Evaluation, Education, & procurement;Balance Training;Functional Mobility Training; Endurance Training;Self-Care / ADL; Safety Education & Training      11/05/20 1000   Oral Hygiene   Assistance Needed Independent   CARE Score 6   Upper Body Dressing   Assistance Needed Independent   CARE Score 6   Lower Body Dressing   Assistance Needed Supervision or touching assistance   Comment Supervision.    CARE Score 4   Putting On/Taking Off Footwear   Assistance Needed Setup or clean-up assistance   CARE Score 5

## 2020-11-05 NOTE — PROGRESS NOTES
Patient:   Mikal Aguilar  MR#:    [id number]   Room:    26/826-01   YOB: 1945  Date of Progress Note: 11/5/2020  Time of Note                           10:26 AM  Consulting Physician:   Aaliyah Morillo M.D. Attending Physician:  Aaliyah Morillo MD     Chief complaint S/p Traumatic subarachnoid hemorrhage      S:This 76 y.o. female  with history of Lupus,DM diet controlled, GERD, CAD s/p CABG x 3 and stenting, HTN and hyperlipidemia. Patient had had some weakness since July and had been using a rollator walker and her daughters had been alternating staying with her. She presented to [address] [place] [address] on 10/24/20 after having a fall. Her daughter was pushing her while she was sitting on her rollator and she fell backwards, hitting her head on the asphalt. She did not lose consciousness but did have immediate onset of headache that has persisted. CT done revealed a small right frontal area of traumatic subarachnoid hemorrhage. Her B/P was elevated with systolic in 769-104 range. She was admitted to her PCP Dr. Joel Strauss with consult for neurosurgery. She was seen by neurosurgeon Dr. Lorrie Goel and not felt to need any surgical intervention. Repeat CTs have been stable. She was evaluated by SPT for swallow and was initially placed on bite size consistency diet with nectar thick liquids but has now been upgraded to regular with thin liquids with medicines whole in pudding or applesauce. Patient also found to have difficulty with word finding and short-term memory. Patient is also participating with both PT/OT. She is felt to need a stay on Rehab for continued therapy to work towards her goal of returning home with assist of her daughters. She is now felt ready to start the Rehab program. Overnight vomiting . Repeat CT head stable and improved. Feels dizziness and nauseous when moving. Still dizziness at times. Fluctuates with certain movements.     REVIEW OF SYSTEMS:  Constitutional: No fevers No chills  Neck:No stiffness  Respiratory: No shortness of breath  Cardiovascular: No chest pain No palpitations  Gastrointestinal: No abdominal pain    Genitourinary: No Dysuria  Neurological: +headache, no confusion    Past Medical History:      Diagnosis Date    Acute cystitis without hematuria 10/28/2020    Asthma     CAD (coronary artery disease)     Chest pain     Depression with anxiety     [facility] [address]     GERD (gastroesophageal reflux disease)     Glucose intolerance (impaired glucose tolerance)     Hyperlipidemia     Cholesterol management per [place] Hypertension     Lupus (systemic lupus erythematosus) ([facility] [address] [facility]     Malaise and fatigue 10/6/2017    Movement disorder     Sees pain management for neck pain and previous surgery    New onset seizure ([facility] [address] [facility]     Osteoarthritis     Palliative care patient 07/22/2020    S/P CABG x 3 03/05/2013    PACABG X 3, LIMA-LAD, SVG0OM, SVG-DIAG, RT EV, DR Cornel Kaplan       Past Surgical History:      Procedure Laterality Date    APPENDECTOMY  1965    BACK SURGERY      CARDIAC CATHETERIZATION  5/18/11    EF 60%    CARDIAC CATHETERIZATION  1/25/16    drug eluting stent to RCA    CARDIAC CATHETERIZATION  1/25/2016    CARDIAC CATHETERIZATION  2/19/16    CARDIAC CATHETERIZATION  08/24/2017    cutting balloon angioplasty ot LAD    CARDIAC CATHETERIZATION  04/13/2020    TWO Stents placed    COLON SURGERY  1992    Polypectomy - 2    COLON SURGERY  2003    Polypectomy - 4    COLON SURGERY  11/2008    Polypectomy - 2    COLON SURGERY  8/2012    Polypectomy - 3    COLONOSCOPY      CORONARY ANGIOPLASTY  08/2017    cutting balloon angioplasty LAD diagonal    CORONARY ANGIOPLASTY WITH STENT PLACEMENT  3/22/2007    CORONARY ANGIOPLASTY WITH STENT PLACEMENT  8/2007    CORONARY ARTERY BYPASS GRAFT  3/5/2013    PACABG X 3, LIMA-LAD, SVG0OM, SVG-DIAG, RT EVH, DR PATEL    COSMETIC SURGERY      deviated septum and rhinoplasty    CYST REMOVAL  1970    DIAGNOSTIC CARDIAC CATH LAB PROCEDURE      ENDOSCOPY, COLON, DIAGNOSTIC      EYE SURGERY  03/2019    cataracts    HEMORRHOID SURGERY  1987    HYSTERECTOMY  1983    Partial    NASAL SEPTUM SURGERY  1977    SINUS SURGERY  2003    Nasal Polyps Removed    SPINE SURGERY  3/6/2006    Lumbar Laminectomy L4&5    SPINE SURGERY  3/6/2006    Cervical Fusion - C4,5,6    TONSILLECTOMY  1968       Medications in Hospital:      Current Facility-Administered Medications:     ceFEPIme (MAXIPIME) 2 g in sodium chloride (PF) 20 mL IV syringe, 2 g, Intravenous, Q12H, Armando Hickman MD, 2 g at 11/05/20 0551    ondansetron (ZOFRAN) tablet 4 mg, 4 mg, Oral, TID WC, Tiana Sanford MD, 4 mg at 11/05/20 0841    melatonin tablet 1 mg, 1 mg, Oral, Nightly PRN, Armando Hickman MD    promethazine (PHENERGAN) tablet 12.5 mg, 12.5 mg, Oral, Q6H PRN, Tiana Sanford MD, 12.5 mg at 10/28/20 0928    pantoprazole (PROTONIX) tablet 40 mg, 40 mg, Oral, QAM AC, Tiana Sanford MD, 40 mg at 11/05/20 0548    ondansetron (ZOFRAN) injection 4 mg, 4 mg, Intravenous, Q4H PRN, Tiana Sanford MD, 4 mg at 11/03/20 1146    senna (SENOKOT) tablet 8.6 mg, 1 tablet, Oral, Daily PRN, Armando Hickman MD    glucagon (rDNA) injection 1 mg, 1 mg, Intramuscular, PRN, Armando Hickman MD    glucose (GLUTOSE) 40 % oral gel 15 g, 15 g, Oral, PRN, Armando Hickman MD    acetaminophen (TYLENOL) tablet 650 mg, 650 mg, Oral, Q8H PRN, Armando Hickman MD, 650 mg at 10/28/20 0530    ALPRAZolam Bre Basset) tablet 0.125 mg, 0.125 mg, Oral, Daily PRN, Armando Hickman MD, 0.125 mg at 11/04/20 2126    cyclobenzaprine (FLEXERIL) tablet 10 mg, 10 mg, Oral, TID PRN, Armando Hickman MD    insulin lispro (HUMALOG) injection vial 0-6 Units, 0-6 Units, Subcutaneous, BID, Armando Hickman MD    isosorbide mononitrate (IMDUR) extended release tablet 60 mg, 60 mg, Oral, Daily, Armando Hickman MD, 60 mg at 11/05/20 0841    levETIRAcetam (KEPPRA) tablet 500 mg, 500 mg, Oral, BID, Cristopher Peck MD, 500 mg at 11/05/20 0841    metoprolol succinate (TOPROL XL) extended release tablet 50 mg, 50 mg, Oral, Daily, Cristopher Peck MD, 50 mg at 11/05/20 0841    nitroGLYCERIN (NITROSTAT) SL tablet 0.4 mg, 0.4 mg, Sublingual, Q5 Min PRN, Cristopher Peck MD    ranolazine [facility]) extended release tablet 1,000 mg, 1,000 mg, Oral, BID, Cristopher Peck MD, 1,000 mg at 11/05/20 0841    traZODone (DESYREL) tablet 50 mg, 50 mg, Oral, Nightly, Cristopher Peck MD, 50 mg at 11/04/20 2126    polyethylene glycol (GLYCOLAX) packet 17 g, 17 g, Oral, Daily PRN, Stephenie Madden MD    traMADol (ULTRAM) tablet 50 mg, 50 mg, Oral, Q6H PRN, Stephenie Madden MD, 50 mg at 11/03/20 0520    Allergies: Other; Codeine; Darvocet [propoxyphene n-acetaminophen]; Hydrocodone-acetaminophen; Lyrica [pregabalin]; Morphine; and Percocet [oxycodone-acetaminophen]    Social History:   TOBACCO:   reports that she quit smoking about 53 years ago. Her smoking use included cigarettes. She has a 1.00 pack-year smoking history. She has never used smokeless tobacco.  ETOH:   reports no history of alcohol use. Family History:       Problem Relation Age of Onset    Heart Disease Mother     High Blood Pressure Mother     Diabetes Mother     High Cholesterol Mother     Diabetes Brother          PHYSICAL EXAM:  BP (!) 106/50   Pulse 65   Temp 96.3 °F (35.7 °C) (Temporal)   Resp 17   Ht 5' 5\" (1.651 m)   Wt 154 lb 5 oz (70 kg)   SpO2 93%   BMI 25.68 kg/m²       Constitutional - well developed, well nourished.    Eyes - conjunctiva normal.   Ear, nose, throat - No scars, masses, or lesions over external nose or ears, no atrophy of tongue  Neck-symmetric, no masses noted, no jugular vein distension  Respiration- chest wall appears symmetric, good expansion,   normal effort without use of accessory muscles  Musculoskeletal - no significant wasting of muscles noted, no bony deformities  Extremities-no clubbing, cyanosis or edema  Skin - warm, dry, and intact. No rash, erythema, or pallor. Psychiatric - mood, affect, and behavior appear normal.      Neurological exam  Awake,  fluent oriented slow  Attention and concentration appear mildly impaired  Recent and remote memory appears mildly impaired  Speech normal without dysarthria  No clear issues with language of fund of knowledge     Cranial Nerve Exam     CN III, IV,VI-EOMI, No nystagmus, conjugate eye movements, no ptosis    CN VII-no facial assymetry       Motor Exam  antigravity throughout upper and lower extremities bilaterally      Tremors- no tremors in hands or head noted     Gait  Not tested      Nursing/pcp notes, imaging,labs and vitals reviewed. PT,OT and/or speech notes reviewed    Lab Results   Component Value Date    WBC 3.9 (L) 11/05/2020    HGB 11.9 (L) 11/05/2020    HCT 35.5 (L) 11/05/2020    MCV 90.8 11/05/2020     11/05/2020     Lab Results   Component Value Date     11/05/2020    K 4.4 11/05/2020     11/05/2020    CO2 25 11/05/2020    BUN 13 11/05/2020    CREATININE 0.6 11/05/2020    GLUCOSE 100 11/05/2020    CALCIUM 9.5 11/05/2020    PROT 6.0 (L) 11/05/2020    LABALBU 4.1 11/05/2020    BILITOT 0.4 11/05/2020    ALKPHOS 55 11/05/2020    AST 15 11/05/2020    ALT 20 11/05/2020    LABGLOM >60 11/05/2020    GFRAA >59 11/05/2020    GLOB 1.8 11/19/2016     Lab Results   Component Value Date    INR 0.97 10/27/2020    INR 0.99 10/25/2020    INR 0.96 10/24/2020    PROTIME 12.7 10/27/2020    PROTIME 12.9 10/25/2020    PROTIME 12.7 10/24/2020         PARKER Cruz    Speech and Language Pathologist    Speech Therapy    Progress Notes    Signed    Date of Service:  11/3/2020  2:03 PM                Signed              Show:Clear all  [x]Manual[x]Template[]Copied    Added by:  [T]XHLISSA Cho SLP    []Elliot for details  [facility]  INPATIENT SPEECH THERAPY  [facility] 8 REHAB UNIT  TIME   Time In: 1300  Time Out: 5045  Minutes: 15     [x]? Daily Note  []? Progress Note     Date: 11/3/2020  Patient Name: Sam Jones        MRN:   [id number]    Account #: [de-identified]  : 1945  (76 y.o.)  Gender: female   Primary Provider: Melissa Ferguson MD  Precautions: Fall  Swallowing Status/Diet: Regular diet with thin liquids     Subjective: Patient nauseous and not feeling well. Patient completed a portion of therapy, however then requested to rest.      Objective: Patient presenting with increased recall of recent/daily events. Patient was able to recall details from both OT and PT sessions with no difficulty.      Increased processing and thought organization was noted for conversational discourse.      Patient began to not feel well during therapy session and session was discontinued.      SHORT TERM GOAL #1:  Goal 1: The patient will complete simple/complex naming tasks with min verbal cues at 80% accuracy in order to improve verbal expression and thought organization for functional communication.     SHORT TERM GOAL #2:  Goal 2: The patient will follow multi step commands during daily activities with min verbal cues at 100% accuracy in order to improve safety with functional ADL's and decreased assistance from caregivers.     SHORT TERM GOAL #3:  Goal 3: The patient will complete attention/processing tasks with 80% accuracy with min verbal cues.     SHORT TERM GOAL #4:  Goal 4: The patient will complete executive function tasks (planning, organizing, self monitoring, etc) with min verbal cues at 80% accuracy in order to improve safety awareness with functional ADL's.     SHORT TERM GOAL #5:  Goal 5: The patient will demonstrate functional problem solving and safety awareness with min verbal cues at 80% accuracy for daily living tasks in order to increase safe interaction with environment and decreased assistance from caregivers.  not met     Swallowing Short Term Goals  Short-term Goals  Goal 1: The patient will tolerate regular diet with thin liquids with

## 2020-11-05 NOTE — PROGRESS NOTES
Progress Note  Arvind Pringle  11/4/2020 6:39 PM  Subjective:   Admit Date:   10/27/2020      CC/ADMIT DX:       Interval History:   Reviewed overnight events and nursing notes. She is feeling better today with no nausea. I have reviewed all labs/diagnostics from the last 24hrs. ROS:   I have done a 10 point ROS and all are negative, except what is mentioned in the HPI. Dietary Nutrition Supplements: Standard High Calorie Oral Supplement  DIET GENERAL;    Medications:      cefepime  2 g Intravenous Q12H    ondansetron  4 mg Oral TID WC    pantoprazole  40 mg Oral QAM AC    insulin lispro  0-6 Units Subcutaneous BID    isosorbide mononitrate  60 mg Oral Daily    levETIRAcetam  500 mg Oral BID    metoprolol succinate  50 mg Oral Daily    ranolazine  1,000 mg Oral BID    traZODone  50 mg Oral Nightly           Objective:   Vitals: /74   Pulse 64   Temp 97.2 °F (36.2 °C) (Temporal)   Resp 16   Ht 5' 5\" (1.651 m)   Wt 151 lb 9.6 oz (68.8 kg)   SpO2 93%   BMI 25.23 kg/m²      Intake/Output Summary (Last 24 hours) at 11/4/2020 1839  Last data filed at 11/4/2020 1251  Gross per 24 hour   Intake 960 ml   Output --   Net 960 ml     General appearance: alert and cooperative with exam  Lungs: clear to auscultation bilaterally  Heart: RRR  Abdomen: soft, non-tender; bowel sounds normal; no masses,  no organomegaly  Extremities: extremities normal, atraumatic, no cyanosis or edema  Neurologic:  No obvious focal neurologic deficits.     Assessment and Plan:   Principal Problem:    Subarachnoid hemorrhage following injury, no loss of consciousness (HCC)  Active Problems:    Coronary artery disease of native artery of native heart with stable angina pectoris (HCC)    Osteoarthritis    GERD (gastroesophageal reflux disease)    Glucose intolerance (impaired glucose tolerance)    Depression with anxiety    CAD (coronary artery disease)    Hyperlipidemia    Hypertension    Hx of CABG    Bilateral carotid

## 2020-11-05 NOTE — PLAN OF CARE
Problem: Falls - Risk of:  Goal: Will remain free from falls  Description: Will remain free from falls  11/5/2020 1240 by Michael Preciado, LPN  Outcome: Ongoing  11/5/2020 0009 by Sofiya Going, LPN  Outcome: Ongoing  Goal: Absence of physical injury  Description: Absence of physical injury  11/5/2020 1240 by Michael Preciado, LPN  Outcome: Ongoing  11/5/2020 0009 by Sofiya Going, LPN  Outcome: Ongoing     Problem: Skin Integrity:  Goal: Will show no infection signs and symptoms  Description: Will show no infection signs and symptoms  11/5/2020 1240 by Michael Preciado, LPN  Outcome: Ongoing  11/5/2020 0009 by Sofiya Going, LPN  Outcome: Ongoing  Goal: Absence of new skin breakdown  Description: Absence of new skin breakdown  11/5/2020 1240 by Michael Preciado, LPN  Outcome: Ongoing  11/5/2020 0009 by Sofiya Going, LPN  Outcome: Ongoing     Problem: Infection:  Goal: Will remain free from infection  Description: Will remain free from infection  11/5/2020 1240 by Michael Preciado, LPN  Outcome: Ongoing  11/5/2020 0009 by Sofiya Going, LPN  Outcome: Ongoing     Problem: Safety:  Goal: Free from accidental physical injury  Description: Free from accidental physical injury  11/5/2020 1240 by Michael Preciado, LPN  Outcome: Ongoing  11/5/2020 0009 by Sofiya Going, LPN  Outcome: Ongoing  Goal: Free from intentional harm  Description: Free from intentional harm  11/5/2020 1240 by Michael Preciado LPN  Outcome: Ongoing  11/5/2020 0009 by Sofiya Kristina, LPN  Outcome: Ongoing     Problem: Daily Care:  Goal: Daily care needs are met  Description: Daily care needs are met  11/5/2020 1240 by Michael Preciado, LPN  Outcome: Ongoing  11/5/2020 0009 by Sofiya Kristina, LPN  Outcome: Ongoing     Problem: Pain:  Goal: Patient's pain/discomfort is manageable  Description: Patient's pain/discomfort is manageable  11/5/2020 1240 by Michael Preciado, LPN  Outcome: Ongoing  11/5/2020 0009 by Sofiya Going, LPN  Outcome:

## 2020-11-05 NOTE — PROGRESS NOTES
Physical Findings     Discharge Planning:    Continue Oral Nutrition Supplement     Electronically signed by Ron Pérez RD, LD on 11/5/20 at 1:52 PM CST    Contact: [phone]

## 2020-11-05 NOTE — PROGRESS NOTES
[facility]  INPATIENT SPEECH THERAPY  [facility] 8 REHAB UNIT  TIME   Time In: 0900  Time Out: 1000  Minutes: 60       [x]Daily Note  []Progress Note    Date: 2020  Patient Name: Mauro Scott        MRN: [id number]    Account #: [de-identified]  : 1945  (76 y.o.)  Gender: female   Primary Provider: Carlitos Dave MD  Precautions: Fall   Swallowing Status/Diet: REgular diet with thin liquids      Subjective: Patient alert and cooperative with all therapy tasks. Patient seen in her room for treatment session. Objective: Patient presenting with increased recall of recent/daily events. Patient able to recall details from both OT and PT sessions she completed today. She also recalled staff members names with no difficulty. Patient completed structured reasoning tasks where she was to determine 4 words associated with object. Patient completing with 100% accuracy, independently and in a timely manner. Higher level reasoning/problem solving task completed with patient. Patient required min verbal cues and completed with 100%. With no verbal cues patient completing with 80-90% accuracy. Improvement for processing, and thought organization for all tasks and conversational discourse noted today. Patient continues to make progress towards her goals. SHORT TERM GOAL #1:  Goal 1: The patient will complete simple/complex naming tasks with min verbal cues at 80% accuracy in order to improve verbal expression and thought organization for functional communication. SHORT TERM GOAL #2:  Goal 2: The patient will follow multi step commands during daily activities with min verbal cues at 100% accuracy in order to improve safety with functional ADL's and decreased assistance from caregivers. SHORT TERM GOAL #3:  Goal 3: The patient will complete attention/processing tasks with 80% accuracy with min verbal cues.     SHORT TERM GOAL #4:  Goal 4: The patient will complete executive function tasks (planning, organizing, self monitoring, etc) with min verbal cues at 80% accuracy in order to improve safety awareness with functional ADL's. SHORT TERM GOAL #5:  Goal 5: The patient will demonstrate functional problem solving and safety awareness with min verbal cues at 80% accuracy for daily living tasks in order to increase safe interaction with environment and decreased assistance from caregivers. not met    Swallowing Short Term Goals  Short-term Goals  Goal 1: The patient will tolerate regular diet with thin liquids with min/no overt s/s of aspiration. ASSESSMENT:  Assessment: [x]Progressing towards goals          []Not Progressing towards goals    Patient Tolerance of Treatment:   [x]Tolerated well []Tolerated fair []Required rest breaks []Fatigued    Education:  Learner:  [x]Patient          []Significant Other          []Other  Education provided regarding:  [x]Goals and POC   []Diet and swallowing precautions    []Home Exercise Program  []Progress and/or discharge information  Method of Education:  [x]Discussion          [x]Demonstration          []Handout          []Other  Evaluation of Education:   []Verbalized understanding   []Demonstrates without assistance  [x]Demonstrates with assistance  []Needs further instruction     []No evidence of learning                  []No family present      Plan: [x]Continue with current plan of care    []Modify current plan of care as follows:    []Discharge patient    Discharge Location:    Services/Supervision Recommended:      [x]Patient continues to require treatment by a licensed therapist to address functional deficits as outlined in the established plan of care.

## 2020-11-05 NOTE — PLAN OF CARE
decrease  11/5/2020 0009 by Amirah Boyer LPN  Outcome: Ongoing  11/4/2020 1508 by Yue Yadav RN  Outcome: Ongoing     Problem: Skin Integrity:  Goal: Skin integrity will stabilize  Description: Skin integrity will stabilize  11/5/2020 0009 by Amirah Boyer LPN  Outcome: Ongoing  11/4/2020 1508 by Yue Yadav RN  Outcome: Ongoing     Problem: Discharge Planning:  Goal: Patients continuum of care needs are met  Description: Patients continuum of care needs are met  11/5/2020 0009 by Amirah Boyer LPN  Outcome: Ongoing  11/4/2020 1508 by Yue Yadav RN  Outcome: Ongoing     Problem: Nutrition  Goal: Optimal nutrition therapy  11/5/2020 0009 by Amirah Boyer LPN  Outcome: Ongoing  11/4/2020 1508 by Yue Yadav RN  Outcome: Ongoing     Problem: Serum Glucose Level - Abnormal:  Goal: Ability to maintain appropriate glucose levels has stabilized  Description: Ability to maintain appropriate glucose levels has stabilized  11/5/2020 0009 by Amirah Boyer LPN  Outcome: Ongoing  11/4/2020 1508 by Yue Yadav RN  Outcome: Ongoing     Problem: Mood - Altered:  Goal: Mood stable  Description: Mood stable  11/5/2020 0009 by Amirah Boyer LPN  Outcome: Ongoing  11/4/2020 1508 by Yue Yadav RN  Outcome: Ongoing     Problem: Anxiety:  Goal: Level of anxiety will decrease  Description: Level of anxiety will decrease  11/5/2020 0009 by Amirah Boyer LPN  Outcome: Ongoing  11/4/2020 1508 by Yue Yadav RN  Outcome: Ongoing

## 2020-11-05 NOTE — PROGRESS NOTES
oriented, in no acute distress  Skin:  Skin color, texture, turgor normal. No rashes or lesions. Eyes:  No gross abnormalities. Neck:  neck- supple, no mass, non-tender  Lungs:  Normal expansion. Clear to auscultation. No rales, rhonchi, or wheezing. Heart:  Heart regular rate and rhythm  Abdomen: Auscultation: Normal bowel sounds. No bruits. Extremities: Extremities warm to touch, pink, with no edema.   Musculoskeletal:  negative  Neurologic: No gross focal deficits on limited exam    CBC with Differential:    Lab Results   Component Value Date    WBC 3.9 11/05/2020    RBC 3.91 11/05/2020    HGB 11.9 11/05/2020    HCT 35.5 11/05/2020    HCT 32.4 05/19/2011     11/05/2020     05/19/2011    MCV 90.8 11/05/2020    MCH 30.4 11/05/2020    MCHC 33.5 11/05/2020    RDW 13.2 11/05/2020    LYMPHOPCT 29.3 11/05/2020    MONOPCT 8.7 11/05/2020    EOSPCT 2.8 05/19/2011    BASOPCT 0.8 11/05/2020    MONOSABS 0.30 11/05/2020    LYMPHSABS 1.2 11/05/2020    EOSABS 0.20 11/05/2020    BASOSABS 0.00 11/05/2020     CMP:    Lab Results   Component Value Date     11/05/2020     05/19/2011    K 4.4 11/05/2020    K 4.2 05/19/2011     11/05/2020     05/19/2011    CO2 25 11/05/2020    BUN 13 11/05/2020    CREATININE 0.6 11/05/2020    CREATININE 1.0 05/19/2011    GFRAA >59 11/05/2020    LABGLOM >60 11/05/2020    GLUCOSE 100 11/05/2020    PROT 6.0 11/05/2020    PROT 7.2 03/21/2013    LABALBU 4.1 11/05/2020    LABALBU 4.1 05/19/2011    CALCIUM 9.5 11/05/2020    BILITOT 0.4 11/05/2020    ALKPHOS 55 11/05/2020    ALKPHOS 37 05/19/2011    AST 15 11/05/2020    ALT 20 11/05/2020     Last 3 Troponin:    Lab Results   Component Value Date    TROPONINI 0.01 07/20/2020    TROPONINI <0.01 05/28/2020    TROPONINI <0.01 01/10/2019     Urine Culture:  No components found for: CURINE  Blood Culture:  No components found for: CBLOOD, CFUNGUSBL  Stool Culture:  No components found for: CSTOOL    Assessment:    Principal Problem:    Subarachnoid hemorrhage following injury, no loss of consciousness (HCC)  Active Problems:    Coronary artery disease of native artery of native heart with stable angina pectoris (HCC)    Osteoarthritis    GERD (gastroesophageal reflux disease)    Glucose intolerance (impaired glucose tolerance)    Depression with anxiety    CAD (coronary artery disease)    Hyperlipidemia    Hypertension    Hx of CABG    Bilateral carotid artery stenosis    Altered mental status    Subarachnoid hemorrhage (HCC)    SLE (systemic lupus erythematosus) (HCC)    Intracranial bleed (HCC)    Post-traumatic headache    Acute cystitis without hematuria  Resolved Problems:    * No resolved hospital problems. *          Plan:  Continue current rehab orders. I will recheck urinalysis today. May be can do without the last dose of IV antibiotics given loss of IV access. Not really suitable replacement for by mouth treatment.       Electronically signed by Melvin Bonilla MD on 11/5/2020 at 2:00 PM

## 2020-11-05 NOTE — PROGRESS NOTES
11/05/20 1345   Restrictions/Precautions   Restrictions/Precautions Fall Risk;Seizure   Bed Mobility   Rolling Supervision   Supine to Sit Supervision   Sit to Supine Supervision   Scooting Supervision   Transfers   Sit to Stand Supervision   Stand to sit Supervision   Bed to Chair Supervision   Ambulation   Ambulation? Yes   WB Status WBAT   Ambulation 1   Surface level tile   Device Rollator   Assistance Supervision   Quality of Gait Good reciprocal gait pattern and safety awareness   Distance 500'x3   Comments Needed a rest break after each bout of amb, no noted signs of LOB or SOB   Conditions Requiring Skilled Therapeutic Intervention   Assessment D/t patient having increased nausea/dizziness w/ther ex, tx focused on amb today. No noted signes of increased nausea or dizziness w/amb. Pt required a rest break after each bout of amb   Safety Devices   Type of devices Bed alarm in place;Call light within reach; Left in bed     Electronically signed by MATT Burton on 11/5/2020 at 2:57 PM

## 2020-11-05 NOTE — PLAN OF CARE
Problem: Nutrition  Goal: Optimal nutrition therapy  11/5/2020 1403 by Charlotte Melchor RD, LD  Outcome: Ongoing  11/5/2020 0009 by Yahir Burnette LPN  Outcome: Ongoing   Nutrition Problem #1: Inadequate oral intake  Intervention: Food and/or Nutrient Delivery: Continue Current Diet, Continue Oral Nutrition Supplement  Nutritional Goals: PO intake >50%, wt stable, improvement in N/V

## 2020-11-06 LAB
GLUCOSE BLD-MCNC: 104 MG/DL (ref 70–99)
GLUCOSE BLD-MCNC: 111 MG/DL (ref 70–99)
PERFORMED ON: ABNORMAL
PERFORMED ON: ABNORMAL

## 2020-11-06 PROCEDURE — 82947 ASSAY GLUCOSE BLOOD QUANT: CPT

## 2020-11-06 PROCEDURE — 97530 THERAPEUTIC ACTIVITIES: CPT

## 2020-11-06 PROCEDURE — 6370000000 HC RX 637 (ALT 250 FOR IP): Performed by: PSYCHIATRY & NEUROLOGY

## 2020-11-06 PROCEDURE — 97110 THERAPEUTIC EXERCISES: CPT

## 2020-11-06 PROCEDURE — 97535 SELF CARE MNGMENT TRAINING: CPT

## 2020-11-06 PROCEDURE — 97116 GAIT TRAINING THERAPY: CPT

## 2020-11-06 PROCEDURE — 1180000000 HC REHAB R&B

## 2020-11-06 PROCEDURE — 6370000000 HC RX 637 (ALT 250 FOR IP): Performed by: FAMILY MEDICINE

## 2020-11-06 PROCEDURE — 99232 SBSQ HOSP IP/OBS MODERATE 35: CPT | Performed by: PSYCHIATRY & NEUROLOGY

## 2020-11-06 RX ADMIN — LEVETIRACETAM 500 MG: 500 TABLET ORAL at 07:37

## 2020-11-06 RX ADMIN — ONDANSETRON HYDROCHLORIDE 4 MG: 4 TABLET, FILM COATED ORAL at 07:37

## 2020-11-06 RX ADMIN — METOPROLOL SUCCINATE 50 MG: 50 TABLET, EXTENDED RELEASE ORAL at 07:37

## 2020-11-06 RX ADMIN — RANOLAZINE 1000 MG: 500 TABLET, FILM COATED, EXTENDED RELEASE ORAL at 07:36

## 2020-11-06 RX ADMIN — RANOLAZINE 1000 MG: 500 TABLET, FILM COATED, EXTENDED RELEASE ORAL at 21:15

## 2020-11-06 RX ADMIN — LEVETIRACETAM 500 MG: 500 TABLET ORAL at 21:15

## 2020-11-06 RX ADMIN — TRAZODONE HYDROCHLORIDE 50 MG: 50 TABLET ORAL at 21:15

## 2020-11-06 RX ADMIN — ISOSORBIDE MONONITRATE 60 MG: 60 TABLET, EXTENDED RELEASE ORAL at 07:37

## 2020-11-06 RX ADMIN — ALPRAZOLAM 0.12 MG: 0.25 TABLET ORAL at 21:16

## 2020-11-06 RX ADMIN — ONDANSETRON HYDROCHLORIDE 4 MG: 4 TABLET, FILM COATED ORAL at 16:14

## 2020-11-06 RX ADMIN — PANTOPRAZOLE SODIUM 40 MG: 40 TABLET, DELAYED RELEASE ORAL at 05:40

## 2020-11-06 NOTE — PROGRESS NOTES
Occupational Therapy  Facility/Department: [facility] 8 REHAB UNIT  Daily Treatment Note  NAME: Henry Pardo  : 1945  MRN: [id number]    Date of Service: 2020    Discharge Recommendations:  Continue to assess pending progress  OT Equipment Recommendations  Equipment Needed: No    Assessment   Performance deficits / Impairments: Decreased functional mobility ; Decreased high-level IADLs  Assessment: Patient already has rollator, shower chair and BSC. No DME is needed. Treatment Diagnosis: subarachnoid hemorrhage  Activity Tolerance  Activity Tolerance: Patient Tolerated treatment well  Safety Devices  Safety Devices in place: Yes(Up ad apryl in the room)  Type of devices: Left in bed;Call light within reach         Patient Diagnosis(es): There were no encounter diagnoses. has a past medical history of Acute cystitis without hematuria, Asthma, CAD (coronary artery disease), Chest pain, Depression with anxiety, Diabetes mellitus ([address] GERD (gastroesophageal reflux disease), Glucose intolerance (impaired glucose tolerance), Hyperlipidemia, Hypertension, Lupus (systemic lupus erythematosus) ([address] Malaise and fatigue, Movement disorder, New onset seizure ([address] Osteoarthritis, Palliative care patient, and S/P CABG x 3.   has a past surgical history that includes Cardiac catheterization (11); Appendectomy (); Tonsillectomy (); cyst removal (); Nasal septum surgery (); Hysterectomy (); Hemorrhoid surgery (); sinus surgery (); Spine surgery (3/6/2006); Spine surgery (3/6/2006); Coronary angioplasty with stent (3/22/2007); Coronary angioplasty with stent (2007); Colon surgery (); Colon surgery (); Colon surgery (2008); Colon surgery (2012); Coronary artery bypass graft (3/5/2013); back surgery; Colonoscopy; Endoscopy, colon, diagnostic; Cosmetic surgery; Cardiac catheterization (16); Cardiac catheterization (2016); Cardiac catheterization (16);  Cardiac catheterization (08/24/2017); Coronary angioplasty (08/2017); Diagnostic Cardiac Cath Lab Procedure; eye surgery (03/2019); and Cardiac catheterization (04/13/2020). Restrictions  Restrictions/Precautions  Restrictions/Precautions: Up Ad [place]  Required Braces or Orthoses?: No  Subjective   General  Chart Reviewed: Yes  Patient assessed for rehabilitation services?: Yes  Diagnosis: Subarachnoid hemorrhage  Subjective  Subjective: c/o nausea and headache, vomited at the end of tx session, BP taken, nursing notified. Objective       Balance  Sitting Balance: Independent  Standing Balance: Modified independent   Functional Mobility  Functional - Mobility Device: 4-Wheeled Walker  Activity: To/from bathroom  Assist Level: Modified independent   Functional Mobility Comments: Long distance downstairs. Tub Transfers  Tub - Transfer From: (Rollator)  Tub - Transfer Type: To and From  Tub - Transfer To: Shower seat with back  Tub - Technique: To left; To right;Ambulating  Tub Transfers: Modified independence     Transfers  Sit to stand: Modified independent  Stand to sit: Modified independent       Plan   Plan  Current Treatment Recommendations: Home Management Training, Functional Mobility Training    Goals  Short term goals  Time Frame for Short term goals: 1 week  Short term goal 1: MET  Short term goal 2: MET  Short term goal 3: MET  Short term goal 4: MET  Short term goal 5: MET  Short term goal 6: MET  Short term goal 7: MET  Long term goals  Time Frame for Long term goals : 2 weeks  Long term goal 1: MET  Long term goal 2: MET  Long term goal 3: MET  Long term goal 4: MET  Long term goal 5: MET  Long term goals 6: MET  Long term goal 8: Modified Independent with picking up objects from floor with AE as needed. Long term goal 9: MET  Long term goal 10: Complete IADL/homemaking task Modified [place].        Therapy Time   Individual Concurrent Group Co-treatment   Time In   1440       Time Out   1530

## 2020-11-06 NOTE — PROGRESS NOTES
Family Medicine Progress Note    Patient:  Mauro Scott  YOB: 1945    MRN: [id number]     Acct: [de-identified]     Admit date: 10/27/2020    Patient Seen, Chart, Consults notes, Labs, Radiology studies reviewed. Subjective: Day 10 of stay with subarachnoid hemorrhage and most recent (in last 24 hours) has had no new issues per nursing notes. Patient sleeping soundly this morning and allowed to continue to do so. Past, Family, Social History unchanged from admission. Diet:  Dietary Nutrition Supplements: Standard High Calorie Oral Supplement  DIET GENERAL;    Medications:  Scheduled Meds:   ondansetron  4 mg Oral TID WC    pantoprazole  40 mg Oral QAM AC    insulin lispro  0-6 Units Subcutaneous BID    isosorbide mononitrate  60 mg Oral Daily    levETIRAcetam  500 mg Oral BID    metoprolol succinate  50 mg Oral Daily    ranolazine  1,000 mg Oral BID    traZODone  50 mg Oral Nightly     Continuous Infusions:  PRN Meds:melatonin, promethazine, ondansetron, senna, glucagon (rDNA), glucose, acetaminophen, ALPRAZolam, cyclobenzaprine, nitroGLYCERIN, polyethylene glycol, traMADol    Objective:    Vitals: BP (!) 138/58   Pulse 58   Temp 97.4 °F (36.3 °C) (Temporal)   Resp 18   Ht 5' 5\" (1.651 m)   Wt 156 lb 8 oz (71 kg)   SpO2 94%   BMI 26.04 kg/m²   24 hour intake/output:    Intake/Output Summary (Last 24 hours) at 11/6/2020 0752  Last data filed at 11/6/2020 0243  Gross per 24 hour   Intake 960 ml   Output 300 ml   Net 660 ml     Last 3 weights: Wt Readings from Last 3 Encounters:   11/06/20 156 lb 8 oz (71 kg)   10/25/20 144 lb 3.2 oz (65.4 kg)   07/21/20 141 lb 8 oz (64.2 kg)       Physical Exam:    General Appearance:  in no acute distress and sleeping soundly.   Skin:  negatives: color normal  Eyes: Not examined  Neck:  no bruits  Lungs:  Breathing Pattern: regular, no distress  Heart:  Heart regular rate and rhythm  Abdomen: Not examined  Extremities: Not examined  Musculoskeletal:  negative  Neurologic: Sleeping this morning    CBC with Differential:    Lab Results   Component Value Date    WBC 3.9 11/05/2020    RBC 3.91 11/05/2020    HGB 11.9 11/05/2020    HCT 35.5 11/05/2020    HCT 32.4 05/19/2011     11/05/2020     05/19/2011    MCV 90.8 11/05/2020    MCH 30.4 11/05/2020    MCHC 33.5 11/05/2020    RDW 13.2 11/05/2020    LYMPHOPCT 29.3 11/05/2020    MONOPCT 8.7 11/05/2020    EOSPCT 2.8 05/19/2011    BASOPCT 0.8 11/05/2020    MONOSABS 0.30 11/05/2020    LYMPHSABS 1.2 11/05/2020    EOSABS 0.20 11/05/2020    BASOSABS 0.00 11/05/2020     CMP:    Lab Results   Component Value Date     11/05/2020     05/19/2011    K 4.4 11/05/2020    K 4.2 05/19/2011     11/05/2020     05/19/2011    CO2 25 11/05/2020    BUN 13 11/05/2020    CREATININE 0.6 11/05/2020    CREATININE 1.0 05/19/2011    GFRAA >59 11/05/2020    LABGLOM >60 11/05/2020    GLUCOSE 100 11/05/2020    PROT 6.0 11/05/2020    PROT 7.2 03/21/2013    LABALBU 4.1 11/05/2020    LABALBU 4.1 05/19/2011    CALCIUM 9.5 11/05/2020    BILITOT 0.4 11/05/2020    ALKPHOS 55 11/05/2020    ALKPHOS 37 05/19/2011    AST 15 11/05/2020    ALT 20 11/05/2020     Last 3 Troponin:    Lab Results   Component Value Date    TROPONINI 0.01 07/20/2020    TROPONINI <0.01 05/28/2020    TROPONINI <0.01 01/10/2019     Urine Culture:  No components found for: CURINE  Blood Culture:  No components found for: CBLOOD, CFUNGUSBL  Stool Culture:  No components found for: CSTOOL    Assessment:    Principal Problem:    Subarachnoid hemorrhage following injury, no loss of consciousness ([facility] [address]  Active Problems:    Coronary artery disease of native artery of native heart with stable angina pectoris (HCC)    Osteoarthritis    GERD (gastroesophageal reflux disease)    Glucose intolerance (impaired glucose tolerance)    Depression with anxiety    CAD (coronary artery disease)    Hyperlipidemia    Hypertension    Hx of CABG Bilateral carotid artery stenosis    Altered mental status    Subarachnoid hemorrhage (HCC)    SLE (systemic lupus erythematosus) (HCC)    Intracranial bleed (HCC)    Post-traumatic headache    Acute cystitis without hematuria  Resolved Problems:    * No resolved hospital problems. *          Plan:  Continue with current orders. Review of urinalysis shows negative bacteria but did show some trace leukocyte Estrace. I will await culture before reinstituting antibiotics as review of her previous microbiology shows her very sensitive to multiple antibiotics. Certainly have options for oral treatment if even necessary.       Electronically signed by Marco Maradiaga MD on 11/6/2020 at 7:52 AM

## 2020-11-06 NOTE — PLAN OF CARE
Problem: Falls - Risk of:  Goal: Will remain free from falls  Description: Will remain free from falls  11/6/2020 0127 by Chaya Fowler LPN  Outcome: Ongoing  11/5/2020 1240 by Belkys Odell LPN  Outcome: Ongoing  Goal: Absence of physical injury  Description: Absence of physical injury  11/6/2020 0127 by Chaya Fowler LPN  Outcome: Ongoing  11/5/2020 1240 by Belkys Odell LPN  Outcome: Ongoing     Problem: Skin Integrity:  Goal: Will show no infection signs and symptoms  Description: Will show no infection signs and symptoms  11/6/2020 0127 by Chaya Fowler LPN  Outcome: Ongoing  11/5/2020 1240 by Belkys Odell LPN  Outcome: Ongoing  Goal: Absence of new skin breakdown  Description: Absence of new skin breakdown  11/6/2020 0127 by Chaya Fowler LPN  Outcome: Ongoing  11/5/2020 1240 by Belkys Odell LPN  Outcome: Ongoing     Problem: Infection:  Goal: Will remain free from infection  Description: Will remain free from infection  11/6/2020 0127 by Chaya Fowler LPN  Outcome: Ongoing  11/5/2020 1240 by Belkys Odell LPN  Outcome: Ongoing     Problem: Safety:  Goal: Free from accidental physical injury  Description: Free from accidental physical injury  11/6/2020 0127 by Chaya Fowler LPN  Outcome: Ongoing  11/5/2020 1240 by Belkys Odell LPN  Outcome: Ongoing  Goal: Free from intentional harm  Description: Free from intentional harm  11/6/2020 0127 by Chaya Fowler LPN  Outcome: Ongoing  11/5/2020 1240 by Belkys Odell LPN  Outcome: Ongoing     Problem: Daily Care:  Goal: Daily care needs are met  Description: Daily care needs are met  11/6/2020 0127 by Chaya Fowler LPN  Outcome: Ongoing  11/5/2020 1240 by Belkys Odell LPN  Outcome: Ongoing     Problem: Pain:  Goal: Patient's pain/discomfort is manageable  Description: Patient's pain/discomfort is manageable  11/6/2020 0127 by Chaya Fowler LPN  Outcome: Ongoing  11/5/2020 1240 by Tamara Hunter Rodney Herbert LPN  Outcome: Ongoing  Goal: Pain level will decrease  Description: Pain level will decrease  11/6/2020 0127 by Chaya Fowler LPN  Outcome: Ongoing  11/5/2020 1240 by Belkys Odell LPN  Outcome: Ongoing     Problem: Skin Integrity:  Goal: Skin integrity will stabilize  Description: Skin integrity will stabilize  11/6/2020 0127 by Chaya Fowler LPN  Outcome: Ongoing  11/5/2020 1240 by Belkys Odell LPN  Outcome: Ongoing     Problem: Discharge Planning:  Goal: Patients continuum of care needs are met  Description: Patients continuum of care needs are met  11/6/2020 0127 by Chaya Fowler LPN  Outcome: Ongoing  11/5/2020 1240 by Belkys Odell LPN  Outcome: Ongoing     Problem: Nutrition  Goal: Optimal nutrition therapy  11/6/2020 0127 by Chaya Fowler LPN  Outcome: Ongoing  11/5/2020 1403 by Kristin Espinoza RD, LD  Outcome: Ongoing     Problem: Serum Glucose Level - Abnormal:  Goal: Ability to maintain appropriate glucose levels has stabilized  Description: Ability to maintain appropriate glucose levels has stabilized  11/6/2020 0127 by Chaya Fowler LPN  Outcome: Ongoing  11/5/2020 1240 by Belkys Odell LPN  Outcome: Ongoing     Problem: Mood - Altered:  Goal: Mood stable  Description: Mood stable  11/6/2020 0127 by Chaya Fowler LPN  Outcome: Ongoing  11/5/2020 1240 by Belkys Odell LPN  Outcome: Ongoing     Problem: Anxiety:  Goal: Level of anxiety will decrease  Description: Level of anxiety will decrease  11/6/2020 0127 by Chaya Fowler LPN  Outcome: Ongoing  11/5/2020 1240 by Belkys Odell LPN  Outcome: Ongoing

## 2020-11-06 NOTE — PROGRESS NOTES
Patient:   Sam Jones  MR#:    [id number]   Room:    0826/826-01   YOB: 1945  Date of Progress Note: 11/6/2020  Time of Note                           10:56 AM  Consulting Physician:   Melissa Ferguson M.D. Attending Physician:  Melissa Ferguson MD     Chief complaint S/p Traumatic subarachnoid hemorrhage      S:This 76 y.o. female  with history of Lupus,DM diet controlled, GERD, CAD s/p CABG x 3 and stenting, HTN and hyperlipidemia. Patient had had some weakness since July and had been using a rollator walker and her daughters had been alternating staying with her. She presented to [address] [place] [address] on 10/24/20 after having a fall. Her daughter was pushing her while she was sitting on her rollator and she fell backwards, hitting her head on the asphalt. She did not lose consciousness but did have immediate onset of headache that has persisted. CT done revealed a small right frontal area of traumatic subarachnoid hemorrhage. Her B/P was elevated with systolic in 237-587 range. She was admitted to her PCP Dr. Marci Dillon with consult for neurosurgery. She was seen by neurosurgeon Dr. Yenny Cisneros and not felt to need any surgical intervention. Repeat CTs have been stable. She was evaluated by SPT for swallow and was initially placed on bite size consistency diet with nectar thick liquids but has now been upgraded to regular with thin liquids with medicines whole in pudding or applesauce. Patient also found to have difficulty with word finding and short-term memory. Patient is also participating with both PT/OT. She is felt to need a stay on Rehab for continued therapy to work towards her goal of returning home with assist of her daughters. She is now felt ready to start the Rehab program. Overnight vomiting . Repeat CT head stable and improved. Feels dizziness and nauseous when moving. Still dizziness at times. Fluctuates with certain movements. She indicates she did not vomit yesterday.     REVIEW OF SYSTEMS:  Constitutional: No fevers No chills  Neck:No stiffness  Respiratory: No shortness of breath  Cardiovascular: No chest pain No palpitations  Gastrointestinal: No abdominal pain    Genitourinary: No Dysuria  Neurological: +headache, no confusion    Past Medical History:      Diagnosis Date    Acute cystitis without hematuria 10/28/2020    Asthma     CAD (coronary artery disease)     Chest pain     Depression with anxiety     Diabetes mellitus ([facility]     GERD (gastroesophageal reflux disease)     Glucose intolerance (impaired glucose tolerance)     Hyperlipidemia     Cholesterol management per Catrina Khoury See Hypertension     Lupus (systemic lupus erythematosus) ([facility]     Malaise and fatigue 10/6/2017    Movement disorder     Sees pain management for neck pain and previous surgery    New onset seizure ([facility]     Osteoarthritis     Palliative care patient 07/22/2020    S/P CABG x 3 03/05/2013    PACABG X 3, LIMA-LAD, SVG0OM, SVG-DIAG, RT EVH, DR Mikie Brady       Past Surgical History:      Procedure Laterality Date    APPENDECTOMY  1965    BACK SURGERY      CARDIAC CATHETERIZATION  5/18/11    EF 60%    CARDIAC CATHETERIZATION  1/25/16    drug eluting stent to RCA    CARDIAC CATHETERIZATION  1/25/2016    CARDIAC CATHETERIZATION  2/19/16    CARDIAC CATHETERIZATION  08/24/2017    cutting balloon angioplasty ot LAD    CARDIAC CATHETERIZATION  04/13/2020    TWO Stents placed    COLON SURGERY  1992    Polypectomy - 2    COLON SURGERY  2003    Polypectomy - 4    COLON SURGERY  11/2008    Polypectomy - 2    COLON SURGERY  8/2012    Polypectomy - 3    COLONOSCOPY      CORONARY ANGIOPLASTY  08/2017    cutting balloon angioplasty LAD diagonal    CORONARY ANGIOPLASTY WITH STENT PLACEMENT  3/22/2007    CORONARY ANGIOPLASTY WITH STENT PLACEMENT  8/2007    CORONARY ARTERY BYPASS GRAFT  3/5/2013    PACABG X 3, LIMA-LAD, SVG0OM, SVG-DIAG, RT EVH, DR PATEL    COSMETIC SURGERY (TOPROL XL) extended release tablet 50 mg, 50 mg, Oral, Daily, Vero Wolf MD, 50 mg at 11/06/20 0737    nitroGLYCERIN (NITROSTAT) SL tablet 0.4 mg, 0.4 mg, Sublingual, Q5 Min PRN, Vero Wolf MD    ranolazine [facility]) extended release tablet 1,000 mg, 1,000 mg, Oral, BID, Vero Wolf MD, 1,000 mg at 11/06/20 0736    traZODone (DESYREL) tablet 50 mg, 50 mg, Oral, Nightly, Vero Wolf MD, 50 mg at 11/05/20 2048    polyethylene glycol (GLYCOLAX) packet 17 g, 17 g, Oral, Daily PRN, Kyler Schneider MD    traMADol (ULTRAM) tablet 50 mg, 50 mg, Oral, Q6H PRN, Kyler Schneider MD, 50 mg at 11/03/20 0520    Allergies: Other; Codeine; Darvocet [propoxyphene n-acetaminophen]; Hydrocodone-acetaminophen; Lyrica [pregabalin]; Morphine; and Percocet [oxycodone-acetaminophen]    Social History:   TOBACCO:   reports that she quit smoking about 53 years ago. Her smoking use included cigarettes. She has a 1.00 pack-year smoking history. She has never used smokeless tobacco.  ETOH:   reports no history of alcohol use. Family History:       Problem Relation Age of Onset    Heart Disease Mother     High Blood Pressure Mother     Diabetes Mother     High Cholesterol Mother     Diabetes Brother          PHYSICAL EXAM:  BP (!) 138/58   Pulse 58   Temp 97.4 °F (36.3 °C) (Temporal)   Resp 18   Ht 5' 5\" (1.651 m)   Wt 156 lb 8 oz (71 kg)   SpO2 94%   BMI 26.04 kg/m²       Constitutional - well developed, well nourished. Eyes - conjunctiva normal.   Ear, nose, throat - No scars, masses, or lesions over external nose or ears, no atrophy of tongue  Neck-symmetric, no masses noted, no jugular vein distension  Respiration- chest wall appears symmetric, good expansion,   normal effort without use of accessory muscles  Musculoskeletal - no significant wasting of muscles noted, no bony deformities  Extremities-no clubbing, cyanosis or edema  Skin - warm, dry, and intact.  No rash, erythema, or pallor. Psychiatric - mood, affect, and behavior appear normal.      Neurological exam  Awake,  fluent oriented slow  Attention and concentration appear mildly impaired  Recent and remote memory appears mildly impaired  Speech normal without dysarthria  No clear issues with language of fund of knowledge     Cranial Nerve Exam     CN III, IV,VI-EOMI, No nystagmus, conjugate eye movements, no ptosis    CN VII-no facial assymetry       Motor Exam  antigravity throughout upper and lower extremities bilaterally      Tremors- no tremors in hands or head noted     Gait  Not tested      Nursing/pcp notes, imaging,labs and vitals reviewed.      PT,OT and/or speech notes reviewed    Lab Results   Component Value Date    WBC 3.9 (L) 11/05/2020    HGB 11.9 (L) 11/05/2020    HCT 35.5 (L) 11/05/2020    MCV 90.8 11/05/2020     11/05/2020     Lab Results   Component Value Date     11/05/2020    K 4.4 11/05/2020     11/05/2020    CO2 25 11/05/2020    BUN 13 11/05/2020    CREATININE 0.6 11/05/2020    GLUCOSE 100 11/05/2020    CALCIUM 9.5 11/05/2020    PROT 6.0 (L) 11/05/2020    LABALBU 4.1 11/05/2020    BILITOT 0.4 11/05/2020    ALKPHOS 55 11/05/2020    AST 15 11/05/2020    ALT 20 11/05/2020    LABGLOM >60 11/05/2020    GFRAA >59 11/05/2020    GLOB 1.8 11/19/2016     Lab Results   Component Value Date    INR 0.97 10/27/2020    INR 0.99 10/25/2020    INR 0.96 10/24/2020    PROTIME 12.7 10/27/2020    PROTIME 12.9 10/25/2020    PROTIME 12.7 10/24/2020       Jaya Hunter    Student Physical Therapist    Physical Therapy    Progress Notes    Signed    Date of Service:  11/5/2020  1:45 PM                Signed              Show:Clear all  []Manual[x]Template[]Copied    Added by:  [x]Jason Mora    []Elliot for details       11/05/20 1345   Restrictions/Precautions   Restrictions/Precautions Fall Risk;Seizure   Bed Mobility   Rolling Supervision   Supine to Sit Supervision   Sit to Supine Supervision   Scooting Supervision   Transfers   Sit to Stand Supervision   Stand to sit Supervision   Bed to Chair Supervision   Ambulation   Ambulation? Yes   WB Status WBAT   Ambulation 1   Surface level tile   Device Rollator   Assistance Supervision   Quality of Gait Good reciprocal gait pattern and safety awareness   Distance 500'x3   Comments Needed a rest break after each bout of amb, no noted signs of LOB or SOB   Conditions Requiring Skilled Therapeutic Intervention   Assessment D/t patient having increased nausea/dizziness w/ther ex, tx focused on amb today. No noted signes of increased nausea or dizziness w/amb. Pt required a rest break after each bout of amb   Safety Devices   Type of devices Bed alarm in place;Call light within reach; Left in bed      Electronically signed by Demetrius Singh, SPT on 11/5/2020 at 2:57 PM               Cosigned by:  Pako Catalan, PT at 11/5/2020  3:23 PM    Revision History                               RECORD REVIEW: Previous medical records, medications were reviewed at today's visit    IMPRESSION:   1. S/P Traumatic subarachnoid hemorrhage-off ASA/Plavix-monitor  2. HTN-on meds monitor  3. DM-monitor BS  4. Seizures-on Keppra  5. CAD-on Ramexa-Plavix and ASA on hold  6. Nausea and vomiting post ICH-repeat CT stable-Zofran/Phenergan PRN  7. GI-bowel regimen/PPI  8. Insomnia-Trazodone qhs  9. Headache-Tramadol/Tylenol PRN  10. Anxiety-Xanax PRN  11. History of lupus-monitor  12. PT/OT/Speech  13.  UTI-complete abx     Dizziness/nausea with movement-Zofran TID  Suspect this may be peripheral vestibulopathy (BPPV) post fall/trauma      Continue care as noted    ELOS November 11       Expected duration and frequency therapy: 180 minutes per day, 5 days per week    [address] [facility]  [phone] CELL  Dr Hernan Marcano

## 2020-11-06 NOTE — PROGRESS NOTES
Occupational Therapy  Facility/Department: [facility] 8 REHAB UNIT  Daily Treatment Note  NAME: Nya Johnson  : 1945  MRN: [id number]    Date of Service: 2020    Discharge Recommendations:  Continue to assess pending progress     Assessment   Performance deficits / Impairments: Decreased functional mobility ; Decreased high-level IADLs  Treatment Diagnosis: subarachnoid hemorrhage  Activity Tolerance  Activity Tolerance: Patient Tolerated treatment well  Safety Devices  Safety Devices in place: Yes  Type of devices: Left in chair;Call light within reach; Chair alarm in place       Patient Diagnosis(es): There were no encounter diagnoses. has a past medical history of Acute cystitis without hematuria, Asthma, CAD (coronary artery disease), Chest pain, Depression with anxiety, Diabetes mellitus ([address] GERD (gastroesophageal reflux disease), Glucose intolerance (impaired glucose tolerance), Hyperlipidemia, Hypertension, Lupus (systemic lupus erythematosus) ([address] Malaise and fatigue, Movement disorder, New onset seizure ([facility] Osteoarthritis, Palliative care patient, and S/P CABG x 3.   has a past surgical history that includes Cardiac catheterization (11); Appendectomy (); Tonsillectomy (); cyst removal (); Nasal septum surgery (); Hysterectomy (); Hemorrhoid surgery (); sinus surgery (); Spine surgery (3/6/2006); Spine surgery (3/6/2006); Coronary angioplasty with stent (3/22/2007); Coronary angioplasty with stent (2007); Colon surgery (); Colon surgery (); Colon surgery (2008); Colon surgery (2012); Coronary artery bypass graft (3/5/2013); back surgery; Colonoscopy; Endoscopy, colon, diagnostic; Cosmetic surgery; Cardiac catheterization (16); Cardiac catheterization (2016); Cardiac catheterization (16); Cardiac catheterization (2017); Coronary angioplasty (2017);  Diagnostic Cardiac Cath Lab Procedure; eye surgery (2019); and Cardiac catheterization (04/13/2020). Restrictions  Restrictions/Precautions  Restrictions/Precautions: Fall Risk, Seizure  Required Braces or Orthoses?: No    Objective    Balance  Sitting Balance: Independent  Standing Balance: Modified independent   Functional Mobility  Functional - Mobility Device: 4-Wheeled Walker  Activity: To/from bathroom  Assist Level: Modified independent   Bed mobility  Supine to Sit: Modified independent  Scooting: Modified independent  Transfers  Sit to stand: Modified independent  Stand to sit: Modified independent  Transfer Comments: up/down from bed, toilet, shower bench      Plan   Plan  Current Treatment Recommendations: Home Management Training, Equipment Evaluation, Education, & procurement, Functional Mobility Training     OutComes Score       11/06/20 0815   Eating   Assistance Needed Independent   CARE Score 6   [address] Needed Independent   Comment Modified Independent   CARE Score 6   Shower/Bathe Self   Assistance Needed Independent   Comment Modified Independent   CARE Score 6   Upper Body Dressing   Assistance Needed Independent   Comment Independent   CARE Score 6   Lower Body Dressing   Assistance Needed Independent   Comment Modified Independent   CARE Score 6   Putting On/Taking Off Footwear   Assistance Needed Independent   Comment Modified Independent   CARE Score 6          11/06/20 0815   Toilet Transfer   Assistance Needed Independent   Comment Modified Independent   CARE Score 6     Goals  Short term goals  Time Frame for Short term goals: 1 week  Short term goal 1: MET  Short term goal 2: MET  Short term goal 3: MET  Short term goal 4: MET  Short term goal 5: MET  Short term goal 6: Supervision with toilet transfers.   Short term goal 7: MET  Long term goals  Time Frame for Long term goals : 2 weeks  Long term goal 1: MET  Long term goal 2: MET  Long term goal 3: MET  Long term goal 4: MET  Long term goal 5: MET  Long term goals 6: MET  Long term goal 8: Modified Independent with picking up objects from floor with AE as needed. Long term goal 9: Verbalize DME needs. Long term goal 10: Complete IADL/homemaking task Modified [place].         Therapy Time   Individual Concurrent Group Co-treatment   Time In   0815       Time Out   0915       Minutes   60       Timed Code Treatment Minutes: [address]

## 2020-11-06 NOTE — PROGRESS NOTES
[facility]  Test for Patient Wilson in the Areas of Transfers/Ambulation    Ambulation/Transfers   · Independent ambulation in room with assistive device:  Yes     -Device Type:  Rollator  · Independent transfers from wheelchair to surface in room:  Yes     Bathroom Transfers  · Independent transfers in patient bathroom:  Yes         Inpatient Rehabilitation Nursing and Therapies feel as though the patient qualifies for an acute rehabilitation test for independence in the areas of transfers and ambulation prior to discharging from Inpatient Rehabilitation Unit at [facility].  This test for independence in the areas of transfers and ambulation must be agreed upon by the patient's physician, nurse, and therapists.         Nurse Approval:  Electronically signed by Alva Hensley RN on 11/6/2020 at 11:36 AM    Physical Therapist Approval:  Electronically signed by MATT Tee on 11/6/2020 at 10:54 AM    Occupational Therapist Approval: Electronically signed by Kip Au OT on 11/6/20 at 11:03 AM CST

## 2020-11-06 NOTE — PROGRESS NOTES
11/06/20 1000   Restrictions/Precautions   Restrictions/Precautions Fall Risk;Seizure   Required Braces or Orthoses? No   Bed Mobility   Rolling Independent   Supine to Sit Independent   Sit to Supine Independent   Scooting Independent   Transfers   Sit to Stand Independent   Stand to sit Independent   Bed to Chair Independent   Ambulation   Ambulation? Yes   WB Status WBAT   More Ambulation? Yes   Ambulation 1   Surface level tile   Device Rollator   Assistance Independent   Quality of Gait Good reciprocal pattern w/good safety awareness, pt showed no LOB w/increased gait speed, pt showed increased endurance; no increased nausea or dizziness w/bout of amb   Distance 1500'   Comments Needed one standing rest break on 3rd lap around floor   Ambulation 2   Surface - 2 level tile   Device 2 Rollator   Assistance 2 Independent   Quality of Gait 2 Increased rest breaks during this bout of amb   Distance 1000'   Comments Increased fatigue/SOB at end of amb   Ambulation 3   Surface - 3 level tile   Device 3 Rollator   Assistance 3 Independent   Quality of Gait 3 Good reciprocal gait pattern   Distance 175'   Comments Therapy gym to pt room   Conditions Requiring Skilled Therapeutic Intervention   Assessment Pt checked for up at apryl, pt demonstrates good safety awareness and amb w/no LOB or increased dizziness/nausea   Activity Tolerance   Activity Tolerance Patient Tolerated treatment well   Safety Devices   Type of devices Call light within reach; Left in chair     Electronically signed by MATT Bernabe on 11/6/2020 at 10:57 AM

## 2020-11-07 PROCEDURE — 97535 SELF CARE MNGMENT TRAINING: CPT

## 2020-11-07 PROCEDURE — 87086 URINE CULTURE/COLONY COUNT: CPT

## 2020-11-07 PROCEDURE — 97530 THERAPEUTIC ACTIVITIES: CPT

## 2020-11-07 PROCEDURE — 6370000000 HC RX 637 (ALT 250 FOR IP): Performed by: PSYCHIATRY & NEUROLOGY

## 2020-11-07 PROCEDURE — 6370000000 HC RX 637 (ALT 250 FOR IP): Performed by: FAMILY MEDICINE

## 2020-11-07 PROCEDURE — 99233 SBSQ HOSP IP/OBS HIGH 50: CPT | Performed by: PSYCHIATRY & NEUROLOGY

## 2020-11-07 PROCEDURE — 97116 GAIT TRAINING THERAPY: CPT

## 2020-11-07 PROCEDURE — 1180000000 HC REHAB R&B

## 2020-11-07 RX ORDER — PHENAZOPYRIDINE HYDROCHLORIDE 100 MG/1
200 TABLET, FILM COATED ORAL
Status: DISCONTINUED | OUTPATIENT
Start: 2020-11-07 | End: 2020-11-11 | Stop reason: HOSPADM

## 2020-11-07 RX ORDER — MECLIZINE HYDROCHLORIDE 25 MG/1
25 TABLET ORAL 3 TIMES DAILY
Status: DISCONTINUED | OUTPATIENT
Start: 2020-11-07 | End: 2020-11-11 | Stop reason: HOSPADM

## 2020-11-07 RX ORDER — ONDANSETRON 4 MG/1
4 TABLET, FILM COATED ORAL EVERY 8 HOURS PRN
Status: DISCONTINUED | OUTPATIENT
Start: 2020-11-07 | End: 2020-11-11 | Stop reason: HOSPADM

## 2020-11-07 RX ADMIN — ONDANSETRON HYDROCHLORIDE 4 MG: 4 TABLET, FILM COATED ORAL at 15:39

## 2020-11-07 RX ADMIN — LEVETIRACETAM 500 MG: 500 TABLET ORAL at 08:24

## 2020-11-07 RX ADMIN — ISOSORBIDE MONONITRATE 60 MG: 60 TABLET, EXTENDED RELEASE ORAL at 08:23

## 2020-11-07 RX ADMIN — PHENAZOPYRIDINE HYDROCHLORIDE 200 MG: 100 TABLET ORAL at 12:41

## 2020-11-07 RX ADMIN — ONDANSETRON HYDROCHLORIDE 4 MG: 4 TABLET, FILM COATED ORAL at 08:23

## 2020-11-07 RX ADMIN — METOPROLOL SUCCINATE 50 MG: 50 TABLET, EXTENDED RELEASE ORAL at 08:24

## 2020-11-07 RX ADMIN — RANOLAZINE 1000 MG: 500 TABLET, FILM COATED, EXTENDED RELEASE ORAL at 08:23

## 2020-11-07 RX ADMIN — RANOLAZINE 1000 MG: 500 TABLET, FILM COATED, EXTENDED RELEASE ORAL at 20:42

## 2020-11-07 RX ADMIN — LEVETIRACETAM 500 MG: 500 TABLET ORAL at 20:42

## 2020-11-07 RX ADMIN — PHENAZOPYRIDINE HYDROCHLORIDE 200 MG: 100 TABLET ORAL at 17:05

## 2020-11-07 RX ADMIN — PANTOPRAZOLE SODIUM 40 MG: 40 TABLET, DELAYED RELEASE ORAL at 05:50

## 2020-11-07 RX ADMIN — MECLIZINE HYDROCHLORIDE 25 MG: 25 TABLET ORAL at 12:41

## 2020-11-07 RX ADMIN — ALPRAZOLAM 0.12 MG: 0.25 TABLET ORAL at 21:54

## 2020-11-07 RX ADMIN — MECLIZINE HYDROCHLORIDE 25 MG: 25 TABLET ORAL at 20:42

## 2020-11-07 RX ADMIN — TRAZODONE HYDROCHLORIDE 50 MG: 50 TABLET ORAL at 21:54

## 2020-11-07 ASSESSMENT — PAIN DESCRIPTION - PROGRESSION
CLINICAL_PROGRESSION: GRADUALLY IMPROVING
CLINICAL_PROGRESSION: GRADUALLY IMPROVING

## 2020-11-07 ASSESSMENT — PAIN DESCRIPTION - DESCRIPTORS
DESCRIPTORS: HEADACHE
DESCRIPTORS: HEADACHE

## 2020-11-07 ASSESSMENT — PAIN DESCRIPTION - PAIN TYPE
TYPE: ACUTE PAIN
TYPE: ACUTE PAIN

## 2020-11-07 ASSESSMENT — PAIN DESCRIPTION - FREQUENCY
FREQUENCY: CONTINUOUS
FREQUENCY: CONTINUOUS

## 2020-11-07 ASSESSMENT — PAIN SCALES - GENERAL
PAINLEVEL_OUTOF10: 3
PAINLEVEL_OUTOF10: 3

## 2020-11-07 ASSESSMENT — PAIN DESCRIPTION - LOCATION
LOCATION: HEAD
LOCATION: HEAD

## 2020-11-07 ASSESSMENT — PAIN DESCRIPTION - ONSET
ONSET: ON-GOING
ONSET: GRADUAL

## 2020-11-07 ASSESSMENT — PAIN - FUNCTIONAL ASSESSMENT: PAIN_FUNCTIONAL_ASSESSMENT: ACTIVITIES ARE NOT PREVENTED

## 2020-11-07 NOTE — PROGRESS NOTES
11/07/20 1100   Ambulation   WB Status WBAT   Ambulation 1   Surface level tile   Device Rollator   Assistance Independent   Quality of Gait Good, steady pace. Distance 1000'   Comments Performed standing dynamic activity:  Refillling water pitcher and getting coffee. No LOB or nausea/dizziness noted.    Electronically signed by Paul Rogers PTA on 11/7/2020 at 11:07 AM

## 2020-11-07 NOTE — PROGRESS NOTES
Patient:   Nai Reeder  MR#:    [id number]   Room:    0826/826-01   YOB: 1945  Date of Progress Note: 11/7/2020  Time of Note                           9:48 AM  Consulting Physician:   Lori Ricks M.D. Attending Physician:  Lori Ricks MD     Chief complaint S/p Traumatic subarachnoid hemorrhage      S:This 76 y.o. female  with history of Lupus,DM diet controlled, GERD, CAD s/p CABG x 3 and stenting, HTN and hyperlipidemia. Patient had had some weakness since July and had been using a rollator walker and her daughters had been alternating staying with her. She presented to [facility] ER on 10/24/20 after having a fall. Her daughter was pushing her while she was sitting on her rollator and she fell backwards, hitting her head on the asphalt. She did not lose consciousness but did have immediate onset of headache that has persisted. CT done revealed a small right frontal area of traumatic subarachnoid hemorrhage. Her B/P was elevated with systolic in 301-209 range. She was admitted to her PCP Dr. Brissa Hickey with consult for neurosurgery. She was seen by neurosurgeon Dr. Tami Wiseman and not felt to need any surgical intervention. Repeat CTs have been stable. She was evaluated by SPT for swallow and was initially placed on bite size consistency diet with nectar thick liquids but has now been upgraded to regular with thin liquids with medicines whole in pudding or applesauce. Patient also found to have difficulty with word finding and short-term memory. Patient is also participating with both PT/OT. She is felt to need a stay on Rehab for continued therapy to work towards her goal of returning home with assist of her daughters. Having vestibular dysfunction with head movement. Complaining of urinary frequency and dysuria. This is acute on chronic.     REVIEW OF SYSTEMS:  Constitutional: No fevers No chills  Neck:No stiffness  Respiratory: No shortness of breath  Cardiovascular: No chest pain No palpitations  Gastrointestinal: No abdominal pain    Genitourinary: No Dysuria  Neurological: +headache, no confusion    Past Medical History:      Diagnosis Date    Acute cystitis without hematuria 10/28/2020    Asthma     CAD (coronary artery disease)     Chest pain     Depression with anxiety     Diabetes mellitus ([facility]     GERD (gastroesophageal reflux disease)     Glucose intolerance (impaired glucose tolerance)     Hyperlipidemia     Cholesterol management per Chayito Cook M.D.   [facility] Hypertension     Lupus (systemic lupus erythematosus) ([facility]     Malaise and fatigue 10/6/2017    Movement disorder     Sees pain management for neck pain and previous surgery    New onset seizure ([facility]     Osteoarthritis     Palliative care patient 07/22/2020    S/P CABG x 3 03/05/2013    PACABG X 3, LIMA-LAD, SVG0OM, SVG-DIAG, RT EV, DR Laura Gonzales       Past Surgical History:      Procedure Laterality Date    APPENDECTOMY  1965    BACK SURGERY      CARDIAC CATHETERIZATION  5/18/11    EF 60%    CARDIAC CATHETERIZATION  1/25/16    drug eluting stent to RCA    CARDIAC CATHETERIZATION  1/25/2016    CARDIAC CATHETERIZATION  2/19/16    CARDIAC CATHETERIZATION  08/24/2017    cutting balloon angioplasty ot LAD    CARDIAC CATHETERIZATION  04/13/2020    TWO Stents placed    COLON SURGERY  1992    Polypectomy - 2    COLON SURGERY  2003    Polypectomy - 4    COLON SURGERY  11/2008    Polypectomy - 2    COLON SURGERY  8/2012    Polypectomy - 3    COLONOSCOPY      CORONARY ANGIOPLASTY  08/2017    cutting balloon angioplasty LAD diagonal    CORONARY ANGIOPLASTY WITH STENT PLACEMENT  3/22/2007    CORONARY ANGIOPLASTY WITH STENT PLACEMENT  8/2007    CORONARY ARTERY BYPASS GRAFT  3/5/2013    PACABG X 3, LIMA-LAD, SVG0OM, SVG-DIAG, RT EVH, DR PATEL    COSMETIC SURGERY      deviated septum and rhinoplasty    CYST REMOVAL  1970    DIAGNOSTIC CARDIAC CATH LAB PROCEDURE      ENDOSCOPY, COLON, DIAGNOSTIC      EYE SURGERY  03/2019    cataracts   [address]    Partial    NASAL SEPTUM SURGERY  1977    SINUS SURGERY  2003    Nasal Polyps Removed    SPINE SURGERY  3/6/2006    Lumbar Laminectomy L4&5    SPINE SURGERY  3/6/2006    Cervical Fusion - C4,5,6    TONSILLECTOMY  1968       Medications in Hospital:      Current Facility-Administered Medications:     ondansetron (ZOFRAN) tablet 4 mg, 4 mg, Oral, TID WC, Hunter Diallo MD, 4 mg at 11/07/20 0823    melatonin tablet 1 mg, 1 mg, Oral, Nightly PRN, Ludy Hung MD    promethazine (PHENERGAN) tablet 12.5 mg, 12.5 mg, Oral, Q6H PRN, Hunter Diallo MD, 12.5 mg at 10/28/20 0928    pantoprazole (PROTONIX) tablet 40 mg, 40 mg, Oral, QAM AC, Hunter Diallo MD, 40 mg at 11/07/20 0550    ondansetron (ZOFRAN) injection 4 mg, 4 mg, Intravenous, Q4H PRN, Hunter Diallo MD, 4 mg at 11/03/20 1146    senna (SENOKOT) tablet 8.6 mg, 1 tablet, Oral, Daily PRN, Ludy Hung MD    glucagon (rDNA) injection 1 mg, 1 mg, Intramuscular, PRN, Ludy Hung MD    glucose (GLUTOSE) 40 % oral gel 15 g, 15 g, Oral, PRN, Ludy Hung MD    acetaminophen (TYLENOL) tablet 650 mg, 650 mg, Oral, Q8H PRN, Ludy Hung MD, 650 mg at 10/28/20 0530    ALPRAZolam Aleisha Jessie) tablet 0.125 mg, 0.125 mg, Oral, Daily PRN, Ludy Hung MD, 0.125 mg at 11/06/20 2116    cyclobenzaprine (FLEXERIL) tablet 10 mg, 10 mg, Oral, TID PRN, Ludy Hung MD    isosorbide mononitrate (IMDUR) extended release tablet 60 mg, 60 mg, Oral, Daily, Ludy Hung MD, 60 mg at 11/07/20 5630    levETIRAcetam (KEPPRA) tablet 500 mg, 500 mg, Oral, BID, Ludy Hung MD, 500 mg at 11/07/20 4889    metoprolol succinate (TOPROL XL) extended release tablet 50 mg, 50 mg, Oral, Daily, Ludy Hung MD, 50 mg at 11/07/20 0824    nitroGLYCERIN (NITROSTAT) SL tablet 0.4 mg, 0.4 mg, Sublingual, Q5 Min PRN, Ludy Hung MD    ranolazine [facility]) extended release tablet 1,000 mg, 1,000 mg, Oral, BID, Ophelia Delgadillo MD, 1,000 mg at 11/07/20 3164    traZODone (DESYREL) tablet 50 mg, 50 mg, Oral, Nightly, Ophelia Delgadillo MD, 50 mg at 11/06/20 2115    polyethylene glycol (GLYCOLAX) packet 17 g, 17 g, Oral, Daily PRN, Cari Ravi MD    traMADol (ULTRAM) tablet 50 mg, 50 mg, Oral, Q6H PRN, Cari Ravi MD, 50 mg at 11/03/20 0520    Allergies: Other; Codeine; Darvocet [propoxyphene n-acetaminophen]; Hydrocodone-acetaminophen; Lyrica [pregabalin]; Morphine; and Percocet [oxycodone-acetaminophen]    Social History:   TOBACCO:   reports that she quit smoking about 53 years ago. Her smoking use included cigarettes. She has a 1.00 pack-year smoking history. She has never used smokeless tobacco.  ETOH:   reports no history of alcohol use. Family History:       Problem Relation Age of Onset    Heart Disease Mother     High Blood Pressure Mother     Diabetes Mother     High Cholesterol Mother     Diabetes Brother          PHYSICAL EXAM:  BP (!) 109/58   Pulse 72   Temp 98.3 °F (36.8 °C)   Resp 18   Ht 5' 5\" (1.651 m)   Wt 153 lb 8 oz (69.6 kg)   SpO2 92%   BMI 25.54 kg/m²       Constitutional - well developed, well nourished. Eyes - conjunctiva normal.   Ear, nose, throat - No scars, masses, or lesions over external nose or ears, no atrophy of tongue  Neck-symmetric, no masses noted, no jugular vein distension  Respiration- chest wall appears symmetric, good expansion,   normal effort without use of accessory muscles  Musculoskeletal - no significant wasting of muscles noted, no bony deformities  Extremities-no clubbing, cyanosis or edema  Skin - warm, dry, and intact. No rash, erythema, or pallor.   Psychiatric - mood, affect, and behavior appear normal.      Neurological exam  Awake,  fluent oriented slow  Attention and concentration appear mildly impaired  Recent and remote memory appears mildly impaired  Speech normal without dysarthria  No clear issues with language of fund of knowledge     Cranial Nerve Exam     CN III, IV,VI-EOMI, No nystagmus, conjugate eye movements, no ptosis    CN VII-no facial assymetry       Motor Exam  antigravity throughout upper and lower extremities bilaterally      Tremors- no tremors in hands or head noted     Gait  Not tested      Nursing/pcp notes, imaging,labs and vitals reviewed. PT,OT and/or speech notes reviewed    Lab Results   Component Value Date    WBC 3.9 (L) 11/05/2020    HGB 11.9 (L) 11/05/2020    HCT 35.5 (L) 11/05/2020    MCV 90.8 11/05/2020     11/05/2020     Lab Results   Component Value Date     11/05/2020    K 4.4 11/05/2020     11/05/2020    CO2 25 11/05/2020    BUN 13 11/05/2020    CREATININE 0.6 11/05/2020    GLUCOSE 100 11/05/2020    CALCIUM 9.5 11/05/2020    PROT 6.0 (L) 11/05/2020    LABALBU 4.1 11/05/2020    BILITOT 0.4 11/05/2020    ALKPHOS 55 11/05/2020    AST 15 11/05/2020    ALT 20 11/05/2020    LABGLOM >60 11/05/2020    GFRAA >59 11/05/2020    GLOB 1.8 11/19/2016     Lab Results   Component Value Date    INR 0.97 10/27/2020    INR 0.99 10/25/2020    INR 0.96 10/24/2020    PROTIME 12.7 10/27/2020    PROTIME 12.9 10/25/2020    PROTIME 12.7 10/24/2020        Objective    Balance  Sitting Balance: Independent  Standing Balance: Modified independent   Functional Mobility  Functional - Mobility Device: 4-Wheeled Walker  Activity: To/from bathroom  Assist Level: Modified independent   Functional Mobility Comments: Long distance downstairs. Tub Transfers  Tub - Transfer From: (Rollator)  Tub - Transfer Type: To and From  Tub - Transfer To: Shower seat with back  Tub - Technique: To left; To right;Ambulating  Tub Transfers: Modified independence  Transfers  Sit to stand: Modified independent  Stand to sit: Modified independent      11/06/20 1300   Restrictions/Precautions   Restrictions/Precautions Up [place]   Transfers   Sit to Stand Independent   Stand to sit Independent   Ambulation Ambulation? Yes   WB Status WBAT   Ambulation 1   Surface level tile   Device Rollator   Assistance Independent   Quality of Gait Good reciprocal gait, no LOB or increase in nausea/dizziness   Distance 300'   Ambulation 2   Surface - 2 level tile   Device 2 Rollator   Assistance 2 Independent   Quality of Gait 2 Same as above   Distance 175'   Comments Therapy gym to room   Exercises   Comments Romberg balance exercise in II bar; tandem and retro walking in II bars; 2x each   Conditions Requiring Skilled Therapeutic Intervention   Assessment Decreased ability to balance when amb in tandem stance as well as retro walking; pt is independent w/amb, transfers and bed mobility   Activity Tolerance   Activity Tolerance Patient Tolerated treatment well   Safety Devices   Type of devices Call light within reach; Left in bed       RECORD REVIEW: Previous medical records, medications were reviewed at today's visit    IMPRESSION:   1. S/P Traumatic subarachnoid hemorrhage-off ASA/Plavix-monitor  2. HTN-on meds monitor  3. DM-monitor BS  4. Seizures-on Keppra  5. CAD-on Ramexa-Plavix and ASA on hold  6. Nausea and vomiting post ICH-repeat CT stable-Zofran/Phenergan PRN  7. GI-bowel regimen/PPI  8. Insomnia-Trazodone qhs  9. Headache-Tramadol/Tylenol PRN  10. Anxiety-Xanax PRN  11. History of lupus-monitor  12. PT/OT/Speech  13. UTI-complete abx  14. Dysuria and urinary frequency. Repeat urinalysis. Pyridium added. This is somewhat of a chronic problem as well. Renal ultrasound ordered. Dizziness/nausea with movement-Zofran TID  Suspect this may be peripheral vestibulopathy (BPPV) post fall/trauma. Long talk regarding that. Antivert added. Continue care as noted    ELOS November 11     More than 35 minutes spent reviewing the patient's records, examination and, counseling and coordination of care.

## 2020-11-07 NOTE — PROGRESS NOTES
Medicine Progress Note 11/7/2020    Patient:  Mauro Scott  YOB: 1945  MRN: [id number]     Acct: [de-identified]   Admit date: 10/27/2020    Patient Seen, Chart, Consults notes, Labs, Radiology studies reviewed. Subjective:   No acute issues overnight. No fever or chills. Has been working with PT/OT. No chest pain or shortness of breath. No abd pain, dysuria but does have urgency. Medications:   Scheduled Meds:   ondansetron  4 mg Oral TID WC    pantoprazole  40 mg Oral QAM AC    isosorbide mononitrate  60 mg Oral Daily    levETIRAcetam  500 mg Oral BID    metoprolol succinate  50 mg Oral Daily    ranolazine  1,000 mg Oral BID    traZODone  50 mg Oral Nightly     Continuous Infusions:  PRN Meds:melatonin, promethazine, ondansetron, senna, glucagon (rDNA), glucose, acetaminophen, ALPRAZolam, cyclobenzaprine, nitroGLYCERIN, polyethylene glycol, traMADol    Objective:   Vitals:   Patient Vitals for the past 24 hrs:   BP Temp Temp src Pulse Resp SpO2 Weight   11/07/20 0740 (!) 109/58 98.3 °F (36.8 °C) -- 72 18 92 % --   11/07/20 0549 -- -- -- -- -- -- 153 lb 8 oz (69.6 kg)   11/06/20 1835 (!) 153/70 96.8 °F (36 °C) Temporal 63 16 95 % --       GENERAL: Awake, alert, in no acute distress. CARDIAC: Regular rate and rhythm. No murmurs, rubs, or gallops. RESPIRATORY: Chest is clear to auscultation bilaterally. No wheezes, rales, or rhonchi. ABDOMEN: Normoactive bowel sounds. Abdomen soft, nontender, and nondistended. EXTREMITIES: No cyanosis, clubbing, or edema. 24 hour intake/output:    Intake/Output Summary (Last 24 hours) at 11/7/2020 0840  Last data filed at 11/6/2020 1945  Gross per 24 hour   Intake 720 ml   Output --   Net 720 ml     Last 3 weights:   Wt Readings from Last 3 Encounters:   11/07/20 153 lb 8 oz (69.6 kg)   10/25/20 144 lb 3.2 oz (65.4 kg)   07/21/20 141 lb 8 oz (64.2 kg)       Labs:  Hematology:  Recent Labs     11/05/20  0509   WBC 3.9*   HGB 11.9*   HCT 35.5*        Chemistry:  Recent Labs     11/05/20  0509      K 4.4      CO2 25   GLUCOSE 100   BUN 13   CREATININE 0.6   ANIONGAP 11   LABGLOM >60   GFRAA >59   CALCIUM 9.5     Recent Labs     11/05/20  0509   PROT 6.0*   LABALBU 4.1   AST 15   ALT 20   ALKPHOS 55   BILITOT 0.4       Recent Labs     11/05/20  0729 11/05/20  1119 11/05/20  1625 11/05/20  1955 11/06/20  0620 11/06/20  1119   POCGLU 105* 138* 91 140* 104* 111*       Cultures:   Lab Results   Component Value Date/Time    BC No growth after 5 days of incubation. 10/30/2020 11:01 AM       Lab Results   Component Value Date/Time    LABURIN >100,000 CFU/ml  Mixed skin valdez present   (A) 10/27/2020 05:00 PM    LABURIN Moderate growth 10/27/2020 05:00 PM    LABURIN Moderate growth 10/27/2020 05:00 PM       Assessment/Plan:   Principal Problem: Subarachnoid hemorrhage following injury, no loss of consciousness [facility])  Active Hospital Problems    Diagnosis Date Noted    Coronary artery disease of native artery of native heart with stable angina pectoris ([facility] [address] [I25.118]      Priority: High    Acute cystitis without hematuria [N30.00] 10/28/2020    Intracranial bleed ([facility] [address] [I62.9] 10/27/2020    Post-traumatic headache [G44.309] 10/27/2020    Subarachnoid hemorrhage following injury, no loss of consciousness ([facility] [address] [S06.6X0A] 10/25/2020    SLE (systemic lupus erythematosus) ([facility] [address] [M32.9] 10/25/2020    Subarachnoid hemorrhage ([facility] [address] [I60.9] 10/24/2020    Altered mental status [R41.82] 06/10/2020    Bilateral carotid artery stenosis [I65.23] 12/28/2017    Hx of CABG [Z95.1] 02/25/2016    Hyperlipidemia [E78.5]     Hypertension [I10]     CAD (coronary artery disease) [I25.10]     Osteoarthritis [M19.90]     GERD (gastroesophageal reflux disease) [K21.9]     Glucose intolerance (impaired glucose tolerance) [R73.02]     Depression with anxiety [F41.8]        Unsure if have urine cx in lab. Have asked staff to check/recollect. Monitoring off Abx at this time. Continue therapy efforts. Monitor with serial neuro checks. Good control last 24h. Continue current regimen. Monitor glc & cover with SSI. BP generally in goal range. Chronic medical conditions otherwise stable. Continue current medications & management. Patient working well with therapy. Patient's covering PMD Dr. Shaila Barclay plans to resume care Monday. I will review the patient's chart tomorrow and am available in case of any concerns prior to Dr. Bhakti Francis return. Labs planned for Monday.       Electronically signed by Grace Barrett MD on 11/7/2020 at 8:40 AM

## 2020-11-07 NOTE — PROGRESS NOTES
Occupational Therapy     11/07/20 0900   General   Diagnosis Subarachnoid hemorrhage   Pain Assessment   Patient Currently in Pain Yes   Pain Assessment 0-10   Pain Level 3   Pain Type Acute pain   Pain Location Head   Pain Descriptors Headache   Pain Frequency Continuous   Clinical Progression Gradually improving   Response to Pain Intervention Patient Satisfied   Balance   Sitting Balance Independent   Standing Balance Modified independent    Functional Mobility   Functional - Mobility Device 4-Wheeled Walker   Activity Retrieve items;Transport items   Assist Level Modified independent    Functional Mobility Comments Light homemaking tasks in kitchen. Transfers   Sit to stand Modified independent   Stand to sit Modified independent   Long term goals   Long term goal 10 MET   Assessment   Performance deficits / Impairments Decreased functional mobility ; Decreased high-level IADLs   Treatment Diagnosis subarachnoid hemorrhage   Prognosis Good   Timed Code Treatment Minutes 60 Minutes   Activity Tolerance   Activity Tolerance Patient Tolerated treatment well   Safety Devices   Safety Devices in place Yes   Type of devices Call light within reach  (Up ad apryl.)   Plan   Current Treatment Recommendations Home Management Training;Functional Mobility Training      11/07/20 0900   Upper Body Dressing   Assistance Needed Independent   CARE Score 6   Lower Body Dressing   Assistance Needed Independent   CARE Score 6   Putting On/Taking Off Footwear   Assistance Needed Independent   CARE Score 6

## 2020-11-08 PROCEDURE — 6370000000 HC RX 637 (ALT 250 FOR IP): Performed by: PSYCHIATRY & NEUROLOGY

## 2020-11-08 PROCEDURE — 6370000000 HC RX 637 (ALT 250 FOR IP): Performed by: FAMILY MEDICINE

## 2020-11-08 PROCEDURE — 1180000000 HC REHAB R&B

## 2020-11-08 RX ADMIN — LEVETIRACETAM 500 MG: 500 TABLET ORAL at 20:48

## 2020-11-08 RX ADMIN — MECLIZINE HYDROCHLORIDE 25 MG: 25 TABLET ORAL at 09:00

## 2020-11-08 RX ADMIN — MECLIZINE HYDROCHLORIDE 25 MG: 25 TABLET ORAL at 20:49

## 2020-11-08 RX ADMIN — RANOLAZINE 1000 MG: 500 TABLET, FILM COATED, EXTENDED RELEASE ORAL at 20:48

## 2020-11-08 RX ADMIN — ONDANSETRON HYDROCHLORIDE 4 MG: 4 TABLET, FILM COATED ORAL at 20:49

## 2020-11-08 RX ADMIN — ALPRAZOLAM 0.12 MG: 0.25 TABLET ORAL at 20:48

## 2020-11-08 RX ADMIN — PHENAZOPYRIDINE HYDROCHLORIDE 200 MG: 100 TABLET ORAL at 11:20

## 2020-11-08 RX ADMIN — ACETAMINOPHEN 650 MG: 325 TABLET ORAL at 14:14

## 2020-11-08 RX ADMIN — PROMETHAZINE HYDROCHLORIDE 12.5 MG: 12.5 TABLET ORAL at 17:28

## 2020-11-08 RX ADMIN — MECLIZINE HYDROCHLORIDE 25 MG: 25 TABLET ORAL at 14:14

## 2020-11-08 RX ADMIN — PANTOPRAZOLE SODIUM 40 MG: 40 TABLET, DELAYED RELEASE ORAL at 06:00

## 2020-11-08 RX ADMIN — LEVETIRACETAM 500 MG: 500 TABLET ORAL at 09:00

## 2020-11-08 RX ADMIN — ISOSORBIDE MONONITRATE 60 MG: 60 TABLET, EXTENDED RELEASE ORAL at 09:00

## 2020-11-08 RX ADMIN — ONDANSETRON HYDROCHLORIDE 4 MG: 4 TABLET, FILM COATED ORAL at 11:23

## 2020-11-08 RX ADMIN — PROMETHAZINE HYDROCHLORIDE 12.5 MG: 12.5 TABLET ORAL at 09:00

## 2020-11-08 RX ADMIN — PHENAZOPYRIDINE HYDROCHLORIDE 200 MG: 100 TABLET ORAL at 17:26

## 2020-11-08 RX ADMIN — PHENAZOPYRIDINE HYDROCHLORIDE 200 MG: 100 TABLET ORAL at 09:00

## 2020-11-08 RX ADMIN — METOPROLOL SUCCINATE 50 MG: 50 TABLET, EXTENDED RELEASE ORAL at 09:00

## 2020-11-08 RX ADMIN — TRAZODONE HYDROCHLORIDE 50 MG: 50 TABLET ORAL at 20:48

## 2020-11-08 RX ADMIN — RANOLAZINE 1000 MG: 500 TABLET, FILM COATED, EXTENDED RELEASE ORAL at 09:00

## 2020-11-08 ASSESSMENT — PAIN DESCRIPTION - LOCATION: LOCATION: HEAD

## 2020-11-08 ASSESSMENT — PAIN SCALES - GENERAL
PAINLEVEL_OUTOF10: 0
PAINLEVEL_OUTOF10: 4

## 2020-11-08 ASSESSMENT — PAIN DESCRIPTION - ORIENTATION: ORIENTATION: ANTERIOR

## 2020-11-08 ASSESSMENT — PAIN DESCRIPTION - PROGRESSION: CLINICAL_PROGRESSION: GRADUALLY WORSENING

## 2020-11-08 ASSESSMENT — PAIN DESCRIPTION - ONSET: ONSET: PROGRESSIVE

## 2020-11-08 ASSESSMENT — PAIN - FUNCTIONAL ASSESSMENT: PAIN_FUNCTIONAL_ASSESSMENT: ACTIVITIES ARE NOT PREVENTED

## 2020-11-08 ASSESSMENT — PAIN DESCRIPTION - DESCRIPTORS: DESCRIPTORS: HEADACHE

## 2020-11-08 ASSESSMENT — PAIN DESCRIPTION - PAIN TYPE: TYPE: ACUTE PAIN

## 2020-11-08 ASSESSMENT — PAIN DESCRIPTION - FREQUENCY: FREQUENCY: INTERMITTENT

## 2020-11-09 LAB
ALBUMIN SERPL-MCNC: 3.8 G/DL (ref 3.5–5.2)
ALP BLD-CCNC: 59 U/L (ref 35–104)
ALT SERPL-CCNC: 17 U/L (ref 5–33)
ANION GAP SERPL CALCULATED.3IONS-SCNC: 9 MMOL/L (ref 7–19)
AST SERPL-CCNC: 12 U/L (ref 5–32)
BASOPHILS ABSOLUTE: 0 K/UL (ref 0–0.2)
BASOPHILS RELATIVE PERCENT: 0.7 % (ref 0–1)
BILIRUB SERPL-MCNC: <0.2 MG/DL (ref 0.2–1.2)
BUN BLDV-MCNC: 9 MG/DL (ref 8–23)
CALCIUM SERPL-MCNC: 8.9 MG/DL (ref 8.8–10.2)
CHLORIDE BLD-SCNC: 105 MMOL/L (ref 98–111)
CO2: 25 MMOL/L (ref 22–29)
CREAT SERPL-MCNC: 0.7 MG/DL (ref 0.5–0.9)
EOSINOPHILS ABSOLUTE: 0.1 K/UL (ref 0–0.6)
EOSINOPHILS RELATIVE PERCENT: 2.9 % (ref 0–5)
GFR AFRICAN AMERICAN: >59
GFR NON-AFRICAN AMERICAN: >60
GLUCOSE BLD-MCNC: 99 MG/DL (ref 74–109)
HCT VFR BLD CALC: 33 % (ref 37–47)
HEMOGLOBIN: 10.9 G/DL (ref 12–16)
IMMATURE GRANULOCYTES #: 0 K/UL
LYMPHOCYTES ABSOLUTE: 1.4 K/UL (ref 1.1–4.5)
LYMPHOCYTES RELATIVE PERCENT: 34.4 % (ref 20–40)
MCH RBC QN AUTO: 30.3 PG (ref 27–31)
MCHC RBC AUTO-ENTMCNC: 33 G/DL (ref 33–37)
MCV RBC AUTO: 91.7 FL (ref 81–99)
MONOCYTES ABSOLUTE: 0.4 K/UL (ref 0–0.9)
MONOCYTES RELATIVE PERCENT: 9.6 % (ref 0–10)
NEUTROPHILS ABSOLUTE: 2.2 K/UL (ref 1.5–7.5)
NEUTROPHILS RELATIVE PERCENT: 51.9 % (ref 50–65)
PDW BLD-RTO: 13.3 % (ref 11.5–14.5)
PLATELET # BLD: 144 K/UL (ref 130–400)
PMV BLD AUTO: 8.8 FL (ref 9.4–12.3)
POTASSIUM REFLEX MAGNESIUM: 4.4 MMOL/L (ref 3.5–5)
RBC # BLD: 3.6 M/UL (ref 4.2–5.4)
SODIUM BLD-SCNC: 139 MMOL/L (ref 136–145)
TOTAL PROTEIN: 5.3 G/DL (ref 6.6–8.7)
URINE CULTURE, ROUTINE: NORMAL
WBC # BLD: 4.2 K/UL (ref 4.8–10.8)

## 2020-11-09 PROCEDURE — 97535 SELF CARE MNGMENT TRAINING: CPT

## 2020-11-09 PROCEDURE — 99232 SBSQ HOSP IP/OBS MODERATE 35: CPT | Performed by: PSYCHIATRY & NEUROLOGY

## 2020-11-09 PROCEDURE — 1180000000 HC REHAB R&B

## 2020-11-09 PROCEDURE — 6370000000 HC RX 637 (ALT 250 FOR IP): Performed by: FAMILY MEDICINE

## 2020-11-09 PROCEDURE — 97129 THER IVNTJ 1ST 15 MIN: CPT

## 2020-11-09 PROCEDURE — 80053 COMPREHEN METABOLIC PANEL: CPT

## 2020-11-09 PROCEDURE — 97130 THER IVNTJ EA ADDL 15 MIN: CPT

## 2020-11-09 PROCEDURE — 6370000000 HC RX 637 (ALT 250 FOR IP): Performed by: PSYCHIATRY & NEUROLOGY

## 2020-11-09 PROCEDURE — 85025 COMPLETE CBC W/AUTO DIFF WBC: CPT

## 2020-11-09 PROCEDURE — 97116 GAIT TRAINING THERAPY: CPT

## 2020-11-09 PROCEDURE — 97110 THERAPEUTIC EXERCISES: CPT

## 2020-11-09 PROCEDURE — 36415 COLL VENOUS BLD VENIPUNCTURE: CPT

## 2020-11-09 PROCEDURE — 97530 THERAPEUTIC ACTIVITIES: CPT

## 2020-11-09 RX ORDER — CLONIDINE HYDROCHLORIDE 0.1 MG/1
0.1 TABLET ORAL EVERY 4 HOURS PRN
Status: DISCONTINUED | OUTPATIENT
Start: 2020-11-09 | End: 2020-11-11 | Stop reason: HOSPADM

## 2020-11-09 RX ADMIN — RANOLAZINE 1000 MG: 500 TABLET, FILM COATED, EXTENDED RELEASE ORAL at 20:53

## 2020-11-09 RX ADMIN — LEVETIRACETAM 500 MG: 500 TABLET ORAL at 20:53

## 2020-11-09 RX ADMIN — METOPROLOL SUCCINATE 50 MG: 50 TABLET, EXTENDED RELEASE ORAL at 07:42

## 2020-11-09 RX ADMIN — ALPRAZOLAM 0.12 MG: 0.25 TABLET ORAL at 22:52

## 2020-11-09 RX ADMIN — CLONIDINE HYDROCHLORIDE 0.1 MG: 0.1 TABLET ORAL at 18:51

## 2020-11-09 RX ADMIN — ISOSORBIDE MONONITRATE 60 MG: 60 TABLET, EXTENDED RELEASE ORAL at 07:42

## 2020-11-09 RX ADMIN — LEVETIRACETAM 500 MG: 500 TABLET ORAL at 07:42

## 2020-11-09 RX ADMIN — PHENAZOPYRIDINE HYDROCHLORIDE 200 MG: 100 TABLET ORAL at 12:10

## 2020-11-09 RX ADMIN — PHENAZOPYRIDINE HYDROCHLORIDE 200 MG: 100 TABLET ORAL at 07:42

## 2020-11-09 RX ADMIN — ACETAMINOPHEN 650 MG: 325 TABLET ORAL at 13:55

## 2020-11-09 RX ADMIN — MECLIZINE HYDROCHLORIDE 25 MG: 25 TABLET ORAL at 20:53

## 2020-11-09 RX ADMIN — TRAZODONE HYDROCHLORIDE 50 MG: 50 TABLET ORAL at 20:53

## 2020-11-09 RX ADMIN — ONDANSETRON HYDROCHLORIDE 4 MG: 4 TABLET, FILM COATED ORAL at 10:30

## 2020-11-09 RX ADMIN — PHENAZOPYRIDINE HYDROCHLORIDE 200 MG: 100 TABLET ORAL at 16:45

## 2020-11-09 RX ADMIN — PANTOPRAZOLE SODIUM 40 MG: 40 TABLET, DELAYED RELEASE ORAL at 05:48

## 2020-11-09 RX ADMIN — MECLIZINE HYDROCHLORIDE 25 MG: 25 TABLET ORAL at 07:42

## 2020-11-09 RX ADMIN — RANOLAZINE 1000 MG: 500 TABLET, FILM COATED, EXTENDED RELEASE ORAL at 07:42

## 2020-11-09 RX ADMIN — MECLIZINE HYDROCHLORIDE 25 MG: 25 TABLET ORAL at 13:34

## 2020-11-09 ASSESSMENT — PAIN DESCRIPTION - ORIENTATION
ORIENTATION: LOWER
ORIENTATION: LOWER

## 2020-11-09 ASSESSMENT — PAIN SCALES - GENERAL
PAINLEVEL_OUTOF10: 5
PAINLEVEL_OUTOF10: 7
PAINLEVEL_OUTOF10: 0
PAINLEVEL_OUTOF10: 6

## 2020-11-09 ASSESSMENT — PAIN DESCRIPTION - PROGRESSION: CLINICAL_PROGRESSION: NOT CHANGED

## 2020-11-09 ASSESSMENT — PAIN DESCRIPTION - PAIN TYPE
TYPE: ACUTE PAIN
TYPE: ACUTE PAIN

## 2020-11-09 ASSESSMENT — PAIN DESCRIPTION - LOCATION
LOCATION: BACK
LOCATION: BACK

## 2020-11-09 ASSESSMENT — PAIN DESCRIPTION - FREQUENCY: FREQUENCY: INTERMITTENT

## 2020-11-09 ASSESSMENT — PAIN DESCRIPTION - DESCRIPTORS: DESCRIPTORS: ACHING

## 2020-11-09 ASSESSMENT — PAIN - FUNCTIONAL ASSESSMENT: PAIN_FUNCTIONAL_ASSESSMENT: ACTIVITIES ARE NOT PREVENTED

## 2020-11-09 ASSESSMENT — PAIN DESCRIPTION - ONSET: ONSET: ON-GOING

## 2020-11-09 NOTE — PROGRESS NOTES
rate and rhythm  Abdomen: Auscultation: Normal bowel sounds. No bruits. Extremities: Extremities warm to touch, pink, with no edema.   Musculoskeletal:  negative  Neurologic:  No obvious gross abnormalities noted on limited exam.      CBC with Differential:    Lab Results   Component Value Date    WBC 4.2 11/09/2020    RBC 3.60 11/09/2020    HGB 10.9 11/09/2020    HCT 33.0 11/09/2020    HCT 32.4 05/19/2011     11/09/2020     05/19/2011    MCV 91.7 11/09/2020    MCH 30.3 11/09/2020    MCHC 33.0 11/09/2020    RDW 13.3 11/09/2020    LYMPHOPCT 34.4 11/09/2020    MONOPCT 9.6 11/09/2020    EOSPCT 2.8 05/19/2011    BASOPCT 0.7 11/09/2020    MONOSABS 0.40 11/09/2020    LYMPHSABS 1.4 11/09/2020    EOSABS 0.10 11/09/2020    BASOSABS 0.00 11/09/2020     CMP:    Lab Results   Component Value Date     11/09/2020     05/19/2011    K 4.4 11/09/2020    K 4.2 05/19/2011     11/09/2020     05/19/2011    CO2 25 11/09/2020    BUN 9 11/09/2020    CREATININE 0.7 11/09/2020    CREATININE 1.0 05/19/2011    GFRAA >59 11/09/2020    LABGLOM >60 11/09/2020    GLUCOSE 99 11/09/2020    PROT 5.3 11/09/2020    PROT 7.2 03/21/2013    LABALBU 3.8 11/09/2020    LABALBU 4.1 05/19/2011    CALCIUM 8.9 11/09/2020    BILITOT <0.2 11/09/2020    ALKPHOS 59 11/09/2020    ALKPHOS 37 05/19/2011    AST 12 11/09/2020    ALT 17 11/09/2020     Last 3 Troponin:    Lab Results   Component Value Date    TROPONINI 0.01 07/20/2020    TROPONINI <0.01 05/28/2020    TROPONINI <0.01 01/10/2019     Urine Culture:  No components found for: CURINE  Blood Culture:  No components found for: CBLOOD, CFUNGUSBL  Stool Culture:  No components found for: CSTOOL    Assessment:    Principal Problem:    Subarachnoid hemorrhage following injury, no loss of consciousness ([facility] [address]  Active Problems:    Coronary artery disease of native artery of native heart with stable angina pectoris (HCC)    Osteoarthritis    GERD (gastroesophageal reflux disease)

## 2020-11-09 NOTE — PATIENT CARE CONFERENCE
[facility] ACUTE INPATIENT REHABILITATION  TEAM CONFERENCE NOTE    Date: 2020  Patient Name: Leslye Hooper        MRN: [id number]    : 1945  (76 y.o.)  Gender: female      Diagnosis: TBI, subarachnoid hemorrhage R frontal area      PHYSICAL THERAPY  STRENGTH  Strength RLE  R Hip Flexion: 3+/5  R Hip ABduction: 3+/5  R Hip ADduction: 3+/5  R Knee Extension: 3+/5  Strength LLE  L Hip Flexion: 3+/5  L Hip ABduction: 3+/5  L Hip ADduction: 3+/5  L Knee Extension: 3+/5  ROM  AROM RLE (degrees)  RLE AROM: WFL  AROM LLE (degrees)  LLE AROM : WFL  BED MOBILITY  Bed Mobility  Rolling: Independent  Supine to Sit: Independent  Sit to Supine: Independent  Scooting: Independent  Comment: D/T HEADACHE/NAUSEA  TRANSFERS  Transfers  Sit to Stand: Independent  Stand to sit: Independent  Bed to Chair: Independent  Car Transfer: Stand by assistance  Comment: Pt independent with transfers, SBA for car transfer VC to sit all the way back and reach for seat  WHEELCHAIR PROPULSION  Propulsion 1  Propulsion: Manual  Level: Level Tile  Method: GUANACO FONTENOT  Level of Assistance: Stand by assistance  Description/ Details: Demonstrated how to turn w/DERICK UE  Distance: 150'  AMBULATION  Ambulation 1  Surface: level tile  Device: Rollator  Other Apparatus: Wheelchair follow  Assistance: Modified Independent  Quality of Gait: Good, steady pace. Gait Deviations: Slow Carm[place], Deviated path  Distance: 1200'  Comments: Pt has good gait speed, required few standing rest, then able to contiune amb.   STAIRS     GOALS:  Short term goals  Time Frame for Short term goals: 1-2 weeks  Short term goal 1: Pt will perform all on bed mobility with supervision  Short term goal 2: Pt will perform sit to stand and stand to sit transfer with RW and supervision  Short term goal 3: Pt will propel W/C 76' with supervision  Short term goal 4: Pt will ambulate 76' with RW and SBA  Short term goal 5: Pt will navigate one step with CGA    Long term goals  Time TERM GOAL #2:  Goal 2: The patient will follow multi step commands during daily activities with min verbal cues at 100% accuracy in order to improve safety with functional ADL's and decreased assistance from caregivers.     SHORT TERM GOAL #3:  Goal 3: The patient will complete attention/processing tasks with 80% accuracy with min verbal cues. MET   SHORT TERM GOAL #4:  Goal 4: The patient will complete executive function tasks (planning, organizing, self monitoring, etc) with min verbal cues at 80% accuracy in order to improve safety awareness with functional ADL's     SHORT TERM GOAL #5:  Goal 5: The patient will demonstrate functional problem solving and safety awareness with min verbal cues at 80% accuracy for daily living tasks in order to increase safe interaction with environment and decreased assistance from caregivers. MET      Swallowing Short Term Goals  Short-term Goals  Goal 1: The patient will tolerate regular diet with thin liquids with min/no overt s/s of aspiration. MET    Long-term Goals  Goal 1: The patient will develop functional, cognitive-linguistic based skills and utilize compensatory strategies to communicate wants and needs effectively to different conversational partners, maintain safety and participate socially in functional living environment. GOAL 2: The patient will maintain adequate hydration/nutrition with optimum safety and efficiency of swallowing function with P.O. intake without overt signs and symptoms of aspiration for the highest appropriate diet level. MET     GOALS MET: 3 STG 1 LTG     ASSESSMENT:  Assessment: [x]? Progressing towards goals           []? Not Progressing towards goals        OCCUPATIONAL THERAPY    CURRENT IRF-SULEMA SCORES  Eating: CARE Score: 6  Oral Hygiene: CARE Score: 6  Toileting: CARE Score: 6  Shower/Bathe: CARE Score: 6  Upper Body Dressing: CARE Score: 6  Lower Body Dressing: CARE Score: 6  Footwear: CARE Score: 6  Toilet Transfers: CARE Score: 6  Picking Up Object:  CARE Score: 6      UE Functioning:  BUE AROM WNL     Pain Assessment:  Pain Level: 0  Pain Location: Head    STGs:  Short term goals  Time Frame for Short term goals: 1 week  Short term goal 1: MET  Short term goal 2: MET  Short term goal 3: MET  Short term goal 4: MET  Short term goal 5: MET  Short term goal 6: MET  Short term goal 7: MET    LTGs:  Long term goals  Time Frame for Long term goals : 2 weeks  Long term goal 1: MET  Long term goal 2: MET  Long term goal 3: MET  Long term goal 4: MET  Long term goal 5: MET  Long term goals 6: MET  Long term goal 8: met  Long term goal 9: MET  Long term goal 10: MET    Assessment:  Decreased functional mobility ; Decreased high-level IADLs              NUTRITION  Current Wt: Weight: 144 lb 9.6 oz (65.6 kg) / Body mass index is 24.06 kg/m². Admission Wt: Admission Body Weight: 144 lb 12.8 oz (65.7 kg)  Oral Diet Orders:   GENERAL  Oral Nutrition Supplement (ONS) Orders:  Standard High Calorie Oral Supplement BID  Pt improving nutritionally with PO intake >50% most meals with ONS. Pt does continue to have complaints of nausea at times. Wt continues to trend up. Please see nutrition note for details. NURSING    Wounds/Incisions/Ulcers: No skin issues identified     Sunday Scale Score: 20    Pain: No pain concerns to address (other than headache yesterday)    Consultations/Labs/X-rays: routine labs    Family Education: Family available and participating in education     Fall Risk:  Salazar Score: 36    Fall in the last week?       Other Nursing Issues:   Blood pressure elevated yesterday and headache-reported clonidine as home medication- ordered and given-relieved      SOCIAL WORK/CASE MANAGEMENT  Assessment: very supportive family with 2 daughters alternating to stay with her, encourages her to continue with exercises/programs to remain active and mentally alert    Discharge Plan   Estimated Length of Stay:11/11/2020  Destination: discharge home with supervision    Pass: No    Services at Discharge: home exercise programs, medical follow ups    Equipment at Discharge: Already has all needed DME. Has Rollator, shower chair and BSC. Progress made in the prior week:  1. UP AD Adrian BECKWITH  2. Improved executive function skills. 3. Modified independent with ambulatory ADLs. 4.  5.      Goals for following week:  1. INDEP WITH EP  2. Discharge planning  3.   4.   5.     Factors facilitating achievement of predicted outcomes: Motivated, Cooperative and Pleasant    Barriers to the achievement of predicted outcomes: Decreased proprioception    Team Members Present at Conference:  : Floresita Paris   Occupational Therapist: Anjali Santos OTR/L  Physical Therapist: Geoffrey Leiva PT,DPT  Speech Therapist: Michelle Bruner MS,CCC-SLP  Nurse: Lacey Cristobal RN,BSN   Nurse Manager:  Lacey Cristobal RN, BSN  Dietitian:  Emperatriz Granger RD  Rehab Director:  Samuel Ndiaye approve the established interdisciplinary plan of care as documented within the medical record of [facility].

## 2020-11-09 NOTE — PROGRESS NOTES
Comprehensive Nutrition Assessment    Type and Reason for Visit:  Reassess    Nutrition Recommendations/Plan: Continue ONS. Nutrition Assessment:  Pt continues to improve nutritionally. Wt continues to trend up and intake is >50% most meals. Intake does vary at times as pt has nausea and dizziness. Consider pt to be at low risk for nutritional compromise at this time. Will continue to monitor. Malnutrition Assessment:  Malnutrition Status:  No malnutrition    Context:  Acute Illness       Estimated Daily Nutrient Needs:  Energy (kcal):  7233-6201; Weight Used for Energy Requirements:  Current     Protein (g):  ; Weight Used for Protein Requirements:  Ideal(1.0-2.0)        Fluid (ml/day):  9062-4893; Method Used for Fluid Requirements:  1 ml/kcal      Nutrition Related Findings:  well-nourished      Wounds:  None       Current Nutrition Therapies:    Dietary Nutrition Supplements: Standard High Calorie Oral Supplement  DIET GENERAL; Anthropometric Measures:  · Height: 5' 5\" (165.1 cm)  · Current Body Weight: 153 lb 7 oz (69.6 kg)   · Admission Body Weight: 144 lb 12.8 oz (65.7 kg)    · Usual Body Weight: 146 lb (66.2 kg)(4/2020)     · Ideal Body Weight: 125 lbs; % Ideal Body Weight 115.8 %   · BMI: 25.5   · BMI Categories: Overweight (BMI 25.0-29. 9)       Nutrition Diagnosis:   · Inadequate oral intake related to acute injury/trauma, early satiety as evidenced by intake 26-50%, poor intake prior to admission      Nutrition Interventions:   Food and/or Nutrient Delivery:  Continue Current Diet, Continue Oral Nutrition Supplement  Nutrition Education/Counseling:  No recommendation at this time   Coordination of Nutrition Care:  Continue to monitor while inpatient    Goals:  PO intake >50%, wt stable, improvement in N/V       Nutrition Monitoring and Evaluation:   Behavioral-Environmental Outcomes:  None Identified   Food/Nutrient Intake Outcomes:  Food and Nutrient Intake  Physical Signs/Symptoms Outcomes:  Biochemical Data, Nausea or Vomiting, Weight, Nutrition Focused Physical Findings     Discharge Planning:    Continue Oral Nutrition Supplement     Electronically signed by Gutierrez Gallo RD, YOLANDA on 11/9/20 at 9:50 AM CST    Contact: [phone]

## 2020-11-09 NOTE — PROGRESS NOTES
SURGERY  03/2019    cataracts   [address]    Partial    NASAL SEPTUM SURGERY  1977    SINUS SURGERY  2003    Nasal Polyps Removed    SPINE SURGERY  3/6/2006    Lumbar Laminectomy L4&5    SPINE SURGERY  3/6/2006    Cervical Fusion - C4,5,6    TONSILLECTOMY  1968       Medications in Hospital:      Current Facility-Administered Medications:     meclizine (ANTIVERT) tablet 25 mg, 25 mg, Oral, TID, Ayaan Aparicio MD, 25 mg at 11/09/20 0742    ondansetron (ZOFRAN) tablet 4 mg, 4 mg, Oral, Q8H PRN, Ayaan Aparicio MD, 4 mg at 11/08/20 2049    phenazopyridine (PYRIDIUM) tablet 200 mg, 200 mg, Oral, TID WC, Ayaan Aparicio MD, 200 mg at 11/09/20 0742    melatonin tablet 1 mg, 1 mg, Oral, Nightly PRN, Brian Ravi MD    promethazine (PHENERGAN) tablet 12.5 mg, 12.5 mg, Oral, Q6H PRN, Carmelo Rodriguez MD, 12.5 mg at 11/08/20 1728    pantoprazole (PROTONIX) tablet 40 mg, 40 mg, Oral, QAM AC, Carmelo Rodriguez MD, 40 mg at 11/09/20 0548    ondansetron (ZOFRAN) injection 4 mg, 4 mg, Intravenous, Q4H PRN, Carmelo Rodriguez MD, 4 mg at 11/03/20 1146    senna (SENOKOT) tablet 8.6 mg, 1 tablet, Oral, Daily PRN, Brian Ravi MD    glucagon (rDNA) injection 1 mg, 1 mg, Intramuscular, PRN, Brian Ravi MD    glucose (GLUTOSE) 40 % oral gel 15 g, 15 g, Oral, PRN, Brian Ravi MD    acetaminophen (TYLENOL) tablet 650 mg, 650 mg, Oral, Q8H PRN, Brian Ravi MD, 650 mg at 11/08/20 1414    ALPRAZolam [facility] [place]) tablet 0.125 mg, 0.125 mg, Oral, Daily PRN, Brian Ravi MD, 0.125 mg at 11/08/20 2048    cyclobenzaprine (FLEXERIL) tablet 10 mg, 10 mg, Oral, TID PRN, Brian Ravi MD    isosorbide mononitrate (IMDUR) extended release tablet 60 mg, 60 mg, Oral, Daily, Brian Ravi MD, 60 mg at 11/09/20 0742    levETIRAcetam (KEPPRA) tablet 500 mg, 500 mg, Oral, BID, Brian Ravi MD, 500 mg at 11/09/20 0742    metoprolol succinate (TOPROL XL) extended release tablet 50 mg, 50 mg, Oral, Daily, Ophelia Delgadillo MD, 50 mg at 11/09/20 0742    nitroGLYCERIN (NITROSTAT) SL tablet 0.4 mg, 0.4 mg, Sublingual, Q5 Min PRN, Ophelia Delgadillo MD    ranolazine [facility]) extended release tablet 1,000 mg, 1,000 mg, Oral, BID, Ophelia Delgadillo MD, 1,000 mg at 11/09/20 8588    traZODone (DESYREL) tablet 50 mg, 50 mg, Oral, Nightly, Ophelia Delgadillo MD, 50 mg at 11/08/20 2048    polyethylene glycol (GLYCOLAX) packet 17 g, 17 g, Oral, Daily PRN, Cari Ravi MD    traMADol (ULTRAM) tablet 50 mg, 50 mg, Oral, Q6H PRN, Cari Ravi MD, 50 mg at 11/03/20 0520    Allergies: Other; Codeine; Darvocet [propoxyphene n-acetaminophen]; Hydrocodone-acetaminophen; Lyrica [pregabalin]; Morphine; and Percocet [oxycodone-acetaminophen]    Social History:   TOBACCO:   reports that she quit smoking about 53 years ago. Her smoking use included cigarettes. She has a 1.00 pack-year smoking history. She has never used smokeless tobacco.  ETOH:   reports no history of alcohol use. Family History:       Problem Relation Age of Onset    Heart Disease Mother     High Blood Pressure Mother     Diabetes Mother     High Cholesterol Mother     Diabetes Brother          PHYSICAL EXAM:  /60   Pulse 50   Temp 97.2 °F (36.2 °C) (Temporal)   Resp 16   Ht 5' 5\" (1.651 m)   Wt 153 lb 7 oz (69.6 kg)   SpO2 91%   BMI 25.53 kg/m²       Constitutional - well developed, well nourished. Eyes - conjunctiva normal.   Ear, nose, throat - No scars, masses, or lesions over external nose or ears, no atrophy of tongue  Neck-symmetric, no masses noted, no jugular vein distension  Respiration- chest wall appears symmetric, good expansion,   normal effort without use of accessory muscles  Musculoskeletal - no significant wasting of muscles noted, no bony deformities  Extremities-no clubbing, cyanosis or edema  Skin - warm, dry, and intact. No rash, erythema, or pallor.   Psychiatric - mood, affect, and behavior appear normal.      Neurological exam  Awake,  fluent oriented slow  Attention and concentration appear mildly impaired  Recent and remote memory appears mildly impaired  Speech normal without dysarthria  No clear issues with language of fund of knowledge     Cranial Nerve Exam     CN III, IV,VI-EOMI, No nystagmus, conjugate eye movements, no ptosis    CN VII-no facial assymetry       Motor Exam  antigravity throughout upper and lower extremities bilaterally      Tremors- no tremors in hands or head noted     Gait  Not tested      Nursing/pcp notes, imaging,labs and vitals reviewed. PT,OT and/or speech notes reviewed    Lab Results   Component Value Date    WBC 4.2 (L) 11/09/2020    HGB 10.9 (L) 11/09/2020    HCT 33.0 (L) 11/09/2020    MCV 91.7 11/09/2020     11/09/2020     Lab Results   Component Value Date     11/09/2020    K 4.4 11/09/2020     11/09/2020    CO2 25 11/09/2020    BUN 9 11/09/2020    CREATININE 0.7 11/09/2020    GLUCOSE 99 11/09/2020    CALCIUM 8.9 11/09/2020    PROT 5.3 (L) 11/09/2020    LABALBU 3.8 11/09/2020    BILITOT <0.2 11/09/2020    ALKPHOS 59 11/09/2020    AST 12 11/09/2020    ALT 17 11/09/2020    LABGLOM >60 11/09/2020    GFRAA >59 11/09/2020    GLOB 1.8 11/19/2016     Lab Results   Component Value Date    INR 0.97 10/27/2020    INR 0.99 10/25/2020    INR 0.96 10/24/2020    PROTIME 12.7 10/27/2020    PROTIME 12.9 10/25/2020    PROTIME 12.7 10/24/2020        Objective    Balance  Sitting Balance: Independent  Standing Balance: Modified independent   Functional Mobility  Functional - Mobility Device: 4-Wheeled Walker  Activity: To/from bathroom  Assist Level: Modified independent   Functional Mobility Comments: Long distance downstairs. Tub Transfers  Tub - Transfer From: (Rollator)  Tub - Transfer Type: To and From  Tub - Transfer To: Shower seat with back  Tub - Technique: To left; To right;Ambulating  Tub Transfers: Modified independence  Transfers  Sit to stand: Modified independent  Stand to sit: Modified independent      11/06/20 1300   Restrictions/Precautions   Restrictions/Precautions Up Ad [place]   Transfers   Sit to Stand Independent   Stand to sit Independent   Ambulation   Ambulation? Yes   WB Status WBAT   Ambulation 1   Surface level tile   Device Rollator   Assistance Independent   Quality of Gait Good reciprocal gait, no LOB or increase in nausea/dizziness   Distance 300'   Ambulation 2   Surface - 2 level tile   Device 2 Rollator   Assistance 2 Independent   Quality of Gait 2 Same as above   Distance 175'   Comments Therapy gym to room   Exercises   Comments Romberg balance exercise in II bar; tandem and retro walking in II bars; 2x each   Conditions Requiring Skilled Therapeutic Intervention   Assessment Decreased ability to balance when amb in tandem stance as well as retro walking; pt is independent w/amb, transfers and bed mobility   Activity Tolerance   Activity Tolerance Patient Tolerated treatment well   Safety Devices   Type of devices Call light within reach; Left in bed       RECORD REVIEW: Previous medical records, medications were reviewed at today's visit    IMPRESSION:   1. S/P Traumatic subarachnoid hemorrhage-off ASA/Plavix-monitor  2. HTN-on meds monitor  3. DM-monitor BS  4. Seizures-on Keppra  5. CAD-on Ramexa-Plavix and ASA on hold  6. Nausea and vomiting post ICH-repeat CT stable-Zofran/Phenergan PRN  7. GI-bowel regimen/PPI  8. Insomnia-Trazodone qhs  9. Headache-Tramadol/Tylenol PRN  10. Anxiety-Xanax PRN  11. History of lupus-monitor  12. PT/OT/Speech  13. UTI-complete abx  14. Dysuria and urinary frequency. Repeat urinalysis unremarkable. Pyridium added. Improved. This is somewhat of a chronic problem as well. Dizziness/nausea with movement-Zofran TID  Suspect this may be peripheral vestibulopathy (BPPV) post fall/trauma. .  Antivert added. Continue care as noted    RENAN November 11. Restaff in the a.m.

## 2020-11-09 NOTE — PROGRESS NOTES
[facility]  INPATIENT SPEECH THERAPY  [facility] 8 REHAB UNIT  TIME   Time In: 1030  Time Out: 1130  Minutes: 60  Total Treatment Time: 60    [x]Daily Note  []Progress Note    Date: 2020  Patient Name: Linda Bernard        MRN: [id number]    Account #: [de-identified]  : 1945  (76 y.o.)  Gender: female   Primary Provider: Carmelo Rodriguez MD  Precautions: fall  Swallowing Status/Diet: regular thin liquids      Subjective: Pt moving around room with rollator walker. She had taken a shower and was completing her hygiene routine. Pt had multiple questions this date about her upcoming d/c. She has concerns about her safety in the home, her mentation, and her success. She required verbal counseling this date. Objective: SLP utilized open-ended questions during structured conversation to improve thought process, organization of verbal utterances, and verbal cohesion. She had 1x 'loss of thought' and 3-4 episodes of tangential speech moments. Pt requires the SLP to provide prompts to return her train of thought. Problem solving tasks provided with 'wh' questions for returning home were completed. Pt answered all questions with atleast one solution, sometimes including 2-3 solutions; demonstrating 80-90% accuracy. Executive functioning tasks completed with home environment and current mentation. She 'brainstormed' with making a list of concerns, activities, and interests to address at home. Pt with 80% accuracy. SHORT TERM GOAL #1:  Goal 1: The patient will complete simple/complex naming tasks with min verbal cues at 80% accuracy in order to improve verbal expression and thought organization for functional communication. SHORT TERM GOAL #2:  Goal 2: The patient will follow multi step commands during daily activities with min verbal cues at 100% accuracy in order to improve safety with functional ADL's and decreased assistance from caregivers.     SHORT TERM GOAL #3:  Goal 3: The patient will complete attention/processing tasks with 80% accuracy with min verbal cues. SHORT TERM GOAL #4:  Goal 4: The patient will complete executive function tasks (planning, organizing, self monitoring, etc) with min verbal cues at 80% accuracy in order to improve safety awareness with functional ADL's. SHORT TERM GOAL #5:  Goal 5: The patient will demonstrate functional problem solving and safety awareness with min verbal cues at 80% accuracy for daily living tasks in order to increase safe interaction with environment and decreased assistance from caregivers. not met    Swallowing Short Term Goals  Short-term Goals  Goal 1: The patient will tolerate regular diet with thin liquids with min/no overt s/s of aspiration. ASSESSMENT:  Assessment: [x]Progressing towards goals          []Not Progressing towards goals    Patient Tolerance of Treatment:   [x]Tolerated well []Tolerated fair []Required rest breaks []Fatigued    Education:  Learner:  [x]Patient          []Significant Other          []Other  Education provided regarding:  []Goals and POC   []Diet and swallowing precautions    []Home Exercise Program  [x]Progress and/or discharge information  Method of Education:  [x]Discussion          []Demonstration          [x]Handout          []Other  Evaluation of Education:   [x]Verbalized understanding   []Demonstrates without assistance  []Demonstrates with assistance  []Needs further instruction     []No evidence of learning                  []No family present      Plan: [x]Continue with current plan of care    []Modify current plan of care as follows:    []Discharge patient    Discharge Location:    Services/Supervision Recommended:      [x]Patient continues to require treatment by a licensed therapist to address functional deficits as outlined in the established plan of care.     Electronically signed by PARKER Perez on 11/9/2020 at 1:00 PM

## 2020-11-09 NOTE — PROGRESS NOTES
eye surgery (03/2019); and Cardiac catheterization (04/13/2020). Restrictions  Restrictions/Precautions  Restrictions/Precautions: Up Ad [place]  Required Braces or Orthoses?: No  Subjective   General  Chart Reviewed: Yes  Patient assessed for rehabilitation services?: Yes  Diagnosis: Subarachnoid hemorrhage  Subjective  Subjective: c/o nausea and headache, vomited at the end of tx session, BP taken, nursing notified. Pain Assessment  Pain Assessment: 0-10  Pain Level: 0  Vital Signs  Patient Currently in Pain: No   Objective             Balance  Sitting Balance: Independent  Standing Balance: Modified independent   Functional Mobility  Functional - Mobility Device: 4-Wheeled Walker  Activity: Retrieve items;Transport items; To/from bathroom; To/From therapy gym  Assist Level: Modified independent      Transfers  Stand Step Transfers: Modified independent  Stand Pivot Transfers: Modified independent  Sit to stand: Modified independent  Stand to sit: Modified independent  Type of ROM/Therapeutic Exercise  Type of ROM/Therapeutic Exercise: Free weights(2# all planes, Independent with HEP)       11/09/20 1400   Shower/Bathe Self   Assistance Needed Independent   CARE Score 6       Plan   Plan  Current Treatment Recommendations: Home Management Training, Functional Mobility Training    Goals  Short term goals  Time Frame for Short term goals: 1 week  Short term goal 1: MET  Short term goal 2: MET  Short term goal 3: MET  Short term goal 4: MET  Short term goal 5: MET  Short term goal 6: MET  Short term goal 7: MET  Long term goals  Time Frame for Long term goals : 2 weeks  Long term goal 1: MET  Long term goal 2: MET  Long term goal 3: MET  Long term goal 4: MET  Long term goal 5: MET  Long term goals 6: MET  Long term goal 8: met  Long term goal 9: MET  Long term goal 10: MET       Therapy Time   Individual Concurrent Group Co-treatment   Time In 1400         Time Out 1500         Minutes 60         Timed Code Treatment Minutes: 60 Minutes       Electronically signed by Anjali Pete OT on 11/9/2020 at 4:41 PM

## 2020-11-09 NOTE — PLAN OF CARE
Problem: Falls - Risk of:  Goal: Will remain free from falls  Description: Will remain free from falls  11/8/2020 2338 by Alka Leiva LPN  Outcome: Ongoing  11/8/2020 1026 by Chayo Fong RN  Outcome: Ongoing  Goal: Absence of physical injury  Description: Absence of physical injury  11/8/2020 2338 by Alka Leiva LPN  Outcome: Ongoing  11/8/2020 1026 by Chayo Fong RN  Outcome: Ongoing     Problem: Skin Integrity:  Goal: Will show no infection signs and symptoms  Description: Will show no infection signs and symptoms  11/8/2020 2338 by Alka Leiva LPN  Outcome: Ongoing  11/8/2020 1026 by Chayo Fong RN  Outcome: Ongoing  Goal: Absence of new skin breakdown  Description: Absence of new skin breakdown  11/8/2020 2338 by Alka Leiva LPN  Outcome: Ongoing  11/8/2020 1026 by Chayo Fong RN  Outcome: Ongoing     Problem: Infection:  Goal: Will remain free from infection  Description: Will remain free from infection  11/8/2020 2338 by Alka Leiva LPN  Outcome: Ongoing  11/8/2020 1026 by Chayo Fong RN  Outcome: Ongoing     Problem: Safety:  Goal: Free from accidental physical injury  Description: Free from accidental physical injury  11/8/2020 2338 by Alka Leiva LPN  Outcome: Ongoing  11/8/2020 1026 by Chayo Fong RN  Outcome: Ongoing  Goal: Free from intentional harm  Description: Free from intentional harm  11/8/2020 2338 by Alka Leiva LPN  Outcome: Ongoing  11/8/2020 1026 by Chayo Fong RN  Outcome: Ongoing     Problem: Daily Care:  Goal: Daily care needs are met  Description: Daily care needs are met  11/8/2020 2338 by Alka Leiva LPN  Outcome: Ongoing  11/8/2020 1026 by Chayo Fong RN  Outcome: Ongoing     Problem: Pain:  Goal: Patient's pain/discomfort is manageable  Description: Patient's pain/discomfort is manageable  11/8/2020 2338 by Alka Leiva LPN  Outcome: Ongoing  11/8/2020 1026 by Chayo Fong RN  Outcome: Ongoing  Goal: Pain level will decrease  Description: Pain level will decrease  11/8/2020 2338 by Anay Ryan LPN  Outcome: Ongoing  11/8/2020 1026 by Camila Gibson RN  Outcome: Ongoing     Problem: Skin Integrity:  Goal: Skin integrity will stabilize  Description: Skin integrity will stabilize  11/8/2020 2338 by Anay Ryan LPN  Outcome: Ongoing  11/8/2020 1026 by Camila Gibson RN  Outcome: Ongoing     Problem: Discharge Planning:  Goal: Patients continuum of care needs are met  Description: Patients continuum of care needs are met  11/8/2020 2338 by Anay Ryan LPN  Outcome: Ongoing  11/8/2020 1026 by Camila Gibson RN  Outcome: Ongoing     Problem: Nutrition  Goal: Optimal nutrition therapy  11/8/2020 2338 by Anay Ryan LPN  Outcome: Ongoing  11/8/2020 1026 by Camila Gibson RN  Outcome: Ongoing     Problem: Serum Glucose Level - Abnormal:  Goal: Ability to maintain appropriate glucose levels has stabilized  Description: Ability to maintain appropriate glucose levels has stabilized  11/8/2020 2338 by Anay Ryan LPN  Outcome: Ongoing  11/8/2020 1026 by Camila Gibson RN  Outcome: Ongoing     Problem: Mood - Altered:  Goal: Mood stable  Description: Mood stable  11/8/2020 2338 by Anay Ryan LPN  Outcome: Ongoing  11/8/2020 1026 by Camila Gibson RN  Outcome: Ongoing     Problem: Anxiety:  Goal: Level of anxiety will decrease  Description: Level of anxiety will decrease  11/8/2020 2338 by Anay Ryan LPN  Outcome: Ongoing  11/8/2020 1026 by Camila Gibson RN  Outcome: Ongoing

## 2020-11-09 NOTE — PLAN OF CARE
Problem: Falls - Risk of:  Goal: Will remain free from falls  Description: Will remain free from falls  11/9/2020 1256 by Jacky Giang RN  Outcome: Ongoing  11/8/2020 2338 by Umang Toribio LPN  Outcome: Ongoing  Goal: Absence of physical injury  Description: Absence of physical injury  11/9/2020 1256 by Jacky Giang RN  Outcome: Ongoing  11/8/2020 2338 by Umang Toribio LPN  Outcome: Ongoing     Problem: Skin Integrity:  Goal: Will show no infection signs and symptoms  Description: Will show no infection signs and symptoms  11/9/2020 1256 by Jacky Giang RN  Outcome: Ongoing  11/8/2020 2338 by Umang Toribio LPN  Outcome: Ongoing  Goal: Absence of new skin breakdown  Description: Absence of new skin breakdown  11/9/2020 1256 by Jacky Giang RN  Outcome: Ongoing  11/8/2020 2338 by Umang Toribio LPN  Outcome: Ongoing     Problem: Infection:  Goal: Will remain free from infection  Description: Will remain free from infection  11/9/2020 1256 by Jacky Giang RN  Outcome: Ongoing  11/8/2020 2338 by Umang Toribio LPN  Outcome: Ongoing     Problem: Safety:  Goal: Free from accidental physical injury  Description: Free from accidental physical injury  11/9/2020 1256 by Jacky Giang RN  Outcome: Ongoing  11/8/2020 2338 by Umang Toribio LPN  Outcome: Ongoing  Goal: Free from intentional harm  Description: Free from intentional harm  11/9/2020 1256 by Jacky Giang RN  Outcome: Ongoing  11/8/2020 2338 by Umang Toribio LPN  Outcome: Ongoing     Problem: Daily Care:  Goal: Daily care needs are met  Description: Daily care needs are met  11/9/2020 1256 by Jacky Giang RN  Outcome: Ongoing  11/8/2020 2338 by Umang Toribio LPN  Outcome: Ongoing     Problem: Pain:  Goal: Patient's pain/discomfort is manageable  Description: Patient's pain/discomfort is manageable  11/9/2020 1256 by Jacky Giang RN  Outcome: Ongoing  11/8/2020 2338 by Umang Toribio LPN  Outcome: Ongoing  Goal: Pain level will decrease  Description: Pain level will decrease  11/9/2020 1256 by Salome Sepulveda RN  Outcome: Ongoing  11/8/2020 2338 by Spencer Goldsmith LPN  Outcome: Ongoing     Problem: Skin Integrity:  Goal: Skin integrity will stabilize  Description: Skin integrity will stabilize  11/9/2020 1256 by Salome Sepulveda RN  Outcome: Ongoing  11/8/2020 2338 by Spencer Goldsmith LPN  Outcome: Ongoing     Problem: Discharge Planning:  Goal: Patients continuum of care needs are met  Description: Patients continuum of care needs are met  11/9/2020 1256 by Salome Sepulveda RN  Outcome: Ongoing  11/8/2020 2338 by Spencer Goldsmith LPN  Outcome: Ongoing     Problem: Nutrition  Goal: Optimal nutrition therapy  11/9/2020 0951 by Nabor Frazier RD, LD  Outcome: Completed  11/8/2020 2338 by Spencer Goldsmith LPN  Outcome: Ongoing     Problem: Serum Glucose Level - Abnormal:  Goal: Ability to maintain appropriate glucose levels has stabilized  Description: Ability to maintain appropriate glucose levels has stabilized  11/9/2020 1256 by Salome Sepulveda RN  Outcome: Ongoing  11/8/2020 2338 by Spencer Goldsmith LPN  Outcome: Ongoing     Problem: Mood - Altered:  Goal: Mood stable  Description: Mood stable  11/9/2020 1256 by Salome Sepulveda RN  Outcome: Ongoing  11/8/2020 2338 by Spencer Goldsmith LPN  Outcome: Ongoing     Problem: Anxiety:  Goal: Level of anxiety will decrease  Description: Level of anxiety will decrease  11/9/2020 1256 by Salome Sepulveda RN  Outcome: Ongoing  11/8/2020 2338 by Spencer Goldsmith LPN  Outcome: Ongoing

## 2020-11-09 NOTE — PROGRESS NOTES
11/09/20 1313 11/09/20 1315 11/09/20 1316   Restrictions/Precautions   Restrictions/Precautions [place]  --   --    Subjective   Subjective pt sitting at EOB and agrees to therapy.   --   --    Pain Screening   Patient Currently in Pain Yes  --   --    Pain Assessment   Pain Assessment  --   --   --    Pain Level  --   --   --    Pain Type Acute pain  --   --    Pain Location Back  --   --    Pain Orientation Lower  --   --    Bed Mobility   Rolling  --  Independent  --    Supine to Sit  --  Independent  --    Sit to Supine  --  Independent  --    Transfers   Sit to Stand  --   --  Independent   Stand to sit  --   --  Independent   Car Transfer  --   --  Stand by assistance   Comment  --   --  Pt independent with transfers, SBA for car transfer VC to sit all the way back and reach for seat   Ambulation   Ambulation?  --   --   --    WB Status  --   --   --    Ambulation 1   Surface  --   --   --    Device  --   --   --    Assistance  --   --   --    Quality of Gait  --   --   --    Distance  --   --   --    Comments  --   --   --    Ambulation 2   Surface - 2  --   --   --    Device 2  --   --   --    Assistance 2  --   --   --    Quality of Gait 2  --   --   --    Distance  --   --   --    Comments  --   --   --    Exercises   Comments  --   --   --    Conditions Requiring Skilled Therapeutic Intervention   Body structures, Functions, Activity limitations  --   --   --    Assessment  --   --   --    Activity Tolerance   Activity Tolerance  --   --   --       11/09/20 1317 11/09/20 1355 11/09/20 1357   Restrictions/Precautions   Restrictions/Precautions  --   --   --    Subjective   Subjective  --   --   --    Pain Screening   Patient Currently in Pain  --   --   --    Pain Assessment   Pain Assessment  --  0-10  --    Pain Level  --  7  --    Pain Type  --   --   --    Pain Location  --   --   --    Pain Orientation  --   --   --    Bed Mobility   Rolling  --   --   --    Supine to Sit  --   --   --    Sit to Supine  --   --   --    Transfers   Sit to Stand  --   --   --    Stand to sit  --   --   --    Car Transfer  --   --   --    Comment  --   --   --    Ambulation   Ambulation? Yes  --   --    WB Status WBAT  --   --    Ambulation 1   Surface level tile  --   --    Device Rollator  --   --    Assistance Modified Independent  --   --    Quality of Gait Good, steady pace.   --   --    Distance 1200'  --   --    Comments Pt has good gait speed, required few standing rest, then able to contiune amb.  --   --    Ambulation 2   Surface - 2 level tile  --   --    Device 2 Rollator  --   --    Assistance 2 Modified Independent  --   --    Quality of Gait 2 Same as above  --   --    Distance 850'  --   --    Comments One lap entire joshi and half lap from gym to OT room  --   --    Exercises   Comments  --   --  Standing BLE in joshi at rail x15, CGA   Conditions Requiring Skilled Therapeutic Intervention   Body structures, Functions, Activity limitations  --   --   --    Assessment  --   --   --    Activity Tolerance   Activity Tolerance  --   --   --       11/09/20 1359   Restrictions/Precautions   Restrictions/Precautions  --    Subjective   Subjective  --    Pain Screening   Patient Currently in Pain  --    Pain Assessment   Pain Assessment  --    Pain Level  --    Pain Type  --    Pain Location  --    Pain Orientation  --    Bed Mobility   Rolling  --    Supine to Sit  --    Sit to Supine  --    Transfers   Sit to Stand  --    Stand to sit  --    Car Transfer  --    Comment  --    Ambulation   Ambulation?  --    WB Status  --    Ambulation 1   Surface  --    Device  --    Assistance  --    Quality of Gait  --    Distance  --    Comments  --    Ambulation 2   Surface - 2  --    Device 2  --    Assistance 2  --    Quality of Gait 2  --    Distance  --    Comments  --    Exercises   Comments  --    Conditions Requiring Skilled Therapeutic Intervention   Body structures, Functions, Activity limitations Decreased functional mobility ; Decreased ADL status; Decreased ROM; Decreased strength;Decreased cognition;Decreased endurance;Decreased balance   Assessment Pt tolerated session well. Pt amb w/ rollator modified independent for 1200' and 850', performed car transfer w/ SBA for VC to enter and exit, and perfromed standing BLE exercsies at rail x15.    Activity Tolerance   Activity Tolerance Patient Tolerated treatment well   Electronically signed by Maryjane Saleh PTA on 11/9/2020 at 3:18 PM

## 2020-11-10 PROCEDURE — 97530 THERAPEUTIC ACTIVITIES: CPT

## 2020-11-10 PROCEDURE — 6370000000 HC RX 637 (ALT 250 FOR IP): Performed by: PSYCHIATRY & NEUROLOGY

## 2020-11-10 PROCEDURE — 97130 THER IVNTJ EA ADDL 15 MIN: CPT

## 2020-11-10 PROCEDURE — 1180000000 HC REHAB R&B

## 2020-11-10 PROCEDURE — 6370000000 HC RX 637 (ALT 250 FOR IP): Performed by: FAMILY MEDICINE

## 2020-11-10 PROCEDURE — 97129 THER IVNTJ 1ST 15 MIN: CPT

## 2020-11-10 PROCEDURE — 97110 THERAPEUTIC EXERCISES: CPT

## 2020-11-10 PROCEDURE — 99233 SBSQ HOSP IP/OBS HIGH 50: CPT | Performed by: PSYCHIATRY & NEUROLOGY

## 2020-11-10 PROCEDURE — 97116 GAIT TRAINING THERAPY: CPT

## 2020-11-10 RX ORDER — LOSARTAN POTASSIUM 100 MG/1
100 TABLET ORAL DAILY
Status: DISCONTINUED | OUTPATIENT
Start: 2020-11-10 | End: 2020-11-11 | Stop reason: HOSPADM

## 2020-11-10 RX ORDER — LOSARTAN POTASSIUM 50 MG/1
50 TABLET ORAL DAILY
Status: DISCONTINUED | OUTPATIENT
Start: 2020-11-10 | End: 2020-11-10

## 2020-11-10 RX ADMIN — PHENAZOPYRIDINE HYDROCHLORIDE 200 MG: 100 TABLET ORAL at 08:13

## 2020-11-10 RX ADMIN — POLYETHYLENE GLYCOL 3350 17 G: 17 POWDER, FOR SOLUTION ORAL at 08:18

## 2020-11-10 RX ADMIN — METOPROLOL SUCCINATE 50 MG: 50 TABLET, EXTENDED RELEASE ORAL at 08:13

## 2020-11-10 RX ADMIN — PANTOPRAZOLE SODIUM 40 MG: 40 TABLET, DELAYED RELEASE ORAL at 05:57

## 2020-11-10 RX ADMIN — CYCLOBENZAPRINE 10 MG: 10 TABLET, FILM COATED ORAL at 11:57

## 2020-11-10 RX ADMIN — PHENAZOPYRIDINE HYDROCHLORIDE 200 MG: 100 TABLET ORAL at 16:54

## 2020-11-10 RX ADMIN — LEVETIRACETAM 500 MG: 500 TABLET ORAL at 21:19

## 2020-11-10 RX ADMIN — ACETAMINOPHEN 650 MG: 325 TABLET ORAL at 16:54

## 2020-11-10 RX ADMIN — ISOSORBIDE MONONITRATE 60 MG: 60 TABLET, EXTENDED RELEASE ORAL at 08:13

## 2020-11-10 RX ADMIN — ACETAMINOPHEN 650 MG: 325 TABLET ORAL at 08:18

## 2020-11-10 RX ADMIN — LOSARTAN POTASSIUM 100 MG: 100 TABLET, FILM COATED ORAL at 09:13

## 2020-11-10 RX ADMIN — RANOLAZINE 1000 MG: 500 TABLET, FILM COATED, EXTENDED RELEASE ORAL at 08:13

## 2020-11-10 RX ADMIN — MECLIZINE HYDROCHLORIDE 25 MG: 25 TABLET ORAL at 08:13

## 2020-11-10 RX ADMIN — RANOLAZINE 1000 MG: 500 TABLET, FILM COATED, EXTENDED RELEASE ORAL at 21:19

## 2020-11-10 RX ADMIN — LEVETIRACETAM 500 MG: 500 TABLET ORAL at 08:13

## 2020-11-10 RX ADMIN — PHENAZOPYRIDINE HYDROCHLORIDE 200 MG: 100 TABLET ORAL at 11:54

## 2020-11-10 RX ADMIN — MECLIZINE HYDROCHLORIDE 25 MG: 25 TABLET ORAL at 21:19

## 2020-11-10 RX ADMIN — MECLIZINE HYDROCHLORIDE 25 MG: 25 TABLET ORAL at 13:39

## 2020-11-10 ASSESSMENT — PAIN DESCRIPTION - ORIENTATION: ORIENTATION: ANTERIOR

## 2020-11-10 ASSESSMENT — PAIN DESCRIPTION - FREQUENCY
FREQUENCY: CONTINUOUS
FREQUENCY: CONTINUOUS

## 2020-11-10 ASSESSMENT — PAIN SCALES - GENERAL
PAINLEVEL_OUTOF10: 7
PAINLEVEL_OUTOF10: 8
PAINLEVEL_OUTOF10: 7
PAINLEVEL_OUTOF10: 5
PAINLEVEL_OUTOF10: 7
PAINLEVEL_OUTOF10: 7

## 2020-11-10 ASSESSMENT — PAIN DESCRIPTION - DESCRIPTORS
DESCRIPTORS: HEADACHE
DESCRIPTORS: HEADACHE

## 2020-11-10 ASSESSMENT — PAIN DESCRIPTION - PAIN TYPE
TYPE: ACUTE PAIN

## 2020-11-10 ASSESSMENT — PAIN DESCRIPTION - LOCATION
LOCATION: HEAD

## 2020-11-10 ASSESSMENT — PAIN DESCRIPTION - ONSET: ONSET: ON-GOING

## 2020-11-10 ASSESSMENT — PAIN DESCRIPTION - PROGRESSION: CLINICAL_PROGRESSION: GRADUALLY WORSENING

## 2020-11-10 NOTE — PROGRESS NOTES
Patient:   Nidhi Hensley  MR#:    [id number]   Room:    [facility]/826-01   YOB: 1945  Date of Progress Note: 11/10/2020  Time of Note                           8:20 AM  Consulting Physician:   Melania Green M.D. Attending Physician:  Melania Green MD     Chief complaint S/p Traumatic subarachnoid hemorrhage      S:This 76 y.o. female  with history of Lupus,DM diet controlled, GERD, CAD s/p CABG x 3 and stenting, HTN and hyperlipidemia. Patient had had some weakness since July and had been using a rollator walker and her daughters had been alternating staying with her. She presented to [facility] ER on 10/24/20 after having a fall. Her daughter was pushing her while she was sitting on her rollator and she fell backwards, hitting her head on the asphalt. She did not lose consciousness but did have immediate onset of headache that has persisted. CT done revealed a small right frontal area of traumatic subarachnoid hemorrhage. Her B/P was elevated with systolic in 369-281 range. She was admitted to her PCP Dr. Steffi Sandhoff with consult for neurosurgery. She was seen by neurosurgeon Dr. Musa Templeton and not felt to need any surgical intervention. Repeat CTs have been stable. She was evaluated by SPT for swallow and was initially placed on bite size consistency diet with nectar thick liquids but has now been upgraded to regular with thin liquids with medicines whole in pudding or applesauce. Patient also found to have difficulty with word finding and short-term memory. Patient is also participating with both PT/OT. She is felt to need a stay on Rehab for continued therapy to work towards her goal of returning home with assist of her daughters. Some LOVE yesterday, associated with elevated BP. Resolved with clonidine.     REVIEW OF SYSTEMS:  Constitutional: No fevers No chills  Neck:No stiffness  Respiratory: No shortness of breath  Cardiovascular: No chest pain No palpitations  Gastrointestinal: No abdominal pain Genitourinary: No Dysuria  Neurological: +headache, no confusion    Past Medical History:      Diagnosis Date    Acute cystitis without hematuria 10/28/2020    Asthma     CAD (coronary artery disease)     Chest pain     Depression with anxiety     Diabetes mellitus ([facility] [address]     GERD (gastroesophageal reflux disease)     Glucose intolerance (impaired glucose tolerance)     Hyperlipidemia     Cholesterol management per Noreen Maciel Hypertension     Lupus (systemic lupus erythematosus) ([facility] [address]     Malaise and fatigue 10/6/2017    Movement disorder     Sees pain management for neck pain and previous surgery    New onset seizure ([facility] [address]     Osteoarthritis     Palliative care patient 07/22/2020    S/P CABG x 3 03/05/2013    PACABG X 3, LIMA-LAD, SVG0OM, SVG-DIAG, RT EV, DR Bernard Stern       Past Surgical History:      Procedure Laterality Date    APPENDECTOMY  1965    BACK SURGERY      CARDIAC CATHETERIZATION  5/18/11    EF 60%    CARDIAC CATHETERIZATION  1/25/16    drug eluting stent to RCA    CARDIAC CATHETERIZATION  1/25/2016    CARDIAC CATHETERIZATION  2/19/16    CARDIAC CATHETERIZATION  08/24/2017    cutting balloon angioplasty ot LAD    CARDIAC CATHETERIZATION  04/13/2020    TWO Stents placed    COLON SURGERY  1992    Polypectomy - 2    COLON SURGERY  2003    Polypectomy - 4    COLON SURGERY  11/2008    Polypectomy - 2    COLON SURGERY  8/2012    Polypectomy - 3    COLONOSCOPY      CORONARY ANGIOPLASTY  08/2017    cutting balloon angioplasty LAD diagonal    CORONARY ANGIOPLASTY WITH STENT PLACEMENT  3/22/2007    CORONARY ANGIOPLASTY WITH STENT PLACEMENT  8/2007    CORONARY ARTERY BYPASS GRAFT  3/5/2013    PACABG X 3, LIMA-LAD, SVG0OM, SVG-DIAG, RT EVH, DR PATEL    COSMETIC SURGERY      deviated septum and rhinoplasty    CYST REMOVAL  1970    DIAGNOSTIC CARDIAC CATH LAB PROCEDURE      ENDOSCOPY, COLON, DIAGNOSTIC      EYE SURGERY  03/2019    cataracts    HEMORRHOID SURGERY metoprolol succinate (TOPROL XL) extended release tablet 50 mg, 50 mg, Oral, Daily, Jay Levi MD, 50 mg at 11/10/20 0813    nitroGLYCERIN (NITROSTAT) SL tablet 0.4 mg, 0.4 mg, Sublingual, Q5 Min PRN, Jay Levi MD    ranolazine [facility]) extended release tablet 1,000 mg, 1,000 mg, Oral, BID, Jay Levi MD, 1,000 mg at 11/10/20 0813    traZODone (DESYREL) tablet 50 mg, 50 mg, Oral, Nightly, Jay Levi MD, 50 mg at 11/09/20 2053    polyethylene glycol (GLYCOLAX) packet 17 g, 17 g, Oral, Daily PRN, Elisha Mariano MD    traMADol (ULTRAM) tablet 50 mg, 50 mg, Oral, Q6H PRN, Elisha Mariano MD, 50 mg at 11/03/20 0520    Allergies: Other; Codeine; Darvocet [propoxyphene n-acetaminophen]; Hydrocodone-acetaminophen; Lyrica [pregabalin]; Morphine; and Percocet [oxycodone-acetaminophen]    Social History:   TOBACCO:   reports that she quit smoking about 53 years ago. Her smoking use included cigarettes. She has a 1.00 pack-year smoking history. She has never used smokeless tobacco.  ETOH:   reports no history of alcohol use. Family History:       Problem Relation Age of Onset    Heart Disease Mother     High Blood Pressure Mother     Diabetes Mother     High Cholesterol Mother     Diabetes Brother          PHYSICAL EXAM:  BP (!) 148/70   Pulse 58   Temp 96.6 °F (35.9 °C) (Temporal)   Resp 17   Ht 5' 5\" (1.651 m)   Wt 144 lb 9.6 oz (65.6 kg)   SpO2 90%   BMI 24.06 kg/m²       Constitutional - well developed, well nourished. Eyes - conjunctiva normal.   Ear, nose, throat - No scars, masses, or lesions over external nose or ears, no atrophy of tongue  Neck-symmetric, no masses noted, no jugular vein distension  Respiration- chest wall appears symmetric, good expansion,   normal effort without use of accessory muscles  Musculoskeletal - no significant wasting of muscles noted, no bony deformities  Extremities-no clubbing, cyanosis or edema  Skin - warm, dry, and intact.  No rash, erythema, or pallor. Psychiatric - mood, affect, and behavior appear normal.      Neurological exam  Awake,  fluent oriented slow  Attention and concentration appear mildly impaired  Recent and remote memory appears mildly impaired  Speech normal without dysarthria  No clear issues with language of fund of knowledge     Cranial Nerve Exam     CN III, IV,VI-EOMI, No nystagmus, conjugate eye movements, no ptosis    CN VII-no facial assymetry       Motor Exam  antigravity throughout upper and lower extremities bilaterally      Tremors- no tremors in hands or head noted     Gait  Not tested      Nursing/pcp notes, imaging,labs and vitals reviewed.      PT,OT and/or speech notes reviewed    Lab Results   Component Value Date    WBC 4.2 (L) 11/09/2020    HGB 10.9 (L) 11/09/2020    HCT 33.0 (L) 11/09/2020    MCV 91.7 11/09/2020     11/09/2020     Lab Results   Component Value Date     11/09/2020    K 4.4 11/09/2020     11/09/2020    CO2 25 11/09/2020    BUN 9 11/09/2020    CREATININE 0.7 11/09/2020    GLUCOSE 99 11/09/2020    CALCIUM 8.9 11/09/2020    PROT 5.3 (L) 11/09/2020    LABALBU 3.8 11/09/2020    BILITOT <0.2 11/09/2020    ALKPHOS 59 11/09/2020    AST 12 11/09/2020    ALT 17 11/09/2020    LABGLOM >60 11/09/2020    GFRAA >59 11/09/2020    GLOB 1.8 11/19/2016     Lab Results   Component Value Date    INR 0.97 10/27/2020    INR 0.99 10/25/2020    INR 0.96 10/24/2020    PROTIME 12.7 10/27/2020    PROTIME 12.9 10/25/2020    PROTIME 12.7 10/24/2020     PHYSICAL THERAPY  STRENGTH  Strength RLE  R Hip Flexion: 3+/5  R Hip ABduction: 3+/5  R Hip ADduction: 3+/5  R Knee Extension: 3+/5  Strength LLE  L Hip Flexion: 3+/5  L Hip ABduction: 3+/5  L Hip ADduction: 3+/5  L Knee Extension: 3+/5  ROM  AROM RLE (degrees)  RLE AROM: WFL  AROM LLE (degrees)  LLE AROM : WFL  BED MOBILITY  Bed Mobility  Rolling: Independent  Supine to Sit: Independent  Sit to Supine: Independent  Scooting: Independent  Comment: D/T HEADACHE/NAUSEA  TRANSFERS  Transfers  Sit to Stand: Independent  Stand to sit: Independent  Bed to Chair: Independent  Car Transfer: Stand by assistance  Comment: Pt independent with transfers, SBA for car transfer VC to sit all the way back and reach for seat  WHEELCHAIR PROPULSION  Propulsion 1  Propulsion: Manual  Level: Level Tile  Method: GUANACO FONTENOT  Level of Assistance: Stand by assistance  Description/ Details: Demonstrated how to turn w/DERICK UE  Distance: 150'  AMBULATION  Ambulation 1  Surface: level tile  Device: Rollator  Other Apparatus: Wheelchair follow  Assistance: Modified Independent  Quality of Gait: Good, steady pace. Gait Deviations: Slow [place], Deviated path  Distance: 1200'  Comments: Pt has good gait speed, required few standing rest, then able to contiune amb. STAIRS  GOALS:  Short term goals  Time Frame for Short term goals: 1-2 weeks  Short term goal 1: Pt will perform all on bed mobility with supervision  Short term goal 2: Pt will perform sit to stand and stand to sit transfer with RW and supervision  Short term goal 3: Pt will propel W/C 76' with supervision  Short term goal 4: Pt will ambulate 76' with RW and SBA  Short term goal 5: Pt will navigate one step with CGA     Long term goals  Time Frame for Long term goals : 2-3 weeks  Long term goal 1: Pt will perform all on bed mobility independently  Long term goal 2: Pt will perform sit to stand and stand to sit transfer with RW independently  Long term goal 3: Pt will propel W/C 150' windependently  Long term goal 4: Pt will ambulate 150' with RW independently  Long term goal 5: Pt will navigate one step with supervision     ASSESSMENT:  Assessment: Pt tolerated session well.  Pt amb w/ rollator modified independent for 1200' and 850', performed car transfer w/ SBA for VC to enter and exit, and perfromed standing BLE exercsies at rail x15.        SPEECH THERAPY  Precautions: fall  Swallowing Status/Diet: regular thin liquids        Subjective: Pt moving around room with rollator walker. She had taken a shower and was completing her hygiene routine. Pt had multiple questions this date about her upcoming d/c. She has concerns about her safety in the home, her mentation, and her success. She required verbal counseling this date.     Objective: SLP utilized open-ended questions during structured conversation to improve thought process, organization of verbal utterances, and verbal cohesion. She had 1x 'loss of thought' and 3-4 episodes of tangential speech moments. Pt requires the SLP to provide prompts to return her train of thought. Problem solving tasks provided with 'wh' questions for returning home were completed. Pt answered all questions with atleast one solution, sometimes including 2-3 solutions; demonstrating 80-90% accuracy. Executive functioning tasks completed with home environment and current mentation. She 'brainstormed' with making a list of concerns, activities, and interests to address at home. Pt with 80% accuracy.     SHORT TERM GOAL #1:  Goal 1: The patient will complete simple/complex naming tasks with min verbal cues at 80% accuracy in order to improve verbal expression and thought organization for functional communication.      SHORT TERM GOAL #2:  Goal 2: The patient will follow multi step commands during daily activities with min verbal cues at 100% accuracy in order to improve safety with functional ADL's and decreased assistance from caregivers.     SHORT TERM GOAL #3:  Goal 3: The patient will complete attention/processing tasks with 80% accuracy with min verbal cues. MET   SHORT TERM GOAL #4:  Goal 4: The patient will complete executive function tasks (planning, organizing, self monitoring, etc) with min verbal cues at 80% accuracy in order to improve safety awareness with functional ADL's     SHORT TERM GOAL #5:  Goal 5:  The patient will demonstrate functional problem solving and safety awareness functional mobility ; Decreased high-level IADLs                    NUTRITION  Current Wt: Weight: 144 lb 9.6 oz (65.6 kg) / Body mass index is 24.06 kg/m². Admission Wt: Admission Body Weight: 144 lb 12.8 oz (65.7 kg)  Oral Diet Orders:   GENERAL  Oral Nutrition Supplement (ONS) Orders:  Standard High Calorie Oral Supplement BID  Pt improving nutritionally with PO intake >50% most meals with ONS. Pt does continue to have complaints of nausea at times. Wt continues to trend up. Please see nutrition note for details.          RECORD REVIEW: Previous medical records, medications were reviewed at today's visit    IMPRESSION:   1. S/P Traumatic subarachnoid hemorrhage-off ASA/Plavix-monitor  2. HTN- losartan added 11/10. 3. DM-monitor BS  4. Seizures-on Keppra  5. CAD-on Ramexa-Plavix and ASA on hold  6. Nausea and vomiting post ICH-repeat CT stable-Zofran/Phenergan PRN  7. GI-bowel regimen/PPI  8. Insomnia-Trazodone qhs  9. Headache-Tramadol/Tylenol PRN  10. Anxiety-Xanax PRN  11. History of lupus-monitor  12. PT/OT/Speech  13. UTI-complete abx  14. Dysuria and urinary frequency. Repeat urinalysis unremarkable. Pyridium added. Improved. This is somewhat of a chronic problem as well. Dizziness/nausea with movement-Zofran TID  Suspect this may be peripheral vestibulopathy (BPPV) post fall/trauma. .  Antivert added.       Continue care as noted    Staffing this date , mutlidisciplinary with entire team with complex decision making and planning for discharge  ELOS:11/11, tomorrow

## 2020-11-10 NOTE — PROGRESS NOTES
Occupational Therapy     11/10/20 1100   General   Diagnosis Subarachnoid hemorrhage   Pain Assessment   Patient Currently in Pain Yes   Pain Assessment 0-10   Pain Level 7   Pain Type Acute pain   Pain Location Head   Pain Descriptors Headache   Pain Frequency Continuous   Clinical Progression Gradually worsening   Response to Pain Intervention Patient Satisfied   Balance   Sitting Balance Independent   Standing Balance Modified independent    Standing Balance   Time 1 minute x2 trials   Activity 2 handed static standing task   Comment Unable to stand longer this tx session d/t headache. Functional Mobility   Functional - Mobility Device 4-Wheeled Walker   Activity Other   Assist Level Modified independent    Transfers   Sit to stand Modified independent   Stand to sit Modified independent   Type of ROM/Therapeutic Exercise   Type of ROM/Therapeutic Exercise Resistive Bands  (Light resistance.)   Comment BUE 1 set x 10 reps. Assessment   Performance deficits / Impairments Decreased functional mobility ; Decreased high-level IADLs   Treatment Diagnosis subarachnoid hemorrhage   Prognosis Good   Timed Code Treatment Minutes 60 Minutes   Activity Tolerance   Activity Tolerance Patient limited by pain   Activity Tolerance C/o headache throughout tx session.    Safety Devices   Safety Devices in place N/A   Type of devices Call light within reach  (Up ad apryl.)   Plan   Current Treatment Recommendations Home Management Training;Functional Mobility Training

## 2020-11-10 NOTE — PLAN OF CARE
Problem: Falls - Risk of:  Goal: Will remain free from falls  Description: Will remain free from falls  11/10/2020 1210 by Danuta Louise RN  Outcome: Ongoing  11/10/2020 0051 by Hang Bosch LPN  Outcome: Ongoing  Goal: Absence of physical injury  Description: Absence of physical injury  11/10/2020 1210 by Danuta Louise RN  Outcome: Ongoing  11/10/2020 0051 by Hang Bosch LPN  Outcome: Ongoing     Problem: Skin Integrity:  Goal: Will show no infection signs and symptoms  Description: Will show no infection signs and symptoms  11/10/2020 1210 by Danuta Louise RN  Outcome: Ongoing  11/10/2020 0051 by Hang Bosch LPN  Outcome: Ongoing  Goal: Absence of new skin breakdown  Description: Absence of new skin breakdown  11/10/2020 1210 by Danuta Louise RN  Outcome: Ongoing  11/10/2020 0051 by Hang Bosch LPN  Outcome: Ongoing     Problem: Infection:  Goal: Will remain free from infection  Description: Will remain free from infection  11/10/2020 1210 by Danuta Louise RN  Outcome: Ongoing  11/10/2020 0051 by Hang Bosch LPN  Outcome: Ongoing     Problem: Safety:  Goal: Free from accidental physical injury  Description: Free from accidental physical injury  11/10/2020 1210 by Danuta Louise RN  Outcome: Ongoing  11/10/2020 0051 by Hang Bosch LPN  Outcome: Ongoing  Goal: Free from intentional harm  Description: Free from intentional harm  11/10/2020 1210 by Danuta Louise RN  Outcome: Ongoing  11/10/2020 0051 by Hang Bosch LPN  Outcome: Ongoing     Problem: Daily Care:  Goal: Daily care needs are met  Description: Daily care needs are met  11/10/2020 1210 by Danuta Louise RN  Outcome: Ongoing  11/10/2020 0051 by Hang Bosch LPN  Outcome: Ongoing     Problem: Pain:  Goal: Patient's pain/discomfort is manageable  Description: Patient's pain/discomfort is manageable  11/10/2020 1210 by Danuta Louise RN  Outcome: Ongoing  11/10/2020 0051 by Ebenezer Sutton Saran Zhu LPN  Outcome: Ongoing  Goal: Pain level will decrease  Description: Pain level will decrease  11/10/2020 1210 by Lanie Thomas RN  Outcome: Ongoing  11/10/2020 0051 by Alka Leiva LPN  Outcome: Ongoing     Problem: Skin Integrity:  Goal: Skin integrity will stabilize  Description: Skin integrity will stabilize  11/10/2020 1210 by Lanie Thomas RN  Outcome: Ongoing  11/10/2020 0051 by Alka Leiva LPN  Outcome: Ongoing     Problem: Discharge Planning:  Goal: Patients continuum of care needs are met  Description: Patients continuum of care needs are met  11/10/2020 1210 by Lanie Thomas RN  Outcome: Ongoing  11/10/2020 0051 by Alka Leiva LPN  Outcome: Ongoing     Problem: Serum Glucose Level - Abnormal:  Goal: Ability to maintain appropriate glucose levels has stabilized  Description: Ability to maintain appropriate glucose levels has stabilized  11/10/2020 1210 by Lanie Thomas RN  Outcome: Ongoing  11/10/2020 0051 by Alka Leiva LPN  Outcome: Ongoing     Problem: Mood - Altered:  Goal: Mood stable  Description: Mood stable  11/10/2020 1210 by Lanie Thomas RN  Outcome: Ongoing  11/10/2020 0051 by Alka Leiva LPN  Outcome: Ongoing     Problem: Anxiety:  Goal: Level of anxiety will decrease  Description: Level of anxiety will decrease  11/10/2020 1210 by Lanie Thomas RN  Outcome: Ongoing  11/10/2020 0051 by Alka Leiva LPN  Outcome: Ongoing

## 2020-11-10 NOTE — PLAN OF CARE
Problem: Falls - Risk of:  Goal: Will remain free from falls  Description: Will remain free from falls  11/10/2020 0051 by Nakita Alvarado LPN  Outcome: Ongoing  11/9/2020 1256 by Jessica Molina RN  Outcome: Ongoing  Goal: Absence of physical injury  Description: Absence of physical injury  11/10/2020 0051 by Nakita Alvarado LPN  Outcome: Ongoing  11/9/2020 1256 by Jessica Molina RN  Outcome: Ongoing     Problem: Skin Integrity:  Goal: Will show no infection signs and symptoms  Description: Will show no infection signs and symptoms  11/10/2020 0051 by Nakita Alvarado LPN  Outcome: Ongoing  11/9/2020 1256 by Jessica Molina RN  Outcome: Ongoing  Goal: Absence of new skin breakdown  Description: Absence of new skin breakdown  11/10/2020 0051 by Nakita Alvarado LPN  Outcome: Ongoing  11/9/2020 1256 by Jessica Molina RN  Outcome: Ongoing     Problem: Infection:  Goal: Will remain free from infection  Description: Will remain free from infection  11/10/2020 0051 by Nakita Alvarado LPN  Outcome: Ongoing  11/9/2020 1256 by Jessica Molina RN  Outcome: Ongoing     Problem: Safety:  Goal: Free from accidental physical injury  Description: Free from accidental physical injury  11/10/2020 0051 by Nakita Alvarado LPN  Outcome: Ongoing  11/9/2020 1256 by Jessica Molina RN  Outcome: Ongoing  Goal: Free from intentional harm  Description: Free from intentional harm  11/10/2020 0051 by Nakita Alvarado LPN  Outcome: Ongoing  11/9/2020 1256 by Jessica Molina RN  Outcome: Ongoing     Problem: Daily Care:  Goal: Daily care needs are met  Description: Daily care needs are met  11/10/2020 0051 by Nakita Alvarado LPN  Outcome: Ongoing  11/9/2020 1256 by Jessica Molina RN  Outcome: Ongoing     Problem: Pain:  Goal: Patient's pain/discomfort is manageable  Description: Patient's pain/discomfort is manageable  11/10/2020 0051 by Nakita Alvarado LPN  Outcome: Ongoing  11/9/2020 1256 by Jessica Molina RN  Outcome: Ongoing  Goal: Pain level will decrease  Description: Pain level will decrease  11/10/2020 0051 by Chad Joyner LPN  Outcome: Ongoing  11/9/2020 1256 by Flaquito Tavares RN  Outcome: Ongoing     Problem: Skin Integrity:  Goal: Skin integrity will stabilize  Description: Skin integrity will stabilize  11/10/2020 0051 by Chad Joyner LPN  Outcome: Ongoing  11/9/2020 1256 by Flaquito Tavares RN  Outcome: Ongoing     Problem: Discharge Planning:  Goal: Patients continuum of care needs are met  Description: Patients continuum of care needs are met  11/10/2020 0051 by Chad Joyner LPN  Outcome: Ongoing  11/9/2020 1256 by Flaquito Tavares RN  Outcome: Ongoing     Problem: Serum Glucose Level - Abnormal:  Goal: Ability to maintain appropriate glucose levels has stabilized  Description: Ability to maintain appropriate glucose levels has stabilized  11/10/2020 0051 by Chad Joyner LPN  Outcome: Ongoing  11/9/2020 1256 by Flaquito Tavares RN  Outcome: Ongoing     Problem: Mood - Altered:  Goal: Mood stable  Description: Mood stable  11/10/2020 0051 by Chad Joyner LPN  Outcome: Ongoing  11/9/2020 1256 by Flaquito Tavares RN  Outcome: Ongoing     Problem: Anxiety:  Goal: Level of anxiety will decrease  Description: Level of anxiety will decrease  11/10/2020 0051 by Chad Joyner LPN  Outcome: Ongoing  11/9/2020 1256 by Flaquito Tavares RN  Outcome: Ongoing

## 2020-11-10 NOTE — PROGRESS NOTES
Name: Nadeen Morrison  MRN:  [id number]  Date of service:  11/10/2020       11/10/20 0901 11/10/20 0902 11/10/20 0904   Bed Mobility   Rolling Independent  --   --    Supine to Sit Independent  --   --    Sit to Supine Independent  --   --    Transfers   Sit to Stand  --  Independent  --    Stand to sit  --  Independent  --    Ambulation   Ambulation?  --  Yes  --    WB Status  --  WBAT  --    Ambulation 1   Surface  --  level tile  --    Device  --  Rollator  --    Assistance  --  Modified Independent  --    Quality of Gait  --  good gait speed and control  --    Distance  --  750', 76'  --    Comments  --  One lap around floor and gym to OT room  --    Exercises   Comments  --   --  BLE stretching self and assisted for 10 minutes   Conditions Requiring Skilled Therapeutic Intervention   Assessment  --   --   --    Activity Tolerance   Activity Tolerance  --   --  Patient Tolerated treatment well      11/10/20 0924   Bed Mobility   Rolling  --    Supine to Sit  --    Sit to Supine  --    Transfers   Sit to Stand  --    Stand to sit  --    Ambulation   Ambulation?  --    WB Status  --    Ambulation 1   Surface  --    Device  --    Assistance  --    Quality of Gait  --    Distance  --    Comments  --    Exercises   Comments  --    Conditions Requiring Skilled Therapeutic Intervention   Assessment Pt tolerated session well. Pt perfromed self stretch and had assistive stretching from PTA to BLE and amb 750' and 75' w/ Rollator, no breaks independently.    Activity Tolerance   Activity Tolerance  --      Electronically signed by Jessie Wick PTA on 11/10/2020 at 9:41 AM
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
## 2020-11-10 NOTE — PROGRESS NOTES
[facility]  INPATIENT SPEECH THERAPY  [facility] 8 REHAB UNIT  TIME   900-1000    [x]Daily Note  []Progress Note    Date: 11/10/2020  Patient Name: Jodee Jerry        MRN: [id number]    Account #: [de-identified]  : 1945  (76 y.o.)  Gender: female   Primary Provider: Lori Ricks MD  Precautions: Fall  Swallowing Status/Diet: Regular diet with thin liquids    Subjective: Patient alert and cooperative with all therapy tasks. Patient seen bedside for treatment. Patient stating her nausea is much better today. Objective:   Patient completed short term recall task where she was to copy sentences by memory in a backwards order. Patient completing with 100% accuracy, independently. Long term memory task completed with 100% accuracy, independently. Patient created a list of 10 items in a timely manner. Patient completed in timely manner. After list was created she was to recall the items. Patient recalling 10/10 of the items independently. Spoke with patient about completing as much as she could independently when returning home. Discussed managing her own meds and then having daughters look over after she completed to   ensure no errors. Patient demonstrating understanding. Patient was made up ad apryl yesterday and is able to ambulate in her room independently. Overall improved safety awareness noted. Patient continues to show significant progress towards all goals. Patient will be discharging tomorrow. SLP and patient discussed in length home exercises to complete upon discharge. SHORT TERM GOAL #1:   Goal 1: The patient will complete simple/complex naming tasks with min verbal cues at 80% accuracy in order to improve verbal expression and thought organization for functional communication.  MET    SHORT TERM GOAL #2:  Goal 2: The patient will follow multi step commands during daily activities with min verbal cues at 100% accuracy in order to improve safety with functional ADL's and decreased assistance from caregivers. SHORT TERM GOAL #3:  Goal 3: The patient will complete attention/processing tasks with 80% accuracy with min verbal cues. MET    SHORT TERM GOAL #4:  Goal 4: The patient will complete executive function tasks (planning, organizing, self monitoring, etc) with min verbal cues at 80% accuracy in order to improve safety awareness with functional ADL's. MET    SHORT TERM GOAL #5:  Goal 5: The patient will demonstrate functional problem solving and safety awareness with min verbal cues at 80% accuracy for daily living tasks in order to increase safe interaction with environment and decreased assistance from caregivers. not met  MET  Swallowing Short Term Goals  Short-term Goals  Goal 1: The patient will tolerate regular diet with thin liquids with min/no overt s/s of aspiration. MET    ASSESSMENT:  Assessment: [x]Progressing towards goals          []Not Progressing towards goals    Patient Tolerance of Treatment:   [x]Tolerated well []Tolerated fair []Required rest breaks []Fatigued    Education:  Learner:  [x]Patient          []Significant Other          []Other  Education provided regarding:  [x]Goals and POC   [x]Diet and swallowing precautions    []Home Exercise Program  []Progress and/or discharge information  Method of Education:  [x]Discussion          [x]Demonstration          []Handout          []Other  Evaluation of Education:   [x]Verbalized understanding   []Demonstrates without assistance  [x]Demonstrates with assistance  []Needs further instruction     []No evidence of learning                  []No family present      Plan: [x]Continue with current plan of care    []Modify current plan of care as follows:    []Discharge patient    Discharge Location:    Services/Supervision Recommended:      [x]Patient continues to require treatment by a licensed therapist to address functional deficits as outlined in the established plan of care.

## 2020-11-11 VITALS
WEIGHT: 144.8 LBS | TEMPERATURE: 97 F | BODY MASS INDEX: 24.12 KG/M2 | RESPIRATION RATE: 16 BRPM | HEIGHT: 65 IN | OXYGEN SATURATION: 90 % | SYSTOLIC BLOOD PRESSURE: 127 MMHG | HEART RATE: 63 BPM | DIASTOLIC BLOOD PRESSURE: 63 MMHG

## 2020-11-11 PROCEDURE — 6370000000 HC RX 637 (ALT 250 FOR IP): Performed by: PSYCHIATRY & NEUROLOGY

## 2020-11-11 PROCEDURE — 6370000000 HC RX 637 (ALT 250 FOR IP): Performed by: FAMILY MEDICINE

## 2020-11-11 PROCEDURE — 99239 HOSP IP/OBS DSCHRG MGMT >30: CPT | Performed by: PSYCHIATRY & NEUROLOGY

## 2020-11-11 RX ORDER — GUAIFENESIN 600 MG/1
600 TABLET, EXTENDED RELEASE ORAL 2 TIMES DAILY
Qty: 60 TABLET | Refills: 0 | Status: SHIPPED | OUTPATIENT
Start: 2020-11-11 | End: 2021-06-12

## 2020-11-11 RX ORDER — MECLIZINE HYDROCHLORIDE 25 MG/1
25 TABLET ORAL 3 TIMES DAILY
Qty: 60 TABLET | Refills: 0 | Status: SHIPPED | OUTPATIENT
Start: 2020-11-11 | End: 2020-11-21

## 2020-11-11 RX ORDER — PROMETHAZINE HYDROCHLORIDE 12.5 MG/1
12.5 TABLET ORAL EVERY 6 HOURS PRN
Qty: 30 TABLET | Refills: 0 | Status: SHIPPED | OUTPATIENT
Start: 2020-11-11 | End: 2020-11-18

## 2020-11-11 RX ORDER — CYCLOBENZAPRINE HCL 10 MG
10 TABLET ORAL 3 TIMES DAILY PRN
Qty: 20 TABLET | Refills: 0 | Status: SHIPPED | OUTPATIENT
Start: 2020-11-11 | End: 2020-11-21

## 2020-11-11 RX ORDER — TRAZODONE HYDROCHLORIDE 50 MG/1
50 TABLET ORAL NIGHTLY
Qty: 30 TABLET | Refills: 5 | Status: ON HOLD | OUTPATIENT
Start: 2020-11-11 | End: 2021-06-15 | Stop reason: HOSPADM

## 2020-11-11 RX ORDER — LEVETIRACETAM 500 MG/1
500 TABLET ORAL 2 TIMES DAILY
Qty: 60 TABLET | Refills: 3 | Status: ON HOLD | OUTPATIENT
Start: 2020-11-11 | End: 2021-06-15 | Stop reason: HOSPADM

## 2020-11-11 RX ORDER — METOPROLOL SUCCINATE 50 MG/1
50 TABLET, EXTENDED RELEASE ORAL DAILY
Qty: 30 TABLET | Refills: 3 | Status: SHIPPED | OUTPATIENT
Start: 2020-11-12

## 2020-11-11 RX ORDER — PSEUDOEPHEDRINE HCL 30 MG
100 TABLET ORAL 2 TIMES DAILY
Qty: 60 CAPSULE | Refills: 0 | Status: SHIPPED | OUTPATIENT
Start: 2020-11-11 | End: 2021-06-12

## 2020-11-11 RX ORDER — LOSARTAN POTASSIUM 100 MG/1
100 TABLET ORAL DAILY
Qty: 30 TABLET | Refills: 3 | Status: SHIPPED | OUTPATIENT
Start: 2020-11-12

## 2020-11-11 RX ORDER — GUAIFENESIN 600 MG/1
600 TABLET, EXTENDED RELEASE ORAL 2 TIMES DAILY
Status: DISCONTINUED | OUTPATIENT
Start: 2020-11-11 | End: 2020-11-11 | Stop reason: HOSPADM

## 2020-11-11 RX ORDER — PANTOPRAZOLE SODIUM 40 MG/1
40 TABLET, DELAYED RELEASE ORAL
Qty: 30 TABLET | Refills: 3 | Status: SHIPPED | OUTPATIENT
Start: 2020-11-12

## 2020-11-11 RX ORDER — ATORVASTATIN CALCIUM 80 MG/1
80 TABLET, FILM COATED ORAL NIGHTLY
Qty: 30 TABLET | Refills: 0 | Status: SHIPPED | OUTPATIENT
Start: 2020-11-11

## 2020-11-11 RX ORDER — PHENAZOPYRIDINE HYDROCHLORIDE 200 MG/1
200 TABLET, FILM COATED ORAL
Qty: 60 TABLET | Refills: 0 | Status: SHIPPED | OUTPATIENT
Start: 2020-11-11 | End: 2020-11-14

## 2020-11-11 RX ADMIN — LOSARTAN POTASSIUM 100 MG: 100 TABLET, FILM COATED ORAL at 08:30

## 2020-11-11 RX ADMIN — RANOLAZINE 1000 MG: 500 TABLET, FILM COATED, EXTENDED RELEASE ORAL at 08:30

## 2020-11-11 RX ADMIN — TRAZODONE HYDROCHLORIDE 50 MG: 50 TABLET ORAL at 00:34

## 2020-11-11 RX ADMIN — LEVETIRACETAM 500 MG: 500 TABLET ORAL at 08:30

## 2020-11-11 RX ADMIN — PHENAZOPYRIDINE HYDROCHLORIDE 200 MG: 100 TABLET ORAL at 08:30

## 2020-11-11 RX ADMIN — METOPROLOL SUCCINATE 50 MG: 50 TABLET, EXTENDED RELEASE ORAL at 08:30

## 2020-11-11 RX ADMIN — MECLIZINE HYDROCHLORIDE 25 MG: 25 TABLET ORAL at 08:30

## 2020-11-11 RX ADMIN — ALPRAZOLAM 0.12 MG: 0.25 TABLET ORAL at 00:35

## 2020-11-11 RX ADMIN — ISOSORBIDE MONONITRATE 60 MG: 60 TABLET, EXTENDED RELEASE ORAL at 08:31

## 2020-11-11 RX ADMIN — PANTOPRAZOLE SODIUM 40 MG: 40 TABLET, DELAYED RELEASE ORAL at 05:41

## 2020-11-11 NOTE — PROGRESS NOTES
rhonchi, or wheezing. Heart:  Heart regular rate and rhythm  Abdomen: Auscultation: Normal bowel sounds. No bruits. Extremities: Extremities warm to touch, pink, with no edema.   Musculoskeletal:  negative  Neurologic: Without gross focal deficit    CBC with Differential:    Lab Results   Component Value Date    WBC 4.2 11/09/2020    RBC 3.60 11/09/2020    HGB 10.9 11/09/2020    HCT 33.0 11/09/2020    HCT 32.4 05/19/2011     11/09/2020     05/19/2011    MCV 91.7 11/09/2020    MCH 30.3 11/09/2020    MCHC 33.0 11/09/2020    RDW 13.3 11/09/2020    LYMPHOPCT 34.4 11/09/2020    MONOPCT 9.6 11/09/2020    EOSPCT 2.8 05/19/2011    BASOPCT 0.7 11/09/2020    MONOSABS 0.40 11/09/2020    LYMPHSABS 1.4 11/09/2020    EOSABS 0.10 11/09/2020    BASOSABS 0.00 11/09/2020     CMP:    Lab Results   Component Value Date     11/09/2020     05/19/2011    K 4.4 11/09/2020    K 4.2 05/19/2011     11/09/2020     05/19/2011    CO2 25 11/09/2020    BUN 9 11/09/2020    CREATININE 0.7 11/09/2020    CREATININE 1.0 05/19/2011    GFRAA >59 11/09/2020    LABGLOM >60 11/09/2020    GLUCOSE 99 11/09/2020    PROT 5.3 11/09/2020    PROT 7.2 03/21/2013    LABALBU 3.8 11/09/2020    LABALBU 4.1 05/19/2011    CALCIUM 8.9 11/09/2020    BILITOT <0.2 11/09/2020    ALKPHOS 59 11/09/2020    ALKPHOS 37 05/19/2011    AST 12 11/09/2020    ALT 17 11/09/2020     Last 3 Troponin:    Lab Results   Component Value Date    TROPONINI 0.01 07/20/2020    TROPONINI <0.01 05/28/2020    TROPONINI <0.01 01/10/2019     Urine Culture:  No components found for: CURINE  Blood Culture:  No components found for: CBLOOD, CFUNGUSBL  Stool Culture:  No components found for: CSTOOL    Assessment:    Principal Problem:    Subarachnoid hemorrhage following injury, no loss of consciousness ([facility] [address]  Active Problems:    Coronary artery disease of native artery of native heart with stable angina pectoris (HCC)    Osteoarthritis    GERD (gastroesophageal reflux disease)    Glucose intolerance (impaired glucose tolerance)    Depression with anxiety    CAD (coronary artery disease)    Hyperlipidemia    Hypertension    Hx of CABG    Bilateral carotid artery stenosis    Altered mental status    Subarachnoid hemorrhage (HCC)    SLE (systemic lupus erythematosus) (HCC)    Intracranial bleed (HCC)    Post-traumatic headache    Acute cystitis without hematuria  Resolved Problems:    * No resolved hospital problems. *          Plan:  Agree with plan for home. Discharge on adjusted dose of losartan as blood pressure appears improved from previous days. Also recommend discharging with clonidine to have as needed. Follow-up with Dr. Layla Panda within 1 to 2 weeks after discharge for transition of care visit.       Electronically signed by Quita Huang MD on 11/11/2020 at 8:01 AM

## 2020-11-11 NOTE — PLAN OF CARE
Problem: Falls - Risk of:  Goal: Will remain free from falls  Description: Will remain free from falls  11/11/2020 0116 by Monika Costa LPN  Outcome: Ongoing  11/10/2020 1210 by Clover Boss RN  Outcome: Ongoing  Goal: Absence of physical injury  Description: Absence of physical injury  11/11/2020 0116 by Monika Costa LPN  Outcome: Ongoing  11/10/2020 1210 by Clover Boss RN  Outcome: Ongoing     Problem: Skin Integrity:  Goal: Will show no infection signs and symptoms  Description: Will show no infection signs and symptoms  11/11/2020 0116 by Monika Costa LPN  Outcome: Ongoing  11/10/2020 1210 by Clover Boss RN  Outcome: Ongoing  Goal: Absence of new skin breakdown  Description: Absence of new skin breakdown  11/11/2020 0116 by Monika Costa LPN  Outcome: Ongoing  11/10/2020 1210 by Clover Boss RN  Outcome: Ongoing     Problem: Infection:  Goal: Will remain free from infection  Description: Will remain free from infection  11/11/2020 0116 by Monika Costa LPN  Outcome: Ongoing  11/10/2020 1210 by Clover Boss RN  Outcome: Ongoing     Problem: Safety:  Goal: Free from accidental physical injury  Description: Free from accidental physical injury  11/11/2020 0116 by Monika Costa LPN  Outcome: Ongoing  11/10/2020 1210 by Clover Boss RN  Outcome: Ongoing  Goal: Free from intentional harm  Description: Free from intentional harm  11/11/2020 0116 by Monika Costa LPN  Outcome: Ongoing  11/10/2020 1210 by Clover Boss RN  Outcome: Ongoing     Problem: Daily Care:  Goal: Daily care needs are met  Description: Daily care needs are met  11/11/2020 0116 by Monika Costa LPN  Outcome: Ongoing  11/10/2020 1210 by Clover Boss RN  Outcome: Ongoing     Problem: Pain:  Goal: Patient's pain/discomfort is manageable  Description: Patient's pain/discomfort is manageable  11/11/2020 0116 by Monika Costa LPN  Outcome: Ongoing  11/10/2020 1210 by Ludy Morgan Shelbi landeros RN  Outcome: Ongoing  Goal: Pain level will decrease  Description: Pain level will decrease  11/11/2020 0116 by Eve Khanna LPN  Outcome: Ongoing  11/10/2020 1210 by Waylon Jain RN  Outcome: Ongoing     Problem: Skin Integrity:  Goal: Skin integrity will stabilize  Description: Skin integrity will stabilize  11/11/2020 0116 by Eve Khanna LPN  Outcome: Ongoing  11/10/2020 1210 by Waylon Jain RN  Outcome: Ongoing     Problem: Discharge Planning:  Goal: Patients continuum of care needs are met  Description: Patients continuum of care needs are met  11/11/2020 0116 by Eve Khanna LPN  Outcome: Ongoing  11/10/2020 1210 by Waylon Jain RN  Outcome: Ongoing     Problem: Serum Glucose Level - Abnormal:  Goal: Ability to maintain appropriate glucose levels has stabilized  Description: Ability to maintain appropriate glucose levels has stabilized  11/11/2020 0116 by Eve Khanna LPN  Outcome: Ongoing  11/10/2020 1210 by Waylon Jain RN  Outcome: Ongoing     Problem: Mood - Altered:  Goal: Mood stable  Description: Mood stable  11/11/2020 0116 by Eve Khanna LPN  Outcome: Ongoing  11/10/2020 1210 by Waylon Jain RN  Outcome: Ongoing     Problem: Anxiety:  Goal: Level of anxiety will decrease  Description: Level of anxiety will decrease  11/11/2020 0116 by Eve Khanna LPN  Outcome: Ongoing  11/10/2020 1210 by Waylon Jain RN  Outcome: Ongoing

## 2020-11-11 NOTE — DISCHARGE SUMMARY
Neurology Discharge Summary     Patient Identification:  Zoe Almeida is a 76 y.o. female. :  1945  Admit Date:  10/27/2020  Discharge date : 20   Attending Provider: Lorie Myers MD     Account Number: [de-identified]                                   Admission Diagnoses:   Intracranial bleed [facility]) [I62.9]    Discharge Diagnoses:  Principal Problem:    Subarachnoid hemorrhage following injury, no loss of consciousness ([facility] [address]  Active Problems:    Coronary artery disease of native artery of native heart with stable angina pectoris (HCC)    Osteoarthritis    GERD (gastroesophageal reflux disease)    Glucose intolerance (impaired glucose tolerance)    Depression with anxiety    CAD (coronary artery disease)    Hyperlipidemia    Hypertension    Hx of CABG    Bilateral carotid artery stenosis    Altered mental status    Subarachnoid hemorrhage (HCC)    SLE (systemic lupus erythematosus) (HCC)    Intracranial bleed (HCC)    Post-traumatic headache    Acute cystitis without hematuria  Resolved Problems:    * No resolved hospital problems.  *      Discharge Medications:    Current Discharge Medication List           Details   levETIRAcetam (KEPPRA) 500 MG tablet Take 1 tablet by mouth 2 times daily  Qty: 60 tablet, Refills: 3      meclizine (ANTIVERT) 25 MG tablet Take 1 tablet by mouth 3 times daily for 10 days  Qty: 60 tablet, Refills: 0      losartan (COZAAR) 100 MG tablet Take 1 tablet by mouth daily  Qty: 30 tablet, Refills: 3      guaiFENesin (MUCINEX) 600 MG extended release tablet Take 1 tablet by mouth 2 times daily  Qty: 60 tablet, Refills: 0      phenazopyridine (PYRIDIUM) 200 MG tablet Take 1 tablet by mouth 3 times daily (with meals) for 3 days  Qty: 60 tablet, Refills: 0      cyclobenzaprine (FLEXERIL) 10 MG tablet Take 1 tablet by mouth 3 times daily as needed for Muscle spasms  Qty: 20 tablet, Refills: 0      pantoprazole (PROTONIX) 40 MG tablet Take 1 tablet by mouth every morning (before breakfast)  Qty: 30 tablet, Refills: 3              Details   traZODone (DESYREL) 50 MG tablet Take 1 tablet by mouth nightly  Qty: 30 tablet, Refills: 5      promethazine (PHENERGAN) 12.5 MG tablet Take 1 tablet by mouth every 6 hours as needed for Nausea  Qty: 30 tablet, Refills: 0      atorvastatin (LIPITOR) 80 MG tablet Take 1 tablet by mouth nightly  Qty: 30 tablet, Refills: 0    Comments: Patient must have labs before any further refills  Associated Diagnoses: Hyperlipidemia, unspecified hyperlipidemia type      metoprolol succinate (TOPROL XL) 50 MG extended release tablet Take 1 tablet by mouth daily  Qty: 30 tablet, Refills: 3      docusate (COLACE, DULCOLAX) 100 MG CAPS Take 100 mg by mouth 2 times daily  Qty: 60 capsule, Refills: 0              Details   zolpidem (AMBIEN) 10 MG tablet Take 10 mg by mouth nightly as needed for Sleep. Half a tablet      isosorbide mononitrate (IMDUR) 60 MG extended release tablet TAKE 1 TABLET TWICE A DAY  Qty: 180 tablet, Refills: 3    Associated Diagnoses: Coronary artery disease involving native coronary artery of native heart with angina pectoris (HCC)      ranolazine (RANEXA) 1000 MG extended release tablet TAKE 1 TABLET TWICE A DAY  Qty: 180 tablet, Refills: 3      metFORMIN (GLUCOPHAGE) 500 MG tablet Take 1 tablet by mouth 2 times daily Please hold Metformin for 48 hours after cardiac catheterization. May resume on 4/15/20  Qty: 180 tablet, Refills: 3      nitroGLYCERIN (NITROSTAT) 0.4 MG SL tablet up to max of 3 total doses. If no relief after 1 dose, call 911. Qty: 30 tablet, Refills: 3      Cyanocobalamin (VITAMIN B 12 PO) Take 1,000 mcg by mouth daily      ALPRAZolam (XANAX) 0.25 MG tablet Take 0.125 mg by mouth daily as needed for Anxiety (takes 1/2 tab). albuterol (PROVENTIL HFA;VENTOLIN HFA) 108 (90 BASE) MCG/ACT inhaler Inhale 2 puffs into the lungs every 6 hours as needed.            Current Discharge Medication List      STOP taking these medications amitriptyline (ELAVIL) 25 MG tablet Comments:   Reason for Stopping:         lisinopril (PRINIVIL;ZESTRIL) 20 MG tablet Comments:   Reason for Stopping:         clopidogrel (PLAVIX) 75 MG tablet Comments:   Reason for Stopping:         aspirin 81 MG tablet Comments:   Reason for Stopping:                 Consults:   [facility] Course: The patient did well during her stay in the rehab unit. She continued to have some dizziness which was felt to be due to a vestibular imbalance due to either the inner ear or her head and neck injury from her accident. It responded well to scheduled meclizine. Blood pressure medications were adjusted by PCP. She remained off of aspirin/Plavix due to her traumatic subarachnoid hemorrhage. These may be resumed in the future when okay with neurosurgery. She has a follow-up visit with them and her PCP in the near future. Medical complications-none.   Disposition upon discharge-improved        Discharge Instructions     Patient Instructions:   Home  Therapy orders: none  Discharge lab work: none  Code status: DNR-CC   Activity: activity as tolerated  Diet: Dietary Nutrition Supplements: Standard High Calorie Oral Supplement  DIET GENERAL;    Wound Care: as directed  Equipment: as per therapy      Princess Mercedes MD    At least 35 minutes were spent in discharging the patient

## 2020-11-11 NOTE — DISCHARGE SUMMARY
Wheelchair follow  Assistance: Modified Independent  Quality of Gait: good gait speed and control  Gait Deviations: Slow Carm[place], Deviated path  Distance: 150' + 150\"  Comments: One lap around floor and gym to OT room  AMBULATION 2  Ambulation 2  Surface - 2: level tile  Device 2: Rollator  Assistance 2: Modified Independent  Quality of Gait 2: Same as above  Distance: 850'  Comments: One lap entire joshi and half lap from gym to OT room  STAIRS       GOALS:  Short term goals  Time Frame for Short term goals: 1-2 weeks  Short term goal 1: Pt will perform all on bed mobility with supervision  Short term goal 2: Pt will perform sit to stand and stand to sit transfer with RW and supervision  Short term goal 3: Pt will propel W/C 76' with supervision  Short term goal 4: Pt will ambulate 76' with RW and SBA  Short term goal 5: Pt will navigate one step with CGA    Long term goals  Time Frame for Long term goals : 2-3 weeks  Long term goal 1: Pt will perform all on bed mobility independently  Long term goal 2: Pt will perform sit to stand and stand to sit transfer with RW independently  Long term goal 3: Pt will propel W/C 150' windependently  Long term goal 4: Pt will ambulate 150' with RW independently  Long term goal 5: Pt will navigate one step with supervision  HOME LIVING:     Type of Home: House  Home Layout: One level(some steps in house)  Home Access: Ramped entrance        ASSESSMENT (IMPAIRMENTS/BARRIERS): Body structures, Functions, Activity limitations: Decreased functional mobility , Decreased ADL status, Decreased ROM, Decreased strength, Decreased cognition, Decreased endurance, Decreased balance  Assessment: Patient made good effort despite her headache.   Activity Tolerance: Patient Tolerated treatment well     PLAN:  Plan  Times per week: 5-6  Times per day: Twice a day  Plan weeks: 2-3  Current Treatment Recommendations: Strengthening, ROM, Balance Training, Functional Mobility Training, Transfer Training, ADL/Self-care Training, Cognitive/Perceptual Training, Endurance Training, Gait Training, Stair training, Wheelchair Mobility Training, Safety Education & Training  Plan Comment: cont PT per POC. Discharge Recommendations: Home with Home health PT  PATIENT REQUIRES AND IS REASONABLY EXPECTED TO ACTIVELY PARTICIPATE IN AT LEAST 3 HOURS OF INTENSIVE THERAPY PER DAY AT LEAST 5 DAYS PER WEEK, AND BE EXPECTED TO MAKE MEASURABLE IMPROVEMENT THAT WILL BE OF PRACTICAL VALUE TO IMPROVE THE PATIENT'S FUNCTIONAL CAPACITY OR ADAPTATION TO IMPAIRMENTS.    PATIENT GOAL FOR REHAB:  RETURN TO PRIOR LEVEL OF FUNCTION       IRF/SULEMA  Roll Left and Right  Assistance Needed: Independent  CARE Score: 6  Discharge Goal: Independent    Sit to Lying  Assistance Needed: Independent  CARE Score: 6  Discharge Goal: Independent    Lying to Sitting on Side of Bed  Assistance Needed: Independent  CARE Score: 6  Discharge Goal: Independent    Sit to Stand  Assistance Needed: Independent  CARE Score: 6  Discharge Goal: Independent    Chair/Bed-to-Chair Transfer  Assistance Needed: Independent  CARE Score: 6  Discharge Goal: Independent    Car Transfer  Assistance Needed: Supervision or touching assistance  Reason if not Attempted: Not attempted due to medical condition or safety concerns  CARE Score: 4  Discharge Goal: Independent    Walk 10 Feet  Assistance Needed: Independent  Reason if not Attempted: Not attempted due to medical condition or safety concerns  CARE Score: 6  Discharge Goal: Independent    Walk 50 Feet with Two Turns  Assistance Needed: Independent  Reason if not Attempted: Not attempted due to medical condition or safety concerns  CARE Score: 6  Discharge Goal: Independent    Walk 150 Feet  Assistance Needed: Independent  Reason if not Attempted: Not attempted due to medical condition or safety concerns  CARE Score: 6  Discharge Goal: Independent    Walking 10 Feet on Uneven Surfaces  Assistance Needed: Supervision or touching assistance  Reason if not Attempted: Not attempted due to medical condition or safety concerns  CARE Score: 4  Discharge Goal: Not Attempted    1 Step (Curb)  Assistance Needed: Independent  Reason if not Attempted: Not attempted due to medical condition or safety concerns  CARE Score: 6  Discharge Goal: Not Attempted    4 Steps  Assistance Needed: Independent  Reason if not Attempted: Not attempted due to medical condition or safety concerns  CARE Score: 6  Discharge Goal: Not Attempted    12 Steps  Assistance Needed: Independent  Reason if not Attempted: Not attempted due to medical condition or safety concerns  CARE Score: 6  Discharge Goal: Independent    Wheel 50 Feet with Two Turns  Assistance Needed: Independent  Reason if not Attempted: Not attempted due to medical condition or safety concerns  CARE Score: 6  Discharge Goal: Independent    Wheel 150 Feet  Assistance Needed: Independent  Reason if not Attempted: Not attempted due to medical condition or safety concerns  CARE Score: 6  Discharge Goal: Independent      LAST TREATMENT TIME  PT Individual Minutes  Time In: 6280  Time Out: 1410  Minutes: 25

## 2020-11-11 NOTE — DISCHARGE SUMMARY
Occupational Therapy Discharge Summary         Date: 2020  Patient Name: Mikal Aguilar        MRN: [id number]    : 1945  (76 y.o.)  Gender: female      Diagnosis: TBI, subarachnoid hemorrhage R frontal area  Restrictions/Precautions  Restrictions/Precautions: Up Ad [place]  Required Braces or Orthoses?: No      Discharge Date: 2020    UE Functioning:  BUE AROM WFL     Home Management:  Functional Mobility  Functional - Mobility Device: 4-Wheeled Walker  Activity: Other  Assist Level: Modified independent   Functional Mobility Comments: Light homemaking tasks in kitchen. Adaptive Equipment/DME Status:  Bathroom Equipment: Grab bars in shower, Shower chair, 3-in-1 commode(grab bars are suctioned to wall)  Home Equipment: Rolling walker, 4 wheeled walker      Pain Assessment:  Pain Level: 7  Pain Location: Head    Remaining Problems:  Decreased functional mobility ; Decreased high-level IADLs    STGs:  Short term goals  Time Frame for Short term goals: 1 week  Short term goal 1: Supervision with toilet hygiene. Short term goal 2: Supervision with showering/bathing. Short term goal 3: Supervision with UB dressing. Short term goal 4: Supervision with LB dressing. Short term goal 5: Supervision with putting on/taking off footwear. Short term goal 6: Supervision with toilet transfers. Short term goal 7: Min A with picking up objects from floor with AE as needed. MET 7/7 Short Term Goals    LTGs:  Long term goals  Time Frame for Long term goals : 2 weeks  Long term goal 1: Modified Independent with toilet hygiene. Long term goal 2: Modified Independent with showering/bathing. Long term goal 3: Independent with UB dressing. Long term goal 4: Modified Independent with LB dressing. Long term goal 5: Modified Independent with putting on/taking off footwear. Long term goals 6: Modified Independent with toilet transfers.   Long term goal 8: Modified Independent with picking up objects from floor with AE as needed. Long term goal 9: Verbalize DME needs. Long term goal 10: Complete IADL/homemaking task Modified Butts.   MET 10/10 Long Term Goals    Discharge Setting and Recommendations:  Home independently with HEP    Discharge Care Scores  Eating: CARE Score: 6  Oral Hygiene: CARE Score: 6  Toileting: CARE Score: 6  Shower/Bathe: CARE Score: 6  Upper Body Dressing: CARE Score: 6  Lower Body Dressing: CARE Score: 6  Footwear: CARE Score: 6  Toilet Transfers: CARE Score: 6  Picking Up Object:  CARE Score: 6    Electronically signed by Kylee Carreon OT on 11/11/20 at 12:49 PM CST

## 2020-11-11 NOTE — CARE COORDINATION
Ms. Trish Cox is going home today with excellent family support. Home exercise programs and supplemental information given to assist in continued progress.

## 2020-12-01 NOTE — DISCHARGE SUMMARY
[address]                Discharge Summary      Date: 2020  Patient Name: Henry Pardo        MRN: [id number]    Account #: [de-identified]  : 1945  (76 y.o.)  Gender: female       PATIENT DIAGNOSIS(ES):    Diagnosis: TBI, subarachnoid hemorrhage R frontal area  Additional Pertinent Hx: Asthma, CAD, CP, depression, anxiety, DM, GERD, hyperlipidemia, HTN, lupus, malaise and fatigue, movement disorder, seizure, OA    History of present illness: This 76 y.o. female  with history of Lupus,DM diet controlled, GERD, CAD s/p CABG x 3 and stenting, HTN and hyperlipidemia. Patient had had some weakness since July and had been using a rollator walker and her daughters had been alternating staying with her. She presented to [facility] ER on 10/24/20 after having a fall. Her daughter was pushing her while she was sitting on her rollator and she fell backwards, hitting her head on the asphalt. She did not lose consciousness but did have immediate onset of headache that has persisted. CT done revealed a small right frontal area of traumatic subarachnoid hemorrhage. Her B/P was elevated with systolic in [id number] range. She was admitted to her PCP Dr. Turner Sung with consult for neurosurgery. She was seen by neurosurgeon Dr. Nancy Crews and not felt to need any surgical intervention. Repeat CTs have been stable. She was evaluated by SPT for swallow and was initially placed on bite size consistency diet with nectar thick liquids but has now been upgraded to regular with thin liquids with medicines whole in pudding or applesauce. Patient also found to have difficulty with word finding and short-term memory. Patient is also participating with both PT/OT. She is felt to need a stay on Rehab for continued therapy to work towards her goal of returning home with assist of her daughters. Some LOVE yesterday, associated with elevated BP.  Resolved with clonidine. (per neurology note)      SHORT TERM GOAL #1:  Goal 1: The patient will complete simple/complex naming tasks with min verbal cues at 80% accuracy in order to improve verbal expression and thought organization for functional communication. MET      SHORT TERM GOAL #2:  Goal 2: The patient will follow multi step commands during daily activities with min verbal cues at 100% accuracy in order to improve safety with functional ADL's and decreased assistance from caregivers. MET      SHORT TERM GOAL #3:  Goal 3: The patient will complete attention/processing tasks with 80% accuracy with min verbal cues. MET      SHORT TERM GOAL #4:  Goal 4: The patient will complete executive function tasks (planning, organizing, self monitoring, etc) with min verbal cues at 80% accuracy in order to improve safety awareness with functional ADL's. MET    SHORT TERM GOAL #5:  Goal 5: The patient will demonstrate functional problem solving and safety awareness with min verbal cues at 80% accuracy for daily living tasks in order to increase safe interaction with environment and decreased assistance from caregivers. not met MET          Swallowing Short Term Goals  Short-term Goals  Goal 1: The patient will tolerate regular diet with thin liquids with min/no overt s/s of aspiration. MET    Long-term Goals  Goal 1: The patient will develop functional, cognitive-linguistic based skills and utilize compensatory strategies to communicate wants and needs effectively to different conversational partners, maintain safety and participate socially in functional living environment. Goals Met: 6  STG's     1  LTG's      Plan:  Discharge patient             Discharge Location: Home with family support             Further Speech treatment/recommendations: Patient not recommended to receive speech services at this time. Patient has met all goals.

## 2020-12-05 ENCOUNTER — OFFICE VISIT (OUTPATIENT)
Age: 75
End: 2020-12-05

## 2020-12-05 VITALS — TEMPERATURE: 97.7 F | HEART RATE: 62 BPM | OXYGEN SATURATION: 95 %

## 2020-12-08 LAB — SARS-COV-2, NAA: NOT DETECTED

## 2021-01-05 ENCOUNTER — HOSPITAL ENCOUNTER (OUTPATIENT)
Dept: PAIN MANAGEMENT | Age: 76
Discharge: HOME | End: 2021-01-05
Payer: MEDICARE

## 2021-01-05 VITALS
OXYGEN SATURATION: 95 % | SYSTOLIC BLOOD PRESSURE: 161 MMHG | TEMPERATURE: 96.9 F | DIASTOLIC BLOOD PRESSURE: 74 MMHG | HEART RATE: 70 BPM | RESPIRATION RATE: 18 BRPM

## 2021-01-05 DIAGNOSIS — M51.36 LUMBAR DEGENERATIVE DISC DISEASE: ICD-10-CM

## 2021-01-05 PROCEDURE — 2580000003 HC RX 258

## 2021-01-05 PROCEDURE — 6360000002 HC RX W HCPCS

## 2021-01-05 PROCEDURE — 3209999900 FLUORO FOR SURGICAL PROCEDURES

## 2021-01-05 PROCEDURE — 62323 NJX INTERLAMINAR LMBR/SAC: CPT

## 2021-01-05 PROCEDURE — 2500000003 HC RX 250 WO HCPCS

## 2021-01-05 RX ORDER — SODIUM CHLORIDE 9 MG/ML
INJECTION INTRAVENOUS
Status: COMPLETED | OUTPATIENT
Start: 2021-01-05 | End: 2021-01-05

## 2021-01-05 RX ORDER — LIDOCAINE HYDROCHLORIDE 10 MG/ML
INJECTION, SOLUTION EPIDURAL; INFILTRATION; INTRACAUDAL; PERINEURAL
Status: COMPLETED | OUTPATIENT
Start: 2021-01-05 | End: 2021-01-05

## 2021-01-05 RX ORDER — METHYLPREDNISOLONE ACETATE 80 MG/ML
INJECTION, SUSPENSION INTRA-ARTICULAR; INTRALESIONAL; INTRAMUSCULAR; SOFT TISSUE
Status: COMPLETED | OUTPATIENT
Start: 2021-01-05 | End: 2021-01-05

## 2021-01-05 RX ADMIN — SODIUM CHLORIDE 5 ML: 9 INJECTION INTRAVENOUS at 10:01

## 2021-01-05 RX ADMIN — METHYLPREDNISOLONE ACETATE 80 MG: 80 INJECTION, SUSPENSION INTRA-ARTICULAR; INTRALESIONAL; INTRAMUSCULAR; SOFT TISSUE at 10:02

## 2021-01-05 RX ADMIN — LIDOCAINE HYDROCHLORIDE 5 ML: 10 INJECTION, SOLUTION EPIDURAL; INFILTRATION; INTRACAUDAL; PERINEURAL at 10:01

## 2021-01-05 ASSESSMENT — PAIN - FUNCTIONAL ASSESSMENT: PAIN_FUNCTIONAL_ASSESSMENT: PREVENTS OR INTERFERES WITH MANY ACTIVE NOT PASSIVE ACTIVITIES

## 2021-01-05 NOTE — PROGRESS NOTES

## 2021-01-05 NOTE — INTERVAL H&P NOTE
Update History & Physical    The patient's History and Physical  was reviewed with the patient and I examined the patient. There was NO CHANGE:[id number]}. The surgical site was confirmed by the patient and me. Plan: The risks, benefits, expected outcome, and alternative to the recommended procedure have been discussed with the patient. Patient understands and wants to proceed with the procedure.      Electronically signed by Heraclio Hillman MD on 1/5/2021 at 10:32 AM

## 2021-01-25 LAB
ALBUMIN SERPL-MCNC: 4.6 G/DL (ref 3.5–5.2)
ALP BLD-CCNC: 82 U/L (ref 35–104)
ALT SERPL-CCNC: 13 U/L (ref 5–33)
ANION GAP SERPL CALCULATED.3IONS-SCNC: 11 MMOL/L (ref 7–19)
AST SERPL-CCNC: 13 U/L (ref 5–32)
BASOPHILS ABSOLUTE: 0 K/UL (ref 0–0.2)
BASOPHILS RELATIVE PERCENT: 0.6 % (ref 0–1)
BILIRUB SERPL-MCNC: 0.5 MG/DL (ref 0.2–1.2)
BUN BLDV-MCNC: 23 MG/DL (ref 8–23)
CALCIUM SERPL-MCNC: 9.4 MG/DL (ref 8.8–10.2)
CHLORIDE BLD-SCNC: 105 MMOL/L (ref 98–111)
CHOLESTEROL, TOTAL: 196 MG/DL (ref 160–199)
CO2: 26 MMOL/L (ref 22–29)
CREAT SERPL-MCNC: 0.8 MG/DL (ref 0.5–0.9)
CREATININE URINE: 44.7 MG/DL (ref 4.2–622)
EOSINOPHILS ABSOLUTE: 0.1 K/UL (ref 0–0.6)
EOSINOPHILS RELATIVE PERCENT: 1.6 % (ref 0–5)
GFR AFRICAN AMERICAN: >59
GFR NON-AFRICAN AMERICAN: >60
GLUCOSE BLD-MCNC: 108 MG/DL (ref 74–109)
HBA1C MFR BLD: 6.1 % (ref 4–6)
HCT VFR BLD CALC: 35.5 % (ref 37–47)
HDLC SERPL-MCNC: 75 MG/DL (ref 65–121)
HEMOGLOBIN: 12 G/DL (ref 12–16)
IMMATURE GRANULOCYTES #: 0 K/UL
LDL CHOLESTEROL CALCULATED: 76 MG/DL
LYMPHOCYTES ABSOLUTE: 1.3 K/UL (ref 1.1–4.5)
LYMPHOCYTES RELATIVE PERCENT: 26.8 % (ref 20–40)
MCH RBC QN AUTO: 31.3 PG (ref 27–31)
MCHC RBC AUTO-ENTMCNC: 33.8 G/DL (ref 33–37)
MCV RBC AUTO: 92.4 FL (ref 81–99)
MICROALBUMIN UR-MCNC: <1.2 MG/DL (ref 0–19)
MICROALBUMIN/CREAT UR-RTO: NORMAL MG/G
MONOCYTES ABSOLUTE: 0.5 K/UL (ref 0–0.9)
MONOCYTES RELATIVE PERCENT: 9.6 % (ref 0–10)
NEUTROPHILS ABSOLUTE: 3 K/UL (ref 1.5–7.5)
NEUTROPHILS RELATIVE PERCENT: 61 % (ref 50–65)
PDW BLD-RTO: 13.4 % (ref 11.5–14.5)
PLATELET # BLD: 144 K/UL (ref 130–400)
PMV BLD AUTO: 8.8 FL (ref 9.4–12.3)
POTASSIUM SERPL-SCNC: 4.4 MMOL/L (ref 3.5–5)
RBC # BLD: 3.84 M/UL (ref 4.2–5.4)
SODIUM BLD-SCNC: 142 MMOL/L (ref 136–145)
TOTAL PROTEIN: 6.6 G/DL (ref 6.6–8.7)
TRIGL SERPL-MCNC: 227 MG/DL (ref 0–149)
WBC # BLD: 4.9 K/UL (ref 4.8–10.8)

## 2021-03-29 ENCOUNTER — TELEPHONE (OUTPATIENT)
Dept: CARDIOLOGY CLINIC | Age: 76
End: 2021-03-29

## 2021-03-29 NOTE — TELEPHONE ENCOUNTER
Maverick Franklin called to schedule a ov for 1 yr follow up. Answered yes to cough, could not schedule    Please be advised that the best time to call her to accommodate their needs is Anytime. Thank you.

## 2021-03-30 ENCOUNTER — HOSPITAL ENCOUNTER (OUTPATIENT)
Dept: PAIN MANAGEMENT | Age: 76
Discharge: HOME | End: 2021-03-30
Payer: MEDICARE

## 2021-03-30 VITALS
TEMPERATURE: 97.6 F | SYSTOLIC BLOOD PRESSURE: 170 MMHG | DIASTOLIC BLOOD PRESSURE: 72 MMHG | RESPIRATION RATE: 16 BRPM | HEART RATE: 67 BPM | OXYGEN SATURATION: 97 %

## 2021-03-30 DIAGNOSIS — R52 PAIN MANAGEMENT: ICD-10-CM

## 2021-03-30 PROCEDURE — 3209999900 FLUORO FOR SURGICAL PROCEDURES

## 2021-03-30 PROCEDURE — 62323 NJX INTERLAMINAR LMBR/SAC: CPT

## 2021-03-30 RX ORDER — DULOXETIN HYDROCHLORIDE 60 MG/1
60 CAPSULE, DELAYED RELEASE ORAL EVERY EVENING
COMMUNITY

## 2021-03-30 RX ORDER — METHYLPREDNISOLONE ACETATE 80 MG/ML
INJECTION, SUSPENSION INTRA-ARTICULAR; INTRALESIONAL; INTRAMUSCULAR; SOFT TISSUE
Status: COMPLETED | OUTPATIENT
Start: 2021-03-30 | End: 2021-03-30

## 2021-03-30 RX ORDER — TIZANIDINE 4 MG/1
4 TABLET ORAL 2 TIMES DAILY
COMMUNITY

## 2021-03-30 RX ORDER — LIDOCAINE HYDROCHLORIDE 10 MG/ML
INJECTION, SOLUTION EPIDURAL; INFILTRATION; INTRACAUDAL; PERINEURAL
Status: COMPLETED | OUTPATIENT
Start: 2021-03-30 | End: 2021-03-30

## 2021-03-30 RX ORDER — SODIUM CHLORIDE 9 MG/ML
INJECTION INTRAVENOUS
Status: COMPLETED | OUTPATIENT
Start: 2021-03-30 | End: 2021-03-30

## 2021-03-30 RX ADMIN — SODIUM CHLORIDE 5 ML: 9 INJECTION INTRAVENOUS at 09:43

## 2021-03-30 RX ADMIN — LIDOCAINE HYDROCHLORIDE 5 ML: 10 INJECTION, SOLUTION EPIDURAL; INFILTRATION; INTRACAUDAL; PERINEURAL at 09:43

## 2021-03-30 RX ADMIN — METHYLPREDNISOLONE ACETATE 80 MG: 80 INJECTION, SUSPENSION INTRA-ARTICULAR; INTRALESIONAL; INTRAMUSCULAR; SOFT TISSUE at 09:44

## 2021-03-30 ASSESSMENT — PAIN DESCRIPTION - DESCRIPTORS: DESCRIPTORS: DISCOMFORT;CONSTANT

## 2021-03-30 ASSESSMENT — PAIN SCALES - GENERAL: PAINLEVEL_OUTOF10: 9

## 2021-03-30 ASSESSMENT — PAIN - FUNCTIONAL ASSESSMENT: PAIN_FUNCTIONAL_ASSESSMENT: PREVENTS OR INTERFERES SOME ACTIVE ACTIVITIES AND ADLS

## 2021-03-30 ASSESSMENT — PAIN DESCRIPTION - FREQUENCY: FREQUENCY: CONTINUOUS

## 2021-03-30 ASSESSMENT — PAIN DESCRIPTION - ORIENTATION: ORIENTATION: LOWER

## 2021-03-30 ASSESSMENT — PAIN DESCRIPTION - PAIN TYPE: TYPE: CHRONIC PAIN

## 2021-03-30 NOTE — INTERVAL H&P NOTE
Update History & Physical    The patient's History and Physical  was reviewed with the patient and I examined the patient. There was NO CHANGE:[id number]}. The surgical site was confirmed by the patient and me. Plan: The risks, benefits, expected outcome, and alternative to the recommended procedure have been discussed with the patient. Patient understands and wants to proceed with the procedure.      Electronically signed by Lennie Cortez MD on 3/30/2021 at 9:38 AM

## 2021-04-13 ENCOUNTER — OFFICE VISIT (OUTPATIENT)
Dept: CARDIOLOGY CLINIC | Age: 76
End: 2021-04-13
Payer: MEDICARE

## 2021-04-13 VITALS
HEART RATE: 65 BPM | WEIGHT: 159 LBS | SYSTOLIC BLOOD PRESSURE: 122 MMHG | HEIGHT: 66 IN | DIASTOLIC BLOOD PRESSURE: 60 MMHG | BODY MASS INDEX: 25.55 KG/M2

## 2021-04-13 DIAGNOSIS — I25.118 CORONARY ARTERY DISEASE OF NATIVE ARTERY OF NATIVE HEART WITH STABLE ANGINA PECTORIS (HCC): Primary | ICD-10-CM

## 2021-04-13 DIAGNOSIS — E78.2 MIXED HYPERLIPIDEMIA: ICD-10-CM

## 2021-04-13 DIAGNOSIS — I10 ESSENTIAL HYPERTENSION: ICD-10-CM

## 2021-04-13 PROCEDURE — 3017F COLORECTAL CA SCREEN DOC REV: CPT | Performed by: CLINICAL NURSE SPECIALIST

## 2021-04-13 PROCEDURE — 1036F TOBACCO NON-USER: CPT | Performed by: CLINICAL NURSE SPECIALIST

## 2021-04-13 PROCEDURE — 99214 OFFICE O/P EST MOD 30 MIN: CPT | Performed by: CLINICAL NURSE SPECIALIST

## 2021-04-13 PROCEDURE — 1123F ACP DISCUSS/DSCN MKR DOCD: CPT | Performed by: CLINICAL NURSE SPECIALIST

## 2021-04-13 PROCEDURE — 4040F PNEUMOC VAC/ADMIN/RCVD: CPT | Performed by: CLINICAL NURSE SPECIALIST

## 2021-04-13 PROCEDURE — G8427 DOCREV CUR MEDS BY ELIG CLIN: HCPCS | Performed by: CLINICAL NURSE SPECIALIST

## 2021-04-13 PROCEDURE — G8399 PT W/DXA RESULTS DOCUMENT: HCPCS | Performed by: CLINICAL NURSE SPECIALIST

## 2021-04-13 PROCEDURE — 1090F PRES/ABSN URINE INCON ASSESS: CPT | Performed by: CLINICAL NURSE SPECIALIST

## 2021-04-13 PROCEDURE — G8417 CALC BMI ABV UP PARAM F/U: HCPCS | Performed by: CLINICAL NURSE SPECIALIST

## 2021-04-13 RX ORDER — AMITRIPTYLINE HYDROCHLORIDE 50 MG/1
50 TABLET, FILM COATED ORAL NIGHTLY
COMMUNITY
Start: 2021-03-31

## 2021-04-13 RX ORDER — PROMETHAZINE HYDROCHLORIDE 12.5 MG/1
12.5 TABLET ORAL EVERY 6 HOURS PRN
COMMUNITY
End: 2021-10-04 | Stop reason: SDUPTHER

## 2021-04-13 RX ORDER — ALPRAZOLAM 0.25 MG/1
0.25 TABLET ORAL NIGHTLY
COMMUNITY
End: 2022-04-04

## 2021-04-13 RX ORDER — DIPHENHYDRAMINE HCL 25 MG
25 TABLET ORAL EVERY 6 HOURS PRN
COMMUNITY

## 2021-04-13 RX ORDER — AMLODIPINE BESYLATE 2.5 MG/1
2.5 TABLET ORAL DAILY
COMMUNITY
Start: 2021-03-31 | End: 2021-06-12

## 2021-04-13 RX ORDER — TRAMADOL HYDROCHLORIDE 50 MG/1
50 TABLET ORAL 3 TIMES DAILY PRN
Status: ON HOLD | COMMUNITY
End: 2021-06-15 | Stop reason: HOSPADM

## 2021-04-13 NOTE — PROGRESS NOTES
[address] Candace Martinez [address], [address]  Phone: [phone]  Fax: [phone]    OFFICE VISIT:  4/13/2021    [address] [facility] [address]: 1945    Reason For Visit:  Checo Kern is a 76 y.o. female who is here for 1 Year Follow Up (patient has edema ) and Coronary Artery Disease  Coronary disease with CABG x3 in 2013.  Underwent heart catheterization January 2019 that showed    Patent LIMA-LAD, patent VG-diag, patent VG-OM, patent RCA, normal LVFX  She establish care with Dr. Faye Juárez last month. Increasing with exertional angina  Elective heart catheterization 4/13/2020  Left main and triple vessel disease.   Patent LIMA to distal LAD.   Patent SVG to 1st diagonal.   Patent SVG to 1st obtuse marginal (small vessel)   Left main lesion feeds large 2nd obtuse marginal.   Normal LV ejection fraction.   Successful PCI to distal left main(3.5 x 12 mm resolute integrity stent)   into the ostial circumflex utilizing drug-eluting stent.   Successful PCI to proximal circumflex (3.0 x 15 mm resolute integrity)   utilizing drug-eluting stent.   Successful PCI with PTCA of ostial LAD. Recommendations if recurrent symptoms can consider ischemic evaluation of RCA    Patient was seen in follow-up last May. Ongoing symptoms. Discussed options of repeat cath to reassess RCA    Patient was seen last May, June and July for back pain and altered mental status. She is now following with pain management  Carotid disease followed by vascular surgery. Last study showed less than 50% bilaterally    Patient was admitted to the hospital in October for subarachnoid hemorrhage after falling off walker and hitting her head. Was stabilized and no surgical intervention required. She did go to inpatient rehab  At that time her aspirin and Plavix were discontinued. She returns today in follow-up. She is accompanied with her daughter who is a nurse. She states she is living with her other daughter and granddaughter.   She is not been driving    Gets around with a walker but has to go very far she does use a wheelchair. She has no significant change in her exertional angina. She reports that she is used nitroglycerin occasionally but is less than she used to. Has significant back pain. That is what limits her. She is seeing pain management      Subjective  Мария Mathur denies resting shortness of breath, orthopnea, paroxysmal nocturnal dyspnea, syncope, presyncope, arrhythmia,  Occasionally has some mild ankle edema. The patient denies numbness or weakness to suggest cerebrovascular accident or transient ischemic attack. Sumi Dumas MD is PCP and follows labs.   Frandy Singletary has the following [facility]:    Patient Active Problem List    Diagnosis Date Noted    Unstable angina [facility])      Priority: High    Chest discomfort 01/10/2019     Priority: High    Coronary artery disease of native artery of native heart with stable angina pectoris ([address]      Priority: High    Acute cystitis without hematuria 10/28/2020    Intracranial bleed ([address] 10/27/2020    Post-traumatic headache 10/27/2020    SLE (systemic lupus erythematosus) ([address] 10/25/2020    Subarachnoid hemorrhage following injury, no loss of consciousness ([address] 10/25/2020    Subarachnoid hemorrhage ([address] 10/24/2020    Palliative care patient 07/22/2020    New onset seizure ([address]     Spondylolisthesis at L4-L5 level     Atherosclerotic cardiovascular disease     AMS (altered mental status) 06/11/2020    Altered mental status 06/10/2020    Intractable back pain 05/29/2020    Acute exacerbation of chronic low back pain 05/28/2020    Bilateral carotid artery stenosis 12/28/2017    Aphasia 11/29/2017    Ataxia 11/29/2017    Vertigo 11/29/2017    Malaise and fatigue 10/06/2017    Chest pressure     Fatigue 02/25/2016    S/P right coronary artery (RCA) stent placement 02/25/2016    Hx of CABG 02/25/2016    Hyperlipidemia     Hypertension     CAD (coronary artery disease)     Chest pain     Osteoarthritis     GERD (gastroesophageal reflux disease)     Asthma     Glucose intolerance (impaired glucose tolerance)     Depression with anxiety      Past Medical History:   Diagnosis Date    Acute cystitis without hematuria 10/28/2020    Asthma     CAD (coronary artery disease)     Chest pain     Depression with anxiety     Diabetes mellitus (HCC)     GERD (gastroesophageal reflux disease)     Glucose intolerance (impaired glucose tolerance)     Hyperlipidemia     Cholesterol management per [facility] Hypertension     Lup[facility]ic lupus erythematosus) [facility]     Malaise and fatigue 10/6/2017    Movement disorder     Sees pain management for neck pain and previous surgery    New onset seizure ([facility]     Osteoarthritis     Palliative care patient 07/22/2020    S/P CABG x 3 03/05/2013    PACABG X 3, LIMA-LAD, SVG0OM, SVG-DIAG, RT EVH, DR PATEL    Subdural hematoma ([facility]      Past Surgical History:   Procedure Laterality Date    APPENDECTOMY  1965    BACK SURGERY      CARDIAC CATHETERIZATION  5/18/11    EF 60%    CARDIAC CATHETERIZATION  1/25/16    drug eluting stent to RCA    CARDIAC CATHETERIZATION  1/25/2016    CARDIAC CATHETERIZATION  2/19/16    CARDIAC CATHETERIZATION  08/24/2017    cutting balloon angioplasty ot LAD    CARDIAC CATHETERIZATION  04/13/2020    TWO Stents placed    COLON SURGERY  1992    Polypectomy - 2    COLON SURGERY  2003    Polypectomy - 4    COLON SURGERY  11/2008    Polypectomy - 2    COLON SURGERY  8/2012    Polypectomy - 3    COLONOSCOPY      CORONARY ANGIOPLASTY  08/2017    cutting balloon angioplasty LAD diagonal    CORONARY ANGIOPLASTY WITH STENT PLACEMENT  3/22/2007    CORONARY ANGIOPLASTY WITH STENT PLACEMENT  8/2007    CORONARY ARTERY BYPASS GRAFT  3/5/2013    PACABG X 3, LIMA-LAD, SVG0OM, SVG-DIAG, RT EVH, DR PATEL    COSMETIC SURGERY      deviated septum and rhinoplasty    CYST REMOVAL      DIAGNOSTIC CARDIAC CATH LAB PROCEDURE      ENDOSCOPY, COLON, DIAGNOSTIC      EYE SURGERY  2019    cataracts   [address]    HYSTERECTOMY      Partial    NASAL SEPTUM SURGERY      SINUS SURGERY      Nasal Polyps Removed    SPINE SURGERY  3/6/2006    Lumbar Laminectomy L4&5    SPINE SURGERY  3/6/2006    Cervical Fusion - C4,5,6    TONSILLECTOMY  1968     Family History   Problem Relation Age of Onset    Heart Disease Mother     High Blood Pressure Mother     Diabetes Mother     High Cholesterol Mother     Diabetes Brother      Social History     Tobacco Use    Smoking status: Former Smoker     Packs/day: 1.00     Years: 1.00     Pack years: 1.00     Types: Cigarettes     Quit date: 1967     Years since quittin.1    Smokeless tobacco: Never Used   Substance Use Topics    Alcohol use: No      Current Outpatient Medications   Medication Sig Dispense Refill    ALPRAZolam (XANAX) 0.25 MG tablet Take 0.25 mg by mouth daily.  amitriptyline (ELAVIL) 50 MG tablet Take 50 mg by mouth nightly      amLODIPine (NORVASC) 2.5 MG tablet Take 2.5 mg by mouth daily      promethazine (PHENERGAN) 12.5 MG tablet Take 12.5 mg by mouth every 6 hours as needed for Nausea      traMADol (ULTRAM) 50 MG tablet Take 50 mg by mouth 3 times daily as needed for Pain.       diphenhydrAMINE (BENADRYL) 25 MG tablet Take 25 mg by mouth every 6 hours as needed for Itching      DULoxetine (CYMBALTA) 60 MG extended release capsule Take 60 mg by mouth daily      tiZANidine (ZANAFLEX) 4 MG tablet Take 4 mg by mouth 2 times daily as needed 1/2 tablet during day, 1 whole tablet at night      levETIRAcetam (KEPPRA) 500 MG tablet Take 1 tablet by mouth 2 times daily 60 tablet 3    traZODone (DESYREL) 50 MG tablet Take 1 tablet by mouth nightly 30 tablet 5    atorvastatin (LIPITOR) 80 MG tablet Take 1 tablet by mouth nightly 30 tablet 0    losartan (COZAAR) 100 MG tablet Take 1 tablet by mouth daily 30 tablet 3    metoprolol succinate (TOPROL XL) 50 MG extended release tablet Take 1 tablet by mouth daily 30 tablet 3    docusate (COLACE, DULCOLAX) 100 MG CAPS Take 100 mg by mouth 2 times daily 60 capsule 0    pantoprazole (PROTONIX) 40 MG tablet Take 1 tablet by mouth every morning (before breakfast) 30 tablet 3    isosorbide mononitrate (IMDUR) 60 MG extended release tablet TAKE 1 TABLET TWICE A  tablet 3    ranolazine (RANEXA) 1000 MG extended release tablet TAKE 1 TABLET TWICE A  tablet 3    nitroGLYCERIN (NITROSTAT) 0.4 MG SL tablet up to max of 3 total doses. If no relief after 1 dose, call 911. 30 tablet 3    albuterol (PROVENTIL HFA;VENTOLIN HFA) 108 (90 BASE) MCG/ACT inhaler Inhale 2 puffs into the lungs every 6 hours as needed.  guaiFENesin (MUCINEX) 600 MG extended release tablet Take 1 tablet by mouth 2 times daily (Patient not taking: Reported on 4/13/2021) 60 tablet 0    zolpidem (AMBIEN) 10 MG tablet Take 10 mg by mouth nightly as needed for Sleep. Half a tablet      metFORMIN (GLUCOPHAGE) 500 MG tablet Take 1 tablet by mouth 2 times daily Please hold Metformin for 48 hours after cardiac catheterization. May resume on 4/15/20 (Patient not taking: Reported on 4/13/2021) 180 tablet 3    Cyanocobalamin (VITAMIN B 12 PO) Take 1,000 mcg by mouth daily       No current facility-administered medications for this visit. Allergies: Other, Codeine, Darvocet [propoxyphene n-acetaminophen], Hydrocodone-acetaminophen, Lyrica [pregabalin], Morphine, and Percocet [oxycodone-acetaminophen]    Review of Systems  Constitutional - + significant activity change no appetite change, or unexpected weight change. No fever, chills or diaphoresis. + fatigue. HEENT - no significant rhinorrhea or epistaxis. No tinnitus or significant hearing loss. Eyes - no sudden vision change or amaurosis.    Respiratory - no significant wheezing, Oriented to person, place and time. Psychiatric -  Appropriate affect and mood. Assessment:     Diagnosis Orders   1. Coronary artery disease of native artery of native heart with stable angina pectoris ([facility]     2. Essential hypertension     3. Mixed hyperlipidemia       Data:  BP Readings from Last 3 Encounters:   04/13/21 122/60   03/30/21 (!) 170/72   01/05/21 (!) 161/74    Pulse Readings from Last 3 Encounters:   04/13/21 65   03/30/21 67   01/05/21 70        Wt Readings from Last 3 Encounters:   04/13/21 159 lb (72.1 kg)   11/11/20 144 lb 12.8 oz (65.7 kg)   10/25/20 144 lb 3.2 oz (65.4 kg)       Blood pressure and heart rate controlled. Currently on long-acting nitrate, beta-blocker, ARB, CCB  and Ranexa  On statin    Patient had stents last year with option of reassessing RCA with symptoms. She continues to have stable angina but overall feels this is not any worse. She reports at this time she would not want to have any intervention. Unfortunately last year she has had several hospital admissions for uncontrollable back pain and is now seeing pain management. She suffered a fall in October resulting in subarachnoid hemorrhage. This is got her less active requiring rehabilitation. She is now home and requires some assistance so her activity level is much less. She is off her aspirin Plavix due to bleed. Angina mostly controlled. We discussed when to let us know if she has increased need in her as needed nitroglycerin. We can increase her Imdur. Maximum dose of Ranexa    Has some elevated blood pressure readings at home. Reviewed home log. Currently on ARB beta-blocker and low-dose CCB. If continues to elevate may need to titrate her Norvasc. We discussed normal ankle edema by end of day especially with legs being dependent and on the calcium channel blocker    We discussed fall precautions. This is multifactorial with weakness and ongoing back pain.   She is seeing pain management    Labs per primary care. Last ones checked in the hospital appear stable. Reviewed all emergency room and hospital records. States taking medications as prescribed  Stable cardiovascular status. No evidence of overt heart failure, unstable angina or dysrhythmia. Plan  Let us know if you have increase in need for nitro or chest pain with less activity, can up titrate your imdur  Follow up in 6 mos With Dr. Tami Peterson   Call with any questions or concerns  Follow up with Jackson Tapia MD for non cardiac problems  Report any new problems  Cardiovascular Fitness-Exercise as tolerated. Cardiac / Healthy Diet  Continue current medications as directed  Continue plan of treatment  It is always recommended that you bring your medications bottles with you to each visit - this is for your safety! LEILA Pradhan dragon/transcription disclaimer: Much of this encounter note is electronic transcription/translation of spoken language to printed tach. Electronic translation of spoken language may be erroneous, or at times, nonsensical words or phrases may be inadvertently transcribed.  Although, I have reviewed the note for such errors, some may still exist.

## 2021-04-29 ENCOUNTER — TELEPHONE (OUTPATIENT)
Dept: NEUROLOGY | Age: 76
End: 2021-04-29

## 2021-04-29 NOTE — TELEPHONE ENCOUNTER
Received a referral from Dr. Amaya Ojeda office for this patient. Called and spoke with patient to let her know when I have scheduled her appointment with  [address] [place] [address]. Patient is aware of the appointment time/date/location.

## 2021-06-03 ENCOUNTER — OFFICE VISIT (OUTPATIENT)
Dept: NEUROLOGY | Age: 76
End: 2021-06-03
Payer: MEDICARE

## 2021-06-03 VITALS
DIASTOLIC BLOOD PRESSURE: 73 MMHG | SYSTOLIC BLOOD PRESSURE: 134 MMHG | HEIGHT: 66 IN | WEIGHT: 160 LBS | HEART RATE: 71 BPM | BODY MASS INDEX: 25.71 KG/M2

## 2021-06-03 DIAGNOSIS — R27.0 ATAXIA: ICD-10-CM

## 2021-06-03 DIAGNOSIS — R41.3 MEMORY LOSS: Primary | ICD-10-CM

## 2021-06-03 DIAGNOSIS — Z87.898 HISTORY OF SEIZURE: ICD-10-CM

## 2021-06-03 PROCEDURE — 3017F COLORECTAL CA SCREEN DOC REV: CPT | Performed by: PSYCHIATRY & NEUROLOGY

## 2021-06-03 PROCEDURE — 1090F PRES/ABSN URINE INCON ASSESS: CPT | Performed by: PSYCHIATRY & NEUROLOGY

## 2021-06-03 PROCEDURE — G8399 PT W/DXA RESULTS DOCUMENT: HCPCS | Performed by: PSYCHIATRY & NEUROLOGY

## 2021-06-03 PROCEDURE — G8417 CALC BMI ABV UP PARAM F/U: HCPCS | Performed by: PSYCHIATRY & NEUROLOGY

## 2021-06-03 PROCEDURE — 4040F PNEUMOC VAC/ADMIN/RCVD: CPT | Performed by: PSYCHIATRY & NEUROLOGY

## 2021-06-03 PROCEDURE — 1036F TOBACCO NON-USER: CPT | Performed by: PSYCHIATRY & NEUROLOGY

## 2021-06-03 PROCEDURE — 1123F ACP DISCUSS/DSCN MKR DOCD: CPT | Performed by: PSYCHIATRY & NEUROLOGY

## 2021-06-03 PROCEDURE — G8427 DOCREV CUR MEDS BY ELIG CLIN: HCPCS | Performed by: PSYCHIATRY & NEUROLOGY

## 2021-06-03 PROCEDURE — 99215 OFFICE O/P EST HI 40 MIN: CPT | Performed by: PSYCHIATRY & NEUROLOGY

## 2021-06-03 RX ORDER — HYDROCHLOROTHIAZIDE 12.5 MG/1
1 TABLET ORAL DAILY
Status: ON HOLD | COMMUNITY
Start: 2021-05-18 | End: 2021-06-15 | Stop reason: HOSPADM

## 2021-06-03 RX ORDER — DONEPEZIL HYDROCHLORIDE 5 MG/1
5 TABLET, FILM COATED ORAL NIGHTLY
COMMUNITY
End: 2021-06-12

## 2021-06-03 NOTE — PROGRESS NOTES
REVIEW OF SYSTEMS    Constitutional: []Fever []Sweats []Chills [] Recent Injury   [x] Denies all unless marked  HENT:[]Headache  [] Head Injury  [] Sore Throat  [] Ear Pain  [] Dizziness [] Hearing Loss   [x] Denies all unless marked  Spine:  [] Neck pain  [] Back pain  [] Sciaticia  [x] Denies all unless marked  Cardiovascular:[]Chest Pain []Palpitations [] Heart Disease  [x] Denies all unless marked  Pulmonary: []Shortness of Breath []Cough   [x] Denies all unless marked  Gastrointestinal:  []Abdominal Pain  []Blood in Stool  []Diarrhea []Constipation []Nausea  []Vomiting  [x] Denies all unless marked  Genitourinary:  [] Dysuria [] Frequency  [] Incontinence [] Urgency   [x] Denies all unless marked  Musculoskeletal: [] Arthralgia  [] Myalgias [] Muscle cramps  [] Muscle twitches   [x] Denies all unless marked   Extremities:   [] Pain   [] Swelling   [x] Denies all unless marked  Skin:[] Rash  [] Color Change  [x] Denies all unless marked  Neurological:[] Visual Disturbance [] Double Vision [] Slurred Speech [] Trouble swallowing  [] Vertigo [] Tingling [] Numbness [x] Weakness [x] Loss of Balance   [] Loss of Consciousness [x] Memory Loss [] Seizures  [] Denies all unless marked  Psychiatric/Behavioral:[] Depression [] Anxiety  [x] Denies all unless marked  Sleep: []  Insomnia [] Sleep Disturbance [] Snoring [] Restless Legs [] Daytime Sleepiness [] Sleep Apnea  [x] Denies all unless marked

## 2021-06-03 NOTE — PROGRESS NOTES
Chief Complaint   Patient presents with    Consultation     New patient referral from Dr Jarvis Peña to evaluate patient with loss of balance, frequent falls, and memory loss. Tsering Muhammad is a 76y.o. year old female who is seen for evaluation of her poor balance and memory. She was hospitalized last October after a fall and a small subdural hematoma which was managed conservatively. Since that time she has been complaining of dizziness which is mostly upon standing as well as poor short-term memory, occasional sharp pains in the left temple and poor balance. She also takes numerous medications for chronic pain and insomnia. Has a history of cervical and lumbar fusions. I saw her when she was up in the rehab unit last year. She has a questionable history of seizures and takes Keppra. She had a normal EEG 7/20. Her medications include Elavil 50 mg, Cymbalta 60 mg, trazodone 50 mg, Xanax at times, Keppra, Aricept for the past few months, occasional tramadol, Zanaflex and Benadryl as needed. She is here today with her daughter.     Active Ambulatory Problems     Diagnosis Date Noted    Chest pain     Osteoarthritis     GERD (gastroesophageal reflux disease)     Asthma     Glucose intolerance (impaired glucose tolerance)     Depression with anxiety     CAD (coronary artery disease)     Hyperlipidemia     Hypertension     Fatigue 02/25/2016    S/P right coronary artery (RCA) stent placement 02/25/2016    Hx of CABG 02/25/2016    Chest pressure     Coronary artery disease of native artery of native heart with stable angina pectoris ([facility] [address]     Malaise and fatigue 10/06/2017    Aphasia 11/29/2017    Ataxia 11/29/2017    Vertigo 11/29/2017    Bilateral carotid artery stenosis 12/28/2017    Chest discomfort 01/10/2019    Unstable angina (HCC)     Acute exacerbation of chronic low back pain 05/28/2020    Intractable back pain 05/29/2020    Altered mental status 06/10/2020    AMS Social Connections:     Frequency of Communication with Friends and Family:     Frequency of Social Gatherings with Friends and Family:     Attends [employer] Services:     Active Member of Clubs or Organizations:     Attends Club or Organization Meetings:     Marital Status:    Intimate Partner Violence:     Fear of Current or Ex-Partner:     Emotionally Abused:     Physically Abused:     Sexually Abused:        Review of Systems    Constitutional: []? Fever []? Sweats []? Chills []? Recent Injury   [x]? Denies all unless marked  HENT:[]? Headache  []? Head Injury  []? Sore Throat  []? Ear Pain  []? Dizziness []? Hearing Loss   [x]? Denies all unless marked  Spine:  []? Neck pain  []? Back pain  []? Sciaticia  [x]? Denies all unless marked  Cardiovascular:[]? Chest Pain []? Palpitations []? Heart Disease  [x]? Denies all unless marked  Pulmonary: []? Shortness of Breath []? Cough   [x]? Denies all unless marked  Gastrointestinal:  []? Abdominal Pain  []? Blood in Stool  []? Diarrhea []? Constipation []? Nausea  []? Vomiting  [x]? Denies all unless marked  Genitourinary:  []? Dysuria []? Frequency  []? Incontinence []? Urgency   [x]? Denies all unless marked  Musculoskeletal: []? Arthralgia  []? Myalgias []? Muscle cramps  []? Muscle twitches   [x]? Denies all unless marked   Extremities:   []? Pain   []? Swelling   [x]? Denies all unless marked  Skin:[]? Rash  []? Color Change  [x]? Denies all unless marked  Neurological:[]? Visual Disturbance []? Double Vision []? Slurred Speech []? Trouble swallowing  []? Vertigo []? Tingling []? Numbness [x]? Weakness [x]? Loss of Balance   []? Loss of Consciousness [x]? Memory Loss []? Seizures  []? Denies all unless marked  Psychiatric/Behavioral:[]? Depression []? Anxiety  [x]? Denies all unless marked  Sleep: []? Insomnia []? Sleep Disturbance []? Snoring []? Restless Legs []? Daytime Sleepiness []? Sleep Apnea  [x]?  Denies all unless marked                     Current Outpatient Medications   Medication Sig Dispense Refill    hydroCHLOROthiazide (HYDRODIURIL) 12.5 MG tablet Take 1 tablet by mouth daily      donepezil (ARICEPT) 5 MG tablet Take 5 mg by mouth nightly      ALPRAZolam (XANAX) 0.25 MG tablet Take 0.25 mg by mouth daily.  amitriptyline (ELAVIL) 50 MG tablet Take 50 mg by mouth nightly      amLODIPine (NORVASC) 2.5 MG tablet Take 2.5 mg by mouth daily      promethazine (PHENERGAN) 12.5 MG tablet Take 12.5 mg by mouth every 6 hours as needed for Nausea      traMADol (ULTRAM) 50 MG tablet Take 50 mg by mouth 3 times daily as needed for Pain.  diphenhydrAMINE (BENADRYL) 25 MG tablet Take 25 mg by mouth every 6 hours as needed for Itching      DULoxetine (CYMBALTA) 60 MG extended release capsule Take 60 mg by mouth daily      tiZANidine (ZANAFLEX) 4 MG tablet Take 4 mg by mouth 2 times daily as needed 1/2 tablet during day, 1 whole tablet at night      levETIRAcetam (KEPPRA) 500 MG tablet Take 1 tablet by mouth 2 times daily 60 tablet 3    traZODone (DESYREL) 50 MG tablet Take 1 tablet by mouth nightly 30 tablet 5    atorvastatin (LIPITOR) 80 MG tablet Take 1 tablet by mouth nightly 30 tablet 0    losartan (COZAAR) 100 MG tablet Take 1 tablet by mouth daily 30 tablet 3    metoprolol succinate (TOPROL XL) 50 MG extended release tablet Take 1 tablet by mouth daily 30 tablet 3    docusate (COLACE, DULCOLAX) 100 MG CAPS Take 100 mg by mouth 2 times daily 60 capsule 0    pantoprazole (PROTONIX) 40 MG tablet Take 1 tablet by mouth every morning (before breakfast) 30 tablet 3    isosorbide mononitrate (IMDUR) 60 MG extended release tablet TAKE 1 TABLET TWICE A  tablet 3    ranolazine (RANEXA) 1000 MG extended release tablet TAKE 1 TABLET TWICE A  tablet 3    nitroGLYCERIN (NITROSTAT) 0.4 MG SL tablet up to max of 3 total doses.  If no relief after 1 dose, call 911. 30 tablet 3    albuterol (PROVENTIL HFA;VENTOLIN HFA) 108 (90 BASE) MCG/ACT inhaler Inhale 2 puffs into the lungs every 6 hours as needed.  guaiFENesin (MUCINEX) 600 MG extended release tablet Take 1 tablet by mouth 2 times daily (Patient not taking: Reported on 4/13/2021) 60 tablet 0    zolpidem (AMBIEN) 10 MG tablet Take 10 mg by mouth nightly as needed for Sleep. Half a tablet (Patient not taking: Reported on 6/3/2021)      metFORMIN (GLUCOPHAGE) 500 MG tablet Take 1 tablet by mouth 2 times daily Please hold Metformin for 48 hours after cardiac catheterization. May resume on 4/15/20 (Patient not taking: Reported on 4/13/2021) 180 tablet 3    Cyanocobalamin (VITAMIN B 12 PO) Take 1,000 mcg by mouth daily (Patient not taking: Reported on 6/3/2021)       No current facility-administered medications for this visit. Outpatient Medications Marked as Taking for the 6/3/21 encounter (Office Visit) with Ines Olivares MD   Medication Sig Dispense Refill    hydroCHLOROthiazide (HYDRODIURIL) 12.5 MG tablet Take 1 tablet by mouth daily      donepezil (ARICEPT) 5 MG tablet Take 5 mg by mouth nightly      ALPRAZolam (XANAX) 0.25 MG tablet Take 0.25 mg by mouth daily.  amitriptyline (ELAVIL) 50 MG tablet Take 50 mg by mouth nightly      amLODIPine (NORVASC) 2.5 MG tablet Take 2.5 mg by mouth daily      promethazine (PHENERGAN) 12.5 MG tablet Take 12.5 mg by mouth every 6 hours as needed for Nausea      traMADol (ULTRAM) 50 MG tablet Take 50 mg by mouth 3 times daily as needed for Pain.       diphenhydrAMINE (BENADRYL) 25 MG tablet Take 25 mg by mouth every 6 hours as needed for Itching      DULoxetine (CYMBALTA) 60 MG extended release capsule Take 60 mg by mouth daily      tiZANidine (ZANAFLEX) 4 MG tablet Take 4 mg by mouth 2 times daily as needed 1/2 tablet during day, 1 whole tablet at night      levETIRAcetam (KEPPRA) 500 MG tablet Take 1 tablet by mouth 2 times daily 60 tablet 3    traZODone (DESYREL) 50 MG tablet Take 1 tablet by mouth nightly 30 tablet 5    Normal complex commands. Speech normal without dysarthria  No clear issues with language of fund of knowledge    Cranial Nerve Exam   CN II- Visual fields grossly unremarkable. VA adequate. CN III, IV,VI- PERRLA, EOMI, No nystagmus, conjugate eye movements, no ptosis  CN VII-no facial asymmetry  CN VIII-Hearing intact   CN IX and X- Palate elevates in midline  CN XI-good shoulder shrug  CN XII-Tongue midline with no fasciculations or fibrillations    Motor Exam  V/V throughout upper and lower extremities bilaterally, no cogwheeling, normal tone    Sensory Exam  Sensation intact to light touch , PP  upper and lower extremities bilaterally; normal vibration sense in LE's    Reflexes   Absent throughout    Tremors- no tremors in hands or head noted    Gait  Slow. Somewhat unsteady    Coordination  Finger to nose and QUIRINO-unremarkable    Lab Results   Component Value Date    THCQRFIN66 >2000 (H) 10/27/2020     Lab Results   Component Value Date    WBC 4.9 01/25/2021    HGB 12.0 01/25/2021    HCT 35.5 (L) 01/25/2021    MCV 92.4 01/25/2021     01/25/2021     Lab Results   Component Value Date     01/25/2021    K 4.4 01/25/2021     01/25/2021    CO2 26 01/25/2021    BUN 23 01/25/2021    CREATININE 0.8 01/25/2021    GLUCOSE 108 01/25/2021    CALCIUM 9.4 01/25/2021    PROT 6.6 01/25/2021    LABALBU 4.6 01/25/2021    BILITOT 0.5 01/25/2021    ALKPHOS 82 01/25/2021    AST 13 01/25/2021    ALT 13 01/25/2021    LABGLOM >60 01/25/2021    GFRAA >59 01/25/2021    GLOB 1.8 11/19/2016           Assessment    ICD-10-CM    1. Memory loss  R41.3 [place] EEG EXTENDED MONITORING  MINUTES   2. Ataxia  R27.0    3. History of seizure  Z87.898      Her difficulties could certainly be as a result of her head injury but 1 would have hoped that they would be improving. Additionally, she is on numerous medications that can adversely affect ones balance and memory. We will start with that.   She will reduce her Elavil from 50 to 25 mg a day and they will call me in a few weeks and update me. The meantime we will check an EEG as well. Plan  Orders Placed This Encounter   Procedures    [place] EEG EXTENDED MONITORING  MINUTES     Standing Status:   Future     Standing Expiration Date:   7/3/2021       More than 40 minutes were spent reviewing the patient's old records, medications, examination and and counseling and coordination of care    Return in about 4 weeks (around 7/1/2021).     (Please note that portions of this note were completed with a voice recognition program. Efforts were made to edit the dictations but occasionally words are mis-transcribed.)

## 2021-06-07 RX ORDER — RANOLAZINE 1000 MG/1
TABLET, EXTENDED RELEASE ORAL
Qty: 180 TABLET | Refills: 3 | Status: SHIPPED | OUTPATIENT
Start: 2021-06-07 | End: 2022-08-30 | Stop reason: SDUPTHER

## 2021-06-12 ENCOUNTER — HOSPITAL ENCOUNTER (INPATIENT)
Age: 76
LOS: 2 days | Discharge: HOME | DRG: 101 | End: 2021-06-15
Attending: EMERGENCY MEDICINE | Admitting: FAMILY MEDICINE
Payer: MEDICARE

## 2021-06-12 DIAGNOSIS — G40.909 SEIZURE DISORDER (HCC): ICD-10-CM

## 2021-06-12 DIAGNOSIS — Z87.820 HISTORY OF TRAUMATIC BRAIN INJURY: ICD-10-CM

## 2021-06-12 DIAGNOSIS — G40.901 STATUS EPILEPTICUS (HCC): Primary | ICD-10-CM

## 2021-06-12 DIAGNOSIS — J96.01 ACUTE RESPIRATORY FAILURE WITH HYPOXIA (HCC): ICD-10-CM

## 2021-06-12 LAB
GLUCOSE BLD-MCNC: 211 MG/DL (ref 70–99)
PERFORMED ON: ABNORMAL

## 2021-06-12 PROCEDURE — 96374 THER/PROPH/DIAG INJ IV PUSH: CPT

## 2021-06-12 PROCEDURE — 99284 EMERGENCY DEPT VISIT MOD MDM: CPT

## 2021-06-12 PROCEDURE — 96365 THER/PROPH/DIAG IV INF INIT: CPT

## 2021-06-12 PROCEDURE — 93005 ELECTROCARDIOGRAM TRACING: CPT | Performed by: EMERGENCY MEDICINE

## 2021-06-13 ENCOUNTER — APPOINTMENT (OUTPATIENT)
Dept: GENERAL RADIOLOGY | Age: 76
DRG: 101 | End: 2021-06-13
Payer: MEDICARE

## 2021-06-13 ENCOUNTER — APPOINTMENT (OUTPATIENT)
Dept: MRI IMAGING | Age: 76
DRG: 101 | End: 2021-06-13
Payer: MEDICARE

## 2021-06-13 ENCOUNTER — APPOINTMENT (OUTPATIENT)
Dept: CT IMAGING | Age: 76
DRG: 101 | End: 2021-06-13
Payer: MEDICARE

## 2021-06-13 PROBLEM — G40.901 STATUS EPILEPTICUS (HCC): Status: ACTIVE | Noted: 2021-06-13

## 2021-06-13 LAB
ALBUMIN SERPL-MCNC: 4.6 G/DL (ref 3.5–5.2)
ALP BLD-CCNC: 109 U/L (ref 35–104)
ALT SERPL-CCNC: 22 U/L (ref 5–33)
ANION GAP SERPL CALCULATED.3IONS-SCNC: 10 MMOL/L (ref 7–19)
AST SERPL-CCNC: 16 U/L (ref 5–32)
BACTERIA: NEGATIVE /HPF
BASOPHILS ABSOLUTE: 0 K/UL (ref 0–0.2)
BASOPHILS RELATIVE PERCENT: 0.5 % (ref 0–1)
BILIRUB SERPL-MCNC: 0.5 MG/DL (ref 0.2–1.2)
BILIRUBIN URINE: NEGATIVE
BLOOD, URINE: NEGATIVE
BUN BLDV-MCNC: 18 MG/DL (ref 8–23)
CALCIUM SERPL-MCNC: 9.2 MG/DL (ref 8.8–10.2)
CHLORIDE BLD-SCNC: 98 MMOL/L (ref 98–111)
CLARITY: CLEAR
CO2: 25 MMOL/L (ref 22–29)
COLOR: YELLOW
CREAT SERPL-MCNC: 0.8 MG/DL (ref 0.5–0.9)
CRYSTALS, UA: ABNORMAL /HPF
EOSINOPHILS ABSOLUTE: 0.2 K/UL (ref 0–0.6)
EOSINOPHILS RELATIVE PERCENT: 3.6 % (ref 0–5)
EPITHELIAL CELLS, UA: 1 /HPF (ref 0–5)
FOLATE: 11.7 NG/ML (ref 4.8–37.3)
GFR AFRICAN AMERICAN: >59
GFR NON-AFRICAN AMERICAN: >60
GLUCOSE BLD-MCNC: 209 MG/DL (ref 74–109)
GLUCOSE BLD-MCNC: 212 MG/DL (ref 70–99)
GLUCOSE URINE: NEGATIVE MG/DL
HCT VFR BLD CALC: 33.9 % (ref 37–47)
HEMOGLOBIN: 11.6 G/DL (ref 12–16)
HYALINE CASTS: 2 /HPF (ref 0–8)
IMMATURE GRANULOCYTES #: 0.1 K/UL
KETONES, URINE: NEGATIVE MG/DL
LEUKOCYTE ESTERASE, URINE: ABNORMAL
LYMPHOCYTES ABSOLUTE: 1 K/UL (ref 1.1–4.5)
LYMPHOCYTES RELATIVE PERCENT: 22.9 % (ref 20–40)
MAGNESIUM: 1.7 MG/DL (ref 1.6–2.4)
MCH RBC QN AUTO: 32.6 PG (ref 27–31)
MCHC RBC AUTO-ENTMCNC: 34.2 G/DL (ref 33–37)
MCV RBC AUTO: 95.2 FL (ref 81–99)
MONOCYTES ABSOLUTE: 0.5 K/UL (ref 0–0.9)
MONOCYTES RELATIVE PERCENT: 11.8 % (ref 0–10)
NEUTROPHILS ABSOLUTE: 2.5 K/UL (ref 1.5–7.5)
NEUTROPHILS RELATIVE PERCENT: 60 % (ref 50–65)
NITRITE, URINE: NEGATIVE
PDW BLD-RTO: 13.6 % (ref 11.5–14.5)
PERFORMED ON: ABNORMAL
PH UA: 5.5 (ref 5–8)
PLATELET # BLD: 152 K/UL (ref 130–400)
PMV BLD AUTO: 8.5 FL (ref 9.4–12.3)
POTASSIUM REFLEX MAGNESIUM: 4.6 MMOL/L (ref 3.5–5)
PROTEIN UA: 30 MG/DL
RBC # BLD: 3.56 M/UL (ref 4.2–5.4)
RBC UA: 1 /HPF (ref 0–4)
SARS-COV-2, NAAT: NOT DETECTED
SODIUM BLD-SCNC: 133 MMOL/L (ref 136–145)
SPECIFIC GRAVITY UA: 1.01 (ref 1–1.03)
TOTAL PROTEIN: 6.7 G/DL (ref 6.6–8.7)
TSH SERPL DL<=0.05 MIU/L-ACNC: 1.67 UIU/ML (ref 0.27–4.2)
UROBILINOGEN, URINE: 1 E.U./DL
VITAMIN B-12: 507 PG/ML (ref 211–946)
WBC # BLD: 4.1 K/UL (ref 4.8–10.8)
WBC UA: 1 /HPF (ref 0–5)

## 2021-06-13 PROCEDURE — 70450 CT HEAD/BRAIN W/O DYE: CPT

## 2021-06-13 PROCEDURE — 82746 ASSAY OF FOLIC ACID SERUM: CPT

## 2021-06-13 PROCEDURE — 85025 COMPLETE CBC W/AUTO DIFF WBC: CPT

## 2021-06-13 PROCEDURE — 6360000002 HC RX W HCPCS: Performed by: PHYSICIAN ASSISTANT

## 2021-06-13 PROCEDURE — 82607 VITAMIN B-12: CPT

## 2021-06-13 PROCEDURE — 6360000002 HC RX W HCPCS: Performed by: PSYCHIATRY & NEUROLOGY

## 2021-06-13 PROCEDURE — 83735 ASSAY OF MAGNESIUM: CPT

## 2021-06-13 PROCEDURE — 95819 EEG AWAKE AND ASLEEP: CPT | Performed by: PSYCHIATRY & NEUROLOGY

## 2021-06-13 PROCEDURE — 87635 SARS-COV-2 COVID-19 AMP PRB: CPT

## 2021-06-13 PROCEDURE — 2580000003 HC RX 258: Performed by: PHYSICIAN ASSISTANT

## 2021-06-13 PROCEDURE — 84443 ASSAY THYROID STIM HORMONE: CPT

## 2021-06-13 PROCEDURE — 95816 EEG AWAKE AND DROWSY: CPT

## 2021-06-13 PROCEDURE — 82947 ASSAY GLUCOSE BLOOD QUANT: CPT

## 2021-06-13 PROCEDURE — 99223 1ST HOSP IP/OBS HIGH 75: CPT | Performed by: PSYCHIATRY & NEUROLOGY

## 2021-06-13 PROCEDURE — 2000000000 HC ICU R&B

## 2021-06-13 PROCEDURE — 71045 X-RAY EXAM CHEST 1 VIEW: CPT

## 2021-06-13 PROCEDURE — 81001 URINALYSIS AUTO W/SCOPE: CPT

## 2021-06-13 PROCEDURE — 36415 COLL VENOUS BLD VENIPUNCTURE: CPT

## 2021-06-13 PROCEDURE — 6360000002 HC RX W HCPCS: Performed by: EMERGENCY MEDICINE

## 2021-06-13 PROCEDURE — 6360000002 HC RX W HCPCS

## 2021-06-13 PROCEDURE — 80053 COMPREHEN METABOLIC PANEL: CPT

## 2021-06-13 RX ORDER — ONDANSETRON 4 MG/1
4 TABLET, ORALLY DISINTEGRATING ORAL EVERY 8 HOURS PRN
Status: DISCONTINUED | OUTPATIENT
Start: 2021-06-13 | End: 2021-06-15 | Stop reason: HOSPADM

## 2021-06-13 RX ORDER — ONDANSETRON 2 MG/ML
4 INJECTION INTRAMUSCULAR; INTRAVENOUS EVERY 6 HOURS PRN
Status: DISCONTINUED | OUTPATIENT
Start: 2021-06-13 | End: 2021-06-15 | Stop reason: HOSPADM

## 2021-06-13 RX ORDER — LORAZEPAM 2 MG/ML
INJECTION INTRAMUSCULAR
Status: COMPLETED
Start: 2021-06-13 | End: 2021-06-13

## 2021-06-13 RX ORDER — SODIUM CHLORIDE 9 MG/ML
25 INJECTION, SOLUTION INTRAVENOUS PRN
Status: DISCONTINUED | OUTPATIENT
Start: 2021-06-13 | End: 2021-06-15 | Stop reason: HOSPADM

## 2021-06-13 RX ORDER — ACETAMINOPHEN 325 MG/1
650 TABLET ORAL EVERY 4 HOURS PRN
Status: DISCONTINUED | OUTPATIENT
Start: 2021-06-13 | End: 2021-06-15 | Stop reason: HOSPADM

## 2021-06-13 RX ORDER — LORAZEPAM 2 MG/ML
1 INJECTION INTRAMUSCULAR PRN
Status: DISCONTINUED | OUTPATIENT
Start: 2021-06-13 | End: 2021-06-15 | Stop reason: HOSPADM

## 2021-06-13 RX ORDER — LEVETIRACETAM 10 MG/ML
1000 INJECTION INTRAVASCULAR EVERY 12 HOURS
Status: DISCONTINUED | OUTPATIENT
Start: 2021-06-13 | End: 2021-06-14

## 2021-06-13 RX ORDER — SODIUM CHLORIDE 0.9 % (FLUSH) 0.9 %
5-40 SYRINGE (ML) INJECTION PRN
Status: DISCONTINUED | OUTPATIENT
Start: 2021-06-13 | End: 2021-06-15 | Stop reason: HOSPADM

## 2021-06-13 RX ORDER — SODIUM CHLORIDE 0.9 % (FLUSH) 0.9 %
5-40 SYRINGE (ML) INJECTION EVERY 12 HOURS SCHEDULED
Status: DISCONTINUED | OUTPATIENT
Start: 2021-06-13 | End: 2021-06-15 | Stop reason: HOSPADM

## 2021-06-13 RX ORDER — LORAZEPAM 2 MG/ML
2 INJECTION INTRAMUSCULAR ONCE
Status: COMPLETED | OUTPATIENT
Start: 2021-06-13 | End: 2021-06-13

## 2021-06-13 RX ORDER — AMLODIPINE BESYLATE 2.5 MG/1
2.5 TABLET ORAL DAILY
COMMUNITY
End: 2021-10-07 | Stop reason: SDUPTHER

## 2021-06-13 RX ORDER — LEVETIRACETAM 10 MG/ML
1000 INJECTION INTRAVASCULAR ONCE
Status: COMPLETED | OUTPATIENT
Start: 2021-06-13 | End: 2021-06-13

## 2021-06-13 RX ADMIN — LEVETIRACETAM 1000 MG: 10 INJECTION INTRAVENOUS at 00:58

## 2021-06-13 RX ADMIN — SODIUM CHLORIDE, PRESERVATIVE FREE 10 ML: 5 INJECTION INTRAVENOUS at 20:26

## 2021-06-13 RX ADMIN — LEVETIRACETAM 1000 MG: 10 INJECTION INTRAVENOUS at 13:49

## 2021-06-13 RX ADMIN — SODIUM CHLORIDE, PRESERVATIVE FREE 10 ML: 5 INJECTION INTRAVENOUS at 09:26

## 2021-06-13 RX ADMIN — LORAZEPAM 2 MG: 2 INJECTION INTRAMUSCULAR; INTRAVENOUS at 00:13

## 2021-06-13 RX ADMIN — ENOXAPARIN SODIUM 40 MG: 40 INJECTION SUBCUTANEOUS at 09:26

## 2021-06-13 RX ADMIN — LORAZEPAM 2 MG: 2 INJECTION INTRAMUSCULAR at 00:13

## 2021-06-13 ASSESSMENT — ENCOUNTER SYMPTOMS
GASTROINTESTINAL NEGATIVE: 1
RESPIRATORY NEGATIVE: 1
EYES NEGATIVE: 1

## 2021-06-13 ASSESSMENT — PAIN SCALES - GENERAL
PAINLEVEL_OUTOF10: 0

## 2021-06-13 NOTE — ED NOTES
Pt seizing at this time. Dr. Jason Davis at bedside. Verbal order for 2mg IV of Ativan.       Viktoria Gonsalez RN  06/13/21 0690

## 2021-06-13 NOTE — ED NOTES
Bed: 01-A  Expected date:   Expected time:   Means of arrival:   Comments:  [id number] [facility]  06/12/21 8143

## 2021-06-13 NOTE — CONSULTS
[facility] Consult      Patient:   Jj Thompson  MR#:    [id number]  Account Number:                   [id number]      Room:    [id number]   YOB: 1945  Date of Progress Note: 6/13/2021  Time of Note                           12:08 PM  Attending Physician:  Melissa Steve MD  Consulting Physician:  Bebo Loera DO       CHIEF COMPLAINT:  Seizure    HISTORY OF PRESENT ILLNESS:   This is a 76 y.o. female who was admitted with seizures. She has a prior history of TBI with petechial frontal ICH that did not require surgical intervention. There was prior concern for possible seizure activity and she was on Keppra 500 mg bid. She had multiple events yesterday consistent with unresponsiveness and GTC activity, tongue biting noted, confusion noted after the events. No fever, headaches, focal weakness, or slurred speech. No history of meningitis or encephalitis, no family history of seizures noted. CT head negative. REVIEW OF SYSTEMS:  Constitutional - No fever or chills. HENT -  No Scalp tenderness. No tinnitus or significant hearing loss. No nose bleeding, no sore throat. Eyes - No sudden vision change or eye pain  Respiratory - No significant shortness of breath or cough  Cardiovascular - No chest pain. No palpitations or significant leg swelling  Gastrointestinal - No abdominal swelling or pain. Genitourinary - No difficulty urinating, dysuria  Musculoskeletal - No back pain or myalgia. Skin - No color change or rash  Neurologic - No lateralizing weakness. No numbness. Hematologic - No easy bruising or spontaneous bleeding. Psychiatric - No anxiety.      PAST MEDICAL HISTORY:      Diagnosis Date    Acute cystitis without hematuria 10/28/2020    Asthma     CAD (coronary artery disease)     Chest pain     Depression with anxiety     Diabetes mellitus (HCC)     GERD (gastroesophageal reflux disease)     Glucose intolerance (impaired glucose tolerance)     years ago. Her smoking use included cigarettes. She has a 1.00 pack-year smoking history. She has never used smokeless tobacco.  ETOH:   reports no history of alcohol use.   DRUG:    Social History     Substance and Sexual Activity   Drug Use No       FAMILY HISTORY:       Problem Relation Age of Onset    Heart Disease Mother     High Blood Pressure Mother     Diabetes Mother     High Cholesterol Mother     Diabetes Brother        MEDICATIONS:      Current Facility-Administered Medications:     sodium chloride flush 0.9 % injection 5-40 mL, 5-40 mL, Intravenous, 2 times per day, Dao Daigle PA-C, 10 mL at 06/13/21 0926    sodium chloride flush 0.9 % injection 5-40 mL, 5-40 mL, Intravenous, PRN, Dao Daigle PA-C    0.9 % sodium chloride infusion, 25 mL, Intravenous, PRN, Dao Daigle PA-C    enoxaparin (LOVENOX) injection 40 mg, 40 mg, Subcutaneous, Daily, Dao Daigle PA-C, 40 mg at 06/13/21 8892    acetaminophen (TYLENOL) tablet 650 mg, 650 mg, Oral, Q4H PRN, Dao Daigle PA-C    ondansetron (ZOFRAN-ODT) disintegrating tablet 4 mg, 4 mg, Oral, Q8H PRN **OR** ondansetron (ZOFRAN) injection 4 mg, 4 mg, Intravenous, Q6H PRN, Dao Daigle PA-C    ALLERGIES:    Other, Codeine, Darvocet [propoxyphene n-acetaminophen], Hydrocodone-acetaminophen, Lyrica [pregabalin], Morphine, and Percocet [oxycodone-acetaminophen]    PHYSICAL EXAM:    Constitutional -   /62   Pulse 66   Temp 97.6 °F (36.4 °C) (Temporal)   Resp 16   Ht 5' 6\" (1.676 m)   Wt 165 lb (74.8 kg)   SpO2 93%   BMI 26.63 kg/m²   General appearance: No acute distress   EYES -   Conjunctiva normal  Pupillary exam as below, see CN exam in the neurologic exam  ENT-    No scars, masses, or lesions over external nose or ears  Oropharynx without erythema, palate midline  Cardiovascular -   No clubbing, cyanosis, or edema   Pulmonary-   Good expansion, normal effort without use of accessory muscles  Musculoskeletal -   No significant wasting of muscles noted  Gait as below, see gait exam in the neurologic exam  Muscle strength, tone, stability as below see the motor exam in the neurologic exam.   No bony deformities  Skin -   Warm, dry, and intact to inspection and palpation. No rash, erythema, or pallor  Psychiatric -   Mood, affect, and behavior appear normal    Memory as below see mental status examination in the neurologic exam      NEUROLOGICAL EXAM    Mental status   [] Awake, alert, oriented   [x] Affect attention and concentration appear appropriate  [] Recent and remote memory appears unremarkable  [x] Speech normal without dysarthria or aphasia, comprehension and repetition intact. COMMENTS:  Lethargic, following commands, speech fluent   Cranial Nerves [x] No VF deficit to confrontation  [x] PERRLA, EOMI, no nystagmus, conjugate eye movements, no ptosis  [x] Face symmetric  [x] Facial sensation intact  [x] Tongue midline no atrophy or fasciculations present  [x] Palate midline, hearing to finger rub normal  [x] Shoulder shrug and SCM testing normal  COMMENTS:   Motor   [x] 5/5 strength x 4 extremities  [x] Normal bulk and tone  [x] No tremor present  [x] No rigidity or bradykinesia noted  COMMENTS:   Sensory  [x] Sensation intact to light touch, pin prick, vibration, and proprioception BLE  [] Sensation intact to light touch, pin prick, vibration, and proprioception BUE  COMMENTS:   Coordination [x] FTN normal bilaterally   [] HTS normal bilaterally  [] QUIRINO normal.   COMMENTS:   Reflexes  [x] Symmetric and non-pathological  [x] Toes downgoing bilaterally  [x] No clonus present  COMMENTS:   Gait                  [] Normal steady gait    [] Ataxic    [] Spastic     [] Magnetic     [] Shuffling  [x] Not assessed  COMMENTS:       LABS/IMAGING:    As below and per HPI    CT Head WO Contrast    Result Date: 6/13/2021  CT HEAD WO CONTRAST 6/13/2021 10:01 AM History: Altered mental status. Seizures. Past history of subarachnoid hemorrhage. Noncontrast head CT compared with 28 October 2020. In order to have a CT radiation dose as low as reasonably achievable Automated Exposure Control was utilized for adjustment of the mA and/or KV according to patient size. DLP in mGycm= 798. A preliminary [employer] report was transmitted to the appropriate department immediately after this study was interpreted. Axial, sagittal, and coronal noncontrast CT imaging of the head. The visualized paranasal sinuses are clear and normally aerated. The mastoid air cells are clear. The brain and ventricles have an age-appropriate appearance. Mild small vessel disease. There is no hemorrhage or mass-effect. No acute infarct is seen. No calvarial abnormality. 1. No acute intracranial abnormality is seen. Signed by Dr Rosemary Puckett    XR CHEST PORTABLE    Result Date: 6/13/2021  XR CHEST PORTABLE 6/13/2021 10:19 AM History: Altered mental status. Portable chest x-ray compared with 20 July 2020. Increased perihilar and interstitial prominence compatible with heart failure. Asymmetric prominence of the right hilum should warrant follow-up contrast enhanced chest CT when the patient is able. No pneumothorax. 1. Increased heart failure changes. 2. Asymmetric enlargement of the right hilum for which follow-up contrast enhanced chest CT is recommended. Signed by Dr Christine Zapata     06/12/21  2357   WBC 4.1*   HGB 11.6*        Recent Labs     06/12/21  2357   *   K 4.6   CL 98   CO2 25   BUN 18   CREATININE 0.8   GLUCOSE 209*     Recent Labs     06/12/21  2357   AST 16   ALT 22   BILITOT 0.5   ALKPHOS 109*       ASSESSMENT:  76 y.o. 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 PLAN:  1. MRI brain  2. EEG  3. Increase Keppra to 1000 mg q12h  4. Ativan prn, seizure precautions.        Please feel free to

## 2021-06-13 NOTE — ED PROVIDER NOTES
[facility] EMERGENCY DEPT  EMERGENCY DEPARTMENT ENCOUNTER      Pt Name: Vesta Osman  MRN: [id number]  [place] 1945  Date of evaluation: 6/12/2021  Provider: Susan Quesada MD    CHIEF COMPLAINT       Chief Complaint   Patient presents with    Seizures         HISTORY OF PRESENT ILLNESS   (Location/Symptom, Timing/Onset,Context/Setting, Quality, Duration, Modifying Factors, Severity)  Note limiting factors. Vesta Osman is a 76 y.o. female who presents to the emergency department after seizure episodes tonight. 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 She is on Keppra 500 mg twice a day and did take both doses today according to her daughters. Patient had 2 seizures tonight witnessed by the daughter. The 1st 1 lasted approximately 2 minutes and the 2nd 1 lasted approximately 30 seconds. Patient was unresponsive and would not wake up to sternal rub or other stimulation for a while after the 2nd seizure. Daughter called EMS and on EMS arrival patient was responsive to painful stimuli only but did have gradual improvement in mental status in route to the emergency department. No recent injury. Has complained of some pain with urination recently but no other symptoms of infection such as cough, fever, vomiting, diarrhea. No recent falls or other known injuries. HPI    NursingNotes were reviewed. REVIEW OF SYSTEMS    (2-9 systems for level 4, 10 or more for level 5)     Review of Systems   Unable to perform ROS: Mental status change       A complete review of systems was performed and is negative except as noted above in the HPI.        PAST MEDICAL HISTORY     Past Medical History:   Diagnosis Date    Acute cystitis without hematuria 10/28/2020    Asthma     CAD (coronary artery disease)     Chest pain     Depression with anxiety     Diabetes mellitus (HCC)     GERD (gastroesophageal reflux disease)     Glucose intolerance Medications    ALBUTEROL (PROVENTIL HFA;VENTOLIN HFA) 108 (90 BASE) MCG/ACT INHALER    Inhale 2 puffs into the lungs every 6 hours as needed. ALPRAZOLAM (XANAX) 0.25 MG TABLET    Take 0.25 mg by mouth daily. AMITRIPTYLINE (ELAVIL) 50 MG TABLET    Take 50 mg by mouth nightly    AMLODIPINE (NORVASC) 2.5 MG TABLET    Take 2.5 mg by mouth daily    ATORVASTATIN (LIPITOR) 80 MG TABLET    Take 1 tablet by mouth nightly    DIPHENHYDRAMINE (BENADRYL) 25 MG TABLET    Take 25 mg by mouth every 6 hours as needed for Itching    DULOXETINE (CYMBALTA) 60 MG EXTENDED RELEASE CAPSULE    Take 60 mg by mouth daily    HYDROCHLOROTHIAZIDE (HYDRODIURIL) 12.5 MG TABLET    Take 1 tablet by mouth daily    ISOSORBIDE MONONITRATE (IMDUR) 60 MG EXTENDED RELEASE TABLET    TAKE 1 TABLET TWICE A DAY    LEVETIRACETAM (KEPPRA) 500 MG TABLET    Take 1 tablet by mouth 2 times daily    LOSARTAN (COZAAR) 100 MG TABLET    Take 1 tablet by mouth daily    METOPROLOL SUCCINATE (TOPROL XL) 50 MG EXTENDED RELEASE TABLET    Take 1 tablet by mouth daily    NITROGLYCERIN (NITROSTAT) 0.4 MG SL TABLET    up to max of 3 total doses. If no relief after 1 dose, call 911. PANTOPRAZOLE (PROTONIX) 40 MG TABLET    Take 1 tablet by mouth every morning (before breakfast)    PROMETHAZINE (PHENERGAN) 12.5 MG TABLET    Take 12.5 mg by mouth every 6 hours as needed for Nausea    RANOLAZINE (RANEXA) 1000 MG EXTENDED RELEASE TABLET    TAKE 1 TABLET TWICE A DAY    TIZANIDINE (ZANAFLEX) 4 MG TABLET    Take 4 mg by mouth 2 times daily as needed 1/2 tablet during day, 1 whole tablet at night    TRAMADOL (ULTRAM) 50 MG TABLET    Take 50 mg by mouth 3 times daily as needed for Pain.     TRAZODONE (DESYREL) 50 MG TABLET    Take 1 tablet by mouth nightly       ALLERGIES     Other, Codeine, Darvocet [propoxyphene n-acetaminophen], Hydrocodone-acetaminophen, Lyrica [pregabalin], Morphine, and Percocet [oxycodone-acetaminophen]    FAMILY HISTORY       Family History   Problem Relation Age of Onset    Heart Disease Mother     High Blood Pressure Mother     Diabetes Mother     High Cholesterol Mother     Diabetes Brother           SOCIAL HISTORY       Social History     Socioeconomic History    Marital status:      Spouse name: None    Number of children: None    Years of education: None    Highest education level: None   Occupational History    None   Tobacco Use    Smoking status: Former Smoker     Packs/day: 1.00     Years: 1.00     Pack years: 1.00     Types: Cigarettes     Quit date: 1967     Years since quittin.3    Smokeless tobacco: Never Used   Vaping Use    Vaping Use: Never used   Substance and Sexual Activity    Alcohol use: No    Drug use: No    Sexual activity: Not Currently   Other Topics Concern    None   Social History Narrative    None     Social Determinants of Health     Financial Resource Strain:     Difficulty of Paying Living Expenses:    Food Insecurity:     Worried About Running Out of Food in the Last Year:     Ran Out of Food in the Last Year:    Transportation Needs:     Lack of Transportation (Medical):      Lack of Transportation (Non-Medical):    Physical Activity:     Days of Exercise per Week:     Minutes of Exercise per Session:    Stress:     Feeling of Stress :    Social Connections:     Frequency of Communication with Friends and Family:     Frequency of Social Gatherings with Friends and Family:     Attends Sabianist Services:     Active Member of Clubs or Organizations:     Attends Club or Organization Meetings:     Marital Status:    Intimate Partner Violence:     Fear of Current or Ex-Partner:     Emotionally Abused:     Physically Abused:     Sexually Abused:        SCREENINGS             PHYSICAL EXAM    (up to 7 for level 4, 8 or more for level 5)     ED Triage Vitals [21 2345]   BP Temp Temp Source Pulse Resp SpO2 Height Weight   (!) 149/62 98 °F (36.7 °C) Oral 63 24 (!) 89 % -- -- Physical Exam  Vitals and nursing note reviewed. Constitutional:       General: She is not in acute distress. Appearance: She is well-developed. She is not toxic-appearing or diaphoretic. Interventions: Nasal cannula in place. HENT:      Head: Normocephalic and atraumatic. Eyes:      General: No scleral icterus. Right eye: No discharge. Left eye: No discharge. Pupils: Pupils are equal, round, and reactive to light. Cardiovascular:      Rate and Rhythm: Normal rate and regular rhythm. Pulses: Normal pulses. Heart sounds: Normal heart sounds. Pulmonary:      Effort: Pulmonary effort is normal. No respiratory distress. Breath sounds: Normal breath sounds. No stridor. No wheezing or rhonchi. Abdominal:      General: There is no distension. Musculoskeletal:         General: No deformity. Normal range of motion. Cervical back: Normal range of motion. Right lower leg: No edema. Left lower leg: No edema. Skin:     General: Skin is warm and dry. Neurological:      General: No focal deficit present. Mental Status: She is lethargic. GCS: GCS eye subscore is 3. GCS verbal subscore is 5. GCS motor subscore is 6. Cranial Nerves: Cranial nerves are intact. No cranial nerve deficit. Sensory: Sensation is intact. Motor: Motor function is intact. No weakness or abnormal muscle tone. Coordination: Coordination is intact. Psychiatric:         Behavior: Behavior normal.         Thought Content:  Thought content normal.         Judgment: Judgment normal.         DIAGNOSTIC RESULTS     EKG: All EKG's are interpreted by the Emergency Department Physician who either signs or Co-signs this chart in the absence of a cardiologist.      RADIOLOGY:   Non-plain film images such as CT, Ultrasound and MRI are read by the radiologist. Plainradiographic images are visualized and preliminarily interpreted by the emergency physician with the below findings:      Interpretation per the Radiologist below, if available at the time of this note:    CT Head WO Contrast    (Results Pending)   XR CHEST PORTABLE    (Results Pending)         ED BEDSIDE ULTRASOUND:   Performed by ED Physician - none    LABS:  Labs Reviewed   CBC WITH AUTO DIFFERENTIAL - Abnormal; Notable for the following components:       Result Value    WBC 4.1 (*)     RBC 3.56 (*)     Hemoglobin 11.6 (*)     Hematocrit 33.9 (*)     MCH 32.6 (*)     MPV 8.5 (*)     Monocytes % 11.8 (*)     Lymphocytes Absolute 1.0 (*)     All other components within normal limits   COMPREHENSIVE METABOLIC PANEL W/ REFLEX TO MG FOR LOW K - Abnormal; Notable for the following components:    Sodium 133 (*)     Glucose 209 (*)     Alkaline Phosphatase 109 (*)     All other components within normal limits   URINE RT REFLEX TO CULTURE - Abnormal; Notable for the following components:    Protein, UA 30 (*)     Leukocyte Esterase, Urine TRACE (*)     All other components within normal limits   MICROSCOPIC URINALYSIS - Abnormal; Notable for the following components:    Bacteria, UA NEGATIVE (*)     Crystals, UA NEG (*)     All other components within normal limits   POCT GLUCOSE - Abnormal; Notable for the following components:    POC Glucose 211 (*)     All other components within normal limits   COVID-19, RAPID       All other labs were within normal range or not returned as of this dictation.     Medications   LORazepam (ATIVAN) injection 2 mg (2 mg Intravenous Given 6/13/21 0013)   levETIRAcetam (KEPPRA) 1000 mg/100 mL IVPB (1,000 mg Intravenous New Bag 6/13/21 0058)       EMERGENCY DEPARTMENT COURSE and DIFFERENTIALDIAGNOSIS/MDM:   Vitals:    Vitals:    06/12/21 2357 06/13/21 0016 06/13/21 0059 06/13/21 0158   BP:  134/87 (!) 116/51 96/69   Pulse:  67 63 63   Resp:  15 15 20   Temp:       TempSrc:       SpO2: 91% (!) 86% 98% 99%       MDM  Number of Diagnoses or Management Options  Acute respiratory failure with hypoxia [facility]): new and requires workup  Breakthrough seizure [facility]): new and requires workup  History of traumatic brain injury: established and worsening  Seizure disorder ([address]): established and worsening     Amount and/or Complexity of Data Reviewed  Clinical lab tests: ordered and reviewed  Tests in the radiology section of CPT®: ordered and reviewed  Tests in the medicine section of CPT®: reviewed and ordered  Decide to obtain previous medical records or to obtain history from someone other than the patient: yes  Obtain history from someone other than the patient: yes  Review and summarize past medical records: yes  Discuss the patient with other providers: yes  Independent visualization of images, tracings, or specimens: yes    Risk of Complications, Morbidity, and/or Mortality  Presenting problems: high  Diagnostic procedures: moderate  Management options: moderate    Critical Care  Total time providing critical care: 30-74 minutes    Patient Progress  Patient progress: stable    On arrival here, patient was waking up and following commands. Laboratory evaluation and head CT were ordered. After few minutes, patient did have a witnessed seizure episode that lasted estimated 1 to 2 minutes here. It was resolving on its own when patient was given 2 mg of Ativan with resolution of seizure activity. Patient did have desaturation immediately following the seizure episode down to 40s briefly which resolved after airway suctioning and jaw thrust maneuver. Attempted to place a nasal trumpet bilaterally with significant resistance. Daughters reported history of rhinoplasty and attempted placement was aborted as oxygen saturation had improved to the upper 80s by the point and patient's respiratory status was improving. Afterwards patient maintain oxygen saturation in the upper 90s on nasal cannula. Head CT shows no evidence of acute intracranial process.   Labs show no evidence of underlying infection or other

## 2021-06-13 NOTE — H&P
[facility]  History and Physical    Patient:  Dior Salas  MRN: [id number]    CHIEF COMPLAINT:  Seizures    History Obtained From:  patient, electronic medical record  PCP: Myesha Mendez MD    ED HISTORY OF PRESENT ILLNESS:   Dior Salas is a 76 y.o. female who presented to the emergency department after seizure episodes 6-12-21. 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 She is on Keppra 500 mg twice a day and did take both doses today according to her daughters. Patient had 2 seizures tonight witnessed by the daughter. The 1st 1 lasted approximately 2 minutes and the 2nd 1 lasted approximately 30 seconds. Patient was unresponsive and would not wake up to sternal rub or other stimulation for a while after the 2nd seizure. Daughter called EMS and on EMS arrival patient was responsive to painful stimuli only but did have gradual improvement in mental status in route to the emergency department. No recent injury. Has complained of some pain with urination recently but no other symptoms of infection such as cough, fever, vomiting, diarrhea. No recent falls or other known injuries. Admitted to ICU ,SZ precautions to Dr. An Chang. ED TREATMENT:  On arrival here, patient was waking up and following commands. Laboratory evaluation and head CT were ordered. After few minutes, patient did have a witnessed seizure episode that lasted estimated 1 to 2 minutes here. It was resolving on its own when patient was given 2 mg of Ativan with resolution of seizure activity. Patient did have desaturation immediately following the seizure episode down to 40s briefly which resolved after airway suctioning and jaw thrust maneuver. Attempted to place a nasal trumpet bilaterally with significant resistance.   Daughters reported history of rhinoplasty and attempted placement was aborted as oxygen saturation had improved to the upper 80s by the point and patient's respiratory status was improving. Afterwards patient maintain oxygen saturation in the upper 90s on nasal cannula. Head CT shows no evidence of acute intracranial process. Labs show no evidence of underlying infection or other metabolic derangements that would predispose patient to seizures. She was loaded with Keppra. No further seizure activity in the emergency department. Of note, medication list shows tramadol but family reports she only takes that 1 or 2 times a month when she has significant pain. She has not taken any doses of that recently. Discussed risk of seizures being induced by tramadol with them. REVIEW OF SYSTEMS:  Review of Systems   Constitutional: Negative for chills and fever. HENT: Negative. Eyes: Negative. Respiratory: Negative. Cardiovascular: Negative. Gastrointestinal: Negative. Endocrine: Negative. Genitourinary: Negative. Musculoskeletal: Negative. Neurological: Positive for seizures. Psychiatric/Behavioral: Negative for behavioral problems, hallucinations and sleep disturbance.        Past Medical History:      Diagnosis Date    Acute cystitis without hematuria 10/28/2020    Asthma     CAD (coronary artery disease)     Chest pain     Depression with anxiety     Diabetes mellitus (HCC)     GERD (gastroesophageal reflux disease)     Glucose intolerance (impaired glucose tolerance)     Hyperlipidemia     Cholesterol management per Michael Villa Hypertension     Lupus [facility]ic lupus erythematosus) [facility]     Malaise and fatigue 10/6/2017    Movement disorder     Sees pain management for neck pain and previous surgery    New onset seizure ([facility] [facility]     Osteoarthritis     Palliative care patient 07/22/2020    S/P CABG x 3 03/05/2013    PACABG X 3, LIMA-LAD, SVG0OM, SVG-DIAG, RT EVH, DR PATEL    Subdural hematoma (HCC)        Past Surgical History:      Procedure Laterality Date   [address] [facility] SURGERY      CARDIAC CATHETERIZATION  5/18/11    EF 60%    CARDIAC CATHETERIZATION  1/25/16    drug eluting stent to RCA    CARDIAC CATHETERIZATION  1/25/2016    CARDIAC CATHETERIZATION  2/19/16    CARDIAC CATHETERIZATION  08/24/2017    cutting balloon angioplasty ot LAD    CARDIAC CATHETERIZATION  04/13/2020    TWO Stents placed    CATARACT REMOVAL      COLON SURGERY  1992    Polypectomy - 2    COLON SURGERY  2003    Polypectomy - 4    COLON SURGERY  11/2008    Polypectomy - 2    COLON SURGERY  8/2012    Polypectomy - 3    COLONOSCOPY      CORONARY ANGIOPLASTY  08/2017    cutting balloon angioplasty LAD diagonal    CORONARY ANGIOPLASTY WITH STENT PLACEMENT  3/22/2007    CORONARY ANGIOPLASTY WITH STENT PLACEMENT  8/2007    CORONARY ARTERY BYPASS GRAFT  3/5/2013    PACABG X 3, LIMA-LAD, SVG0OM, SVG-DIAG, RT EVH, DR PATEL    COSMETIC SURGERY      deviated septum and rhinoplasty    CYST REMOVAL  1970    DIAGNOSTIC CARDIAC CATH LAB PROCEDURE      ENDOSCOPY, COLON, DIAGNOSTIC      EYE SURGERY  03/2019    cataracts    [address]    Partial    NASAL SEPTUM [address] SINUS SURGERY  2003    Nasal Polyps Removed    SPINE SURGERY  3/6/2006    Lumbar Laminectomy L4&5    SPINE SURGERY  3/6/2006    Cervical Fusion - C4,5,6    TONSILLECTOMY  1968       Medications Prior to Admission:    Prior to Admission medications    Medication Sig Start Date End Date Taking? Authorizing Provider   amLODIPine (NORVASC) 2.5 MG tablet Take 2.5 mg by mouth daily   Yes Historical Provider, MD   ranolazine (RANEXA) 1000 MG extended release tablet TAKE 1 TABLET TWICE A DAY 6/7/21  Yes LEILA Marquez NP   hydroCHLOROthiazide (HYDRODIURIL) 12.5 MG tablet Take 1 tablet by mouth daily 5/18/21  Yes Historical Provider, MD   ALPRAZolam (XANAX) 0.25 MG tablet Take 0.25 mg by mouth nightly.  Half tab   Yes Historical Provider, MD   amitriptyline (ELAVIL) 50 MG tablet Take 50 [pregabalin], Morphine, and Percocet [oxycodone-acetaminophen]    Social History:   Social History     Socioeconomic History    Marital status:      Spouse name: Not on file    Number of children: 2    Years of education: Not on file    Highest education level: Not on file   Occupational History    Not on file   Tobacco Use    Smoking status: Former Smoker     Packs/day: 1.00     Years: 1.00     Pack years: 1.00     Types: Cigarettes     Quit date: 1967     Years since quittin.3    Smokeless tobacco: Never Used   Vaping Use    Vaping Use: Never used   Substance and Sexual Activity    Alcohol use: No    Drug use: No    Sexual activity: Not Currently   Other Topics Concern    Not on file   Social History Narrative    Not on file     Social Determinants of Health     Financial Resource Strain:     Difficulty of Paying Living Expenses:    Food Insecurity:     Worried About Running Out of Food in the Last Year:     [address] in the Last Year:    Transportation Needs:     Lack of Transportation (Medical):      Lack of Transportation (Non-Medical):    Physical Activity:     Days of Exercise per Week:     Minutes of Exercise per Session:    Stress:     Feeling of Stress :    Social Connections:     Frequency of Communication with Friends and Family:     Frequency of Social Gatherings with Friends and Family:     Attends Faith Services:     Active Member of Clubs or Organizations:     Attends Club or Organization Meetings:     Marital Status:    Intimate Partner Violence:     Fear of Current or Ex-Partner:     Emotionally Abused:     Physically Abused:     Sexually Abused:        Family History:       Problem Relation Age of Onset    Heart Disease Mother     High Blood Pressure Mother     Diabetes Mother     High Cholesterol Mother     Diabetes Brother            Physical Exam:    Vitals: /65   Pulse 56   Temp 97.8 °F (36.6 °C) (Temporal)   Resp 14   Ht 5' 6\" (1.676 m)   Wt 165 lb (74.8 kg)   SpO2 97%   BMI 26.63 kg/m²     Objective:  General Appearance:  Comfortable. Vital signs: (most recent): Blood pressure 103/65, pulse 56, temperature 97.8 °F (36.6 °C), temperature source Temporal, resp. rate 14, height 5' 6\" (1.676 m), weight 165 lb (74.8 kg), SpO2 97 %. No fever. Output: Producing urine and producing stool. HEENT: Normal HEENT exam.    Lungs:  Normal effort. Heart: Normal rate. Abdomen: Abdomen is soft. Bowel sounds are normal.   There is no abdominal tenderness. Extremities: Normal range of motion. Pulses: Distal pulses are intact. Neurological: Patient is alert and oriented to person, place and time. Pupils:  Pupils are equal, round, and reactive to light. Skin:  Warm and dry. CBC:   Recent Labs     06/12/21  2357   WBC 4.1*   HGB 11.6*        BMP:    Recent Labs     06/12/21  2357   *   K 4.6   CL 98   CO2 25   BUN 18   CREATININE 0.8   GLUCOSE 209*     Hepatic:   Recent Labs     06/12/21  2357   AST 16   ALT 22   BILITOT 0.5   ALKPHOS 109*     Troponin T: No results for input(s): TROPONINI in the last 72 hours. Pro-BNP: No results for input(s): BNP in the last 72 hours. Lipids: No results for input(s): CHOL, HDL in the last 72 hours. Invalid input(s): LDLCALCU  ABGs:   Lab Results   Component Value Date    PHART 7.350 07/20/2020    PO2ART 53.0 07/20/2020    WYV8JPX 46.0 07/20/2020     INR: No results for input(s): INR in the last 72 hours. -----------------------------------------------------------------  EKG: sinus   Radiology:    in ED--> Head CT shows no evidence of acute intracranial process  No results found. Assessment and Plan   1. Active Problems:    CAD (coronary artery disease)    Hypertension    Hx of CABG    SLE (systemic lupus erythematosus) ([address]    Status epilepticus ([address]  Resolved Problems:    * No resolved hospital problems.  *  History of traumatic brain injury Plan:  Medically stable  Patient loaded W/ Keppra in ED has been sz free since   Neuro consulted, will await further recommendations. Ok to floor when OK W Neuro     [employer] PA-C  6/13/2021    I have discussed the care of Dior Salas, including pertinent history and exam findings with the ARNP/PA. I have seen and examined the patient and the key elements of all parts of the encounter have been performed by me. I agree with the assessment and plan as outlined by the ARNP/PA. Please refer to my separate note for complete documentation.      Electronically signed by Noemi Marquez MD on 6/13/2021 at 12:24 PM

## 2021-06-13 NOTE — CONSULTS
**Physician Signature**  This document was electronically signed by: Pipe Jessica MD  2021   11:29 AM    **Consult Information**  Member Facility: [address] [place] [address] MRN: [id number]  Visit/Encounter Number: [id number]  Consult ID: [id number]  Facility Time Zone: CT  Date and Time of Request: 2021 07:20 AM  Requesting Clinician: DR. Gamal Damon  Patient Name: Graham Zhong  YOB: 1945  Gender: Female  Patient identity was confirmed at the beginning of the consult with the   patient/family/staff using two personal identifiers: Patient name and       **Admission**  Admission Date: 2021  Chief reason for ICU admission: Altered Mental Status    **Core Metrics**  General orienting sentence for patient: 75F with a hx of subarachnoid   hemorrhage few months ago. admitted  with seizures. now   alert.   Chief physiologic deterioration: None - Stable patient  Is the patient on DVT prophylaxis?: Yes  Is the patient on GI prophylaxis?: Not Indicated  Has this patient reached their nutritional goal?: Yes  Are there current issues with pain management in this patient?:   No  Are there issues with skin integrity?: No  Are there issues with delirium?: No  Has the patient been mobilized?: Yes  Is this patient currently intubated?: No  Are there ethical or care philosophy or family issues?: No  Do you recommend an in depth evaluation?: No  Do you recommend the patient should: : Be downgraded/transitioned out of   ICU

## 2021-06-13 NOTE — PROGRESS NOTES
..  Cheri Rees arrived to room # 145. Presented with: sz  Mental Status: Patient is sleepy. Vitals:    06/13/21 0400   BP: (!) 125/57   Pulse: 56   Resp: 14   Temp:    SpO2:      Patient safety contract and falls prevention contract reviewed with patient Yes. Oriented Family to room. Call light within reach. Yes.   Needs, issues or concerns expressed at this time: no.      Electronically signed by Melchor Henning RN on 6/13/2021 at 4:51 AM

## 2021-06-14 ENCOUNTER — APPOINTMENT (OUTPATIENT)
Dept: MRI IMAGING | Age: 76
DRG: 101 | End: 2021-06-14
Payer: MEDICARE

## 2021-06-14 LAB
EKG P AXIS: 29 DEGREES
EKG P-R INTERVAL: 160 MS
EKG Q-T INTERVAL: 492 MS
EKG QRS DURATION: 116 MS
EKG QTC CALCULATION (BAZETT): 491 MS
EKG T AXIS: 135 DEGREES
GLUCOSE BLD-MCNC: 146 MG/DL (ref 70–99)
GLUCOSE BLD-MCNC: 165 MG/DL (ref 70–99)
GLUCOSE BLD-MCNC: 210 MG/DL (ref 70–99)
PERFORMED ON: ABNORMAL

## 2021-06-14 PROCEDURE — 1210000000 HC MED SURG R&B

## 2021-06-14 PROCEDURE — A9577 INJ MULTIHANCE: HCPCS | Performed by: PSYCHIATRY & NEUROLOGY

## 2021-06-14 PROCEDURE — 6370000000 HC RX 637 (ALT 250 FOR IP): Performed by: FAMILY MEDICINE

## 2021-06-14 PROCEDURE — 99232 SBSQ HOSP IP/OBS MODERATE 35: CPT | Performed by: PSYCHIATRY & NEUROLOGY

## 2021-06-14 PROCEDURE — 82947 ASSAY GLUCOSE BLOOD QUANT: CPT

## 2021-06-14 PROCEDURE — 2580000003 HC RX 258: Performed by: FAMILY MEDICINE

## 2021-06-14 PROCEDURE — 2580000003 HC RX 258: Performed by: PHYSICIAN ASSISTANT

## 2021-06-14 PROCEDURE — 6360000002 HC RX W HCPCS: Performed by: PSYCHIATRY & NEUROLOGY

## 2021-06-14 PROCEDURE — 6360000004 HC RX CONTRAST MEDICATION: Performed by: PSYCHIATRY & NEUROLOGY

## 2021-06-14 PROCEDURE — 6360000002 HC RX W HCPCS: Performed by: PHYSICIAN ASSISTANT

## 2021-06-14 PROCEDURE — 70553 MRI BRAIN STEM W/O & W/DYE: CPT

## 2021-06-14 PROCEDURE — 93010 ELECTROCARDIOGRAM REPORT: CPT | Performed by: INTERNAL MEDICINE

## 2021-06-14 PROCEDURE — 6370000000 HC RX 637 (ALT 250 FOR IP): Performed by: PSYCHIATRY & NEUROLOGY

## 2021-06-14 PROCEDURE — 6370000000 HC RX 637 (ALT 250 FOR IP): Performed by: PHYSICIAN ASSISTANT

## 2021-06-14 RX ORDER — NICOTINE POLACRILEX 4 MG
15 LOZENGE BUCCAL PRN
Status: DISCONTINUED | OUTPATIENT
Start: 2021-06-14 | End: 2021-06-15 | Stop reason: HOSPADM

## 2021-06-14 RX ORDER — AMITRIPTYLINE HYDROCHLORIDE 10 MG/1
10 TABLET, FILM COATED ORAL NIGHTLY
Status: DISCONTINUED | OUTPATIENT
Start: 2021-06-14 | End: 2021-06-15 | Stop reason: HOSPADM

## 2021-06-14 RX ORDER — DEXTROSE MONOHYDRATE 25 G/50ML
12.5 INJECTION, SOLUTION INTRAVENOUS PRN
Status: DISCONTINUED | OUTPATIENT
Start: 2021-06-14 | End: 2021-06-15 | Stop reason: HOSPADM

## 2021-06-14 RX ORDER — ISOSORBIDE MONONITRATE 60 MG/1
60 TABLET, EXTENDED RELEASE ORAL 2 TIMES DAILY
Status: DISCONTINUED | OUTPATIENT
Start: 2021-06-14 | End: 2021-06-15 | Stop reason: HOSPADM

## 2021-06-14 RX ORDER — DEXTROSE MONOHYDRATE 50 MG/ML
100 INJECTION, SOLUTION INTRAVENOUS PRN
Status: DISCONTINUED | OUTPATIENT
Start: 2021-06-14 | End: 2021-06-15 | Stop reason: HOSPADM

## 2021-06-14 RX ORDER — PANTOPRAZOLE SODIUM 40 MG/1
40 TABLET, DELAYED RELEASE ORAL
Status: DISCONTINUED | OUTPATIENT
Start: 2021-06-15 | End: 2021-06-15 | Stop reason: HOSPADM

## 2021-06-14 RX ORDER — NITROGLYCERIN 0.4 MG/1
0.4 TABLET SUBLINGUAL EVERY 5 MIN PRN
Status: DISCONTINUED | OUTPATIENT
Start: 2021-06-14 | End: 2021-06-15 | Stop reason: HOSPADM

## 2021-06-14 RX ORDER — RANOLAZINE 500 MG/1
1000 TABLET, EXTENDED RELEASE ORAL 2 TIMES DAILY
Status: DISCONTINUED | OUTPATIENT
Start: 2021-06-14 | End: 2021-06-15 | Stop reason: HOSPADM

## 2021-06-14 RX ORDER — METOPROLOL SUCCINATE 50 MG/1
50 TABLET, EXTENDED RELEASE ORAL DAILY
Status: DISCONTINUED | OUTPATIENT
Start: 2021-06-14 | End: 2021-06-15 | Stop reason: HOSPADM

## 2021-06-14 RX ORDER — LEVETIRACETAM 500 MG/1
1000 TABLET ORAL 2 TIMES DAILY
Status: DISCONTINUED | OUTPATIENT
Start: 2021-06-14 | End: 2021-06-15 | Stop reason: HOSPADM

## 2021-06-14 RX ADMIN — LEVETIRACETAM 1000 MG: 500 TABLET ORAL at 19:36

## 2021-06-14 RX ADMIN — AMITRIPTYLINE HYDROCHLORIDE 10 MG: 10 TABLET, FILM COATED ORAL at 19:45

## 2021-06-14 RX ADMIN — LEVETIRACETAM 1000 MG: 500 TABLET ORAL at 10:08

## 2021-06-14 RX ADMIN — SODIUM CHLORIDE, PRESERVATIVE FREE 10 ML: 5 INJECTION INTRAVENOUS at 19:44

## 2021-06-14 RX ADMIN — LEVETIRACETAM 1000 MG: 10 INJECTION INTRAVENOUS at 01:05

## 2021-06-14 RX ADMIN — ACETAMINOPHEN 650 MG: 325 TABLET ORAL at 19:37

## 2021-06-14 RX ADMIN — METOPROLOL SUCCINATE 50 MG: 50 TABLET, EXTENDED RELEASE ORAL at 17:16

## 2021-06-14 RX ADMIN — ENOXAPARIN SODIUM 40 MG: 40 INJECTION SUBCUTANEOUS at 10:08

## 2021-06-14 RX ADMIN — RANOLAZINE 1000 MG: 500 TABLET, FILM COATED, EXTENDED RELEASE ORAL at 19:36

## 2021-06-14 RX ADMIN — GADOBENATE DIMEGLUMINE 15 ML: 529 INJECTION, SOLUTION INTRAVENOUS at 09:03

## 2021-06-14 RX ADMIN — ISOSORBIDE MONONITRATE 60 MG: 60 TABLET, EXTENDED RELEASE ORAL at 19:37

## 2021-06-14 RX ADMIN — SODIUM CHLORIDE, PRESERVATIVE FREE 10 ML: 5 INJECTION INTRAVENOUS at 10:08

## 2021-06-14 RX ADMIN — ACETAMINOPHEN 650 MG: 325 TABLET ORAL at 11:18

## 2021-06-14 ASSESSMENT — PAIN SCALES - GENERAL
PAINLEVEL_OUTOF10: 2
PAINLEVEL_OUTOF10: 0
PAINLEVEL_OUTOF10: 0
PAINLEVEL_OUTOF10: 1
PAINLEVEL_OUTOF10: 7
PAINLEVEL_OUTOF10: 0
PAINLEVEL_OUTOF10: 6

## 2021-06-14 ASSESSMENT — PAIN DESCRIPTION - LOCATION
LOCATION: HEAD
LOCATION: HEAD

## 2021-06-14 ASSESSMENT — PAIN DESCRIPTION - FREQUENCY
FREQUENCY: INTERMITTENT
FREQUENCY: INTERMITTENT

## 2021-06-14 ASSESSMENT — PAIN DESCRIPTION - DESCRIPTORS
DESCRIPTORS: ACHING
DESCRIPTORS: ACHING

## 2021-06-14 ASSESSMENT — PAIN DESCRIPTION - PROGRESSION
CLINICAL_PROGRESSION: NOT CHANGED
CLINICAL_PROGRESSION: NOT CHANGED

## 2021-06-14 ASSESSMENT — PAIN - FUNCTIONAL ASSESSMENT
PAIN_FUNCTIONAL_ASSESSMENT: ACTIVITIES ARE NOT PREVENTED
PAIN_FUNCTIONAL_ASSESSMENT: ACTIVITIES ARE NOT PREVENTED

## 2021-06-14 ASSESSMENT — PAIN DESCRIPTION - ONSET
ONSET: GRADUAL
ONSET: GRADUAL

## 2021-06-14 ASSESSMENT — PAIN DESCRIPTION - ORIENTATION
ORIENTATION: ANTERIOR
ORIENTATION: ANTERIOR

## 2021-06-14 ASSESSMENT — PAIN DESCRIPTION - PAIN TYPE
TYPE: ACUTE PAIN
TYPE: ACUTE PAIN

## 2021-06-14 NOTE — PROGRESS NOTES
[facility] Neurology Progress Note      Patient:   Hung Maxwell  MR#:    [id number]   Room:    1839/021-36   YOB: 1945  Date of Progress Note: 6/14/2021  Time of Note                           12:39 PM  Consulting Physician:  Josh Higginbotham DO  Attending Physician:  Rae Best MD      INTERVAL HISTORY:  No acute events, no further seizures, no new focal complaints this am.     REVIEW OF SYSTEMS:  Constitutional: No fevers No chills  Neck:No stiffness  Respiratory: No shortness of breath  Cardiovascular: No chest pain No palpitations  Gastrointestinal: No abdominal pain    Genitourinary: No Dysuria  Neurological: No headache, no confusion    PHYSICAL EXAM:    Constitutional -   BP (!) 135/35   Pulse 70   Temp 98.7 °F (37.1 °C) (Temporal)   Resp 13   Ht 5' 6\" (1.676 m)   Wt 166 lb 9.6 oz (75.6 kg)   SpO2 92%   BMI 26.89 kg/m²   General appearance: No acute distress   EYES -   Conjunctiva normal  Pupillary exam as below, see CN exam in the neurologic exam  ENT-    No scars, masses, or lesions over external nose or ears  Hearing normal bilaterally to finger rub  Neck-   No neck masses noted  Thyroid normal   No jugular vein distension  Cardiovascular -   No clubbing, cyanosis, or edema   Pulmonary-   Good expansion, normal effort without use of accessory muscles  Musculoskeletal -   No significant wasting of muscles noted  Gait as below, see gait exam in the neurologic exam  Muscle strength, tone, stability as below see the motor exam in the neurologic exam.   No bony deformities  Skin -   Warm, dry, and intact to inspection and palpation.     No rash, erythema, or pallor  Psychiatric -   Mood, affect, and behavior appear normal    Memory as below see mental status examination in the neurologic exam      NEUROLOGICAL EXAM    Mental status   [x] Awake, alert, oriented   [x] Affect attention and concentration appear appropriate  [x] Recent and remote memory appears unremarkable  [x] Speech chronic ischemic changes, partial empty sella noted. EEG normal.       PLAN:  1. Continue Keppra at 1000 mg bid   2. Ativan prn, seizure precautions. Please feel free to call with any questions. [phone] (cell phone).     Prashanth Bella DO  Board Certified Neurology

## 2021-06-14 NOTE — CONSULTS
Palliative Care:   Ms. Sandy Suresh is a very pleasant 76 yr old admitted with seizures. She is known to palliative care team.  Pt is currently up in chair, alert and oriented. Dtr, Wilma Staff, is present and assists with information. Past Medical History:        Past Medical History:   Diagnosis Date    Acute cystitis without hematuria 10/28/2020    Asthma     CAD (coronary artery disease)     Chest pain     Depression with anxiety     Diabetes mellitus (HCC)     GERD (gastroesophageal reflux disease)     Glucose intolerance (impaired glucose tolerance)     Hyperlipidemia     Cholesterol management per Danielle Liu M.D.   [address] [place] [address] Hypertension     Lupus (systemic lupus erythematosus) [facility]     Malaise and fatigue 10/6/2017    Movement disorder     Sees pain management for neck pain and previous surgery    New onset seizure ([facility]     Osteoarthritis     Palliative care patient 07/22/2020    S/P CABG x 3 03/05/2013    PACABG X 3, LIMA-LAD, SVG0OM, SVG-DIAG, RT EVH, DR PATEL    Subdural hematoma ([facility]        Advance Directives: On file. DNR-CC, ACP completed last admission, reviewed, no changes. Pain/Other Symptoms:   Back pain. Pt states she is sensitive to pain meds. Ok with Tylenol or Ibuprofen. Nurse aware. Activity:     As violette        Psychological/Spiritual:  Pt states good family and spirtual support. Also tells me she has not attended [employer] since the pandemic. Plan:    Med management    Patient/family discussion r/t goals:  Palliative has been following pt since last admission. At that time pt had a fall with \"small bleed\" per dtr. No surgical intervention. Pt did develop seizures at th time ans ia managed with medications. Pt lives alone with good children support. \Pt tells me she has what she needs in the home. SHe uses a Rolator for ambulation. Also had raised seat for RR and a BSC for night time needs. Pt tells me she has problems with SOA on exertion.  States when she amb short distances she does have SOA and is able to recover with rest. Encouraged pt to discuss with MD.  Pt and dtr review pt health hx with this nurse. Pt did have 3 bypass CABG in 2013. Also had stents placed 2007,2016, and last in 2020. Pt has inhaler and nebulizer at home. Pt tells me d/t chronic back pain which started about a yr ago, she noticed weight loss. Pt also talks with me about her weight gain,dtr, states it is most likely d/t her calling pt daily and asking if she is eating. Pt states at times she is not hungry but feels like she should eat. She does have some meds scheduled as per her MD orders prior to admission. States being able to be up in chair is helpful as well as side lying position. Currently pt BP elevated. Otherwise VSS.               Electronically signed by Irene Angeles RN on 6/14/2021 at 11:22 AM

## 2021-06-14 NOTE — PROGRESS NOTES
4 Eyes Skin Assessment    Jaya Poole is being assessed upon: Transfer to New Unit    I agree that I, Harsh Cheung, RN, along with Anahi Charles RN (either 2 RN's or 1 LPN and 1 RN) have performed a thorough Head to Toe Skin Assessment on the patient. ALL assessment sites listed below have been assessed. Areas assessed by both nurses:     [x]   Head, Face, and Ears   [x]   Shoulders, Back, and Chest  [x]   Arms, Elbows, and Hands   [x]   Coccyx, Sacrum, and Ischium  [x]   Legs, Feet, and Heels    Does the Patient have Skin Breakdown?  No    Sunday Prevention initiated: Yes  Wound Care Orders initiated: NA    WOC nurse consulted for Pressure Injury (Stage 3,4, Unstageable, DTI, NWPT, and Complex wounds) and New or Established Ostomies: NA        Primary Nurse eSignature: Harsh Cheung RN on 6/14/2021 at 5:12 PM      Co-Signer eSignature: Electronically signed by Anahi Charles RN on 6/14/21 at 5:13 PM CDT

## 2021-06-14 NOTE — PROGRESS NOTES
Progress Note  Megha Sweet  6/14/2021 1:53 PM  Subjective:   Admit Date:   6/12/2021      CC/ADMIT DX:       Interval History:   Reviewed overnight events and nursing notes. She has no CP or SOA. No GI issues. No seizures. I have reviewed all labs/diagnostics from the last 24hrs. ROS:   I have done a 10 point ROS and all are negative, except what is mentioned in the HPI. ADULT DIET; Regular; 4 carb choices (60 gm/meal)    Medications:      sodium chloride        levETIRAcetam  1,000 mg Oral BID    sodium chloride flush  5-40 mL Intravenous 2 times per day    enoxaparin  40 mg Subcutaneous Daily           Objective:   Vitals: BP (!) 137/57   Pulse 72   Temp 98.7 °F (37.1 °C) (Temporal)   Resp 16   Ht 5' 6\" (1.676 m)   Wt 166 lb 9.6 oz (75.6 kg)   SpO2 95%   BMI 26.89 kg/m²      Intake/Output Summary (Last 24 hours) at 6/14/2021 1353  Last data filed at 6/14/2021 1000  Gross per 24 hour   Intake 715 ml   Output --   Net 715 ml     General appearance: alert and cooperative with exam  Lungs: clear to auscultation bilaterally  Heart: RRR  Abdomen: soft, non-tender; bowel sounds normal; no masses,  no organomegaly  Extremities: extremities normal, atraumatic, no cyanosis or edema  Neurologic:  No obvious focal neurologic deficits. Assessment and Plan: Active Problems:    CAD (coronary artery disease)    Hypertension    Hx of CABG    SLE (systemic lupus erythematosus) ([facility]    Status epilepticus ([facility]  Resolved Problems:    * No resolved hospital problems. *        Plan:  1. Continue present medication(s)   2. Transfer  3. Follow with Neurology  4. Resume some home medicine      Discharge planning:   her home     Reviewed treatment plans with the patient and/or family.              Electronically signed by Tawnya Henao MD on 6/14/2021 at 1:53 PM

## 2021-06-15 VITALS
HEART RATE: 73 BPM | RESPIRATION RATE: 16 BRPM | DIASTOLIC BLOOD PRESSURE: 74 MMHG | BODY MASS INDEX: 26.02 KG/M2 | WEIGHT: 161.9 LBS | HEIGHT: 66 IN | SYSTOLIC BLOOD PRESSURE: 153 MMHG | TEMPERATURE: 96.8 F | OXYGEN SATURATION: 96 %

## 2021-06-15 LAB
GLUCOSE BLD-MCNC: 137 MG/DL (ref 70–99)
GLUCOSE BLD-MCNC: 162 MG/DL (ref 70–99)
PERFORMED ON: ABNORMAL
PERFORMED ON: ABNORMAL

## 2021-06-15 PROCEDURE — 82947 ASSAY GLUCOSE BLOOD QUANT: CPT

## 2021-06-15 PROCEDURE — 6360000002 HC RX W HCPCS: Performed by: FAMILY MEDICINE

## 2021-06-15 PROCEDURE — 6370000000 HC RX 637 (ALT 250 FOR IP): Performed by: FAMILY MEDICINE

## 2021-06-15 PROCEDURE — 99232 SBSQ HOSP IP/OBS MODERATE 35: CPT | Performed by: PSYCHIATRY & NEUROLOGY

## 2021-06-15 PROCEDURE — 2580000003 HC RX 258: Performed by: FAMILY MEDICINE

## 2021-06-15 RX ORDER — LEVETIRACETAM 1000 MG/1
1000 TABLET ORAL 2 TIMES DAILY
Qty: 60 TABLET | Refills: 3 | Status: SHIPPED | OUTPATIENT
Start: 2021-06-15 | End: 2022-04-04 | Stop reason: SDUPTHER

## 2021-06-15 RX ADMIN — SODIUM CHLORIDE, PRESERVATIVE FREE 10 ML: 5 INJECTION INTRAVENOUS at 07:39

## 2021-06-15 RX ADMIN — LEVETIRACETAM 1000 MG: 500 TABLET ORAL at 07:38

## 2021-06-15 RX ADMIN — METOPROLOL SUCCINATE 50 MG: 50 TABLET, EXTENDED RELEASE ORAL at 07:38

## 2021-06-15 RX ADMIN — ACETAMINOPHEN 650 MG: 325 TABLET ORAL at 06:09

## 2021-06-15 RX ADMIN — PANTOPRAZOLE SODIUM 40 MG: 40 TABLET, DELAYED RELEASE ORAL at 06:09

## 2021-06-15 RX ADMIN — ENOXAPARIN SODIUM 40 MG: 40 INJECTION SUBCUTANEOUS at 07:39

## 2021-06-15 RX ADMIN — ISOSORBIDE MONONITRATE 60 MG: 60 TABLET, EXTENDED RELEASE ORAL at 07:38

## 2021-06-15 RX ADMIN — RANOLAZINE 1000 MG: 500 TABLET, FILM COATED, EXTENDED RELEASE ORAL at 07:38

## 2021-06-15 ASSESSMENT — PAIN SCALES - GENERAL
PAINLEVEL_OUTOF10: 1
PAINLEVEL_OUTOF10: 9

## 2021-06-15 NOTE — PROGRESS NOTES
[facility] Neurology Progress Note      Patient:   Jaya Poole  MR#:    [id number]   Room:    Formerly named [facility]/310-01   YOB: 1945  Date of Progress Note: 6/15/2021  Time of Note                           10:02 AM  Consulting Physician:  Amaris Corona DO  Attending Physician:  Travis Terrazas MD      INTERVAL HISTORY:  No acute events, no further seizures, no new focal complaints this am, more alert, improving. REVIEW OF SYSTEMS:  Constitutional: No fevers No chills  Neck:No stiffness  Respiratory: No shortness of breath  Cardiovascular: No chest pain No palpitations  Gastrointestinal: No abdominal pain    Genitourinary: No Dysuria  Neurological: No headache, no confusion    PHYSICAL EXAM:    Constitutional -   BP (!) 161/74   Pulse 66   Temp 96.3 °F (35.7 °C) (Temporal)   Resp 16   Ht 5' 6\" (1.676 m)   Wt 161 lb 14.4 oz (73.4 kg)   SpO2 96%   BMI 26.13 kg/m²   General appearance: No acute distress   EYES -   Conjunctiva normal  Pupillary exam as below, see CN exam in the neurologic exam  ENT-    No scars, masses, or lesions over external nose or ears  Hearing normal bilaterally to finger rub  Neck-   No neck masses noted  Thyroid normal   No jugular vein distension  Cardiovascular -   No clubbing, cyanosis, or edema   Pulmonary-   Good expansion, normal effort without use of accessory muscles  Musculoskeletal -   No significant wasting of muscles noted  Gait as below, see gait exam in the neurologic exam  Muscle strength, tone, stability as below see the motor exam in the neurologic exam.   No bony deformities  Skin -   Warm, dry, and intact to inspection and palpation.     No rash, erythema, or pallor  Psychiatric -   Mood, affect, and behavior appear normal    Memory as below see mental status examination in the neurologic exam      NEUROLOGICAL EXAM    Mental status   [x] Awake, alert, oriented   [x] Affect attention and concentration appear appropriate  [x] Recent and remote memory appears unremarkable  [x] Speech normal without dysarthria or aphasia, comprehension and repetition intact. COMMENTS:   Cranial Nerves [x] No VF deficit to confrontation  [x] PERRLA, EOMI, no nystagmus, conjugate eye movements, no ptosis  [x] Face symmetric  [x] Facial sensation intact  [x] Tongue midline no atrophy or fasciculations present  [x] Palate midline, hearing to finger rub normal  [x] Shoulder shrug and SCM testing normal  COMMENTS:   Motor   [x] 5/5 strength x 4 extremities  [x] Normal bulk and tone  [x] No tremor present  [x] No rigidity or bradykinesia noted  COMMENTS:   Sensory  [x] Sensation intact to light touch, pin prick, vibration, and proprioception BLE  [] Sensation intact to light touch, pin prick, vibration, and proprioception BUE  COMMENTS:   Coordination [x] FTN normal bilaterally   [] HTS normal bilaterally  [] QUIRINO normal.   COMMENTS:   Reflexes  [x] Symmetric and non-pathological  [x] Toes downgoing bilaterally  [x] No clonus present  COMMENTS:   Gait                  [] Normal steady gait    [] Ataxic    [] Spastic     [] Magnetic     [] Shuffling  [x] Not assessed  COMMENTS:       LABS/IMAGING:    CT Head WO Contrast    Result Date: 6/13/2021  CT HEAD WO CONTRAST 6/13/2021 10:01 AM History: Altered mental status. Seizures. Past history of subarachnoid hemorrhage. Noncontrast head CT compared with 28 October 2020. In order to have a CT radiation dose as low as reasonably achievable Automated Exposure Control was utilized for adjustment of the mA and/or KV according to patient size. DLP in mGycm= 798. A preliminary [employer] report was transmitted to the appropriate department immediately after this study was interpreted. Axial, sagittal, and coronal noncontrast CT imaging of the head. The visualized paranasal sinuses are clear and normally aerated. The mastoid air cells are clear. The brain and ventricles have an age-appropriate appearance. Mild small vessel disease.  There is no hemorrhage or mass-effect. No acute infarct is seen. No calvarial abnormality. 1. No acute intracranial abnormality is seen. Signed by Dr Shiv Aparicio    XR CHEST PORTABLE    Result Date: 6/13/2021  XR CHEST PORTABLE 6/13/2021 10:19 AM History: Altered mental status. Portable chest x-ray compared with 20 July 2020. Increased perihilar and interstitial prominence compatible with heart failure. Asymmetric prominence of the right hilum should warrant follow-up contrast enhanced chest CT when the patient is able. No pneumothorax. 1. Increased heart failure changes. 2. Asymmetric enlargement of the right hilum for which follow-up contrast enhanced chest CT is recommended. Signed by Dr Cash Hernandez Results   Component Value Date    WBC 4.1 (L) 06/12/2021    HGB 11.6 (L) 06/12/2021    HCT 33.9 (L) 06/12/2021    MCV 95.2 06/12/2021     06/12/2021     Lab Results   Component Value Date     (L) 06/12/2021    K 4.6 06/12/2021    CL 98 06/12/2021    CO2 25 06/12/2021    BUN 18 06/12/2021    CREATININE 0.8 06/12/2021    GLUCOSE 209 (H) 06/12/2021    CALCIUM 9.2 06/12/2021    PROT 6.7 06/12/2021    LABALBU 4.6 06/12/2021    BILITOT 0.5 06/12/2021    ALKPHOS 109 (H) 06/12/2021    AST 16 06/12/2021    ALT 22 06/12/2021    LABGLOM >60 06/12/2021    GFRAA >59 06/12/2021    GLOB 1.8 11/19/2016     Lab Results   Component Value Date    INR 0.97 10/27/2020    INR 0.99 10/25/2020    INR 0.96 10/24/2020    PROTIME 12.7 10/27/2020    PROTIME 12.9 10/25/2020    PROTIME 12.7 10/24/2020       RECORD REVIEW:   Previous medical records, medications were reviewed at today's visit. Nursing/physician notes, imaging, labs and vitals reviewed. PT,OT and/or speech notes reviewed       ASSESSMENT:  76 y.o. 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 Exam remains improved.   MRI brain with

## 2021-06-15 NOTE — CARE COORDINATION
Date / Time of Evaluation: 6/15/2021 1:57 PM  Assessment Completed by: FRANSISCA Menjivar    Patient Admission Status: Inpatient [101]    [address] [phone] (home)   Telephone Information:   Mobile [phone]       (Best Practice:  Have patient / caregiver verify above address and phone number by stating out loud their current address and reachable phone number.)  Is above information correct? yes      Current PCP:  Myranda Fritz MD  PCP verified? yes    Initial Assessment Completed at bedside with:  Pt laying in bed;     Emergency Contacts:  Extended Emergency Contact Information  Primary Emergency Contact: Jaiden Darling [address], [address] [facility] of [address] [place] [address] Phone: [phone]  Mobile Phone: [phone]  Relation: Child  Secondary Emergency Contact: [facility] of [address] [place] [address] Phone: [phone]  Mobile Phone: [phone]  Relation: Child    Advance Directives: Code Status:  DNR-CC    Financial:  Payor: MEDICARE / Plan: MEDICARE PART A AND B / Product Type: *No Product type* /     Pre-Cert required for SNF:  no    Have Long Term Care Insurance:  no    Pharmacy:   [place] [id number] [facility] [phone] [address] [address] [facility] [address]  Phone: [phone] Fax: [phone] [address] [phone] - f [phone]  [address] [place] [address]  Phone: [phone] Fax: [phone]      Potential assistance purchasing medications?   no    ADLS:  Support System:  Children    Current Home Environment:  Home with children taking turns staying with her  Steps:  unknown    Plans to RETURN to current housing: yes  Barriers to RETURNING to current housing:  no    Currently ACTIVE with Home Health CARE:  No-uncertain she wants it, but wants [address]:  none    DME Provider: none    Has a pulse oximetry unit at home: no    Had [address] prior to admission:  no    Active with HD/PD prior to admission: no  Nephrologist:  no  HD Center:  no    Transition Plan:   home with family support    Transportation PLAN for Discharge:  Private vehicle    Factors facilitating achievement of predicted outcomes:     Barriers to discharge: Additional CM/SW Notes: Pt laying in bed; stated she lives at home; her daughters come stay with her and alternate; she stated she was not current with [facility]; but seemed somewhat interested in a bath aide; she noted she has rollator and shower chair;           Bert Heck and/or her family were provided with choice of provider.         Antoinette Willingham, [address] [place] [address]  Care Management Department  Ph:  [phone]   Fax: [phone]  Electronically signed by FRANSISCA Nichole on 6/15/2021 at 3:43 PM

## 2021-06-15 NOTE — PLAN OF CARE
Problem: Falls - Risk of:  Goal: Will remain free from falls  Description: Will remain free from falls  6/15/2021 0041 by Katy Reese RN  Outcome: Ongoing  6/14/2021 1240 by Dion Delaney RN  Outcome: Ongoing  Goal: Absence of physical injury  Description: Absence of physical injury  6/15/2021 0041 by Katy Reese RN  Outcome: Ongoing  6/14/2021 1240 by Dion Delaney RN  Outcome: Ongoing     Problem: Discharge Planning:  Goal: Participates in care planning  Description: Participates in care planning  6/15/2021 0041 by Katy Reese RN  Outcome: Ongoing  6/14/2021 1240 by Dion Delaney RN  Outcome: Ongoing  Goal: Discharged to appropriate level of care  Description: Discharged to appropriate level of care  6/15/2021 0041 by Katy Reese RN  Outcome: Ongoing  6/14/2021 1240 by Dion Delaney RN  Outcome: Ongoing     Problem: Airway Clearance - Ineffective:  Goal: Ability to maintain a clear airway will improve  Description: Ability to maintain a clear airway will improve  6/15/2021 0041 by Katy Reese RN  Outcome: Ongoing  6/14/2021 1240 by Dion Delaney RN  Outcome: Ongoing
symptoms  6/13/2021 2320 by Nena Reece RN  Outcome: Ongoing  6/13/2021 1508 by Ronald Roque RN  Outcome: Ongoing  Goal: Absence of new skin breakdown  Description: Absence of new skin breakdown  6/13/2021 2320 by Nena Reece RN  Outcome: Ongoing  6/13/2021 1508 by Ronald Roque RN  Outcome: Ongoing

## 2021-06-16 ENCOUNTER — TELEPHONE (OUTPATIENT)
Dept: NEUROSURGERY | Age: 76
End: 2021-06-16

## 2021-06-25 NOTE — DISCHARGE SUMMARY
Hospital Discharge Summary    Vesta Osman  :  1945  MRN:  [id number]    Admit date:  2021  Discharge date:  6/15/2021    Admitting Physician:    Jarret Sunshine MD    Discharge Diagnoses: Active Problems:    CAD (coronary artery disease)    Hypertension    Hx of CABG    SLE (systemic lupus erythematosus) ([facility]    Status epilepticus ([facility]  Resolved Problems:    * No resolved hospital problems. [facility] Course:   She was admitted with a seizure. She was seen by Neurology and her Keppra was adjusted. She is doing well and has had no further seizure. Her VS are stable. She is eating. Discharge Medications:       Junie Hensley   Home Medication Instructions ECV:[id number]    Printed on:21 2369   Medication Information                      albuterol (PROVENTIL HFA;VENTOLIN HFA) 108 (90 BASE) MCG/ACT inhaler  Inhale 2 puffs into the lungs every 6 hours as needed. ALPRAZolam (XANAX) 0.25 MG tablet  Take 0.25 mg by mouth nightly. Half tab             amitriptyline (ELAVIL) 50 MG tablet  Take 50 mg by mouth nightly             amLODIPine (NORVASC) 2.5 MG tablet  Take 2.5 mg by mouth daily             atorvastatin (LIPITOR) 80 MG tablet  Take 1 tablet by mouth nightly             diphenhydrAMINE (BENADRYL) 25 MG tablet  Take 25 mg by mouth every 6 hours as needed for Itching             DULoxetine (CYMBALTA) 60 MG extended release capsule  Take 60 mg by mouth every evening 5 pm             isosorbide mononitrate (IMDUR) 60 MG extended release tablet  TAKE 1 TABLET TWICE A DAY             levETIRAcetam (KEPPRA) 1000 MG tablet  Take 1 tablet by mouth 2 times daily             losartan (COZAAR) 100 MG tablet  Take 1 tablet by mouth daily             metoprolol succinate (TOPROL XL) 50 MG extended release tablet  Take 1 tablet by mouth daily             nitroGLYCERIN (NITROSTAT) 0.4 MG SL tablet  up to max of 3 total doses. If no relief after 1 dose, call 911. pantoprazole (PROTONIX) 40 MG tablet  Take 1 tablet by mouth every morning (before breakfast)             promethazine (PHENERGAN) 12.5 MG tablet  Take 12.5 mg by mouth every 6 hours as needed for Nausea             ranolazine (RANEXA) 1000 MG extended release tablet  TAKE 1 TABLET TWICE A DAY             tiZANidine (ZANAFLEX) 4 MG tablet  Take 4 mg by mouth 2 times daily as needed 1/2 tablet during day, 1/2 tablet at night                 Consults:  Neurology    Significant Diagnostic Studies:  See complete admission record      Disposition:   Home in stable condition  Follow up with Lynett Klinefelter, MD in 1 week. F/U with Neurology as they recommend    Diet: carb control, cardiac    Activity: as tolerated, seizure precautions    Special Instructions: Home Health to follow if pt would like      The patient or family are to call or return if there are any problems, questions, concerns or change in her condition.      Signed:  Lynett Klinefelter, MD MD  6/24/2021, 11:58 PM

## 2021-07-02 ENCOUNTER — OFFICE VISIT (OUTPATIENT)
Dept: NEUROLOGY | Age: 76
End: 2021-07-02
Payer: MEDICARE

## 2021-07-02 VITALS
BODY MASS INDEX: 25.88 KG/M2 | HEART RATE: 79 BPM | SYSTOLIC BLOOD PRESSURE: 153 MMHG | HEIGHT: 66 IN | DIASTOLIC BLOOD PRESSURE: 78 MMHG | WEIGHT: 161 LBS

## 2021-07-02 DIAGNOSIS — R27.0 ATAXIA: ICD-10-CM

## 2021-07-02 DIAGNOSIS — Z87.898 HISTORY OF SEIZURE: ICD-10-CM

## 2021-07-02 DIAGNOSIS — R41.3 MEMORY LOSS: Primary | ICD-10-CM

## 2021-07-02 PROCEDURE — G8417 CALC BMI ABV UP PARAM F/U: HCPCS | Performed by: PSYCHIATRY & NEUROLOGY

## 2021-07-02 PROCEDURE — G8427 DOCREV CUR MEDS BY ELIG CLIN: HCPCS | Performed by: PSYCHIATRY & NEUROLOGY

## 2021-07-02 PROCEDURE — 99214 OFFICE O/P EST MOD 30 MIN: CPT | Performed by: PSYCHIATRY & NEUROLOGY

## 2021-07-02 PROCEDURE — G8399 PT W/DXA RESULTS DOCUMENT: HCPCS | Performed by: PSYCHIATRY & NEUROLOGY

## 2021-07-02 PROCEDURE — 1123F ACP DISCUSS/DSCN MKR DOCD: CPT | Performed by: PSYCHIATRY & NEUROLOGY

## 2021-07-02 PROCEDURE — 4040F PNEUMOC VAC/ADMIN/RCVD: CPT | Performed by: PSYCHIATRY & NEUROLOGY

## 2021-07-02 PROCEDURE — 1036F TOBACCO NON-USER: CPT | Performed by: PSYCHIATRY & NEUROLOGY

## 2021-07-02 PROCEDURE — 1090F PRES/ABSN URINE INCON ASSESS: CPT | Performed by: PSYCHIATRY & NEUROLOGY

## 2021-07-02 PROCEDURE — 3017F COLORECTAL CA SCREEN DOC REV: CPT | Performed by: PSYCHIATRY & NEUROLOGY

## 2021-07-02 PROCEDURE — 1111F DSCHRG MED/CURRENT MED MERGE: CPT | Performed by: PSYCHIATRY & NEUROLOGY

## 2021-07-02 NOTE — PROGRESS NOTES
Chief Complaint   Patient presents with    Follow-up     Patient has not been able to get her EEG completed, she was admitted to the hospital after her last visit here. Trazodone and tramadol were d/c and Keppra changed to 1000mg bid. Patient kept f/u today due to her recent admission, needing f/u after that.  Memory Loss       Chadwick Ramsay is a 76y.o. year old female who is seen for evaluation of her poor balance and memory. She was hospitalized last October after a fall and a small subdural hematoma which was managed conservatively. Since that time she has been complaining of dizziness which is mostly upon standing as well as poor short-term memory, occasional sharp pains in the left temple and poor balance. She also takes numerous medications for chronic pain and insomnia. Has a history of cervical and lumbar fusions. I saw her when she was up in the rehab unit last year. She has a questionable history of seizures and takes Keppra. She had a normal EEG 7/20. Her medications include Elavil 50 mg, Cymbalta 60 mg, trazodone 50 mg, Xanax at times, Keppra, Aricept for the past few months, occasional tramadol, Zanaflex and Benadryl as needed. She is here today with her daughter. She had a normal EEG 6/21. She was hospitalized at that time for seizures. Tramadol discontinued. MRI of the brain showing nothing acute. Keppra dose changed to 1000 mg twice a day. She had been on 500 mg twice a day. She tried reducing her Elavil to 25 mg a day on her last visit due to orthostatic hypotension but had to go back to 50 mg a day because of worsening back pain. She is still ataxic at times and has poor word finding but is on multiple medications as noted above. Has chronic neck and low back pain along with headaches.   Active Ambulatory Problems     Diagnosis Date Noted    Chest pain     Osteoarthritis     GERD (gastroesophageal reflux disease)     Asthma     Glucose intolerance (impaired glucose tolerance)     Depression with anxiety     CAD (coronary artery disease)     Hyperlipidemia     Hypertension     Fatigue 02/25/2016    S/P right coronary artery (RCA) stent placement 02/25/2016    Hx of CABG 02/25/2016    Chest pressure     Coronary artery disease of native artery of native heart with stable angina pectoris ([address]     Malaise and fatigue 10/06/2017    Aphasia 11/29/2017    Ataxia 11/29/2017    Vertigo 11/29/2017    Bilateral carotid artery stenosis 12/28/2017    Chest discomfort 01/10/2019    Unstable angina (HCC)     Acute exacerbation of chronic low back pain 05/28/2020    Intractable back pain 05/29/2020    Altered mental status 06/10/2020    AMS (altered mental status) 06/11/2020    New onset seizure ([address]     Spondylolisthesis at L4-L5 level     Atherosclerotic cardiovascular disease     Palliative care patient 07/22/2020    Subarachnoid hemorrhage ([address] 10/24/2020    SLE (systemic lupus erythematosus) ([address] 10/25/2020    Subarachnoid hemorrhage following injury, no loss of consciousness ([address] 10/25/2020    Intracranial bleed ([address] 10/27/2020    Post-traumatic headache 10/27/2020    Acute cystitis without hematuria 10/28/2020    Status epilepticus ([address] 06/13/2021     Resolved Ambulatory Problems     Diagnosis Date Noted    No Resolved Ambulatory Problems     Past Medical History:   Diagnosis Date    Diabetes mellitus ([address]     Lupus (systemic lupus erythematosus) (HCC)     Movement disorder     S/P CABG x 3 03/05/2013    Subdural hematoma (HCC)        Past Surgical History:   Procedure Laterality Date    APPENDECTOMY  1965    BACK SURGERY      CARDIAC CATHETERIZATION  5/18/11    EF 60%    CARDIAC CATHETERIZATION  1/25/16    drug eluting stent to RCA    CARDIAC CATHETERIZATION  1/25/2016    CARDIAC CATHETERIZATION  2/19/16    CARDIAC CATHETERIZATION  08/24/2017    cutting balloon angioplasty ot LAD    CARDIAC CATHETERIZATION  04/13/2020    TWO Stents placed    CATARACT REMOVAL      COLON SURGERY  1992    Polypectomy - 2    COLON SURGERY  2003    Polypectomy - 4    COLON SURGERY  11/2008    Polypectomy - 2    COLON SURGERY  8/2012    Polypectomy - 3    COLONOSCOPY      CORONARY ANGIOPLASTY  08/2017    cutting balloon angioplasty LAD diagonal    CORONARY ANGIOPLASTY WITH STENT PLACEMENT  3/22/2007    CORONARY ANGIOPLASTY WITH STENT PLACEMENT  8/2007    CORONARY ARTERY BYPASS GRAFT  3/5/2013    PACABG X 3, LIMA-LAD, SVG0OM, SVG-DIAG, RT EVH, DR PATEL    COSMETIC SURGERY      deviated septum and rhinoplasty    CYST REMOVAL  1970    DIAGNOSTIC CARDIAC CATH LAB PROCEDURE      ENDOSCOPY, COLON, DIAGNOSTIC      EYE SURGERY  03/2019    cataracts    HEMORRHOID SURGERY  1987    HYSTERECTOMY  1983    Partial    NASAL SEPTUM SURGERY  1977    SINUS SURGERY  2003    Nasal Polyps Removed    SPINE SURGERY  3/6/2006    Lumbar Laminectomy L4&5    SPINE SURGERY  3/6/2006    Cervical Fusion - C4,5,6    TONSILLECTOMY  1968       Family History   Problem Relation Age of Onset    Heart Disease Mother     High Blood Pressure Mother     Diabetes Mother     High Cholesterol Mother     Diabetes Brother        Allergies   Allergen Reactions    Other      Other reaction(s): Nausea And Vomiting  narcotics    Codeine Nausea Only    Darvocet [Propoxyphene N-Acetaminophen] Nausea Only    Hydrocodone-Acetaminophen Nausea Only    Lyrica [Pregabalin] Other (See Comments)     Tardive dyskinesia    Morphine Nausea Only    Percocet [Oxycodone-Acetaminophen] Nausea Only       Social History     Socioeconomic History    Marital status:       Spouse name: Not on file    Number of children: 2    Years of education: Not on file    Highest education level: Not on file   Occupational History    Not on file   Tobacco Use    Smoking status: Former Smoker     Packs/day: 1.00     Years: 1.00     Pack years: 1.00     Types: Cigarettes     Quit date: 2/26/1967 Years since quittin.4    Smokeless tobacco: Never Used   Vaping Use    Vaping Use: Never used   Substance and Sexual Activity    Alcohol use: No    Drug use: No    Sexual activity: Not Currently   Other Topics Concern    Not on file   Social History Narrative    Not on file     Social Determinants of Health     Financial Resource Strain:     Difficulty of Paying Living Expenses:    Food Insecurity:     Worried About Running Out of Food in the Last Year:     [address] in the Last Year:    Transportation Needs:     Lack of Transportation (Medical):  Lack of Transportation (Non-Medical):    Physical Activity:     Days of Exercise per Week:     Minutes of Exercise per Session:    Stress:     Feeling of Stress :    Social Connections:     Frequency of Communication with Friends and Family:     Frequency of Social Gatherings with Friends and Family:     Attends [employer] Services:     Active Member of Clubs or Organizations:     Attends Club or Organization Meetings:     Marital Status:    Intimate Partner Violence:     Fear of Current or Ex-Partner:     Emotionally Abused:     Physically Abused:     Sexually Abused:        Review of Systems    Constitutional: []? Fever []? Sweats []? Chills []? Recent Injury   [x]? Denies all unless marked  HENT:[]? Headache  []? Head Injury  []? Sore Throat  []? Ear Pain  []? Dizziness []? Hearing Loss   [x]? Denies all unless marked  Spine:  []? Neck pain  []? Back pain  []? Sciaticia  [x]? Denies all unless marked  Cardiovascular:[]? Chest Pain []? Palpitations []? Heart Disease  [x]? Denies all unless marked  Pulmonary: []? Shortness of Breath []? Cough   [x]? Denies all unless marked  Gastrointestinal:  []? Abdominal Pain  []? Blood in Stool  []? Diarrhea []? Constipation []? Nausea  []? Vomiting  [x]? Denies all unless marked  Genitourinary:  []? Dysuria []? Frequency  []? Incontinence []? Urgency   [x]?  Denies all unless marked  Musculoskeletal: []? Arthralgia  []? Myalgias []? Muscle cramps  []? Muscle twitches   [x]? Denies all unless marked   Extremities:   []? Pain   []? Swelling   [x]? Denies all unless marked  Skin:[]? Rash  []? Color Change  [x]? Denies all unless marked  Neurological:[]? Visual Disturbance []? Double Vision []? Slurred Speech []? Trouble swallowing  []? Vertigo []? Tingling []? Numbness []? Weakness []? Loss of Balance   []? Loss of Consciousness []? Memory Loss []? Seizures  [x]? Denies all unless marked  Psychiatric/Behavioral:[]? Depression []? Anxiety  [x]? Denies all unless marked  Sleep: []? Insomnia []? Sleep Disturbance []? Snoring []? Restless Legs []? Daytime Sleepiness []? Sleep Apnea  [x]? Denies all unless marked       Current Outpatient Medications   Medication Sig Dispense Refill    levETIRAcetam (KEPPRA) 1000 MG tablet Take 1 tablet by mouth 2 times daily 60 tablet 3    amLODIPine (NORVASC) 2.5 MG tablet Take 2.5 mg by mouth daily      ranolazine (RANEXA) 1000 MG extended release tablet TAKE 1 TABLET TWICE A  tablet 3    ALPRAZolam (XANAX) 0.25 MG tablet Take 0.25 mg by mouth nightly.  Half tab      amitriptyline (ELAVIL) 50 MG tablet Take 50 mg by mouth nightly      promethazine (PHENERGAN) 12.5 MG tablet Take 12.5 mg by mouth every 6 hours as needed for Nausea      diphenhydrAMINE (BENADRYL) 25 MG tablet Take 25 mg by mouth every 6 hours as needed for Itching      DULoxetine (CYMBALTA) 60 MG extended release capsule Take 60 mg by mouth every evening 5 pm      tiZANidine (ZANAFLEX) 4 MG tablet Take 4 mg by mouth 2 times daily as needed 1/2 tablet during day, 1/2 tablet at night      atorvastatin (LIPITOR) 80 MG tablet Take 1 tablet by mouth nightly (Patient taking differently: Take 80 mg by mouth nightly 5 pm) 30 tablet 0    losartan (COZAAR) 100 MG tablet Take 1 tablet by mouth daily 30 tablet 3    metoprolol succinate (TOPROL XL) 50 MG extended release tablet Take 1 tablet by mouth daily 30 tablet 3  pantoprazole (PROTONIX) 40 MG tablet Take 1 tablet by mouth every morning (before breakfast) 30 tablet 3    isosorbide mononitrate (IMDUR) 60 MG extended release tablet TAKE 1 TABLET TWICE A  tablet 3    nitroGLYCERIN (NITROSTAT) 0.4 MG SL tablet up to max of 3 total doses. If no relief after 1 dose, call 911. 30 tablet 3    albuterol (PROVENTIL HFA;VENTOLIN HFA) 108 (90 BASE) MCG/ACT inhaler Inhale 2 puffs into the lungs every 6 hours as needed. No current facility-administered medications for this visit. Outpatient Medications Marked as Taking for the 7/2/21 encounter (Office Visit) with Princess Mercedes MD   Medication Sig Dispense Refill    levETIRAcetam (KEPPRA) 1000 MG tablet Take 1 tablet by mouth 2 times daily 60 tablet 3    amLODIPine (NORVASC) 2.5 MG tablet Take 2.5 mg by mouth daily      ranolazine (RANEXA) 1000 MG extended release tablet TAKE 1 TABLET TWICE A  tablet 3    ALPRAZolam (XANAX) 0.25 MG tablet Take 0.25 mg by mouth nightly.  Half tab      amitriptyline (ELAVIL) 50 MG tablet Take 50 mg by mouth nightly      promethazine (PHENERGAN) 12.5 MG tablet Take 12.5 mg by mouth every 6 hours as needed for Nausea      diphenhydrAMINE (BENADRYL) 25 MG tablet Take 25 mg by mouth every 6 hours as needed for Itching      DULoxetine (CYMBALTA) 60 MG extended release capsule Take 60 mg by mouth every evening 5 pm      tiZANidine (ZANAFLEX) 4 MG tablet Take 4 mg by mouth 2 times daily as needed 1/2 tablet during day, 1/2 tablet at night      atorvastatin (LIPITOR) 80 MG tablet Take 1 tablet by mouth nightly (Patient taking differently: Take 80 mg by mouth nightly 5 pm) 30 tablet 0    losartan (COZAAR) 100 MG tablet Take 1 tablet by mouth daily 30 tablet 3    metoprolol succinate (TOPROL XL) 50 MG extended release tablet Take 1 tablet by mouth daily 30 tablet 3    pantoprazole (PROTONIX) 40 MG tablet Take 1 tablet by mouth every morning (before breakfast) 30 tablet 3    isosorbide mononitrate (IMDUR) 60 MG extended release tablet TAKE 1 TABLET TWICE A  tablet 3    nitroGLYCERIN (NITROSTAT) 0.4 MG SL tablet up to max of 3 total doses. If no relief after 1 dose, call 911. 30 tablet 3    albuterol (PROVENTIL HFA;VENTOLIN HFA) 108 (90 BASE) MCG/ACT inhaler Inhale 2 puffs into the lungs every 6 hours as needed. BP (!) 153/78   Pulse 79   Ht 5' 6\" (1.676 m)   Wt 161 lb (73 kg)   BMI 25.99 kg/m²       Constitutional - well developed, well nourished. Eyes - conjunctiva normal.  Pupils react to light  Ear, nose, throat -hearing intact to finger rub No scars, masses, or lesions over external nose or ears, no atrophy of tongue  Neck-symmetric, no masses noted, no jugular vein distension. No bruits noted. Respiration- chest wall appears symmetric, good expansion,   normal effort without use of accessory muscles  Cardiovascular- RRR  Musculoskeletal - no significant wasting of muscles noted, no bony deformities, gait no gross ataxia  Extremities-no clubbing, cyanosis or edema  Skin - warm, dry, and intact. No rash, erythema, or pallor. Psychiatric - mood, affect, and behavior appear normal.      Neurological exam  Awake, alert, fluent oriented x 3 appropriate affect  Attention and concentration appear appropriate  Recent and remote memory appears unremarkable. Normal complex commands. Speech normal without dysarthria  No clear issues with language of fund of knowledge    Cranial Nerve Exam   CN II- Visual fields grossly unremarkable. VA adequate.    CN III, IV,VI- PERRLA, EOMI, No nystagmus, conjugate eye movements, no ptosis  CN VII-no facial asymmetry  CN VIII-Hearing intact   CN IX and X- Palate elevates in midline  CN XI-good shoulder shrug  CN XII-Tongue midline with no fasciculations or fibrillations    Motor Exam  V/V throughout upper and lower extremities bilaterally, no cogwheeling, normal tone      Reflexes   Absent throughout    Tremors- no tremors in hands or head noted    Gait  Slow. Somewhat unsteady    Coordination  Finger to nose and QUIRINO-unremarkable    Lab Results   Component Value Date    [id number] 06/13/2021     Lab Results   Component Value Date    WBC 4.1 (L) 06/12/2021    HGB 11.6 (L) 06/12/2021    HCT 33.9 (L) 06/12/2021    MCV 95.2 06/12/2021     06/12/2021     Lab Results   Component Value Date     (L) 06/12/2021    K 4.6 06/12/2021    CL 98 06/12/2021    CO2 25 06/12/2021    BUN 18 06/12/2021    CREATININE 0.8 06/12/2021    GLUCOSE 209 (H) 06/12/2021    CALCIUM 9.2 06/12/2021    PROT 6.7 06/12/2021    LABALBU 4.6 06/12/2021    BILITOT 0.5 06/12/2021    ALKPHOS 109 (H) 06/12/2021    AST 16 06/12/2021    ALT 22 06/12/2021    LABGLOM >60 06/12/2021    GFRAA >59 06/12/2021    GLOB 1.8 11/19/2016           Assessment    ICD-10-CM    1. Memory loss  R41.3    2. History of seizure  Z87.898    3. Ataxia  R27.0      Her difficulties could certainly be as a result of her head injury but 1 would have hoped that they would be improving. Additionally, she is on numerous medications that can adversely affect ones balance and memory. This was again discussed but it does not appear that she can reduce many of them at all. She will need to use an assistive device ambulating in my opinion. She will let me know if she has any further seizures on the higher dose of the Keppra. Plan      Return in about 3 months (around 10/2/2021), or if symptoms worsen or fail to improve.     (Please note that portions of this note were completed with a voice recognition program. Efforts were made to edit the dictations but occasionally words are mis-transcribed.)

## 2021-07-20 ENCOUNTER — HOSPITAL ENCOUNTER (OUTPATIENT)
Dept: PAIN MANAGEMENT | Age: 76
Discharge: HOME | End: 2021-07-20
Payer: MEDICARE

## 2021-07-20 VITALS
RESPIRATION RATE: 16 BRPM | OXYGEN SATURATION: 97 % | HEART RATE: 71 BPM | DIASTOLIC BLOOD PRESSURE: 73 MMHG | SYSTOLIC BLOOD PRESSURE: 150 MMHG | TEMPERATURE: 96.5 F

## 2021-07-20 DIAGNOSIS — R52 PAIN MANAGEMENT: ICD-10-CM

## 2021-07-20 PROCEDURE — 62323 NJX INTERLAMINAR LMBR/SAC: CPT

## 2021-07-20 PROCEDURE — 2500000003 HC RX 250 WO HCPCS

## 2021-07-20 PROCEDURE — 3209999900 FLUORO FOR SURGICAL PROCEDURES

## 2021-07-20 PROCEDURE — 2580000003 HC RX 258

## 2021-07-20 PROCEDURE — 6360000002 HC RX W HCPCS

## 2021-07-20 RX ORDER — METHYLPREDNISOLONE ACETATE 80 MG/ML
80 INJECTION, SUSPENSION INTRA-ARTICULAR; INTRALESIONAL; INTRAMUSCULAR; SOFT TISSUE ONCE
Status: DISCONTINUED | OUTPATIENT
Start: 2021-07-20 | End: 2021-07-22 | Stop reason: HOSPADM

## 2021-07-20 RX ORDER — LIDOCAINE HYDROCHLORIDE 10 MG/ML
5 INJECTION, SOLUTION EPIDURAL; INFILTRATION; INTRACAUDAL; PERINEURAL ONCE
Status: DISCONTINUED | OUTPATIENT
Start: 2021-07-20 | End: 2021-07-22 | Stop reason: HOSPADM

## 2021-07-20 RX ORDER — SODIUM CHLORIDE 9 MG/ML
5 INJECTION INTRAVENOUS ONCE
Status: DISCONTINUED | OUTPATIENT
Start: 2021-07-20 | End: 2021-07-22 | Stop reason: HOSPADM

## 2021-07-20 RX ORDER — HYDROCHLOROTHIAZIDE 12.5 MG/1
12.5 CAPSULE, GELATIN COATED ORAL DAILY
COMMUNITY
End: 2021-10-07

## 2021-07-20 ASSESSMENT — PAIN DESCRIPTION - FREQUENCY: FREQUENCY: CONTINUOUS

## 2021-07-20 ASSESSMENT — PAIN SCALES - GENERAL: PAINLEVEL_OUTOF10: 8

## 2021-07-20 ASSESSMENT — PAIN DESCRIPTION - LOCATION: LOCATION: BACK

## 2021-07-20 ASSESSMENT — PAIN DESCRIPTION - ORIENTATION: ORIENTATION: LOWER;MID

## 2021-07-20 ASSESSMENT — PAIN DESCRIPTION - PAIN TYPE: TYPE: CHRONIC PAIN

## 2021-07-20 NOTE — INTERVAL H&P NOTE
Update History & Physical    The patient's History and Physical  was reviewed with the patient and I examined the patient. There was NO CHANGE:[id number] The surgical site was confirmed by the patient and me. Plan: The risks, benefits, expected outcome, and alternative to the recommended procedure have been discussed with the patient. Patient understands and wants to proceed with the procedure.      Electronically signed by Fidencio Hartmann MD on 7/20/2021 at 9:31 AM

## 2021-08-27 LAB
ALBUMIN SERPL-MCNC: 4.4 G/DL (ref 3.5–5.2)
ALP BLD-CCNC: 71 U/L (ref 35–104)
ALT SERPL-CCNC: 20 U/L (ref 5–33)
ANION GAP SERPL CALCULATED.3IONS-SCNC: 14 MMOL/L (ref 7–19)
AST SERPL-CCNC: 17 U/L (ref 5–32)
BASOPHILS ABSOLUTE: 0 K/UL (ref 0–0.2)
BASOPHILS RELATIVE PERCENT: 0.6 % (ref 0–1)
BILIRUB SERPL-MCNC: 0.6 MG/DL (ref 0.2–1.2)
BUN BLDV-MCNC: 12 MG/DL (ref 8–23)
CALCIUM SERPL-MCNC: 9.5 MG/DL (ref 8.8–10.2)
CHLORIDE BLD-SCNC: 102 MMOL/L (ref 98–111)
CO2: 24 MMOL/L (ref 22–29)
CREAT SERPL-MCNC: 0.8 MG/DL (ref 0.5–0.9)
EOSINOPHILS ABSOLUTE: 0.1 K/UL (ref 0–0.6)
EOSINOPHILS RELATIVE PERCENT: 1.9 % (ref 0–5)
GFR AFRICAN AMERICAN: >59
GFR NON-AFRICAN AMERICAN: >60
GLUCOSE BLD-MCNC: 141 MG/DL (ref 74–109)
HBA1C MFR BLD: 6.9 % (ref 4–6)
HCT VFR BLD CALC: 36.7 % (ref 37–47)
HEMOGLOBIN: 11.6 G/DL (ref 12–16)
IMMATURE GRANULOCYTES #: 0 K/UL
LYMPHOCYTES ABSOLUTE: 1 K/UL (ref 1.1–4.5)
LYMPHOCYTES RELATIVE PERCENT: 27 % (ref 20–40)
MCH RBC QN AUTO: 30.8 PG (ref 27–31)
MCHC RBC AUTO-ENTMCNC: 31.6 G/DL (ref 33–37)
MCV RBC AUTO: 97.3 FL (ref 81–99)
MONOCYTES ABSOLUTE: 0.3 K/UL (ref 0–0.9)
MONOCYTES RELATIVE PERCENT: 9.1 % (ref 0–10)
NEUTROPHILS ABSOLUTE: 2.2 K/UL (ref 1.5–7.5)
NEUTROPHILS RELATIVE PERCENT: 60.6 % (ref 50–65)
PDW BLD-RTO: 14.1 % (ref 11.5–14.5)
PLATELET # BLD: 154 K/UL (ref 130–400)
PMV BLD AUTO: 8.6 FL (ref 9.4–12.3)
POTASSIUM SERPL-SCNC: 4.5 MMOL/L (ref 3.5–5)
RBC # BLD: 3.77 M/UL (ref 4.2–5.4)
SODIUM BLD-SCNC: 140 MMOL/L (ref 136–145)
TOTAL PROTEIN: 6.6 G/DL (ref 6.6–8.7)
TSH REFLEX FT4: 4.06 UIU/ML (ref 0.35–5.5)
VITAMIN B-12: 438 PG/ML (ref 211–946)
WBC # BLD: 3.6 K/UL (ref 4.8–10.8)

## 2021-09-19 DIAGNOSIS — I25.119 CORONARY ARTERY DISEASE INVOLVING NATIVE CORONARY ARTERY OF NATIVE HEART WITH ANGINA PECTORIS (HCC): ICD-10-CM

## 2021-09-20 RX ORDER — ISOSORBIDE MONONITRATE 60 MG/1
TABLET, EXTENDED RELEASE ORAL
Qty: 180 TABLET | Refills: 3 | Status: SHIPPED | OUTPATIENT
Start: 2021-09-20 | End: 2022-10-06 | Stop reason: SDUPTHER

## 2021-10-04 ENCOUNTER — OFFICE VISIT (OUTPATIENT)
Dept: NEUROLOGY | Age: 76
End: 2021-10-04
Payer: MEDICARE

## 2021-10-04 VITALS
WEIGHT: 161 LBS | HEART RATE: 66 BPM | HEIGHT: 66 IN | SYSTOLIC BLOOD PRESSURE: 109 MMHG | BODY MASS INDEX: 25.88 KG/M2 | DIASTOLIC BLOOD PRESSURE: 61 MMHG

## 2021-10-04 DIAGNOSIS — R41.3 MEMORY LOSS: Primary | ICD-10-CM

## 2021-10-04 DIAGNOSIS — R27.0 ATAXIA: ICD-10-CM

## 2021-10-04 DIAGNOSIS — Z87.898 HISTORY OF SEIZURE: ICD-10-CM

## 2021-10-04 PROCEDURE — G8417 CALC BMI ABV UP PARAM F/U: HCPCS | Performed by: PSYCHIATRY & NEUROLOGY

## 2021-10-04 PROCEDURE — 1090F PRES/ABSN URINE INCON ASSESS: CPT | Performed by: PSYCHIATRY & NEUROLOGY

## 2021-10-04 PROCEDURE — G8484 FLU IMMUNIZE NO ADMIN: HCPCS | Performed by: PSYCHIATRY & NEUROLOGY

## 2021-10-04 PROCEDURE — G8399 PT W/DXA RESULTS DOCUMENT: HCPCS | Performed by: PSYCHIATRY & NEUROLOGY

## 2021-10-04 PROCEDURE — 1036F TOBACCO NON-USER: CPT | Performed by: PSYCHIATRY & NEUROLOGY

## 2021-10-04 PROCEDURE — 99214 OFFICE O/P EST MOD 30 MIN: CPT | Performed by: PSYCHIATRY & NEUROLOGY

## 2021-10-04 PROCEDURE — 1123F ACP DISCUSS/DSCN MKR DOCD: CPT | Performed by: PSYCHIATRY & NEUROLOGY

## 2021-10-04 PROCEDURE — G8427 DOCREV CUR MEDS BY ELIG CLIN: HCPCS | Performed by: PSYCHIATRY & NEUROLOGY

## 2021-10-04 PROCEDURE — 4040F PNEUMOC VAC/ADMIN/RCVD: CPT | Performed by: PSYCHIATRY & NEUROLOGY

## 2021-10-04 RX ORDER — PROMETHAZINE HYDROCHLORIDE 12.5 MG/1
12.5 TABLET ORAL EVERY 6 HOURS PRN
Qty: 30 TABLET | Refills: 5 | Status: SHIPPED | OUTPATIENT
Start: 2021-10-04

## 2021-10-04 NOTE — PROGRESS NOTES
Chief Complaint   Patient presents with    Follow-up     Patient is here for a 3mo follow up for memory loss. Patient also c/o having spells waking up and having the shakes, then sweats quite a bit, and then recovers. Care giver states this only occurs at night when patient is trying to sleep. Pt needs a refill on phenergan 12.5mg to [facility]. Mathew Tate is a 68y.o. year old female who is seen for evaluation of her poor balance and memory. She was hospitalized last October after a fall and a small subdural hematoma which was managed conservatively. Since that time she has been complaining of dizziness which is mostly upon standing as well as poor short-term memory, occasional sharp pains in the left temple and poor balance. She also takes numerous medications for chronic pain and insomnia. Has a history of cervical and lumbar fusions. I saw her when she was up in the rehab unit last year. She had a questionable history of seizures and takes Keppra. She had a normal EEG 7/20. Her medications include Elavil 50 mg, Cymbalta 60 mg, trazodone 50 mg, Xanax at times, Keppra, Aricept for the past few months, occasional tramadol, Zanaflex and Benadryl as needed. She is here today with her daughter. She had a normal EEG 6/21. She was hospitalized at that time for seizures. Tramadol discontinued. MRI of the brain showing nothing acute. Keppra dose changed to 1000 mg twice a day. She had been on 500 mg twice a day. She tried reducing her Elavil to 25 mg a day on her last visit due to orthostatic hypotension but had to go back to 50 mg a day because of worsening back pain. She is still ataxic at times and has poor word finding but is on multiple medications as noted above. Has chronic neck and low back pain along with headaches. Seen here 7/21. She has had no seizures. Likes to take a nap during the day and then has trouble sleeping at night.   We had a long talk regarding this as well as her medications in general.  Active Ambulatory Problems     Diagnosis Date Noted    Chest pain     Osteoarthritis     GERD (gastroesophageal reflux disease)     Asthma     Glucose intolerance (impaired glucose tolerance)     Depression with anxiety     CAD (coronary artery disease)     Hyperlipidemia     Hypertension     Fatigue 02/25/2016    S/P right coronary artery (RCA) stent placement 02/25/2016    Hx of CABG 02/25/2016    Chest pressure     Coronary artery disease of native artery of native heart with stable angina pectoris ([address]     Malaise and fatigue 10/06/2017    Aphasia 11/29/2017    Ataxia 11/29/2017    Vertigo 11/29/2017    Bilateral carotid artery stenosis 12/28/2017    Chest discomfort 01/10/2019    Unstable angina (HCC)     Acute exacerbation of chronic low back pain 05/28/2020    Intractable back pain 05/29/2020    Altered mental status 06/10/2020    AMS (altered mental status) 06/11/2020    New onset seizure ([address]     Spondylolisthesis at L4-L5 level     Atherosclerotic cardiovascular disease     Palliative care patient 07/22/2020    Subarachnoid hemorrhage ([address] 10/24/2020    SLE (systemic lupus erythematosus) ([address] 10/25/2020    Subarachnoid hemorrhage following injury, no loss of consciousness ([address] 10/25/2020    Intracranial bleed ([address] 10/27/2020    Post-traumatic headache 10/27/2020    Acute cystitis without hematuria 10/28/2020    Status epilepticus ([address] 06/13/2021     Resolved Ambulatory Problems     Diagnosis Date Noted    No Resolved Ambulatory Problems     Past Medical History:   Diagnosis Date    Diabetes mellitus ([address]     Lupus (systemic lupus erythematosus) (HCC)     Movement disorder     S/P CABG x 3 03/05/2013    Subdural hematoma ([facility])        Past Surgical History:   Procedure Laterality Date    APPENDECTOMY  1965    BACK SURGERY      CARDIAC CATHETERIZATION  5/18/11    EF 60%    CARDIAC CATHETERIZATION  1/25/16    drug eluting stent to RCA    CARDIAC CATHETERIZATION  1/25/2016    CARDIAC CATHETERIZATION  2/19/16    CARDIAC CATHETERIZATION  08/24/2017    cutting balloon angioplasty ot LAD    CARDIAC CATHETERIZATION  04/13/2020    TWO Stents placed    CATARACT REMOVAL      COLON SURGERY  1992    Polypectomy - 2    COLON SURGERY  2003    Polypectomy - 4    COLON SURGERY  11/2008    Polypectomy - 2    COLON SURGERY  8/2012    Polypectomy - 3    COLONOSCOPY      CORONARY ANGIOPLASTY  08/2017    cutting balloon angioplasty LAD diagonal    CORONARY ANGIOPLASTY WITH STENT PLACEMENT  3/22/2007    CORONARY ANGIOPLASTY WITH STENT PLACEMENT  8/2007    CORONARY ARTERY BYPASS GRAFT  3/5/2013    PACABG X 3, LIMA-LAD, SVG0OM, SVG-DIAG, RT EVH, DR PATEL    COSMETIC SURGERY      deviated septum and rhinoplasty    CYST REMOVAL  1970    DIAGNOSTIC CARDIAC CATH LAB PROCEDURE      ENDOSCOPY, COLON, DIAGNOSTIC      EYE SURGERY  03/2019    cataracts    HEMORRHOID SURGERY  1987    HYSTERECTOMY  1983    Partial    NASAL SEPTUM SURGERY  1977    SINUS SURGERY  2003    Nasal Polyps Removed    SPINE SURGERY  3/6/2006    Lumbar Laminectomy L4&5    SPINE SURGERY  3/6/2006    Cervical Fusion - C4,5,6    TONSILLECTOMY  1968       Family History   Problem Relation Age of Onset    Heart Disease Mother     High Blood Pressure Mother     Diabetes Mother     High Cholesterol Mother     Diabetes Brother        Allergies   Allergen Reactions    Other      Other reaction(s): Nausea And Vomiting  narcotics    Codeine Nausea Only    Darvocet [Propoxyphene N-Acetaminophen] Nausea Only    Hydrocodone-Acetaminophen Nausea Only    Lyrica [Pregabalin] Other (See Comments)     Tardive dyskinesia    Morphine Nausea Only    Percocet [Oxycodone-Acetaminophen] Nausea Only       Social History     Socioeconomic History    Marital status:       Spouse name: Not on file    Number of children: 2    Years of education: Not on file    Highest education level: Not on file   Occupational History    Not on file   Tobacco Use    Smoking status: Former Smoker     Packs/day: 1.00     Years: 1.00     Pack years: 1.00     Types: Cigarettes     Quit date: 1967     Years since quittin.5    Smokeless tobacco: Never Used   Vaping Use    Vaping Use: Never used   Substance and Sexual Activity    Alcohol use: No    Drug use: No    Sexual activity: Not Currently   Other Topics Concern    Not on file   Social History Narrative    Not on file     Social Determinants of Health     Financial Resource Strain:     Difficulty of Paying Living Expenses:    Food Insecurity:     Worried About Running Out of Food in the Last Year:     Ran Out of Food in the Last Year:    Transportation Needs:     Lack of Transportation (Medical):  Lack of Transportation (Non-Medical):    Physical Activity:     Days of Exercise per Week:     Minutes of Exercise per Session:    Stress:     Feeling of Stress :    Social Connections:     Frequency of Communication with Friends and Family:     Frequency of Social Gatherings with Friends and Family:     Attends Jew Services:     Active Member of Clubs or Organizations:     Attends Club or Organization Meetings:     Marital Status:    Intimate Partner Violence:     Fear of Current or Ex-Partner:     Emotionally Abused:     Physically Abused:     Sexually Abused:        Review of Systems    Constitutional: []? Fever []? Sweats []? Chills []? Recent Injury   [x]? Denies all unless marked  HENT:[]? Headache  []? Head Injury  []? Sore Throat  []? Ear Pain  []? Dizziness []? Hearing Loss   [x]? Denies all unless marked  Spine:  []? Neck pain  []? Back pain  []? Sciaticia  [x]? Denies all unless marked  Cardiovascular:[]? Chest Pain []? Palpitations []? Heart Disease  [x]? Denies all unless marked  Pulmonary: []? Shortness of Breath []? Cough   [x]? Denies all unless marked  Gastrointestinal:  []? Abdominal Pain  []? Blood in Stool  []? Diarrhea []? Constipation []? Nausea  []? Vomiting  [x]? Denies all unless marked  Genitourinary:  []? Dysuria []? Frequency  []? Incontinence []? Urgency   [x]? Denies all unless marked  Musculoskeletal: []? Arthralgia  []? Myalgias []? Muscle cramps  [x]? Muscle twitches   []? Denies all unless marked   Extremities:   []? Pain   []? Swelling   [x]? Denies all unless marked  Skin:[]? Rash  []? Color Change  [x]? Denies all unless marked  Neurological:[]? Visual Disturbance []? Double Vision []? Slurred Speech []? Trouble swallowing  []? Vertigo []? Tingling []? Numbness []? Weakness []? Loss of Balance   []? Loss of Consciousness []? Memory Loss []? Seizures  [x]? Denies all unless marked  Psychiatric/Behavioral:[]? Depression []? Anxiety  [x]? Denies all unless marked  Sleep: []? Insomnia [x]? Sleep Disturbance []? Snoring []? Restless Legs []? Daytime Sleepiness []? Sleep Apnea  []? Denies all unless marked                     Current Outpatient Medications   Medication Sig Dispense Refill    isosorbide mononitrate (IMDUR) 60 MG extended release tablet TAKE 1 TABLET TWICE A  tablet 3    levETIRAcetam (KEPPRA) 1000 MG tablet Take 1 tablet by mouth 2 times daily 60 tablet 3    amLODIPine (NORVASC) 2.5 MG tablet Take 2.5 mg by mouth daily      ranolazine (RANEXA) 1000 MG extended release tablet TAKE 1 TABLET TWICE A  tablet 3    ALPRAZolam (XANAX) 0.25 MG tablet Take 0.25 mg by mouth nightly.  Half tab      amitriptyline (ELAVIL) 50 MG tablet Take 50 mg by mouth nightly      promethazine (PHENERGAN) 12.5 MG tablet Take 12.5 mg by mouth every 6 hours as needed for Nausea      diphenhydrAMINE (BENADRYL) 25 MG tablet Take 25 mg by mouth every 6 hours as needed for Itching      DULoxetine (CYMBALTA) 60 MG extended release capsule Take 60 mg by mouth every evening 5 pm      tiZANidine (ZANAFLEX) 4 MG tablet Take 4 mg by mouth 2 times daily as needed 1/2 tablet during day, 1/2 tablet at night      atorvastatin (LIPITOR) 80 MG tablet Take 1 tablet by mouth nightly (Patient taking differently: Take 80 mg by mouth nightly 5 pm) 30 tablet 0    losartan (COZAAR) 100 MG tablet Take 1 tablet by mouth daily 30 tablet 3    metoprolol succinate (TOPROL XL) 50 MG extended release tablet Take 1 tablet by mouth daily 30 tablet 3    pantoprazole (PROTONIX) 40 MG tablet Take 1 tablet by mouth every morning (before breakfast) 30 tablet 3    nitroGLYCERIN (NITROSTAT) 0.4 MG SL tablet up to max of 3 total doses. If no relief after 1 dose, call 911. 30 tablet 3    albuterol (PROVENTIL HFA;VENTOLIN HFA) 108 (90 BASE) MCG/ACT inhaler Inhale 2 puffs into the lungs every 6 hours as needed.  hydroCHLOROthiazide (MICROZIDE) 12.5 MG capsule Take 12.5 mg by mouth daily (Patient not taking: Reported on 10/4/2021)       No current facility-administered medications for this visit. Outpatient Medications Marked as Taking for the 10/4/21 encounter (Office Visit) with Ray Darby MD   Medication Sig Dispense Refill    isosorbide mononitrate (IMDUR) 60 MG extended release tablet TAKE 1 TABLET TWICE A  tablet 3    levETIRAcetam (KEPPRA) 1000 MG tablet Take 1 tablet by mouth 2 times daily 60 tablet 3    amLODIPine (NORVASC) 2.5 MG tablet Take 2.5 mg by mouth daily      ranolazine (RANEXA) 1000 MG extended release tablet TAKE 1 TABLET TWICE A  tablet 3    ALPRAZolam (XANAX) 0.25 MG tablet Take 0.25 mg by mouth nightly.  Half tab      amitriptyline (ELAVIL) 50 MG tablet Take 50 mg by mouth nightly      promethazine (PHENERGAN) 12.5 MG tablet Take 12.5 mg by mouth every 6 hours as needed for Nausea      diphenhydrAMINE (BENADRYL) 25 MG tablet Take 25 mg by mouth every 6 hours as needed for Itching      DULoxetine (CYMBALTA) 60 MG extended release capsule Take 60 mg by mouth every evening 5 pm      tiZANidine (ZANAFLEX) 4 MG tablet Take 4 mg by mouth 2 times daily as needed 1/2 tablet during day, 1/2 tablet at night      atorvastatin (LIPITOR) 80 MG tablet Take 1 tablet by mouth nightly (Patient taking differently: Take 80 mg by mouth nightly 5 pm) 30 tablet 0    losartan (COZAAR) 100 MG tablet Take 1 tablet by mouth daily 30 tablet 3    metoprolol succinate (TOPROL XL) 50 MG extended release tablet Take 1 tablet by mouth daily 30 tablet 3    pantoprazole (PROTONIX) 40 MG tablet Take 1 tablet by mouth every morning (before breakfast) 30 tablet 3    nitroGLYCERIN (NITROSTAT) 0.4 MG SL tablet up to max of 3 total doses. If no relief after 1 dose, call 911. 30 tablet 3    albuterol (PROVENTIL HFA;VENTOLIN HFA) 108 (90 BASE) MCG/ACT inhaler Inhale 2 puffs into the lungs every 6 hours as needed. /61   Pulse 66   Ht 5' 6\" (1.676 m)   Wt 161 lb (73 kg)   BMI 25.99 kg/m²       Constitutional - well developed, well nourished. Eyes - conjunctiva normal.  Pupils react to light  Ear, nose, throat -hearing intact to finger rub No scars, masses, or lesions over external nose or ears, no atrophy of tongue  Neck-symmetric, no masses noted, no jugular vein distension. No bruits noted. Respiration- chest wall appears symmetric, good expansion,   normal effort without use of accessory muscles  Cardiovascular- RRR  Musculoskeletal - no significant wasting of muscles noted, no bony deformities, gait no gross ataxia  Extremities-no clubbing, cyanosis or edema  Skin - warm, dry, and intact. No rash, erythema, or pallor. Psychiatric - mood, affect, and behavior appear normal.      Neurological exam  Awake, alert, fluent oriented x 3 appropriate affect. Knew our last holiday was Labor Day. Knew the day of the week. Attention and concentration appear appropriate  Recent and remote memory appears unremarkable. Speech normal without dysarthria  No clear issues with language of fund of knowledge. Follows complex commands without difficulty.     Cranial Nerve Exam   CN II- Visual fields grossly

## 2021-10-04 NOTE — PROGRESS NOTES
REVIEW OF SYSTEMS    Constitutional: []Fever []Sweats []Chills [] Recent Injury   [x] Denies all unless marked  HENT:[]Headache  [] Head Injury  [] Sore Throat  [] Ear Pain  [] Dizziness [] Hearing Loss   [x] Denies all unless marked  Spine:  [] Neck pain  [] Back pain  [] Sciaticia  [x] Denies all unless marked  Cardiovascular:[]Chest Pain []Palpitations [] Heart Disease  [x] Denies all unless marked  Pulmonary: []Shortness of Breath []Cough   [x] Denies all unless marked  Gastrointestinal:  []Abdominal Pain  []Blood in Stool  []Diarrhea []Constipation []Nausea  []Vomiting  [x] Denies all unless marked  Genitourinary:  [] Dysuria [] Frequency  [] Incontinence [] Urgency   [x] Denies all unless marked  Musculoskeletal: [] Arthralgia  [] Myalgias [] Muscle cramps  [x] Muscle twitches   [] Denies all unless marked   Extremities:   [] Pain   [] Swelling   [x] Denies all unless marked  Skin:[] Rash  [] Color Change  [x] Denies all unless marked  Neurological:[] Visual Disturbance [] Double Vision [] Slurred Speech [] Trouble swallowing  [] Vertigo [] Tingling [] Numbness [] Weakness [] Loss of Balance   [] Loss of Consciousness [] Memory Loss [] Seizures  [x] Denies all unless marked  Psychiatric/Behavioral:[] Depression [] Anxiety  [x] Denies all unless marked  Sleep: []  Insomnia [x] Sleep Disturbance [] Snoring [] Restless Legs [] Daytime Sleepiness [] Sleep Apnea  [] Denies all unless marked

## 2021-10-05 ENCOUNTER — HOSPITAL ENCOUNTER (OUTPATIENT)
Dept: PAIN MANAGEMENT | Age: 76
Discharge: HOME | End: 2021-10-05
Payer: MEDICARE

## 2021-10-05 VITALS
TEMPERATURE: 97.1 F | OXYGEN SATURATION: 98 % | DIASTOLIC BLOOD PRESSURE: 75 MMHG | BODY MASS INDEX: 25.71 KG/M2 | RESPIRATION RATE: 18 BRPM | HEART RATE: 67 BPM | WEIGHT: 160 LBS | SYSTOLIC BLOOD PRESSURE: 168 MMHG | HEIGHT: 66 IN

## 2021-10-05 DIAGNOSIS — R52 PAIN MANAGEMENT: ICD-10-CM

## 2021-10-05 PROCEDURE — 64494 INJ PARAVERT F JNT L/S 2 LEV: CPT

## 2021-10-05 PROCEDURE — 6360000002 HC RX W HCPCS

## 2021-10-05 PROCEDURE — 3209999900 FLUORO FOR SURGICAL PROCEDURES

## 2021-10-05 PROCEDURE — 64493 INJ PARAVERT F JNT L/S 1 LEV: CPT

## 2021-10-05 PROCEDURE — 2500000003 HC RX 250 WO HCPCS

## 2021-10-05 RX ORDER — LIDOCAINE HYDROCHLORIDE 10 MG/ML
20 INJECTION, SOLUTION EPIDURAL; INFILTRATION; INTRACAUDAL; PERINEURAL ONCE
Status: DISCONTINUED | OUTPATIENT
Start: 2021-10-05 | End: 2021-10-07 | Stop reason: HOSPADM

## 2021-10-05 RX ORDER — BUPIVACAINE HYDROCHLORIDE 5 MG/ML
5 INJECTION, SOLUTION EPIDURAL; INTRACAUDAL ONCE
Status: DISCONTINUED | OUTPATIENT
Start: 2021-10-05 | End: 2021-10-07 | Stop reason: HOSPADM

## 2021-10-05 RX ORDER — METHYLPREDNISOLONE ACETATE 80 MG/ML
80 INJECTION, SUSPENSION INTRA-ARTICULAR; INTRALESIONAL; INTRAMUSCULAR; SOFT TISSUE ONCE
Status: DISCONTINUED | OUTPATIENT
Start: 2021-10-05 | End: 2021-10-07 | Stop reason: HOSPADM

## 2021-10-05 ASSESSMENT — PAIN - FUNCTIONAL ASSESSMENT: PAIN_FUNCTIONAL_ASSESSMENT: 0-10

## 2021-10-05 ASSESSMENT — PAIN DESCRIPTION - DESCRIPTORS: DESCRIPTORS: ACHING

## 2021-10-05 NOTE — PROGRESS NOTES
Procedure:  Level of Consciousness: [x]Alert []Oriented []Disoriented []Lethargic  Anxiety Level: [x]Calm []Anxious []Depressed []Other  Skin: [x]Warm []Dry []Cool []Moist []Intact []Other  Cardiovascular: [x]Palpitations: []Never []Occasionally []Frequently  Chest Pain: [x]No []Yes  Respiratory:  [x]Unlabored []Labored []Cough ([] Productive []Unproductive)  HCG Required: [x]No []Yes   Results: []Negative []Positive  Knowledge Level:        [x]Patient/Other verbalized understanding of pre-procedure instructions. [x]Assessment of post-op care needs (transportation, responsible caregiver)        [x]Able to discuss health care problems and how to deal with it.   Factors that Affect Teaching:        Language Barrier: [x]No []Yes - why:        Hearing Loss:        [x]No []Yes            Corrective Device:  []Yes []No        Vision Loss:           []No [x]Yes            Corrective Device:  [x]Yes []No        Memory Loss:       [x]No []Yes            []Short Term []Long Term  Motivational Level:  [x]Asks Questions                  []Extremely Anxious       [x]Seems Interested               []Seems Uninterested                  [x]Denies need for Education  Risk for Injury:  [x]Patient oriented to person, place and time  []History of frequent falls/loss of balance  Nutritional:  []Change in appetite   []Weight Gain   []Weight Loss  Functional:  []Requires assistance with ADL's

## 2021-10-05 NOTE — INTERVAL H&P NOTE
Update History & Physical    The patient's History and Physical  was reviewed with the patient and I examined the patient. There was  NO CHANGE:[id number]}. The surgical site was confirmed by the patient and me. Plan: The risks, benefits, expected outcome, and alternative to the recommended procedure have been discussed with the patient. Patient understands and wants to proceed with the procedure.      Electronically signed by Kadeem Lopez MD on 10/5/2021 at 10:20 AM

## 2021-10-06 ENCOUNTER — TELEPHONE (OUTPATIENT)
Dept: CARDIOLOGY CLINIC | Age: 76
End: 2021-10-06

## 2021-10-07 ENCOUNTER — OFFICE VISIT (OUTPATIENT)
Dept: CARDIOLOGY CLINIC | Age: 76
End: 2021-10-07
Payer: MEDICARE

## 2021-10-07 VITALS
WEIGHT: 165 LBS | BODY MASS INDEX: 26.52 KG/M2 | HEIGHT: 66 IN | SYSTOLIC BLOOD PRESSURE: 162 MMHG | HEART RATE: 68 BPM | OXYGEN SATURATION: 97 % | DIASTOLIC BLOOD PRESSURE: 78 MMHG

## 2021-10-07 DIAGNOSIS — R06.09 DOE (DYSPNEA ON EXERTION): ICD-10-CM

## 2021-10-07 DIAGNOSIS — I25.119 CORONARY ARTERY DISEASE INVOLVING NATIVE CORONARY ARTERY OF NATIVE HEART WITH ANGINA PECTORIS (HCC): Primary | ICD-10-CM

## 2021-10-07 DIAGNOSIS — E78.2 MIXED HYPERLIPIDEMIA: ICD-10-CM

## 2021-10-07 DIAGNOSIS — I10 ESSENTIAL HYPERTENSION: ICD-10-CM

## 2021-10-07 PROCEDURE — G8399 PT W/DXA RESULTS DOCUMENT: HCPCS | Performed by: CLINICAL NURSE SPECIALIST

## 2021-10-07 PROCEDURE — 1090F PRES/ABSN URINE INCON ASSESS: CPT | Performed by: CLINICAL NURSE SPECIALIST

## 2021-10-07 PROCEDURE — G8417 CALC BMI ABV UP PARAM F/U: HCPCS | Performed by: CLINICAL NURSE SPECIALIST

## 2021-10-07 PROCEDURE — 1036F TOBACCO NON-USER: CPT | Performed by: CLINICAL NURSE SPECIALIST

## 2021-10-07 PROCEDURE — 1123F ACP DISCUSS/DSCN MKR DOCD: CPT | Performed by: CLINICAL NURSE SPECIALIST

## 2021-10-07 PROCEDURE — 99214 OFFICE O/P EST MOD 30 MIN: CPT | Performed by: CLINICAL NURSE SPECIALIST

## 2021-10-07 PROCEDURE — 4040F PNEUMOC VAC/ADMIN/RCVD: CPT | Performed by: CLINICAL NURSE SPECIALIST

## 2021-10-07 PROCEDURE — G8427 DOCREV CUR MEDS BY ELIG CLIN: HCPCS | Performed by: CLINICAL NURSE SPECIALIST

## 2021-10-07 PROCEDURE — G8484 FLU IMMUNIZE NO ADMIN: HCPCS | Performed by: CLINICAL NURSE SPECIALIST

## 2021-10-07 RX ORDER — AMLODIPINE BESYLATE 2.5 MG/1
2.5 TABLET ORAL 2 TIMES DAILY
Qty: 60 TABLET | Refills: 5 | Status: SHIPPED | OUTPATIENT
Start: 2021-10-07 | End: 2022-04-13

## 2021-10-07 NOTE — PROGRESS NOTES
[address] [place], [address]  [address]  Phone: [phone]  Fax: [phone]    OFFICE VISIT:  10/7/2021    Eligio Cagle - : 1945    Reason For Visit:  Kiki Zamora is a 68 y.o. female who is here for 6 Month Follow-Up (Patient c/o SOB) and Coronary Artery Disease  Coronary disease with CABG x3 in .  Underwent heart catheterization 2019 that showed    Patent LIMA-LAD, patent VG-diag, patent VG-OM, patent RCA, normal LVFX  She establish care with Dr. Latoya Reyez last month.  Increasing with exertional angina  Elective heart catheterization 2020  Left main and triple vessel disease.   Patent LIMA to distal LAD.   Patent SVG to 1st diagonal.   Patent SVG to 1st obtuse marginal (small vessel)   Left main lesion feeds large 2nd obtuse marginal.   Normal LV ejection fraction.   Successful PCI to distal left main(3.5 x 12 mm resolute integrity stent)   into the ostial circumflex utilizing drug-eluting stent.   Successful PCI to proximal circumflex (3.0 x 15 mm resolute integrity)   utilizing drug-eluting stent.   Successful PCI with PTCA of ostial LAD. Recommendations if recurrent symptoms can consider ischemic evaluation of RCA    Patient had fall with subarachnoid hemorrhage last year. She started having seizures last year  following with DR Darien Roque    She has MAXWELL that is slowly getting worse. Her activity has greatly decreased since her fall and seizure disorder. Unfortunately now she is unable to drive. Previously she was going to the gym 3 days a week. Now she is not been able to do much activity or exercise. She does have family members living with her. She has gained some weight and that is upsetting to her as she has never had that problem, attributes it to medication and not being as active. Subjective  Kiki Zamora denies exertional chest pain. Has MAXWELL.   Usually no resting shortness of breath, orthopnea, paroxysmal nocturnal dyspnea, syncope, presyncope, arrhythmia  Has peripheral edema and fatigue. The patient denies numbness or weakness to suggest cerebrovascular accident or transient ischemic attack. Brandi Huerta MD is PCP and follows labs.   Has follow-up in a couple weeks  Rajni Nj has the following history as recorded in [facility]:    Patient Active Problem List    Diagnosis Date Noted    Unstable angina ([address]     Chest discomfort 01/10/2019    Coronary artery disease of native artery of native heart with stable angina pectoris ([address]     Status epilepticus ([address] 06/13/2021    Acute cystitis without hematuria 10/28/2020    Intracranial bleed ([address] 10/27/2020    Post-traumatic headache 10/27/2020    SLE (systemic lupus erythematosus) ([address] 10/25/2020    Subarachnoid hemorrhage following injury, no loss of consciousness ([address] 10/25/2020    Subarachnoid hemorrhage ([address] 10/24/2020    Palliative care patient 07/22/2020    New onset seizure ([address]     Spondylolisthesis at L4-L5 level     Atherosclerotic cardiovascular disease     AMS (altered mental status) 06/11/2020    Altered mental status 06/10/2020    Intractable back pain 05/29/2020    Acute exacerbation of chronic low back pain 05/28/2020    Bilateral carotid artery stenosis 12/28/2017    Aphasia 11/29/2017    Ataxia 11/29/2017    Vertigo 11/29/2017    Malaise and fatigue 10/06/2017    Chest pressure     Fatigue 02/25/2016    S/P right coronary artery (RCA) stent placement 02/25/2016    Hx of CABG 02/25/2016    Hyperlipidemia     Hypertension     CAD (coronary artery disease)     Chest pain     Osteoarthritis     GERD (gastroesophageal reflux disease)     Asthma     Glucose intolerance (impaired glucose tolerance)     Depression with anxiety      Past Medical History:   Diagnosis Date    Acute cystitis without hematuria 10/28/2020    Asthma     CAD (coronary artery disease)     Chest pain     Depression with anxiety     Diabetes mellitus (HCC)     GERD (gastroesophageal reflux disease)     Glucose intolerance (impaired glucose tolerance)     Hyperlipidemia     Cholesterol management per Deng Cardoso Hypertension     Lup[facility]matosus) [facility]     Malaise and fatigue 10/6/2017    Movement disorder     Sees pain management for neck pain and previous surgery    New onset seizure ([facility]     Osteoarthritis     Palliative care patient 07/22/2020    S/P CABG x 3 03/05/2013    PACABG X 3, LIMA-LAD, SVG0OM, SVG-DIAG, RT EVH, DR PATEL    Subdural hematoma ([facility]      Past Surgical History:   Procedure Laterality Date    APPENDECTOMY  1965    BACK SURGERY      CARDIAC CATHETERIZATION  5/18/11    EF 60%    CARDIAC CATHETERIZATION  1/25/16    drug eluting stent to RCA   [address]  1/25/2016    CARDIAC CATHETERIZATION  2/19/16    CARDIAC CATHETERIZATION  08/24/2017    cutting balloon angioplasty ot LAD    CARDIAC CATHETERIZATION  04/13/2020    TWO Stents placed    CATARACT REMOVAL      COLON SURGERY  1992    Polypectomy - 2    COLON SURGERY  2003    Polypectomy - 4    COLON SURGERY  11/2008    Polypectomy - 2    COLON SURGERY  8/2012    Polypectomy - 3    COLONOSCOPY      CORONARY ANGIOPLASTY  08/2017    cutting balloon angioplasty LAD diagonal    CORONARY ANGIOPLASTY WITH STENT PLACEMENT  3/22/2007    CORONARY ANGIOPLASTY WITH STENT PLACEMENT  8/2007    CORONARY ARTERY BYPASS GRAFT  3/5/2013    PACABG X 3, LIMA-LAD, SVG0OM, SVG-DIAG, RT EVH, DR PATEL    COSMETIC SURGERY      deviated septum and rhinoplasty    CYST REMOVAL  1970    DIAGNOSTIC CARDIAC CATH LAB PROCEDURE      ENDOSCOPY, COLON, DIAGNOSTIC      EYE SURGERY  03/2019    cataracts    [address]    Partial    NASAL [facility] SINUS SURGERY  2003    Nasal Polyps Removed    SPINE SURGERY  3/6/2006    Lumbar Laminectomy L4&5    SPINE SURGERY  3/6/2006    Cervical Fusion - C4,5,6    TONSILLECTOMY  1968     Family History   Problem Relation Age of Onset    Heart Disease Mother     High Blood Pressure Mother     Diabetes Mother     High Cholesterol Mother     Diabetes Brother      Social History     Tobacco Use    Smoking status: Former Smoker     Packs/day: 1.00     Years: 1.00     Pack years: 1.00     Types: Cigarettes     Quit date: 1967     Years since quittin.6    Smokeless tobacco: Never Used   Substance Use Topics    Alcohol use: No      Current Outpatient Medications   Medication Sig Dispense Refill    amLODIPine (NORVASC) 2.5 MG tablet Take 1 tablet by mouth 2 times daily 60 tablet 5    promethazine (PHENERGAN) 12.5 MG tablet Take 1 tablet by mouth every 6 hours as needed for Nausea 30 tablet 5    isosorbide mononitrate (IMDUR) 60 MG extended release tablet TAKE 1 TABLET TWICE A  tablet 3    levETIRAcetam (KEPPRA) 1000 MG tablet Take 1 tablet by mouth 2 times daily 60 tablet 3    ranolazine (RANEXA) 1000 MG extended release tablet TAKE 1 TABLET TWICE A  tablet 3    ALPRAZolam (XANAX) 0.25 MG tablet Take 0.25 mg by mouth nightly.  Half tab      amitriptyline (ELAVIL) 50 MG tablet Take 50 mg by mouth nightly      diphenhydrAMINE (BENADRYL) 25 MG tablet Take 25 mg by mouth every 6 hours as needed for Itching      DULoxetine (CYMBALTA) 60 MG extended release capsule Take 60 mg by mouth every evening 5 pm      tiZANidine (ZANAFLEX) 4 MG tablet Take 4 mg by mouth 2 times daily as needed 1/2 tablet during day, 1/2 tablet at night      atorvastatin (LIPITOR) 80 MG tablet Take 1 tablet by mouth nightly (Patient taking differently: Take 80 mg by mouth nightly 5 pm) 30 tablet 0    losartan (COZAAR) 100 MG tablet Take 1 tablet by mouth daily 30 tablet 3    metoprolol succinate (TOPROL XL) 50 MG extended release tablet Take 1 tablet by mouth daily 30 tablet 3    pantoprazole (PROTONIX) 40 MG tablet Take 1 tablet by mouth every morning (before breakfast) 30 tablet 3    nitroGLYCERIN (NITROSTAT) 0.4 MG SL tablet up to max of 3 total doses. If no relief after 1 dose, call 911. 30 tablet 3    albuterol (PROVENTIL HFA;VENTOLIN HFA) 108 (90 BASE) MCG/ACT inhaler Inhale 2 puffs into the lungs every 6 hours as needed. No current facility-administered medications for this visit. Allergies: Other, Codeine, Darvocet [propoxyphene n-acetaminophen], Hydrocodone-acetaminophen, Lyrica [pregabalin], Morphine, and Percocet [oxycodone-acetaminophen]    Review of Systems  Constitutional -+ significant activity change, no appetite change, or unexpected weight change. No fever, chills or diaphoresis. + fatigue. HEENT - no significant rhinorrhea or epistaxis. No tinnitus or significant hearing loss. Eyes - no sudden vision change or amaurosis. Respiratory - no significant wheezing, stridor, apnea or cough. + dyspnea on exertion. No resting  shortness of breath. Cardiovascular - no exertional chest pain, orthopnea or PND. No sensation of arrhythmia or slow heart rate. No claudication or leg edema. Gastrointestinal - no abdominal swelling or pain. No blood in stool. No severe constipation, diarrhea, nausea, or vomiting. Genitourinary - no difficulty urinating, dysuria, frequency, or urgency. No flank pain or hematuria. Musculoskeletal - no back pain, gait- using walker   Skin - no color change or rash. No pallor. No new surgical incision. Neurologic - no speech difficulty, facial asymmetry or lateralizing weakness. No seizures, presyncope, syncope, or significant dizziness. Hematologic - no easy bruising or excessive bleeding. Psychiatric - no severe anxiety or insomnia. No confusion. All other review of systems are negative. Objective  Vital Signs - BP (!) 162/78   Pulse 68   Ht 5' 6\" (1.676 m)   Wt 165 lb (74.8 kg)   SpO2 97%   BMI 26.63 kg/m²   General - Roberto Din is alert, cooperative, and pleasant. Well groomed. No acute distress.     Body habitus is normal.  HEENT - The head is normocephalic. No circumoral cyanosis. Dentition is normal.   EYES -  No Xanthelasma, no arcus senilis, no conjunctival hemorrhages or discharge. Neck - Supple, without increased jugular venous pressures. No carotid bruits. No mass. Respiratory - Lungs are clear bilaterally. No wheezes or rales. Normal effort without use of accessory muscles. Cardiovascular - Heart has regular rhythm and rate. No murmurs, rubs or gallops. + pedal pulses and no varicosities. Abdominal -  Soft, nontender, nondistended. Bowel sounds are intact. Extremities - No clubbing, cyanosis 1+ pitting BLE edema. Musculoskeletal -  No clubbing . No Osler's nodes. Gait- using walker. No kyphosis or scoliosis. Skin -  no statis ulcers or dermatitis. Neurological - No focal signs are identified. Oriented to person, place and time. Psychiatric -  Appropriate affect and mood. Assessment:     Diagnosis Orders   1. Coronary artery disease involving native coronary artery of native heart with angina pectoris ([address]     2. Essential hypertension     3. Mixed hyperlipidemia     4. MAXWELL (dyspnea on exertion)  ECHO Complete 2D W Doppler W Color     Data:  BP Readings from Last 3 Encounters:   10/07/21 (!) 162/78   10/05/21 (!) 168/75   10/04/21 109/61    Pulse Readings from Last 3 Encounters:   10/07/21 68   10/05/21 67   10/04/21 66        Wt Readings from Last 3 Encounters:   10/07/21 165 lb (74.8 kg)   10/05/21 160 lb (72.6 kg)   10/04/21 161 lb (73 kg)     Blood pressure little elevated today. States has been running elevated at home in the 150-160s    Heart rate controlled. Medical management includes beta-blocker, ARB, CCB and long-acting nitrate. Continues on Ranexa as well    We will increase her amlodipine to twice a day to see if it helps with better blood pressure control. No longer taking diuretic. Last admission labs show some mild hyponatremia.   Last labs were resolved but not on diuretic. She has follow-up with primary care in 2 weeks with repeat labs. If swelling worsens or blood pressures difficult to control may need to add diuretic back    Unfortunately with multiple problems including new seizure disorder and hemorrhage last year activity has greatly decreased. She is used to being quite active and exercising regularly. Weight gain is bothering her. She will further discuss with her primary care in a few weeks    Encouraged her to be as active as possible with pace herself. Fall risk discussed, she is using a walker. She is given up driving due to her seizure disorder   Reviewed recent labs    Significant diffuse coronary disease. Preserved LV function. With ongoing worsening MAXWELL will get 2D echo to see if there is been a change in her LV function. If that is stable we will see her back in 6 months     States taking medications as prescribed  Stable cardiovascular status. No evidence of overt heart failure, angina or dysrhythmia. 30 minutes were spent preparing, reviewing and seeing patient. All questions answered    Plan  Increase your amlodipine to twice a day  BP goal 130/80  ECHO soon   Follow up in 6 mos   Call with any questions or concerns  Follow up with Bree Reese MD for non cardiac problems and labs  Report any new problems  Cardiovascular Fitness-Exercise as tolerated. Fall precautions  Cardiac / Healthy Diet  Continue current medications as directed  Continue plan of treatment  It is always recommended that you bring your medications bottles with you to each visit - this is for your safety! LEILA Wade/transcription disclaimer: Much of this encounter note is electronic transcription/translation of spoken language to printed tach. Electronic translation of spoken language may be erroneous, or at times, nonsensical words or phrases may be inadvertently transcribed.  Although, I have reviewed the note for such errors, some may still exist.

## 2021-10-07 NOTE — PATIENT INSTRUCTIONS
Increase your amlodipine to twice a day  BP goal 130/80  Follow up in 6 mos   Call with any questions or concerns  Follow up with Donell Reilly MD for non cardiac problems  Report any new problems  Cardiovascular Fitness-Exercise as tolerated. Fall precautions  Cardiac / Healthy Diet  Continue current medications as directed  Continue plan of treatment  It is always recommended that you bring your medications bottles with you to each visit - this is for your safety!

## 2021-10-14 ENCOUNTER — HOSPITAL ENCOUNTER (OUTPATIENT)
Dept: NON INVASIVE DIAGNOSTICS | Age: 76
Discharge: HOME | End: 2021-10-14
Payer: MEDICARE

## 2021-10-14 DIAGNOSIS — R06.09 DOE (DYSPNEA ON EXERTION): ICD-10-CM

## 2021-10-14 LAB
LV EF: 58 %
LVEF MODALITY: NORMAL

## 2021-10-14 PROCEDURE — 93306 TTE W/DOPPLER COMPLETE: CPT

## 2021-10-19 ENCOUNTER — OFFICE VISIT (OUTPATIENT)
Dept: VASCULAR SURGERY | Age: 76
End: 2021-10-19
Payer: MEDICARE

## 2021-10-19 ENCOUNTER — HOSPITAL ENCOUNTER (OUTPATIENT)
Dept: VASCULAR LAB | Age: 76
Discharge: HOME | End: 2021-10-19
Payer: MEDICARE

## 2021-10-19 VITALS
TEMPERATURE: 97.5 F | HEIGHT: 66 IN | WEIGHT: 165 LBS | DIASTOLIC BLOOD PRESSURE: 72 MMHG | OXYGEN SATURATION: 98 % | SYSTOLIC BLOOD PRESSURE: 138 MMHG | HEART RATE: 66 BPM | BODY MASS INDEX: 26.52 KG/M2

## 2021-10-19 DIAGNOSIS — I65.23 BILATERAL CAROTID ARTERY STENOSIS: Primary | ICD-10-CM

## 2021-10-19 PROCEDURE — 93880 EXTRACRANIAL BILAT STUDY: CPT

## 2021-10-19 PROCEDURE — G8484 FLU IMMUNIZE NO ADMIN: HCPCS | Performed by: PHYSICIAN ASSISTANT

## 2021-10-19 PROCEDURE — 1036F TOBACCO NON-USER: CPT | Performed by: PHYSICIAN ASSISTANT

## 2021-10-19 PROCEDURE — 4040F PNEUMOC VAC/ADMIN/RCVD: CPT | Performed by: PHYSICIAN ASSISTANT

## 2021-10-19 PROCEDURE — 1090F PRES/ABSN URINE INCON ASSESS: CPT | Performed by: PHYSICIAN ASSISTANT

## 2021-10-19 PROCEDURE — 1123F ACP DISCUSS/DSCN MKR DOCD: CPT | Performed by: PHYSICIAN ASSISTANT

## 2021-10-19 PROCEDURE — G8427 DOCREV CUR MEDS BY ELIG CLIN: HCPCS | Performed by: PHYSICIAN ASSISTANT

## 2021-10-19 PROCEDURE — G8399 PT W/DXA RESULTS DOCUMENT: HCPCS | Performed by: PHYSICIAN ASSISTANT

## 2021-10-19 PROCEDURE — G8417 CALC BMI ABV UP PARAM F/U: HCPCS | Performed by: PHYSICIAN ASSISTANT

## 2021-10-19 PROCEDURE — 99213 OFFICE O/P EST LOW 20 MIN: CPT | Performed by: PHYSICIAN ASSISTANT

## 2021-10-19 NOTE — PROGRESS NOTES
Patient Care Team:  Deyvi De Los Santos MD as PCP - General (Family Medicine)  LEILA Malone as Nurse Practitioner (Certified Nurse Specialist)  Julia Leos MD as Consulting Physician (Interventional Cardiology)  Castro Shi MD as Consulting Physician (Vascular Surgery)  Nicole Gustafson MD as Consulting Physician (Neurology)      History and Physical:    Cecily Cote is a 68 y.o. female who has a history of carotid artery stenosis. Today we are following up for her carotid artery stenosis. Currently she is on a statin daily. He denies any new onset of partial or complete loss of vision affecting only one eye, speech difficulty or lateralizing weakness, numbness/tingling. Currently she is not taking aspirin due to the fact that she gets injections by Dr. Gideon Barrios every 3 months.     Cecily Cote is a 68 y.o. female with the following history reviewed and recorded in "[employer]"Care:  Patient Active Problem List    Diagnosis Date Noted    Unstable angina [facility])      Priority: High    Chest discomfort 01/10/2019     Priority: High    Coronary artery disease of native artery of native heart with stable angina pectoris ([address]      Priority: High    Status epilepticus ([address] 06/13/2021    Acute cystitis without hematuria 10/28/2020    Intracranial bleed ([address] 10/27/2020    Post-traumatic headache 10/27/2020    SLE (systemic lupus erythematosus) ([address] 10/25/2020    Subarachnoid hemorrhage following injury, no loss of consciousness ([address] 10/25/2020    Subarachnoid hemorrhage ([address] 10/24/2020    Palliative care patient 07/22/2020    New onset seizure ([address]     Spondylolisthesis at L4-L5 level     Atherosclerotic cardiovascular disease     AMS (altered mental status) 06/11/2020    Altered mental status 06/10/2020    Intractable back pain 05/29/2020    Acute exacerbation of chronic low back pain 05/28/2020    Bilateral carotid artery stenosis 12/28/2017    Aphasia 11/29/2017    Ataxia 11/29/2017    Vertigo 11/29/2017    Malaise and fatigue 10/06/2017    Chest pressure     Fatigue 02/25/2016    S/P right coronary artery (RCA) stent placement 02/25/2016 1/25/16 MARCELINO to RCA by Dr. Suman Faith  Patent RCA stent on 2/29/16 cath by Dr. Juan M Turpin Hx of CABG 02/25/2016    Hyperlipidemia      Dr Marin Cartwright cks cholesterol      Hypertension     CAD (coronary artery disease)      5/18/2011  Cath  Mild CAD, normal LVFX  2/5/2013  SE  Positive for clinical and EKG myocardial ischemia  2/8.2013  Cath  TVD with normal LVFX  3/5/2013  CABG x 3 LIMA-LAD, VG-diag, VG-Om1  07/06/2015  lexiscan  negative for myocardial ischemia, EF 84  %  1/25/16 MARCELINO to RCA by Dr. Suman Faith  2/29/2016  Cath  Patent RCA stent on 2/29/16 cath by Dr. Shaheen Eid  11/19/16  lexiscan Positive for possible apical myocardial ischemia, EF 79%, 1% ischemic myocardium on stress, low risk findings  11/10/2018  ACS PANKAJ RISK Score 4 (angina, age>65, cad>50, ASA ), AUC indication 3, AUC score 8   1/11/19 cath  Patent LIMA-LAD, patent VG-diag, patent VG-OM, patent RCA, normal LVFX        Chest pain     Osteoarthritis     GERD (gastroesophageal reflux disease)     Asthma     Glucose intolerance (impaired glucose tolerance)     Depression with anxiety      Current Outpatient Medications   Medication Sig Dispense Refill    amLODIPine (NORVASC) 2.5 MG tablet Take 1 tablet by mouth 2 times daily 60 tablet 5    promethazine (PHENERGAN) 12.5 MG tablet Take 1 tablet by mouth every 6 hours as needed for Nausea 30 tablet 5    isosorbide mononitrate (IMDUR) 60 MG extended release tablet TAKE 1 TABLET TWICE A  tablet 3    levETIRAcetam (KEPPRA) 1000 MG tablet Take 1 tablet by mouth 2 times daily 60 tablet 3    ranolazine (RANEXA) 1000 MG extended release tablet TAKE 1 TABLET TWICE A  tablet 3    ALPRAZolam (XANAX) 0.25 MG tablet Take 0.25 mg by mouth nightly.  Half tab      amitriptyline (ELAVIL) 50 MG tablet Take 50 mg by mouth nightly      diphenhydrAMINE (BENADRYL) 25 MG tablet Take 25 mg by mouth every 6 hours as needed for Itching      DULoxetine (CYMBALTA) 60 MG extended release capsule Take 60 mg by mouth every evening 5 pm      tiZANidine (ZANAFLEX) 4 MG tablet Take 4 mg by mouth 2 times daily as needed 1/2 tablet during day, 1/2 tablet at night      atorvastatin (LIPITOR) 80 MG tablet Take 1 tablet by mouth nightly (Patient taking differently: Take 80 mg by mouth nightly 5 pm) 30 tablet 0    losartan (COZAAR) 100 MG tablet Take 1 tablet by mouth daily 30 tablet 3    metoprolol succinate (TOPROL XL) 50 MG extended release tablet Take 1 tablet by mouth daily 30 tablet 3    pantoprazole (PROTONIX) 40 MG tablet Take 1 tablet by mouth every morning (before breakfast) 30 tablet 3    nitroGLYCERIN (NITROSTAT) 0.4 MG SL tablet up to max of 3 total doses. If no relief after 1 dose, call 911. 30 tablet 3    albuterol (PROVENTIL HFA;VENTOLIN HFA) 108 (90 BASE) MCG/ACT inhaler Inhale 2 puffs into the lungs every 6 hours as needed. No current facility-administered medications for this visit. Allergies:  Other, Codeine, Darvocet [propoxyphene n-acetaminophen], Hydrocodone-acetaminophen, Lyrica [pregabalin], Morphine, and Percocet [oxycodone-acetaminophen]  Past Medical History:   Diagnosis Date    Acute cystitis without hematuria 10/28/2020    Asthma     CAD (coronary artery disease)     Chest pain     Depression with anxiety     [facility]     GERD (gastroesophageal reflux disease)     Glucose intolerance (impaired glucose tolerance)     Hyperlipidemia     Cholesterol management per Nan Sarabia Hypertension     Lupus (systemic lupus erythematosus) ([facility]     Malaise and fatigue 10/6/2017    Movement disorder     Sees pain management for neck pain and previous surgery    [facility]     Osteoarthritis     Palliative care patient 07/22/2020    S/P CABG x 3 03/05/2013    PACABG X 3, LIMA-NGUYỄN, SVG0OM, SVG-DIAG, RT EVH, DR PATEL    Subdural hematoma ([address]      Past Surgical History:   Procedure Laterality Date    APPENDECTOMY  1965    BACK SURGERY      CARDIAC CATHETERIZATION  11    EF 60%    CARDIAC CATHETERIZATION  16    drug eluting stent to RCA    CARDIAC CATHETERIZATION  2016    CARDIAC CATHETERIZATION  16    CARDIAC CATHETERIZATION  2017    cutting balloon angioplasty ot LAD    CARDIAC CATHETERIZATION  2020    TWO Stents placed    CATARACT REMOVAL      COLON SURGERY      Polypectomy - 2    COLON SURGERY      Polypectomy - 4    COLON SURGERY  2008    Polypectomy - 2    COLON SURGERY  2012    Polypectomy - 3    COLONOSCOPY      CORONARY ANGIOPLASTY  2017    cutting balloon angioplasty LAD diagonal    CORONARY ANGIOPLASTY WITH STENT PLACEMENT  3/22/2007    CORONARY ANGIOPLASTY WITH STENT PLACEMENT  2007    CORONARY ARTERY BYPASS GRAFT  3/5/2013    PACABG X 3, LIMA-LAD, SVG0OM, SVG-DIAG, RT EVH, DR PATEL    COSMETIC SURGERY      deviated septum and rhinoplasty    CYST REMOVAL  1970    DIAGNOSTIC CARDIAC CATH LAB PROCEDURE      ENDOSCOPY, COLON, DIAGNOSTIC      EYE SURGERY  2019    cataracts    [address]    Partial    NASAL SEPTUM [address] SINUS SURGERY      Nasal Polyps Removed    SPINE SURGERY  3/6/2006    Lumbar Laminectomy L4&5    SPINE SURGERY  3/6/2006    Cervical Fusion - C4,5,6    TONSILLECTOMY  1968     Family History   Problem Relation Age of Onset    Heart Disease Mother     High Blood Pressure Mother     Diabetes Mother     High Cholesterol Mother     Diabetes Brother      Social History     Tobacco Use    Smoking status: Former Smoker     Packs/day: 1.00     Years: 1.00     Pack years: 1.00     Types: Cigarettes     Quit date: 1967     Years since quittin.6    Smokeless tobacco: Never Used   Substance Use Topics    Alcohol use:  No Review of Systems    Constitutional -   No fever or chills   HENT - no HA's, tinnitus, rhinorrhea, sore throat  Eyes - no sudden vision change or amaurosis. Respiratory - SOB or chest pain  Cardiovascular - no chest pain, syncope, or significant dizziness. No palpitations   Gastrointestinal - no significant abdominal pain. No blood in stool. No diarrhea, nausea, or vomiting. Genitourinary - No difficulty urinating, dysuria, frequency, or urgency. No flank pain or hematuria. Musculoskeletal - no back pain or myalgia. Skin - no rashes or wounds   Neurologic - no dizziness, facial asymmetry, or light headedness. No seizures. No new onset of partial or complete loss of vision affecting only one eye, speech difficulty or lateralizing weakness, numbness/tingling   Hematologic - no excessive bleeding. Psychiatric - no severe anxiety or nervousness. No confusion. All other review of systems are negative. Physical Exam    /72   Pulse 66   Temp 97.5 °F (36.4 °C)   Ht 5' 6\" (1.676 m)   Wt 165 lb (74.8 kg)   SpO2 98%   BMI 26.63 kg/m²     Constitutional - No acute distress. HENT - head normocephalic. Hearing is intact   Eyes - conjunctiva normal.  EOMS normal.  No exudate. No icterus. Neck- ROM appears normal, no tracheal deviation. Cardiovascular - Regular rate and rhythm. Heart sounds are normal.  No murmur, rub, or gallop. Carotid pulses bilaterally without bruit. Extremities - Radial and ulnar pulses are 2+ to palpation bilaterally. No cyanosis, clubbing, or significant edema. No signs atheroembolic event. Pulmonary - effort appears normal.  No respiratory distress. Lungs - Breath sounds normal.   GI - Abdomen - No distension or palpable mass. Genitourinary - deferred. Musculoskeletal - ROM appears normal.  No significant edema. Neurologic - alert and oriented X 3. Face symmetric. No lateralizing weakness noted. Skin - warm, dry, and intact.   No rash, erythema, or pallor. Psychiatric - mood, affect, and behavior appear normal.  Judgment and thought processes appear normal.    Risk factors for atherosclerosis of all vascular beds have been reviewed with the patient including:  Family history, tobacco abuse in all forms, elevated cholesterol, hyperlipidemia, and diabetes. Carotid Doppler results:    Right CCA/ICA <50% stenotic  Left CCA/ICA <50% stenotic  Right vertebral artery flow is antegrade  Left vertebral artery flow is antegrade  Individual velocities reviewed: Yes. Results were reviewed with the patient. Assessment      1. Bilateral carotid artery stenosis          Plan      Recommend she continue ASA (if okay with Dr Rossy Goss) and continue statin daily  Recommend no smoking  Recommend good BP and glycemic control  Recommend low fat, low cholesterol diet  Recommend daily exercise in moderation   We will follow-up in 12 months with a repeat carotid artery duplex scan. Patient was instructed to go to ER or call office immediately with any new onset of partial or complete loss of vision affecting only one eye, speech difficulty or lateralizing weakness, numbness/tingling      Thank you for allowing us to participate in the care of your patient         Please note that parts of the chart were generated using Dragon dictation software. Although every effort was made to ensure the accuracy of this automated transcription, some errors in transcription may have occurred.

## 2021-10-27 ENCOUNTER — HOSPITAL ENCOUNTER (OUTPATIENT)
Dept: CT IMAGING | Age: 76
Discharge: HOME | End: 2021-10-27
Payer: MEDICARE

## 2021-10-27 DIAGNOSIS — R91.8 ABNORMAL FINDINGS ON DIAGNOSTIC IMAGING OF LUNG: ICD-10-CM

## 2021-10-27 PROCEDURE — 71270 CT THORAX DX C-/C+: CPT

## 2021-10-27 PROCEDURE — 6360000004 HC RX CONTRAST MEDICATION: Performed by: FAMILY MEDICINE

## 2021-10-27 RX ADMIN — IOPAMIDOL 90 ML: 755 INJECTION, SOLUTION INTRAVENOUS at 09:26

## 2021-11-12 ENCOUNTER — HOSPITAL ENCOUNTER (INPATIENT)
Age: 76
LOS: 1 days | Discharge: HOME | DRG: 101 | End: 2021-11-14
Attending: PEDIATRICS | Admitting: FAMILY MEDICINE
Payer: MEDICARE

## 2021-11-12 DIAGNOSIS — K52.9 COLITIS: Primary | ICD-10-CM

## 2021-11-12 DIAGNOSIS — R56.9 SEIZURE (HCC): ICD-10-CM

## 2021-11-12 DIAGNOSIS — R11.2 NON-INTRACTABLE VOMITING WITH NAUSEA, UNSPECIFIED VOMITING TYPE: ICD-10-CM

## 2021-11-12 PROCEDURE — 99285 EMERGENCY DEPT VISIT HI MDM: CPT

## 2021-11-12 PROCEDURE — 93005 ELECTROCARDIOGRAM TRACING: CPT | Performed by: PEDIATRICS

## 2021-11-13 ENCOUNTER — APPOINTMENT (OUTPATIENT)
Dept: MRI IMAGING | Age: 76
DRG: 101 | End: 2021-11-13
Payer: MEDICARE

## 2021-11-13 ENCOUNTER — APPOINTMENT (OUTPATIENT)
Dept: GENERAL RADIOLOGY | Age: 76
DRG: 101 | End: 2021-11-13
Payer: MEDICARE

## 2021-11-13 ENCOUNTER — APPOINTMENT (OUTPATIENT)
Dept: CT IMAGING | Age: 76
DRG: 101 | End: 2021-11-13
Payer: MEDICARE

## 2021-11-13 PROBLEM — K52.9 COLITIS: Status: ACTIVE | Noted: 2021-11-13

## 2021-11-13 LAB
ALBUMIN SERPL-MCNC: 4.7 G/DL (ref 3.5–5.2)
ALP BLD-CCNC: 73 U/L (ref 35–104)
ALT SERPL-CCNC: 25 U/L (ref 5–33)
ANION GAP SERPL CALCULATED.3IONS-SCNC: 15 MMOL/L (ref 7–19)
AST SERPL-CCNC: 20 U/L (ref 5–32)
BACTERIA: NEGATIVE /HPF
BASOPHILS ABSOLUTE: 0 K/UL (ref 0–0.2)
BASOPHILS RELATIVE PERCENT: 0.6 % (ref 0–1)
BILIRUB SERPL-MCNC: 0.5 MG/DL (ref 0.2–1.2)
BILIRUBIN URINE: NEGATIVE
BLOOD, URINE: NEGATIVE
BUN BLDV-MCNC: 17 MG/DL (ref 8–23)
CALCIUM SERPL-MCNC: 9.3 MG/DL (ref 8.8–10.2)
CHLORIDE BLD-SCNC: 101 MMOL/L (ref 98–111)
CLARITY: CLEAR
CO2: 20 MMOL/L (ref 22–29)
COLOR: YELLOW
CREAT SERPL-MCNC: 0.9 MG/DL (ref 0.5–0.9)
CRYSTALS, UA: ABNORMAL /HPF
EOSINOPHILS ABSOLUTE: 0.1 K/UL (ref 0–0.6)
EOSINOPHILS RELATIVE PERCENT: 1.6 % (ref 0–5)
EPITHELIAL CELLS, UA: 1 /HPF (ref 0–5)
GFR AFRICAN AMERICAN: >59
GFR NON-AFRICAN AMERICAN: >60
GLUCOSE BLD-MCNC: 184 MG/DL (ref 74–109)
GLUCOSE URINE: NEGATIVE MG/DL
HCT VFR BLD CALC: 36.5 % (ref 37–47)
HEMOGLOBIN: 12 G/DL (ref 12–16)
HYALINE CASTS: 1 /HPF (ref 0–8)
IMMATURE GRANULOCYTES #: 0.1 K/UL
KETONES, URINE: NEGATIVE MG/DL
LACTIC ACID: 1.4 MMOL/L (ref 0.5–1.9)
LEUKOCYTE ESTERASE, URINE: ABNORMAL
LIPASE: 21 U/L (ref 13–60)
LYMPHOCYTES ABSOLUTE: 1.4 K/UL (ref 1.1–4.5)
LYMPHOCYTES RELATIVE PERCENT: 28.6 % (ref 20–40)
MCH RBC QN AUTO: 32.3 PG (ref 27–31)
MCHC RBC AUTO-ENTMCNC: 32.9 G/DL (ref 33–37)
MCV RBC AUTO: 98.4 FL (ref 81–99)
MONOCYTES ABSOLUTE: 0.6 K/UL (ref 0–0.9)
MONOCYTES RELATIVE PERCENT: 11.3 % (ref 0–10)
NEUTROPHILS ABSOLUTE: 2.8 K/UL (ref 1.5–7.5)
NEUTROPHILS RELATIVE PERCENT: 56.9 % (ref 50–65)
NITRITE, URINE: NEGATIVE
PDW BLD-RTO: 14.1 % (ref 11.5–14.5)
PH UA: 5.5 (ref 5–8)
PLATELET # BLD: 161 K/UL (ref 130–400)
PMV BLD AUTO: 9 FL (ref 9.4–12.3)
POTASSIUM REFLEX MAGNESIUM: 4.3 MMOL/L (ref 3.5–5)
PRO-BNP: 403 PG/ML (ref 0–1800)
PROTEIN UA: NEGATIVE MG/DL
RBC # BLD: 3.71 M/UL (ref 4.2–5.4)
RBC UA: 1 /HPF (ref 0–4)
SARS-COV-2, NAAT: NOT DETECTED
SODIUM BLD-SCNC: 136 MMOL/L (ref 136–145)
SPECIFIC GRAVITY UA: >=1.045 (ref 1–1.03)
TOTAL PROTEIN: 6.8 G/DL (ref 6.6–8.7)
TROPONIN: <0.01 NG/ML (ref 0–0.03)
UROBILINOGEN, URINE: 0.2 E.U./DL
WBC # BLD: 4.9 K/UL (ref 4.8–10.8)
WBC UA: 6 /HPF (ref 0–5)

## 2021-11-13 PROCEDURE — 6370000000 HC RX 637 (ALT 250 FOR IP): Performed by: FAMILY MEDICINE

## 2021-11-13 PROCEDURE — 36415 COLL VENOUS BLD VENIPUNCTURE: CPT

## 2021-11-13 PROCEDURE — 99223 1ST HOSP IP/OBS HIGH 75: CPT | Performed by: PSYCHIATRY & NEUROLOGY

## 2021-11-13 PROCEDURE — 74177 CT ABD & PELVIS W/CONTRAST: CPT

## 2021-11-13 PROCEDURE — 81001 URINALYSIS AUTO W/SCOPE: CPT

## 2021-11-13 PROCEDURE — 2500000003 HC RX 250 WO HCPCS: Performed by: PEDIATRICS

## 2021-11-13 PROCEDURE — 6360000004 HC RX CONTRAST MEDICATION: Performed by: PEDIATRICS

## 2021-11-13 PROCEDURE — 83880 ASSAY OF NATRIURETIC PEPTIDE: CPT

## 2021-11-13 PROCEDURE — 83605 ASSAY OF LACTIC ACID: CPT

## 2021-11-13 PROCEDURE — 83690 ASSAY OF LIPASE: CPT

## 2021-11-13 PROCEDURE — 71046 X-RAY EXAM CHEST 2 VIEWS: CPT

## 2021-11-13 PROCEDURE — 87040 BLOOD CULTURE FOR BACTERIA: CPT

## 2021-11-13 PROCEDURE — 2580000003 HC RX 258: Performed by: FAMILY MEDICINE

## 2021-11-13 PROCEDURE — 6360000002 HC RX W HCPCS: Performed by: PSYCHIATRY & NEUROLOGY

## 2021-11-13 PROCEDURE — 95819 EEG AWAKE AND ASLEEP: CPT

## 2021-11-13 PROCEDURE — 6360000002 HC RX W HCPCS: Performed by: PEDIATRICS

## 2021-11-13 PROCEDURE — 85025 COMPLETE CBC W/AUTO DIFF WBC: CPT

## 2021-11-13 PROCEDURE — 80053 COMPREHEN METABOLIC PANEL: CPT

## 2021-11-13 PROCEDURE — 84484 ASSAY OF TROPONIN QUANT: CPT

## 2021-11-13 PROCEDURE — 1210000000 HC MED SURG R&B

## 2021-11-13 PROCEDURE — 87635 SARS-COV-2 COVID-19 AMP PRB: CPT

## 2021-11-13 PROCEDURE — 96374 THER/PROPH/DIAG INJ IV PUSH: CPT

## 2021-11-13 PROCEDURE — 6360000002 HC RX W HCPCS: Performed by: FAMILY MEDICINE

## 2021-11-13 RX ORDER — SODIUM CHLORIDE 0.9 % (FLUSH) 0.9 %
5-40 SYRINGE (ML) INJECTION PRN
Status: DISCONTINUED | OUTPATIENT
Start: 2021-11-13 | End: 2021-11-14 | Stop reason: HOSPADM

## 2021-11-13 RX ORDER — LOSARTAN POTASSIUM 100 MG/1
100 TABLET ORAL DAILY
Status: DISCONTINUED | OUTPATIENT
Start: 2021-11-13 | End: 2021-11-14 | Stop reason: HOSPADM

## 2021-11-13 RX ORDER — CIPROFLOXACIN 2 MG/ML
400 INJECTION, SOLUTION INTRAVENOUS EVERY 12 HOURS
Status: DISCONTINUED | OUTPATIENT
Start: 2021-11-13 | End: 2021-11-14 | Stop reason: HOSPADM

## 2021-11-13 RX ORDER — AMLODIPINE BESYLATE 5 MG/1
2.5 TABLET ORAL 2 TIMES DAILY
Status: DISCONTINUED | OUTPATIENT
Start: 2021-11-13 | End: 2021-11-14 | Stop reason: HOSPADM

## 2021-11-13 RX ORDER — LEVETIRACETAM 10 MG/ML
1000 INJECTION INTRAVASCULAR EVERY 12 HOURS
Status: DISCONTINUED | OUTPATIENT
Start: 2021-11-13 | End: 2021-11-14 | Stop reason: HOSPADM

## 2021-11-13 RX ORDER — ACETAMINOPHEN 325 MG/1
650 TABLET ORAL EVERY 4 HOURS PRN
Status: DISCONTINUED | OUTPATIENT
Start: 2021-11-13 | End: 2021-11-14 | Stop reason: HOSPADM

## 2021-11-13 RX ORDER — SODIUM CHLORIDE 9 MG/ML
25 INJECTION, SOLUTION INTRAVENOUS PRN
Status: DISCONTINUED | OUTPATIENT
Start: 2021-11-13 | End: 2021-11-14 | Stop reason: HOSPADM

## 2021-11-13 RX ORDER — RANOLAZINE 500 MG/1
1000 TABLET, EXTENDED RELEASE ORAL 2 TIMES DAILY
Status: DISCONTINUED | OUTPATIENT
Start: 2021-11-13 | End: 2021-11-14 | Stop reason: HOSPADM

## 2021-11-13 RX ORDER — PROMETHAZINE HYDROCHLORIDE 12.5 MG/1
12.5 TABLET ORAL EVERY 6 HOURS PRN
Status: DISCONTINUED | OUTPATIENT
Start: 2021-11-13 | End: 2021-11-14 | Stop reason: HOSPADM

## 2021-11-13 RX ORDER — ATORVASTATIN CALCIUM 40 MG/1
80 TABLET, FILM COATED ORAL NIGHTLY
Status: DISCONTINUED | OUTPATIENT
Start: 2021-11-13 | End: 2021-11-14 | Stop reason: HOSPADM

## 2021-11-13 RX ORDER — KETOROLAC TROMETHAMINE 30 MG/ML
15 INJECTION, SOLUTION INTRAMUSCULAR; INTRAVENOUS ONCE
Status: COMPLETED | OUTPATIENT
Start: 2021-11-13 | End: 2021-11-13

## 2021-11-13 RX ORDER — LORAZEPAM 2 MG/ML
1 INJECTION INTRAMUSCULAR PRN
Status: DISCONTINUED | OUTPATIENT
Start: 2021-11-13 | End: 2021-11-14 | Stop reason: HOSPADM

## 2021-11-13 RX ORDER — LEVETIRACETAM 10 MG/ML
1000 INJECTION INTRAVASCULAR ONCE
Status: COMPLETED | OUTPATIENT
Start: 2021-11-13 | End: 2021-11-13

## 2021-11-13 RX ORDER — PANTOPRAZOLE SODIUM 40 MG/1
40 TABLET, DELAYED RELEASE ORAL
Status: DISCONTINUED | OUTPATIENT
Start: 2021-11-14 | End: 2021-11-14 | Stop reason: HOSPADM

## 2021-11-13 RX ORDER — ISOSORBIDE MONONITRATE 60 MG/1
60 TABLET, EXTENDED RELEASE ORAL 2 TIMES DAILY
Status: DISCONTINUED | OUTPATIENT
Start: 2021-11-13 | End: 2021-11-14 | Stop reason: HOSPADM

## 2021-11-13 RX ORDER — AMITRIPTYLINE HYDROCHLORIDE 50 MG/1
50 TABLET, FILM COATED ORAL NIGHTLY
Status: DISCONTINUED | OUTPATIENT
Start: 2021-11-13 | End: 2021-11-14 | Stop reason: HOSPADM

## 2021-11-13 RX ORDER — ONDANSETRON 4 MG/1
4 TABLET, ORALLY DISINTEGRATING ORAL EVERY 8 HOURS PRN
Status: DISCONTINUED | OUTPATIENT
Start: 2021-11-13 | End: 2021-11-14 | Stop reason: HOSPADM

## 2021-11-13 RX ORDER — METOPROLOL SUCCINATE 50 MG/1
50 TABLET, EXTENDED RELEASE ORAL DAILY
Status: DISCONTINUED | OUTPATIENT
Start: 2021-11-13 | End: 2021-11-14 | Stop reason: HOSPADM

## 2021-11-13 RX ORDER — SODIUM CHLORIDE 0.9 % (FLUSH) 0.9 %
5-40 SYRINGE (ML) INJECTION EVERY 12 HOURS SCHEDULED
Status: DISCONTINUED | OUTPATIENT
Start: 2021-11-13 | End: 2021-11-14 | Stop reason: HOSPADM

## 2021-11-13 RX ORDER — DULOXETIN HYDROCHLORIDE 30 MG/1
60 CAPSULE, DELAYED RELEASE ORAL EVERY EVENING
Status: DISCONTINUED | OUTPATIENT
Start: 2021-11-13 | End: 2021-11-14 | Stop reason: HOSPADM

## 2021-11-13 RX ORDER — ONDANSETRON 2 MG/ML
4 INJECTION INTRAMUSCULAR; INTRAVENOUS EVERY 6 HOURS PRN
Status: DISCONTINUED | OUTPATIENT
Start: 2021-11-13 | End: 2021-11-14 | Stop reason: HOSPADM

## 2021-11-13 RX ADMIN — IOPAMIDOL 90 ML: 755 INJECTION, SOLUTION INTRAVENOUS at 03:41

## 2021-11-13 RX ADMIN — LEVETIRACETAM 1000 MG: 10 INJECTION INTRAVENOUS at 06:42

## 2021-11-13 RX ADMIN — ENOXAPARIN SODIUM 40 MG: 100 INJECTION SUBCUTANEOUS at 09:12

## 2021-11-13 RX ADMIN — METRONIDAZOLE 500 MG: 500 INJECTION, SOLUTION INTRAVENOUS at 20:52

## 2021-11-13 RX ADMIN — LOSARTAN POTASSIUM 100 MG: 100 TABLET, FILM COATED ORAL at 13:14

## 2021-11-13 RX ADMIN — METOPROLOL SUCCINATE 50 MG: 50 TABLET, EXTENDED RELEASE ORAL at 13:14

## 2021-11-13 RX ADMIN — CIPROFLOXACIN 400 MG: 2 INJECTION, SOLUTION INTRAVENOUS at 18:04

## 2021-11-13 RX ADMIN — RANOLAZINE 1000 MG: 500 TABLET, EXTENDED RELEASE ORAL at 13:14

## 2021-11-13 RX ADMIN — METRONIDAZOLE 500 MG: 500 INJECTION, SOLUTION INTRAVENOUS at 06:42

## 2021-11-13 RX ADMIN — ONDANSETRON 4 MG: 2 INJECTION INTRAMUSCULAR; INTRAVENOUS at 13:14

## 2021-11-13 RX ADMIN — RANOLAZINE 1000 MG: 500 TABLET, EXTENDED RELEASE ORAL at 20:52

## 2021-11-13 RX ADMIN — AMLODIPINE BESYLATE 2.5 MG: 5 TABLET ORAL at 13:14

## 2021-11-13 RX ADMIN — ISOSORBIDE MONONITRATE 60 MG: 60 TABLET, EXTENDED RELEASE ORAL at 20:52

## 2021-11-13 RX ADMIN — SODIUM CHLORIDE, PRESERVATIVE FREE 10 ML: 5 INJECTION INTRAVENOUS at 09:12

## 2021-11-13 RX ADMIN — KETOROLAC TROMETHAMINE 15 MG: 30 INJECTION, SOLUTION INTRAMUSCULAR; INTRAVENOUS at 04:01

## 2021-11-13 RX ADMIN — AMITRIPTYLINE HYDROCHLORIDE 50 MG: 50 TABLET, FILM COATED ORAL at 20:52

## 2021-11-13 RX ADMIN — SODIUM CHLORIDE, PRESERVATIVE FREE 10 ML: 5 INJECTION INTRAVENOUS at 20:53

## 2021-11-13 RX ADMIN — METRONIDAZOLE 500 MG: 500 INJECTION, SOLUTION INTRAVENOUS at 15:04

## 2021-11-13 RX ADMIN — LEVETIRACETAM 1000 MG: 10 INJECTION INTRAVENOUS at 18:02

## 2021-11-13 RX ADMIN — ISOSORBIDE MONONITRATE 60 MG: 60 TABLET, EXTENDED RELEASE ORAL at 13:14

## 2021-11-13 RX ADMIN — AMLODIPINE BESYLATE 2.5 MG: 5 TABLET ORAL at 20:52

## 2021-11-13 RX ADMIN — DULOXETINE HYDROCHLORIDE 60 MG: 30 CAPSULE, DELAYED RELEASE ORAL at 18:02

## 2021-11-13 RX ADMIN — LORAZEPAM 1 MG: 2 INJECTION INTRAMUSCULAR; INTRAVENOUS at 13:14

## 2021-11-13 RX ADMIN — ACETAMINOPHEN 650 MG: 325 TABLET ORAL at 20:55

## 2021-11-13 RX ADMIN — ATORVASTATIN CALCIUM 80 MG: 40 TABLET, FILM COATED ORAL at 20:52

## 2021-11-13 ASSESSMENT — ENCOUNTER SYMPTOMS
WHEEZING: 0
NAUSEA: 1
SHORTNESS OF BREATH: 0
COLOR CHANGE: 0
VOMITING: 1
BLOOD IN STOOL: 0
ABDOMINAL PAIN: 1

## 2021-11-13 ASSESSMENT — PAIN SCALES - GENERAL
PAINLEVEL_OUTOF10: 0
PAINLEVEL_OUTOF10: 6
PAINLEVEL_OUTOF10: 4

## 2021-11-13 NOTE — CONSULTS
[facility] Neurology Consult      Patient:   Anival Ray  MR#:    [id number]  Account Number:                   [id number]      Room:    [address] [place] [address]-   YOB: 1945  Date of Progress Note: 2021  Time of Note                           11:52 AM  Attending Physician:  Eleazar Yanez MD  Consulting Physician:  Emely Landry DO       CHIEF COMPLAINT:  Seiziure    HISTORY OF PRESENT ILLNESS:   This is a 68 y.o. female who was admitted with breakthrough seizures. The patient has an underlying history of a seizure disorder. 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 She is on Keppra at home. But has had some intractable nausea and vomiting which likely led to decreased absorption of her Keppra and breakthrough events. She denies fever, headaches, focal weakness, or slurred speech. No history of meningitis or encephalitis. No family history of seizures. Prior EEG normal.  Prior MRI with nothing acute, , atrophy, chronic ischemic changes, partial empty sella. REVIEW OF SYSTEMS:  Constitutional - No fever or chills. HENT -  No Scalp tenderness. No tinnitus or significant hearing loss. No nose bleeding, no sore throat. Eyes - No sudden vision change or eye pain  Respiratory - No significant shortness of breath or cough  Cardiovascular - No chest pain. No palpitations or significant leg swelling  Gastrointestinal - Abdominal pain, nausea. Genitourinary - No difficulty urinating, dysuria  Musculoskeletal - No back pain or myalgia. Skin - No color change or rash  Neurologic -  No lateralizing weakness. No numbness. Hematologic - No easy bruising or spontaneous bleeding. Psychiatric - No anxiety.      PAST MEDICAL HISTORY:      Diagnosis Date    Acute cystitis without hematuria 10/28/2020    Asthma     CAD (coronary artery disease)     Chest pain     Depression with anxiety  Diabetes mellitus [facility]     GERD (gastroesophageal reflux disease)     Glucose intolerance (impaired glucose tolerance)     Hyperlipidemia     Cholesterol management per Philippe Swan Hypertension     Lupus (systemic lupus erythematosus) ([facility]     Malaise and fatigue 10/6/2017    Movement disorder     Sees pain management for neck pain and previous surgery    New onset seizure ([facility]     Osteoarthritis     Palliative care patient 07/22/2020    S/P CABG x 3 03/05/2013    PACABG X 3, LIMA-LAD, SVG0OM, SVG-DIAG, RT EVH, DR PATEL    Subdural hematoma ([facility]        PAST SURGICAL HISTORY:      Procedure Laterality Date   [address] [place] [address]  5/18/11    EF 60%    CARDIAC CATHETERIZATION  1/25/16    drug eluting stent to RCA   [address]  1/25/2016    CARDIAC CATHETERIZATION  2/19/16    CARDIAC CATHETERIZATION  08/24/2017    cutting balloon angioplasty ot LAD    CARDIAC CATHETERIZATION  04/13/2020    TWO Stents placed    CATARACT REMOVAL      COLON SURGERY  1992    Polypectomy - 2    COLON SURGERY  2003    Polypectomy - 4    COLON SURGERY  11/2008    Polypectomy - 2    COLON SURGERY  8/2012    Polypectomy - 3    COLONOSCOPY      CORONARY ANGIOPLASTY  08/2017    cutting balloon angioplasty LAD diagonal    CORONARY ANGIOPLASTY WITH STENT PLACEMENT  3/22/2007    CORONARY ANGIOPLASTY WITH STENT PLACEMENT  8/2007    CORONARY ARTERY BYPASS GRAFT  3/5/2013    PACABG X 3, LIMA-LAD, SVG0OM, SVG-DIAG, RT EVH, DR PATEL    COSMETIC SURGERY      deviated septum and rhinoplasty    CYST REMOVAL  1970    DIAGNOSTIC CARDIAC CATH LAB PROCEDURE      ENDOSCOPY, COLON, DIAGNOSTIC      EYE SURGERY  03/2019    cataracts    [address]    Partial    NASAL [facility] SINUS SURGERY  2003    Nasal Polyps Removed    SPINE SURGERY  3/6/2006    Lumbar Laminectomy L4&5    SPINE SURGERY 3/6/2006    Cervical Fusion - C4,5,6    TONSILLECTOMY  1968       SOCIAL HISTORY:   TOBACCO:   reports that she quit smoking about 54 years ago. Her smoking use included cigarettes. She has a 1.00 pack-year smoking history. She has never used smokeless tobacco.  ETOH:   reports no history of alcohol use.   DRUG:    Social History     Substance and Sexual Activity   Drug Use No       FAMILY HISTORY:       Problem Relation Age of Onset    Heart Disease Mother     High Blood Pressure Mother     Diabetes Mother     High Cholesterol Mother     Diabetes Brother        MEDICATIONS:      Current Facility-Administered Medications:     metronidazole (FLAGYL) 500 mg in NaCl 100 mL IVPB premix, 500 mg, IntraVENous, Q8H, Herlinda Hanna MD, Stopped at 11/13/21 0829    ciprofloxacin (CIPRO) IVPB 400 mg, 400 mg, IntraVENous, Q12H, Herlinda Hanna MD    sodium chloride flush 0.9 % injection 5-40 mL, 5-40 mL, IntraVENous, 2 times per day, Oral Oneill MD, 10 mL at 11/13/21 0912    sodium chloride flush 0.9 % injection 5-40 mL, 5-40 mL, IntraVENous, PRN, Oral Oneill MD    0.9 % sodium chloride infusion, 25 mL, IntraVENous, PRN, Oral Oneill MD    enoxaparin (LOVENOX) injection 40 mg, 40 mg, SubCUTAneous, Daily, Oral Oneill MD, 40 mg at 11/13/21 0912    acetaminophen (TYLENOL) tablet 650 mg, 650 mg, Oral, Q4H PRN, Oral Oneill MD    ondansetron (ZOFRAN-ODT) disintegrating tablet 4 mg, 4 mg, Oral, Q8H PRN **OR** ondansetron (ZOFRAN) injection 4 mg, 4 mg, IntraVENous, Q6H PRN, Oral Oneill MD    levETIRAcetam (KEPPRA) 1000 mg/100 mL IVPB, 1,000 mg, IntraVENous, Q12H, Herlinda Hanna MD    ALLERGIES:    Other, Codeine, Darvocet [propoxyphene n-acetaminophen], Hydrocodone-acetaminophen, Lyrica [pregabalin], Morphine, and Percocet [oxycodone-acetaminophen]    PHYSICAL EXAM:    Constitutional -   BP (!) 152/72   Pulse 66   Temp 97.5 °F (36.4 °C) (Temporal)   Resp 19   Ht 5' 7\" (1.702 m)   Wt 170 lb (77.1 kg)   SpO2 100%   BMI 26.63 kg/m²   General appearance: No acute distress   EYES -   Conjunctiva normal  Pupillary exam as below, see CN exam in the neurologic exam  ENT-    No scars, masses, or lesions over external nose or ears  Oropharynx without erythema, palate midline  Cardiovascular -   No clubbing, cyanosis, or edema   Pulmonary-   Good expansion, normal effort without use of accessory muscles  Musculoskeletal -   No significant wasting of muscles noted  Gait as below, see gait exam in the neurologic exam  Muscle strength, tone, stability as below see the motor exam in the neurologic exam.   No bony deformities  Skin -   Warm, dry, and intact to inspection and palpation. No rash, erythema, or pallor  Psychiatric -   Mood, affect, and behavior appear normal    Memory as below see mental status examination in the neurologic exam      NEUROLOGICAL EXAM    Mental status   [x] Awake, alert, oriented   [x] Affect attention and concentration appear appropriate  [x] Recent and remote memory appears unremarkable  [x] Speech normal without dysarthria or aphasia, comprehension and repetition intact.    COMMENTS:   Cranial Nerves [x] No VF deficit to confrontation  [x] PERRLA, EOMI, no nystagmus, conjugate eye movements, no ptosis  [x] Face symmetric  [x] Facial sensation intact  [x] Tongue midline no atrophy or fasciculations present  [x] Palate midline, hearing to finger rub normal  [x] Shoulder shrug and SCM testing normal  COMMENTS:   Motor   [x] 5/5 strength x 4 extremities  [x] Normal bulk and tone  [x] No tremor present  [x] No rigidity or bradykinesia noted  COMMENTS:   Sensory  [x] Sensation intact to light touch, pin prick, vibration, and proprioception BLE  [] Sensation intact to light touch, pin prick, vibration, and proprioception BUE  COMMENTS:   Coordination [x] FTN normal bilaterally   [] HTS normal bilaterally  [] QUIRINO normal.   COMMENTS:   Reflexes  [x] Symmetric and non-pathological  [x] Toes downgoing bilaterally  [x] No clonus present  COMMENTS:   Gait                  [] Normal steady gait    [] Ataxic    [] Spastic     [] Magnetic     [] Shuffling  [x] Not assessed  COMMENTS:       LABS/IMAGING:    As below and per HPI    XR CHEST (2 VW)    Result Date: 11/13/2021  EXAMINATION: XR CHEST (2 VW) 11/13/2021 8:01 AM HISTORY: Congestion, possible aspiration COMPARISON: 6/12/2021 FINDINGS: Heart appears normal in size. Median sternotomy wires are present. Atherosclerotic calcifications are seen in the aorta. Coronary artery stent is noted. There is no pneumothorax. Pulmonary vasculature is mildly indistinct. Minimal airspace opacities are noted in the left perihilar region and medial right lung base. There has been prior fusion of the lower cervical spine. Airspace opacities bilaterally may reflect an infectious or inflammatory process. Developing pulmonary edema can also have this appearance. Signed by Dr Glo Moritz Additional Contrast? None    Result Date: 11/13/2021  EXAMINATION: CT ABDOMEN PELVIS W IV CONTRAST 11/13/2021 7:47 AM HISTORY: Abdominal pain and vomiting Comparison: CT abdomen and pelvis with contrast 7/20/2020 Technique: Sequential imaging was performed from the lung bases through the pubic symphysis after the administration of IV contrast. Sagittal and coronal reformations were made from the original source data and reviewed. Automated exposure control was utilized for radiation dose reduction. Radiation dose: DLP 6841 mGy-cm Findings: The heart appears mildly enlarged. Coronary artery calcifications are present. There is atelectasis in both lung bases. There is diffuse fatty infiltration of the liver. Low-density lesions in the right hepatic lobe are again identified. The most posterior lesion appears decreased in size. These appear to reflect cysts. Liver is otherwise unremarkable.  The gallbladder, spleen, pancreas, and adrenal glands are unremarkable. A left renal cyst is present. A tiny hypodensity is seen in the right kidney which is too small to accurately characterize but is also likely a cyst. The ureters are decompressed bilaterally. The main portal and splenic veins and IVC appear grossly normal. There is some dilatation of the infrarenal abdominal aorta to 2.2 cm. Chronic infrarenal aortic dissection suspected. Atherosclerotic calcifications are seen throughout the aorta and its branch vessels. The origins of the celiac axis, superior mesenteric artery, and inferior mesenteric artery enhance normally. There is calcified plaque at the origins of the renal arteries bilaterally. No pathologically enlarged central mesenteric or retroperitoneal lymph nodes are identified. There is a small hiatal hernia. The stomach and small bowel appear normal in caliber. There are numerous scattered colonic diverticula. No focal inflammatory stranding is identified to suggest acute diverticulitis. There is a fat-containing umbilical hernia. No free air or free fluid is seen in the abdomen. Urinary bladder is grossly normal in appearance. Uterus is surgically absent. The ovaries are visualized and not well evaluated by CT. No free fluid is seen in the pelvis. No pathologically enlarged pelvic or inguinal lymph nodes are identified. Review of the visualized osseous structures demonstrates no acute or aggressive lesions. Degenerative osteophyte formation is present in the spine. There is anterolisthesis of L4 on L5. The patient appears osteopenic. Impression: 1. No acute findings in the abdomen or pelvis. 2. Colonic diverticulosis. Apparent wall thickening of the sigmoid colon is felt to be related to underdistention. 3. Extensive atherosclerotic disease. 4. Hepatic and renal cysts. 5. Small hiatal hernia. A preliminary report was provided by stat rad.  Signed by Dr Milli Chin     11/12/21  2319   WBC 4.9   HGB 12.0   PLT 161     Recent Labs     11/12/21  2319      K 4.3      CO2 20*   BUN 17   CREATININE 0.9   GLUCOSE 184*     Recent Labs     11/12/21  2319   AST 20   ALT 25   BILITOT 0.5   ALKPHOS 73       ASSESSMENT:  68 y.o. admitted with breakthrough seizures, recent colitis history, nausea / vomiting, possibly interfering with absorption of her seizure medications. Seizure disorder history noted, prior TBI with non-surgical ICH noted. Plan further workup. PLAN:  1. Continue Keppra 1000 mg IV q12h  2.  Seizure precautions, prn ativan  3. MRI brain, EEG  4. Continue addressing medical issues, possible colitis, nausea / vomiting. 5.  Epilepsy precautions and seizure first aid discussed. No driving, heights, swimming, tub baths, open flames, or heavy machinery. Please feel free to call with any questions. [phone] (cell phone).     Naveed Huynh DO  Board Certified Neurology

## 2021-11-13 NOTE — ED PROVIDER NOTES
[facility] 3 PEARL/VAS/MED  eMERGENCY dEPARTMENT eNCOUnter      Pt Name: José Miguel Cantu  MRN: [id number]  Birthdate 1945  Date of evaluation: 11/12/2021  Provider: Robin Braun MD    CHIEF COMPLAINT       Chief Complaint   Patient presents with    Seizures     Witness seizure via EMS          HISTORY OF PRESENT ILLNESS   (Location/Symptom, Timing/Onset,Context/Setting, Quality, Duration, Modifying Factors, Severity)  Note limiting factors. José Miguel Cantu is a 68 y.o. female who presents to the emergency department with seizure. Daughter and grandson give history due to patient's postictal status. Patient has had a seizure disorder since falling on 10/24/2020 and sustaining a small subdural hematoma. Daughter states that her seizures used to be farther apart but she is been having more frequent seizures. Today iris observed patient having a seizure approximately 10:35 PM. Patient \"locked up and fell back on to bed. \" Seizure activity lasted approximately 45 seconds. Iris noted that patient's elevated during seizure activity. Mac Phillips is concerned because patient complained of nausea at approximately 9 PM while she was getting ready for bed. She was given Phenergan 12.5 mg tablet. At 56 iris could hear patient vomiting in her room. When he went to check on her he found her having a seizure. Mac Phillips is concerned that she may have aspirated. Iris noted that she did not appear to be breathing during the 45 seconds she was having the seizure. He denies cyanosis. Patient remained on bed during activity and did not fall or strike her head. Patient was confused and less responsive after seizure ended and remains so in the emergency department. HPI    NursingNotes were reviewed. REVIEW OF SYSTEMS    (2-9 systems for level 4, 10 or more for level 5)     Review of Systems   Unable to perform ROS: Mental status change   Constitutional: Negative for appetite change and fever.    Respiratory: Negative for shortness of breath. Gastrointestinal: Positive for abdominal pain, nausea and vomiting. Neurological: Positive for seizures. Psychiatric/Behavioral: Positive for confusion.             PAST MEDICALHISTORY     Past Medical History:   Diagnosis Date    Acute cystitis without hematuria 10/28/2020    Asthma     CAD (coronary artery disease)     Chest pain     Depression with anxiety     Diabetes mellitus (HCC)     GERD (gastroesophageal reflux disease)     Glucose intolerance (impaired glucose tolerance)     Hyperlipidemia     Cholesterol management per Deng Cardoso Hypertension     [facility]up[facility] lupus erythematosus) [facility]     Malaise and fatigue 10/6/2017    Movement disorder     Sees pain management for neck pain and previous surgery    New onset seizure ([facility]     Osteoarthritis     Palliative care patient 07/22/2020    S/P CABG x 3 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/2013    PACABG X 3, LIMA-LAD, SVG0OM, SVG-DIAG, RT EVH, DR PATEL    Subdural hematoma ([facility]          SURGICAL HISTORY       Past Surgical History:   Procedure Laterality Date    APPENDECTOMY  1965    BACK SURGERY      CARDIAC CATHETERIZATION  5/18/11    EF 60%    CARDIAC CATHETERIZATION  1/25/16    drug eluting stent to RCA   [address]  1/25/2016    CARDIAC CATHETERIZATION  2/19/16    CARDIAC CATHETERIZATION  08/24/2017    cutting balloon angioplasty ot LAD    CARDIAC CATHETERIZATION  04/13/2020    TWO Stents placed    CATARACT REMOVAL      COLON SURGERY  1992    Polypectomy - 2    COLON SURGERY  2003    Polypectomy - 4    COLON SURGERY  11/2008    Polypectomy - 2    COLON SURGERY  8/2012    Polypectomy - 3    COLONOSCOPY      CORONARY ANGIOPLASTY  08/2017    cutting balloon angioplasty LAD diagonal    CORONARY ANGIOPLASTY WITH STENT PLACEMENT  3/22/2007    CORONARY ANGIOPLASTY WITH STENT PLACEMENT  8/2007    CORONARY ARTERY BYPASS GRAFT  3/5/2013    PACABG X 3, LIMA-LAD, SVG0OM, SVG-DIAG, RT EVH, DR Denny Sanchez COSMETIC SURGERY      deviated septum and rhinoplasty    CYST REMOVAL  1970    DIAGNOSTIC CARDIAC CATH LAB PROCEDURE      ENDOSCOPY, COLON, DIAGNOSTIC      EYE SURGERY  03/2019    cataracts    HEMORRHOID SURGERY  1987    HYSTERECTOMY  1983    Partial    NASAL SEPTUM SURGERY  1977    SINUS SURGERY  2003    Nasal Polyps Removed    SPINE SURGERY  3/6/2006    Lumbar Laminectomy L4&5    SPINE SURGERY  3/6/2006    Cervical Fusion - C4,5,6    TONSILLECTOMY  1968         CURRENT MEDICATIONS     Current Discharge Medication List      CONTINUE these medications which have NOT CHANGED    Details   amLODIPine (NORVASC) 2.5 MG tablet Take 1 tablet by mouth 2 times daily  Qty: 60 tablet, Refills: 5      promethazine (PHENERGAN) 12.5 MG tablet Take 1 tablet by mouth every 6 hours as needed for Nausea  Qty: 30 tablet, Refills: 5      isosorbide mononitrate (IMDUR) 60 MG extended release tablet TAKE 1 TABLET TWICE A DAY  Qty: 180 tablet, Refills: 3    Comments: YOUR PATIENT HAS REQUESTED A REFILL OF THIS MEDICATION, PREVIOUSLY AUTHORIZED BY ANOTHER PRESCRIBER. Associated Diagnoses: Coronary artery disease involving native coronary artery of native heart with angina pectoris (HCC)      levETIRAcetam (KEPPRA) 1000 MG tablet Take 1 tablet by mouth 2 times daily  Qty: 60 tablet, Refills: 3      ranolazine (RANEXA) 1000 MG extended release tablet TAKE 1 TABLET TWICE A DAY  Qty: 180 tablet, Refills: 3      ALPRAZolam (XANAX) 0.25 MG tablet Take 0.25 mg by mouth nightly.  Half tab      amitriptyline (ELAVIL) 50 MG tablet Take 50 mg by mouth nightly      diphenhydrAMINE (BENADRYL) 25 MG tablet Take 25 mg by mouth every 6 hours as needed for Itching      DULoxetine (CYMBALTA) 60 MG extended release capsule Take 60 mg by mouth every evening 5 pm      tiZANidine (ZANAFLEX) 4 MG tablet Take 4 mg by mouth 2 times daily as needed 1/2 tablet during day, 1/2 tablet at night      atorvastatin (LIPITOR) 80 MG tablet Take 1 tablet by mouth nightly  Qty: 30 tablet, Refills: 0    Comments: Patient must have labs before any further refills  Associated Diagnoses: Hyperlipidemia, unspecified hyperlipidemia type      losartan (COZAAR) 100 MG tablet Take 1 tablet by mouth daily  Qty: 30 tablet, Refills: 3      metoprolol succinate (TOPROL XL) 50 MG extended release tablet Take 1 tablet by mouth daily  Qty: 30 tablet, Refills: 3      pantoprazole (PROTONIX) 40 MG tablet Take 1 tablet by mouth every morning (before breakfast)  Qty: 30 tablet, Refills: 3      nitroGLYCERIN (NITROSTAT) 0.4 MG SL tablet up to max of 3 total doses. If no relief after 1 dose, call 911. Qty: 30 tablet, Refills: 3      albuterol (PROVENTIL HFA;VENTOLIN HFA) 108 (90 BASE) MCG/ACT inhaler Inhale 2 puffs into the lungs every 6 hours as needed. ALLERGIES     Other, Codeine, Darvocet [propoxyphene n-acetaminophen], Hydrocodone-acetaminophen, Lyrica [pregabalin], Morphine, and Percocet [oxycodone-acetaminophen]    FAMILY HISTORY       Family History   Problem Relation Age of Onset    Heart Disease Mother     High Blood Pressure Mother     Diabetes Mother     High Cholesterol Mother     Diabetes Brother           SOCIAL HISTORY       Social History     Socioeconomic History    Marital status:       Spouse name: Not on file    Number of children: 2    Years of education: Not on file    Highest education level: Not on file   Occupational History    Not on file   Tobacco Use    Smoking status: Former Smoker     Packs/day: 1.00     Years: 1.00     Pack years: 1.00     Types: Cigarettes     Quit date: 1967     Years since quittin.7    Smokeless tobacco: Never Used   Vaping Use    Vaping Use: Never used   Substance and Sexual Activity    Alcohol use: No    Drug use: No    Sexual activity: Not Currently   Other Topics Concern    Not on file   Social History Narrative    Not on file     Social Determinants of Health     Financial Resource Strain:    Gladys Harley Difficulty of Paying Living Expenses: Not on file   Food Insecurity:     Worried About Running Out of Food in the Last Year: Not on file    Ran Out of Food in the Last Year: Not on file   Transportation Needs:     Lack of Transportation (Medical): Not on file    Lack of Transportation (Non-Medical): Not on file   Physical Activity:     Days of Exercise per Week: Not on file    Minutes of Exercise per Session: Not on file   Stress:     Feeling of Stress : Not on file   Social Connections:     Frequency of Communication with Friends and Family: Not on file    Frequency of Social Gatherings with Friends and Family: Not on file    Attends Latter day Services: Not on file    Active Member of [address] [place] [address] [employer] or Organizations: Not on file    Attends Club or Organization Meetings: Not on file    Marital Status: Not on file   Intimate Partner Violence:     Fear of Current or Ex-Partner: Not on file    Emotionally Abused: Not on file    Physically Abused: Not on file    Sexually Abused: Not on file   Housing Stability:     Unable to Pay for Housing in the Last Year: Not on file    Number of [place] in the Last Year: Not on file    Unstable Housing in the Last Year: Not on file       SCREENINGS    Bree Coma Scale  Eye Opening: Spontaneous  Best Verbal Response: Oriented  Best Motor Response: Obeys commands  [place] Coma Scale Score: 15        PHYSICAL EXAM    (up to 7 for level 4, 8 or more for level 5)     ED Triage Vitals [11/12/21 2310]   BP Temp Temp src Pulse Resp SpO2 Height Weight   (!) 152/72 -- -- 66 19 100 % 5' 7\" (1.702 m) 170 lb (77.1 kg)       Physical Exam  Vitals and nursing note reviewed. Constitutional:       Comments: Patient sits quietly with eyes open staring. Patient will respond when spoken to but seems mildly confused. HENT:      Head: Normocephalic and atraumatic.       Right Ear: External ear normal.      Left Ear: External ear normal.      Nose: Nose normal.      Mouth/Throat: DIAGNOSTIC RESULTS     EKG: All EKG's areinterpreted by the Emergency Department Physician who either signs or Co-signs this chart in the absence of a cardiologist.    KG dated 11/12/2021 at 20 3:11 PM: Normal sinus rhythm, rate 65. PAC. VT interval 219. Left bundle branch block. No significant change from prior EKG on 6/12/2021. RADIOLOGY:  Non-plain film images such as CT, Ultrasound and MRI are read by the radiologist. Plain radiographic images are visualized and preliminarily interpreted bythe emergency physician with the below findings:      CT ABDOMEN PELVIS W IV CONTRAST Additional Contrast? None   Final Result   Impression:   1. No acute findings in the abdomen or pelvis. 2. Colonic diverticulosis. Apparent wall thickening of the sigmoid   colon is felt to be related to underdistention. 3. Extensive atherosclerotic disease. 4. Hepatic and renal cysts. 5. Small hiatal hernia. A preliminary report was provided by stat rad. Signed by Dr Kristina Reese      XR CHEST (2 VW)   Final Result   Airspace opacities bilaterally may reflect an infectious   or inflammatory process. Developing pulmonary edema can also have this   appearance.    Signed by Dr Yolis Vick:  Labs Reviewed   CBC WITH AUTO DIFFERENTIAL - Abnormal; Notable for the following components:       Result Value    RBC 3.71 (*)     Hematocrit 36.5 (*)     MCH 32.3 (*)     MCHC 32.9 (*)     MPV 9.0 (*)     Monocytes % 11.3 (*)     All other components within normal limits   COMPREHENSIVE METABOLIC PANEL W/ REFLEX TO MG FOR LOW K - Abnormal; Notable for the following components:    CO2 20 (*)     Glucose 184 (*)     All other components within normal limits   URINE RT REFLEX TO CULTURE - Abnormal; Notable for the following components:    Leukocyte Esterase, Urine TRACE (*)     All other components within normal limits   MICROSCOPIC URINALYSIS - Abnormal; Notable for the following components:    Bacteria, UA NEGATIVE (*)     Crystals, UA NEG (*)     WBC, UA 6 (*)     All other components within normal limits   COVID-19, RAPID   CULTURE, BLOOD 1   CULTURE, BLOOD 2   TROPONIN   LIPASE   BRAIN NATRIURETIC PEPTIDE   LACTIC ACID, PLASMA       All other labs were within normal range or not returned as of this dictation. EMERGENCY DEPARTMENT COURSE and DIFFERENTIAL DIAGNOSIS/MDM:   Vitals:    Vitals:    11/12/21 2310   BP: (!) 152/72   Pulse: 66   Resp: 19   SpO2: 100%   Weight: 170 lb (77.1 kg)   Height: 5' 7\" (1.702 m)       MDM  60-year-old female with history of new onset seizure disorder last year presents after seizure. When postictal state has resolved, patient also complains of abdominal tenderness. Patient has had recent episodes of vomiting. Lab, EKG, and radiology results reviewed. Patient diagnosed with colitis and seizure activity of increasing frequency. Patient given Keppra 1 g IV, ciprofloxacin 400 mg IV and Flagyl 500 mg IV while in the emergency department. Discussed with Dr. Jose Palomares, on-call physician for Dr. Gigi Douglass. Patient will be admitted for further evaluation and treatment. Discussed with Dr. Rios Adair, neurologist, who recommends continuing IV Keppra 1 g every 12 hours and he will consult on the floor. CONSULTS:  IP CONSULT TO NEUROLOGY    PROCEDURES:  Unless otherwise noted below, none     Procedures    FINAL IMPRESSION      1. Colitis    2. Non-intractable vomiting with nausea, unspecified vomiting type    3.  Seizure [facility])          DISPOSITION/PLAN   DISPOSITION Admitted 11/13/2021 06:22:27 AM             (Please note that portions of this note were completed with a voice recognition program.  Efforts were made to edit thedictations but occasionally words are mis-transcribed.)    Rianna Borden MD (electronically signed)  Attending Emergency Physician          Rianna Borden MD  11/13/21 5029

## 2021-11-13 NOTE — H&P
History and Physical    Patient:  Jean Claude Smith  MRN: [id number]    CHIEF COMPLAINT:  Seizure and abdominal pain      PCP: Endy Monaco MD    HISTORY OF PRESENT ILLNESS:   The patient is a 68 y.o. female with history of HTN, GERD, seizures after ICH sustained after fall in 10/2020 presented to the ED after seizures at home witnessed by grandson. She is on Keppra outpatient without any recent dose changes. She was post-ictal on arrival to the ED, but improved and complained of abdominal pain and N/V. CT abdomen in ED suspicious for colitis and she was started on cipro/flagyl. She is admitted for further management of her colitis and neurology consult for increased seizure burden.         Past Medical History:      Diagnosis Date    Acute cystitis without hematuria 10/28/2020    Asthma     CAD (coronary artery disease)     Chest pain     Depression with anxiety     Diabetes mellitus (HCC)     GERD (gastroesophageal reflux disease)     Glucose intolerance (impaired glucose tolerance)     Hyperlipidemia     Cholesterol management per Keely Sanchez M.D.   [address] [place] [address] Hypertension     Lup[facility]ic lupus erythematosus) [facility]     Malaise and fatigue 10/6/2017    Movement disorder     Sees pain management for neck pain and previous surgery    New onset seizure ([facility]     Osteoarthritis     Palliative care patient 07/22/2020    S/P CABG x 3 03/05/2013    PACABG X 3, LIMA-LAD, SVG0OM, SVG-DIAG, RT EVH, DR PATEL    Subdural hematoma ([facility]        Past Surgical History:      Procedure Laterality Date    APPENDECTOMY  1965    BACK SURGERY      CARDIAC CATHETERIZATION  5/18/11    EF 60%    CARDIAC CATHETERIZATION  1/25/16    drug eluting stent to RCA   [address]  1/25/2016    CARDIAC CATHETERIZATION  2/19/16    CARDIAC CATHETERIZATION  08/24/2017    cutting balloon angioplasty ot LAD    CARDIAC CATHETERIZATION  04/13/2020    TWO Stents placed   [facility] Polypectomy - 2    COLON SURGERY  2003    Polypectomy - 4    COLON SURGERY  11/2008    Polypectomy - 2    COLON SURGERY  8/2012    Polypectomy - 3    COLONOSCOPY      CORONARY ANGIOPLASTY  08/2017    cutting balloon angioplasty LAD diagonal    CORONARY ANGIOPLASTY WITH STENT PLACEMENT  3/22/2007    CORONARY ANGIOPLASTY WITH STENT PLACEMENT  8/2007    CORONARY ARTERY BYPASS GRAFT  3/5/2013    PACABG X 3, LIMA-LAD, SVG0OM, SVG-DIAG, RT EVH, DR PATEL    COSMETIC SURGERY      deviated septum and rhinoplasty    CYST REMOVAL  1970    DIAGNOSTIC CARDIAC CATH LAB PROCEDURE      ENDOSCOPY, COLON, DIAGNOSTIC      EYE SURGERY  03/2019    cataracts    HEMORRHOID SURGERY  1987    HYSTERECTOMY  1983    Partial    NASAL SEPTUM SURGERY  1977    SINUS SURGERY  2003    Nasal Polyps Removed    SPINE SURGERY  3/6/2006    Lumbar Laminectomy L4&5    SPINE SURGERY  3/6/2006    Cervical Fusion - C4,5,6    TONSILLECTOMY  1968       Medications Prior to Admission:    Prior to Admission medications    Medication Sig Start Date End Date Taking? Authorizing Provider   amLODIPine (NORVASC) 2.5 MG tablet Take 1 tablet by mouth 2 times daily 10/7/21  Yes LEILA Nava   promethazine (PHENERGAN) 12.5 MG tablet Take 1 tablet by mouth every 6 hours as needed for Nausea 10/4/21  Yes Agnes Prescott MD   isosorbide mononitrate (IMDUR) 60 MG extended release tablet TAKE 1 TABLET TWICE A DAY 9/20/21  Yes LEILA Buchanan - CNP   levETIRAcetam (KEPPRA) 1000 MG tablet Take 1 tablet by mouth 2 times daily 6/15/21  Yes Cristobal Walker MD   ranolazine (RANEXA) 1000 MG extended release tablet TAKE 1 TABLET TWICE A DAY 6/7/21  Yes LEILA Cartagena - BERNICE   ALPRAZolam Juvencio Paci) 0.25 MG tablet Take 0.25 mg by mouth nightly.  Half tab   Yes Historical Provider, MD   amitriptyline (ELAVIL) 50 MG tablet Take 50 mg by mouth nightly 3/31/21  Yes Historical Provider, MD   diphenhydrAMINE (BENADRYL) 25 MG tablet Take 25 mg by mouth every 6 hours as needed for Itching   Yes Historical Provider, MD   DULoxetine (CYMBALTA) 60 MG extended release capsule Take 60 mg by mouth every evening 5 pm   Yes Historical Provider, MD   tiZANidine (ZANAFLEX) 4 MG tablet Take 4 mg by mouth 2 times daily as needed 1/2 tablet during day, 1/2 tablet at night   Yes Historical Provider, MD   atorvastatin (LIPITOR) 80 MG tablet Take 1 tablet by mouth nightly  Patient taking differently: Take 80 mg by mouth nightly 5 pm 20  Yes Anya Anne MD   losartan (COZAAR) 100 MG tablet Take 1 tablet by mouth daily 20  Yes Anya Anne MD   metoprolol succinate (TOPROL XL) 50 MG extended release tablet Take 1 tablet by mouth daily 20  Yes Anya Anne MD   pantoprazole (PROTONIX) 40 MG tablet Take 1 tablet by mouth every morning (before breakfast) 20  Yes Anya Anne MD   nitroGLYCERIN (NITROSTAT) 0.4 MG SL tablet up to max of 3 total doses. If no relief after 1 dose, call 911. 4/3/20   Concow Grills, MD   albuterol (PROVENTIL HFA;VENTOLIN HFA) 108 (90 BASE) MCG/ACT inhaler Inhale 2 puffs into the lungs every 6 hours as needed. Historical Provider, MD       Allergies: Other, Codeine, Darvocet [propoxyphene n-acetaminophen], Hydrocodone-acetaminophen, Lyrica [pregabalin], Morphine, and Percocet [oxycodone-acetaminophen]    Social History:   Social History     Socioeconomic History    Marital status:       Spouse name: Not on file    Number of children: 2    Years of education: Not on file    Highest education level: Not on file   Occupational History    Not on file   Tobacco Use    Smoking status: Former Smoker     Packs/day: 1.00     Years: 1.00     Pack years: 1.00     Types: Cigarettes     Quit date: 1967     Years since quittin.7    Smokeless tobacco: Never Used   Vaping Use    Vaping Use: Never used   Substance and Sexual Activity    Alcohol use: No    Drug use: No    Sexual activity: Not Currently   Other Topics Concern    Not on file   Social History Narrative    Not on file     Social Determinants of Health     Financial Resource Strain:     Difficulty of Paying Living Expenses: Not on file   Food Insecurity:     Worried About Running Out of Food in the Last Year: Not on file    Yu of Food in the Last Year: Not on file   Transportation Needs:     Lack of Transportation (Medical): Not on file    Lack of Transportation (Non-Medical): Not on file   Physical Activity:     Days of Exercise per Week: Not on file    Minutes of Exercise per Session: Not on file   Stress:     Feeling of Stress : Not on file   Social Connections:     Frequency of Communication with Friends and Family: Not on file    Frequency of Social Gatherings with Friends and Family: Not on file    Attends Church Services: Not on file    Active Member of [address] [place] [address] [employer] or Organizations: Not on file    Attends Club or Organization Meetings: Not on file    Marital Status: Not on file   Intimate Partner Violence:     Fear of Current or Ex-Partner: Not on file    Emotionally Abused: Not on file    Physically Abused: Not on file    Sexually Abused: Not on file   Housing Stability:     Unable to Pay for Housing in the Last Year: Not on file    Number of [place] in the Last Year: Not on file    Unstable Housing in the Last Year: Not on file       Family History:       Problem Relation Age of Onset    Heart Disease Mother     High Blood Pressure Mother     Diabetes Mother     High Cholesterol Mother     Diabetes Brother         Review of systems:  Review of Systems   Constitutional: Positive for activity change and appetite change. Negative for fatigue and fever. HENT: Negative for congestion. Respiratory: Negative for shortness of breath and wheezing. Cardiovascular: Negative for chest pain and leg swelling. Gastrointestinal: Positive for abdominal pain, nausea and vomiting. Negative for blood in stool.    Skin: Negative for color change and pallor. Neurological: Positive for seizures. Negative for headaches. A full 14 point review of systems is otherwise negative outside listed above and HPI      Physical Exam:        Physical Exam  Vitals reviewed. Constitutional:       General: She is not in acute distress. Appearance: Normal appearance. She is not ill-appearing. HENT:      Head: Normocephalic and atraumatic. Eyes:      General:         Right eye: No discharge. Left eye: No discharge. Extraocular Movements: Extraocular movements intact. Conjunctiva/sclera: Conjunctivae normal.   Cardiovascular:      Rate and Rhythm: Normal rate and regular rhythm. Pulses: Normal pulses. Heart sounds: No murmur heard. Pulmonary:      Effort: Pulmonary effort is normal. No respiratory distress. Abdominal:      General: Bowel sounds are normal. There is no distension. Tenderness: There is abdominal tenderness (generalize, worse in LLQ). Skin:     Capillary Refill: Capillary refill takes less than 2 seconds. Coloration: Skin is not jaundiced. Neurological:      General: No focal deficit present. Mental Status: She is alert and oriented to person, place, and time. Psychiatric:         Mood and Affect: Mood normal.      Comments: Slowed speech                 CBC:   Recent Labs     11/12/21 2319   WBC 4.9   HGB 12.0        BMP:    Recent Labs     11/12/21 2319      K 4.3      CO2 20*   BUN 17   CREATININE 0.9   GLUCOSE 184*     Hepatic:   Recent Labs     11/12/21 2319   AST 20   ALT 25   BILITOT 0.5   ALKPHOS 73     Troponin T:   Recent Labs     11/12/21 2319   TROPONINI <0.01     Pro-BNP: No results for input(s): BNP in the last 72 hours. Lipids: No results for input(s): CHOL, HDL in the last 72 hours.     Invalid input(s): LDLCALCU  ABGs:   Lab Results   Component Value Date    PHART 7.350 07/20/2020    PO2ART 53.0 07/20/2020    ACP6OTL 46.0 07/20/2020 INR: No results for input(s): INR in the last 72 hours. -----------------------------------------------------------------  EKG: NSR, rate of 65, PAC, LBBB, unchanged from prior  Radiology:     XR CHEST (2 VW)    Result Date: 11/13/2021  EXAMINATION: XR CHEST (2 VW) 11/13/2021 8:01 AM HISTORY: Congestion, possible aspiration COMPARISON: 6/12/2021 FINDINGS: Heart appears normal in size. Median sternotomy wires are present. Atherosclerotic calcifications are seen in the aorta. Coronary artery stent is noted. There is no pneumothorax. Pulmonary vasculature is mildly indistinct. Minimal airspace opacities are noted in the left perihilar region and medial right lung base. There has been prior fusion of the lower cervical spine. Airspace opacities bilaterally may reflect an infectious or inflammatory process. Developing pulmonary edema can also have this appearance. Signed by Dr Vigil Dark Additional Contrast? None    Result Date: 11/13/2021  EXAMINATION: CT ABDOMEN PELVIS W IV CONTRAST 11/13/2021 7:47 AM HISTORY: Abdominal pain and vomiting Comparison: CT abdomen and pelvis with contrast 7/20/2020 Technique: Sequential imaging was performed from the lung bases through the pubic symphysis after the administration of IV contrast. Sagittal and coronal reformations were made from the original source data and reviewed. Automated exposure control was utilized for radiation dose reduction. Radiation dose: DLP 4160 mGy-cm Findings: The heart appears mildly enlarged. Coronary artery calcifications are present. There is atelectasis in both lung bases. There is diffuse fatty infiltration of the liver. Low-density lesions in the right hepatic lobe are again identified. The most posterior lesion appears decreased in size. These appear to reflect cysts. Liver is otherwise unremarkable. The gallbladder, spleen, pancreas, and adrenal glands are unremarkable. A left renal cyst is present.  A tiny hypodensity is seen in the right kidney which is too small to accurately characterize but is also likely a cyst. The ureters are decompressed bilaterally. The main portal and splenic veins and IVC appear grossly normal. There is some dilatation of the infrarenal abdominal aorta to 2.2 cm. Chronic infrarenal aortic dissection suspected. Atherosclerotic calcifications are seen throughout the aorta and its branch vessels. The origins of the celiac axis, superior mesenteric artery, and inferior mesenteric artery enhance normally. There is calcified plaque at the origins of the renal arteries bilaterally. No pathologically enlarged central mesenteric or retroperitoneal lymph nodes are identified. There is a small hiatal hernia. The stomach and small bowel appear normal in caliber. There are numerous scattered colonic diverticula. No focal inflammatory stranding is identified to suggest acute diverticulitis. There is a fat-containing umbilical hernia. No free air or free fluid is seen in the abdomen. Urinary bladder is grossly normal in appearance. Uterus is surgically absent. The ovaries are visualized and not well evaluated by CT. No free fluid is seen in the pelvis. No pathologically enlarged pelvic or inguinal lymph nodes are identified. Review of the visualized osseous structures demonstrates no acute or aggressive lesions. Degenerative osteophyte formation is present in the spine. There is anterolisthesis of L4 on L5. The patient appears osteopenic. Impression: 1. No acute findings in the abdomen or pelvis. 2. Colonic diverticulosis. Apparent wall thickening of the sigmoid colon is felt to be related to underdistention. 3. Extensive atherosclerotic disease. 4. Hepatic and renal cysts. 5. Small hiatal hernia. A preliminary report was provided by stat rad.  Signed by Dr Kim Mariscal and Plan     Principal Problem:    Colitis  Active Problems:    GERD (gastroesophageal reflux disease) Hypertension    Seizure ([facility] [address]  Resolved Problems:    * No resolved hospital problems. *    Colitis: CT abdomen in ED with thickening of the sigmoid colon. This in association with nausea/vomiting/abdominal pain, thought to be consistent with colitis. She was started on IV cipro and flagyl in the ED. -Phenergan prn for nausea. -Continue cipro/flagyl, can transition to PO as she tolerates. Seizure: history of seizures since fall on 10/24/2020 resulted in intracranial hemorrhage. She has been on keppra outpatient. Seizures x 2 prior to presentation to ED on 11/12/2021. Suspect that N/V above affect the efficacy of her Keppra. Neurology consulted from ED, appreciate their recommendations. -IV Keppra 1g every 12 hours per neurology. -MRI brain and EEG    GERD: continue home medications. HTN: continue home medications.      DVT ppx: lovenox    Disposition: Inpatient, IVF, IV antibiotics, MRI brain, EEG    Erika Castro MD  11/13/2021 12:39 PM

## 2021-11-13 NOTE — ED NOTES
Bed: 03  Expected date:   Expected time:   Means of arrival: [facility]  Comments:  EMS: seizure     Tami Martinez RN  11/12/21 0966

## 2021-11-14 ENCOUNTER — APPOINTMENT (OUTPATIENT)
Dept: MRI IMAGING | Age: 76
DRG: 101 | End: 2021-11-14
Payer: MEDICARE

## 2021-11-14 VITALS
TEMPERATURE: 97.2 F | BODY MASS INDEX: 26.68 KG/M2 | OXYGEN SATURATION: 94 % | SYSTOLIC BLOOD PRESSURE: 104 MMHG | HEIGHT: 67 IN | DIASTOLIC BLOOD PRESSURE: 72 MMHG | RESPIRATION RATE: 16 BRPM | WEIGHT: 170 LBS | HEART RATE: 65 BPM

## 2021-11-14 LAB
ALBUMIN SERPL-MCNC: 4.2 G/DL (ref 3.5–5.2)
ALP BLD-CCNC: 55 U/L (ref 35–104)
ALT SERPL-CCNC: 21 U/L (ref 5–33)
ANION GAP SERPL CALCULATED.3IONS-SCNC: 14 MMOL/L (ref 7–19)
AST SERPL-CCNC: 17 U/L (ref 5–32)
BASOPHILS ABSOLUTE: 0 K/UL (ref 0–0.2)
BASOPHILS RELATIVE PERCENT: 0.6 % (ref 0–1)
BILIRUB SERPL-MCNC: 0.4 MG/DL (ref 0.2–1.2)
BUN BLDV-MCNC: 14 MG/DL (ref 8–23)
CALCIUM SERPL-MCNC: 9 MG/DL (ref 8.8–10.2)
CHLORIDE BLD-SCNC: 104 MMOL/L (ref 98–111)
CO2: 21 MMOL/L (ref 22–29)
CREAT SERPL-MCNC: 0.7 MG/DL (ref 0.5–0.9)
EOSINOPHILS ABSOLUTE: 0.1 K/UL (ref 0–0.6)
EOSINOPHILS RELATIVE PERCENT: 2.3 % (ref 0–5)
GFR AFRICAN AMERICAN: >59
GFR NON-AFRICAN AMERICAN: >60
GLUCOSE BLD-MCNC: 123 MG/DL (ref 74–109)
HCT VFR BLD CALC: 34.2 % (ref 37–47)
HEMOGLOBIN: 10.8 G/DL (ref 12–16)
IMMATURE GRANULOCYTES #: 0.1 K/UL
LYMPHOCYTES ABSOLUTE: 1.2 K/UL (ref 1.1–4.5)
LYMPHOCYTES RELATIVE PERCENT: 23.2 % (ref 20–40)
MCH RBC QN AUTO: 32.1 PG (ref 27–31)
MCHC RBC AUTO-ENTMCNC: 31.6 G/DL (ref 33–37)
MCV RBC AUTO: 101.8 FL (ref 81–99)
MONOCYTES ABSOLUTE: 0.5 K/UL (ref 0–0.9)
MONOCYTES RELATIVE PERCENT: 9.9 % (ref 0–10)
NEUTROPHILS ABSOLUTE: 3.2 K/UL (ref 1.5–7.5)
NEUTROPHILS RELATIVE PERCENT: 63 % (ref 50–65)
PDW BLD-RTO: 14.2 % (ref 11.5–14.5)
PLATELET # BLD: 156 K/UL (ref 130–400)
PMV BLD AUTO: 8.8 FL (ref 9.4–12.3)
POTASSIUM SERPL-SCNC: 4 MMOL/L (ref 3.5–5)
RBC # BLD: 3.36 M/UL (ref 4.2–5.4)
SODIUM BLD-SCNC: 139 MMOL/L (ref 136–145)
TOTAL PROTEIN: 5.6 G/DL (ref 6.6–8.7)
WBC # BLD: 5.1 K/UL (ref 4.8–10.8)

## 2021-11-14 PROCEDURE — 70553 MRI BRAIN STEM W/O & W/DYE: CPT

## 2021-11-14 PROCEDURE — 6360000002 HC RX W HCPCS: Performed by: FAMILY MEDICINE

## 2021-11-14 PROCEDURE — A9577 INJ MULTIHANCE: HCPCS | Performed by: PSYCHIATRY & NEUROLOGY

## 2021-11-14 PROCEDURE — 6360000002 HC RX W HCPCS: Performed by: PSYCHIATRY & NEUROLOGY

## 2021-11-14 PROCEDURE — 2580000003 HC RX 258: Performed by: FAMILY MEDICINE

## 2021-11-14 PROCEDURE — 99232 SBSQ HOSP IP/OBS MODERATE 35: CPT | Performed by: PSYCHIATRY & NEUROLOGY

## 2021-11-14 PROCEDURE — 6360000004 HC RX CONTRAST MEDICATION: Performed by: PSYCHIATRY & NEUROLOGY

## 2021-11-14 PROCEDURE — 2500000003 HC RX 250 WO HCPCS: Performed by: PEDIATRICS

## 2021-11-14 PROCEDURE — 6360000002 HC RX W HCPCS: Performed by: PEDIATRICS

## 2021-11-14 PROCEDURE — 85025 COMPLETE CBC W/AUTO DIFF WBC: CPT

## 2021-11-14 PROCEDURE — 36415 COLL VENOUS BLD VENIPUNCTURE: CPT

## 2021-11-14 PROCEDURE — 6370000000 HC RX 637 (ALT 250 FOR IP): Performed by: FAMILY MEDICINE

## 2021-11-14 PROCEDURE — 80053 COMPREHEN METABOLIC PANEL: CPT

## 2021-11-14 PROCEDURE — 95816 EEG AWAKE AND DROWSY: CPT | Performed by: PSYCHIATRY & NEUROLOGY

## 2021-11-14 RX ORDER — LORAZEPAM 2 MG/ML
0.5 INJECTION INTRAMUSCULAR ONCE
Status: COMPLETED | OUTPATIENT
Start: 2021-11-14 | End: 2021-11-14

## 2021-11-14 RX ORDER — CIPROFLOXACIN 500 MG/1
500 TABLET, FILM COATED ORAL 2 TIMES DAILY
Qty: 18 TABLET | Refills: 0 | Status: SHIPPED | OUTPATIENT
Start: 2021-11-14 | End: 2021-11-23

## 2021-11-14 RX ORDER — METRONIDAZOLE 500 MG/1
500 TABLET ORAL 3 TIMES DAILY
Qty: 27 TABLET | Refills: 0 | Status: SHIPPED | OUTPATIENT
Start: 2021-11-14 | End: 2021-11-23

## 2021-11-14 RX ADMIN — ENOXAPARIN SODIUM 40 MG: 100 INJECTION SUBCUTANEOUS at 07:42

## 2021-11-14 RX ADMIN — LORAZEPAM 0.5 MG: 2 INJECTION INTRAMUSCULAR; INTRAVENOUS at 08:45

## 2021-11-14 RX ADMIN — ISOSORBIDE MONONITRATE 60 MG: 60 TABLET, EXTENDED RELEASE ORAL at 07:42

## 2021-11-14 RX ADMIN — PANTOPRAZOLE SODIUM 40 MG: 40 TABLET, DELAYED RELEASE ORAL at 05:51

## 2021-11-14 RX ADMIN — SODIUM CHLORIDE, PRESERVATIVE FREE 10 ML: 5 INJECTION INTRAVENOUS at 07:43

## 2021-11-14 RX ADMIN — METRONIDAZOLE 500 MG: 500 INJECTION, SOLUTION INTRAVENOUS at 05:21

## 2021-11-14 RX ADMIN — METOPROLOL SUCCINATE 50 MG: 50 TABLET, EXTENDED RELEASE ORAL at 07:42

## 2021-11-14 RX ADMIN — LOSARTAN POTASSIUM 100 MG: 100 TABLET, FILM COATED ORAL at 07:42

## 2021-11-14 RX ADMIN — RANOLAZINE 1000 MG: 500 TABLET, EXTENDED RELEASE ORAL at 07:41

## 2021-11-14 RX ADMIN — GADOBENATE DIMEGLUMINE 15 ML: 529 INJECTION, SOLUTION INTRAVENOUS at 09:38

## 2021-11-14 RX ADMIN — CIPROFLOXACIN 400 MG: 2 INJECTION, SOLUTION INTRAVENOUS at 05:51

## 2021-11-14 RX ADMIN — LEVETIRACETAM 1000 MG: 10 INJECTION INTRAVENOUS at 05:16

## 2021-11-14 RX ADMIN — AMLODIPINE BESYLATE 2.5 MG: 5 TABLET ORAL at 07:41

## 2021-11-14 NOTE — PROGRESS NOTES
[facility] Neurology Progress Note      Patient:   Kameron Drew  MR#:    [id number]   Room:    [facility]01   YOB: 1945  Date of Progress Note: 11/14/2021  Time of Note                           10:54 AM  Consulting Physician:  Ariel Stewart DO  Attending Physician:  Donell Reilly MD      INTERVAL HISTORY:  No acute events overnight, doing better this am, sitting up in bed, less nausea noted, no seizures. REVIEW OF SYSTEMS:  Constitutional: No fevers No chills  Neck:No stiffness  Respiratory: No shortness of breath  Cardiovascular: No chest pain No palpitations  Gastrointestinal: No abdominal pain    Genitourinary: No Dysuria  Neurological: No headache    PHYSICAL EXAM:    Constitutional -   /67   Pulse 62   Temp 96.3 °F (35.7 °C) (Temporal)   Resp 20   Ht 5' 7\" (1.702 m)   Wt 170 lb (77.1 kg)   SpO2 91%   BMI 26.63 kg/m²   General appearance: No acute distress   EYES -   Conjunctiva normal  Pupillary exam as below, see CN exam in the neurologic exam  ENT-    No scars, masses, or lesions over external nose or ears  Hearing normal bilaterally to finger rub  Neck-   No neck masses noted  Thyroid normal   No jugular vein distension  Cardiovascular -   No clubbing, cyanosis, or edema   Pulmonary-   Good expansion, normal effort without use of accessory muscles  Musculoskeletal -   No significant wasting of muscles noted  Gait as below, see gait exam in the neurologic exam  Muscle strength, tone, stability as below see the motor exam in the neurologic exam.   No bony deformities  Skin -   Warm, dry, and intact to inspection and palpation. No rash, erythema, or pallor  Psychiatric -   Mood, affect, and behavior appear normal    Memory as below see mental status examination in the neurologic exam      NEUROLOGICAL EXAM     Mental status    [x]? Awake, alert, oriented   [x]? Affect attention and concentration appear appropriate  [x]?  Recent and remote memory appears unremarkable  [x]? Speech normal without dysarthria or aphasia, comprehension and repetition intact. COMMENTS:   Cranial Nerves [x]? No VF deficit to confrontation  [x]? PERRLA, EOMI, no nystagmus, conjugate eye movements, no ptosis  [x]? Face symmetric  [x]? Facial sensation intact  [x]? Tongue midline no atrophy or fasciculations present  [x]? Palate midline, hearing to finger rub normal  [x]? Shoulder shrug and SCM testing normal  COMMENTS:   Motor   [x]? 5/5 strength x 4 extremities  [x]? Normal bulk and tone  [x]? No tremor present  [x]? No rigidity or bradykinesia noted  COMMENTS:   Sensory  [x]? Sensation intact to light touch, pin prick, vibration, and proprioception BLE  []? Sensation intact to light touch, pin prick, vibration, and proprioception BUE  COMMENTS:   Coordination [x]? FTN normal bilaterally   []? HTS normal bilaterally  []? QUIRINO normal.   COMMENTS:   Reflexes  [x]? Symmetric and non-pathological  [x]? Toes downgoing bilaterally  [x]? No clonus present  COMMENTS:   Gait                  []? Normal steady gait    []? Ataxic    []? Spastic     []? Magnetic     []? Shuffling  [x]? Not assessed  COMMENTS:        LABS/IMAGING:    XR CHEST (2 VW)    Result Date: 11/13/2021  EXAMINATION: XR CHEST (2 VW) 11/13/2021 8:01 AM HISTORY: Congestion, possible aspiration COMPARISON: 6/12/2021 FINDINGS: Heart appears normal in size. Median sternotomy wires are present. Atherosclerotic calcifications are seen in the aorta. Coronary artery stent is noted. There is no pneumothorax. Pulmonary vasculature is mildly indistinct. Minimal airspace opacities are noted in the left perihilar region and medial right lung base. There has been prior fusion of the lower cervical spine. Airspace opacities bilaterally may reflect an infectious or inflammatory process. Developing pulmonary edema can also have this appearance.  Signed by Dr Blaine Bell Additional Contrast? None    Result Date: 11/13/2021  EXAMINATION: CT ABDOMEN PELVIS W IV CONTRAST 11/13/2021 7:47 AM HISTORY: Abdominal pain and vomiting Comparison: CT abdomen and pelvis with contrast 7/20/2020 Technique: Sequential imaging was performed from the lung bases through the pubic symphysis after the administration of IV contrast. Sagittal and coronal reformations were made from the original source data and reviewed. Automated exposure control was utilized for radiation dose reduction. Radiation dose: DLP 1686 mGy-cm Findings: The heart appears mildly enlarged. Coronary artery calcifications are present. There is atelectasis in both lung bases. There is diffuse fatty infiltration of the liver. Low-density lesions in the right hepatic lobe are again identified. The most posterior lesion appears decreased in size. These appear to reflect cysts. Liver is otherwise unremarkable. The gallbladder, spleen, pancreas, and adrenal glands are unremarkable. A left renal cyst is present. A tiny hypodensity is seen in the right kidney which is too small to accurately characterize but is also likely a cyst. The ureters are decompressed bilaterally. The main portal and splenic veins and IVC appear grossly normal. There is some dilatation of the infrarenal abdominal aorta to 2.2 cm. Chronic infrarenal aortic dissection suspected. Atherosclerotic calcifications are seen throughout the aorta and its branch vessels. The origins of the celiac axis, superior mesenteric artery, and inferior mesenteric artery enhance normally. There is calcified plaque at the origins of the renal arteries bilaterally. No pathologically enlarged central mesenteric or retroperitoneal lymph nodes are identified. There is a small hiatal hernia. The stomach and small bowel appear normal in caliber. There are numerous scattered colonic diverticula. No focal inflammatory stranding is identified to suggest acute diverticulitis. There is a fat-containing umbilical hernia.  No free air or free fluid is seen in the abdomen. Urinary bladder is grossly normal in appearance. Uterus is surgically absent. The ovaries are visualized and not well evaluated by CT. No free fluid is seen in the pelvis. No pathologically enlarged pelvic or inguinal lymph nodes are identified. Review of the visualized osseous structures demonstrates no acute or aggressive lesions. Degenerative osteophyte formation is present in the spine. There is anterolisthesis of L4 on L5. The patient appears osteopenic. Impression: 1. No acute findings in the abdomen or pelvis. 2. Colonic diverticulosis. Apparent wall thickening of the sigmoid colon is felt to be related to underdistention. 3. Extensive atherosclerotic disease. 4. Hepatic and renal cysts. 5. Small hiatal hernia. A preliminary report was provided by stat rad. Signed by Dr Faviola Daugherty      Lab Results   Component Value Date    WBC 5.1 11/14/2021    HGB 10.8 (L) 11/14/2021    HCT 34.2 (L) 11/14/2021    .8 (H) 11/14/2021     11/14/2021     Lab Results   Component Value Date     11/14/2021    K 4.0 11/14/2021     11/14/2021    CO2 21 (L) 11/14/2021    BUN 14 11/14/2021    CREATININE 0.7 11/14/2021    GLUCOSE 123 (H) 11/14/2021    CALCIUM 9.0 11/14/2021    PROT 5.6 (L) 11/14/2021    LABALBU 4.2 11/14/2021    BILITOT 0.4 11/14/2021    ALKPHOS 55 11/14/2021    AST 17 11/14/2021    ALT 21 11/14/2021    LABGLOM >60 11/14/2021    GFRAA >59 11/14/2021    GLOB 1.8 11/19/2016     Lab Results   Component Value Date    INR 0.97 10/27/2020    INR 0.99 10/25/2020    INR 0.96 10/24/2020    PROTIME 12.7 10/27/2020    PROTIME 12.9 10/25/2020    PROTIME 12.7 10/24/2020       RECORD REVIEW:   Previous medical records, medications were reviewed at today's visit. Nursing/physician notes, imaging, labs and vitals reviewed.  PT,OT and/or speech notes reviewed      ASSESSMENT:  68 y.o. admitted with breakthrough seizures, recent colitis history, nausea / vomiting, possibly interfering with absorption of her seizure medications. Seizure disorder history noted, prior TBI with non-surgical ICH noted. MRI brain negative. EEG normal.  No further seizures. Exam stable.      PLAN:  1. Continue Keppra 1000 mg IV q12h, switch back to PO once nausea resolved. 2.  Seizure precautions, prn ativan  3. Continue addressing medical issues, possible colitis, nausea / vomiting. [address] for discharge from my standpoint once cleared medically. Please feel free to call with any questions. [phone] (cell phone).     Jonas Lewis DO  Board Certified Neurology

## 2021-11-14 NOTE — DISCHARGE SUMMARY
Discharge Summary    Patient ID: Yaron Javier  MRN: [id number]     Acct:  [de-identified]       Patient's PCP: Kim Márquez MD    Admit Date: 11/12/2021     Discharge Date:   11/14/2021    Admitting Physician: No admitting provider for patient encounter. Discharge Physician: Claudia Singh MD     Active Discharge Diagnoses:    Primary Problem  [address] Problems    Diagnosis Date Noted    Colitis [K52.9] 11/13/2021    Seizure ([address] [R56.9]     Hypertension [I10]     GERD (gastroesophageal reflux disease) [K21.9]        The patient was seen and examined on day of discharge and this discharge summary is in conjunction with the daily progress note from day of discharge. Code Status:  [facility] Hospital Course: The patient is a 68 y.o. female with history of HTN, GERD, seizures after ICH sustained after fall in 10/2020 presented to the ED after seizures at home witnessed by grandson. She is on Keppra outpatient without any recent dose changes. She was post-ictal on arrival to the ED, but improved and complained of abdominal pain and N/V. CT abdomen in ED suspicious for colitis and she was started on cipro/flagyl. She is admitted for further management of her colitis and neurology consult for increased seizure burden. No seizures while admitted. MRI brain and EEG unremarkable. N/V improved. Will be discharged to complete cipro/flagyl PO for colitis and outpatient f/u with neurology. The patient was admitted for the above noted medical/surgical issues. Please see daily progress note for further details concerning their stay. The patient improved throughout their stay and reached maximum medical improvement on the day of discharge. The patient/family agree with the treatment plan as outlined above. All questions concerning their stay were answered prior to discharge. They understand the importance of follow up concerning any abnormal test results.      Physical Exam  Vitals reviewed. Constitutional:       General: She is not in acute distress. Appearance: Normal appearance. She is not ill-appearing. HENT:      Head: Normocephalic and atraumatic. Nose: Nose normal.   Eyes:      General:         Right eye: No discharge. Left eye: No discharge. Extraocular Movements: Extraocular movements intact. Conjunctiva/sclera: Conjunctivae normal.   Cardiovascular:      Rate and Rhythm: Normal rate and regular rhythm. Pulses: Normal pulses. Heart sounds: No murmur heard. Pulmonary:      Effort: Pulmonary effort is normal. No respiratory distress. Abdominal:      General: Bowel sounds are normal. There is no distension. Skin:     Capillary Refill: Capillary refill takes less than 2 seconds. Coloration: Skin is not jaundiced. Neurological:      Mental Status: She is alert and oriented to person, place, and time. Psychiatric:         Mood and Affect: Mood normal.      Comments: Slowed speech        Consults:  IP CONSULT TO NEUROLOGY    Disposition: Discharge home with 24 hour care from family. Discharged Condition: stable    Follow Up: Gerald Berry MD  [address] [facility] [address] [address]    In 1 week  [place] [address] [address] [facility]  [phone]            Lab Frequency Next Occurrence   VL DUP CAROTID BILATERAL Once 10/20/2022   Up with assistance PRN    CBC Auto Differential DAILY    Comprehensive Metabolic Panel DAILY        Diet: ADULT DIET;  Regular    Discharge Medications:      Medication List      START taking these medications    ciprofloxacin 500 MG tablet  Commonly known as: CIPRO  Take 1 tablet by mouth 2 times daily for 9 days     metroNIDAZOLE 500 MG tablet  Commonly known as: FLAGYL  Take 1 tablet by mouth 3 times daily for 9 days        CHANGE how you take these medications    atorvastatin 80 MG tablet  Commonly known as: LIPITOR  Take 1 tablet by mouth nightly  What changed: additional instructions        CONTINUE taking these medications    albuterol sulfate  (90 Base) MCG/ACT inhaler     ALPRAZolam 0.25 MG tablet  Commonly known as: XANAX     amitriptyline 50 MG tablet  Commonly known as: ELAVIL     amLODIPine 2.5 MG tablet  Commonly known as: NORVASC  Take 1 tablet by mouth 2 times daily     diphenhydrAMINE 25 MG tablet  Commonly known as: BENADRYL     DULoxetine 60 MG extended release capsule  Commonly known as: CYMBALTA     isosorbide mononitrate 60 MG extended release tablet  Commonly known as: IMDUR  TAKE 1 TABLET TWICE A DAY     levETIRAcetam 1000 MG tablet  Commonly known as: KEPPRA  Take 1 tablet by mouth 2 times daily     losartan 100 MG tablet  Commonly known as: COZAAR  Take 1 tablet by mouth daily     metoprolol succinate 50 MG extended release tablet  Commonly known as: TOPROL XL  Take 1 tablet by mouth daily     nitroGLYCERIN 0.4 MG SL tablet  Commonly known as: NITROSTAT  up to max of 3 total doses. If no relief after 1 dose, call 911. pantoprazole 40 MG tablet  Commonly known as: PROTONIX  Take 1 tablet by mouth every morning (before breakfast)     promethazine 12.5 MG tablet  Commonly known as: PHENERGAN  Take 1 tablet by mouth every 6 hours as needed for Nausea     ranolazine 1000 MG extended release tablet  Commonly known as: RANEXA  TAKE 1 TABLET TWICE A DAY     tiZANidine 4 MG tablet  Commonly known as: Eleazar Hamman           Where to Get Your Medications      These medications were sent to [place] [id number] #[id number] [facility], [place] [address] [phone] [address] [address] [address]    Phone: [phone]   · ciprofloxacin 500 MG tablet  · metroNIDAZOLE 500 MG tablet         Time Spent on discharge is  30 minutes in patient examination, evaluation, counseling as well as medication reconciliation, prescriptions for required medications, discharge planning and follow up.     Electronically

## 2021-11-14 NOTE — PLAN OF CARE
Problem: Skin Integrity:  Goal: Will show no infection signs and symptoms  Description: Will show no infection signs and symptoms  11/13/2021 2349 by Dayne Thomas RN  Outcome: Ongoing  11/13/2021 2347 by Dayne Thomas RN  Outcome: Ongoing  Goal: Absence of new skin breakdown  Description: Absence of new skin breakdown  11/13/2021 2349 by Dayne Thomas RN  Outcome: Ongoing  11/13/2021 2347 by Dayne Thomas RN  Outcome: Ongoing     Problem: Falls - Risk of:  Goal: Will remain free from falls  Description: Will remain free from falls  11/13/2021 2349 by Dayne Thomas RN  Outcome: Ongoing  11/13/2021 2347 by Dayne Thomas RN  Outcome: Ongoing  Goal: Absence of physical injury  Description: Absence of physical injury  11/13/2021 2349 by Dayne Thomas RN  Outcome: Ongoing  11/13/2021 2347 by Dayne Thomas RN  Outcome: Ongoing

## 2021-11-14 NOTE — PROCEDURES
ADULT INPATIENT ELECTROENCEPHALOGRAM REPORT    Patient:   Gail Greenwood  MR#:    [id number]  Room #:    INPATIENT  YOB: 1945  Date of Evaluation:  11/13/2021  Primary Physician:     Thomas Mcnally MD   Referring Physician:   Yasmin Mariscal DO      CLINICAL INFORMATION:     This patient is a 68 y.o. female with a history of seizures. MEDICATIONS:     See MAR. RECORDING CONDITIONS:     This EEG was performed utilizing standard International 10-20 System of electrode placement, with additional channels monitored for eye movement. One channel electrocardiogram was monitored. Data was obtained, stored, and interpreted according to ACNS guidelines (J Clin Neurophysiol 2006;23(2):) utilizing referential montage recording, with reformatting to longitudinal, transverse bipolar, and referential montages as necessary for interpretation, along with the digital/automated EEG analysis. Patient tolerated entire procedure well. Photic stimulation and hyperventilation were utilized as activation procedures unless otherwise specified below. E.E.G. DESCRIPTION:     The resting predominant posterior background frequency is a 8 Hz 30-40 uV rhythm. No overt focal, lateralizing, or paroxysmal abnormalities were noted through the recording. Drowsiness and sleep were not demonstrated. Hyperventilation was not performed. Photic stimulation was performed and had little change on the recording. No ECG abnormalities were noted. Muscle, motion, and eye movement artifacts were noted. EEG INTERPRETATION:    Normal EEG for age in the awake state. CLINICAL CORRELATION:     The absence of epileptiform abnormalities does not preclude a clinical diagnosis of seizures.        Yasmin Mariscal DO  Board Certified Neurologist    Date reported: 11/14/2021  Date signed: 11/14/2021

## 2021-11-15 LAB
EKG P AXIS: 61 DEGREES
EKG P-R INTERVAL: 194 MS
EKG Q-T INTERVAL: 466 MS
EKG QRS DURATION: 116 MS
EKG QTC CALCULATION (BAZETT): 474 MS
EKG T AXIS: 133 DEGREES

## 2021-11-15 PROCEDURE — 93010 ELECTROCARDIOGRAM REPORT: CPT | Performed by: INTERNAL MEDICINE

## 2021-11-18 LAB
BLOOD CULTURE, ROUTINE: NORMAL
CULTURE, BLOOD 2: NORMAL

## 2021-12-13 ENCOUNTER — HOSPITAL ENCOUNTER (OUTPATIENT)
Dept: ULTRASOUND IMAGING | Age: 76
Discharge: HOME | End: 2021-12-13
Payer: MEDICARE

## 2021-12-13 DIAGNOSIS — K76.89 OTHER SPECIFIED DISEASES OF LIVER: ICD-10-CM

## 2021-12-13 PROCEDURE — 76705 ECHO EXAM OF ABDOMEN: CPT

## 2021-12-15 ENCOUNTER — OFFICE VISIT (OUTPATIENT)
Dept: GASTROENTEROLOGY | Age: 76
End: 2021-12-15
Payer: MEDICARE

## 2021-12-15 VITALS
HEIGHT: 65 IN | HEART RATE: 83 BPM | BODY MASS INDEX: 26.33 KG/M2 | SYSTOLIC BLOOD PRESSURE: 120 MMHG | OXYGEN SATURATION: 98 % | DIASTOLIC BLOOD PRESSURE: 60 MMHG | WEIGHT: 158 LBS

## 2021-12-15 DIAGNOSIS — R10.13 CHRONIC EPIGASTRIC PAIN: Primary | ICD-10-CM

## 2021-12-15 DIAGNOSIS — Z11.59 SCREENING FOR VIRAL DISEASE: Primary | ICD-10-CM

## 2021-12-15 DIAGNOSIS — R10.32 LLQ ABDOMINAL PAIN: ICD-10-CM

## 2021-12-15 DIAGNOSIS — R11.0 NAUSEA: ICD-10-CM

## 2021-12-15 DIAGNOSIS — G89.29 CHRONIC EPIGASTRIC PAIN: Primary | ICD-10-CM

## 2021-12-15 PROCEDURE — 1123F ACP DISCUSS/DSCN MKR DOCD: CPT | Performed by: NURSE PRACTITIONER

## 2021-12-15 PROCEDURE — 99213 OFFICE O/P EST LOW 20 MIN: CPT | Performed by: NURSE PRACTITIONER

## 2021-12-15 PROCEDURE — G8417 CALC BMI ABV UP PARAM F/U: HCPCS | Performed by: NURSE PRACTITIONER

## 2021-12-15 PROCEDURE — 1036F TOBACCO NON-USER: CPT | Performed by: NURSE PRACTITIONER

## 2021-12-15 PROCEDURE — G8399 PT W/DXA RESULTS DOCUMENT: HCPCS | Performed by: NURSE PRACTITIONER

## 2021-12-15 PROCEDURE — G8482 FLU IMMUNIZE ORDER/ADMIN: HCPCS | Performed by: NURSE PRACTITIONER

## 2021-12-15 PROCEDURE — 4040F PNEUMOC VAC/ADMIN/RCVD: CPT | Performed by: NURSE PRACTITIONER

## 2021-12-15 PROCEDURE — G8427 DOCREV CUR MEDS BY ELIG CLIN: HCPCS | Performed by: NURSE PRACTITIONER

## 2021-12-15 PROCEDURE — 1090F PRES/ABSN URINE INCON ASSESS: CPT | Performed by: NURSE PRACTITIONER

## 2021-12-15 RX ORDER — ASPIRIN 81 MG/1
81 TABLET ORAL DAILY
COMMUNITY

## 2021-12-15 ASSESSMENT — ENCOUNTER SYMPTOMS
ANAL BLEEDING: 0
VOICE CHANGE: 0
DIARRHEA: 0
TROUBLE SWALLOWING: 0
ABDOMINAL DISTENTION: 0
SORE THROAT: 0
SHORTNESS OF BREATH: 0
BACK PAIN: 1
RECTAL PAIN: 0
COUGH: 0
ABDOMINAL PAIN: 1
NAUSEA: 1
CONSTIPATION: 0
VOMITING: 0
BLOOD IN STOOL: 0

## 2021-12-15 NOTE — PATIENT INSTRUCTIONS

## 2021-12-15 NOTE — PROGRESS NOTES
Subjective:      Prudence Davis is a72 y.o. female  Chief Complaint   Patient presents with    New Patient    Nausea    Abdominal Pain       HPI  PCP: Gerald Berry MD  Referring Provider: Dominick Barillas MD  New pt referral  From PCP  Pt reports the following GI complaints:    C/o nausea  Started about 2 months ago. Takes phenergan prn when this occurs and this helps. Waxes and wanes. No vomiting. Reports SHARON pain  Chronic since age 11years old. Occurs when manually pressing into SHARON region. Reports lower abd pain. Left lower abdominal region. Started 3 months ago. Is intermittent. No pain today. Reports tenderness with palpation. Has rare constipation, takes laxatives prn and this works well. CT abd/pelvis 11/2021 for evaluation of this pain noted diverticulosis and wall thickening of sigmoid colon which was felt to be related to underdistension. Reports she sees Dr James Paredes for seizures and last seizure was Nov 12, 2021. Family HX:  Maternal grandmother had colon polyps, maternal grandfather had colon polyps, mother had colon polyps  Pt denies family hx of colon CA, inflammatory bowel dx, gastric CA and esophageal CA.     Past Medical History:   Diagnosis Date    Acute cystitis without hematuria 10/28/2020    Asthma     CAD (coronary artery disease)     Chest pain     Depression with anxiety     Diabetes mellitus (HCC)     GERD (gastroesophageal reflux disease)     Glucose intolerance (impaired glucose tolerance)     Hyperlipidemia     Cholesterol management per Sean Jean Hypertension     Lupus ([facility]ic lupus erythematosus) [facility]     Malaise and fatigue 10/6/2017    Movement disorder     Sees pain management for neck pain and previous surgery    New onset seizure ([facility]     Osteoarthritis     Palliative care patient 07/22/2020    S/P CABG x 3 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    PACABG X 3, LIMA-LAD, SVG0OM, SVG-DIAG, RT EVH, DR PATEL    Subdural hematoma ([facility] Past Surgical History:   Procedure Laterality Date    APPENDECTOMY  1965    BACK SURGERY      CARDIAC CATHETERIZATION  05/18/2011    EF 60%    CARDIAC CATHETERIZATION  01/25/2016    drug eluting stent to RCA   [address]  01/25/2016    CARDIAC CATHETERIZATION  02/19/2016    CARDIAC CATHETERIZATION  08/24/2017    cutting balloon angioplasty ot LAD    CARDIAC CATHETERIZATION  04/13/2020    TWO Stents placed    CATARACT REMOVAL      COLON SURGERY  1992    Polypectomy - 2    COLON SURGERY  2003    Polypectomy - 4    COLON SURGERY  11/2008    Polypectomy - 2    COLON SURGERY  08/2012    Polypectomy - 3    COLONOSCOPY  03/01/2017    Dr Basilio Nasreen- normal per pt recall    COLONOSCOPY  07/20/2012    polyps, diverticulosis,  4yr recall    CORONARY ANGIOPLASTY  08/2017    cutting balloon angioplasty LAD diagonal    CORONARY ANGIOPLASTY WITH STENT PLACEMENT  03/22/2007    CORONARY ANGIOPLASTY WITH STENT PLACEMENT  08/2007    CORONARY ARTERY BYPASS GRAFT  03/05/2013    PACABG X 3, LIMA-LAD, SVG0OM, SVG-DIAG, RT EVH, DR PATEL    COSMETIC SURGERY      deviated septum and rhinoplasty    CYST REMOVAL  1970    DIAGNOSTIC CARDIAC CATH LAB PROCEDURE      EYE SURGERY  03/2019    cataracts    [address]    Partial    NASAL [address] [facility] SINUS SURGERY  2003    Nasal Polyps Removed    SPINE SURGERY  03/06/2006    Lumbar Laminectomy L4&5    SPINE SURGERY  03/06/2006    Cervical Fusion - C4,5,6    TONSILLECTOMY  1968    UPPER GASTROINTESTINAL ENDOSCOPY  09/20/2006       Social History     Socioeconomic History    Marital status:       Spouse name: None    Number of children: 2    Years of education: None    Highest education level: None   Occupational History    None   Tobacco Use    Smoking status: Former Smoker     Packs/day: 1.00     Years: 1.00     Pack years: 1.00     Types: Cigarettes     Quit date: 2/26/1967     Years since quitting: Current Outpatient Medications   Medication Sig Dispense Refill    aspirin 81 MG EC tablet Take 81 mg by mouth daily      amLODIPine (NORVASC) 2.5 MG tablet Take 1 tablet by mouth 2 times daily 60 tablet 5    promethazine (PHENERGAN) 12.5 MG tablet Take 1 tablet by mouth every 6 hours as needed for Nausea 30 tablet 5    isosorbide mononitrate (IMDUR) 60 MG extended release tablet TAKE 1 TABLET TWICE A  tablet 3    levETIRAcetam (KEPPRA) 1000 MG tablet Take 1 tablet by mouth 2 times daily 60 tablet 3    ranolazine (RANEXA) 1000 MG extended release tablet TAKE 1 TABLET TWICE A  tablet 3    ALPRAZolam (XANAX) 0.25 MG tablet Take 0.25 mg by mouth nightly. Half tab      amitriptyline (ELAVIL) 50 MG tablet Take 50 mg by mouth nightly      diphenhydrAMINE (BENADRYL) 25 MG tablet Take 25 mg by mouth every 6 hours as needed for Itching      DULoxetine (CYMBALTA) 60 MG extended release capsule Take 60 mg by mouth every evening 5 pm      tiZANidine (ZANAFLEX) 4 MG tablet Take 4 mg by mouth nightly as needed 1/2 tablet at night      atorvastatin (LIPITOR) 80 MG tablet Take 1 tablet by mouth nightly (Patient taking differently: Take 80 mg by mouth nightly 5 pm) 30 tablet 0    losartan (COZAAR) 100 MG tablet Take 1 tablet by mouth daily 30 tablet 3    metoprolol succinate (TOPROL XL) 50 MG extended release tablet Take 1 tablet by mouth daily 30 tablet 3    pantoprazole (PROTONIX) 40 MG tablet Take 1 tablet by mouth every morning (before breakfast) 30 tablet 3    nitroGLYCERIN (NITROSTAT) 0.4 MG SL tablet up to max of 3 total doses. If no relief after 1 dose, call 911. 30 tablet 3    albuterol (PROVENTIL HFA;VENTOLIN HFA) 108 (90 BASE) MCG/ACT inhaler Inhale 2 puffs into the lungs every 6 hours as needed. No current facility-administered medications for this visit. Review of Systems   Constitutional: Positive for fatigue.  Negative for appetite change, fever and unexpected weight change. HENT: Negative for sore throat, trouble swallowing and voice change. Respiratory: Negative for cough and shortness of breath. Cardiovascular: Negative for chest pain, palpitations and leg swelling. Gastrointestinal: Positive for abdominal pain and nausea. Negative for abdominal distention, anal bleeding, blood in stool, constipation, diarrhea, rectal pain and vomiting. Genitourinary: Negative for hematuria. Musculoskeletal: Positive for arthralgias, back pain and neck pain. Neurological: Positive for seizures. Negative for dizziness, weakness, light-headedness and headaches. Psychiatric/Behavioral: Positive for dysphoric mood. Negative for sleep disturbance. The patient is not nervous/anxious. All other systems reviewed and are negative. Objective:     Physical Exam  Vitals and nursing note reviewed. Constitutional:       Appearance: She is well-developed. Comments: /60 (Site: Left Upper Arm)   Pulse 83   Ht 5' 5\" (1.651 m)   Wt 158 lb (71.7 kg)   SpO2 98%   BMI 26.29 kg/m²    Eyes:      General: No scleral icterus. Conjunctiva/sclera: Conjunctivae normal.      Pupils: Pupils are equal, round, and reactive to light. Neck:      Thyroid: No thyromegaly. Cardiovascular:      Rate and Rhythm: Normal rate and regular rhythm. Heart sounds: Normal heart sounds. No murmur heard. No friction rub. No gallop. Pulmonary:      Effort: Pulmonary effort is normal. No respiratory distress. Breath sounds: Normal breath sounds. Abdominal:      General: Bowel sounds are normal. There is no distension. Palpations: Abdomen is soft. Tenderness: There is no abdominal tenderness. There is no rebound. Comments: No abd pain with palpation on exam today   Musculoskeletal:         General: No deformity. Normal range of motion. Cervical back: Normal range of motion and neck supple.    Neurological:      Mental Status: She is alert and oriented to person, place, and time. Cranial Nerves: No cranial nerve deficit. Psychiatric:         Judgment: Judgment normal.           Assessment:       Diagnosis Orders   1. Chronic epigastric pain  ESOPHAGOSCOPY / EGD   2. Nausea  ESOPHAGOSCOPY / EGD   3. LLQ abdominal pain  COLONOSCOPY W/ OR W/O BIOPSY         Plan:      1. Schedule outpatient endoscopy. Patient advised no Aspirin, Fish Oil, Vit E or NSAIDs 5 (five) days before procedure. Follow-up Visit: per Dr. Yanna Rosales clearance from Dr. Lisa Jones for anesthesia  Pt Education:   Risks, benefits, and alternatives to endoscopy were discussed. Patient voices understanding of risks of, but not limited to, perforation, bleeding, and infection. The risk of perforation is increased with esophageal dilatation. All questions answered to patient's satisfaction. Patient is agreable to proceed. 2. Schedule outpatient colonoscopy. Patient advised no Aspirin, Fish Oil, Vit E or NSAIDs 5 (five) days before procedure. Follow-up Visit: per Dr Yanna Rosales clearance from [address] [place] [address] for anesthesia  Pt education:  Risks, benefits, and alternatives to colonoscopy were discussed. Risks of colonoscopy include, but are not limited to, perforation, bleeding, and infection. We discussed that the risk for perforation is 1-3 in 5,000  at the time of colonoscopy;   and 1-2% risk of bleeding post-polypectomy. All questions answered to the satisfaction of the patient. Pt is agreeable to proceed.

## 2021-12-23 ENCOUNTER — OFFICE VISIT (OUTPATIENT)
Dept: NEUROLOGY | Age: 76
End: 2021-12-23
Payer: MEDICARE

## 2021-12-23 VITALS
OXYGEN SATURATION: 97 % | HEART RATE: 69 BPM | SYSTOLIC BLOOD PRESSURE: 127 MMHG | WEIGHT: 161.8 LBS | DIASTOLIC BLOOD PRESSURE: 67 MMHG | HEIGHT: 65 IN | BODY MASS INDEX: 26.96 KG/M2

## 2021-12-23 DIAGNOSIS — G40.909 SEIZURE DISORDER (HCC): Primary | ICD-10-CM

## 2021-12-23 DIAGNOSIS — R27.0 ATAXIA: ICD-10-CM

## 2021-12-23 DIAGNOSIS — R41.3 MEMORY LOSS: ICD-10-CM

## 2021-12-23 PROBLEM — F01.50 VASCULAR DEMENTIA WITHOUT BEHAVIORAL DISTURBANCE (HCC): Status: ACTIVE | Noted: 2021-12-23

## 2021-12-23 PROCEDURE — G8399 PT W/DXA RESULTS DOCUMENT: HCPCS | Performed by: PSYCHIATRY & NEUROLOGY

## 2021-12-23 PROCEDURE — 1090F PRES/ABSN URINE INCON ASSESS: CPT | Performed by: PSYCHIATRY & NEUROLOGY

## 2021-12-23 PROCEDURE — 99214 OFFICE O/P EST MOD 30 MIN: CPT | Performed by: PSYCHIATRY & NEUROLOGY

## 2021-12-23 PROCEDURE — G8417 CALC BMI ABV UP PARAM F/U: HCPCS | Performed by: PSYCHIATRY & NEUROLOGY

## 2021-12-23 PROCEDURE — G8427 DOCREV CUR MEDS BY ELIG CLIN: HCPCS | Performed by: PSYCHIATRY & NEUROLOGY

## 2021-12-23 PROCEDURE — 4040F PNEUMOC VAC/ADMIN/RCVD: CPT | Performed by: PSYCHIATRY & NEUROLOGY

## 2021-12-23 PROCEDURE — 1123F ACP DISCUSS/DSCN MKR DOCD: CPT | Performed by: PSYCHIATRY & NEUROLOGY

## 2021-12-23 PROCEDURE — G8482 FLU IMMUNIZE ORDER/ADMIN: HCPCS | Performed by: PSYCHIATRY & NEUROLOGY

## 2021-12-23 PROCEDURE — 1036F TOBACCO NON-USER: CPT | Performed by: PSYCHIATRY & NEUROLOGY

## 2021-12-23 PROCEDURE — 1111F DSCHRG MED/CURRENT MED MERGE: CPT | Performed by: PSYCHIATRY & NEUROLOGY

## 2021-12-23 RX ORDER — LEVETIRACETAM 500 MG/1
1000 TABLET ORAL
COMMUNITY
Start: 2021-11-17 | End: 2022-01-11

## 2021-12-23 RX ORDER — LEVETIRACETAM 250 MG/1
250 TABLET ORAL 2 TIMES DAILY
Qty: 60 TABLET | Refills: 2 | Status: SHIPPED | OUTPATIENT
Start: 2021-12-23 | End: 2022-03-24 | Stop reason: SDUPTHER

## 2021-12-23 NOTE — PROGRESS NOTES
Review of Systems    Constitutional - No fever or chills. No diaphoresis or significant fatigue. HENT -  No tinnitus or significant hearing loss. Eyes - no sudden vision change or eye pain  Respiratory - no significant shortness of breath or cough  Cardiovascular - no chest pain No palpitations or significant leg swelling  Gastrointestinal - X abdominal swelling or pain. Genitourinary - No difficulty urinating, dysuria  Musculoskeletal - no back pain or myalgia. Skin - no color change or rash  Neurologic -  X seizures. No lateralizing weakness. Hematologic - no easy bruising or excessive bleeding. Psychiatric - no severe anxiety or nervousness. All other review of systems are negative.

## 2021-12-23 NOTE — PROGRESS NOTES
Chief Complaint   Patient presents with    Follow-Up from [facility] is a 68y.o. year old female who is seen for evaluation of her seizure disorder. I have seen her in the past for poor balance and memory as well. She has had a few normal EEGs. I had her up in the rehab unit a couple of years ago for cervical and lumbar fusions and she had a seizure while up there. Tramadol was discontinued. Keppra changed to 1000 mg twice a day. She was readmitted to the hospital last month with a breakthrough seizure along with some colitis. She saw Dr. Nidhi Lunsford and those records are reviewed. It was felt that she was not absorbing her Keppra at that time. Her work-up including EEG was unremarkable at that time. In the past she was noted to have some orthostatic hypotension along with word finding difficulties and balance issues. The patient tells me that several times a week she will have what she calls a \"mini seizure\".   Active Ambulatory Problems     Diagnosis Date Noted    Chest pain     Osteoarthritis     GERD (gastroesophageal reflux disease)     Asthma     Glucose intolerance (impaired glucose tolerance)     Depression with anxiety     CAD (coronary artery disease)     Hyperlipidemia     Hypertension     Fatigue 02/25/2016    S/P right coronary artery (RCA) stent placement 02/25/2016    Hx of CABG 02/25/2016    Chest pressure     Coronary artery disease of native artery of native heart with stable angina pectoris ([address]     Malaise and fatigue 10/06/2017    Aphasia 11/29/2017    Ataxia 11/29/2017    Vertigo 11/29/2017    Bilateral carotid artery stenosis 12/28/2017    Chest discomfort 01/10/2019    Unstable angina (HCC)     Acute exacerbation of chronic low back pain 05/28/2020    Intractable back pain 05/29/2020    Altered mental status 06/10/2020    AMS (altered mental status) 06/11/2020    Seizure ([address]     Spondylolisthesis at L4-L5 level     Atherosclerotic cardiovascular disease     Palliative care patient 07/22/2020    Subarachnoid hemorrhage ([address] 10/24/2020    SLE (systemic lupus erythematosus) ([address] 10/25/2020    Subarachnoid hemorrhage following injury, no loss of consciousness ([address] 10/25/2020    Intracranial bleed ([address] 10/27/2020    Post-traumatic headache 10/27/2020    Acute cystitis without hematuria 10/28/2020    Status epilepticus ([address] 06/13/2021    Colitis 11/13/2021    Chronic epigastric pain 12/15/2021    Nausea 12/15/2021    LLQ abdominal pain 12/15/2021    Vascular dementia without behavioral disturbance ([address] 12/23/2021     Resolved Ambulatory Problems     Diagnosis Date Noted    No Resolved Ambulatory Problems     Past Medical History:   Diagnosis Date    Diabetes mellitus ([address]     Lupus (systemic lupus erythematosus) ([address]     Movement disorder     New onset seizure ([address]     S/P CABG x 3 03/05/2013    Subdural hematoma ([address]        Past Surgical History:   Procedure Laterality Date    APPENDECTOMY  1965    BACK SURGERY      CARDIAC CATHETERIZATION  05/18/2011    EF 60%    CARDIAC CATHETERIZATION  01/25/2016    drug eluting stent to RCA    CARDIAC CATHETERIZATION  01/25/2016    CARDIAC CATHETERIZATION  02/19/2016    CARDIAC CATHETERIZATION  08/24/2017    cutting balloon angioplasty ot LAD    CARDIAC CATHETERIZATION  04/13/2020    TWO Stents placed    CATARACT REMOVAL      COLON SURGERY  1992    Polypectomy - 2    COLON SURGERY  2003    Polypectomy - 4    COLON SURGERY  11/2008    Polypectomy - 2    COLON SURGERY  08/2012    Polypectomy - 3    COLONOSCOPY  03/01/2017    Dr Maci Sandoval- normal per pt recall    COLONOSCOPY  07/20/2012    polyps, diverticulosis,  4yr recall    CORONARY ANGIOPLASTY  08/2017    cutting balloon angioplasty LAD diagonal    CORONARY ANGIOPLASTY WITH STENT PLACEMENT  03/22/2007    CORONARY ANGIOPLASTY WITH STENT PLACEMENT  08/2007    CORONARY ARTERY BYPASS GRAFT  03/05/2013    PACABG X 3, excessive bleeding. Psychiatric - no severe anxiety or nervousness. All other review of systems are negative.       Current Outpatient Medications   Medication Sig Dispense Refill    levETIRAcetam (KEPPRA) 500 MG tablet       levETIRAcetam (KEPPRA) 250 MG tablet Take 1 tablet by mouth 2 times daily 60 tablet 2    aspirin 81 MG EC tablet Take 81 mg by mouth daily      amLODIPine (NORVASC) 2.5 MG tablet Take 1 tablet by mouth 2 times daily 60 tablet 5    promethazine (PHENERGAN) 12.5 MG tablet Take 1 tablet by mouth every 6 hours as needed for Nausea 30 tablet 5    isosorbide mononitrate (IMDUR) 60 MG extended release tablet TAKE 1 TABLET TWICE A  tablet 3    levETIRAcetam (KEPPRA) 1000 MG tablet Take 1 tablet by mouth 2 times daily 60 tablet 3    ranolazine (RANEXA) 1000 MG extended release tablet TAKE 1 TABLET TWICE A  tablet 3    ALPRAZolam (XANAX) 0.25 MG tablet Take 0.25 mg by mouth nightly. Half tab      amitriptyline (ELAVIL) 50 MG tablet Take 50 mg by mouth nightly      diphenhydrAMINE (BENADRYL) 25 MG tablet Take 25 mg by mouth every 6 hours as needed for Itching      DULoxetine (CYMBALTA) 60 MG extended release capsule Take 60 mg by mouth every evening 5 pm      tiZANidine (ZANAFLEX) 4 MG tablet Take 4 mg by mouth nightly as needed 1/2 tablet at night      atorvastatin (LIPITOR) 80 MG tablet Take 1 tablet by mouth nightly (Patient taking differently: Take 80 mg by mouth nightly 5 pm) 30 tablet 0    losartan (COZAAR) 100 MG tablet Take 1 tablet by mouth daily 30 tablet 3    metoprolol succinate (TOPROL XL) 50 MG extended release tablet Take 1 tablet by mouth daily 30 tablet 3    pantoprazole (PROTONIX) 40 MG tablet Take 1 tablet by mouth every morning (before breakfast) 30 tablet 3    nitroGLYCERIN (NITROSTAT) 0.4 MG SL tablet up to max of 3 total doses.  If no relief after 1 dose, call 911. 30 tablet 3    albuterol (PROVENTIL HFA;VENTOLIN HFA) 108 (90 BASE) MCG/ACT inhaler Inhale 2 puffs into the lungs every 6 hours as needed. No current facility-administered medications for this visit. Outpatient Medications Marked as Taking for the 12/23/21 encounter (Office Visit) with Lexus Hope MD   Medication Sig Dispense Refill    levETIRAcetam (KEPPRA) 500 MG tablet       levETIRAcetam (KEPPRA) 250 MG tablet Take 1 tablet by mouth 2 times daily 60 tablet 2    aspirin 81 MG EC tablet Take 81 mg by mouth daily      amLODIPine (NORVASC) 2.5 MG tablet Take 1 tablet by mouth 2 times daily 60 tablet 5    promethazine (PHENERGAN) 12.5 MG tablet Take 1 tablet by mouth every 6 hours as needed for Nausea 30 tablet 5    isosorbide mononitrate (IMDUR) 60 MG extended release tablet TAKE 1 TABLET TWICE A  tablet 3    levETIRAcetam (KEPPRA) 1000 MG tablet Take 1 tablet by mouth 2 times daily 60 tablet 3    ranolazine (RANEXA) 1000 MG extended release tablet TAKE 1 TABLET TWICE A  tablet 3    ALPRAZolam (XANAX) 0.25 MG tablet Take 0.25 mg by mouth nightly. Half tab      amitriptyline (ELAVIL) 50 MG tablet Take 50 mg by mouth nightly      diphenhydrAMINE (BENADRYL) 25 MG tablet Take 25 mg by mouth every 6 hours as needed for Itching      DULoxetine (CYMBALTA) 60 MG extended release capsule Take 60 mg by mouth every evening 5 pm      tiZANidine (ZANAFLEX) 4 MG tablet Take 4 mg by mouth nightly as needed 1/2 tablet at night      atorvastatin (LIPITOR) 80 MG tablet Take 1 tablet by mouth nightly (Patient taking differently: Take 80 mg by mouth nightly 5 pm) 30 tablet 0    losartan (COZAAR) 100 MG tablet Take 1 tablet by mouth daily 30 tablet 3    metoprolol succinate (TOPROL XL) 50 MG extended release tablet Take 1 tablet by mouth daily 30 tablet 3    pantoprazole (PROTONIX) 40 MG tablet Take 1 tablet by mouth every morning (before breakfast) 30 tablet 3    nitroGLYCERIN (NITROSTAT) 0.4 MG SL tablet up to max of 3 total doses.  If no relief after 1 dose, call 156. 18 tablet 3    albuterol (PROVENTIL HFA;VENTOLIN HFA) 108 (90 BASE) MCG/ACT inhaler Inhale 2 puffs into the lungs every 6 hours as needed. /67   Pulse 69   Ht 5' 5\" (1.651 m)   Wt 161 lb 12.8 oz (73.4 kg)   SpO2 97%   BMI 26.92 kg/m²       Constitutional - well developed, well nourished. Eyes - conjunctiva normal.  Pupils react to light  Ear, nose, throat -hearing intact to finger rub No scars, masses, or lesions over external nose or ears, no atrophy of tongue  Neck-symmetric, no masses noted, no jugular vein distension. No bruits noted. Respiration- chest wall appears symmetric, good expansion,   normal effort without use of accessory muscles  Cardiovascular- RRR  Musculoskeletal - no significant wasting of muscles noted, no bony deformities, gait no gross ataxia  Extremities-no clubbing, cyanosis or edema  Skin - warm, dry, and intact. No rash, erythema, or pallor. Psychiatric - mood, affect, and behavior appear normal.      Neurological exam  Awake, alert, fluent oriented x 3 appropriate affect  Attention and concentration appear appropriate  Recent and remote memory appears unremarkable  Speech normal without dysarthria  No clear issues with language of fund of knowledge    Cranial Nerve Exam   CN II- Visual fields grossly unremarkable. VA adequate.   CN III, IV,VI- PERRLA, EOMI, No nystagmus, conjugate eye movements, no ptosis  CN VII-no facial asymmetry  CN VIII-Hearing intact   CN IX and X- Palate elevates in midline  CN XI-good shoulder shrug  CN XII-Tongue midline with no fasciculations or fibrillations    Motor Exam  V/V throughout upper and lower extremities bilaterally, no cogwheeling, normal tone      Reflexes   Absent throughout    Tremors- no tremors in hands or head noted    Gait  Uses a Rollator    Coordination  Finger to nose and QUIRINO-unremarkable    Lab Results   Component Value Date    [id number] [id number] 08/27/2021     Lab Results   Component Value Date    WBC 5.1 11/14/2021 HGB 10.8 (L) 11/14/2021    HCT 34.2 (L) 11/14/2021    .8 (H) 11/14/2021     11/14/2021     Lab Results   Component Value Date     11/14/2021    K 4.0 11/14/2021     11/14/2021    CO2 21 (L) 11/14/2021    BUN 14 11/14/2021    CREATININE 0.7 11/14/2021    GLUCOSE 123 (H) 11/14/2021    CALCIUM 9.0 11/14/2021    PROT 5.6 (L) 11/14/2021    LABALBU 4.2 11/14/2021    BILITOT 0.4 11/14/2021    ALKPHOS 55 11/14/2021    AST 17 11/14/2021    ALT 21 11/14/2021    LABGLOM >60 11/14/2021    GFRAA >59 11/14/2021    GLOB 1.8 11/19/2016           Assessment    ICD-10-CM    1. Seizure disorder ([address]  G40.909 OK DISCHARGE MEDS RECONCILED W/ CURRENT OUTPATIENT MED LIST   2. Memory loss  R41.3    3. Ataxia  R27.0      May be having breakthrough seizures on Keppra 1000 mg twice a day. Increase this to 1250 twice a day. They were warned of potential side effects. They will call me in a week or 2 and let me know how they are doing. If she continues to have these we will go up to 1500 mg twice a day. Memory loss and ataxia are stable. Plan  Orders Placed This Encounter   Procedures    OK DISCHARGE MEDS RECONCILED W/ CURRENT OUTPATIENT MED LIST         Orders Placed This Encounter   Medications    levETIRAcetam (KEPPRA) 250 MG tablet     Sig: Take 1 tablet by mouth 2 times daily     Dispense:  60 tablet     Refill:  2       Pt warned of potential side effects of medication changes/new medicines. They are to call for new or ongoing difficulties. Return in about 3 months (around 3/23/2022).     (Please note that portions of this note were completed with a voice recognition program. Efforts were made to edit the dictations but occasionally words are mis-transcribed.)

## 2022-01-11 ENCOUNTER — HOSPITAL ENCOUNTER (OUTPATIENT)
Dept: PAIN MANAGEMENT | Age: 77
Discharge: HOME | End: 2022-01-11
Payer: MEDICARE

## 2022-01-11 VITALS
SYSTOLIC BLOOD PRESSURE: 155 MMHG | OXYGEN SATURATION: 99 % | TEMPERATURE: 97.4 F | HEART RATE: 59 BPM | RESPIRATION RATE: 18 BRPM | DIASTOLIC BLOOD PRESSURE: 77 MMHG

## 2022-01-11 DIAGNOSIS — M51.36 LUMBAR DEGENERATIVE DISC DISEASE: ICD-10-CM

## 2022-01-11 PROCEDURE — 64494 INJ PARAVERT F JNT L/S 2 LEV: CPT

## 2022-01-11 PROCEDURE — 3209999900 FLUORO FOR SURGICAL PROCEDURES

## 2022-01-11 PROCEDURE — 6360000002 HC RX W HCPCS

## 2022-01-11 PROCEDURE — 64493 INJ PARAVERT F JNT L/S 1 LEV: CPT

## 2022-01-11 PROCEDURE — 2500000003 HC RX 250 WO HCPCS

## 2022-01-11 RX ORDER — BUPIVACAINE HYDROCHLORIDE 5 MG/ML
5 INJECTION, SOLUTION EPIDURAL; INTRACAUDAL ONCE
Status: DISCONTINUED | OUTPATIENT
Start: 2022-01-11 | End: 2022-01-13 | Stop reason: HOSPADM

## 2022-01-11 RX ORDER — LIDOCAINE HYDROCHLORIDE 10 MG/ML
20 INJECTION, SOLUTION EPIDURAL; INFILTRATION; INTRACAUDAL; PERINEURAL ONCE
Status: DISCONTINUED | OUTPATIENT
Start: 2022-01-11 | End: 2022-01-13 | Stop reason: HOSPADM

## 2022-01-11 RX ORDER — METHYLPREDNISOLONE ACETATE 80 MG/ML
80 INJECTION, SUSPENSION INTRA-ARTICULAR; INTRALESIONAL; INTRAMUSCULAR; SOFT TISSUE ONCE
Status: DISCONTINUED | OUTPATIENT
Start: 2022-01-11 | End: 2022-01-13 | Stop reason: HOSPADM

## 2022-01-11 ASSESSMENT — PAIN - FUNCTIONAL ASSESSMENT: PAIN_FUNCTIONAL_ASSESSMENT: 0-10

## 2022-01-11 NOTE — PROGRESS NOTES
Procedure:  Level of Consciousness: [x]Alert [x]Oriented []Disoriented []Lethargic  Anxiety Level: [x]Calm []Anxious []Depressed []Other  Skin: []Warm [x]Dry []Cool []Moist []Intact []Other  Cardiovascular: [x]Palpitations: [x]Never []Occasionally []Frequently  Chest Pain: [x]No []Yes  Respiratory:  [x]Unlabored []Labored []Cough ([] Productive []Unproductive)  HCG Required: []No []Yes   Results: []Negative []Positive  Knowledge Level:        [x]Patient/Other verbalized understanding of pre-procedure instructions. [x]Assessment of post-op care needs (transportation, responsible caregiver)        [x]Able to discuss health care problems and how to deal with it.   Factors that Affect Teaching:        Language Barrier: [x]No []Yes - why:        Hearing Loss:        [x]No []Yes            Corrective Device:  []Yes []No        Vision Loss:           [x]No []Yes            Corrective Device:  []Yes []No        Memory Loss:       [x]No []Yes            []Short Term []Long Term  Motivational Level:  [x]Asks Questions                  []Extremely Anxious       [x]Seems Interested               []Seems Uninterested                  [x]Denies need for Education  Risk for Injury:  [x]Patient oriented to person, place and time  []History of frequent falls/loss of balance  Nutritional:  []Change in appetite   []Weight Gain   []Weight Loss  Functional:  []Requires assistance with ADL's

## 2022-01-11 NOTE — INTERVAL H&P NOTE
Update History & Physical    The patient's History and Physical   was reviewed with the patient and I examined the patient. There was  nO [id number]}. The surgical site was confirmed by the patient and me. Plan: The risks, benefits, expected outcome, and alternative to the recommended procedure have been discussed with the patient. Patient understands and wants to proceed with the procedure.      Electronically signed by Eliza Escobar MD on 1/11/2022 at 9:47 AM

## 2022-01-26 ENCOUNTER — HOSPITAL ENCOUNTER (OUTPATIENT)
Dept: INFUSION THERAPY | Facility: HOSPITAL | Age: 77
Discharge: HOME | End: 2022-01-26
Admitting: FAMILY MEDICINE

## 2022-01-26 VITALS
DIASTOLIC BLOOD PRESSURE: 63 MMHG | HEART RATE: 66 BPM | OXYGEN SATURATION: 95 % | TEMPERATURE: 98.2 F | SYSTOLIC BLOOD PRESSURE: 131 MMHG | RESPIRATION RATE: 18 BRPM

## 2022-01-26 PROCEDURE — 96365 THER/PROPH/DIAG IV INF INIT: CPT

## 2022-01-26 PROCEDURE — 25010000002 REMDESIVIR 100 MG/20ML SOLUTION 1 EACH VIAL: Performed by: FAMILY MEDICINE

## 2022-01-26 RX ADMIN — REMDESIVIR 200 MG: 100 INJECTION, POWDER, LYOPHILIZED, FOR SOLUTION INTRAVENOUS at 13:53

## 2022-01-27 ENCOUNTER — HOSPITAL ENCOUNTER (OUTPATIENT)
Dept: INFUSION THERAPY | Facility: HOSPITAL | Age: 77
Discharge: HOME | End: 2022-01-27
Admitting: FAMILY MEDICINE

## 2022-01-27 VITALS
SYSTOLIC BLOOD PRESSURE: 135 MMHG | DIASTOLIC BLOOD PRESSURE: 62 MMHG | TEMPERATURE: 97.3 F | HEART RATE: 82 BPM | OXYGEN SATURATION: 97 % | RESPIRATION RATE: 20 BRPM

## 2022-01-27 PROCEDURE — 96365 THER/PROPH/DIAG IV INF INIT: CPT

## 2022-01-27 PROCEDURE — 25010000002 REMDESIVIR 100 MG/20ML SOLUTION 1 EACH VIAL: Performed by: FAMILY MEDICINE

## 2022-01-27 RX ADMIN — REMDESIVIR 100 MG: 100 INJECTION, POWDER, LYOPHILIZED, FOR SOLUTION INTRAVENOUS at 13:26

## 2022-01-28 ENCOUNTER — HOSPITAL ENCOUNTER (OUTPATIENT)
Dept: INFUSION THERAPY | Facility: HOSPITAL | Age: 77
Discharge: HOME | End: 2022-01-28
Admitting: FAMILY MEDICINE

## 2022-01-28 ENCOUNTER — TELEPHONE (OUTPATIENT)
Dept: GASTROENTEROLOGY | Age: 77
End: 2022-01-28

## 2022-01-28 VITALS
TEMPERATURE: 98.1 F | RESPIRATION RATE: 20 BRPM | HEART RATE: 62 BPM | DIASTOLIC BLOOD PRESSURE: 51 MMHG | SYSTOLIC BLOOD PRESSURE: 104 MMHG | OXYGEN SATURATION: 98 %

## 2022-01-28 PROCEDURE — 96365 THER/PROPH/DIAG IV INF INIT: CPT

## 2022-01-28 PROCEDURE — 25010000002 REMDESIVIR 100 MG/20ML SOLUTION 1 EACH VIAL: Performed by: FAMILY MEDICINE

## 2022-01-28 RX ADMIN — REMDESIVIR 100 MG: 100 INJECTION, POWDER, LYOPHILIZED, FOR SOLUTION INTRAVENOUS at 13:23

## 2022-01-28 NOTE — TELEPHONE ENCOUNTER
I called Jm rios at 's office back, she did not answer, I asked are they going to follow the patient for the liver cyst or does Yojana Russo want [facility] to follow the patient, I did remind them we have had an issues getting the patient to call us back yet to get scopes RS, message has been left for the patient to call us.   cma

## 2022-01-28 NOTE — TELEPHONE ENCOUNTER
Assessment:        Diagnosis Orders   1. Chronic epigastric pain  ESOPHAGOSCOPY / EGD   2. Nausea  ESOPHAGOSCOPY / EGD   3. LLQ abdominal pain  COLONOSCOPY W/ OR W/O BIOPSY                    Plan:      1. Schedule outpatient endoscopy. Patient advised no Aspirin, Fish Oil, Vit E or NSAIDs 5 (five) days before procedure. Follow-up Visit: per Dr. Imani Greene clearance from Dr. Reece Milian for anesthesia  Pt Education:   Risks, benefits, and alternatives to endoscopy were discussed. Patient voices understanding of risks of, but not limited to, perforation, bleeding, and infection. The risk of perforation is increased with esophageal dilatation. All questions answered to patient's satisfaction. Patient is agreable to proceed. 2. Schedule outpatient colonoscopy      Last visit as NP [facility] 12-15-21. Egd and CLN was scheduled 1-10-22 but it said [place] [facility]  has tried to reach back out to this patient to get RS but no response back as to date. Jesus Melo at [address] office called from [phone] asking if [facility] had seen the Liver US ordered by Abdias Ross dated 12-13-21 ( in [place]). I did notify Jesus Melo at 's office that scopes were scheduled but then CA and TS has been trying to reach the patient to get scopes RS but again no response back yet from the patient. I am sending this to [facility] because it was asked if [facility] had seen the Liver US in [place] from Abdias Ross.  [facility]

## 2022-01-28 NOTE — TELEPHONE ENCOUNTER
No I didn't see the US results til you just asked me to look. Do they want me to see her for the liver cysts or are they following this? If they want me to see her for this please get her an appt for this.  Thank you

## 2022-01-28 NOTE — CODE DOCUMENTATION
Infusion complete, no s/s of reaction, patient remains stable  
Patient discharged home, no s/s of reaction, remains stable  
Renae

## 2022-02-11 NOTE — TELEPHONE ENCOUNTER
2-11-22    Jesica Johnson with 's office called back today from [phone] said that [address] wants [facility] to follow this patient for her liver cyst. I had previously left a message for this patient to call us back and get an OV scheduled but she has yet to return the call. I did try the patient again today @ 11:45 am but again she did not answer, I left another Vm for this patient to call us back to get OV scheduled. Jesica Johnson with 's has been notified as will with a VM today.   amanda

## 2022-03-04 ENCOUNTER — APPOINTMENT (OUTPATIENT)
Dept: CT IMAGING | Age: 77
End: 2022-03-04
Payer: MEDICARE

## 2022-03-04 ENCOUNTER — HOSPITAL ENCOUNTER (EMERGENCY)
Age: 77
Discharge: HOME | End: 2022-03-04
Attending: EMERGENCY MEDICINE
Payer: MEDICARE

## 2022-03-04 ENCOUNTER — APPOINTMENT (OUTPATIENT)
Dept: GENERAL RADIOLOGY | Age: 77
End: 2022-03-04
Payer: MEDICARE

## 2022-03-04 VITALS
HEART RATE: 62 BPM | SYSTOLIC BLOOD PRESSURE: 120 MMHG | RESPIRATION RATE: 13 BRPM | BODY MASS INDEX: 26.63 KG/M2 | DIASTOLIC BLOOD PRESSURE: 60 MMHG | OXYGEN SATURATION: 95 % | TEMPERATURE: 98.2 F | WEIGHT: 160 LBS

## 2022-03-04 DIAGNOSIS — R53.1 GENERAL WEAKNESS: ICD-10-CM
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
## 2022-03-04 DIAGNOSIS — G89.29 ACUTE EXACERBATION OF CHRONIC LOW BACK PAIN: ICD-10-CM

## 2022-03-04 DIAGNOSIS — Z98.1 HISTORY OF FUSION OF CERVICAL SPINE: ICD-10-CM

## 2022-03-04 DIAGNOSIS — M54.50 ACUTE EXACERBATION OF CHRONIC LOW BACK PAIN: ICD-10-CM

## 2022-03-04 LAB
ALBUMIN SERPL-MCNC: 4.7 G/DL (ref 3.5–5.2)
ALP BLD-CCNC: 63 U/L (ref 35–104)
ALT SERPL-CCNC: 19 U/L (ref 5–33)
ANION GAP SERPL CALCULATED.3IONS-SCNC: 13 MMOL/L (ref 7–19)
AST SERPL-CCNC: 16 U/L (ref 5–32)
BASOPHILS ABSOLUTE: 0 K/UL (ref 0–0.2)
BASOPHILS RELATIVE PERCENT: 0.7 % (ref 0–1)
BILIRUB SERPL-MCNC: 0.4 MG/DL (ref 0.2–1.2)
BUN BLDV-MCNC: 22 MG/DL (ref 8–23)
CALCIUM SERPL-MCNC: 9.6 MG/DL (ref 8.8–10.2)
CHLORIDE BLD-SCNC: 105 MMOL/L (ref 98–111)
CO2: 21 MMOL/L (ref 22–29)
CREAT SERPL-MCNC: 0.8 MG/DL (ref 0.5–0.9)
EKG P AXIS: 65 DEGREES
EKG P-R INTERVAL: 212 MS
EKG Q-T INTERVAL: 478 MS
EKG QRS DURATION: 114 MS
EKG QTC CALCULATION (BAZETT): 479 MS
EKG T AXIS: 137 DEGREES
EOSINOPHILS ABSOLUTE: 0.1 K/UL (ref 0–0.6)
EOSINOPHILS RELATIVE PERCENT: 2 % (ref 0–5)
GFR AFRICAN AMERICAN: >59
GFR NON-AFRICAN AMERICAN: >60
GLUCOSE BLD-MCNC: 165 MG/DL (ref 74–109)
HCT VFR BLD CALC: 36 % (ref 37–47)
HEMOGLOBIN: 11.2 G/DL (ref 12–16)
IMMATURE GRANULOCYTES #: 0 K/UL
LYMPHOCYTES ABSOLUTE: 1 K/UL (ref 1.1–4.5)
LYMPHOCYTES RELATIVE PERCENT: 25.6 % (ref 20–40)
MCH RBC QN AUTO: 31.7 PG (ref 27–31)
MCHC RBC AUTO-ENTMCNC: 31.1 G/DL (ref 33–37)
MCV RBC AUTO: 102 FL (ref 81–99)
MONOCYTES ABSOLUTE: 0.4 K/UL (ref 0–0.9)
MONOCYTES RELATIVE PERCENT: 9.2 % (ref 0–10)
NEUTROPHILS ABSOLUTE: 2.5 K/UL (ref 1.5–7.5)
NEUTROPHILS RELATIVE PERCENT: 61.5 % (ref 50–65)
PDW BLD-RTO: 14.7 % (ref 11.5–14.5)
PLATELET # BLD: 126 K/UL (ref 130–400)
PMV BLD AUTO: 9 FL (ref 9.4–12.3)
POTASSIUM REFLEX MAGNESIUM: 4.4 MMOL/L (ref 3.5–5)
PRO-BNP: 359 PG/ML (ref 0–1800)
RBC # BLD: 3.53 M/UL (ref 4.2–5.4)
SODIUM BLD-SCNC: 139 MMOL/L (ref 136–145)
TOTAL CK: 96 U/L (ref 26–192)
TOTAL PROTEIN: 6.4 G/DL (ref 6.6–8.7)
TROPONIN: <0.01 NG/ML (ref 0–0.03)
WBC # BLD: 4 K/UL (ref 4.8–10.8)

## 2022-03-04 PROCEDURE — 71045 X-RAY EXAM CHEST 1 VIEW: CPT

## 2022-03-04 PROCEDURE — 36415 COLL VENOUS BLD VENIPUNCTURE: CPT

## 2022-03-04 PROCEDURE — 93010 ELECTROCARDIOGRAM REPORT: CPT | Performed by: INTERNAL MEDICINE

## 2022-03-04 PROCEDURE — 82550 ASSAY OF CK (CPK): CPT

## 2022-03-04 PROCEDURE — 80053 COMPREHEN METABOLIC PANEL: CPT

## 2022-03-04 PROCEDURE — 99283 EMERGENCY DEPT VISIT LOW MDM: CPT

## 2022-03-04 PROCEDURE — 72128 CT CHEST SPINE W/O DYE: CPT

## 2022-03-04 PROCEDURE — 85025 COMPLETE CBC W/AUTO DIFF WBC: CPT

## 2022-03-04 PROCEDURE — 84484 ASSAY OF TROPONIN QUANT: CPT

## 2022-03-04 PROCEDURE — 93005 ELECTROCARDIOGRAM TRACING: CPT | Performed by: EMERGENCY MEDICINE

## 2022-03-04 PROCEDURE — 72125 CT NECK SPINE W/O DYE: CPT

## 2022-03-04 PROCEDURE — 72131 CT LUMBAR SPINE W/O DYE: CPT

## 2022-03-04 PROCEDURE — 83880 ASSAY OF NATRIURETIC PEPTIDE: CPT

## 2022-03-04 PROCEDURE — 70450 CT HEAD/BRAIN W/O DYE: CPT

## 2022-03-04 ASSESSMENT — ENCOUNTER SYMPTOMS
COUGH: 0
SHORTNESS OF BREATH: 0
DIARRHEA: 0
VOMITING: 0
VOICE CHANGE: 0
BACK PAIN: 1
RHINORRHEA: 0
EYE REDNESS: 0
EYE PAIN: 0
ABDOMINAL PAIN: 0

## 2022-03-04 NOTE — ED PROVIDER NOTES
[facility] EMERGENCY DEPT  EMERGENCY DEPARTMENT ENCOUNTER      Pt Name: Ghanshyam Mcnamara  MRN: [id number]  [place] 1945  Date of evaluation: 3/4/2022  Provider: Tierra Costello MD    CHIEF COMPLAINT       Chief Complaint   Patient presents with    Loss of Consciousness     Pt states she stood up to get tissue paper \"then my knees gave out on me and fell to the floor\" PT thinks she is \"pretty sure she didn't pass out\"         HISTORY OF PRESENT ILLNESS   (Location/Symptom, Timing/Onset,Context/Setting, Quality, Duration, Modifying Factors, Severity)  Note limiting factors. Ghanshyam Mcnamara is a 68 y.o. female who presents to the emergency department for evaluation after a fall at home. States she had gotten up to go the bathroom after she stood up to go and get some toilet paper she got lightheaded and states her knees gave out from underneath her causing her to fall. States she initially fell forward into the door then hit the counter as she was falling down to the ground. Has pain in her neck and back. Has chronic back problems with prior neck and back surgeries but pain is worse today since the fall. Was not able to ambulate after the fall and had to be assisted up out of the floor by EMS on their arrival.    HPI    NursingNotes were reviewed. REVIEW OF SYSTEMS    (2-9 systems for level 4, 10 or more for level 5)     Review of Systems   Constitutional: Negative for fatigue and fever. HENT: Negative for congestion, rhinorrhea and voice change. Eyes: Negative for pain and redness. Respiratory: Negative for cough and shortness of breath. Cardiovascular: Negative for chest pain. Gastrointestinal: Negative for abdominal pain, diarrhea and vomiting. Endocrine: Negative. Genitourinary: Negative. Musculoskeletal: Positive for back pain. Negative for arthralgias and gait problem. Skin: Negative for rash and wound. Neurological: Positive for weakness. Negative for headaches.    Hematological: Negative. Psychiatric/Behavioral: Negative. All other systems reviewed and are negative. A complete review of systems was performed and is negative except as noted above in the HPI.        PAST MEDICAL HISTORY     Past Medical History:   Diagnosis Date    Acute cystitis without hematuria 10/28/2020    Asthma     CAD (coronary artery disease)     Chest pain     Depression with anxiety     Diabetes mellitus (HCC)     GERD (gastroesophageal reflux disease)     Glucose intolerance (impaired glucose tolerance)     Hyperlipidemia     Cholesterol management per Araseli Sena M.D.   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    PACABG X 3, LIMA-LAD, SVG0OM, SVG-DIAG, RT EVH, DR PATEL    Subdural hematoma ([facility]          SURGICAL HISTORY       Past Surgical History:   Procedure Laterality Date    APPENDECTOMY  1965    BACK SURGERY      CARDIAC CATHETERIZATION  05/18/2011    EF 60%    CARDIAC CATHETERIZATION  01/25/2016    drug eluting stent to RCA   [address]  01/25/2016    CARDIAC CATHETERIZATION  02/19/2016    CARDIAC CATHETERIZATION  08/24/2017    cutting balloon angioplasty ot LAD    CARDIAC CATHETERIZATION  04/13/2020    TWO Stents placed    CATARACT REMOVAL      COLON SURGERY  1992    Polypectomy - 2    COLON SURGERY  2003    Polypectomy - 4    COLON SURGERY  11/2008    Polypectomy - 2    COLON SURGERY  08/2012    Polypectomy - 3    COLONOSCOPY  03/01/2017    Dr Miguelangel Lakhani- normal per pt recall    COLONOSCOPY  07/20/2012    polyps, diverticulosis,  4yr recall    CORONARY ANGIOPLASTY  08/2017    cutting balloon angioplasty LAD diagonal    CORONARY ANGIOPLASTY WITH STENT PLACEMENT  03/22/2007    CORONARY ANGIOPLASTY WITH STENT PLACEMENT  08/2007    CORONARY ARTERY BYPASS GRAFT  03/05/2013    PACABG X 3, LIMA-LAD, SVG0OM, SVG-DIAG, RT EV, DR PATEL    COSMETIC SURGERY      deviated septum and rhinoplasty    CYST REMOVAL  1970    DIAGNOSTIC CARDIAC CATH LAB PROCEDURE      EYE SURGERY  03/2019    cataracts   [address]    Partial    NASAL SEPTUM [address] SINUS SURGERY  2003    Nasal Polyps Removed    SPINE SURGERY  03/06/2006    Lumbar Laminectomy L4&5    SPINE SURGERY  03/06/2006    Cervical Fusion - C4,5,6    TONSILLECTOMY  1968    UPPER GASTROINTESTINAL ENDOSCOPY  09/20/2006         CURRENT MEDICATIONS       Previous Medications    ALBUTEROL (PROVENTIL HFA;VENTOLIN HFA) 108 (90 BASE) MCG/ACT INHALER    Inhale 2 puffs into the lungs every 6 hours as needed. ALPRAZOLAM (XANAX) 0.25 MG TABLET    Take 0.25 mg by mouth nightly. Half tab    AMITRIPTYLINE (ELAVIL) 50 MG TABLET    Take 50 mg by mouth nightly    AMLODIPINE (NORVASC) 2.5 MG TABLET    Take 1 tablet by mouth 2 times daily    ASPIRIN 81 MG EC TABLET    Take 81 mg by mouth daily    ATORVASTATIN (LIPITOR) 80 MG TABLET    Take 1 tablet by mouth nightly    DIPHENHYDRAMINE (BENADRYL) 25 MG TABLET    Take 25 mg by mouth every 6 hours as needed for Itching    DULOXETINE (CYMBALTA) 60 MG EXTENDED RELEASE CAPSULE    Take 60 mg by mouth every evening 5 pm    ISOSORBIDE MONONITRATE (IMDUR) 60 MG EXTENDED RELEASE TABLET    TAKE 1 TABLET TWICE A DAY    LEVETIRACETAM (KEPPRA) 1000 MG TABLET    Take 1 tablet by mouth 2 times daily    LEVETIRACETAM (KEPPRA) 250 MG TABLET    Take 1 tablet by mouth 2 times daily    LOSARTAN (COZAAR) 100 MG TABLET    Take 1 tablet by mouth daily    METOPROLOL SUCCINATE (TOPROL XL) 50 MG EXTENDED RELEASE TABLET    Take 1 tablet by mouth daily    NITROGLYCERIN (NITROSTAT) 0.4 MG SL TABLET    up to max of 3 total doses. If no relief after 1 dose, call 911.     PANTOPRAZOLE (PROTONIX) 40 MG TABLET    Take 1 tablet by mouth every morning (before breakfast)    PROMETHAZINE (PHENERGAN) 12.5 MG TABLET    Take 1 tablet by mouth every 6 hours as needed for Nausea    RANOLAZINE (RANEXA) 1000 MG EXTENDED RELEASE TABLET    TAKE 1 TABLET TWICE A DAY    TIZANIDINE (ZANAFLEX) 4 MG TABLET    Take 4 mg by mouth nightly as needed 1/2 tablet at night       ALLERGIES     Other, Nexium [esomeprazole], Codeine, Darvocet [propoxyphene n-acetaminophen], Hydrocodone-acetaminophen, Lyrica [pregabalin], Morphine, and Percocet [oxycodone-acetaminophen]    FAMILY HISTORY       Family History   Problem Relation Age of Onset    Heart Disease Mother     High Blood Pressure Mother     Diabetes Mother     High Cholesterol Mother     Colon Polyps Mother     Diabetes Brother     Colon Polyps Maternal Grandmother     Colon Polyps Maternal Grandfather     Colon Cancer Neg Hx     Esophageal Cancer Neg Hx     Liver Cancer Neg Hx           SOCIAL HISTORY       Social History     Socioeconomic History    Marital status:      Spouse name: None    Number of children: 2    Years of education: None    Highest education level: None   Occupational History    None   Tobacco Use    Smoking status: Former Smoker     Packs/day: 1.00     Years: 1.00     Pack years: 1.00     Types: Cigarettes     Quit date: 1967     Years since quittin.0    Smokeless tobacco: Never Used   Vaping Use    Vaping Use: Never used   Substance and Sexual Activity    Alcohol use: No    Drug use: No    Sexual activity: Not Currently   Other Topics Concern    None   Social History Narrative    None     Social Determinants of Health     Financial Resource Strain:     Difficulty of Paying Living Expenses: Not on file   Food Insecurity:     Worried About Running Out of Food in the Last Year: Not on file    Yu of Food in the Last Year: Not on file   Transportation Needs:     Lack of Transportation (Medical):  Not on file    Lack of Transportation (Non-Medical): Not on file   Physical Activity:     Days of Exercise per Week: Not on file    Minutes of Exercise per Session: Not on file   Stress:     Feeling of Stress : Not on file   Social Connections:     Frequency of Communication with Friends and Family: Not on file    Frequency of Social Gatherings with Friends and Family: Not on file    Attends Jehov[employer]'s witness Services: Not on file    Active Member of [address] [place] [address] or Organizations: Not on file    Attends Club or Organization Meetings: Not on file    Marital Status: Not on file   Intimate Partner Violence:     Fear of Current or Ex-Partner: Not on file    Emotionally Abused: Not on file    Physically Abused: Not on file    Sexually Abused: Not on file   Housing Stability:     Unable to Pay for Housing in the Last Year: Not on file    Number of [place] in the Last Year: Not on file    Unstable Housing in the Last Year: Not on file       SCREENINGS    Bree Coma Scale  Eye Opening: Spontaneous  Best Verbal Response: Oriented  Best Motor Response: Obeys commands  Bree Coma Scale Score: 15        PHYSICAL EXAM    (up to 7 for level 4, 8 or more for level 5)     ED Triage Vitals [03/04/22 0520]   BP Temp Temp Source Pulse Resp SpO2 Height Weight   126/67 97.8 °F (36.6 °C) Oral 62 13 95 % -- 160 lb (72.6 kg)       Physical Exam  Vitals and nursing note reviewed. Constitutional:       General: She is not in acute distress. Appearance: Normal appearance. She is well-developed. She is not diaphoretic. Interventions: Cervical collar in place. HENT:      Head: Normocephalic and atraumatic. No Bocanegra's sign, contusion, right periorbital erythema, left periorbital erythema or laceration. Right Ear: External ear normal.      Left Ear: External ear normal.      Nose: No nasal deformity, laceration, mucosal edema or rhinorrhea. Mouth/Throat:      Mouth: No oral lesions.    Eyes:      Conjunctiva/sclera: Conjunctivae normal.      Pupils: Pupils are equal, round, and reactive to light. Neck:      Trachea: No tracheal deviation. Cardiovascular:      Rate and Rhythm: Normal rate and regular rhythm. Pulses:           Radial pulses are 2+ on the right side and 2+ on the left side. Dorsalis pedis pulses are 2+ on the right side and 2+ on the left side. Pulmonary:      Effort: No accessory muscle usage or respiratory distress. Breath sounds: Normal breath sounds. No decreased breath sounds, rhonchi or rales. Abdominal:      General: There is no distension. Palpations: Abdomen is not rigid. Tenderness: There is no abdominal tenderness. There is no guarding. Musculoskeletal:      Right shoulder: Normal.      Left shoulder: Normal.      Cervical back: No deformity, rigidity, tenderness or bony tenderness. Thoracic back: Normal. No deformity, tenderness or bony tenderness. Lumbar back: Normal. No deformity, tenderness or bony tenderness. Right hip: Normal.      Left hip: Normal.      Right knee: Normal.      Left knee: Normal.   Skin:     Findings: No laceration. Neurological:      Mental Status: She is alert and oriented to person, place, and time. GCS: GCS eye subscore is 4. GCS verbal subscore is 5. GCS motor subscore is 6. Cranial Nerves: No cranial nerve deficit. Sensory: No sensory deficit.    Psychiatric:         Speech: Speech normal.         DIAGNOSTIC RESULTS     EKG: All EKG's are interpreted by the Emergency Department Physician who either signs or Co-signs this chart in the absence of a cardiologist.        RADIOLOGY:   Non-plain film images such as CT, Ultrasound and MRI are read by the radiologist. Plainradiographic images are visualized and preliminarily interpreted by the emergency physician with the below findings:        Interpretation per the Radiologist below, if available at the time of this note:    XR CHEST PORTABLE    (Results Pending)   CT HEAD WO CONTRAST    (Results Pending)   CT CERVICAL SPINE WO CONTRAST    (Results Pending)   CT THORACIC SPINE WO CONTRAST    (Results Pending)   CT LUMBAR SPINE WO CONTRAST    (Results Pending)         ED BEDSIDE ULTRASOUND:   Performed by ED Physician - none    LABS:  Labs Reviewed   CBC WITH AUTO DIFFERENTIAL - Abnormal; Notable for the following components:       Result Value    WBC 4.0 (*)     RBC 3.53 (*)     Hemoglobin 11.2 (*)     Hematocrit 36.0 (*)     .0 (*)     MCH 31.7 (*)     MCHC 31.1 (*)     RDW 14.7 (*)     Platelets 308 (*)     MPV 9.0 (*)     Lymphocytes Absolute 1.0 (*)     All other components within normal limits   COMPREHENSIVE METABOLIC PANEL W/ REFLEX TO MG FOR LOW K - Abnormal; Notable for the following components:    CO2 21 (*)     Glucose 165 (*)     Total Protein 6.4 (*)     All other components within normal limits   TROPONIN   BRAIN NATRIURETIC PEPTIDE   CK   URINALYSIS WITH REFLEX TO CULTURE       All other labs were within normal range or not returned as of this dictation. EMERGENCY DEPARTMENT COURSE and DIFFERENTIALDIAGNOSIS/MDM:   Vitals:    Vitals:    03/04/22 0520 03/04/22 0610   BP: 126/67 (!) 122/57   Pulse: 62 60   Resp: 13 12   Temp: 97.8 °F (36.6 °C) 98 °F (36.7 °C)   TempSrc: Oral Oral   SpO2: 95% 95%   Weight: 160 lb (72.6 kg)        MDM  ED Course as of 03/04/22 0647   Fri Mar 04, 2022   0538 EKG shows sinus rhythm with a rate of 62. There is a left bundle branch block present. No evidence of acute ischemia or infarction. Normal intervals other than QRS slightly prolonged at 123 [BUTCH]   0622 CT C SPINE:    No fracture or traumatic malalignment. Comparison 10/24/2020. Postsurgical changes ACDF C4-C6. Hardware is intact. Stable mild grade 1 retrolisthesis C3 on C4. Radiologist: Ismael Danielle MD [BUTCH]   3657 CT L SPINE:    No fracture or traumatic malalignment. Osteopenia. Lumbar degenerative disc disease and facet arthropathy. Grade 1 anterolisthesis L4 on L5 is unchanged. Comparison x-ray 10/24/2020. A couple sclerotic bone islands in the L2 vertebral body. Disc osteophyte complexes with facet arthropathy and ligamentum flavum thickening result in some moderate to severe degrees of spinal canal stenosis is greatest at L3-L4. Neural foraminal stenosis also most severe at this level. Radiologist: Shannon Browne MD [BUTCH]   7638 CT HEAD:      No ICH, mass effect or edema. No skull fracture. Empty sella. Mild chronic low-attenuation in the cerebral white matter. Small calcification left parietal lobe. Radiologist: Shannon Browne MD [BUTCH]   4156 Work-up here shows no acute traumatic injuries. Patient has significant chronic degenerative changes in lumbar spine but appears stable. Laboratory evaluation shows no evidence of underlying infection, dehydration, significant electrolyte derangements, significant anemia, or other abnormalities to of cause generalized weakness. Pending urinalysis still. Plan to ambulate patient and if she does okay discharge home with outpatient follow-up as needed. [BUTCH]      ED Course User Index  [BUTCH] Inés Kennedy MD     Evaluation and work-up here revealed no signs of emergent or life-threatening pathology that would necessitate admission for further work-up or management at this time. Patient is felt to be stable for discharge home with return precautions for worsening of the condition or development of new concerning symptoms. Patient was encouraged to follow-up with their primary care doctor in the appropriate timeframe. Necessary prescriptions and information have been provided for treatment at home. Patient voices understanding and agreement with the plan. CONSULTS:  None    PROCEDURES:  Unless otherwise notedbelow, none     Procedures    FINAL IMPRESSION     1. Fall from standing, initial encounter    2. General weakness    3. Acute exacerbation of chronic low back pain    4.  History of fusion of cervical spine DISPOSITION/PLAN   DISPOSITION Decision To Discharge 03/04/2022 06:37:52 AM      PATIENT REFERRED TO:  [address] [place] [address] EMERGENCY DEPT  [facility]  [phone]    If symptoms worsen    Yoni Beaver MD  [address] [phone]      As needed      DISCHARGE MEDICATIONS:  New Prescriptions    No medications on file          (Please note that portions of this note were completed with a voice recognition program.  Efforts were made to edit the dictations butoccasionally words are mis-transcribed.)    Joi Rouse MD (electronically signed)  Emergency Physician          Joi Raymond MD  03/04/22 2705

## 2022-03-24 RX ORDER — LEVETIRACETAM 250 MG/1
250 TABLET ORAL 2 TIMES DAILY
Qty: 60 TABLET | Refills: 2 | Status: SHIPPED | OUTPATIENT
Start: 2022-03-24 | End: 2022-05-04

## 2022-03-24 NOTE — TELEPHONE ENCOUNTER
Requested Prescriptions     Pending Prescriptions Disp Refills    levETIRAcetam (KEPPRA) 250 MG tablet 60 tablet 2     Sig: Take 1 tablet by mouth 2 times daily       Last Office Visit: 12/23/2021  Next Office Visit: 4/4/2022  Last Medication Refill: 12/23/21 with 2 refills

## 2022-04-04 ENCOUNTER — OFFICE VISIT (OUTPATIENT)
Dept: NEUROLOGY | Age: 77
End: 2022-04-04
Payer: MEDICARE

## 2022-04-04 VITALS
BODY MASS INDEX: 26.82 KG/M2 | HEART RATE: 73 BPM | HEIGHT: 65 IN | WEIGHT: 161 LBS | DIASTOLIC BLOOD PRESSURE: 71 MMHG | SYSTOLIC BLOOD PRESSURE: 139 MMHG

## 2022-04-04 DIAGNOSIS — G40.909 SEIZURE DISORDER (HCC): Primary | ICD-10-CM

## 2022-04-04 DIAGNOSIS — R27.0 ATAXIA: ICD-10-CM

## 2022-04-04 DIAGNOSIS — R41.3 MEMORY LOSS: ICD-10-CM

## 2022-04-04 PROCEDURE — G8399 PT W/DXA RESULTS DOCUMENT: HCPCS | Performed by: PSYCHIATRY & NEUROLOGY

## 2022-04-04 PROCEDURE — 1123F ACP DISCUSS/DSCN MKR DOCD: CPT | Performed by: PSYCHIATRY & NEUROLOGY

## 2022-04-04 PROCEDURE — 99214 OFFICE O/P EST MOD 30 MIN: CPT | Performed by: PSYCHIATRY & NEUROLOGY

## 2022-04-04 PROCEDURE — 1090F PRES/ABSN URINE INCON ASSESS: CPT | Performed by: PSYCHIATRY & NEUROLOGY

## 2022-04-04 PROCEDURE — 1036F TOBACCO NON-USER: CPT | Performed by: PSYCHIATRY & NEUROLOGY

## 2022-04-04 PROCEDURE — 4040F PNEUMOC VAC/ADMIN/RCVD: CPT | Performed by: PSYCHIATRY & NEUROLOGY

## 2022-04-04 PROCEDURE — G8427 DOCREV CUR MEDS BY ELIG CLIN: HCPCS | Performed by: PSYCHIATRY & NEUROLOGY

## 2022-04-04 PROCEDURE — G8417 CALC BMI ABV UP PARAM F/U: HCPCS | Performed by: PSYCHIATRY & NEUROLOGY

## 2022-04-04 RX ORDER — CLONAZEPAM 0.5 MG/1
TABLET ORAL
COMMUNITY
Start: 2022-03-28

## 2022-04-04 RX ORDER — LEVETIRACETAM 1000 MG/1
TABLET ORAL
Qty: 270 TABLET | Refills: 5 | Status: SHIPPED | OUTPATIENT
Start: 2022-04-04

## 2022-04-04 NOTE — PROGRESS NOTES

## 2022-04-04 NOTE — PROGRESS NOTES
Chief Complaint   Patient presents with    Seizures     pt states she has been having seizures recently, she states they are increasing      Memory Loss       Leslye Hooper is a 68y.o. year old female who is seen for evaluation of her seizure disorder. I have seen her in the past for poor balance and memory as well. She has had a few normal EEGs. I had her up in the rehab unit a couple of years ago for cervical and lumbar fusions and she had a seizure while up there. Tramadol was discontinued. Keppra changed to 1000 mg twice a day. She was readmitted to the hospital last year with a breakthrough seizure along with some colitis. She saw Dr. Carlo Headley . It was felt that she was not absorbing her Keppra at that time. Her work-up including EEG was unremarkable at that time. In the past she was noted to have some orthostatic hypotension along with word finding difficulties and balance issues. The patient tells me that several times a week she will have what she calls a \"mini seizure\". On her last visit 12/21 her Keppra was increased to 1250 mg twice daily. She now says that for the past couple of weeks she has been having daily spells where as she awakens she will feel a code chill and her muscles will tighten up in her chest and arms and she will have a headache. Symptoms lasted about 10 minutes and then she will have some trembles for a few hours. She currently takes Klonopin 0.5 mg once to twice a day as well.   Active Ambulatory Problems     Diagnosis Date Noted    Chest pain     Osteoarthritis     GERD (gastroesophageal reflux disease)     Asthma     Glucose intolerance (impaired glucose tolerance)     Depression with anxiety     CAD (coronary artery disease)     Hyperlipidemia     Hypertension     Fatigue 02/25/2016    S/P right coronary artery (RCA) stent placement 02/25/2016    Hx of CABG 02/25/2016    Chest pressure     Coronary artery disease of native artery of native heart with stable angina pectoris ([address]     Malaise and fatigue 10/06/2017    Aphasia 11/29/2017    Ataxia 11/29/2017    Vertigo 11/29/2017    Bilateral carotid artery stenosis 12/28/2017    Chest discomfort 01/10/2019    Unstable angina (HCC)     Acute exacerbation of chronic low back pain 05/28/2020    Intractable back pain 05/29/2020    Altered mental status 06/10/2020    AMS (altered mental status) 06/11/2020    Seizure ([address]     Spondylolisthesis at L4-L5 level     Atherosclerotic cardiovascular disease     Palliative care patient 07/22/2020    Subarachnoid hemorrhage ([address] 10/24/2020    SLE (systemic lupus erythematosus) ([address] 10/25/2020    Subarachnoid hemorrhage following injury, no loss of consciousness ([address] 10/25/2020    Intracranial bleed ([address] 10/27/2020    Post-traumatic headache 10/27/2020    Acute cystitis without hematuria 10/28/2020    Status epilepticus ([address] 06/13/2021    Colitis 11/13/2021    Chronic epigastric pain 12/15/2021    Nausea 12/15/2021    LLQ abdominal pain 12/15/2021    Vascular dementia without behavioral disturbance ([address] 12/23/2021     Resolved Ambulatory Problems     Diagnosis Date Noted    No Resolved Ambulatory Problems     Past Medical History:   Diagnosis Date    Diabetes mellitus ([address]     Lupus (systemic lupus erythematosus) ([address]     Movement disorder     New onset seizure ([address]     S/P CABG x 3 03/05/2013    Subdural hematoma (HCC)        Past Surgical History:   Procedure Laterality Date    APPENDECTOMY  1965    BACK SURGERY      CARDIAC CATHETERIZATION  05/18/2011    EF 60%    CARDIAC CATHETERIZATION  01/25/2016    drug eluting stent to RCA    CARDIAC CATHETERIZATION  01/25/2016    CARDIAC CATHETERIZATION  02/19/2016    CARDIAC CATHETERIZATION  08/24/2017    cutting balloon angioplasty ot LAD    CARDIAC CATHETERIZATION  04/13/2020    TWO Stents placed    CATARACT REMOVAL      COLON SURGERY  1992    Polypectomy - 2    COLON SURGERY 2003    Polypectomy - 4    COLON SURGERY  11/2008    Polypectomy - 2    COLON SURGERY  08/2012    Polypectomy - 3    COLONOSCOPY  03/01/2017    Dr Kuldip Kaur- normal per pt recall    COLONOSCOPY  07/20/2012    polyps, diverticulosis,  4yr recall    CORONARY ANGIOPLASTY  08/2017    cutting balloon angioplasty LAD diagonal    CORONARY ANGIOPLASTY WITH STENT PLACEMENT  03/22/2007    CORONARY ANGIOPLASTY WITH STENT PLACEMENT  08/2007    CORONARY ARTERY BYPASS GRAFT  03/05/2013    PACABG X 3, LIMA-LAD, SVG0OM, SVG-DIAG, RT EVH, DR PATEL    COSMETIC SURGERY      deviated septum and rhinoplasty    CYST REMOVAL  1970    DIAGNOSTIC CARDIAC CATH LAB PROCEDURE      EYE SURGERY  03/2019    cataracts   [address]    Partial    NASAL [address] SINUS SURGERY  2003    Nasal Polyps Removed    SPINE SURGERY  03/06/2006    Lumbar Laminectomy L4&5    SPINE SURGERY  03/06/2006    Cervical Fusion - C4,5,6    TONSILLECTOMY  1968    UPPER GASTROINTESTINAL ENDOSCOPY  09/20/2006       Family History   Problem Relation Age of Onset    Heart Disease Mother     High Blood Pressure Mother     Diabetes Mother     High Cholesterol Mother     Colon Polyps Mother     Diabetes Brother     Colon Polyps Maternal Grandmother     Colon Polyps Maternal Grandfather     Colon Cancer Neg Hx     Esophageal Cancer Neg Hx     Liver Cancer Neg Hx        Allergies   Allergen Reactions    Other      Other reaction(s): Nausea And Vomiting  narcotics    Nexium [Esomeprazole] Nausea Only     Stomach pain    Codeine Nausea Only    Darvocet [Propoxyphene N-Acetaminophen] Nausea Only    Hydrocodone-Acetaminophen Nausea Only    Lyrica [Pregabalin] Other (See Comments)     Tardive dyskinesia    Morphine Nausea Only    Percocet [Oxycodone-Acetaminophen] Nausea Only       Social History     Socioeconomic History    Marital status:       Spouse name: Not on file    Number of children: 2    Years of education: Not on file    Highest education level: Not on file   Occupational History    Not on file   Tobacco Use    Smoking status: Former Smoker     Packs/day: 1.00     Years: 1.00     Pack years: 1.00     Types: Cigarettes     Quit date: 1967     Years since quittin.1    Smokeless tobacco: Never Used   Vaping Use    Vaping Use: Never used   Substance and Sexual Activity    Alcohol use: No    Drug use: No    Sexual activity: Not Currently   Other Topics Concern    Not on file   Social History Narrative    Not on file     Social Determinants of Health     Financial Resource Strain:     Difficulty of Paying Living Expenses: Not on file   Food Insecurity:     Worried About Running Out of Food in the Last Year: Not on file    Yu of Food in the Last Year: Not on file   Transportation Needs:     Lack of Transportation (Medical): Not on file    Lack of Transportation (Non-Medical): Not on file   Physical Activity:     Days of Exercise per Week: Not on file    Minutes of Exercise per Session: Not on file   Stress:     Feeling of Stress : Not on file   Social Connections:     Frequency of Communication with Friends and Family: Not on file    Frequency of Social Gatherings with Friends and Family: Not on file    Attends Spiritism Services: Not on file    Active Member of Clubs or Organizations: Not on file    Attends Club or Organization Meetings: Not on file    Marital Status: Not on file   Intimate Partner Violence:     Fear of Current or Ex-Partner: Not on file    Emotionally Abused: Not on file    Physically Abused: Not on file    Sexually Abused: Not on file   Housing Stability:     Unable to Pay for Housing in the Last Year: Not on file    Number of [place] in the Last Year: Not on file    Unstable Housing in the Last Year: Not on file       Review of Systems    Constitutional - No fever or chills.   No diaphoresis or significant Take 1 tablet by mouth daily 30 tablet 3    metoprolol succinate (TOPROL XL) 50 MG extended release tablet Take 1 tablet by mouth daily 30 tablet 3    pantoprazole (PROTONIX) 40 MG tablet Take 1 tablet by mouth every morning (before breakfast) 30 tablet 3    nitroGLYCERIN (NITROSTAT) 0.4 MG SL tablet up to max of 3 total doses. If no relief after 1 dose, call 911. 30 tablet 3    albuterol (PROVENTIL HFA;VENTOLIN HFA) 108 (90 BASE) MCG/ACT inhaler Inhale 2 puffs into the lungs every 6 hours as needed. No current facility-administered medications for this visit.        Outpatient Medications Marked as Taking for the 4/4/22 encounter (Office Visit) with Agnieszka Beard MD   Medication Sig Dispense Refill    clonazePAM (KLONOPIN) 0.5 MG tablet TAKE 1 TABLET BY MOUTH TWICE DAILY      levETIRAcetam (KEPPRA) 1000 MG tablet Take 1.5 tabs twice a day 270 tablet 5    levETIRAcetam (KEPPRA) 250 MG tablet Take 1 tablet by mouth 2 times daily 60 tablet 2    aspirin 81 MG EC tablet Take 81 mg by mouth daily      amLODIPine (NORVASC) 2.5 MG tablet Take 1 tablet by mouth 2 times daily 60 tablet 5    promethazine (PHENERGAN) 12.5 MG tablet Take 1 tablet by mouth every 6 hours as needed for Nausea 30 tablet 5    isosorbide mononitrate (IMDUR) 60 MG extended release tablet TAKE 1 TABLET TWICE A  tablet 3    ranolazine (RANEXA) 1000 MG extended release tablet TAKE 1 TABLET TWICE A  tablet 3    amitriptyline (ELAVIL) 50 MG tablet Take 50 mg by mouth nightly      diphenhydrAMINE (BENADRYL) 25 MG tablet Take 25 mg by mouth every 6 hours as needed for Itching      DULoxetine (CYMBALTA) 60 MG extended release capsule Take 60 mg by mouth every evening 5 pm      tiZANidine (ZANAFLEX) 4 MG tablet Take 4 mg by mouth nightly as needed 1/2 tablet at night      atorvastatin (LIPITOR) 80 MG tablet Take 1 tablet by mouth nightly (Patient taking differently: Take 80 mg by mouth nightly 5 pm) 30 tablet 0    losartan (COZAAR) 100 MG tablet Take 1 tablet by mouth daily 30 tablet 3    metoprolol succinate (TOPROL XL) 50 MG extended release tablet Take 1 tablet by mouth daily 30 tablet 3    pantoprazole (PROTONIX) 40 MG tablet Take 1 tablet by mouth every morning (before breakfast) 30 tablet 3    nitroGLYCERIN (NITROSTAT) 0.4 MG SL tablet up to max of 3 total doses. If no relief after 1 dose, call 911. 30 tablet 3    albuterol (PROVENTIL HFA;VENTOLIN HFA) 108 (90 BASE) MCG/ACT inhaler Inhale 2 puffs into the lungs every 6 hours as needed. /71   Pulse 73   Ht 5' 5\" (1.651 m)   Wt 161 lb (73 kg)   BMI 26.79 kg/m²       Constitutional - well developed, well nourished. Eyes - conjunctiva normal.  Pupils react to light  Ear, nose, throat -hearing intact to finger rub No scars, masses, or lesions over external nose or ears, no atrophy of tongue  Neck-symmetric, no masses noted, no jugular vein distension. No bruits noted. Respiration- chest wall appears symmetric, good expansion,   normal effort without use of accessory muscles  Cardiovascular- RRR  Musculoskeletal - no significant wasting of muscles noted, no bony deformities, gait no gross ataxia  Extremities-no clubbing, cyanosis or edema  Skin - warm, dry, and intact. No rash, erythema, or pallor. Psychiatric - mood, affect, and behavior appear normal.      Neurological exam  Awake, alert, fluent oriented x 3 appropriate affect  Attention and concentration appear appropriate  Recent and remote memory appears unremarkable  Speech normal without dysarthria  No clear issues with language of fund of knowledge    Cranial Nerve Exam   CN II- Visual fields grossly unremarkable. VA adequate.   CN III, IV,VI- PERRLA, EOMI, No nystagmus, conjugate eye movements, no ptosis  CN VII-no facial asymmetry  CN VIII-Hearing intact   CN IX and X- Palate elevates in midline  CN XI-good shoulder shrug  CN XII-Tongue midline with no fasciculations or fibrillations    Motor Exam  V/V throughout upper and lower extremities bilaterally, no cogwheeling, normal tone      Reflexes   Absent throughout    Tremors- no tremors in hands or head noted    Gait  Uses a Rollator    Coordination  Finger to nose and QUIRINO-unremarkable    Lab Results   Component Value Date    [id number] 08/27/2021     Lab Results   Component Value Date    WBC 4.0 (L) 03/04/2022    HGB 11.2 (L) 03/04/2022    HCT 36.0 (L) 03/04/2022    .0 (H) 03/04/2022     (L) 03/04/2022     Lab Results   Component Value Date     03/04/2022    K 4.4 03/04/2022     03/04/2022    CO2 21 (L) 03/04/2022    BUN 22 03/04/2022    CREATININE 0.8 03/04/2022    GLUCOSE 165 (H) 03/04/2022    CALCIUM 9.6 03/04/2022    PROT 6.4 (L) 03/04/2022    LABALBU 4.7 03/04/2022    BILITOT 0.4 03/04/2022    ALKPHOS 63 03/04/2022    AST 16 03/04/2022    ALT 19 03/04/2022    LABGLOM >60 03/04/2022    GFRAA >59 03/04/2022    GLOB 1.8 11/19/2016           Assessment    ICD-10-CM    1. Seizure disorder ([facility] [address]  G40.909    2. Memory loss  R41.3    3. Ataxia  R27.0      May be having breakthrough seizures on Keppra 1250 mg twice a daily. Unclear if these episodes are seizures or not. I have recommended increasing her Keppra to 1500 mg twice a day if tolerated. We may have to add another medicine such as zonisamide in the near future. Memory loss and ataxia are stable. Plan      Pt warned of potential side effects of medication changes/new medicines. They are to call for new or ongoing difficulties. Return in about 4 weeks (around 5/2/2022).     (Please note that portions of this note were completed with a voice recognition program. Efforts were made to edit the dictations but occasionally words are mis-transcribed.)

## 2022-04-06 ENCOUNTER — TELEPHONE (OUTPATIENT)
Dept: CARDIOLOGY CLINIC | Age: 77
End: 2022-04-06

## 2022-04-07 ENCOUNTER — OFFICE VISIT (OUTPATIENT)
Dept: CARDIOLOGY CLINIC | Age: 77
End: 2022-04-07
Payer: MEDICARE

## 2022-04-07 VITALS
WEIGHT: 164 LBS | SYSTOLIC BLOOD PRESSURE: 128 MMHG | BODY MASS INDEX: 30.96 KG/M2 | DIASTOLIC BLOOD PRESSURE: 80 MMHG | HEIGHT: 61 IN | HEART RATE: 69 BPM

## 2022-04-07 DIAGNOSIS — E78.5 HYPERLIPIDEMIA, UNSPECIFIED HYPERLIPIDEMIA TYPE: ICD-10-CM

## 2022-04-07 DIAGNOSIS — I25.118 CORONARY ARTERY DISEASE OF NATIVE ARTERY OF NATIVE HEART WITH STABLE ANGINA PECTORIS (HCC): Primary | ICD-10-CM

## 2022-04-07 DIAGNOSIS — I10 ESSENTIAL HYPERTENSION: ICD-10-CM

## 2022-04-07 DIAGNOSIS — R06.09 DOE (DYSPNEA ON EXERTION): ICD-10-CM

## 2022-04-07 PROCEDURE — 1090F PRES/ABSN URINE INCON ASSESS: CPT | Performed by: CLINICAL NURSE SPECIALIST

## 2022-04-07 PROCEDURE — G8399 PT W/DXA RESULTS DOCUMENT: HCPCS | Performed by: CLINICAL NURSE SPECIALIST

## 2022-04-07 PROCEDURE — 99214 OFFICE O/P EST MOD 30 MIN: CPT | Performed by: CLINICAL NURSE SPECIALIST

## 2022-04-07 PROCEDURE — 1123F ACP DISCUSS/DSCN MKR DOCD: CPT | Performed by: CLINICAL NURSE SPECIALIST

## 2022-04-07 PROCEDURE — 4040F PNEUMOC VAC/ADMIN/RCVD: CPT | Performed by: CLINICAL NURSE SPECIALIST

## 2022-04-07 PROCEDURE — 1036F TOBACCO NON-USER: CPT | Performed by: CLINICAL NURSE SPECIALIST

## 2022-04-07 PROCEDURE — G8427 DOCREV CUR MEDS BY ELIG CLIN: HCPCS | Performed by: CLINICAL NURSE SPECIALIST

## 2022-04-07 PROCEDURE — G8417 CALC BMI ABV UP PARAM F/U: HCPCS | Performed by: CLINICAL NURSE SPECIALIST

## 2022-04-07 RX ORDER — NITROGLYCERIN 0.4 MG/1
TABLET SUBLINGUAL
Qty: 30 TABLET | Refills: 3 | Status: SHIPPED | OUTPATIENT
Start: 2022-04-07

## 2022-04-07 NOTE — PATIENT INSTRUCTIONS
Follow up in 6 mos With Dr. Jen Reed  Call with any questions or concerns  Follow up with Marco Antonio Lennon MD for non cardiac problems  Report any new problems  Cardiovascular Fitness-Exercise as tolerated. Fall precautions  Cardiac / Healthy Diet  Continue current medications as directed  Continue plan of treatment  It is always recommended that you bring your medications bottles with you to each visit - this is for your safety!

## 2022-04-07 NOTE — PROGRESS NOTES
[id number] [facility]  [address] [place] [address]  Phone: [phone]  Fax: [phone]    OFFICE VISIT:  2022    Nya Rennere - : 1945    Reason For Visit:  April Palma is a 68 y.o. female who is here for 6 Month Follow-Up (pt has soa) and Coronary Artery Disease  Coronary disease with CABG x3 in .  Underwent heart catheterization 2019 that showed    Patent LIMA-LAD, patent VG-diag, patent VG-OM, patent RCA, normal LVFX  She establish care with Dr. Luis Felipe Dinero last month.  Increasing with exertional angina  Elective heart catheterization 2020  Left main and triple vessel disease.   Patent LIMA to distal LAD.   Patent SVG to 1st diagonal.   Patent SVG to 1st obtuse marginal (small vessel)   Left main lesion feeds large 2nd obtuse marginal.   Normal LV ejection fraction.   Successful PCI to distal left main(3.5 x 12 mm resolute integrity stent)   into the ostial circumflex utilizing drug-eluting stent.   Successful PCI to proximal circumflex (3.0 x 15 mm resolute integrity)   utilizing drug-eluting stent.   Successful PCI with PTCA of ostial LAD. Recommendations if recurrent symptoms can consider ischemic evaluation of RCA    2D echo 10/14/2021 showed normal LV size and function with EF 55 to 60%. Mild apical hypokinesis. No significant valvular abnormalities    Patient had a fall with subarachnoid hemorrhage in  and subsequent seizures. Patient was hospitalized in November with recurrent seizure and colitis  Patient was seen and treated last month in the emergency room after fall    She returns today for routine follow-up. She states from a cardiac standpoint she is doing fairly well. Her biggest problem has been an increase in seizure activity and her Keppra was just uptitrated. She states she was having about 4 seizures a week but has not had one since the increase in her Keppra.   She states that starts off as a burning icy sensation in her chest causing her muscles to contract. Will last several minutes but does not lose consciousness. She said afterwards she is just tired and sore. She is trying to be careful and be a little bit more active. She is using a walker and has not had any more falls since the fall in the bathroom that took her to the emergency room      Subjective  Vaibhav Fitch denies exertional chest pain, shortness of breath, orthopnea, paroxysmal nocturnal dyspnea, syncope, presyncope, arrhythmia, edema   The patient denies numbness or weakness to suggest cerebrovascular accident or transient ischemic attack.     She follows with vascular surgery for carotid disease  Vero Wolf MD is PCP   Dr Julio Bellamy is neurologist.  Jose León has the following history as recorded in [facility]:    Patient Active Problem List    Diagnosis Date Noted    Unstable angina ([address]     Chest discomfort 01/10/2019    Coronary artery disease of native artery of native heart with stable angina pectoris ([address]     Vascular dementia without behavioral disturbance ([address] 12/23/2021    Chronic epigastric pain 12/15/2021    Nausea 12/15/2021    LLQ abdominal pain 12/15/2021    Colitis 11/13/2021    Status epilepticus ([address] 06/13/2021    Acute cystitis without hematuria 10/28/2020    Intracranial bleed ([address] 10/27/2020    Post-traumatic headache 10/27/2020    SLE (systemic lupus erythematosus) ([address] 10/25/2020    Subarachnoid hemorrhage following injury, no loss of consciousness ([address] 10/25/2020    Subarachnoid hemorrhage ([address] 10/24/2020    Palliative care patient 07/22/2020    Seizure ([address]     Spondylolisthesis at L4-L5 level     Atherosclerotic cardiovascular disease     AMS (altered mental status) 06/11/2020    Altered mental status 06/10/2020    Intractable back pain 05/29/2020    Acute exacerbation of chronic low back pain 05/28/2020    Bilateral carotid artery stenosis 12/28/2017    Aphasia 11/29/2017    Ataxia 11/29/2017    Vertigo 11/29/2017    Malaise and fatigue 10/06/2017    Chest pressure     Fatigue 02/25/2016    S/P right coronary artery (RCA) stent placement 02/25/2016    Hx of CABG 02/25/2016    Hyperlipidemia     Hypertension     CAD (coronary artery disease)     Chest pain     Osteoarthritis     GERD (gastroesophageal reflux disease)     Asthma     Glucose intolerance (impaired glucose tolerance)     Depression with anxiety      Past Medical History:   Diagnosis Date    Acute cystitis without hematuria 10/28/2020    Asthma     CAD (coronary artery disease)     Chest pain     Depression with anxiety     Diabetes mellitus (HCC)     GERD (gastroesophageal reflux disease)     Glucose intolerance (impaired glucose tolerance)     Hyperlipidemia     Cholesterol management per Lonzell Pill M.D.   Floydene Ada Hypertension     Lupus (systemic lupus erythematosus) ([facility]     Malaise and fatigue 10/6/2017    Movement disorder     Sees pain management for neck pain and previous surgery    New onset seizure ([facility]     Osteoarthritis     Palliative care patient 07/22/2020    S/P CABG x 3 03/05/2013    PACABG X 3, LIMA-LAD, SVG0OM, SVG-DIAG, RT EVH, DR PATEL    Subdural hematoma ([facility]      Past Surgical History:   Procedure Laterality Date    APPENDECTOMY  1965    BACK SURGERY      CARDIAC CATHETERIZATION  05/18/2011    EF 60%    CARDIAC CATHETERIZATION  01/25/2016    drug eluting stent to RCA   [address]  01/25/2016    CARDIAC CATHETERIZATION  02/19/2016    CARDIAC CATHETERIZATION  08/24/2017    cutting balloon angioplasty ot LAD    CARDIAC CATHETERIZATION  04/13/2020    TWO Stents placed    CATARACT REMOVAL      COLON SURGERY  1992    Polypectomy - 2    COLON SURGERY  2003    Polypectomy - 4    COLON SURGERY  11/2008    Polypectomy - 2    COLON SURGERY  08/2012    Polypectomy - 3    COLONOSCOPY  03/01/2017    Dr Darrius Miller- normal per pt recall    COLONOSCOPY  07/20/2012    polyps, diverticulosis,  4yr recall    CORONARY ANGIOPLASTY  2017    cutting balloon angioplasty LAD diagonal    CORONARY ANGIOPLASTY WITH STENT PLACEMENT  2007    CORONARY ANGIOPLASTY WITH STENT PLACEMENT  2007    CORONARY ARTERY BYPASS GRAFT  2013    PACABG X 3, LIMA-LAD, SVG0OM, SVG-DIAG, RT EVH, DR PATEL    COSMETIC SURGERY      deviated septum and rhinoplasty    CYST REMOVAL  1970    DIAGNOSTIC CARDIAC CATH LAB PROCEDURE      EYE SURGERY  2019    cataracts   [address]    Partial    NASAL SEPTUM [address] SINUS SURGERY      Nasal Polyps Removed    SPINE SURGERY  2006    Lumbar Laminectomy L4&5    SPINE SURGERY  2006    Cervical Fusion - C4,5,6    TONSILLECTOMY  1968    UPPER GASTROINTESTINAL ENDOSCOPY  2006     Family History   Problem Relation Age of Onset    Heart Disease Mother     High Blood Pressure Mother     Diabetes Mother     High Cholesterol Mother     Colon Polyps Mother     Diabetes Brother     Colon Polyps Maternal Grandmother     Colon Polyps Maternal Grandfather     Colon Cancer Neg Hx     Esophageal Cancer Neg Hx     Liver Cancer Neg Hx      Social History     Tobacco Use    Smoking status: Former Smoker     Packs/day: 1.00     Years: 1.00     Pack years: 1.00     Types: Cigarettes     Quit date: 1967     Years since quittin.1    Smokeless tobacco: Never Used   Substance Use Topics    Alcohol use: No      Current Outpatient Medications   Medication Sig Dispense Refill    nitroGLYCERIN (NITROSTAT) 0.4 MG SL tablet up to max of 3 total doses.  If no relief after 1 dose, call 911. 30 tablet 3    clonazePAM (KLONOPIN) 0.5 MG tablet TAKE 1 TABLET BY MOUTH TWICE DAILY      levETIRAcetam (KEPPRA) 1000 MG tablet Take 1.5 tabs twice a day 270 tablet 5    aspirin 81 MG EC tablet Take 81 mg by mouth daily      amLODIPine (NORVASC) 2.5 MG tablet Take 1 tablet by mouth 2 times daily 60 tablet 5    promethazine (PHENERGAN) 12.5 MG tablet Take 1 tablet by mouth every 6 hours as needed for Nausea 30 tablet 5    isosorbide mononitrate (IMDUR) 60 MG extended release tablet TAKE 1 TABLET TWICE A  tablet 3    ranolazine (RANEXA) 1000 MG extended release tablet TAKE 1 TABLET TWICE A  tablet 3    amitriptyline (ELAVIL) 50 MG tablet Take 50 mg by mouth nightly      diphenhydrAMINE (BENADRYL) 25 MG tablet Take 25 mg by mouth every 6 hours as needed for Itching      DULoxetine (CYMBALTA) 60 MG extended release capsule Take 60 mg by mouth every evening 5 pm      tiZANidine (ZANAFLEX) 4 MG tablet Take 4 mg by mouth nightly as needed 1/2 tablet at night      atorvastatin (LIPITOR) 80 MG tablet Take 1 tablet by mouth nightly (Patient taking differently: Take 80 mg by mouth nightly 5 pm) 30 tablet 0    losartan (COZAAR) 100 MG tablet Take 1 tablet by mouth daily 30 tablet 3    metoprolol succinate (TOPROL XL) 50 MG extended release tablet Take 1 tablet by mouth daily 30 tablet 3    pantoprazole (PROTONIX) 40 MG tablet Take 1 tablet by mouth every morning (before breakfast) 30 tablet 3    albuterol (PROVENTIL HFA;VENTOLIN HFA) 108 (90 BASE) MCG/ACT inhaler Inhale 2 puffs into the lungs every 6 hours as needed.  levETIRAcetam (KEPPRA) 250 MG tablet Take 1 tablet by mouth 2 times daily (Patient not taking: Reported on 4/7/2022) 60 tablet 2     No current facility-administered medications for this visit. Allergies: Other, Nexium [esomeprazole], Codeine, Darvocet [propoxyphene n-acetaminophen], Hydrocodone-acetaminophen, Lyrica [pregabalin], Morphine, and Percocet [oxycodone-acetaminophen]    Review of Systems  Constitutional - no significant activity change, appetite change, or unexpected weight change. No fever, chills or diaphoresis. No fatigue. HEENT - no significant rhinorrhea or epistaxis. No tinnitus or significant hearing loss. Eyes - no sudden vision change or amaurosis.    Respiratory - no significant wheezing, stridor, apnea or cough. No dyspnea on exertion or shortness of breath. Cardiovascular - no exertional chest pain, orthopnea or PND. No sensation of arrhythmia or slow heart rate. No claudication or leg edema. Gastrointestinal - no abdominal swelling or pain. No blood in stool. No severe constipation, diarrhea, nausea, or vomiting. Genitourinary - no difficulty urinating, dysuria, frequency, or urgency. No flank pain or hematuria. Musculoskeletal - no back pain, gait disturbance, or myalgia. Skin - no color change or rash. No pallor. No new surgical incision. Neurologic - no speech difficulty, facial asymmetry or lateralizing weakness. + seizures, no presyncope, syncope, or significant dizziness. Hematologic - no easy bruising or excessive bleeding. Psychiatric - no severe anxiety or insomnia. No confusion. All other review of systems are negative. Objective  Vital Signs - /80   Pulse 69   Ht 5' 1\" (1.549 m)   Wt 164 lb (74.4 kg)   BMI 30.99 kg/m²   General - Jo Moore is alert, cooperative, and pleasant. Well groomed. No acute distress. Body habitus is normal.  HEENT - The head is normocephalic. No circumoral cyanosis. Dentition is normal.   EYES -  No Xanthelasma, no arcus senilis, no conjunctival hemorrhages or discharge. Neck - Supple, without increased jugular venous pressures. No carotid bruits. No mass. Respiratory - Lungs are clear bilaterally. No wheezes or rales. Normal effort without use of accessory muscles. Cardiovascular - Heart has regular rhythm and rate. No murmurs, rubs or gallops. + pedal pulses and no varicosities. Abdominal -  Soft, nontender, nondistended. Bowel sounds are intact. Extremities - No clubbing, cyanosis, or  edema. Musculoskeletal -  No clubbing . No Osler's nodes. Gait -slow and cautious using walker. No kyphosis or scoliosis. Skin -  no statis ulcers or dermatitis.   Neurological - No focal signs are identified. Oriented to person, place and time. Psychiatric -  Appropriate affect and mood. Assessment:     Diagnosis Orders   1. Coronary artery disease of native artery of native heart with stable angina pectoris ([address]     2. Essential hypertension     3. MAXWELL (dyspnea on exertion)     4. Hyperlipidemia, unspecified hyperlipidemia type       Data:  BP Readings from Last 3 Encounters:   04/07/22 128/80   04/04/22 139/71   03/04/22 120/60    Pulse Readings from Last 3 Encounters:   04/07/22 69   04/04/22 73   03/04/22 62        Wt Readings from Last 3 Encounters:   04/07/22 164 lb (74.4 kg)   04/04/22 161 lb (73 kg)   03/04/22 160 lb (72.6 kg)     Blood pressure heart rate are controlled. Medical management includes beta-blocker, ARB and CCB. Remains on long-acting nitrate and Ranexa  On statin and aspirin   Reviewed  recent notes   Reviewed recent labs    From a cardiac standpoint she is doing well. Unfortunately she has had increase in her seizure activity but is hopefully improving with the recent increase of her Keppra. Has follow-up with neurology next month. We discussed keeping activity safely as much as possible. She is using her walker. She has family members that live with her and her daughter is a nurse. States taking medications as prescribed  Stable cardiovascular status. No evidence of overt heart failure, angina or dysrhythmia. 30 minutes were spent preparing, reviewing and seeing patient. All questions answered    Plan    Follow up in 6 mos With Dr. Philippe Sheldon  Call with any questions or concerns  Follow up with Wendy Berry MD for non cardiac problems  Report any new problems  Cardiovascular Fitness-Exercise as tolerated. Fall precautions  Cardiac / Healthy Diet  Continue current medications as directed  Continue plan of treatment  It is always recommended that you bring your medications bottles with you to each visit - this is for your safety!        Haroon Bowman APRN    EMR dragon/transcription disclaimer: Much of this encounter note is electronic transcription/translation of spoken language to printed tach. Electronic translation of spoken language may be erroneous, or at times, nonsensical words or phrases may be inadvertently transcribed.  Although, I have reviewed the note for such errors, some may still exist.

## 2022-04-13 RX ORDER — AMLODIPINE BESYLATE 2.5 MG/1
TABLET ORAL
Qty: 60 TABLET | Refills: 5 | Status: SHIPPED | OUTPATIENT
Start: 2022-04-13

## 2022-04-19 ENCOUNTER — HOSPITAL ENCOUNTER (OUTPATIENT)
Dept: PAIN MANAGEMENT | Age: 77
Discharge: HOME | End: 2022-04-19
Payer: MEDICARE

## 2022-04-19 VITALS
RESPIRATION RATE: 20 BRPM | DIASTOLIC BLOOD PRESSURE: 73 MMHG | TEMPERATURE: 96.4 F | OXYGEN SATURATION: 99 % | SYSTOLIC BLOOD PRESSURE: 148 MMHG | HEART RATE: 65 BPM

## 2022-04-19 DIAGNOSIS — R52 PAIN MANAGEMENT: ICD-10-CM

## 2022-04-19 PROCEDURE — 3209999900 FLUORO FOR SURGICAL PROCEDURES

## 2022-04-19 PROCEDURE — 6360000002 HC RX W HCPCS

## 2022-04-19 PROCEDURE — 64493 INJ PARAVERT F JNT L/S 1 LEV: CPT

## 2022-04-19 PROCEDURE — 2500000003 HC RX 250 WO HCPCS

## 2022-04-19 PROCEDURE — 64494 INJ PARAVERT F JNT L/S 2 LEV: CPT

## 2022-04-19 RX ORDER — LIDOCAINE HYDROCHLORIDE 10 MG/ML
19.5 INJECTION, SOLUTION EPIDURAL; INFILTRATION; INTRACAUDAL; PERINEURAL ONCE
Status: DISCONTINUED | OUTPATIENT
Start: 2022-04-19 | End: 2022-04-21 | Stop reason: HOSPADM

## 2022-04-19 RX ORDER — METHYLPREDNISOLONE ACETATE 40 MG/ML
80 INJECTION, SUSPENSION INTRA-ARTICULAR; INTRALESIONAL; INTRAMUSCULAR; SOFT TISSUE ONCE
Status: DISCONTINUED | OUTPATIENT
Start: 2022-04-19 | End: 2022-04-21 | Stop reason: HOSPADM

## 2022-04-19 RX ORDER — BUPIVACAINE HYDROCHLORIDE 5 MG/ML
4.5 INJECTION, SOLUTION EPIDURAL; INTRACAUDAL ONCE
Status: DISCONTINUED | OUTPATIENT
Start: 2022-04-19 | End: 2022-04-21 | Stop reason: HOSPADM

## 2022-04-19 ASSESSMENT — PAIN DESCRIPTION - LOCATION: LOCATION: BACK

## 2022-04-19 ASSESSMENT — PAIN - FUNCTIONAL ASSESSMENT
PAIN_FUNCTIONAL_ASSESSMENT: 0-10
PAIN_FUNCTIONAL_ASSESSMENT: PREVENTS OR INTERFERES SOME ACTIVE ACTIVITIES AND ADLS

## 2022-04-19 ASSESSMENT — PAIN SCALES - GENERAL: PAINLEVEL_OUTOF10: 8

## 2022-04-19 ASSESSMENT — PAIN DESCRIPTION - DESCRIPTORS: DESCRIPTORS: ACHING;CONSTANT

## 2022-04-19 ASSESSMENT — PAIN DESCRIPTION - PAIN TYPE: TYPE: CHRONIC PAIN

## 2022-04-19 ASSESSMENT — PAIN DESCRIPTION - ORIENTATION: ORIENTATION: LOWER

## 2022-04-19 ASSESSMENT — PAIN DESCRIPTION - FREQUENCY: FREQUENCY: CONTINUOUS

## 2022-04-19 NOTE — INTERVAL H&P NOTE
Update History & Physical    The patient's History and Physical  was reviewed with the patient and I examined the patient. There was  NO CHANGE:[id number]}. The surgical site was confirmed by the patient and me. Plan: The risks, benefits, expected outcome, and alternative to the recommended procedure have been discussed with the patient. Patient understands and wants to proceed with the procedure.      Electronically signed by Delaney Kunz MD on 4/19/2022 at 11:47 AM

## 2022-04-26 ENCOUNTER — TELEPHONE (OUTPATIENT)
Dept: PAIN MANAGEMENT | Age: 77
End: 2022-04-26

## 2022-05-02 ENCOUNTER — TELEPHONE (OUTPATIENT)
Dept: GASTROENTEROLOGY | Facility: CLINIC | Age: 77
End: 2022-05-02

## 2022-05-04 ENCOUNTER — OFFICE VISIT (OUTPATIENT)
Dept: NEUROLOGY | Age: 77
End: 2022-05-04
Payer: MEDICARE

## 2022-05-04 VITALS
DIASTOLIC BLOOD PRESSURE: 65 MMHG | HEIGHT: 65 IN | HEART RATE: 67 BPM | SYSTOLIC BLOOD PRESSURE: 136 MMHG | RESPIRATION RATE: 16 BRPM | WEIGHT: 161 LBS | BODY MASS INDEX: 26.82 KG/M2

## 2022-05-04 DIAGNOSIS — G40.909 SEIZURE DISORDER (HCC): Primary | ICD-10-CM

## 2022-05-04 DIAGNOSIS — R27.0 ATAXIA: ICD-10-CM

## 2022-05-04 DIAGNOSIS — R41.3 MEMORY LOSS: ICD-10-CM

## 2022-05-04 PROCEDURE — 1090F PRES/ABSN URINE INCON ASSESS: CPT | Performed by: PSYCHIATRY & NEUROLOGY

## 2022-05-04 PROCEDURE — 1123F ACP DISCUSS/DSCN MKR DOCD: CPT | Performed by: PSYCHIATRY & NEUROLOGY

## 2022-05-04 PROCEDURE — 99214 OFFICE O/P EST MOD 30 MIN: CPT | Performed by: PSYCHIATRY & NEUROLOGY

## 2022-05-04 PROCEDURE — G8427 DOCREV CUR MEDS BY ELIG CLIN: HCPCS | Performed by: PSYCHIATRY & NEUROLOGY

## 2022-05-04 PROCEDURE — G8417 CALC BMI ABV UP PARAM F/U: HCPCS | Performed by: PSYCHIATRY & NEUROLOGY

## 2022-05-04 PROCEDURE — 1036F TOBACCO NON-USER: CPT | Performed by: PSYCHIATRY & NEUROLOGY

## 2022-05-04 PROCEDURE — 4040F PNEUMOC VAC/ADMIN/RCVD: CPT | Performed by: PSYCHIATRY & NEUROLOGY

## 2022-05-04 PROCEDURE — G8399 PT W/DXA RESULTS DOCUMENT: HCPCS | Performed by: PSYCHIATRY & NEUROLOGY

## 2022-05-04 NOTE — PROGRESS NOTES
REVIEW OF SYSTEMS    Constitutional: []Fever []Sweats []Chills [] Recent Injury   [x] Denies all unless marked  HENT:[]Headache  [] Head Injury  [] Sore Throat  [] Ear Pain  [] Dizziness [] Hearing Loss   [x] Denies all unless marked  Musculoskeletal: [] Arthralgia  [] Myalgias [] Muscle cramps  [] Muscle twitches   [x] Denies all unless marked   Spine:  [] Neck pain  [] Back pain  [] Sciaticia  [x] Denies all unless marked  Neurological:[] Visual Disturbance [] Double Vision [] Slurred Speech [] Trouble swallowing  [] Vertigo [] Tingling [] Numbness [] Weakness [] Loss of Balance   [] Loss of Consciousness [] Memory Loss [x] Seizures  [] Denies all unless marked  Psychiatric/Behavioral:[] Depression [] Anxiety  [x] Denies all unless marked  Sleep: []  Insomnia [] Sleep Disturbance [] Snoring [] Restless Legs [] Daytime Sleepiness [] Sleep Apnea  [x] Denies all unless marked

## 2022-05-04 NOTE — PROGRESS NOTES
Chief Complaint   Patient presents with    Follow-up    Seizures       Tiago Forbes is a 68y.o. year old female who is seen for evaluation of her seizure disorder. I have seen her in the past for poor balance and memory as well. She has had a few normal EEGs. I had her up in the rehab unit a couple of years ago for cervical and lumbar fusions and she had a seizure while up there. Tramadol was discontinued. Keppra changed to 1000 mg twice a day. She was readmitted to the hospital last year with a breakthrough seizure along with some colitis. She saw Dr. Khushbu Upton . It was felt that she was not absorbing her Keppra at that time. Her work-up including EEG was unremarkable at that time. In the past she was noted to have some orthostatic hypotension along with word finding difficulties and balance issues. The patient tells me that several times a week she will have what she calls a \"mini seizure\". On her  visit 12/21 her Keppra was increased to 1250 mg twice daily. On her last visit a month ago she said that for the past couple of weeks she had been having daily spells where as she awakens she will feel a cold chill and her muscles will tighten up in her chest and arms and she will have a headache. Symptoms lasted about 10 minutes and then she will have some trembles for a few hours. She  takes Klonopin 0.5 mg once to twice a day as well. I recommended increasing her Keppra to 1500 mg twice daily. Since doing that she is only had 3 spells which is a significant improvement. Denies any side effects from the medication. She is here again today with her daughter.   Active Ambulatory Problems     Diagnosis Date Noted    Chest pain     Osteoarthritis     GERD (gastroesophageal reflux disease)     Asthma     Glucose intolerance (impaired glucose tolerance)     Depression with anxiety     CAD (coronary artery disease)     Hyperlipidemia     Hypertension     Fatigue 02/25/2016    S/P right coronary artery (RCA) stent placement 02/25/2016    Hx of CABG 02/25/2016    Chest pressure     Coronary artery disease of native artery of native heart with stable angina pectoris ([address]     Malaise and fatigue 10/06/2017    Aphasia 11/29/2017    Ataxia 11/29/2017    Vertigo 11/29/2017    Bilateral carotid artery stenosis 12/28/2017    Chest discomfort 01/10/2019    Unstable angina (HCC)     Acute exacerbation of chronic low back pain 05/28/2020    Intractable back pain 05/29/2020    Altered mental status 06/10/2020    AMS (altered mental status) 06/11/2020    Seizure ([address]     Spondylolisthesis at L4-L5 level     Atherosclerotic cardiovascular disease     Palliative care patient 07/22/2020    Subarachnoid hemorrhage ([address] 10/24/2020    SLE (systemic lupus erythematosus) ([address] 10/25/2020    Subarachnoid hemorrhage following injury, no loss of consciousness ([address] 10/25/2020    Intracranial bleed ([address] 10/27/2020    Post-traumatic headache 10/27/2020    Acute cystitis without hematuria 10/28/2020    Status epilepticus ([address] 06/13/2021    Colitis 11/13/2021    Chronic epigastric pain 12/15/2021    Nausea 12/15/2021    LLQ abdominal pain 12/15/2021    Vascular dementia without behavioral disturbance ([address] 12/23/2021     Resolved Ambulatory Problems     Diagnosis Date Noted    No Resolved Ambulatory Problems     Past Medical History:   Diagnosis Date    Diabetes mellitus ([address]     Lupus (systemic lupus erythematosus) ([address]     Movement disorder     New onset seizure ([address]     S/P CABG x 3 03/05/2013    Subdural hematoma ([address]        Past Surgical History:   Procedure Laterality Date    APPENDECTOMY  1965    BACK SURGERY      CARDIAC CATHETERIZATION  05/18/2011    EF 60%    CARDIAC CATHETERIZATION  01/25/2016    drug eluting stent to RCA    CARDIAC CATHETERIZATION  01/25/2016    CARDIAC CATHETERIZATION  02/19/2016    CARDIAC CATHETERIZATION  08/24/2017    cutting balloon angioplasty ot LAD    CARDIAC CATHETERIZATION  04/13/2020    TWO Stents placed    CATARACT REMOVAL      COLON SURGERY  1992    Polypectomy - 2    COLON SURGERY  2003    Polypectomy - 4    COLON SURGERY  11/2008    Polypectomy - 2    COLON SURGERY  08/2012    Polypectomy - 3    COLONOSCOPY  03/01/2017    Dr Fantasma Little- normal per pt recall    COLONOSCOPY  07/20/2012    polyps, diverticulosis,  4yr recall    CORONARY ANGIOPLASTY  08/2017    cutting balloon angioplasty LAD diagonal    CORONARY ANGIOPLASTY WITH STENT PLACEMENT  03/22/2007    CORONARY ANGIOPLASTY WITH STENT PLACEMENT  08/2007    CORONARY ARTERY BYPASS GRAFT  03/05/2013    PACABG X 3, LIMA-LAD, SVG0OM, SVG-DIAG, RT EVH, DR PATEL    COSMETIC SURGERY      deviated septum and rhinoplasty    CYST REMOVAL  1970    DIAGNOSTIC CARDIAC CATH LAB PROCEDURE      EYE SURGERY  03/2019    cataracts   [address]    Partial    NASAL SEPTUM [address] SINUS SURGERY  2003    Nasal Polyps Removed    SPINE SURGERY  03/06/2006    Lumbar Laminectomy L4&5    SPINE SURGERY  03/06/2006    Cervical Fusion - C4,5,6    TONSILLECTOMY  1968    UPPER GASTROINTESTINAL ENDOSCOPY  09/20/2006       Family History   Problem Relation Age of Onset    Heart Disease Mother     High Blood Pressure Mother     Diabetes Mother     High Cholesterol Mother     Colon Polyps Mother     Diabetes Brother     Colon Polyps Maternal Grandmother     Colon Polyps Maternal Grandfather     Colon Cancer Neg Hx     Esophageal Cancer Neg Hx     Liver Cancer Neg Hx        Allergies   Allergen Reactions    Other      Other reaction(s): Nausea And Vomiting  narcotics    Nexium [Esomeprazole] Nausea Only     Stomach pain    Codeine Nausea Only    Darvocet [Propoxyphene N-Acetaminophen] Nausea Only    Hydrocodone-Acetaminophen Nausea Only    Lyrica [Pregabalin] Other (See Comments)     Tardive dyskinesia    Morphine Nausea Only    Percocet [Oxycodone-Acetaminophen] Nausea Only       Social History     Socioeconomic History    Marital status:      Spouse name: Not on file    Number of children: 2    Years of education: Not on file    Highest education level: Not on file   Occupational History    Not on file   Tobacco Use    Smoking status: Former Smoker     Packs/day: 1.00     Years: 1.00     Pack years: 1.00     Types: Cigarettes     Quit date: 1967     Years since quittin.2    Smokeless tobacco: Never Used   Vaping Use    Vaping Use: Never used   Substance and Sexual Activity    Alcohol use: No    Drug use: No    Sexual activity: Not Currently   Other Topics Concern    Not on file   Social History Narrative    Not on file     Social Determinants of Health     Financial Resource Strain:     Difficulty of Paying Living Expenses: Not on file   Food Insecurity:     Worried About Running Out of Food in the Last Year: Not on file    Yu of Food in the Last Year: Not on file   Transportation Needs:     Lack of Transportation (Medical): Not on file    Lack of Transportation (Non-Medical):  Not on file   Physical Activity:     Days of Exercise per Week: Not on file    Minutes of Exercise per Session: Not on file   Stress:     Feeling of Stress : Not on file   Social Connections:     Frequency of Communication with Friends and Family: Not on file    Frequency of Social Gatherings with Friends and Family: Not on file    Attends Alevism Services: Not on file    Active Member of Clubs or Organizations: Not on file    Attends Club or Organization Meetings: Not on file    Marital Status: Not on file   Intimate Partner Violence:     Fear of Current or Ex-Partner: Not on file    Emotionally Abused: Not on file    Physically Abused: Not on file    Sexually Abused: Not on file   Housing Stability:     Unable to Pay for Housing in the Last Year: Not on file    Number of [place] in the Last Year: Not on file    Unstable Housing in the Last Year: Not on file       Review of Systems    Constitutional - No fever or chills. No diaphoresis or significant fatigue. HENT -  No tinnitus or significant hearing loss. Eyes - no sudden vision change or eye pain  Respiratory - no significant shortness of breath or cough  Cardiovascular - no chest pain No palpitations or significant leg swelling  Gastrointestinal - no abdominal swelling or pain. Genitourinary - No difficulty urinating, dysuria  Musculoskeletal - no back pain or myalgia. Skin - no color change or rash  Neurologic - yes seizures. No lateralizing weakness. Hematologic - no easy bruising or excessive bleeding. Psychiatric - no severe anxiety or nervousness. All other review of systems are negative.          Current Outpatient Medications   Medication Sig Dispense Refill    amLODIPine (NORVASC) 2.5 MG tablet TAKE 1 TABLET BY MOUTH TWICE DAILY 60 tablet 5    nitroGLYCERIN (NITROSTAT) 0.4 MG SL tablet up to max of 3 total doses.  If no relief after 1 dose, call 911. 30 tablet 3    clonazePAM (KLONOPIN) 0.5 MG tablet TAKE 1 TABLET BY MOUTH TWICE DAILY      levETIRAcetam (KEPPRA) 1000 MG tablet Take 1.5 tabs twice a day 270 tablet 5    aspirin 81 MG EC tablet Take 81 mg by mouth daily      promethazine (PHENERGAN) 12.5 MG tablet Take 1 tablet by mouth every 6 hours as needed for Nausea 30 tablet 5    isosorbide mononitrate (IMDUR) 60 MG extended release tablet TAKE 1 TABLET TWICE A  tablet 3    ranolazine (RANEXA) 1000 MG extended release tablet TAKE 1 TABLET TWICE A  tablet 3    amitriptyline (ELAVIL) 50 MG tablet Take 50 mg by mouth nightly      diphenhydrAMINE (BENADRYL) 25 MG tablet Take 25 mg by mouth every 6 hours as needed for Itching      DULoxetine (CYMBALTA) 60 MG extended release capsule Take 60 mg by mouth every evening 5 pm      tiZANidine (ZANAFLEX) 4 MG tablet Take 4 mg by mouth 2 times daily 1/2 tablet BID      atorvastatin (LIPITOR) 80 MG tablet Take 1 tablet by mouth nightly (Patient taking differently: Take 80 mg by mouth nightly 5 pm) 30 tablet 0    losartan (COZAAR) 100 MG tablet Take 1 tablet by mouth daily 30 tablet 3    metoprolol succinate (TOPROL XL) 50 MG extended release tablet Take 1 tablet by mouth daily 30 tablet 3    pantoprazole (PROTONIX) 40 MG tablet Take 1 tablet by mouth every morning (before breakfast) 30 tablet 3    albuterol (PROVENTIL HFA;VENTOLIN HFA) 108 (90 BASE) MCG/ACT inhaler Inhale 2 puffs into the lungs every 6 hours as needed. No current facility-administered medications for this visit. Outpatient Medications Marked as Taking for the 5/4/22 encounter (Office Visit) with Ettie Simmonds, MD   Medication Sig Dispense Refill    amLODIPine (NORVASC) 2.5 MG tablet TAKE 1 TABLET BY MOUTH TWICE DAILY 60 tablet 5    nitroGLYCERIN (NITROSTAT) 0.4 MG SL tablet up to max of 3 total doses.  If no relief after 1 dose, call 911. 30 tablet 3    clonazePAM (KLONOPIN) 0.5 MG tablet TAKE 1 TABLET BY MOUTH TWICE DAILY      levETIRAcetam (KEPPRA) 1000 MG tablet Take 1.5 tabs twice a day 270 tablet 5    aspirin 81 MG EC tablet Take 81 mg by mouth daily      promethazine (PHENERGAN) 12.5 MG tablet Take 1 tablet by mouth every 6 hours as needed for Nausea 30 tablet 5    isosorbide mononitrate (IMDUR) 60 MG extended release tablet TAKE 1 TABLET TWICE A  tablet 3    ranolazine (RANEXA) 1000 MG extended release tablet TAKE 1 TABLET TWICE A  tablet 3    amitriptyline (ELAVIL) 50 MG tablet Take 50 mg by mouth nightly      diphenhydrAMINE (BENADRYL) 25 MG tablet Take 25 mg by mouth every 6 hours as needed for Itching      DULoxetine (CYMBALTA) 60 MG extended release capsule Take 60 mg by mouth every evening 5 pm      tiZANidine (ZANAFLEX) 4 MG tablet Take 4 mg by mouth 2 times daily 1/2 tablet BID      atorvastatin (LIPITOR) 80 MG tablet Take 1 tablet by mouth nightly (Patient taking differently: Take 80 mg by mouth nightly 5 pm) 30 tablet 0    losartan (COZAAR) 100 MG tablet Take 1 tablet by mouth daily 30 tablet 3    metoprolol succinate (TOPROL XL) 50 MG extended release tablet Take 1 tablet by mouth daily 30 tablet 3    pantoprazole (PROTONIX) 40 MG tablet Take 1 tablet by mouth every morning (before breakfast) 30 tablet 3    albuterol (PROVENTIL HFA;VENTOLIN HFA) 108 (90 BASE) MCG/ACT inhaler Inhale 2 puffs into the lungs every 6 hours as needed. /65   Pulse 67   Resp 16   Ht 5' 5\" (1.651 m)   Wt 161 lb (73 kg)   BMI 26.79 kg/m²       Constitutional - well developed, well nourished. Eyes - conjunctiva normal.  Pupils react to light  Ear, nose, throat -hearing intact to finger rub No scars, masses, or lesions over external nose or ears, no atrophy of tongue  Neck-symmetric, no masses noted, no jugular vein distension. No bruits noted. Respiration- chest wall appears symmetric, good expansion,   normal effort without use of accessory muscles  Cardiovascular- RRR  Musculoskeletal - no significant wasting of muscles noted, no bony deformities, gait no gross ataxia  Extremities-no clubbing, cyanosis or edema  Skin - warm, dry, and intact. No rash, erythema, or pallor. Psychiatric - mood, affect, and behavior appear normal.      Neurological exam  Awake, alert, fluent oriented x 3 appropriate affect  Attention and concentration appear appropriate  Recent and remote memory appears unremarkable  Speech normal without dysarthria  No clear issues with language of fund of knowledge    Cranial Nerve Exam   CN II- Visual fields grossly unremarkable. VA adequate.   CN III, IV,VI- PERRLA, EOMI, No nystagmus, conjugate eye movements, no ptosis  CN VII-no facial asymmetry  CN VIII-Hearing intact   CN IX and X- Palate elevates in midline  CN XI-good shoulder shrug  CN XII-Tongue midline with no fasciculations or fibrillations    Motor Exam  V/V throughout upper and lower extremities bilaterally, no cogwheeling, normal tone      Reflexes   Absent throughout    Tremors- no tremors in hands or head noted    Gait  Slightly unsteady    Coordination  Finger to nose and QUIRINO-unremarkable    Lab Results   Component Value Date    [id number] 438 08/27/2021     Lab Results   Component Value Date    WBC 4.0 (L) 03/04/2022    HGB 11.2 (L) 03/04/2022    HCT 36.0 (L) 03/04/2022    .0 (H) 03/04/2022     (L) 03/04/2022     Lab Results   Component Value Date     03/04/2022    K 4.4 03/04/2022     03/04/2022    CO2 21 (L) 03/04/2022    BUN 22 03/04/2022    CREATININE 0.8 03/04/2022    GLUCOSE 165 (H) 03/04/2022    CALCIUM 9.6 03/04/2022    PROT 6.4 (L) 03/04/2022    LABALBU 4.7 03/04/2022    BILITOT 0.4 03/04/2022    ALKPHOS 63 03/04/2022    AST 16 03/04/2022    ALT 19 03/04/2022    LABGLOM >60 03/04/2022    GFRAA >59 03/04/2022    GLOB 1.8 11/19/2016           Assessment    ICD-10-CM    1. Seizure disorder ([facility]  G40.909    2. Ataxia  R27.0    3. Memory loss  R41.3      Patient with underlying seizure disorder. Unclear if her recent episodes are true seizures or not but it seems to be responding to the higher dose of Keppra and so she will stay on that. No changes made in her medications today. Memory loss and ataxia are stable. Plan      Return in about 3 months (around 8/4/2022).     (Please note that portions of this note were completed with a voice recognition program. Efforts were made to edit the dictations but occasionally words are mis-transcribed.)

## 2022-05-09 LAB
ALBUMIN SERPL-MCNC: 4.4 G/DL (ref 3.5–5.2)
ALP BLD-CCNC: 74 U/L (ref 35–104)
ALT SERPL-CCNC: 21 U/L (ref 5–33)
ANION GAP SERPL CALCULATED.3IONS-SCNC: 12 MMOL/L (ref 7–19)
AST SERPL-CCNC: 14 U/L (ref 5–32)
BASOPHILS ABSOLUTE: 0 K/UL (ref 0–0.2)
BASOPHILS RELATIVE PERCENT: 0.5 % (ref 0–1)
BILIRUB SERPL-MCNC: 0.4 MG/DL (ref 0.2–1.2)
BUN BLDV-MCNC: 16 MG/DL (ref 8–23)
CALCIUM SERPL-MCNC: 9.4 MG/DL (ref 8.8–10.2)
CHLORIDE BLD-SCNC: 105 MMOL/L (ref 98–111)
CHOLESTEROL, TOTAL: 205 MG/DL (ref 160–199)
CO2: 22 MMOL/L (ref 22–29)
CREAT SERPL-MCNC: 0.6 MG/DL (ref 0.5–0.9)
EOSINOPHILS ABSOLUTE: 0.1 K/UL (ref 0–0.6)
EOSINOPHILS RELATIVE PERCENT: 2.5 % (ref 0–5)
GFR AFRICAN AMERICAN: >59
GFR NON-AFRICAN AMERICAN: >60
GLUCOSE BLD-MCNC: 146 MG/DL (ref 74–109)
HBA1C MFR BLD: 6.7 % (ref 4–6)
HCT VFR BLD CALC: 39.3 % (ref 37–47)
HDLC SERPL-MCNC: 66 MG/DL (ref 65–121)
HEMOGLOBIN: 12.9 G/DL (ref 12–16)
IMMATURE GRANULOCYTES #: 0 K/UL
LDL CHOLESTEROL CALCULATED: ABNORMAL MG/DL
LDL CHOLESTEROL DIRECT: 70 MG/DL
LYMPHOCYTES ABSOLUTE: 1.1 K/UL (ref 1.1–4.5)
LYMPHOCYTES RELATIVE PERCENT: 26.4 % (ref 20–40)
MCH RBC QN AUTO: 32.7 PG (ref 27–31)
MCHC RBC AUTO-ENTMCNC: 32.8 G/DL (ref 33–37)
MCV RBC AUTO: 99.5 FL (ref 81–99)
MONOCYTES ABSOLUTE: 0.4 K/UL (ref 0–0.9)
MONOCYTES RELATIVE PERCENT: 10 % (ref 0–10)
NEUTROPHILS ABSOLUTE: 2.6 K/UL (ref 1.5–7.5)
NEUTROPHILS RELATIVE PERCENT: 60.1 % (ref 50–65)
PDW BLD-RTO: 13 % (ref 11.5–14.5)
PLATELET # BLD: 142 K/UL (ref 130–400)
PMV BLD AUTO: 8.7 FL (ref 9.4–12.3)
POTASSIUM SERPL-SCNC: 4.5 MMOL/L (ref 3.5–5)
RBC # BLD: 3.95 M/UL (ref 4.2–5.4)
SODIUM BLD-SCNC: 139 MMOL/L (ref 136–145)
T4 FREE: 0.97 NG/DL (ref 0.93–1.7)
TOTAL PROTEIN: 6.4 G/DL (ref 6.6–8.7)
TRIGL SERPL-MCNC: 478 MG/DL (ref 0–149)
TSH SERPL DL<=0.05 MIU/L-ACNC: 5.53 UIU/ML (ref 0.27–4.2)
WBC # BLD: 4.3 K/UL (ref 4.8–10.8)

## 2022-07-21 RX ORDER — LEVETIRACETAM 250 MG/1
TABLET ORAL
Qty: 60 TABLET | Refills: 2 | OUTPATIENT
Start: 2022-07-21

## 2022-08-10 ENCOUNTER — OFFICE VISIT (OUTPATIENT)
Dept: NEUROLOGY | Age: 77
End: 2022-08-10
Payer: MEDICARE

## 2022-08-10 VITALS
WEIGHT: 161 LBS | DIASTOLIC BLOOD PRESSURE: 74 MMHG | SYSTOLIC BLOOD PRESSURE: 141 MMHG | HEIGHT: 65 IN | BODY MASS INDEX: 26.82 KG/M2 | HEART RATE: 66 BPM

## 2022-08-10 DIAGNOSIS — G40.909 SEIZURE DISORDER (HCC): Primary | ICD-10-CM

## 2022-08-10 DIAGNOSIS — R41.3 MEMORY LOSS: ICD-10-CM

## 2022-08-10 DIAGNOSIS — R27.0 ATAXIA: ICD-10-CM

## 2022-08-10 PROCEDURE — G8399 PT W/DXA RESULTS DOCUMENT: HCPCS | Performed by: PSYCHIATRY & NEUROLOGY

## 2022-08-10 PROCEDURE — 99214 OFFICE O/P EST MOD 30 MIN: CPT | Performed by: PSYCHIATRY & NEUROLOGY

## 2022-08-10 PROCEDURE — G8427 DOCREV CUR MEDS BY ELIG CLIN: HCPCS | Performed by: PSYCHIATRY & NEUROLOGY

## 2022-08-10 PROCEDURE — 1036F TOBACCO NON-USER: CPT | Performed by: PSYCHIATRY & NEUROLOGY

## 2022-08-10 PROCEDURE — 1090F PRES/ABSN URINE INCON ASSESS: CPT | Performed by: PSYCHIATRY & NEUROLOGY

## 2022-08-10 PROCEDURE — G8417 CALC BMI ABV UP PARAM F/U: HCPCS | Performed by: PSYCHIATRY & NEUROLOGY

## 2022-08-10 PROCEDURE — 1123F ACP DISCUSS/DSCN MKR DOCD: CPT | Performed by: PSYCHIATRY & NEUROLOGY

## 2022-08-10 NOTE — PROGRESS NOTES
Chief Complaint   Patient presents with    Seizures     Pt states her last seizure was this morning        Nazario Caballero is a 68y.o. year old female who is seen for evaluation of her seizure disorder. I have seen her in the past for poor balance and memory as well. She has had a few normal EEGs. I had her up in the rehab unit a couple of years ago for cervical and lumbar fusions and she had a seizure while up there. Tramadol was discontinued. Keppra changed to 1000 mg twice a day. She was readmitted to the hospital last year with a breakthrough seizure along with some colitis. She saw Dr. Amor Chi . It was felt that she was not absorbing her Keppra at that time. Her work-up including EEG was unremarkable at that time. In the past she was noted to have some orthostatic hypotension along with word finding difficulties and balance issues. The patient tells me that several times a week she will have what she calls a \"mini seizure\". On her  visit 12/21 her Keppra was increased to 1250 mg twice daily. On a prior visit this year she said that for the past couple of weeks she had been having daily spells where as she awakens she will feel a cold chill and her muscles will tighten up in her chest and arms and she will have a headache. Symptoms lasted about 10 minutes and then she will have some trembles for a few hours. She  takes Klonopin 0.5 mg once to twice a day as well. I recommended increasing her Keppra to 1500 mg twice daily. Following this she had a significant reduction in her episodes. Denies any side effects from the medication. She is here again today with her daughter. She had a spell this morning and a few days ago. Unclear if these are seizures. She also falls at times and has poor balance. She continues on clonazepam, Benadryl and Elavil.   Active Ambulatory Problems     Diagnosis Date Noted    Chest pain     Osteoarthritis     GERD (gastroesophageal reflux disease)     Asthma Glucose intolerance (impaired glucose tolerance)     Depression with anxiety     CAD (coronary artery disease)     Hyperlipidemia     Hypertension     Fatigue 02/25/2016    S/P right coronary artery (RCA) stent placement 02/25/2016    Hx of CABG 02/25/2016    Chest pressure     Coronary artery disease of native artery of native heart with stable angina pectoris ([address]     Malaise and fatigue 10/06/2017    Aphasia 11/29/2017    Ataxia 11/29/2017    Vertigo 11/29/2017    Bilateral carotid artery stenosis 12/28/2017    Chest discomfort 01/10/2019    Unstable angina (HCC)     Acute exacerbation of chronic low back pain 05/28/2020    Intractable back pain 05/29/2020    Altered mental status 06/10/2020    AMS (altered mental status) 06/11/2020    Seizure ([address]     Spondylolisthesis at L4-L5 level     Atherosclerotic cardiovascular disease     Palliative care patient 07/22/2020    Subarachnoid hemorrhage ([address] 10/24/2020    SLE (systemic lupus erythematosus) ([address] 10/25/2020    Subarachnoid hemorrhage following injury, no loss of consciousness ([address] 10/25/2020    Intracranial bleed ([address] 10/27/2020    Post-traumatic headache 10/27/2020    Acute cystitis without hematuria 10/28/2020    Status epilepticus ([address] 06/13/2021    Colitis 11/13/2021    Chronic epigastric pain 12/15/2021    Nausea 12/15/2021    LLQ abdominal pain 12/15/2021    Vascular dementia without behavioral disturbance ([address] 12/23/2021     Resolved Ambulatory Problems     Diagnosis Date Noted    No Resolved Ambulatory Problems     Past Medical History:   Diagnosis Date    Diabetes mellitus ([address]     Lupus (systemic lupus erythematosus) ([address]     Movement disorder     New onset seizure ([address]     S/P CABG x 3 03/05/2013    Subdural hematoma (HCC)        Past Surgical History:   Procedure Laterality Date    [facility]  05/18/2011    EF 60%    CARDIAC CATHETERIZATION  01/25/2016    drug eluting stent to RCA [Pregabalin] Other (See Comments)     Tardive dyskinesia    Morphine Nausea Only    Percocet [Oxycodone-Acetaminophen] Nausea Only       Social History     Socioeconomic History    Marital status:      Spouse name: Not on file    Number of children: 2    Years of education: Not on file    Highest education level: Not on file   Occupational History    Not on file   Tobacco Use    Smoking status: Former     Packs/day: 1.00     Years: 1.00     Pack years: 1.00     Types: Cigarettes     Quit date: 1967     Years since quittin.4    Smokeless tobacco: Never   Vaping Use    Vaping Use: Never used   Substance and Sexual Activity    Alcohol use: No    Drug use: No    Sexual activity: Not Currently   Other Topics Concern    Not on file   Social History Narrative    Not on file     Social Determinants of Health     Financial Resource Strain: Not on file   Food Insecurity: Not on file   Transportation Needs: Not on file   Physical Activity: Not on file   Stress: Not on file   Social Connections: Not on file   Intimate Partner Violence: Not on file   Housing Stability: Not on file       Review of Systems    Constitutional - No fever or chills. No diaphoresis or significant fatigue. HENT -  No tinnitus or significant hearing loss. Eyes - no sudden vision change or eye pain  Respiratory - no significant shortness of breath or cough  Cardiovascular - no chest pain No palpitations or significant leg swelling  Gastrointestinal - no abdominal swelling or pain. Genitourinary - No difficulty urinating, dysuria  Musculoskeletal - no back pain or myalgia. Skin - no color change or rash  Neurologic - yes, last seizure was 8/10/22. No lateralizing weakness. Hematologic - no easy bruising or excessive bleeding. Psychiatric - no severe anxiety or nervousness. All other review of systems are negative.        Current Outpatient Medications   Medication Sig Dispense Refill    amLODIPine (NORVASC) 2.5 MG tablet TAKE 1 TABLET BY MOUTH TWICE DAILY 60 tablet 5    nitroGLYCERIN (NITROSTAT) 0.4 MG SL tablet up to max of 3 total doses. If no relief after 1 dose, call 911. 30 tablet 3    clonazePAM (KLONOPIN) 0.5 MG tablet TAKE 1 TABLET BY MOUTH TWICE DAILY      levETIRAcetam (KEPPRA) 1000 MG tablet Take 1.5 tabs twice a day 270 tablet 5    aspirin 81 MG EC tablet Take 81 mg by mouth daily      promethazine (PHENERGAN) 12.5 MG tablet Take 1 tablet by mouth every 6 hours as needed for Nausea 30 tablet 5    isosorbide mononitrate (IMDUR) 60 MG extended release tablet TAKE 1 TABLET TWICE A  tablet 3    ranolazine (RANEXA) 1000 MG extended release tablet TAKE 1 TABLET TWICE A  tablet 3    amitriptyline (ELAVIL) 50 MG tablet Take 50 mg by mouth nightly      diphenhydrAMINE (BENADRYL) 25 MG tablet Take 25 mg by mouth every 6 hours as needed for Itching      DULoxetine (CYMBALTA) 60 MG extended release capsule Take 60 mg by mouth every evening 5 pm      tiZANidine (ZANAFLEX) 4 MG tablet Take 4 mg by mouth 2 times daily 1/2 tablet BID      atorvastatin (LIPITOR) 80 MG tablet Take 1 tablet by mouth nightly (Patient taking differently: Take 80 mg by mouth nightly 5 pm) 30 tablet 0    losartan (COZAAR) 100 MG tablet Take 1 tablet by mouth daily 30 tablet 3    metoprolol succinate (TOPROL XL) 50 MG extended release tablet Take 1 tablet by mouth daily 30 tablet 3    pantoprazole (PROTONIX) 40 MG tablet Take 1 tablet by mouth every morning (before breakfast) 30 tablet 3    albuterol (PROVENTIL HFA;VENTOLIN HFA) 108 (90 BASE) MCG/ACT inhaler Inhale 2 puffs into the lungs every 6 hours as needed. No current facility-administered medications for this visit.        Outpatient Medications Marked as Taking for the 8/10/22 encounter (Office Visit) with Jerzy Grady MD   Medication Sig Dispense Refill    amLODIPine (NORVASC) 2.5 MG tablet TAKE 1 TABLET BY MOUTH TWICE DAILY 60 tablet 5    nitroGLYCERIN (NITROSTAT) 0.4 MG SL tablet up to max of 3 total doses. If no relief after 1 dose, call 911. 30 tablet 3    clonazePAM (KLONOPIN) 0.5 MG tablet TAKE 1 TABLET BY MOUTH TWICE DAILY      levETIRAcetam (KEPPRA) 1000 MG tablet Take 1.5 tabs twice a day 270 tablet 5    aspirin 81 MG EC tablet Take 81 mg by mouth daily      promethazine (PHENERGAN) 12.5 MG tablet Take 1 tablet by mouth every 6 hours as needed for Nausea 30 tablet 5    isosorbide mononitrate (IMDUR) 60 MG extended release tablet TAKE 1 TABLET TWICE A  tablet 3    ranolazine (RANEXA) 1000 MG extended release tablet TAKE 1 TABLET TWICE A  tablet 3    amitriptyline (ELAVIL) 50 MG tablet Take 50 mg by mouth nightly      diphenhydrAMINE (BENADRYL) 25 MG tablet Take 25 mg by mouth every 6 hours as needed for Itching      DULoxetine (CYMBALTA) 60 MG extended release capsule Take 60 mg by mouth every evening 5 pm      tiZANidine (ZANAFLEX) 4 MG tablet Take 4 mg by mouth 2 times daily 1/2 tablet BID      atorvastatin (LIPITOR) 80 MG tablet Take 1 tablet by mouth nightly (Patient taking differently: Take 80 mg by mouth nightly 5 pm) 30 tablet 0    losartan (COZAAR) 100 MG tablet Take 1 tablet by mouth daily 30 tablet 3    metoprolol succinate (TOPROL XL) 50 MG extended release tablet Take 1 tablet by mouth daily 30 tablet 3    pantoprazole (PROTONIX) 40 MG tablet Take 1 tablet by mouth every morning (before breakfast) 30 tablet 3    albuterol (PROVENTIL HFA;VENTOLIN HFA) 108 (90 BASE) MCG/ACT inhaler Inhale 2 puffs into the lungs every 6 hours as needed. BP (!) 141/74   Pulse 66   Ht 5' 5\" (1.651 m)   Wt 161 lb (73 kg)   BMI 26.79 kg/m²       Constitutional - well developed, well nourished. Eyes - conjunctiva normal.  Pupils react to light  Ear, nose, throat -hearing intact to finger rub No scars, masses, or lesions over external nose or ears, no atrophy of tongue  Neck-symmetric, no masses noted, no jugular vein distension. No bruits noted.   Respiration- chest wall appears symmetric, good expansion,   normal effort without use of accessory muscles  Cardiovascular- RRR  Musculoskeletal - no significant wasting of muscles noted, no bony deformities, gait no gross ataxia  Extremities-no clubbing, cyanosis or edema  Skin - warm, dry, and intact. No rash, erythema, or pallor. Psychiatric - mood, affect, and behavior appear normal.      Neurological exam  Awake, alert, fluent oriented x 3 appropriate affect  Attention and concentration appear appropriate  Recent and remote memory appears unremarkable  Speech normal without dysarthria  No clear issues with language of fund of knowledge    Cranial Nerve Exam   CN II- Visual fields grossly unremarkable. VA adequate. CN III, IV,VI- PERRLA, EOMI, No nystagmus, conjugate eye movements, no ptosis  CN VII-no facial asymmetry  CN VIII-Hearing intact   CN IX and X- Palate elevates in midline  CN XI-good shoulder shrug  CN XII-Tongue midline with no fasciculations or fibrillations    Motor Exam  V/V throughout upper and lower extremities bilaterally, no cogwheeling, normal tone      Reflexes   Absent throughout    Tremors- no tremors in hands or head noted    Gait  Slightly unsteady    Coordination  Finger to nose and QUIRINO-unremarkable    Lab Results   Component Value Date    [id number] 08/27/2021     Lab Results   Component Value Date    WBC 4.3 (L) 05/09/2022    HGB 12.9 05/09/2022    HCT 39.3 05/09/2022    MCV 99.5 (H) 05/09/2022     05/09/2022     Lab Results   Component Value Date     05/09/2022    K 4.5 05/09/2022     05/09/2022    CO2 22 05/09/2022    BUN 16 05/09/2022    CREATININE 0.6 05/09/2022    GLUCOSE 146 (H) 05/09/2022    CALCIUM 9.4 05/09/2022    PROT 6.4 (L) 05/09/2022    LABALBU 4.4 05/09/2022    BILITOT 0.4 05/09/2022    ALKPHOS 74 05/09/2022    AST 14 05/09/2022    ALT 21 05/09/2022    LABGLOM >60 05/09/2022    GFRAA >59 05/09/2022    GLOB 1.8 11/19/2016           Assessment    ICD-10-CM    1. Seizure disorder ([facility] [address]  G40.909       2. Memory loss  R41.3       3. Ataxia  R27.0           Patient with underlying seizure disorder. Unclear if her recent episodes are true seizures or not but it seems to be responding to the higher dose of Keppra and so she will stay on that. They have been encouraged to reduce and/or discontinue clonazepam, Benadryl and Elavil when and as able. I have some suspicion that these could be contributing to some of her difficulties including her balance. Memory loss stable. Plan      No follow-ups on file.     (Please note that portions of this note were completed with a voice recognition program. Efforts were made to edit the dictations but occasionally words are mis-transcribed.)

## 2022-08-30 ENCOUNTER — HOSPITAL ENCOUNTER (OUTPATIENT)
Dept: PAIN MANAGEMENT | Age: 77
Discharge: HOME | End: 2022-08-30
Payer: MEDICARE

## 2022-08-30 VITALS
OXYGEN SATURATION: 99 % | RESPIRATION RATE: 18 BRPM | DIASTOLIC BLOOD PRESSURE: 53 MMHG | SYSTOLIC BLOOD PRESSURE: 133 MMHG | HEART RATE: 66 BPM | TEMPERATURE: 96.9 F

## 2022-08-30 DIAGNOSIS — R52 PAIN MANAGEMENT: ICD-10-CM

## 2022-08-30 PROCEDURE — 64494 INJ PARAVERT F JNT L/S 2 LEV: CPT

## 2022-08-30 PROCEDURE — 64493 INJ PARAVERT F JNT L/S 1 LEV: CPT

## 2022-08-30 PROCEDURE — 3209999900 FLUORO FOR SURGICAL PROCEDURES

## 2022-08-30 PROCEDURE — 6360000002 HC RX W HCPCS

## 2022-08-30 PROCEDURE — 2500000003 HC RX 250 WO HCPCS

## 2022-08-30 RX ORDER — RANOLAZINE 1000 MG/1
TABLET, EXTENDED RELEASE ORAL
Qty: 180 TABLET | Refills: 3 | Status: SHIPPED | OUTPATIENT
Start: 2022-08-30

## 2022-08-30 RX ORDER — LIDOCAINE HYDROCHLORIDE 10 MG/ML
19.5 INJECTION, SOLUTION EPIDURAL; INFILTRATION; INTRACAUDAL; PERINEURAL ONCE
Status: DISCONTINUED | OUTPATIENT
Start: 2022-08-30 | End: 2022-09-01 | Stop reason: HOSPADM

## 2022-08-30 RX ORDER — BUPIVACAINE HYDROCHLORIDE 5 MG/ML
4.5 INJECTION, SOLUTION EPIDURAL; INTRACAUDAL ONCE
Status: DISCONTINUED | OUTPATIENT
Start: 2022-08-30 | End: 2022-09-01 | Stop reason: HOSPADM

## 2022-08-30 RX ORDER — METHYLPREDNISOLONE ACETATE 40 MG/ML
80 INJECTION, SUSPENSION INTRA-ARTICULAR; INTRALESIONAL; INTRAMUSCULAR; SOFT TISSUE ONCE
Status: DISCONTINUED | OUTPATIENT
Start: 2022-08-30 | End: 2022-09-01 | Stop reason: HOSPADM

## 2022-08-30 ASSESSMENT — PAIN DESCRIPTION - LOCATION: LOCATION: BACK

## 2022-08-30 ASSESSMENT — PAIN SCALES - GENERAL: PAINLEVEL_OUTOF10: 9

## 2022-08-30 ASSESSMENT — PAIN - FUNCTIONAL ASSESSMENT: PAIN_FUNCTIONAL_ASSESSMENT: 0-10

## 2022-09-29 ENCOUNTER — TELEPHONE (OUTPATIENT)
Dept: CARDIOLOGY CLINIC | Age: 77
End: 2022-09-29

## 2022-10-06 DIAGNOSIS — I25.119 CORONARY ARTERY DISEASE INVOLVING NATIVE CORONARY ARTERY OF NATIVE HEART WITH ANGINA PECTORIS (HCC): ICD-10-CM

## 2022-10-06 RX ORDER — ISOSORBIDE MONONITRATE 60 MG/1
TABLET, EXTENDED RELEASE ORAL
Qty: 180 TABLET | Refills: 3 | Status: SHIPPED | OUTPATIENT
Start: 2022-10-06 | End: 2022-10-19 | Stop reason: SDUPTHER

## 2022-10-13 ENCOUNTER — TELEPHONE (OUTPATIENT)
Dept: CARDIOLOGY CLINIC | Age: 77
End: 2022-10-13

## 2022-10-19 ENCOUNTER — OFFICE VISIT (OUTPATIENT)
Dept: CARDIOLOGY CLINIC | Age: 77
End: 2022-10-19
Payer: MEDICARE

## 2022-10-19 VITALS
SYSTOLIC BLOOD PRESSURE: 120 MMHG | DIASTOLIC BLOOD PRESSURE: 58 MMHG | HEART RATE: 73 BPM | BODY MASS INDEX: 27.66 KG/M2 | HEIGHT: 65 IN | WEIGHT: 166 LBS

## 2022-10-19 DIAGNOSIS — I20.8 STABLE ANGINA PECTORIS (HCC): ICD-10-CM

## 2022-10-19 DIAGNOSIS — I10 ESSENTIAL HYPERTENSION: ICD-10-CM

## 2022-10-19 DIAGNOSIS — E78.2 MIXED HYPERLIPIDEMIA: ICD-10-CM

## 2022-10-19 DIAGNOSIS — I25.118 CORONARY ARTERY DISEASE OF NATIVE ARTERY OF NATIVE HEART WITH STABLE ANGINA PECTORIS (HCC): Primary | ICD-10-CM

## 2022-10-19 PROCEDURE — G8417 CALC BMI ABV UP PARAM F/U: HCPCS | Performed by: CLINICAL NURSE SPECIALIST

## 2022-10-19 PROCEDURE — 99214 OFFICE O/P EST MOD 30 MIN: CPT | Performed by: CLINICAL NURSE SPECIALIST

## 2022-10-19 PROCEDURE — G8399 PT W/DXA RESULTS DOCUMENT: HCPCS | Performed by: CLINICAL NURSE SPECIALIST

## 2022-10-19 PROCEDURE — G8427 DOCREV CUR MEDS BY ELIG CLIN: HCPCS | Performed by: CLINICAL NURSE SPECIALIST

## 2022-10-19 PROCEDURE — G8484 FLU IMMUNIZE NO ADMIN: HCPCS | Performed by: CLINICAL NURSE SPECIALIST

## 2022-10-19 PROCEDURE — 1090F PRES/ABSN URINE INCON ASSESS: CPT | Performed by: CLINICAL NURSE SPECIALIST

## 2022-10-19 PROCEDURE — 1123F ACP DISCUSS/DSCN MKR DOCD: CPT | Performed by: CLINICAL NURSE SPECIALIST

## 2022-10-19 PROCEDURE — 1036F TOBACCO NON-USER: CPT | Performed by: CLINICAL NURSE SPECIALIST

## 2022-10-19 RX ORDER — ISOSORBIDE MONONITRATE 60 MG/1
TABLET, EXTENDED RELEASE ORAL
Qty: 180 TABLET | Refills: 3 | Status: SHIPPED | OUTPATIENT
Start: 2022-10-19

## 2022-10-19 NOTE — PATIENT INSTRUCTIONS
Increase your Isosorbide to 120mg in the AM and keep 60 in the PM  Call in 2 weeks and let us know if there has been any improvement in your angina  If not then will discuss heart cath   Follow up in 6 mos With Dr. Kapil Calles  Call with any questions or concerns  Follow up with Cm Warren MD for non cardiac problems  Report any new problems  Cardiovascular Fitness-Exercise as tolerated. Fall precautions  Cardiac / Healthy Diet  Continue current medications as directed  Continue plan of treatment  It is always recommended that you bring your medications bottles with you to each visit - this is for your safety!

## 2022-10-19 NOTE — PROGRESS NOTES
[address] [place], [address] [address]  Phone: [phone]  Fax: [phone]    OFFICE VISIT:  10/19/2022    shakira Duqeu - : 1945    Reason For Visit:  Nj is a 68 y.o. female who is here for 6 Month Follow-Up (Pt has had pressure in stomach area) and Coronary Artery Disease  Coronary disease with CABG x3 in . Underwent heart catheterization 2019 that showed    Patent LIMA-LAD, patent VG-diag, patent VG-OM, patent RCA, normal LVFX  She establish care with Dr. Lamar Durham last month. Increasing with exertional angina  Elective heart catheterization 2020  Left main and triple vessel disease. Patent LIMA to distal LAD. Patent SVG to 1st diagonal.   Patent SVG to 1st obtuse marginal (small vessel)   Left main lesion feeds large 2nd obtuse marginal.   Normal LV ejection fraction. Successful PCI to distal left main(3.5 x 12 mm resolute integrity stent)   into the ostial circumflex utilizing drug-eluting stent. Successful PCI to proximal circumflex (3.0 x 15 mm resolute integrity)   utilizing drug-eluting stent. Successful PCI with PTCA of ostial LAD. Recommendations if recurrent symptoms can consider ischemic evaluation of RCA    2D echo 10/14/2021 showed normal LV size and function with EF 55 to 60%. Mild apical hypokinesis. No significant valvular abnormalities    Patient had a fall with subarachnoid hemorrhage in  and subsequent seizures. Patient continues to follow with neurology for memory loss, generalized weakness and seizure disorder    She returns today for routine follow-up. Accompanied with her daughter who is a nurse. Her other daughter helps take care of her as well. She states that they will not set her stay by herself any longer. She has significant back pain and weakness and seizures  Had a fall about 3 mos ago with no significant injury. She is using her walker regularly. Overall she says she is just feeling weak.   She is having angina most days associated with activity and stress. Tries to deep breathe and relax for it to go away. She has required nitroglycerin several times a week. Usually gets relief with 1 dose. She states she has shortness of breath and some nausea with it at times    She used to walk and be quite active. Now she is unable to do this on a regular basis due to being unstable and back pain. She is still having some seizure-like activity. She states is mostly in the morning. Has sensation that begins in her legs and her whole body drawls up. Afterwards she is exhausted. She reports some episodes are light but this morning had 1 that was pretty significant. She is not lost consciousness        Subjective  Rhea resting shortness of breath, orthopnea, paroxysmal nocturnal dyspnea, syncope, presyncope, arrhythmia, edema   The patient denies numbness or weakness to suggest cerebrovascular accident or transient ischemic attack.     She follows with vascular surgery for carotid disease  Sánchez Shrestha MD is PCP   Dr April Hernandez is neurologist.  Graciela Douglass has the following history as recorded in [facility]:    Patient Active Problem List    Diagnosis Date Noted    Unstable angina [facility])     Chest discomfort 01/10/2019    Coronary artery disease of native artery of native heart with stable angina pectoris [facility])     Vascular dementia without behavioral disturbance ([address] 12/23/2021    Chronic epigastric pain 12/15/2021    Nausea 12/15/2021    LLQ abdominal pain 12/15/2021    Colitis 11/13/2021    Status epilepticus ([address] 06/13/2021    Acute cystitis without hematuria 10/28/2020    Intracranial bleed ([address] 10/27/2020    Post-traumatic headache 10/27/2020    SLE (systemic lupus erythematosus) ([address] 10/25/2020    Subarachnoid hemorrhage following injury, no loss of consciousness ([address] 10/25/2020    Subarachnoid hemorrhage ([address] 10/24/2020    Palliative care patient 07/22/2020    Seizure ([address]     Spondylolisthesis at L4-L5 level     Atherosclerotic cardiovascular disease     AMS (altered mental status) 06/11/2020    Altered mental status 06/10/2020    Intractable back pain 05/29/2020    Acute exacerbation of chronic low back pain 05/28/2020    Bilateral carotid artery stenosis 12/28/2017    Aphasia 11/29/2017    Ataxia 11/29/2017    Vertigo 11/29/2017    Malaise and fatigue 10/06/2017    Chest pressure     Fatigue 02/25/2016    S/P right coronary artery (RCA) stent placement 02/25/2016    Hx of CABG 02/25/2016    Hyperlipidemia     Hypertension     CAD (coronary artery disease)     Chest pain     Osteoarthritis     GERD (gastroesophageal reflux disease)     Asthma     Glucose intolerance (impaired glucose tolerance)     Depression with anxiety      Past Medical History:   Diagnosis Date    Acute cystitis without hematuria 10/28/2020    Asthma     CAD (coronary artery disease)     Chest pain     Depression with anxiety     Diabetes mellitus (HCC)     GERD (gastroesophageal reflux disease)     Glucose intolerance (impaired glucose tolerance)     Hyperlipidemia     Cholesterol management per Isaias Wiseman M.D.     Hypertension     Lupus (systemic lupus erythematosus) (HCC)     Malaise and fatigue 10/6/2017    Movement disorder     Sees pain management for neck pain and previous surgery    New onset seizure ([facility]     Osteoarthritis     Palliative care patient 07/22/2020    S/P CABG x 3 03/05/2013    PACABG X 3, LIMA-LAD, SVG0OM, SVG-DIAG, RT EVH, DR PATEL    Subdural hematoma      Past Surgical History:   Procedure Laterality Date    4 [facility]  05/18/2011    EF 60%    CARDIAC CATHETERIZATION  01/25/2016    drug eluting stent to RCA    CARDIAC CATHETERIZATION  01/25/2016    CARDIAC CATHETERIZATION  02/19/2016    CARDIAC CATHETERIZATION  08/24/2017    cutting balloon angioplasty ot LAD    CARDIAC CATHETERIZATION  04/13/2020    TWO Stents placed    CATARACT REMOVAL      COLON SURGERY  1992    Polypectomy - 2    COLON SURGERY  2003    Polypectomy - 4    COLON SURGERY  2008    Polypectomy - 2    COLON SURGERY  2012    Polypectomy - 3    COLONOSCOPY  2017    Dr Megan Castro- normal per pt recall    COLONOSCOPY  2012    polyps, diverticulosis,  4yr recall    CORONARY ANGIOPLASTY  2017    cutting balloon angioplasty LAD diagonal    CORONARY ANGIOPLASTY WITH STENT PLACEMENT  2007    CORONARY ANGIOPLASTY WITH STENT PLACEMENT  2007    CORONARY ARTERY BYPASS GRAFT  2013    PACABG X 3, LIMA-LAD, SVG0OM, SVG-DIAG, RT EVH, DR PATEL    COSMETIC SURGERY      deviated septum and rhinoplasty    CYST REMOVAL  1970    DIAGNOSTIC CARDIAC CATH LAB PROCEDURE      EYE SURGERY  2019    cataracts    [address] (CERVIX STATUS UNKNOWN)  1983    Partial    [address] [facility]    SINUS SURGERY      Nasal Polyps Removed    SPINE SURGERY  2006    Lumbar Laminectomy L4&5    SPINE SURGERY  2006    Cervical Fusion - C4,5,6    TONSILLECTOMY  1968    UPPER GASTROINTESTINAL ENDOSCOPY  2006     Family History   Problem Relation Age of Onset    Heart Disease Mother     High Blood Pressure Mother     Diabetes Mother     High Cholesterol Mother     Colon Polyps Mother     Diabetes Brother     Colon Polyps Maternal Grandmother     Colon Polyps Maternal Grandfather     Colon Cancer Neg Hx     Esophageal Cancer Neg Hx     Liver Cancer Neg Hx      Social History     Tobacco Use    Smoking status: Former     Packs/day: 1.00     Years: 1.00     Pack years: 1.00     Types: Cigarettes     Quit date: 1967     Years since quittin.6    Smokeless tobacco: Never   Substance Use Topics    Alcohol use: No      Current Outpatient Medications   Medication Sig Dispense Refill    isosorbide mononitrate (IMDUR) 60 MG extended release tablet Take 120mg in the AM and 60 in the  tablet 3    ranolazine (RANEXA) 1000 MG extended release tablet or epistaxis. No tinnitus or significant hearing loss. Eyes - no sudden vision change or amaurosis. Respiratory - no significant wheezing, stridor, apnea or cough. No dyspnea on exertion or shortness of breath. Cardiovascular - + exertional chest pain, no orthopnea or PND. No sensation of arrhythmia or slow heart rate. No claudication or leg edema. Gastrointestinal - no abdominal swelling or pain. No blood in stool. No severe constipation, diarrhea, nausea, or vomiting. Genitourinary - no difficulty urinating, dysuria, frequency, or urgency. No flank pain or hematuria. Musculoskeletal - + back pain, gait-unstable using walker. Last fall 3 months ago  Skin - no color change or rash. No pallor. No new surgical incision. Neurologic - no speech difficulty, facial asymmetry or lateralizing weakness. + seizures, no presyncope, syncope, or significant dizziness. Hematologic - no easy bruising or excessive bleeding. Psychiatric - no severe anxiety or insomnia. No confusion. All other review of systems are negative. Objective  Vital Signs - BP (!) 120/58   Pulse 73   Ht 5' 5\" (1.651 m)   Wt 166 lb (75.3 kg)   BMI 27.62 kg/m²   General - Shasta Barahona is alert, cooperative, and pleasant. Well groomed. No acute distress. Body habitus is normal.  HEENT - The head is normocephalic. No circumoral cyanosis. Dentition is normal.   EYES -  No Xanthelasma, no arcus senilis, no conjunctival hemorrhages or discharge. Neck - Supple, without increased jugular venous pressures. No carotid bruits. No mass. Respiratory - Lungs are clear bilaterally. No wheezes or rales. Normal effort without use of accessory muscles. Cardiovascular - Heart has regular rhythm and rate. No murmurs, rubs or gallops. + pedal pulses and no varicosities. Abdominal -  Soft, nontender, nondistended. Bowel sounds are intact. Extremities - No clubbing, cyanosis, or  edema. Musculoskeletal -  No clubbing .   No Osler's nodes. Gait -slow and cautious using walker. No kyphosis or scoliosis. Skin -  no statis ulcers or dermatitis. Neurological - No focal signs are identified. Oriented to person, place and time. Psychiatric -  Appropriate affect and mood. Assessment:     Diagnosis Orders   1. Coronary artery disease of native artery of native heart with stable angina pectoris ([facility]        2. Essential hypertension        3. Mixed hyperlipidemia        4. Stable angina pectoris ([facility]            Data:  BP Readings from Last 3 Encounters:   10/19/22 (!) 120/58   08/30/22 (!) 133/53   08/10/22 (!) 141/74    Pulse Readings from Last 3 Encounters:   10/19/22 73   08/30/22 66   08/10/22 66        Wt Readings from Last 3 Encounters:   10/19/22 166 lb (75.3 kg)   08/10/22 161 lb (73 kg)   05/04/22 161 lb (73 kg)     Blood pressure heart rate are controlled. Medical management includes beta-blocker, ARB and CCB. Remains on long-acting nitrate 60 mg twice a day and Ranexa 1000 mg twice a day  On statin and aspirin   Reviewed  recent notes   Reviewed recent labs  LDL controlled but total cholesterol elevated elevated triglycerides    Patient has had an increase in exertional and stress related angina. She has required nitroglycerin. Thus far she has been able to get relief with 1 dose. But she has noticed an increase in need for nitroglycerin  Reviewed her last heart catheterization. Mention that there was an option to repeat cath if symptoms worsened to address RCA lesion. Last heart catheterization was in 2020. Patient would like to try medication first.  We will uptitrate her isosorbide to her 20 mg in the morning and keep 60 mg in the evening. She will let us know in 2 weeks if there is been any change. If not we will rediscuss heart catheterization    Unfortunately she has multiple comorbidities. She has looked at her other medications and they have tried to wean some of those down.   Recently her Keppra has had to be increased due to increase in some seizure-like activities     States taking medications as prescribed     30 minutes were spent preparing, reviewing and seeing patient. All questions answered    Plan    Increase your Isosorbide to 120mg in the AM and keep 60 in the PM  Call in 2 weeks and let us know if there has been any improvement in your angina  If not then will discuss heart cath   Follow up in 6 mos With Dr. Jorge Garlnad  Call with any questions or concerns  Follow up with Jan Mcburney, MD for non cardiac problems and labs  Follow-up with neurology as planned  Report any new problems  Cardiovascular Fitness-Exercise as tolerated. Fall precautions  Cardiac / Healthy Diet  Continue current medications as directed  Continue plan of treatment  It is always recommended that you bring your medications bottles with you to each visit - this is for your safety! LEILA Mancini    EMR dragon/transcription disclaimer: Much of this encounter note is electronic transcription/translation of spoken language to printed tach. Electronic translation of spoken language may be erroneous, or at times, nonsensical words or phrases may be inadvertently transcribed.  Although, I have reviewed the note for such errors, some may still exist.

## 2022-11-21 RX ORDER — AMLODIPINE BESYLATE 2.5 MG/1
TABLET ORAL
Qty: 60 TABLET | Refills: 5 | Status: SHIPPED | OUTPATIENT
Start: 2022-11-21

## 2022-12-06 ENCOUNTER — HOSPITAL ENCOUNTER (OUTPATIENT)
Dept: PAIN MANAGEMENT | Age: 77
Discharge: HOME | End: 2022-12-06
Payer: MEDICARE

## 2022-12-06 VITALS
HEART RATE: 72 BPM | RESPIRATION RATE: 18 BRPM | OXYGEN SATURATION: 95 % | SYSTOLIC BLOOD PRESSURE: 156 MMHG | TEMPERATURE: 96.6 F | DIASTOLIC BLOOD PRESSURE: 60 MMHG

## 2022-12-06 DIAGNOSIS — R52 PAIN MANAGEMENT: ICD-10-CM

## 2022-12-06 PROCEDURE — 2500000003 HC RX 250 WO HCPCS

## 2022-12-06 PROCEDURE — 6360000002 HC RX W HCPCS

## 2022-12-06 PROCEDURE — 64494 INJ PARAVERT F JNT L/S 2 LEV: CPT

## 2022-12-06 PROCEDURE — 64493 INJ PARAVERT F JNT L/S 1 LEV: CPT

## 2022-12-06 RX ORDER — LIDOCAINE HYDROCHLORIDE 10 MG/ML
20 INJECTION, SOLUTION EPIDURAL; INFILTRATION; INTRACAUDAL; PERINEURAL ONCE
Status: DISCONTINUED | OUTPATIENT
Start: 2022-12-06 | End: 2022-12-08 | Stop reason: HOSPADM

## 2022-12-06 RX ORDER — BUPIVACAINE HYDROCHLORIDE 5 MG/ML
5 INJECTION, SOLUTION EPIDURAL; INTRACAUDAL ONCE
Status: DISCONTINUED | OUTPATIENT
Start: 2022-12-06 | End: 2022-12-08 | Stop reason: HOSPADM

## 2022-12-06 RX ORDER — METHYLPREDNISOLONE ACETATE 80 MG/ML
80 INJECTION, SUSPENSION INTRA-ARTICULAR; INTRALESIONAL; INTRAMUSCULAR; SOFT TISSUE ONCE
Status: DISCONTINUED | OUTPATIENT
Start: 2022-12-06 | End: 2022-12-08 | Stop reason: HOSPADM

## 2022-12-06 ASSESSMENT — PAIN - FUNCTIONAL ASSESSMENT: PAIN_FUNCTIONAL_ASSESSMENT: 0-10

## 2023-01-04 ENCOUNTER — APPOINTMENT (OUTPATIENT)
Dept: CT IMAGING | Age: 78
DRG: 177 | End: 2023-01-04
Payer: MEDICARE

## 2023-01-04 ENCOUNTER — HOSPITAL ENCOUNTER (INPATIENT)
Age: 78
DRG: 177 | End: 2023-01-04
Attending: EMERGENCY MEDICINE | Admitting: FAMILY MEDICINE
Payer: MEDICARE

## 2023-01-04 ENCOUNTER — APPOINTMENT (OUTPATIENT)
Dept: GENERAL RADIOLOGY | Age: 78
DRG: 177 | End: 2023-01-04
Payer: MEDICARE

## 2023-01-04 DIAGNOSIS — R40.4 TRANSIENT ALTERATION OF AWARENESS: ICD-10-CM

## 2023-01-04 DIAGNOSIS — R44.3 HALLUCINATION: ICD-10-CM

## 2023-01-04 DIAGNOSIS — E78.5 HYPERLIPIDEMIA, UNSPECIFIED HYPERLIPIDEMIA TYPE: ICD-10-CM

## 2023-01-04 DIAGNOSIS — U07.1 COVID-19 VIRUS INFECTION: Primary | ICD-10-CM

## 2023-01-04 LAB
ALBUMIN SERPL-MCNC: 4.6 G/DL (ref 3.5–5.2)
ALP BLD-CCNC: 76 U/L (ref 35–104)
ALT SERPL-CCNC: 21 U/L (ref 5–33)
ANION GAP SERPL CALCULATED.3IONS-SCNC: 12 MMOL/L (ref 7–19)
AST SERPL-CCNC: 18 U/L (ref 5–32)
BACTERIA: ABNORMAL /HPF
BASOPHILS ABSOLUTE: 0 K/UL (ref 0–0.2)
BASOPHILS RELATIVE PERCENT: 0.5 % (ref 0–1)
BILIRUB SERPL-MCNC: 0.5 MG/DL (ref 0.2–1.2)
BILIRUBIN URINE: NEGATIVE
BLOOD, URINE: NEGATIVE
BUN BLDV-MCNC: 14 MG/DL (ref 8–23)
CALCIUM SERPL-MCNC: 9.2 MG/DL (ref 8.8–10.2)
CHLORIDE BLD-SCNC: 105 MMOL/L (ref 98–111)
CHP ED QC CHECK: YES
CLARITY: ABNORMAL
CO2: 22 MMOL/L (ref 22–29)
COLOR: ABNORMAL
CREAT SERPL-MCNC: 0.7 MG/DL (ref 0.5–0.9)
EOSINOPHILS ABSOLUTE: 0.1 K/UL (ref 0–0.6)
EOSINOPHILS RELATIVE PERCENT: 2.3 % (ref 0–5)
EPITHELIAL CELLS, UA: ABNORMAL /HPF
GFR SERPL CREATININE-BSD FRML MDRD: >60 ML/MIN/{1.73_M2}
GLUCOSE BLD-MCNC: 125 MG/DL
GLUCOSE BLD-MCNC: 125 MG/DL (ref 70–99)
GLUCOSE BLD-MCNC: 132 MG/DL (ref 74–109)
GLUCOSE URINE: NEGATIVE MG/DL
HCT VFR BLD CALC: 40.5 % (ref 37–47)
HEMOGLOBIN: 13.2 G/DL (ref 12–16)
IMMATURE GRANULOCYTES #: 0 K/UL
KETONES, URINE: NEGATIVE MG/DL
LEUKOCYTE ESTERASE, URINE: ABNORMAL
LYMPHOCYTES ABSOLUTE: 0.8 K/UL (ref 1.1–4.5)
LYMPHOCYTES RELATIVE PERCENT: 17 % (ref 20–40)
MCH RBC QN AUTO: 31.7 PG (ref 27–31)
MCHC RBC AUTO-ENTMCNC: 32.6 G/DL (ref 33–37)
MCV RBC AUTO: 97.1 FL (ref 81–99)
MONOCYTES ABSOLUTE: 0.5 K/UL (ref 0–0.9)
MONOCYTES RELATIVE PERCENT: 11.1 % (ref 0–10)
NEUTROPHILS ABSOLUTE: 3 K/UL (ref 1.5–7.5)
NEUTROPHILS RELATIVE PERCENT: 68.6 % (ref 50–65)
NITRITE, URINE: NEGATIVE
PDW BLD-RTO: 13.6 % (ref 11.5–14.5)
PERFORMED ON: ABNORMAL
PH UA: 5.5 (ref 5–8)
PLATELET # BLD: 149 K/UL (ref 130–400)
PMV BLD AUTO: 8.6 FL (ref 9.4–12.3)
POTASSIUM SERPL-SCNC: 4.1 MMOL/L (ref 3.5–5)
PROTEIN UA: NEGATIVE MG/DL
RBC # BLD: 4.17 M/UL (ref 4.2–5.4)
SARS-COV-2, NAAT: DETECTED
SODIUM BLD-SCNC: 139 MMOL/L (ref 136–145)
SPECIFIC GRAVITY UA: 1.02 (ref 1–1.03)
TOTAL PROTEIN: 6.5 G/DL (ref 6.6–8.7)
UROBILINOGEN, URINE: 1 E.U./DL
WBC # BLD: 4.4 K/UL (ref 4.8–10.8)
WBC UA: ABNORMAL /HPF (ref 0–5)
YEAST: PRESENT /HPF

## 2023-01-04 PROCEDURE — 93005 ELECTROCARDIOGRAM TRACING: CPT | Performed by: EMERGENCY MEDICINE

## 2023-01-04 PROCEDURE — 70450 CT HEAD/BRAIN W/O DYE: CPT

## 2023-01-04 PROCEDURE — 87040 BLOOD CULTURE FOR BACTERIA: CPT

## 2023-01-04 PROCEDURE — 82947 ASSAY GLUCOSE BLOOD QUANT: CPT

## 2023-01-04 PROCEDURE — 2580000003 HC RX 258: Performed by: EMERGENCY MEDICINE

## 2023-01-04 PROCEDURE — 80053 COMPREHEN METABOLIC PANEL: CPT

## 2023-01-04 PROCEDURE — 96372 THER/PROPH/DIAG INJ SC/IM: CPT

## 2023-01-04 PROCEDURE — G0378 HOSPITAL OBSERVATION PER HR: HCPCS

## 2023-01-04 PROCEDURE — 80177 DRUG SCRN QUAN LEVETIRACETAM: CPT

## 2023-01-04 PROCEDURE — 70450 CT HEAD/BRAIN W/O DYE: CPT | Performed by: RADIOLOGY

## 2023-01-04 PROCEDURE — 6370000000 HC RX 637 (ALT 250 FOR IP): Performed by: FAMILY MEDICINE

## 2023-01-04 PROCEDURE — 6360000002 HC RX W HCPCS: Performed by: FAMILY MEDICINE

## 2023-01-04 PROCEDURE — 81001 URINALYSIS AUTO W/SCOPE: CPT

## 2023-01-04 PROCEDURE — 71045 X-RAY EXAM CHEST 1 VIEW: CPT

## 2023-01-04 PROCEDURE — 87635 SARS-COV-2 COVID-19 AMP PRB: CPT

## 2023-01-04 PROCEDURE — 99285 EMERGENCY DEPT VISIT HI MDM: CPT

## 2023-01-04 PROCEDURE — 2580000003 HC RX 258: Performed by: FAMILY MEDICINE

## 2023-01-04 PROCEDURE — 36415 COLL VENOUS BLD VENIPUNCTURE: CPT

## 2023-01-04 PROCEDURE — 71045 X-RAY EXAM CHEST 1 VIEW: CPT | Performed by: RADIOLOGY

## 2023-01-04 PROCEDURE — 85025 COMPLETE CBC W/AUTO DIFF WBC: CPT

## 2023-01-04 PROCEDURE — 6360000002 HC RX W HCPCS: Performed by: EMERGENCY MEDICINE

## 2023-01-04 RX ORDER — RANOLAZINE 500 MG/1
1000 TABLET, EXTENDED RELEASE ORAL 2 TIMES DAILY
Status: DISCONTINUED | OUTPATIENT
Start: 2023-01-04 | End: 2023-01-10 | Stop reason: HOSPADM

## 2023-01-04 RX ORDER — ISOSORBIDE MONONITRATE 60 MG/1
60 TABLET, EXTENDED RELEASE ORAL EVERY EVENING
Status: DISCONTINUED | OUTPATIENT
Start: 2023-01-04 | End: 2023-01-10 | Stop reason: HOSPADM

## 2023-01-04 RX ORDER — LEVETIRACETAM 500 MG/1
1500 TABLET ORAL 2 TIMES DAILY
Status: DISCONTINUED | OUTPATIENT
Start: 2023-01-04 | End: 2023-01-10 | Stop reason: HOSPADM

## 2023-01-04 RX ORDER — NITROGLYCERIN 0.4 MG/1
0.4 TABLET SUBLINGUAL EVERY 5 MIN PRN
Status: DISCONTINUED | OUTPATIENT
Start: 2023-01-04 | End: 2023-01-10 | Stop reason: HOSPADM

## 2023-01-04 RX ORDER — AMITRIPTYLINE HYDROCHLORIDE 25 MG/1
50 TABLET, FILM COATED ORAL NIGHTLY
Status: DISCONTINUED | OUTPATIENT
Start: 2023-01-04 | End: 2023-01-05

## 2023-01-04 RX ORDER — 0.9 % SODIUM CHLORIDE 0.9 %
1000 INTRAVENOUS SOLUTION INTRAVENOUS ONCE
Status: COMPLETED | OUTPATIENT
Start: 2023-01-04 | End: 2023-01-04

## 2023-01-04 RX ORDER — ONDANSETRON 4 MG/1
4 TABLET, ORALLY DISINTEGRATING ORAL EVERY 8 HOURS PRN
Status: DISCONTINUED | OUTPATIENT
Start: 2023-01-04 | End: 2023-01-10 | Stop reason: HOSPADM

## 2023-01-04 RX ORDER — SODIUM CHLORIDE 0.9 % (FLUSH) 0.9 %
5-40 SYRINGE (ML) INJECTION PRN
Status: DISCONTINUED | OUTPATIENT
Start: 2023-01-04 | End: 2023-01-10 | Stop reason: HOSPADM

## 2023-01-04 RX ORDER — ALBUTEROL SULFATE 90 UG/1
2 AEROSOL, METERED RESPIRATORY (INHALATION) EVERY 6 HOURS PRN
Status: DISCONTINUED | OUTPATIENT
Start: 2023-01-04 | End: 2023-01-10 | Stop reason: HOSPADM

## 2023-01-04 RX ORDER — TIZANIDINE 4 MG/1
2 TABLET ORAL NIGHTLY
Status: DISCONTINUED | OUTPATIENT
Start: 2023-01-04 | End: 2023-01-10 | Stop reason: HOSPADM

## 2023-01-04 RX ORDER — PANTOPRAZOLE SODIUM 40 MG/1
40 TABLET, DELAYED RELEASE ORAL
Status: DISCONTINUED | OUTPATIENT
Start: 2023-01-05 | End: 2023-01-10 | Stop reason: HOSPADM

## 2023-01-04 RX ORDER — DULOXETIN HYDROCHLORIDE 60 MG/1
60 CAPSULE, DELAYED RELEASE ORAL EVERY EVENING
Status: DISCONTINUED | OUTPATIENT
Start: 2023-01-04 | End: 2023-01-10 | Stop reason: HOSPADM

## 2023-01-04 RX ORDER — ONDANSETRON 2 MG/ML
4 INJECTION INTRAMUSCULAR; INTRAVENOUS EVERY 6 HOURS PRN
Status: DISCONTINUED | OUTPATIENT
Start: 2023-01-04 | End: 2023-01-10 | Stop reason: HOSPADM

## 2023-01-04 RX ORDER — CLONAZEPAM 0.5 MG/1
0.5 TABLET ORAL NIGHTLY
Status: DISCONTINUED | OUTPATIENT
Start: 2023-01-04 | End: 2023-01-10 | Stop reason: HOSPADM

## 2023-01-04 RX ORDER — ATORVASTATIN CALCIUM 80 MG/1
80 TABLET, FILM COATED ORAL NIGHTLY
Status: DISCONTINUED | OUTPATIENT
Start: 2023-01-04 | End: 2023-01-10 | Stop reason: HOSPADM

## 2023-01-04 RX ORDER — METOPROLOL SUCCINATE 50 MG/1
50 TABLET, EXTENDED RELEASE ORAL DAILY
Status: DISCONTINUED | OUTPATIENT
Start: 2023-01-05 | End: 2023-01-10 | Stop reason: HOSPADM

## 2023-01-04 RX ORDER — AMLODIPINE BESYLATE 2.5 MG/1
2.5 TABLET ORAL 2 TIMES DAILY
Status: DISCONTINUED | OUTPATIENT
Start: 2023-01-04 | End: 2023-01-10 | Stop reason: HOSPADM

## 2023-01-04 RX ORDER — SODIUM CHLORIDE 0.9 % (FLUSH) 0.9 %
5-40 SYRINGE (ML) INJECTION EVERY 12 HOURS SCHEDULED
Status: DISCONTINUED | OUTPATIENT
Start: 2023-01-04 | End: 2023-01-10 | Stop reason: HOSPADM

## 2023-01-04 RX ORDER — ENOXAPARIN SODIUM 100 MG/ML
40 INJECTION SUBCUTANEOUS DAILY
Status: DISCONTINUED | OUTPATIENT
Start: 2023-01-04 | End: 2023-01-06

## 2023-01-04 RX ORDER — ACETAMINOPHEN 325 MG/1
650 TABLET ORAL EVERY 4 HOURS PRN
Status: DISCONTINUED | OUTPATIENT
Start: 2023-01-04 | End: 2023-01-10 | Stop reason: HOSPADM

## 2023-01-04 RX ORDER — ASPIRIN 81 MG/1
81 TABLET ORAL DAILY
Status: DISCONTINUED | OUTPATIENT
Start: 2023-01-05 | End: 2023-01-10 | Stop reason: HOSPADM

## 2023-01-04 RX ORDER — ISOSORBIDE MONONITRATE 60 MG/1
120 TABLET, EXTENDED RELEASE ORAL EVERY MORNING
Status: DISCONTINUED | OUTPATIENT
Start: 2023-01-05 | End: 2023-01-10 | Stop reason: HOSPADM

## 2023-01-04 RX ORDER — SODIUM CHLORIDE 9 MG/ML
INJECTION, SOLUTION INTRAVENOUS PRN
Status: DISCONTINUED | OUTPATIENT
Start: 2023-01-04 | End: 2023-01-10 | Stop reason: HOSPADM

## 2023-01-04 RX ORDER — LOSARTAN POTASSIUM 100 MG/1
100 TABLET ORAL DAILY
Status: DISCONTINUED | OUTPATIENT
Start: 2023-01-04 | End: 2023-01-10 | Stop reason: HOSPADM

## 2023-01-04 RX ADMIN — SODIUM CHLORIDE 1000 ML: 9 INJECTION, SOLUTION INTRAVENOUS at 18:18

## 2023-01-04 RX ADMIN — ENOXAPARIN SODIUM 40 MG: 100 INJECTION SUBCUTANEOUS at 19:49

## 2023-01-04 RX ADMIN — ATORVASTATIN CALCIUM 80 MG: 80 TABLET, FILM COATED ORAL at 21:20

## 2023-01-04 RX ADMIN — CEFTRIAXONE 1000 MG: 1 INJECTION, POWDER, FOR SOLUTION INTRAMUSCULAR; INTRAVENOUS at 21:19

## 2023-01-04 RX ADMIN — RANOLAZINE 1000 MG: 500 TABLET, EXTENDED RELEASE ORAL at 21:20

## 2023-01-04 RX ADMIN — TIZANIDINE 2 MG: 4 TABLET ORAL at 21:20

## 2023-01-04 RX ADMIN — ISOSORBIDE MONONITRATE 60 MG: 60 TABLET, EXTENDED RELEASE ORAL at 21:20

## 2023-01-04 RX ADMIN — DULOXETINE 60 MG: 60 CAPSULE, DELAYED RELEASE ORAL at 21:20

## 2023-01-04 RX ADMIN — CLONAZEPAM 0.5 MG: 0.5 TABLET ORAL at 21:20

## 2023-01-04 RX ADMIN — SODIUM CHLORIDE, PRESERVATIVE FREE 10 ML: 5 INJECTION INTRAVENOUS at 21:19

## 2023-01-04 RX ADMIN — AMLODIPINE BESYLATE 2.5 MG: 2.5 TABLET ORAL at 21:20

## 2023-01-04 RX ADMIN — LOSARTAN POTASSIUM 100 MG: 100 TABLET, FILM COATED ORAL at 21:20

## 2023-01-04 RX ADMIN — LEVETIRACETAM 1500 MG: 500 TABLET, FILM COATED ORAL at 21:20

## 2023-01-04 RX ADMIN — AMITRIPTYLINE HYDROCHLORIDE 50 MG: 25 TABLET, FILM COATED ORAL at 21:20

## 2023-01-04 ASSESSMENT — ENCOUNTER SYMPTOMS
SORE THROAT: 0
DIARRHEA: 0
VOMITING: 0
NAUSEA: 0
ABDOMINAL PAIN: 0
SHORTNESS OF BREATH: 0
RHINORRHEA: 0
SINUS PRESSURE: 0

## 2023-01-04 ASSESSMENT — PAIN - FUNCTIONAL ASSESSMENT: PAIN_FUNCTIONAL_ASSESSMENT: 0-10

## 2023-01-04 ASSESSMENT — PAIN SCALES - GENERAL: PAINLEVEL_OUTOF10: 6

## 2023-01-04 NOTE — ED PROVIDER NOTES
[facility] [address]  eMERGENCY dEPARTMENT eNCOUnter      Pt Name: Sammie Medina  MRN: [id number]  [place] 1945  Date of evaluation: 1/4/2023  Provider: Nils Mckeon MD    CHIEF COMPLAINT       Chief Complaint   Patient presents with    Hallucinations     LKW approximately 2 days ago, pt seeing spiders x 2 days, found in floor by EMS spraying insect repellant. PT denies falling, says she laid down in the floor. Also c/o HA, weakness, dizziness. Pt has no prior mental health HX         HISTORY OF PRESENT ILLNESS   (Location/Symptom, Timing/Onset,Context/Setting, Quality, Duration, Modifying Factors, Severity)  Note limiting factors. Sammie Medina is a 68 y.o. female who presents to the emergency department found on the floor; hallucinations     HPI    Patient is a 51-year-old white female with a history of asthma; CAD that is post CABG; depression; anxiety; diabetes mellitus; reflux disease; hyperlipidemia; lupus; movement disorder for which she sees pain management; seizure disorder; osteoarthritis; CAD; subdural hematoma; complains of weakness; dizziness; and headache. She denies trauma. She has had delirium in the past from medication issues. She currently is alert awake and cooperative. Daughter at the bedside reports that over the course of last night she began voicing false believes which she has had in the past but they were worse and were stressing her. She said she thought she saw people coming and going. She says she was concerned that someone (television characters) were going to come and get her. She reports hearing music all the time. Daughter. With whom she lives,  ran some errands and found her on the floor spraying the ground trying to stop the spiders that she was seen. She is not on any narcotic pain medication but takes Zanaflex and clonidine cautiously. She is otherwise at her baseline mental status as per her daughter who is at the bedside.   She takes Keppra for seizure disorder that is poorly characterized by the daughter and the patient. She complains of a headache. It is uncertain if she fell or slid down from furniture this morning. She is in no distress in the ED. NursingNotes were reviewed. REVIEW OF SYSTEMS    (2-9 systems for level 4, 10 or more for level 5)     Review of Systems   Constitutional:  Negative for chills, diaphoresis, fatigue and fever. HENT:  Negative for rhinorrhea, sinus pressure and sore throat. Eyes:  Negative for visual disturbance. Respiratory:  Negative for shortness of breath. Cardiovascular:  Negative for chest pain. Gastrointestinal:  Negative for abdominal pain, diarrhea, nausea and vomiting. Genitourinary:  Negative for difficulty urinating and dysuria. Musculoskeletal:  Negative for arthralgias and myalgias. Skin:  Negative for rash. Neurological:  Positive for dizziness, weakness and headaches. Psychiatric/Behavioral:  Positive for hallucinations. Negative for confusion. All other systems reviewed and are negative. PAST MEDICALHISTORY     Past Medical History:   Diagnosis Date    Acute cystitis without hematuria 10/28/2020    Asthma     CAD (coronary artery disease)     Chest pain     Depression with anxiety     Diabetes mellitus (HCC)     GERD (gastroesophageal reflux disease)     Glucose intolerance (impaired glucose tolerance)     Hyperlipidemia     Cholesterol management per Blanca Or M.D.     Hypertension     Lupus (systemic lupus erythematosus) (HCC)     Malaise and fatigue 10/6/2017    Movement disorder     Sees pain management for neck pain and previous surgery    New onset seizure ([facility] [address]     Osteoarthritis     Palliative care patient 07/22/2020    S/P CABG x 3 03/05/2013    PACABG X 3, LIMA-LAD, SVG0OM, SVG-DIAG, RT EVH, DR PATEL    Subdural hematoma          SURGICAL HISTORY       Past Surgical History:   Procedure Laterality Date    1973 [facility] CATHETERIZATION  05/18/2011    EF 60%    CARDIAC CATHETERIZATION  01/25/2016    drug eluting stent to RCA    CARDIAC CATHETERIZATION  01/25/2016    CARDIAC CATHETERIZATION  02/19/2016    CARDIAC CATHETERIZATION  08/24/2017    cutting balloon angioplasty ot LAD    CARDIAC CATHETERIZATION  04/13/2020    TWO Stents placed    CATARACT REMOVAL      COLON SURGERY  1992    Polypectomy - 2    COLON SURGERY  2003    Polypectomy - 4    COLON SURGERY  11/2008    Polypectomy - 2    COLON SURGERY  08/2012    Polypectomy - 3    COLONOSCOPY  03/01/2017    Dr Elvin Burger- normal per pt recall    COLONOSCOPY  07/20/2012    polyps, diverticulosis,  4yr recall    CORONARY ANGIOPLASTY  08/2017    cutting balloon angioplasty LAD diagonal    CORONARY ANGIOPLASTY WITH STENT PLACEMENT  03/22/2007    CORONARY ANGIOPLASTY WITH STENT PLACEMENT  08/2007    CORONARY ARTERY BYPASS GRAFT  03/05/2013    PACABG X 3, LIMA-LAD, SVG0OM, SVG-DIAG, RT EVH, DR PATEL    COSMETIC SURGERY      deviated septum and rhinoplasty    CYST REMOVAL  1970    DIAGNOSTIC CARDIAC CATH LAB PROCEDURE      EYE SURGERY  03/2019    cataracts    [address] (CERVIX STATUS UNKNOWN)  1983    Partial    [address] [facility]    SINUS SURGERY  2003    Nasal Polyps Removed    SPINE SURGERY  03/06/2006    Lumbar Laminectomy L4&5    SPINE SURGERY  03/06/2006    Cervical Fusion - C4,5,6    TONSILLECTOMY  1968    UPPER GASTROINTESTINAL ENDOSCOPY  09/20/2006         CURRENT MEDICATIONS     Current Discharge Medication List        CONTINUE these medications which have NOT CHANGED    Details   amLODIPine (NORVASC) 2.5 MG tablet TAKE 1 TABLET BY MOUTH TWICE DAILY  Qty: 60 tablet, Refills: 5      isosorbide mononitrate (IMDUR) 60 MG extended release tablet Take 120mg in the AM and 60 in the PM  Qty: 180 tablet, Refills: 3    Comments: YOUR PATIENT HAS REQUESTED A REFILL OF THIS MEDICATION, PREVIOUSLY AUTHORIZED BY ANOTHER PRESCRIBER.       ranolazine (RANEXA) 1000 MG extended release tablet TAKE 1 TABLET TWICE A DAY  Qty: 180 tablet, Refills: 3      nitroGLYCERIN (NITROSTAT) 0.4 MG SL tablet up to max of 3 total doses. If no relief after 1 dose, call 911. Qty: 30 tablet, Refills: 3      clonazePAM (KLONOPIN) 0.5 MG tablet nightly. levETIRAcetam (KEPPRA) 1000 MG tablet Take 1.5 tabs twice a day  Qty: 270 tablet, Refills: 5      aspirin 81 MG EC tablet Take 81 mg by mouth daily At dinner      promethazine (PHENERGAN) 12.5 MG tablet Take 1 tablet by mouth every 6 hours as needed for Nausea  Qty: 30 tablet, Refills: 5      amitriptyline (ELAVIL) 50 MG tablet Take 50 mg by mouth nightly      diphenhydrAMINE (BENADRYL) 25 MG tablet Take 25 mg by mouth every 6 hours as needed for Itching      DULoxetine (CYMBALTA) 60 MG extended release capsule Take 60 mg by mouth every evening 5 pm      tiZANidine (ZANAFLEX) 4 MG tablet Take 4 mg by mouth nightly 1/2 tablet nightly      atorvastatin (LIPITOR) 80 MG tablet Take 1 tablet by mouth nightly  Qty: 30 tablet, Refills: 0    Comments: Patient must have labs before any further refills  Associated Diagnoses: Hyperlipidemia, unspecified hyperlipidemia type      losartan (COZAAR) 100 MG tablet Take 1 tablet by mouth daily  Qty: 30 tablet, Refills: 3      metoprolol succinate (TOPROL XL) 50 MG extended release tablet Take 1 tablet by mouth daily  Qty: 30 tablet, Refills: 3      pantoprazole (PROTONIX) 40 MG tablet Take 1 tablet by mouth every morning (before breakfast)  Qty: 30 tablet, Refills: 3      albuterol (PROVENTIL HFA;VENTOLIN HFA) 108 (90 BASE) MCG/ACT inhaler Inhale 2 puffs into the lungs every 6 hours as needed.              ALLERGIES     Other, Nexium [esomeprazole], Codeine, Darvocet [propoxyphene n-acetaminophen], Hydrocodone-acetaminophen, Lyrica [pregabalin], Morphine, and Percocet [oxycodone-acetaminophen]    FAMILY HISTORY       Family History   Problem Relation Age of Onset    Heart Disease Mother     High Blood Pressure Mother     Diabetes Mother     High Cholesterol Mother     Colon Polyps Mother     Diabetes Brother     Colon Polyps Maternal Grandmother     Colon Polyps Maternal Grandfather     Colon Cancer Neg Hx     Esophageal Cancer Neg Hx     Liver Cancer Neg Hx           SOCIAL HISTORY       Social History     Socioeconomic History    Marital status:     Number of children: 2   Tobacco Use    Smoking status: Former     Packs/day: 1.00     Years: 1.00     Pack years: 1.00     Types: Cigarettes     Quit date: 1967     Years since quittin.8    Smokeless tobacco: Never   Vaping Use    Vaping Use: Never used   Substance and Sexual Activity    Alcohol use: No    Drug use: No    Sexual activity: Not Currently       SCREENINGS   NIH Stroke Scale  Interval: Baseline (admission to the floor)  Level of Consciousness (1a): Alert  LOC Questions (1b): Answers both correctly  LOC Commands (1c): Performs both tasks correctly  Best Gaze (2): Normal  Visual (3): No visual loss  Facial Palsy (4): Normal symmetrical movement  Motor Arm, Left (5a): No drift  Motor Arm, Right (5b): No drift  Motor Leg, Left (6a): No drift  Motor Leg, Right (6b): No drift  Limb Ataxia (7): Absent  Sensory (8): Normal  Best Language (9): No aphasia  Dysarthria (10): Normal  Extinction and Inattention (11): No abnormality  Total: 0Glasgow Coma Scale  Eye Opening: Spontaneous  Best Verbal Response: Confused  Best Motor Response: Obeys commands  Bree Coma Scale Score: 14        PHYSICAL EXAM    (up to 7 for level 4, 8 or more for level 5)     ED Triage Vitals [23 1320]   BP Temp Temp Source Heart Rate Resp SpO2 Height Weight   (!) 147/62 97.9 °F (36.6 °C) Oral 63 16 94 % 5' 5\" (1.651 m) 160 lb (72.6 kg)       Physical Exam  Vitals and nursing note reviewed. Constitutional:       Appearance: She is well-developed. HENT:      Head: Normocephalic and atraumatic. Eyes:      General:         Right eye: No discharge.          Left eye: No discharge. Conjunctiva/sclera: Conjunctivae normal.      Pupils: Pupils are equal, round, and reactive to light. Cardiovascular:      Rate and Rhythm: Normal rate and regular rhythm. Heart sounds: Normal heart sounds. Pulmonary:      Effort: Pulmonary effort is normal. No respiratory distress. Breath sounds: Normal breath sounds. No wheezing or rales. Abdominal:      General: Bowel sounds are normal.      Palpations: Abdomen is soft. There is no mass. Tenderness: There is no abdominal tenderness. There is no guarding or rebound. Musculoskeletal:         General: No tenderness. Normal range of motion. Cervical back: Normal range of motion and neck supple. Skin:     General: Skin is warm and dry. Neurological:      Mental Status: She is alert and oriented to person, place, and time. Psychiatric:         Behavior: Behavior normal.         Thought Content: Thought content normal.         Judgment: Judgment normal.       DIAGNOSTIC RESULTS     EKG: All EKG's areinterpreted by the Emergency Department Physician who either signs or Co-signs this chart in the absence of a cardiologist.    EKG shows sinus rhythm with an incomplete left bundle branch block rate of 60      RADIOLOGY:  Non-plain film images such as CT, Ultrasound and MRI are read by the radiologist. Plain radiographic images are visualized and preliminarily interpreted bythe emergency physician with the below findings:          XR CHEST PORTABLE   Final Result     No active pulmonary disease. Dictated and Electronically Signed by Sanchez Salguero MD at 04-Jan-2023 03:46:00 PM               CT Head W/O Contrast   Final Result   Atrophy and periventricular white matter lucency consistent with small vessel ischemic disease and/or age-related gliosis.        All CT scans at this facility utilize dose modulation, iterative reconstruction, and/or weight based dosing when appropriate to reduce radiation dose to as low as reasonably achievable.    Dictated and Electronically Signed by Ginny Bennett MD at 04-Jan-2023 03:39:28 PM                       LABS:  Labs Reviewed   COVID-19, RAPID - Abnormal; Notable for the following components:       Result Value    SARS-CoV-2, NAAT DETECTED (*)     All other components within normal limits    Narrative:     Amadeo Clements tel. ,  Microbiology results called to and read back by [facility] Tyler Minor, 01/04/2023  14:12, by SALLIE   CBC WITH AUTO DIFFERENTIAL - Abnormal; Notable for the following components:    WBC 4.4 (*)     RBC 4.17 (*)     MCH 31.7 (*)     MCHC 32.6 (*)     MPV 8.6 (*)     Neutrophils % 68.6 (*)     Lymphocytes % 17.0 (*)     Monocytes % 11.1 (*)     Lymphocytes Absolute 0.8 (*)     All other components within normal limits   COMPREHENSIVE METABOLIC PANEL - Abnormal; Notable for the following components:    Glucose 132 (*)     Total Protein 6.5 (*)     All other components within normal limits   URINALYSIS WITH REFLEX TO CULTURE - Abnormal; Notable for the following components:    Color, UA DARK YELLOW (*)     Clarity, UA CLOUDY (*)     Leukocyte Esterase, Urine MODERATE (*)     All other components within normal limits   MICROSCOPIC URINALYSIS - Abnormal; Notable for the following components:    WBC, UA 3-5 (*)     Bacteria, UA 3+ (*)     Yeast, UA Present (*)     All other components within normal limits   COMPREHENSIVE METABOLIC PANEL - Abnormal; Notable for the following components:    Glucose 145 (*)     Calcium 8.4 (*)     Total Protein 5.3 (*)     All other components within normal limits   CBC WITH AUTO DIFFERENTIAL - Abnormal; Notable for the following components:    RBC 3.51 (*)     Hemoglobin 11.3 (*)     Hematocrit 33.4 (*)     MCH 32.2 (*)     Platelets 899 (*)     MPV 8.5 (*)     Neutrophils % 65.8 (*)     Lymphocytes % 19.7 (*)     Monocytes % 12.9 (*)     Lymphocytes Absolute 1.0 (*)     All other components within normal limits   POCT GLUCOSE - Abnormal; Notable for the following components:    POC Glucose 125 (*)     All other components within normal limits   POCT GLUCOSE - Normal   CULTURE, BLOOD 1    Narrative:     ORDER#: [id number]                          ORDERED BY: SHANKAR LOUISE  SOURCE: Blood Hand, Right                  COLLECTED:  01/04/23 20:20  ANTIBIOTICS AT ASIM.:                      RECEIVED :  01/04/23 20:25   CULTURE, BLOOD 2    Narrative:     ORDER#: [id number]                          ORDERED BY: SHANKAR LOUISE  SOURCE: Blood r hand                       COLLECTED:  01/04/23 20:36  ANTIBIOTICS AT ASIM.:                      RECEIVED :  01/04/23 20:41   LEVETIRACETAM LEVEL   POCT GLUCOSE   POCT GLUCOSE       All other labs were within normal range or not returned as of this dictation. EMERGENCY DEPARTMENT COURSE and DIFFERENTIAL DIAGNOSIS/MDM:   Vitals:    Vitals:    01/05/23 2022 01/06/23 0349 01/06/23 0754 01/06/23 1059   BP: 125/62 (!) 114/56 (!) 109/52 (!) 118/55   Pulse: 72 65 70    Resp: 18 18 18    Temp: 96.8 °F (36 °C) 98.1 °F (36.7 °C) 98.4 °F (36.9 °C)    TempSrc:   Temporal    SpO2: 93% 91% 92%    Weight:       Height:           MDM    Confusion and delirium in the setting of Covid-19 infection. No other obvious source of infection; polypharmacy may also be a cause. She will be admitted for further inpatient treatment and evaluation. I spoke with Dr. Gisselle Babcock who requests a neurology consult. CONSULTS:  IP CONSULT TO NEUROLOGY  PALLIATIVE CARE EVAL    PROCEDURES:  Unless otherwise noted below, none     Procedures    FINAL IMPRESSION      1. COVID-19 virus infection    2. Hallucination    3.  Transient alteration of awareness          DISPOSITION/PLAN   DISPOSITION Admitted 01/04/2023 04:57:58 PM      PATIENT REFERRED TO:  Ara Nguyen MD  [address] [place] [address]  [id number] [phone]    Follow up on 1/16/2023  AT 1:00    DISCHARGE MEDICATIONS:  Current Discharge Medication List             (Please note that portions of this note were completed with a voice recognition program.  Efforts were made to edit thedictations but occasionally words are mis-transcribed.)    Kristen Blackman MD (electronically signed)  Attending Emergency Physician         Kristen Blackman MD  01/06/23 2115

## 2023-01-05 LAB
BLOOD CULTURE, ROUTINE: NORMAL
CULTURE, BLOOD 2: NORMAL
EKG P AXIS: 63 DEGREES
EKG P-R INTERVAL: 192 MS
EKG Q-T INTERVAL: 462 MS
EKG QRS DURATION: 106 MS
EKG QTC CALCULATION (BAZETT): 462 MS
EKG T AXIS: 135 DEGREES

## 2023-01-05 PROCEDURE — 6370000000 HC RX 637 (ALT 250 FOR IP): Performed by: PSYCHIATRY & NEUROLOGY

## 2023-01-05 PROCEDURE — 97162 PT EVAL MOD COMPLEX 30 MIN: CPT

## 2023-01-05 PROCEDURE — 2580000003 HC RX 258: Performed by: EMERGENCY MEDICINE

## 2023-01-05 PROCEDURE — 2580000003 HC RX 258: Performed by: FAMILY MEDICINE

## 2023-01-05 PROCEDURE — 96372 THER/PROPH/DIAG INJ SC/IM: CPT

## 2023-01-05 PROCEDURE — 97116 GAIT TRAINING THERAPY: CPT

## 2023-01-05 PROCEDURE — 6360000002 HC RX W HCPCS: Performed by: FAMILY MEDICINE

## 2023-01-05 PROCEDURE — 96374 THER/PROPH/DIAG INJ IV PUSH: CPT

## 2023-01-05 PROCEDURE — 97530 THERAPEUTIC ACTIVITIES: CPT

## 2023-01-05 PROCEDURE — G0378 HOSPITAL OBSERVATION PER HR: HCPCS

## 2023-01-05 PROCEDURE — 6360000002 HC RX W HCPCS: Performed by: EMERGENCY MEDICINE

## 2023-01-05 PROCEDURE — 99222 1ST HOSP IP/OBS MODERATE 55: CPT | Performed by: PSYCHIATRY & NEUROLOGY

## 2023-01-05 PROCEDURE — 6370000000 HC RX 637 (ALT 250 FOR IP): Performed by: FAMILY MEDICINE

## 2023-01-05 RX ORDER — GUAIFENESIN 600 MG/1
1200 TABLET, EXTENDED RELEASE ORAL 2 TIMES DAILY
Status: DISCONTINUED | OUTPATIENT
Start: 2023-01-05 | End: 2023-01-10 | Stop reason: HOSPADM

## 2023-01-05 RX ORDER — ZINC SULFATE 50(220)MG
50 CAPSULE ORAL DAILY
Status: DISCONTINUED | OUTPATIENT
Start: 2023-01-05 | End: 2023-01-10 | Stop reason: HOSPADM

## 2023-01-05 RX ORDER — VITAMIN B COMPLEX
1000 TABLET ORAL DAILY
Status: DISCONTINUED | OUTPATIENT
Start: 2023-01-05 | End: 2023-01-10 | Stop reason: HOSPADM

## 2023-01-05 RX ORDER — AMITRIPTYLINE HYDROCHLORIDE 10 MG/1
25 TABLET, FILM COATED ORAL NIGHTLY
Status: DISCONTINUED | OUTPATIENT
Start: 2023-01-05 | End: 2023-01-10 | Stop reason: HOSPADM

## 2023-01-05 RX ADMIN — ISOSORBIDE MONONITRATE 60 MG: 60 TABLET, EXTENDED RELEASE ORAL at 18:18

## 2023-01-05 RX ADMIN — ASPIRIN 81 MG: 81 TABLET, COATED ORAL at 10:53

## 2023-01-05 RX ADMIN — Medication 1000 UNITS: at 15:22

## 2023-01-05 RX ADMIN — ISOSORBIDE MONONITRATE 120 MG: 60 TABLET, EXTENDED RELEASE ORAL at 10:53

## 2023-01-05 RX ADMIN — ONDANSETRON 4 MG: 2 INJECTION INTRAMUSCULAR; INTRAVENOUS at 18:18

## 2023-01-05 RX ADMIN — TIZANIDINE 2 MG: 4 TABLET ORAL at 19:57

## 2023-01-05 RX ADMIN — SODIUM CHLORIDE, PRESERVATIVE FREE 10 ML: 5 INJECTION INTRAVENOUS at 19:58

## 2023-01-05 RX ADMIN — ATORVASTATIN CALCIUM 80 MG: 80 TABLET, FILM COATED ORAL at 19:57

## 2023-01-05 RX ADMIN — GUAIFENESIN 1200 MG: 600 TABLET ORAL at 19:57

## 2023-01-05 RX ADMIN — LEVETIRACETAM 1500 MG: 500 TABLET, FILM COATED ORAL at 19:57

## 2023-01-05 RX ADMIN — RANOLAZINE 1000 MG: 500 TABLET, EXTENDED RELEASE ORAL at 19:58

## 2023-01-05 RX ADMIN — METOPROLOL SUCCINATE 50 MG: 50 TABLET, EXTENDED RELEASE ORAL at 10:53

## 2023-01-05 RX ADMIN — CLONAZEPAM 0.5 MG: 0.5 TABLET ORAL at 19:58

## 2023-01-05 RX ADMIN — AMLODIPINE BESYLATE 2.5 MG: 2.5 TABLET ORAL at 19:58

## 2023-01-05 RX ADMIN — AMLODIPINE BESYLATE 2.5 MG: 2.5 TABLET ORAL at 15:22

## 2023-01-05 RX ADMIN — LEVETIRACETAM 1500 MG: 500 TABLET, FILM COATED ORAL at 10:53

## 2023-01-05 RX ADMIN — PANTOPRAZOLE SODIUM 40 MG: 40 TABLET, DELAYED RELEASE ORAL at 10:53

## 2023-01-05 RX ADMIN — LOSARTAN POTASSIUM 100 MG: 100 TABLET, FILM COATED ORAL at 10:54

## 2023-01-05 RX ADMIN — GUAIFENESIN 1200 MG: 600 TABLET ORAL at 15:22

## 2023-01-05 RX ADMIN — CEFTRIAXONE 1000 MG: 1 INJECTION, POWDER, FOR SOLUTION INTRAMUSCULAR; INTRAVENOUS at 18:18

## 2023-01-05 RX ADMIN — ZINC SULFATE 220 MG (50 MG) CAPSULE 50 MG: CAPSULE at 15:23

## 2023-01-05 RX ADMIN — AMITRIPTYLINE HYDROCHLORIDE 25 MG: 10 TABLET, FILM COATED ORAL at 19:58

## 2023-01-05 RX ADMIN — ENOXAPARIN SODIUM 40 MG: 100 INJECTION SUBCUTANEOUS at 10:54

## 2023-01-05 RX ADMIN — SODIUM CHLORIDE, PRESERVATIVE FREE 10 ML: 5 INJECTION INTRAVENOUS at 09:00

## 2023-01-05 RX ADMIN — DULOXETINE 60 MG: 60 CAPSULE, DELAYED RELEASE ORAL at 18:18

## 2023-01-05 RX ADMIN — RANOLAZINE 1000 MG: 500 TABLET, EXTENDED RELEASE ORAL at 10:53

## 2023-01-05 ASSESSMENT — PAIN DESCRIPTION - LOCATION: LOCATION: PELVIS

## 2023-01-05 ASSESSMENT — PAIN DESCRIPTION - DESCRIPTORS: DESCRIPTORS: DISCOMFORT

## 2023-01-05 ASSESSMENT — PAIN SCALES - GENERAL: PAINLEVEL_OUTOF10: 3

## 2023-01-05 NOTE — PROGRESS NOTES
Ronan Le arrived to room # 425. Presented with: covid/hallucinations  Mental Status: Patient is alert. Vitals:    01/04/23 2029   BP: (!) 139/44   Pulse: 70   Resp: 17   Temp: 98.4 °F (36.9 °C)   SpO2: 95%     Patient safety contract and falls prevention contract reviewed with patient Yes. Oriented Patient to room. Call light within reach. Yes.   Needs, issues or concerns expressed at this time: no.      Electronically signed by Jame Wilson RN on 1/4/2023 at 8:40 PM

## 2023-01-05 NOTE — CONSULTS
[facility] Neurology Consult  ? ? Patient: Susanna Lao  MR#: [id number]  Account Number: [de-identified]   Room: 5862/800-95   YOB: 1945  Date of Progress Note: 1/5/2023  Time of Note 11:03 AM  Attending Physician: Cecil Stevens MD  Consulting Physician: Suma Moreno M.D.  ?  ? CHIEF COMPLAINT: Confusion  ? HISTORY OF PRESENT ILLNESS:   This 68 y.o. female who presents with confusion and also positivity for COVID-19. Has had fever and chills along with a cough for the past couple of days. Has been having visual hallucinations and delusional thinking during that time as well. Has been admitted with the symptoms and is on the [facility] floor. Does have 3+ bacteria in her urine and a culture is pending. Routine blood studies otherwise okay. I have seen her in the office before for a seizure disorder for which she takes Keppra 1500 mg twice a day. Is also on some other medications including Zanaflex, Klonopin, Cymbalta and Elavil. I tried to get her to reduce some of those in the past for cognitive issues and balance. She continues on them for the most part. Records are reviewed in detail. Case discussed with staff. Patient denies any focal signs or symptoms to suggest stroke or seizure. REVIEW OF SYSTEMS:  Constitutional - No fever or chills. No diaphoresis or significant fatigue. HENT - No tinnitus or significant hearing loss. Eyes - no sudden vision change or eye pain  Respiratory - no significant shortness of breath or cough  Cardiovascular - no chest pain No palpitations or significant leg swelling  Gastrointestinal - no abdominal swelling or pain. Genitourinary - No difficulty urinating, dysuria  Musculoskeletal - no back pain or myalgia. Skin - no color change or rash  Neurologic - No seizures. No lateralizing weakness. Hematologic - no easy bruising or excessive bleeding. Psychiatric - no severe anxiety or nervousness. All other review of systems are negative. ?   Past Medical History:      Diagnosis Date    Acute cystitis without hematuria 10/28/2020    Asthma     CAD (coronary artery disease)     Chest pain     Depression with anxiety     Diabetes mellitus (HCC)     GERD (gastroesophageal reflux disease)     Glucose intolerance (impaired glucose tolerance)     Hyperlipidemia     Cholesterol management per Gabby Hammonds M.D.     Hypertension     Lupus (systemic lupus erythematosus) ([facility] [address]     Malaise and fatigue 10/6/2017    Movement disorder     Sees pain management for neck pain and previous surgery    New onset seizure ([facility] [address]     Osteoarthritis     Palliative care patient 07/22/2020    S/P CABG x 3 03/05/2013    PACABG X 3, LIMA-LAD, SVG0OM, SVG-DIAG, RT EVH, DR PATEL    Subdural hematoma      Past Surgical History:      Procedure Laterality Date    4 [facility]  05/18/2011    EF 60%    CARDIAC CATHETERIZATION  01/25/2016    drug eluting stent to RCA    CARDIAC CATHETERIZATION  01/25/2016    CARDIAC CATHETERIZATION  02/19/2016    CARDIAC CATHETERIZATION  08/24/2017    cutting balloon angioplasty ot LAD    CARDIAC CATHETERIZATION  04/13/2020    TWO Stents placed    CATARACT REMOVAL      COLON SURGERY  1992    Polypectomy - 2    COLON SURGERY  2003    Polypectomy - 4    COLON SURGERY  11/2008    Polypectomy - 2    COLON SURGERY  08/2012    Polypectomy - 3    COLONOSCOPY  03/01/2017    Dr Hema Dowling- normal per pt recall    COLONOSCOPY  07/20/2012    polyps, diverticulosis,  4yr recall    CORONARY ANGIOPLASTY  08/2017    cutting balloon angioplasty LAD diagonal    CORONARY ANGIOPLASTY WITH STENT PLACEMENT  03/22/2007    CORONARY ANGIOPLASTY WITH STENT PLACEMENT  08/2007    CORONARY ARTERY BYPASS GRAFT  03/05/2013    PACABG X 3, LIMA-LAD, SVG0OM, SVG-DIAG, RT EVH, DR PATEL    COSMETIC SURGERY      deviated septum and rhinoplasty    CYST REMOVAL  1970    DIAGNOSTIC CARDIAC CATH LAB PROCEDURE      EYE SURGERY  03/2019    cataracts [address] (CERVIX STATUS UNKNOWN)  1983    Partial    NASAL SEPTUM SURGERY  1977    SINUS SURGERY  2003    Nasal Polyps Removed    SPINE SURGERY  03/06/2006    Lumbar Laminectomy L4&5    SPINE SURGERY  03/06/2006    Cervical Fusion - C4,5,6    TONSILLECTOMY  1968    UPPER GASTROINTESTINAL ENDOSCOPY  09/20/2006     Medications in Hospital:     Current Facility-Administered Medications:     Vitamin D (CHOLECALCIFEROL) tablet 1,000 Units, 1,000 Units, Oral, Daily, Zach Lakhani MD    zinc sulfate (ZINCATE) capsule 50 mg, 50 mg, Oral, Daily, Zach Lakhani MD    guaiFENesin [facility]) extended release tablet 1,200 mg, 1,200 mg, Oral, BID, Zach Lakhani MD    sodium chloride flush 0.9 % injection 5-40 mL, 5-40 mL, IntraVENous, 2 times per day, Draek Barber MD, 10 mL at 01/04/23 2119    sodium chloride flush 0.9 % injection 5-40 mL, 5-40 mL, IntraVENous, PRN, Darek Barber MD    0.9 % sodium chloride infusion, , IntraVENous, PRN, Darek Barber MD    enoxaparin (LOVENOX) injection 40 mg, 40 mg, SubCUTAneous, Daily, Darek Barber MD, 40 mg at 01/05/23 1054    acetaminophen (TYLENOL) tablet 650 mg, 650 mg, Oral, Q4H PRN, Darek Barber MD    ondansetron (ZOFRAN-ODT) disintegrating tablet 4 mg, 4 mg, Oral, Q8H PRN **OR** ondansetron (ZOFRAN) injection 4 mg, 4 mg, IntraVENous, Q6H PRN, Darek Barber MD    cefTRIAXone (ROCEPHIN) 1,000 mg in sterile water 10 mL IV syringe, 1,000 mg, IntraVENous, Q24H, Zach Lakhani MD, 1,000 mg at 01/04/23 2119    albuterol sulfate HFA (PROVENTIL;VENTOLIN;PROAIR) 108 (90 Base) MCG/ACT inhaler 2 puff, 2 puff, Inhalation, Q6H PRN, Zach Lakhani MD    amitriptyline (ELAVIL) tablet 50 mg, 50 mg, Oral, Nightly, Zach Lakhani MD, 50 mg at 01/04/23 2120    amLODIPine (NORVASC) tablet 2.5 mg, 2.5 mg, Oral, BID, Zach Lakhani MD, 2.5 mg at 01/04/23 2120    aspirin EC tablet 81 mg, 81 mg, Oral, Daily, Particbrayan Lakhani MD, 81 mg at 01/05/23 1053    clonazePAM (KLONOPIN) tablet 0.5 mg, 0.5 mg, Oral, Nightly, Shamir Mcmahon MD, 0.5 mg at 01/04/23 2120    DULoxetine (CYMBALTA) extended release capsule 60 mg, 60 mg, Oral, QPM, Shamir Mcmahon MD, 60 mg at 01/04/23 2120    levETIRAcetam (KEPPRA) tablet 1,500 mg, 1,500 mg, Oral, BID, Shamir Mcmahon MD, 1,500 mg at 01/05/23 1053    pantoprazole (PROTONIX) tablet 40 mg, 40 mg, Oral, QAM AC, Shamir Mcmahon MD, 40 mg at 01/05/23 1053    tiZANidine (ZANAFLEX) tablet 2 mg, 2 mg, Oral, Nightly, Shamir Mcmahon MD, 2 mg at 01/04/23 2120    ranolazine (RANEXA) extended release tablet 1,000 mg, 1,000 mg, Oral, BID, Shamir Mcmahon MD, 1,000 mg at 01/05/23 1053    nitroGLYCERIN (NITROSTAT) SL tablet 0.4 mg, 0.4 mg, SubLINGual, Q5 Min PRN, Shamir Mcmahon MD    metoprolol succinate (TOPROL XL) extended release tablet 50 mg, 50 mg, Oral, Daily, Shamir Mcmahon MD, 50 mg at 01/05/23 1053    losartan (COZAAR) tablet 100 mg, 100 mg, Oral, Daily, Shamir Mcmahon MD, 100 mg at 01/05/23 1054    atorvastatin (LIPITOR) tablet 80 mg, 80 mg, Oral, Nightly, Shamir Mcmahon MD, 80 mg at 01/04/23 2120    isosorbide mononitrate (IMDUR) extended release tablet 120 mg, 120 mg, Oral, QAM, Shamir Mcmahon MD, 120 mg at 01/05/23 1053    isosorbide mononitrate (IMDUR) extended release tablet 60 mg, 60 mg, Oral, QPM, Shamir Mcmahon MD, 60 mg at 01/04/23 2120  Allergies: Other, Nexium [esomeprazole], Codeine, Darvocet [propoxyphene n-acetaminophen], Hydrocodone-acetaminophen, Lyrica [pregabalin], Morphine, and Percocet [oxycodone-acetaminophen]  Social History:   TOBACCO:  reports that she quit smoking about 55 years ago. Her smoking use included cigarettes. She has a 1.00 pack-year smoking history. She has never used smokeless tobacco.  ETOH:  reports no history of alcohol use.   Family History:       Problem Relation Age of Onset    Heart Disease Mother     High Blood Pressure Mother     Diabetes Mother     High Cholesterol Mother     Colon Polyps Mother     Diabetes Brother     Colon Polyps Maternal Grandmother     Colon Polyps Maternal Grandfather     Colon Cancer Neg Hx     Esophageal Cancer Neg Hx     Liver Cancer Neg Hx      ? ?  Physical Exam:    Vitals: BP (!) 151/69   Pulse 76   Temp 96.9 °F (36.1 °C) (Temporal)   Resp 18   Ht 5' 5\" (1.651 m)   Wt 167 lb 4 oz (75.9 kg)   SpO2 95%   BMI 27.83 kg/m²   Constitutional - well developed, well nourished. Eyes - conjunctiva normal. Pupils react to light  Ear, nose, throat -hearing intact to finger rub No scars, masses, or lesions over external nose or ears, no atrophy of tongue  Neck-symmetric, no masses noted, no jugular vein distension  Respiration- chest wall appears symmetric, good expansion,   normal effort without use of accessory muscles  Musculoskeletal - no significant wasting of muscles noted, no bony deformities  Extremities-no clubbing, cyanosis or edema  Skin - warm, dry, and intact. No rash, erythema, or pallor. Psychiatric - mood, affect, and behavior appear normal.   Neurological exam  Awake, alert, fluent oriented x 3 appropriate affect  Attention and concentration appear appropriate. Has mild difficulty with right/left confusion and otherwise did well.   Recent and remote memory appears unremarkable  Speech normal without dysarthria  No clear issues with language of fund of knowledge  Cranial Nerve Exam   CN II- Visual fields grossly unremarkable  CN III, IV,VI-EOMI, No nystagmus, conjugate eye movements, no ptosis  CN VII-no facial assymetry  CN VIII-Hearing intact to voice  CN IX and X- Palate elevates in midline  CN XI-good shoulder shrug  CN XII-Tongue midline with no fasciculations or fibrillations  Motor Exam  V/V throughout upper and lower extremities bilaterally, no cogwheeling, normal tone  Sensory Exam  Sensation intact to light touch and temperature upper and lower extremities bilaterally  Reflexes trace throughout  Tremors- no tremors in hands or head noted  Gait  Not tested  Coordination  Finger to nose-unremarkable  ? ?  CBC:   Recent Labs     01/04/23  1328   WBC 4.4*   HGB 13.2        BMP:   Recent Labs     01/04/23  1325 01/04/23  1328   NA  --  139   K  --  4.1   CL  --  105   CO2  --  22   BUN  --  14   CREATININE  --  0.7   GLUCOSE 125 132*     Hepatic:   Recent Labs     01/04/23  1328   AST 18   ALT 21   BILITOT 0.5   ALKPHOS 76     Lipids: No results for input(s): CHOL, HDL in the last 72 hours. Invalid input(s): LDLCALCU  INR: No results for input(s): INR in the last 72 hours. ?  ?  Assessment and Plan   1. Encephalopathy. Currently only minimally confused. Suspect related to [place]. We will go ahead and reduce amitriptyline from 50 to 25 mg. Continue other medications as is and continue to follow along with you. Defer treatment of UTI to PCP.   ?  ?      Diana Livingston MD  Board Certified in Neurology

## 2023-01-05 NOTE — CONSULTS
Palliative Care:      Pt known to Palliative Care. Pleasant 67 y/o lady who presented from home with AMS and is positive for COVID. She was having hallucinations at home. During this visit she is alert and able to answer questions r/t life at home. Pt lives with one of her daughters, another daughter is also involved in her care and she has someone with her most of the time. She uses a rollator at home. Dr. Miller Ling in to visit and waiting for neurology to see and make recommendations. Past Medical History:        Past Medical History:   Diagnosis Date    Acute cystitis without hematuria 10/28/2020    Asthma     CAD (coronary artery disease)     Chest pain     Depression with anxiety     Diabetes mellitus (HCC)     GERD (gastroesophageal reflux disease)     Glucose intolerance (impaired glucose tolerance)     Hyperlipidemia     Cholesterol management per Blanca Or M.D. Hypertension     Lupus (systemic lupus erythematosus) (HCC)     Malaise and fatigue 10/6/2017    Movement disorder     Sees pain management for neck pain and previous surgery    New onset seizure ([facility] [address]     Osteoarthritis     Palliative care patient 07/22/2020    S/P CABG x 3 03/05/2013    PACABG X 3, LIMA-LAD, SVG0OM, SVG-DIAG, RT EVH, DR PATEL    Subdural hematoma        Advance Directives:    Full code  Pt has a living will and POA on file  Daughters are co-POA     Pain/Other Symptoms:  pt is comfortable at this time. She does report chronic neck and back pain and is followed by pain management. Plan:   continue medical treatments and monitor for improvement. Patient/family discussion r/t goals:  (what does living well look like to you?)  Goal is to return home with family    Review of any needed services:  TBD    Will continue to follow POC and support pt.             Electronically signed by Nathalia Bolanos RN on 1/5/2023 at 11:08 AM

## 2023-01-05 NOTE — PROGRESS NOTES
Patient arrived to room 425. Tolerated transport well. Assisted to bed x1 stand by. PCA will obtain vitals. Will complete admission assessment. Bed alarm on, call light within reach.    Electronically signed by Louis Turner RN on 1/4/2023 at 8:05 PM

## 2023-01-05 NOTE — PROGRESS NOTES
Attempted to call neurology consult several times and was unsuccessful. Line was busy each time. Will attempt again later. If unable to get a hold of anyone, will pass along in report to oncoming nurse of needed consultation.    Electronically signed by Randy Lara RN on 1/4/2023 at 8:15 PM

## 2023-01-05 NOTE — PLAN OF CARE
Problem: Skin/Tissue Integrity  Goal: Absence of new skin breakdown  Description: 1. Monitor for areas of redness and/or skin breakdown  2. Assess vascular access sites hourly  3. Every 4-6 hours minimum:  Change oxygen saturation probe site  4. Every 4-6 hours:  If on nasal continuous positive airway pressure, respiratory therapy assess nares and determine need for appliance change or resting period.   1/5/2023 0110 by Skyla Reeder RN  Outcome: Progressing  1/4/2023 1829 by Rand Leavitt RN  Outcome: Progressing     Problem: Chronic Conditions and Co-morbidities  Goal: Patient's chronic conditions and co-morbidity symptoms are monitored and maintained or improved  1/5/2023 0110 by Skyla Reeder RN  Outcome: Progressing  Flowsheets (Taken 1/4/2023 2123)  Care Plan - Patient's Chronic Conditions and Co-Morbidity Symptoms are Monitored and Maintained or Improved: Monitor and assess patient's chronic conditions and comorbid symptoms for stability, deterioration, or improvement  1/4/2023 1829 by Rand Leavitt RN  Outcome: Progressing

## 2023-01-05 NOTE — ED NOTES
Attempted to call report to 4th floor, placed on hold x 6 minutes, will attempt to call back.       Mariel Alan RN  01/04/23 [id number]

## 2023-01-05 NOTE — ACP (ADVANCE CARE PLANNING)
Advance Care Planning     Advance Care Planning Activator (Inpatient)  Conversation Note      Date of ACP Conversation: 1/5/2023     Conversation Conducted with: Pt:  Anson Dobbins    ACP Activator: Mitchell Tejada RN    Health Care Decision Maker:     Current Designated Health Care Decision Maker:     Primary Decision Maker: Sheryl Richardson - Child - [phone]    Primary Decision Maker: Xavi Cortez - Child - [phone]    Care Preferences    Ventilation: \"If you were in your present state of health and suddenly became very ill and were unable to breathe on your own, what would your preference be about the use of a ventilator (breathing machine) if it were available to you? \"     YES          Resuscitation  \"CPR works best to restart the heart when there is a sudden event, like a heart attack, in someone who is otherwise healthy. Unfortunately, CPR does not typically restart the heart for people who have serious health conditions or who are very sick. \"    \"In the event your heart stopped as a result of an underlying serious health condition, would you want attempts to be made to restart your heart (answer \"yes\" for attempt to resuscitate) or would you prefer a natural death (answer \"no\" for do not attempt to resuscitate)? \"     YES               Conversation Outcomes:  [x] ACP discussion completed  [x] Existing advance directive reviewed with patient; no changes to patient's previously recorded wishes.

## 2023-01-05 NOTE — PROGRESS NOTES
Physical Therapy  Facility/Department: [facility] 4 ONCOLOGY UNIT  Physical Therapy Initial Assessment    Name: Zana Dyer  : 1945  MRN: [id number]  Date of Service: 2023    Discharge Recommendations:  Continue to assess pending progress, Patient would benefit from continued therapy after discharge ([address] [place] [address])          Patient Diagnosis(es): The primary encounter diagnosis was COVID-19 virus infection. Diagnoses of Hallucination and Transient alteration of awareness were also pertinent to this visit. Past Medical History:  has a past medical history of Acute cystitis without hematuria, Asthma, CAD (coronary artery disease), Chest pain, Depression with anxiety, Diabetes mellitus ([address] GERD (gastroesophageal reflux disease), Glucose intolerance (impaired glucose tolerance), Hyperlipidemia, Hypertension, Lupus (systemic lupus erythematosus) ([address] Malaise and fatigue, Movement disorder, New onset seizure ([address] Osteoarthritis, Palliative care patient, S/P CABG x 3, and Subdural hematoma. Past Surgical History:  has a past surgical history that includes Cardiac catheterization (2011); Appendectomy (); Tonsillectomy (); cyst removal (); Nasal septum surgery (); Hysterectomy (); Hemorrhoid surgery (); sinus surgery (); Spine surgery (2006); Spine surgery (2006); Coronary angioplasty with stent (2007); Coronary angioplasty with stent (2007); Colon surgery (); Colon surgery (); Colon surgery (2008); Colon surgery (2012); Coronary artery bypass graft (2013); back surgery; Colonoscopy (2017); Cosmetic surgery; Cardiac catheterization (2016); Cardiac catheterization (2016); Cardiac catheterization (2016); Cardiac catheterization (2017); Coronary angioplasty (2017); Diagnostic Cardiac Cath Lab Procedure; eye surgery (2019); Cardiac catheterization (2020);  Cataract removal; Colonoscopy (07/20/2012); and Upper gastrointestinal endoscopy (09/20/2006). Assessment   Body Structures, Functions, Activity Limitations Requiring Skilled Therapeutic Intervention: Decreased functional mobility ; Decreased strength;Decreased endurance;Decreased balance;Decreased posture;Decreased safe awareness  Assessment: pt WOULD BENEFIT FROM SKILLED PT IN THIS SETTING TO ADDRESS HER MOBILITY DEFICITS. IF Pt WERE TO DC HOME AT CURRENT LEVEL OF FUNCTION, SHE WOULD REQUIRE 24/7 ASSIST FOR ALL ADL'S AND MOBILITY. Pt WOULD ALSO BE A GOOD CANDIDATE FOR SHORT TERM REHAB. Therapy Prognosis: Good;Fair  Decision Making: Medium Complexity  Requires PT Follow-Up: Yes  Activity Tolerance  Activity Tolerance: Patient tolerated treatment well     Plan   Physcial Therapy Plan  General Plan: 5-7 times per week  Therapy Duration: 2 Weeks  Current Treatment Recommendations: Strengthening, Balance training, Functional mobility training, Transfer training, Cognitive/Perceptual training, Cognitive reorientation, Gait training, Safety education & training, Therapeutic activities, Positioning  Safety Devices  Type of Devices: Call light within reach, Gait belt, Left in bed, Bed alarm in place     Restrictions        Subjective   General  Diagnosis: UTI, AMS, COVID  General Comment  Comments: INCONTINENT OF URINE. WEARING A BRIEF  Subjective  Subjective: pt STATES SHE IS READY TO GET OOB AND AMB TO BR AND IN ROOM.  DENIES PAIN         Social/Functional History  Social/Functional History  Lives With: Daughter  Type of Home: House  Home Layout: One level  Home Access: Ramped entrance  Bathroom Shower/Tub: Tub/Shower unit, Shower chair with back  Bathroom Equipment: Grab bars in shower, Shower chair  Home Equipment: Rollator  Receives Help From: Family  ADL Assistance: Needs assistance  Bath: Supervision  Ambulation Assistance: Independent  Transfer Assistance: Independent  Active : No  Patient's  Info: family  Vision/Hearing       Cognition   Cognition  Overall Cognitive Status: Exceptions  Arousal/Alertness: Delayed responses to stimuli  Following Commands: Follows one step commands with increased time; Follows one step commands with repetition  Safety Judgement: Decreased awareness of need for assistance  Problem Solving: Assistance required to generate solutions;Assistance required to implement solutions  Insights: Decreased awareness of deficits  Initiation: Requires cues for some  Sequencing: Requires cues for some     Objective   Heart Rate: 76  Heart Rate Source: Monitor  BP: (!) 151/69  MAP (Calculated): 96  Resp: 18  SpO2: 95 %  O2 Device: None (Room air)     Observation/Palpation  Observation: incontinent of urine        AROM RLE (degrees)  RLE AROM: WFL  AROM LLE (degrees)  LLE AROM : WFL  Strength Other  Other: able to move le's against gravity           Bed mobility  Rolling to Right: Minimal assistance; Moderate assistance  Supine to Sit: Minimal assistance; Moderate assistance  Sit to Supine: Minimal assistance; Moderate assistance  Transfers  Sit to Stand: Minimal Assistance  Stand to Sit: Minimal Assistance  Ambulation  Surface: Level tile  Device: Rolling Walker  Assistance: Minimal assistance  Quality of Gait: allows walker to get too far in front of her. flexed posture  Gait Deviations: Slow Carmenza;Decreased step height;Decreased step length;Shuffles  Distance: 10 ft x 2.  Pt declined amb farther due to fatigue                   Goals  Short Term Goals  Time Frame for Short Term Goals: 2 wks  Short Term Goal 1: SUP<>SIT, SBA  Short Term Goal 2: SIT<>STAND, SBA  Short Term Goal 3:  FT WITH RW, SBA       Education  Patient Education  Education Given To: Patient  Education Provided: Role of Therapy;Plan of Care  Education Method: Demonstration;Verbal  Barriers to Learning: Cognition  Education Outcome: Continued education needed      Therapy Time   Individual Concurrent Group Co-treatment Time In           Time Out           Minutes                   Ezekiel Meehan PT     Electronically signed by Ezekiel Meehan PT on 1/5/2023 at 2:58 PM

## 2023-01-05 NOTE — PROGRESS NOTES
4 Eyes Skin Assessment    Vilma Strong is being assessed upon: Admission    I agree that Quentin Diamond, RN, along with Jen Bermudez, RN (either 2 RN's or 1 LPN and 1 RN) have performed a thorough Head to Toe Skin Assessment on the patient. ALL assessment sites listed below have been assessed. Areas assessed by both nurses:     [x]   Head, Face, and Ears   [x]   Shoulders, Back, and Chest  [x]   Arms, Elbows, and Hands   [x]   Coccyx, Sacrum, and Ischium  [x]   Legs, Feet, and Heels    Does the Patient have Skin Breakdown?  No    Sunday Prevention initiated: No  Wound Care Orders initiated: No    WOC nurse consulted for Pressure Injury (Stage 3,4, Unstageable, DTI, NWPT, and Complex wounds) and New or Established Ostomies: No        Primary Nurse eSignature: Hector Naranjo RN on 1/4/2023 at 11:11 PM      Co-Signer eSignature: Electronically signed by Tam Michel RN on 1/5/23 at 12:45 AM CST

## 2023-01-05 NOTE — PLAN OF CARE
Problem: Skin/Tissue Integrity  Goal: Absence of new skin breakdown  Description: 1. Monitor for areas of redness and/or skin breakdown  2. Assess vascular access sites hourly  3. Every 4-6 hours minimum:  Change oxygen saturation probe site  4. Every 4-6 hours:  If on nasal continuous positive airway pressure, respiratory therapy assess nares and determine need for appliance change or resting period.   Outcome: Progressing     Problem: Chronic Conditions and Co-morbidities  Goal: Patient's chronic conditions and co-morbidity symptoms are monitored and maintained or improved  Outcome: Progressing     Problem: Discharge Planning  Goal: Discharge to home or other facility with appropriate resources  Outcome: Progressing     Problem: Safety - Adult  Goal: Free from fall injury  Outcome: Progressing

## 2023-01-05 NOTE — CARE COORDINATION
Patient Contact Information:  [address]  [phone] (home)   Above information verified? [x]   Yes  []   No    Have you been vaccinated for COVID-19 (SARS-CoV-2)? []   Yes  [x]   No                   If so, when? Which :  []   Pfizer-BioNTech  []   Moderna  []   Donnamarie   []   Other:       Pharmacy:    [place] [id number] [id number] [facility], [phone] [facility] [phone]  [address] [facility]  [address] [address]  Phone: [phone] Fax: [phone] [place] [phone]  f [phone]   [address] [address]  Phone: [phone] Fax: [phone]      Active with HD/PD prior to admission:           []   Yes  [x]   No  HD Center:       Financial:  Payor: Mendez Fong / Plan: MEDICARE PART A AND B / Product Type: *No Product type* /     Pre-Cert required for SNF:   []   Yes  [x]   No    Patient Deficits:  [x]   Yes - confusion  []   No    If yes:  []   Confusion/Memory  []   Visual  []   Motor/Sensory         []   Right arm         []   Right leg         []   Left arm         []   Left leg  []   Language/Speech         []   Aphasia         []   Dysarthria         []   Swallow    NIH Stroke Scale  Interval: Baseline (admission to the floor)  Level of Consciousness (1a): Alert  LOC Questions (1b): Answers both correctly  LOC Commands (1c): Performs both tasks correctly  Best Gaze (2): Normal  Visual (3): No visual loss  Facial Palsy (4): Normal symmetrical movement  Motor Arm, Left (5a): No drift  Motor Arm, Right (5b): No drift  Motor Leg, Left (6a): No drift  Motor Leg, Right (6b):  No drift  Limb Ataxia (7): Absent  Sensory (8): Normal  Best Language (9): No aphasia  Dysarthria (10): Normal  Extinction and Inattention (11): No abnormality  Total: 0    [place] Coma Scale  Eye Opening: Spontaneous  Best Verbal Response: Confused  Best Motor Response: Obeys commands  Bree Coma Scale Score: 14    Patient Deficit Notes: Additional CM/SW Notes:   Patient lives at home with her daughter, Avery Gerardo, and plans to return home at discharge. Her daughter, Grayson Jaime, stays with patient from Friday evening through Sunday. Per Avery Juárezaside, patient is only left alone when she runs errands. Prior to admission, patient used a Rollator. Patient is agreeable to Home Health. [facility].  Patient declined to participate in CHW program.      Aubrie Zapata, STEVE  [address] [place] [address] Management     01/05/23 3687   Service Assessment   Cognition Alert   History Provided By Child/Family   Primary [address]   PCP Verified by CM Yes   Last Visit to PCP Within last 3 months   Prior Functional Level Assistance with the following:;Cooking;Housework; Shopping   Current Functional Level Assistance with the following:;Cooking;Housework; Shopping   Can patient return to prior living arrangement Yes   Ability to make needs known: Good   Family able to assist with home care needs: Yes   Would you like for me to discuss the discharge plan with any other family members/significant others, and if so, who? Yes  (daughter, Avery Gerardo)   Financial Resources   (no financial needs identified)   Social/Functional History   Lives With Daughter   Type of [facility] One level   [facility] entrance   Bathroom Shower/Tub Tub/Shower unit; Shower chair with back   [employer] bars in shower; [address] Help From Family   ADL Assistance Needs assistance   Lourdes Matias Supervision   Ambulation Assistance Independent   Transfer Assistance Independent   Active  No   Patient's  Info family   Discharge Planning   Type of Residence House   Living Arrangements Children   Current Services Prior To Admission Durable Medical Equipment   Current DME Prior to [address]   DME Ordered? No   Potential Assistance Purchasing Medications No   Type of Home Care Services OT;PT;Nursing Services   Patient expects to be discharged to: [address] Discharge   Services At/After Discharge Home Health   Confirm Follow Up Transport Family   Condition of Participation: Discharge Planning   The Patient and/or Patient Representative was provided with a Choice of Provider? Patient Representative   Name of the Patient Representative who was provided with the Choice of Provider and agrees with the Discharge Plan? Bran Grubbs   The Patient and/Or Patient Representative agree with the Discharge Plan?  Yes

## 2023-01-06 LAB
ALBUMIN SERPL-MCNC: 3.8 G/DL (ref 3.5–5.2)
ALP BLD-CCNC: 56 U/L (ref 35–104)
ALT SERPL-CCNC: 16 U/L (ref 5–33)
ANION GAP SERPL CALCULATED.3IONS-SCNC: 13 MMOL/L (ref 7–19)
AST SERPL-CCNC: 15 U/L (ref 5–32)
BASOPHILS ABSOLUTE: 0 K/UL (ref 0–0.2)
BASOPHILS RELATIVE PERCENT: 0.4 % (ref 0–1)
BILIRUB SERPL-MCNC: 0.4 MG/DL (ref 0.2–1.2)
BUN BLDV-MCNC: 15 MG/DL (ref 8–23)
CALCIUM SERPL-MCNC: 8.4 MG/DL (ref 8.8–10.2)
CHLORIDE BLD-SCNC: 105 MMOL/L (ref 98–111)
CO2: 22 MMOL/L (ref 22–29)
CREAT SERPL-MCNC: 0.8 MG/DL (ref 0.5–0.9)
EOSINOPHILS ABSOLUTE: 0 K/UL (ref 0–0.6)
EOSINOPHILS RELATIVE PERCENT: 0.6 % (ref 0–5)
GFR SERPL CREATININE-BSD FRML MDRD: >60 ML/MIN/{1.73_M2}
GLUCOSE BLD-MCNC: 145 MG/DL (ref 74–109)
HCT VFR BLD CALC: 33.4 % (ref 37–47)
HEMOGLOBIN: 11.3 G/DL (ref 12–16)
IMMATURE GRANULOCYTES #: 0 K/UL
LYMPHOCYTES ABSOLUTE: 1 K/UL (ref 1.1–4.5)
LYMPHOCYTES RELATIVE PERCENT: 19.7 % (ref 20–40)
MCH RBC QN AUTO: 32.2 PG (ref 27–31)
MCHC RBC AUTO-ENTMCNC: 33.8 G/DL (ref 33–37)
MCV RBC AUTO: 95.2 FL (ref 81–99)
MONOCYTES ABSOLUTE: 0.7 K/UL (ref 0–0.9)
MONOCYTES RELATIVE PERCENT: 12.9 % (ref 0–10)
NEUTROPHILS ABSOLUTE: 3.5 K/UL (ref 1.5–7.5)
NEUTROPHILS RELATIVE PERCENT: 65.8 % (ref 50–65)
PDW BLD-RTO: 14.1 % (ref 11.5–14.5)
PLATELET # BLD: 111 K/UL (ref 130–400)
PMV BLD AUTO: 8.5 FL (ref 9.4–12.3)
POTASSIUM SERPL-SCNC: 3.9 MMOL/L (ref 3.5–5)
RBC # BLD: 3.51 M/UL (ref 4.2–5.4)
SODIUM BLD-SCNC: 140 MMOL/L (ref 136–145)
TOTAL PROTEIN: 5.3 G/DL (ref 6.6–8.7)
WBC # BLD: 5.3 K/UL (ref 4.8–10.8)

## 2023-01-06 PROCEDURE — 94760 N-INVAS EAR/PLS OXIMETRY 1: CPT

## 2023-01-06 PROCEDURE — 80053 COMPREHEN METABOLIC PANEL: CPT

## 2023-01-06 PROCEDURE — 85025 COMPLETE CBC W/AUTO DIFF WBC: CPT

## 2023-01-06 PROCEDURE — 97530 THERAPEUTIC ACTIVITIES: CPT

## 2023-01-06 PROCEDURE — 2580000003 HC RX 258: Performed by: EMERGENCY MEDICINE

## 2023-01-06 PROCEDURE — 1210000000 HC MED SURG R&B

## 2023-01-06 PROCEDURE — 2580000003 HC RX 258: Performed by: FAMILY MEDICINE

## 2023-01-06 PROCEDURE — 36415 COLL VENOUS BLD VENIPUNCTURE: CPT

## 2023-01-06 PROCEDURE — 99231 SBSQ HOSP IP/OBS SF/LOW 25: CPT | Performed by: PSYCHIATRY & NEUROLOGY

## 2023-01-06 PROCEDURE — 6370000000 HC RX 637 (ALT 250 FOR IP): Performed by: FAMILY MEDICINE

## 2023-01-06 PROCEDURE — 97116 GAIT TRAINING THERAPY: CPT

## 2023-01-06 PROCEDURE — 6370000000 HC RX 637 (ALT 250 FOR IP): Performed by: EMERGENCY MEDICINE

## 2023-01-06 PROCEDURE — 6360000002 HC RX W HCPCS: Performed by: FAMILY MEDICINE

## 2023-01-06 PROCEDURE — 6360000002 HC RX W HCPCS: Performed by: EMERGENCY MEDICINE

## 2023-01-06 PROCEDURE — 97165 OT EVAL LOW COMPLEX 30 MIN: CPT

## 2023-01-06 PROCEDURE — 6370000000 HC RX 637 (ALT 250 FOR IP): Performed by: PSYCHIATRY & NEUROLOGY

## 2023-01-06 RX ORDER — ENOXAPARIN SODIUM 100 MG/ML
30 INJECTION SUBCUTANEOUS 2 TIMES DAILY
Status: DISCONTINUED | OUTPATIENT
Start: 2023-01-07 | End: 2023-01-10 | Stop reason: HOSPADM

## 2023-01-06 RX ADMIN — RANOLAZINE 1000 MG: 500 TABLET, EXTENDED RELEASE ORAL at 20:11

## 2023-01-06 RX ADMIN — SODIUM CHLORIDE, PRESERVATIVE FREE 10 ML: 5 INJECTION INTRAVENOUS at 20:17

## 2023-01-06 RX ADMIN — SODIUM CHLORIDE, PRESERVATIVE FREE 10 ML: 5 INJECTION INTRAVENOUS at 10:00

## 2023-01-06 RX ADMIN — ALBUTEROL SULFATE 2 PUFF: 90 AEROSOL, METERED RESPIRATORY (INHALATION) at 08:00

## 2023-01-06 RX ADMIN — CEFTRIAXONE 1000 MG: 1 INJECTION, POWDER, FOR SOLUTION INTRAMUSCULAR; INTRAVENOUS at 18:34

## 2023-01-06 RX ADMIN — ZINC SULFATE 220 MG (50 MG) CAPSULE 50 MG: CAPSULE at 10:59

## 2023-01-06 RX ADMIN — Medication 1000 UNITS: at 10:59

## 2023-01-06 RX ADMIN — ASPIRIN 81 MG: 81 TABLET, COATED ORAL at 10:59

## 2023-01-06 RX ADMIN — ATORVASTATIN CALCIUM 80 MG: 80 TABLET, FILM COATED ORAL at 20:16

## 2023-01-06 RX ADMIN — ENOXAPARIN SODIUM 40 MG: 100 INJECTION SUBCUTANEOUS at 10:59

## 2023-01-06 RX ADMIN — TIZANIDINE 2 MG: 4 TABLET ORAL at 20:12

## 2023-01-06 RX ADMIN — DULOXETINE 60 MG: 60 CAPSULE, DELAYED RELEASE ORAL at 18:34

## 2023-01-06 RX ADMIN — GUAIFENESIN 1200 MG: 600 TABLET ORAL at 10:59

## 2023-01-06 RX ADMIN — LEVETIRACETAM 1500 MG: 500 TABLET, FILM COATED ORAL at 20:12

## 2023-01-06 RX ADMIN — CLONAZEPAM 0.5 MG: 0.5 TABLET ORAL at 20:12

## 2023-01-06 RX ADMIN — ACETAMINOPHEN 650 MG: 325 TABLET ORAL at 20:11

## 2023-01-06 RX ADMIN — GUAIFENESIN 1200 MG: 600 TABLET ORAL at 20:11

## 2023-01-06 RX ADMIN — LEVETIRACETAM 1500 MG: 500 TABLET, FILM COATED ORAL at 10:59

## 2023-01-06 RX ADMIN — RANOLAZINE 1000 MG: 500 TABLET, EXTENDED RELEASE ORAL at 10:59

## 2023-01-06 RX ADMIN — AMITRIPTYLINE HYDROCHLORIDE 25 MG: 10 TABLET, FILM COATED ORAL at 20:11

## 2023-01-06 RX ADMIN — PANTOPRAZOLE SODIUM 40 MG: 40 TABLET, DELAYED RELEASE ORAL at 05:22

## 2023-01-06 ASSESSMENT — PAIN DESCRIPTION - DESCRIPTORS: DESCRIPTORS: POUNDING

## 2023-01-06 ASSESSMENT — PAIN DESCRIPTION - LOCATION: LOCATION: HAND

## 2023-01-06 ASSESSMENT — PAIN SCALES - GENERAL: PAINLEVEL_OUTOF10: 8

## 2023-01-06 NOTE — PROGRESS NOTES
Patient:   Carmelo Reed  MR#:    [id number]   Room:    6682/200-05   YOB: 1945  Date of Progress Note: 1/6/2023  Time of Note                           7:52 AM  Consulting Physician:   Lana Young M.D. Attending Physician:  Zelda Mathur MD     CHIEF COMPLAINT: Confusion  Subjective  This 68 y.o. female who presents with confusion and also positivity for COVID-19. Has had fever and chills along with a cough for the past couple of days. Has been having visual hallucinations and delusional thinking during that time as well. Has been admitted with the symptoms and is on the [facility] floor. Does have 3+ bacteria in her urine and a culture is pending. Routine blood studies otherwise okay. I have seen her in the office before for a seizure disorder for which she takes Keppra 1500 mg twice a day. Is also on some other medications including Zanaflex, Klonopin, Cymbalta and Elavil. I tried to get her to reduce some of those in the past for cognitive issues and balance. She continues on them for the most part. Patient denies any focal signs or symptoms to suggest stroke or seizure. No new issues overnight  REVIEW OF SYSTEMS:  Constitutional: No fevers No chills  Neck:No stiffness  Respiratory: No shortness of breath  Cardiovascular: No chest pain No palpitations  Gastrointestinal: No abdominal pain    Genitourinary: No Dysuria  Neurological: No headache, no seizures      PHYSICAL EXAM:  BP (!) 114/56   Pulse 65   Temp 98.1 °F (36.7 °C)   Resp 18   Ht 5' 5\" (1.651 m)   Wt 167 lb 4 oz (75.9 kg)   SpO2 91%   BMI 27.83 kg/m²     Constitutional: she appears well-developed and well-nourished. Eyes - conjunctiva normal.  Pupils react to light  Ear, nose, throat -hearing intact to voice.  No scars, masses, or lesions over external nose or ears, no atrophy of tongue  Neck-symmetric, no masses noted, no jugular vein distension  Respiration- chest wall appears symmetric, good expansion,   normal effort without use of accessory muscles  Cardiovascular- RRR  Musculoskeletal - no significant wasting of muscles noted, no bony deformities, gait no gross ataxia  Extremities-no clubbing, cyanosis or edema  Skin - warm, dry, and intact. No rash, erythema, or pallor. Psychiatric - mood, affect, and behavior appear normal.      Neurology  NEUROLOGICAL EXAM:      Mental status   Awake, alert, fluent oriented x 3 appropriate affect  Attention and concentration appear appropriate  Recent and remote memory appears unremarkable  Speech normal without dysarthria  No clear issues with language  Able to follow complex commands. Missed the day of the week by 1. Cranial Nerves   CN II- Visual fields grossly unremarkable  CN III, IV,VI-EOMI, No nystagmus, conjugate eye movements, no ptosis  CN VII-no facial asymmetry  CN VIII-Hearing intact   CN IX and X- Palate elevates in midline  CN XI-good shoulder shrug  CN XII-Tongue midline with no fasciculations or fibrillations          Motor function  Antigravity x 4         Nursing/pcp notes, imaging,labs and vitals reviewed.          Lab Results   Component Value Date    WBC 5.3 01/06/2023    HGB 11.3 (L) 01/06/2023    HCT 33.4 (L) 01/06/2023    MCV 95.2 01/06/2023     (L) 01/06/2023     Lab Results   Component Value Date     01/06/2023    K 3.9 01/06/2023     01/06/2023    CO2 22 01/06/2023    BUN 15 01/06/2023    CREATININE 0.8 01/06/2023    GLUCOSE 145 (H) 01/06/2023    CALCIUM 8.4 (L) 01/06/2023    PROT 5.3 (L) 01/06/2023    LABALBU 3.8 01/06/2023    BILITOT 0.4 01/06/2023    ALKPHOS 56 01/06/2023    AST 15 01/06/2023    ALT 16 01/06/2023    LABGLOM >60 01/06/2023    GFRAA >59 05/09/2022    GLOB 1.8 11/19/2016     Lab Results   Component Value Date    INR 0.97 10/27/2020    INR 0.99 10/25/2020    INR 0.96 10/24/2020     Lab Results   Component Value Date    CRP 0.05 05/18/2011       PT,OT and/or speech notes reviewed:      RECORD REVIEW: Previous medical records, medications were reviewed at today's visit    IMPRESSION:   Encephalopathy. Has some baseline mild cognitive impairment and I suspect her recent difficulties are related to COVID. Amitriptyline has been reduced. Also being treated for UTI. No further work-up planned at this time. Will see on Monday if here.

## 2023-01-06 NOTE — PLAN OF CARE
Problem: Skin/Tissue Integrity  Goal: Absence of new skin breakdown  Description: 1. Monitor for areas of redness and/or skin breakdown  2. Assess vascular access sites hourly  3. Every 4-6 hours minimum:  Change oxygen saturation probe site  4. Every 4-6 hours:  If on nasal continuous positive airway pressure, respiratory therapy assess nares and determine need for appliance change or resting period.   Outcome: Progressing     Problem: Chronic Conditions and Co-morbidities  Goal: Patient's chronic conditions and co-morbidity symptoms are monitored and maintained or improved  Outcome: Progressing     Problem: Discharge Planning  Goal: Discharge to home or other facility with appropriate resources  Outcome: Progressing     Problem: Safety - Adult  Goal: Free from fall injury  Outcome: Progressing     Problem: Pain  Goal: Verbalizes/displays adequate comfort level or baseline comfort level  Outcome: Progressing

## 2023-01-06 NOTE — H&P
History and Physical      CHIEF COMPLAINT:  AMS    Reason for Admission:  COVID +, AMS    History Obtained From:  patient, chart    HISTORY OF PRESENT ILLNESS:      The patient is a 68 y.o. female who was admitted from ER with [place]. She was brought to ER with confusion, hallucinations. She has had a cough, she says for 1 day. No SOA. No CP. She has no GI complaints. No dysuria. She is unsure of fever. Past Medical History:        Diagnosis Date    Acute cystitis without hematuria 10/28/2020    Asthma     CAD (coronary artery disease)     Chest pain     Depression with anxiety     Diabetes mellitus (HCC)     GERD (gastroesophageal reflux disease)     Glucose intolerance (impaired glucose tolerance)     Hyperlipidemia     Cholesterol management per Taran Turner M.D.     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    PACABG X 3, LIMA-LAD, SVG0OM, SVG-DIAG, RT EVH, DR PATEL    Subdural hematoma      Past Surgical History:        Procedure Laterality Date    [facility]  05/18/2011    EF 60%    CARDIAC CATHETERIZATION  01/25/2016    drug eluting stent to RCA    CARDIAC CATHETERIZATION  01/25/2016    CARDIAC CATHETERIZATION  02/19/2016    CARDIAC CATHETERIZATION  08/24/2017    cutting balloon angioplasty ot LAD    CARDIAC CATHETERIZATION  04/13/2020    TWO Stents placed    CATARACT REMOVAL      COLON SURGERY  1992    Polypectomy - 2    COLON SURGERY  2003    Polypectomy - 4    COLON SURGERY  11/2008    Polypectomy - 2    COLON SURGERY  08/2012    Polypectomy - 3    COLONOSCOPY  03/01/2017    Dr Cristian Griffith- normal per pt recall    COLONOSCOPY  07/20/2012    polyps, diverticulosis,  4yr recall    CORONARY ANGIOPLASTY  08/2017 cutting balloon angioplasty LAD diagonal    CORONARY ANGIOPLASTY WITH STENT PLACEMENT  03/22/2007    CORONARY ANGIOPLASTY WITH STENT PLACEMENT  08/2007    CORONARY ARTERY BYPASS GRAFT  03/05/2013    PACABG X 3, LIMA-LAD, SVG0OM, SVG-DIAG, RT EV, DR PATEL    COSMETIC SURGERY      deviated septum and rhinoplasty    CYST REMOVAL  1970    DIAGNOSTIC CARDIAC CATH LAB PROCEDURE      EYE SURGERY  03/2019    cataracts    [address] (CERVIX STATUS UNKNOWN)  1983    Partial    NASAL SEPTUM SURGERY  1977    SINUS SURGERY  2003    Nasal Polyps Removed    SPINE SURGERY  03/06/2006    Lumbar Laminectomy L4&5    SPINE SURGERY  03/06/2006    Cervical Fusion - C4,5,6    TONSILLECTOMY  1968    UPPER GASTROINTESTINAL ENDOSCOPY  09/20/2006         Medications Prior to Admission:    Medications Prior to Admission: amLODIPine (NORVASC) 2.5 MG tablet, TAKE 1 TABLET BY MOUTH TWICE DAILY (Patient taking differently: AM and at 5 pm)  isosorbide mononitrate (IMDUR) 60 MG extended release tablet, Take 120mg in the AM and 60 in the PM  ranolazine (RANEXA) 1000 MG extended release tablet, TAKE 1 TABLET TWICE A DAY  nitroGLYCERIN (NITROSTAT) 0.4 MG SL tablet, up to max of 3 total doses. If no relief after 1 dose, call 911. clonazePAM (KLONOPIN) 0.5 MG tablet, nightly.   levETIRAcetam (KEPPRA) 1000 MG tablet, Take 1.5 tabs twice a day  aspirin 81 MG EC tablet, Take 81 mg by mouth daily At dinner  promethazine (PHENERGAN) 12.5 MG tablet, Take 1 tablet by mouth every 6 hours as needed for Nausea  amitriptyline (ELAVIL) 50 MG tablet, Take 50 mg by mouth nightly  diphenhydrAMINE (BENADRYL) 25 MG tablet, Take 25 mg by mouth every 6 hours as needed for Itching  DULoxetine (CYMBALTA) 60 MG extended release capsule, Take 60 mg by mouth every evening 5 pm  tiZANidine (ZANAFLEX) 4 MG tablet, Take 4 mg by mouth nightly 1/2 tablet nightly  atorvastatin (LIPITOR) 80 MG tablet, Take 1 tablet by mouth nightly (Patient taking differently: Take 80 mg by mouth nightly 5 pm)  losartan (COZAAR) 100 MG tablet, Take 1 tablet by mouth daily (Patient taking differently: Take 100 mg by mouth daily In AM)  metoprolol succinate (TOPROL XL) 50 MG extended release tablet, Take 1 tablet by mouth daily  pantoprazole (PROTONIX) 40 MG tablet, Take 1 tablet by mouth every morning (before breakfast)  albuterol (PROVENTIL HFA;VENTOLIN HFA) 108 (90 BASE) MCG/ACT inhaler, Inhale 2 puffs into the lungs every 6 hours as needed. Allergies: Other, Nexium [esomeprazole], Codeine, Darvocet [propoxyphene n-acetaminophen], Hydrocodone-acetaminophen, Lyrica [pregabalin], Morphine, and Percocet [oxycodone-acetaminophen]    Social History:   TOBACCO:   reports that she quit smoking about 55 years ago. Her smoking use included cigarettes. She has a 1.00 pack-year smoking history. She has never used smokeless tobacco.  ETOH:   reports no history of alcohol use. DRUGS:   reports no history of drug use.   MARITAL STATUS:  single  OCCUPATION:  not working  Patient currently lives with family       Family History:       Problem Relation Age of Onset    Heart Disease Mother     High Blood Pressure Mother     Diabetes Mother     High Cholesterol Mother     Colon Polyps Mother     Diabetes Brother     Colon Polyps Maternal Grandmother     Colon Polyps Maternal Grandfather     Colon Cancer Neg Hx     Esophageal Cancer Neg Hx     Liver Cancer Neg Hx      REVIEW OF SYSTEMS:  Constitutional:  AMS  CV: neg  Pulmonary: cough  GI: neg  : neg  Psych: neg  Neuro: neg  Skin: neg  MusculoSkeletal: neg  HEENT: neg  Joints: neg  Vitals:  BP (!) 128/59   Pulse 73   Temp 97 °F (36.1 °C) (Temporal)   Resp 18   Ht 5' 5\" (1.651 m)   Wt 167 lb 4 oz (75.9 kg)   SpO2 94%   BMI 27.83 kg/m²     PHYSICAL EXAM:  Gen: NAD, alert, pleasant  HEENT: WNL  Lymph: no LAD  Neck: no JVD or masses  Chest: CTA bilat  CV: RRR  Abdomen: NT/ND  Extrem: no C/C/E  Neuro: non focal  Skin: no rashes  Joints: no redness  DATA:  I have reviewed the admission labs and imaging tests.     ASSESSMENT AND PLAN:      Principal Problem:    COVID-19 virus infection---supportive care, she is not hypoxic, add Zinc, Vit D, Mucinex, anti coagulation with Lovenox     AMS---Neurology consult pending     Seizure D/O---follow with treatment    HTN--follow BP    DM2--follow glucose    SLE    UTI---continue antibiotics, follow culture      Blanca Ulloa MD  6:07 PM 1/5/2023

## 2023-01-06 NOTE — PROGRESS NOTES
Physical Therapy  Name: Cipriano Becerra  MRN:  [id number]  Date of service:  1/6/2023 01/06/23 1153   Subjective   Subjective I think I need to use the bathroom   General Comment   Comments INCONTINENT OF URINE. WEARING A BRIEF   Oxygen Therapy   O2 Device None (Room air)   Bed Mobility   Supine to Sit Contact guard assistance   Transfers   Sit to Stand Minimal Assistance   Stand to Sit Minimal Assistance   Ambulation   Surface Level tile   Device Rolling Walker   Assistance Minimal assistance   Quality of Gait allows walker to get too far in front of her. flexed posture. needs assist with speed and direction. Distance 12 feet and 25 feet   Other Activities   Comment assisted to bathroom before ambulation to chair. Short Term Goals   Time Frame for Short Term Goals 2 wks   Short Term Goal 1 SUP<>SIT, SBA   Short Term Goal 2 SIT<>STAND, SBA   Short Term Goal 3  FT WITH RW, SBA   Conditions Requiring Skilled Therapeutic Intervention   Body Structures, Functions, Activity Limitations Requiring Skilled Therapeutic Intervention Decreased functional mobility ; Decreased strength;Decreased endurance;Decreased balance;Decreased posture;Decreased safe awareness   Assessment Patient moves very slow. She need constant verbal cues for direction   Discharge Recommendations Continue to assess pending progress; Patient would benefit from continued therapy after discharge   Activity Tolerance   Activity Tolerance Patient tolerated treatment well;Patient limited by endurance   Physcial Therapy Plan   General Plan 5-7 times per week   Therapy Duration 2 Weeks   Current Treatment Recommendations Strengthening;Balance training;Functional mobility training;Transfer training;Cognitive/Perceptual training;Cognitive reorientation;Gait training; Safety education & training; Therapeutic activities; Positioning   PT Plan of Care   Friday X   Safety Devices   Type of Devices Call light within reach; Left in chair       Electronically signed by Ray Barrientos PTA on 1/6/2023 at 11:59 AM

## 2023-01-06 NOTE — PROGRESS NOTES
Pt complained of some discomfort in her chest, when assessed, she states it only hurts if she breathes really deep. Primary complaint is headache, and she states she had emesis overnight. She states she feels disoriented and confused, but easily reoriented by staff. She thought she had been moved to a new room, and her daughter had left her. Support provided, assessments charted.

## 2023-01-06 NOTE — PROGRESS NOTES
Occupational Therapy  Facility/Department: [address] Initial Assessment    Name: Ashleigh Hernandez  : 1945  MRN: [id number]  Date of Service: 2023    Discharge Recommendations:             Patient Diagnosis(es): The primary encounter diagnosis was COVID-19 virus infection. Diagnoses of Hallucination and Transient alteration of awareness were also pertinent to this visit. Past Medical History:  has a past medical history of Acute cystitis without hematuria, Asthma, CAD (coronary artery disease), Chest pain, Depression with anxiety, Diabetes mellitus ([address] GERD (gastroesophageal reflux disease), Glucose intolerance (impaired glucose tolerance), Hyperlipidemia, Hypertension, Lupus (systemic lupus erythematosus) ([address] Malaise and fatigue, Movement disorder, New onset seizure ([address] Osteoarthritis, Palliative care patient, S/P CABG x 3, and Subdural hematoma. Past Surgical History:  has a past surgical history that includes Cardiac catheterization (2011); Appendectomy (); Tonsillectomy (); cyst removal (); Nasal septum surgery (); Hysterectomy (); Hemorrhoid surgery (); sinus surgery (); Spine surgery (2006); Spine surgery (2006); Coronary angioplasty with stent (2007); Coronary angioplasty with stent (2007); Colon surgery (); Colon surgery (); Colon surgery (2008); Colon surgery (2012); Coronary artery bypass graft (2013); back surgery; Colonoscopy (2017); Cosmetic surgery; Cardiac catheterization (2016); Cardiac catheterization (2016); Cardiac catheterization (2016); Cardiac catheterization (2017); Coronary angioplasty (2017); Diagnostic Cardiac Cath Lab Procedure; eye surgery (2019); Cardiac catheterization (2020); Cataract removal; Colonoscopy (2012); and Upper gastrointestinal endoscopy (2006).     Treatment Diagnosis: TIA, COVID, UTI      Assessment Performance deficits / Impairments: Decreased functional mobility ; Decreased endurance;Decreased ADL status; Decreased balance;Decreased strength  Assessment: Will progress as tolerated  Treatment Diagnosis: TIA, COVID, UTI  Prognosis: Good  Decision Making: Low Complexity  REQUIRES OT FOLLOW-UP: Yes  Activity Tolerance  Activity Tolerance: Patient Tolerated treatment well        Plan   Occupational Therapy Plan  Times Per Week: 3-5x/week  Times Per Day: Once a day     Restrictions       Subjective   General  Chart Reviewed: Yes  Patient assessed for rehabilitation services?: Yes  Family / Caregiver Present: No  Diagnosis: TIA, Covid, UTI  General Comment  Comments: Pt. up in recliner when OT arrived     Social/Functional History  Social/Functional History  Lives With: Daughter  Type of Home: House  Home Layout: One level  Home Access: Ramped entrance  Bathroom Shower/Tub: Tub/Shower unit, Shower chair with back  [employer]: Grab bars in shower, Shower chair  Home Equipment: [facility] Help From: Family  ADL Assistance: Independent  Bath: Supervision  Ambulation Assistance: Independent  Transfer Assistance: Independent  Active : No  Patient's  Info: family       Objective   Heart Rate: 70  Heart Rate Source: Monitor  BP: (!) 118/55  MAP (Calculated): 76  Resp: 18  SpO2: 92 %  O2 Device: None (Room air)             Safety Devices  Type of Devices: Call light within reach; Left in chair        AROM: Within functional limits  Strength:  Within functional limits  ADL  Feeding: Independent  Grooming: Supervision  UE Bathing: Supervision  LE Bathing: Minimal assistance  UE Dressing: Supervision  LE Dressing: Minimal assistance  Toileting: Contact guard assistance     Activity Tolerance  Activity Tolerance: Patient tolerated treatment well;Patient limited by endurance     Transfers  Stand Step Transfers: Minimal assistance  Sit to stand: Minimal assistance  Vision  Vision: Within Functional Limits  Hearing  Hearing: Within functional limits  Cognition  Overall Cognitive Status: Exceptions  Arousal/Alertness: Delayed responses to stimuli  Following Commands: Follows one step commands with increased time; Follows one step commands with repetition  Safety Judgement: Decreased awareness of need for assistance  Problem Solving: Assistance required to generate solutions;Assistance required to implement solutions  Insights: Decreased awareness of deficits  Initiation: Requires cues for some  Sequencing: Requires cues for some                                           G-Code     OutComes Score                                                  AM-PAC Score             Tinneti Score       Goals  Short Term Goals  Time Frame for Short Term Goals: 1 week  Short Term Goal 1: Supervision with toilet tfers  Short Term Goal 2: Supervision with LB dsg tasks  Short Term Goal 3: Tolerate standing 60 seconds without rest to assist with self care  Long Term Goals  Long Term Goal 1: Return to PLOF       Therapy Time   Individual Concurrent Group Co-treatment   Time In           Time Out           Minutes                   Hang Kim OT

## 2023-01-07 LAB
ALBUMIN SERPL-MCNC: 3.7 G/DL (ref 3.5–5.2)
ALP BLD-CCNC: 61 U/L (ref 35–104)
ALT SERPL-CCNC: 17 U/L (ref 5–33)
ANION GAP SERPL CALCULATED.3IONS-SCNC: 17 MMOL/L (ref 7–19)
AST SERPL-CCNC: 21 U/L (ref 5–32)
BACTERIA: NEGATIVE /HPF
BASOPHILS ABSOLUTE: 0 K/UL (ref 0–0.2)
BASOPHILS RELATIVE PERCENT: 0.8 % (ref 0–1)
BILIRUB SERPL-MCNC: 0.3 MG/DL (ref 0.2–1.2)
BILIRUBIN URINE: NEGATIVE
BLOOD, URINE: NEGATIVE
BUN BLDV-MCNC: 19 MG/DL (ref 8–23)
C-REACTIVE PROTEIN: 1.46 MG/DL (ref 0–0.5)
CALCIUM SERPL-MCNC: 8.5 MG/DL (ref 8.8–10.2)
CHLORIDE BLD-SCNC: 104 MMOL/L (ref 98–111)
CLARITY: CLEAR
CO2: 16 MMOL/L (ref 22–29)
COLOR: YELLOW
CREAT SERPL-MCNC: 0.8 MG/DL (ref 0.5–0.9)
CRYSTALS, UA: ABNORMAL /HPF
EKG P AXIS: 50 DEGREES
EKG P-R INTERVAL: 184 MS
EKG Q-T INTERVAL: 458 MS
EKG QRS DURATION: 104 MS
EKG QTC CALCULATION (BAZETT): 468 MS
EKG T AXIS: 136 DEGREES
EOSINOPHILS ABSOLUTE: 0.1 K/UL (ref 0–0.6)
EOSINOPHILS RELATIVE PERCENT: 1.6 % (ref 0–5)
EPITHELIAL CELLS, UA: 0 /HPF (ref 0–5)
GFR SERPL CREATININE-BSD FRML MDRD: >60 ML/MIN/{1.73_M2}
GLUCOSE BLD-MCNC: 163 MG/DL (ref 74–109)
GLUCOSE BLD-MCNC: 216 MG/DL (ref 70–99)
GLUCOSE BLD-MCNC: 250 MG/DL (ref 70–99)
GLUCOSE URINE: NEGATIVE MG/DL
HCT VFR BLD CALC: 33.5 % (ref 37–47)
HEMOGLOBIN: 11.2 G/DL (ref 12–16)
HYALINE CASTS: 0 /HPF (ref 0–8)
IMMATURE GRANULOCYTES #: 0 K/UL
KEPPRA: 109 UG/ML (ref 10–40)
KETONES, URINE: NEGATIVE MG/DL
LACTATE DEHYDROGENASE: 278 U/L (ref 91–215)
LEUKOCYTE ESTERASE, URINE: ABNORMAL
LYMPHOCYTES ABSOLUTE: 1.1 K/UL (ref 1.1–4.5)
LYMPHOCYTES RELATIVE PERCENT: 30.4 % (ref 20–40)
MCH RBC QN AUTO: 32.3 PG (ref 27–31)
MCHC RBC AUTO-ENTMCNC: 33.4 G/DL (ref 33–37)
MCV RBC AUTO: 96.5 FL (ref 81–99)
MONOCYTES ABSOLUTE: 0.5 K/UL (ref 0–0.9)
MONOCYTES RELATIVE PERCENT: 12.9 % (ref 0–10)
NEUTROPHILS ABSOLUTE: 1.9 K/UL (ref 1.5–7.5)
NEUTROPHILS RELATIVE PERCENT: 53.2 % (ref 50–65)
NITRITE, URINE: NEGATIVE
PDW BLD-RTO: 13.9 % (ref 11.5–14.5)
PERFORMED ON: ABNORMAL
PERFORMED ON: ABNORMAL
PH UA: 6 (ref 5–8)
PLATELET # BLD: 107 K/UL (ref 130–400)
PMV BLD AUTO: 8.3 FL (ref 9.4–12.3)
POTASSIUM SERPL-SCNC: 3.9 MMOL/L (ref 3.5–5)
PROCALCITONIN: 0.04 NG/ML (ref 0–0.09)
PROTEIN UA: NEGATIVE MG/DL
RBC # BLD: 3.47 M/UL (ref 4.2–5.4)
RBC UA: 0 /HPF (ref 0–4)
SODIUM BLD-SCNC: 137 MMOL/L (ref 136–145)
SPECIFIC GRAVITY UA: 1.02 (ref 1–1.03)
TOTAL PROTEIN: 5.3 G/DL (ref 6.6–8.7)
TROPONIN: <0.01 NG/ML (ref 0–0.03)
UROBILINOGEN, URINE: 0.2 E.U./DL
WBC # BLD: 3.7 K/UL (ref 4.8–10.8)
WBC UA: 0 /HPF (ref 0–5)

## 2023-01-07 PROCEDURE — 84484 ASSAY OF TROPONIN QUANT: CPT

## 2023-01-07 PROCEDURE — 6370000000 HC RX 637 (ALT 250 FOR IP): Performed by: PSYCHIATRY & NEUROLOGY

## 2023-01-07 PROCEDURE — 84145 PROCALCITONIN (PCT): CPT

## 2023-01-07 PROCEDURE — 86140 C-REACTIVE PROTEIN: CPT

## 2023-01-07 PROCEDURE — 83615 LACTATE (LD) (LDH) ENZYME: CPT

## 2023-01-07 PROCEDURE — 80053 COMPREHEN METABOLIC PANEL: CPT

## 2023-01-07 PROCEDURE — 82947 ASSAY GLUCOSE BLOOD QUANT: CPT

## 2023-01-07 PROCEDURE — 6360000002 HC RX W HCPCS: Performed by: FAMILY MEDICINE

## 2023-01-07 PROCEDURE — 81001 URINALYSIS AUTO W/SCOPE: CPT

## 2023-01-07 PROCEDURE — 85025 COMPLETE CBC W/AUTO DIFF WBC: CPT

## 2023-01-07 PROCEDURE — 93005 ELECTROCARDIOGRAM TRACING: CPT | Performed by: FAMILY MEDICINE

## 2023-01-07 PROCEDURE — 1210000000 HC MED SURG R&B

## 2023-01-07 PROCEDURE — 6370000000 HC RX 637 (ALT 250 FOR IP): Performed by: FAMILY MEDICINE

## 2023-01-07 PROCEDURE — 2580000003 HC RX 258: Performed by: EMERGENCY MEDICINE

## 2023-01-07 PROCEDURE — 6370000000 HC RX 637 (ALT 250 FOR IP): Performed by: EMERGENCY MEDICINE

## 2023-01-07 PROCEDURE — 99222 1ST HOSP IP/OBS MODERATE 55: CPT | Performed by: INTERNAL MEDICINE

## 2023-01-07 PROCEDURE — 36415 COLL VENOUS BLD VENIPUNCTURE: CPT

## 2023-01-07 RX ADMIN — ONDANSETRON 4 MG: 4 TABLET, ORALLY DISINTEGRATING ORAL at 18:30

## 2023-01-07 RX ADMIN — GUAIFENESIN 1200 MG: 600 TABLET ORAL at 21:03

## 2023-01-07 RX ADMIN — AMLODIPINE BESYLATE 2.5 MG: 2.5 TABLET ORAL at 21:04

## 2023-01-07 RX ADMIN — NITROGLYCERIN 0.4 MG: 0.4 TABLET, ORALLY DISINTEGRATING SUBLINGUAL at 10:43

## 2023-01-07 RX ADMIN — SODIUM CHLORIDE, PRESERVATIVE FREE 10 ML: 5 INJECTION INTRAVENOUS at 21:03

## 2023-01-07 RX ADMIN — LEVETIRACETAM 1500 MG: 500 TABLET, FILM COATED ORAL at 21:04

## 2023-01-07 RX ADMIN — TIZANIDINE 2 MG: 4 TABLET ORAL at 21:03

## 2023-01-07 RX ADMIN — METOPROLOL SUCCINATE 50 MG: 50 TABLET, EXTENDED RELEASE ORAL at 10:53

## 2023-01-07 RX ADMIN — CLONAZEPAM 0.5 MG: 0.5 TABLET ORAL at 21:04

## 2023-01-07 RX ADMIN — Medication 1000 UNITS: at 10:52

## 2023-01-07 RX ADMIN — ISOSORBIDE MONONITRATE 60 MG: 60 TABLET, EXTENDED RELEASE ORAL at 18:30

## 2023-01-07 RX ADMIN — ATORVASTATIN CALCIUM 80 MG: 80 TABLET, FILM COATED ORAL at 21:04

## 2023-01-07 RX ADMIN — GUAIFENESIN 1200 MG: 600 TABLET ORAL at 10:53

## 2023-01-07 RX ADMIN — ASPIRIN 81 MG: 81 TABLET, COATED ORAL at 10:53

## 2023-01-07 RX ADMIN — ENOXAPARIN SODIUM 30 MG: 100 INJECTION SUBCUTANEOUS at 21:03

## 2023-01-07 RX ADMIN — SODIUM CHLORIDE, PRESERVATIVE FREE 10 ML: 5 INJECTION INTRAVENOUS at 10:52

## 2023-01-07 RX ADMIN — AMITRIPTYLINE HYDROCHLORIDE 25 MG: 10 TABLET, FILM COATED ORAL at 21:03

## 2023-01-07 RX ADMIN — LEVETIRACETAM 1500 MG: 500 TABLET, FILM COATED ORAL at 10:53

## 2023-01-07 RX ADMIN — DULOXETINE 60 MG: 60 CAPSULE, DELAYED RELEASE ORAL at 18:30

## 2023-01-07 RX ADMIN — ZINC SULFATE 220 MG (50 MG) CAPSULE 50 MG: CAPSULE at 10:53

## 2023-01-07 RX ADMIN — PANTOPRAZOLE SODIUM 40 MG: 40 TABLET, DELAYED RELEASE ORAL at 10:53

## 2023-01-07 RX ADMIN — RANOLAZINE 1000 MG: 500 TABLET, EXTENDED RELEASE ORAL at 10:53

## 2023-01-07 RX ADMIN — RANOLAZINE 1000 MG: 500 TABLET, EXTENDED RELEASE ORAL at 21:03

## 2023-01-07 RX ADMIN — ISOSORBIDE MONONITRATE 120 MG: 60 TABLET, EXTENDED RELEASE ORAL at 10:53

## 2023-01-07 ASSESSMENT — PAIN SCALES - GENERAL: PAINLEVEL_OUTOF10: 0

## 2023-01-07 NOTE — PROGRESS NOTES
Progress Note  Lorenza Mckeon  1/6/2023 6:55 PM  Subjective:   Admit Date:   1/4/2023      CC/ADMIT DX:       Interval History:   Reviewed overnight events and nursing notes. She has no new complaints. She denies any SOA or CP. She has a poor appetite. I have reviewed all labs/diagnostics from the last 24hrs. ROS:   I have done a 10 point ROS and all are negative, except what is mentioned in the HPI. ADULT DIET; Regular; Safety Tray; Safety Tray (Disposables)    Medications:      sodium chloride        Vitamin D  1,000 Units Oral Daily    zinc sulfate  50 mg Oral Daily    guaiFENesin  1,200 mg Oral BID    amitriptyline  25 mg Oral Nightly    sodium chloride flush  5-40 mL IntraVENous 2 times per day    enoxaparin  40 mg SubCUTAneous Daily    amLODIPine  2.5 mg Oral BID    aspirin  81 mg Oral Daily    clonazePAM  0.5 mg Oral Nightly    DULoxetine  60 mg Oral QPM    levETIRAcetam  1,500 mg Oral BID    pantoprazole  40 mg Oral QAM AC    tiZANidine  2 mg Oral Nightly    ranolazine  1,000 mg Oral BID    metoprolol succinate  50 mg Oral Daily    losartan  100 mg Oral Daily    atorvastatin  80 mg Oral Nightly    isosorbide mononitrate  120 mg Oral QAM    isosorbide mononitrate  60 mg Oral QPM           Objective:   Vitals: BP (!) 118/55   Pulse 70   Temp 98.4 °F (36.9 °C) (Temporal)   Resp 18   Ht 5' 5\" (1.651 m)   Wt 167 lb 4 oz (75.9 kg)   SpO2 91%   BMI 27.83 kg/m²    Intake/Output Summary (Last 24 hours) at 1/6/2023 1855  Last data filed at 1/6/2023 1020  Gross per 24 hour   Intake 0 ml   Output --   Net 0 ml     General appearance: alert and cooperative with exam  Lungs: clear to auscultation bilaterally  Heart: RRR  Abdomen: soft, non-tender; bowel sounds normal; no masses,  no organomegaly  Extremities: extremities normal, atraumatic, no cyanosis or edema  Neurologic:  No obvious focal neurologic deficits.     Assessment and Plan:   Principal Problem:    COVID-19 virus infection  Resolved Problems:    * No resolved hospital problems. *    Seizure D/O    HTN    DM2    SLE    UTI    Plan:   Continue present medication(s)    Follow with antibiotics for UTI   She has no O2 requirement, so no indication for steroids. She is not a Paxlovid candidate per Pharmacy,  because of cardiac medicine. Continue supportive care   PT/OT      Discharge planning:   her home     Reviewed treatment plans with the patient and/or family.              Electronically signed by Blanca Ulloa MD on 1/6/2023 at 6:55 PM

## 2023-01-07 NOTE — PROGRESS NOTES
Progress Note  Myrna Muñoz  1/7/2023 10:57 AM  Subjective:   Admit Date:   1/4/2023      CC/ADMIT DX:       Interval History:   Reviewed overnight events and nursing notes. She has no new complaints. Denies worsening SOA. No CP. I have reviewed all labs/diagnostics from the last 24hrs. ROS:   I have done a 10 point ROS and all are negative, except what is mentioned in the HPI. ADULT DIET; Regular; Safety Tray; Safety Tray (Disposables)    Medications:      sodium chloride        enoxaparin  30 mg SubCUTAneous BID    Vitamin D  1,000 Units Oral Daily    zinc sulfate  50 mg Oral Daily    guaiFENesin  1,200 mg Oral BID    amitriptyline  25 mg Oral Nightly    sodium chloride flush  5-40 mL IntraVENous 2 times per day    amLODIPine  2.5 mg Oral BID    aspirin  81 mg Oral Daily    clonazePAM  0.5 mg Oral Nightly    DULoxetine  60 mg Oral QPM    levETIRAcetam  1,500 mg Oral BID    pantoprazole  40 mg Oral QAM AC    tiZANidine  2 mg Oral Nightly    ranolazine  1,000 mg Oral BID    metoprolol succinate  50 mg Oral Daily    losartan  100 mg Oral Daily    atorvastatin  80 mg Oral Nightly    isosorbide mononitrate  120 mg Oral QAM    isosorbide mononitrate  60 mg Oral QPM           Objective:   Vitals: /63   Pulse 64   Temp 96.9 °F (36.1 °C) (Temporal)   Resp 18   Ht 5' 5\" (1.651 m)   Wt 169 lb (76.7 kg)   SpO2 92%   BMI 28.12 kg/m²    Intake/Output Summary (Last 24 hours) at 1/7/2023 1057  Last data filed at 1/7/2023 0630  Gross per 24 hour   Intake --   Output 300 ml   Net -300 ml     General appearance: alert and cooperative with exam  Heart: RRR  Extremities: no C/C trace LE edema  Neurologic:  No obvious focal neurologic deficits. Assessment and Plan:   Principal Problem:    COVID-19 virus infection  Resolved Problems:    * No resolved hospital problems.  *    Seizure D/O    HTN    DM2    SLE    UTI    Plan:   Continue present medication(s)    Follow with antibiotics for UTI   She has no O2 requirement   Continue supportive care   PT/OT      Discharge planning:   her home     Reviewed treatment plans with the patient and/or family.              Electronically signed by Abida English MD on 1/7/2023 at 10:57 AM

## 2023-01-07 NOTE — CONSULTS
Cardiology Consultation      I personally saw the patient and rounded with:  RN, on  1/7/23      The observations documented in this note, including the assessment and plan are mine          Date of Admission:  1/4/2023  1:18 PM    Date of Initially Being Seen / Consultation:  1/7/23    Cardiologist:  Wale Dewey 53 Attending: Dr. Paul Orellana     PCP:  Chavez Paiz MD    Reason for Consultation or Admission / Chief Complaint: Chest pain    SUBJECTIVE AND HISTORY OF PRESENT ILLNESS:    Source of the history:  Patient, family, previous inpatient and outpatient records in [facility]. Zoraida Baumgarten is a 68 y.o. female who presents to [facility] with chief complaint of chest pain consulted to the cardiology team for further work-up. Symptoms started morning. States it's more like pressure and heaviness. Retrosternal, dull achy, relieved with nitroglycerin. Patient denies any diaphoresis. Denies any palpitation. Denies any leg swelling. Denies any focal weakness or numbness. Denies any hematemesis or melena or easy bruising or bleeding. No Nausea, vomiting or diarrhea  No abdominal pain  No upper back pain    Does have H/O CAD, stents, bypass. Denies any history of CHF or any valvular or electrical abnormality. Denies any recent or remote cardiac work-up. Does have recent cardiac work up. Cardiovascular review of system is otherwise negative.       Family present:      CARDIAC RISK PROFILE:    Risk Factor Yes / No / Unknown       Gender Female   Cigarette Use Former smoker   Family History of Cardiovascular Disease No   Diabetes Mellitus yes   Hypercholesteremia yes   Hypertension yes          Cardiac Specific Problems:    Specialty Problems          Cardiology Problems    Coronary artery disease of native artery of native heart with stable angina pectoris (HCC)        Unstable angina (HCC)        Chest pain        CAD (coronary artery disease) Hyperlipidemia        Hypertension        Bilateral carotid artery stenosis        Atherosclerotic cardiovascular disease             PRIOR CARDIAC PROBLEM LIST  (IF APPLICABLE):    CAD, CABG      Past Medical History:    Past Medical History:   Diagnosis Date    Acute cystitis without hematuria 10/28/2020    Asthma     CAD (coronary artery disease)     Chest pain     Depression with anxiety     Diabetes mellitus (HCC)     GERD (gastroesophageal reflux disease)     Glucose intolerance (impaired glucose tolerance)     Hyperlipidemia     Cholesterol management per Blanca Or M.ABBI     Hypertension     Lupus (systemic lupus erythematosus) ([address]     Malaise and fatigue 10/6/2017    Movement disorder     Sees pain management for neck pain and previous surgery    New onset seizure ([address]     Osteoarthritis     Palliative care patient 07/22/2020    S/P CABG x 3 03/05/2013    PACABG X 3, LIMA-LAD, SVG0OM, SVG-DIAG, RT EVH, DR PATEL    Subdural hematoma          Past Surgical History:    Past Surgical History:   Procedure Laterality Date    4 [facility]  05/18/2011    EF 60%    CARDIAC CATHETERIZATION  01/25/2016    drug eluting stent to RCA    CARDIAC CATHETERIZATION  01/25/2016    CARDIAC CATHETERIZATION  02/19/2016    CARDIAC CATHETERIZATION  08/24/2017    cutting balloon angioplasty ot LAD    CARDIAC CATHETERIZATION  04/13/2020    TWO Stents placed    CATARACT REMOVAL      COLON SURGERY  1992    Polypectomy - 2    COLON SURGERY  2003    Polypectomy - 4    COLON SURGERY  11/2008    Polypectomy - 2    COLON SURGERY  08/2012    Polypectomy - 3    COLONOSCOPY  03/01/2017    Dr Friedman Grand- normal per pt recall    COLONOSCOPY  07/20/2012    polyps, diverticulosis,  4yr recall    CORONARY ANGIOPLASTY  08/2017    cutting balloon angioplasty LAD diagonal    CORONARY ANGIOPLASTY WITH STENT PLACEMENT  03/22/2007    CORONARY ANGIOPLASTY WITH STENT PLACEMENT  08/2007    CORONARY ARTERY BYPASS GRAFT  03/05/2013    PACABG X 3, LIMA-LAD, SVG0OM, SVG-DIAG, RT EV, DR PATEL    COSMETIC SURGERY      deviated septum and rhinoplasty    CYST REMOVAL  1970    DIAGNOSTIC CARDIAC CATH LAB PROCEDURE      EYE SURGERY  03/2019    cataracts    [address] (CERVIX STATUS UNKNOWN)  1983    Partial    NASAL SEPTUM SURGERY  1977    SINUS SURGERY  2003    Nasal Polyps Removed    SPINE SURGERY  03/06/2006    Lumbar Laminectomy L4&5    SPINE SURGERY  03/06/2006    Cervical Fusion - C4,5,6    TONSILLECTOMY  1968    UPPER GASTROINTESTINAL ENDOSCOPY  09/20/2006         Home Medications:   Prior to Admission medications    Medication Sig Start Date End Date Taking? Authorizing Provider   amLODIPine (NORVASC) 2.5 MG tablet TAKE 1 TABLET BY MOUTH TWICE DAILY  Patient taking differently: AM and at 5 pm 11/21/22   LEILA Palma   isosorbide mononitrate (IMDUR) 60 MG extended release tablet Take 120mg in the AM and 60 in the PM 10/19/22   LEILA Shankar   ranolazine (RANEXA) 1000 MG extended release tablet TAKE 1 TABLET TWICE A DAY 8/30/22   LEILA Raman   nitroGLYCERIN (NITROSTAT) 0.4 MG SL tablet up to max of 3 total doses. If no relief after 1 dose, call 911. 4/7/22   LEILA Shankar   clonazePAM (KLONOPIN) 0.5 MG tablet nightly.  3/28/22   Historical Provider, MD   levETIRAcetam (KEPPRA) 1000 MG tablet Take 1.5 tabs twice a day 4/4/22   Claudette Larson MD   aspirin 81 MG EC tablet Take 81 mg by mouth daily At dinner    Historical Provider, MD   promethazine (PHENERGAN) 12.5 MG tablet Take 1 tablet by mouth every 6 hours as needed for Nausea 10/4/21   Claudette Larson MD   amitriptyline (ELAVIL) 50 MG tablet Take 50 mg by mouth nightly 3/31/21   Historical Provider, MD   diphenhydrAMINE (BENADRYL) 25 MG tablet Take 25 mg by mouth every 6 hours as needed for Itching    Historical Provider, MD   DULoxetine (CYMBALTA) 60 MG extended release capsule Take 60 mg by mouth every evening 5 pm    Historical Provider, MD   tiZANidine (ZANAFLEX) 4 MG tablet Take 4 mg by mouth nightly 1/2 tablet nightly    Historical Provider, MD   atorvastatin (LIPITOR) 80 MG tablet Take 1 tablet by mouth nightly  Patient taking differently: Take 80 mg by mouth nightly 5 pm 11/11/20   Ahsan Juan MD   losartan (COZAAR) 100 MG tablet Take 1 tablet by mouth daily  Patient taking differently: Take 100 mg by mouth daily In AM 11/12/20   Ahsan Juan MD   metoprolol succinate (TOPROL XL) 50 MG extended release tablet Take 1 tablet by mouth daily 11/12/20   Ahsan Juan MD   pantoprazole (PROTONIX) 40 MG tablet Take 1 tablet by mouth every morning (before breakfast) 11/12/20   Ahsan Juan MD   albuterol (PROVENTIL HFA;VENTOLIN HFA) 108 (90 BASE) MCG/ACT inhaler Inhale 2 puffs into the lungs every 6 hours as needed. Historical Provider, MD        Facility Administered Medications:    enoxaparin  30 mg SubCUTAneous BID    Vitamin D  1,000 Units Oral Daily    zinc sulfate  50 mg Oral Daily    guaiFENesin  1,200 mg Oral BID    amitriptyline  25 mg Oral Nightly    sodium chloride flush  5-40 mL IntraVENous 2 times per day    amLODIPine  2.5 mg Oral BID    aspirin  81 mg Oral Daily    clonazePAM  0.5 mg Oral Nightly    DULoxetine  60 mg Oral QPM    levETIRAcetam  1,500 mg Oral BID    pantoprazole  40 mg Oral QAM AC    tiZANidine  2 mg Oral Nightly    ranolazine  1,000 mg Oral BID    metoprolol succinate  50 mg Oral Daily    losartan  100 mg Oral Daily    atorvastatin  80 mg Oral Nightly    isosorbide mononitrate  120 mg Oral QAM    isosorbide mononitrate  60 mg Oral QPM       Allergies: Other, Nexium [esomeprazole], Codeine, Darvocet [propoxyphene n-acetaminophen], Hydrocodone-acetaminophen, Lyrica [pregabalin], Morphine, and Percocet [oxycodone-acetaminophen]     Social History:       Social History     Socioeconomic History    Marital status:       Spouse name: Not on file    Number of children: 2 Years of education: Not on file    Highest education level: Not on file   Occupational History    Not on file   Tobacco Use    Smoking status: Former     Packs/day: 1.00     Years: 1.00     Pack years: 1.00     Types: Cigarettes     Quit date: 1967     Years since quittin.9    Smokeless tobacco: Never   Vaping Use    Vaping Use: Never used   Substance and Sexual Activity    Alcohol use: No    Drug use: No    Sexual activity: Not Currently   Other Topics Concern    Not on file   Social History Narrative    Not on file     Social Determinants of Health     Financial Resource Strain: Not on file   Food Insecurity: Not on file   Transportation Needs: Not on file   Physical Activity: Not on file   Stress: Not on file   Social Connections: Not on file   Intimate Partner Violence: Not on file   Housing Stability: Not on file       Family History:     Family History   Problem Relation Age of Onset    Heart Disease Mother     High Blood Pressure Mother     Diabetes Mother     High Cholesterol Mother     Colon Polyps Mother     Diabetes Brother     Colon Polyps Maternal Grandmother     Colon Polyps Maternal Grandfather     Colon Cancer Neg Hx     Esophageal Cancer Neg Hx     Liver Cancer Neg Hx          REVIEW OF SYSTEMS:     Constitutional: Negative. Eyes: Negative. Respiratory: Cough and shortness of breath  Cardiac: Chest pain  Gastrointestinal: Negative. Genitourinary: Negative. Musculoskeletal: Negative. Skin: Negative. Neurological:  negative  Hematological: Negative. Psychiatric/Behavioral: Negative. PHYSICAL EXAMINATION:     BP (!) 129/55   Pulse 72   Temp 97.6 °F (36.4 °C) (Temporal)   Resp 16   Ht 5' 5\" (1.651 m)   Wt 169 lb (76.7 kg)   SpO2 93%   BMI 28.12 kg/m²     Vitals and nursing note reviewed. Constitutional:       Appearance:  well-developed. HENT:      Head: Normocephalic and atraumatic. Eyes:      General:         Right eye: No discharge.          Left eye: No discharge. Conjunctiva/sclera: Conjunctivae normal.      Pupils: Pupils are equal, round, and reactive to light. Cardiovascular:      Rate and Rhythm: Normal rate and regular rhythm. Heart sounds: No significant murmur. Pulmonary:      Effort: Pulmonary effort is normal. No respiratory distress. Breath sounds: Normal breath sounds. No wheezing or rales. Abdominal:      General: Bowel sounds are normal.      Palpations: Abdomen is soft. There is no mass. Tenderness: There is no abdominal tenderness. There is no guarding or rebound. Musculoskeletal:         General: No tenderness. Normal range of motion. Cervical back: Normal range of motion and neck supple. Skin:     General: Skin is warm and dry. Capillary Refill: Capillary refill takes less than 2 seconds. Neurological:      Mental Status:  alert and oriented to person, place, and time. Psychiatric:         Behavior: Behavior normal.         Thought Content: Thought content normal.         Judgment: Judgment normal.     LABORATORY EVALUATION & TESTING:    I have personally reviewed and interpreted the results of the following diagnostic testing      EKG and or Telemetry:  which was personally reviewed me:      EKG: normal EKG, normal sinus rhythm, nonspecific ST and T waves changes. Cardiac Enzymes:   Recent Labs     01/07/23  1121 01/07/23  1407   TROPONINI <0.01 <0.01       CBC:   Recent Labs     01/06/23  0213 01/07/23  0219   WBC 5.3 3.7*   HGB 11.3* 11.2*   HCT 33.4* 33.5*   MCV 95.2 96.5   * 107*     BMP:   Recent Labs     01/06/23  0213 01/07/23  0219    137   K 3.9 3.9    104   CO2 22 16*   BUN 15 19   CREATININE 0.8 0.8     Cardiac Enzymes:   Recent Labs     01/07/23  1121 01/07/23  1407   TROPONINI <0.01 <0.01     PT/INR: Coag Panel: No results for input(s): INR, PROTIME, APTT in the last 72 hours. APTT: No results for input(s): APTT in the last 72 hours.   Liver Profile:  Lab Results   Component Value Date/Time    AST 21 01/07/2023 02:19 AM    ALT 17 01/07/2023 02:19 AM    BILITOT 0.3 01/07/2023 02:19 AM    ALKPHOS 61 01/07/2023 02:19 AM    ALKPHOS 37 05/19/2011 05:23 AM     Lab Results   Component Value Date/Time    CHOL 205 05/09/2022 09:22 AM    HDL 66 05/09/2022 09:22 AM    TRIG 478 05/09/2022 09:22 AM     TSH:  Lab Results   Component Value Date/Time    TSH 5.530 05/09/2022 09:22 AM     UA:   Lab Results   Component Value Date/Time    COLORU YELLOW 01/07/2023 03:28 PM    PHUR 6.0 01/07/2023 03:28 PM    WBCUA 0 01/07/2023 03:28 PM    RBCUA 0 01/07/2023 03:28 PM    RBCUA 1 07/05/2015 11:50 PM    MUCUS 1+ 03/04/2013 08:35 AM    YEAST Present 01/04/2023 01:55 PM    BACTERIA NEGATIVE 01/07/2023 03:28 PM    CLARITYU Clear 01/07/2023 03:28 PM    SPECGRAV 1.016 01/07/2023 03:28 PM    LEUKOCYTESUR TRACE 01/07/2023 03:28 PM    UROBILINOGEN 0.2 01/07/2023 03:28 PM    BILIRUBINUR Negative 01/07/2023 03:28 PM    BLOODU Negative 01/07/2023 03:28 PM    GLUCOSEU Negative 01/07/2023 03:28 PM       IMAGING:  CT Head W/O Contrast    Result Date: 1/4/2023  NO PRIOR REPORT AVAILABLE Exam: CT OF THE BRAIN WITHOUT CONTRAST Clinical data: Possible fall. Confusion, headaches. History of subdural. Technique: Contiguous axial images are obtained from the skull base to vertex without intravenous contrast.  Reformatted/MPR images were performed. Radiation dose: CTDIvol = 44.85 mGy, DLP = 860 mGy x cm. Prior studies: MRI of brain dated 11/14/21 report. CT scan of brain dated 6/13/21 report. Findings: There is mild atrophy and periventricular white matter lucency consistent with small vessel ischemic disease and/or age-related gliosis. There is no acute parenchymal hemorrhage, mass effect, extra-axial collection or acute territorial infarct. The ventricles are normal in size without shift or dilatation. The mastoid air cells well pneumatized. The calvarium appears intact.   The paranasal sinuses are clear.    Atrophy and periventricular white matter lucency consistent with small vessel ischemic disease and/or age-related gliosis. All CT scans at this facility utilize dose modulation, iterative reconstruction, and/or weight based dosing when appropriate to reduce radiation dose to as low as reasonably achievable. Dictated and Electronically Signed by Romel Berger MD at 04-Jan-2023 03:39:28 PM             XR CHEST PORTABLE    Result Date: 1/4/2023  NO PRIOR REPORT AVAILABLE Exam: X-RAY OF [facility] Clinical data:Cough. Technique:Single view of the chest. Prior studies: Radiograph of the chest dated 11/13/2021 report. CT of the chest dated 10/27/2021 report. Findings: There is cervical fixation hardware. There are sternal wires. The aorta is calcified. Heart is normal in size. Mediastinal and pulmonary vascular shadows are normal.  There is no focal consolidation or effusion. No active pulmonary disease. Dictated and Electronically Signed by Romel Berger MD at 04-Jan-2023 03:46:00 PM               Assessment:  Chest pain- Angina equivalent  Shortness of breath  COVID-19 pneumonia  H/O CAD status post CABG in 2013 and said history of multiple stents. Altered mental status  Seizure-like activity  History of SLE  Type 2 diabetes  Hypertension  Hyperlipidemia  Normocytic bicytopenia  Dementia? Plan:  Troponin trend negative so far  Continue to monitor on telemetry  Echocardiogram   Lexiscan  Continue aspirin, statin, BB, ARB  Continue Imdur  Will hold off on any anticoagulation unless troponin keeps rising. Further plan will depend on the result of echocardiogram and Lexiscan. I had a detailed discussion with the patient and / or family regarding the historical points, physical examination findings, and any diagnostic results supporting the admission diagnosis. The patient was educated on care and need for admission. questions were invited and answered.  Patient and / or family shows understanding of admission information and agrees to follow. I have discussed the risks, benefits and options with the patient and her family. They appear to understand, have no questions, and wish to proceed. Discussed with patient and family and nursing. I greatly appreciate the opportunity and your confidence in allowing me to participate in the care of Joel Monroy    Electronically signed by Akira Salas DO on 1/7/23   Interventional cardiologist, [facility] [place].

## 2023-01-08 VITALS
RESPIRATION RATE: 18 BRPM | HEIGHT: 65 IN | WEIGHT: 169 LBS | SYSTOLIC BLOOD PRESSURE: 140 MMHG | HEART RATE: 65 BPM | TEMPERATURE: 97.2 F | OXYGEN SATURATION: 92 % | DIASTOLIC BLOOD PRESSURE: 57 MMHG | BODY MASS INDEX: 28.16 KG/M2

## 2023-01-08 LAB
GLUCOSE BLD-MCNC: 163 MG/DL (ref 70–99)
GLUCOSE BLD-MCNC: 165 MG/DL (ref 70–99)
GLUCOSE BLD-MCNC: 166 MG/DL (ref 70–99)
GLUCOSE BLD-MCNC: 187 MG/DL (ref 70–99)
PERFORMED ON: ABNORMAL
TROPONIN: <0.01 NG/ML (ref 0–0.03)

## 2023-01-08 PROCEDURE — 36415 COLL VENOUS BLD VENIPUNCTURE: CPT

## 2023-01-08 PROCEDURE — 6370000000 HC RX 637 (ALT 250 FOR IP): Performed by: FAMILY MEDICINE

## 2023-01-08 PROCEDURE — 6360000002 HC RX W HCPCS: Performed by: FAMILY MEDICINE

## 2023-01-08 PROCEDURE — 99232 SBSQ HOSP IP/OBS MODERATE 35: CPT | Performed by: INTERNAL MEDICINE

## 2023-01-08 PROCEDURE — 6370000000 HC RX 637 (ALT 250 FOR IP): Performed by: PSYCHIATRY & NEUROLOGY

## 2023-01-08 PROCEDURE — 82947 ASSAY GLUCOSE BLOOD QUANT: CPT

## 2023-01-08 PROCEDURE — 1210000000 HC MED SURG R&B

## 2023-01-08 PROCEDURE — 84484 ASSAY OF TROPONIN QUANT: CPT

## 2023-01-08 PROCEDURE — 2580000003 HC RX 258: Performed by: EMERGENCY MEDICINE

## 2023-01-08 RX ADMIN — GUAIFENESIN 1200 MG: 600 TABLET ORAL at 10:24

## 2023-01-08 RX ADMIN — PANTOPRAZOLE SODIUM 40 MG: 40 TABLET, DELAYED RELEASE ORAL at 05:14

## 2023-01-08 RX ADMIN — SODIUM CHLORIDE, PRESERVATIVE FREE 10 ML: 5 INJECTION INTRAVENOUS at 10:28

## 2023-01-08 RX ADMIN — Medication 1000 UNITS: at 10:25

## 2023-01-08 RX ADMIN — ASPIRIN 81 MG: 81 TABLET, COATED ORAL at 10:23

## 2023-01-08 RX ADMIN — METOPROLOL SUCCINATE 50 MG: 50 TABLET, EXTENDED RELEASE ORAL at 10:25

## 2023-01-08 RX ADMIN — RANOLAZINE 1000 MG: 500 TABLET, EXTENDED RELEASE ORAL at 10:25

## 2023-01-08 RX ADMIN — LEVETIRACETAM 1500 MG: 500 TABLET, FILM COATED ORAL at 10:24

## 2023-01-08 RX ADMIN — ISOSORBIDE MONONITRATE 120 MG: 60 TABLET, EXTENDED RELEASE ORAL at 10:24

## 2023-01-08 RX ADMIN — AMITRIPTYLINE HYDROCHLORIDE 25 MG: 10 TABLET, FILM COATED ORAL at 20:55

## 2023-01-08 RX ADMIN — AMLODIPINE BESYLATE 2.5 MG: 2.5 TABLET ORAL at 10:24

## 2023-01-08 RX ADMIN — ZINC SULFATE 220 MG (50 MG) CAPSULE 50 MG: CAPSULE at 10:24

## 2023-01-08 RX ADMIN — RANOLAZINE 1000 MG: 500 TABLET, EXTENDED RELEASE ORAL at 20:56

## 2023-01-08 RX ADMIN — CLONAZEPAM 0.5 MG: 0.5 TABLET ORAL at 20:56

## 2023-01-08 RX ADMIN — ALBUTEROL SULFATE 2 PUFF: 90 AEROSOL, METERED RESPIRATORY (INHALATION) at 10:46

## 2023-01-08 RX ADMIN — LOSARTAN POTASSIUM 100 MG: 100 TABLET, FILM COATED ORAL at 10:24

## 2023-01-08 RX ADMIN — AMLODIPINE BESYLATE 2.5 MG: 2.5 TABLET ORAL at 20:56

## 2023-01-08 RX ADMIN — GUAIFENESIN 1200 MG: 600 TABLET ORAL at 20:56

## 2023-01-08 RX ADMIN — ATORVASTATIN CALCIUM 80 MG: 80 TABLET, FILM COATED ORAL at 20:56

## 2023-01-08 RX ADMIN — DULOXETINE 60 MG: 60 CAPSULE, DELAYED RELEASE ORAL at 17:28

## 2023-01-08 RX ADMIN — TIZANIDINE 2 MG: 4 TABLET ORAL at 20:56

## 2023-01-08 RX ADMIN — LEVETIRACETAM 1500 MG: 500 TABLET, FILM COATED ORAL at 20:56

## 2023-01-08 RX ADMIN — ISOSORBIDE MONONITRATE 60 MG: 60 TABLET, EXTENDED RELEASE ORAL at 17:28

## 2023-01-08 RX ADMIN — SODIUM CHLORIDE, PRESERVATIVE FREE 10 ML: 5 INJECTION INTRAVENOUS at 21:02

## 2023-01-08 ASSESSMENT — PAIN SCALES - GENERAL: PAINLEVEL_OUTOF10: 0

## 2023-01-08 NOTE — PROGRESS NOTES
Progress Note  Pravin Baumann  1/8/2023 10:54 AM  Subjective:   Admit Date:   1/4/2023      CC/ADMIT DX:       Interval History:   Reviewed overnight events and nursing notes. She had some CP yesterday. None currently. I have reviewed all labs/diagnostics from the last 24hrs. ROS:   I have done a 10 point ROS and all are negative, except what is mentioned in the HPI. ADULT DIET; Regular; Safety Tray; Safety Tray (Disposables)  Diet NPO    Medications:      sodium chloride        enoxaparin  30 mg SubCUTAneous BID    Vitamin D  1,000 Units Oral Daily    zinc sulfate  50 mg Oral Daily    guaiFENesin  1,200 mg Oral BID    amitriptyline  25 mg Oral Nightly    sodium chloride flush  5-40 mL IntraVENous 2 times per day    amLODIPine  2.5 mg Oral BID    aspirin  81 mg Oral Daily    clonazePAM  0.5 mg Oral Nightly    DULoxetine  60 mg Oral QPM    levETIRAcetam  1,500 mg Oral BID    pantoprazole  40 mg Oral QAM AC    tiZANidine  2 mg Oral Nightly    ranolazine  1,000 mg Oral BID    metoprolol succinate  50 mg Oral Daily    losartan  100 mg Oral Daily    atorvastatin  80 mg Oral Nightly    isosorbide mononitrate  120 mg Oral QAM    isosorbide mononitrate  60 mg Oral QPM           Objective:   Vitals: BP (!) 126/59   Pulse 66   Temp 97 °F (36.1 °C) (Temporal)   Resp 16   Ht 5' 5\" (1.651 m)   Wt 169 lb (76.7 kg)   SpO2 95%   BMI 28.12 kg/m²    Intake/Output Summary (Last 24 hours) at 1/8/2023 1054  Last data filed at 1/8/2023 0211  Gross per 24 hour   Intake --   Output 400 ml   Net -400 ml     General appearance: alert and cooperative with exam  Heart: RRR  Extremities: no C/C trace LE edema  Neurologic:  No obvious focal neurologic deficits. Assessment and Plan:   Principal Problem:    COVID-19 virus infection  Resolved Problems:    * No resolved hospital problems.  *    Seizure D/O    HTN    DM2    SLE    UTI    CP    Plan:   Continue present medication(s)    Follow with cardiac evaluation   She has no O2 requirement   Continue supportive care   PT/OT      Discharge planning:   her home     Reviewed treatment plans with the patient and/or family.              Electronically signed by Kristen Hooker MD on 1/8/2023 at 10:54 AM

## 2023-01-08 NOTE — PLAN OF CARE
Problem: Skin/Tissue Integrity  Goal: Absence of new skin breakdown  Description: 1. Monitor for areas of redness and/or skin breakdown  2. Assess vascular access sites hourly  3. Every 4-6 hours minimum:  Change oxygen saturation probe site  4. Every 4-6 hours:  If on nasal continuous positive airway pressure, respiratory therapy assess nares and determine need for appliance change or resting period.   1/8/2023 1614 by Hernán Montgomery RN  Outcome: Progressing  1/8/2023 0322 by Krishna Newsome RN  Outcome: Progressing     Problem: Chronic Conditions and Co-morbidities  Goal: Patient's chronic conditions and co-morbidity symptoms are monitored and maintained or improved  1/8/2023 1614 by Hernán Montgomery RN  Outcome: Progressing  1/8/2023 0322 by Krishna Newsome RN  Outcome: Progressing     Problem: Discharge Planning  Goal: Discharge to home or other facility with appropriate resources  1/8/2023 1614 by Hernán Montgomery RN  Outcome: Progressing  1/8/2023 0322 by Krishna Newsome RN  Outcome: Progressing     Problem: Safety - Adult  Goal: Free from fall injury  1/8/2023 1614 by Hernán Montgomery RN  Outcome: Progressing  1/8/2023 0322 by Krishna Newsome RN  Outcome: Progressing     Problem: Pain  Goal: Verbalizes/displays adequate comfort level or baseline comfort level  1/8/2023 1614 by Hernán Montgomery RN  Outcome: Progressing  1/8/2023 0322 by Krishna Newsome RN  Outcome: Progressing     Problem: ABCDS Injury Assessment  Goal: Absence of physical injury  Outcome: Progressing

## 2023-01-08 NOTE — PLAN OF CARE
Problem: Skin/Tissue Integrity  Goal: Absence of new skin breakdown  Outcome: Progressing     Problem: Chronic Conditions and Co-morbidities  Goal: Patient's chronic conditions and co-morbidity symptoms are monitored and maintained or improved  Outcome: Progressing     Problem: Discharge Planning  Goal: Discharge to home or other facility with appropriate resources  Outcome: Progressing     Problem: Safety - Adult  Goal: Free from fall injury  Outcome: Progressing     Problem: Pain  Goal: Verbalizes/displays adequate comfort level or baseline comfort level  Outcome: Progressing

## 2023-01-08 NOTE — PROGRESS NOTES
Cardiology Progress Note Adelaide Guillen DO     I personally saw the patient and rounded with:  RN, on  1/7/23      The observations documented in this note, including the assessment and plan are mine          Date of Admission:  1/4/2023  1:18 PM    Date of Initially Being Seen / Consultation:  1/7/23    Cardiologist:  Wale Donato 53 Attending: Dr. Maldonado Kate     PCP:  Gavino Ovalle MD    Reason for Consultation or Admission / Chief Complaint: Chest pain    Progress note  Subjective:  Patient is seen and examined. No acute event overnight. Chest pain is better with medical therapy. No respiratory distress. Denies any palpitation. Cardiovascular review of systems otherwise negative. SUBJECTIVE AND HISTORY OF PRESENT ILLNESS:    Source of the history:  Patient, family, previous inpatient and outpatient records in Epic. Kerwin Butler is a 68 y.o. female who presents to [facility] with chief complaint of chest pain consulted to the cardiology team for further work-up. Symptoms started morning. States it's more like pressure and heaviness. Retrosternal, dull achy, relieved with nitroglycerin. Patient denies any diaphoresis. Denies any palpitation. Denies any leg swelling. Denies any focal weakness or numbness. Denies any hematemesis or melena or easy bruising or bleeding. No Nausea, vomiting or diarrhea  No abdominal pain  No upper back pain    Does have H/O CAD, stents, bypass. Denies any history of CHF or any valvular or electrical abnormality. Denies any recent or remote cardiac work-up. Does have recent cardiac work up. Cardiovascular review of system is otherwise negative.       Family present:      CARDIAC RISK PROFILE:    Risk Factor Yes / No / Unknown       Gender Female   Cigarette Use Former smoker   Family History of Cardiovascular Disease No   Diabetes Mellitus yes   Hypercholesteremia yes   Hypertension yes          Cardiac Specific Problems:    Specialty Problems          Cardiology Problems    Coronary artery disease of native artery of native heart with stable angina pectoris (HCC)        Unstable angina (HCC)        Chest pain        CAD (coronary artery disease)        Hyperlipidemia        Hypertension        Bilateral carotid artery stenosis        Atherosclerotic cardiovascular disease             PRIOR CARDIAC PROBLEM LIST  (IF APPLICABLE):    CAD, CABG      Past Medical History:    Past Medical History:   Diagnosis Date    Acute cystitis without hematuria 10/28/2020    Asthma     CAD (coronary artery disease)     Chest pain     Depression with anxiety     Diabetes mellitus (HCC)     GERD (gastroesophageal reflux disease)     Glucose intolerance (impaired glucose tolerance)     Hyperlipidemia     Cholesterol management per Aguila Sinha M.D.     Hypertension     Lupus (systemic lupus erythematosus) ([facility] [address]     Malaise and fatigue 10/6/2017    Movement disorder     Sees pain management for neck pain and previous surgery    New onset seizure ([facility] [address]     Osteoarthritis     Palliative care patient 07/22/2020    S/P CABG x 3 03/05/2013    PACABG X 3, LIMA-LAD, SVG0OM, SVG-DIAG, RT EVH, DR PATEL    Subdural hematoma          Past Surgical History:    Past Surgical History:   Procedure Laterality Date    4 [facility]  05/18/2011    EF 60%    CARDIAC CATHETERIZATION  01/25/2016    drug eluting stent to RCA    CARDIAC CATHETERIZATION  01/25/2016    CARDIAC CATHETERIZATION  02/19/2016    CARDIAC CATHETERIZATION  08/24/2017    cutting balloon angioplasty ot LAD    CARDIAC CATHETERIZATION  04/13/2020    TWO Stents placed    CATARACT REMOVAL      COLON SURGERY  1992    Polypectomy - 2    COLON SURGERY  2003    Polypectomy - 4    COLON SURGERY  11/2008    Polypectomy - 2    COLON SURGERY  08/2012    Polypectomy - 3    COLONOSCOPY  03/01/2017    Dr Danisha Rodriguez- normal per pt recall    COLONOSCOPY  07/20/2012 polyps, diverticulosis,  4yr recall    CORONARY ANGIOPLASTY  08/2017    cutting balloon angioplasty LAD diagonal    CORONARY ANGIOPLASTY WITH STENT PLACEMENT  03/22/2007    CORONARY ANGIOPLASTY WITH STENT PLACEMENT  08/2007    CORONARY ARTERY BYPASS GRAFT  03/05/2013    PACABG X 3, LIMA-LAD, SVG0OM, SVG-DIAG, RT EVH, DR PATEL    COSMETIC SURGERY      deviated septum and rhinoplasty    CYST REMOVAL  1970    DIAGNOSTIC CARDIAC CATH LAB PROCEDURE      EYE SURGERY  03/2019    cataracts    [address] (CERVIX STATUS UNKNOWN)  1983    Partial    NASAL SEPTUM SURGERY  1977    SINUS SURGERY  2003    Nasal Polyps Removed    SPINE SURGERY  03/06/2006    Lumbar Laminectomy L4&5    SPINE SURGERY  03/06/2006    Cervical Fusion - C4,5,6    TONSILLECTOMY  1968    UPPER GASTROINTESTINAL ENDOSCOPY  09/20/2006         Home Medications:   Prior to Admission medications    Medication Sig Start Date End Date Taking? Authorizing Provider   amLODIPine (NORVASC) 2.5 MG tablet TAKE 1 TABLET BY MOUTH TWICE DAILY  Patient taking differently: AM and at 5 pm 11/21/22   LEILA Myrick   isosorbide mononitrate (IMDUR) 60 MG extended release tablet Take 120mg in the AM and 60 in the PM 10/19/22   LEILA Palacio   ranolazine (RANEXA) 1000 MG extended release tablet TAKE 1 TABLET TWICE A DAY 8/30/22   LEILA Sloan   nitroGLYCERIN (NITROSTAT) 0.4 MG SL tablet up to max of 3 total doses. If no relief after 1 dose, call 911. 4/7/22   LEILA Palacio   clonazePAM (KLONOPIN) 0.5 MG tablet nightly.  3/28/22   Historical Provider, MD   levETIRAcetam (KEPPRA) 1000 MG tablet Take 1.5 tabs twice a day 4/4/22   Emanuel Winslow MD   aspirin 81 MG EC tablet Take 81 mg by mouth daily At dinner    Historical Provider, MD   promethazine (PHENERGAN) 12.5 MG tablet Take 1 tablet by mouth every 6 hours as needed for Nausea 10/4/21   Emanuel Winslow MD   amitriptyline (ELAVIL) 50 MG tablet Take 50 mg by mouth nightly 3/31/21   Historical Provider, MD   diphenhydrAMINE (BENADRYL) 25 MG tablet Take 25 mg by mouth every 6 hours as needed for Itching    Historical Provider, MD   DULoxetine (CYMBALTA) 60 MG extended release capsule Take 60 mg by mouth every evening 5 pm    Historical Provider, MD   tiZANidine (ZANAFLEX) 4 MG tablet Take 4 mg by mouth nightly 1/2 tablet nightly    Historical Provider, MD   atorvastatin (LIPITOR) 80 MG tablet Take 1 tablet by mouth nightly  Patient taking differently: Take 80 mg by mouth nightly 5 pm 11/11/20   Sukhdeep Godwin MD   losartan (COZAAR) 100 MG tablet Take 1 tablet by mouth daily  Patient taking differently: Take 100 mg by mouth daily In AM 11/12/20   Sukhdeep Godwin MD   metoprolol succinate (TOPROL XL) 50 MG extended release tablet Take 1 tablet by mouth daily 11/12/20   Sukhdeep Godwin MD   pantoprazole (PROTONIX) 40 MG tablet Take 1 tablet by mouth every morning (before breakfast) 11/12/20   Sukhdeep Godwin MD   albuterol (PROVENTIL HFA;VENTOLIN HFA) 108 (90 BASE) MCG/ACT inhaler Inhale 2 puffs into the lungs every 6 hours as needed. Historical Provider, MD        Facility Administered Medications:    enoxaparin  30 mg SubCUTAneous BID    Vitamin D  1,000 Units Oral Daily    zinc sulfate  50 mg Oral Daily    guaiFENesin  1,200 mg Oral BID    amitriptyline  25 mg Oral Nightly    sodium chloride flush  5-40 mL IntraVENous 2 times per day    amLODIPine  2.5 mg Oral BID    aspirin  81 mg Oral Daily    clonazePAM  0.5 mg Oral Nightly    DULoxetine  60 mg Oral QPM    levETIRAcetam  1,500 mg Oral BID    pantoprazole  40 mg Oral QAM AC    tiZANidine  2 mg Oral Nightly    ranolazine  1,000 mg Oral BID    metoprolol succinate  50 mg Oral Daily    losartan  100 mg Oral Daily    atorvastatin  80 mg Oral Nightly    isosorbide mononitrate  120 mg Oral QAM    isosorbide mononitrate  60 mg Oral QPM       Allergies:   Other, Nexium [esomeprazole], Codeine, Darvocet [propoxyphene n-acetaminophen], Hydrocodone-acetaminophen, Lyrica [pregabalin], Morphine, and Percocet [oxycodone-acetaminophen]     Social History:       Social History     Socioeconomic History    Marital status:      Spouse name: Not on file    Number of children: 2    Years of education: Not on file    Highest education level: Not on file   Occupational History    Not on file   Tobacco Use    Smoking status: Former     Packs/day: 1.00     Years: 1.00     Pack years: 1.00     Types: Cigarettes     Quit date: 1967     Years since quittin.9    Smokeless tobacco: Never   Vaping Use    Vaping Use: Never used   Substance and Sexual Activity    Alcohol use: No    Drug use: No    Sexual activity: Not Currently   Other Topics Concern    Not on file   Social History Narrative    Not on file     Social Determinants of Health     Financial Resource Strain: Not on file   Food Insecurity: Not on file   Transportation Needs: Not on file   Physical Activity: Not on file   Stress: Not on file   Social Connections: Not on file   Intimate Partner Violence: Not on file   Housing Stability: Not on file       Family History:     Family History   Problem Relation Age of Onset    Heart Disease Mother     High Blood Pressure Mother     Diabetes Mother     High Cholesterol Mother     Colon Polyps Mother     Diabetes Brother     Colon Polyps Maternal Grandmother     Colon Polyps Maternal Grandfather     Colon Cancer Neg Hx     Esophageal Cancer Neg Hx     Liver Cancer Neg Hx          REVIEW OF SYSTEMS:     Constitutional: Negative. Eyes: Negative. Respiratory: Cough and shortness of breath  Cardiac: Chest pain  Gastrointestinal: Negative. Genitourinary: Negative. Musculoskeletal: Negative. Skin: Negative. Neurological:  negative  Hematological: Negative. Psychiatric/Behavioral: Negative.           PHYSICAL EXAMINATION:     BP (!) 115/50   Pulse 60   Temp 96.9 °F (36.1 °C)   Resp 18   Ht 5' 5\" (1.651 m)   Wt 169 lb (76.7 kg) SpO2 94%   BMI 28.12 kg/m²     Vitals and nursing note reviewed. Constitutional:       Appearance:  well-developed. HENT:      Head: Normocephalic and atraumatic. Eyes:      General:         Right eye: No discharge. Left eye: No discharge. Conjunctiva/sclera: Conjunctivae normal.      Pupils: Pupils are equal, round, and reactive to light. Cardiovascular:      Rate and Rhythm: Normal rate and regular rhythm. Heart sounds: No significant murmur. Pulmonary:      Effort: Pulmonary effort is normal. No respiratory distress. Breath sounds: Normal breath sounds. No wheezing or rales. Abdominal:      General: Bowel sounds are normal.      Palpations: Abdomen is soft. There is no mass. Tenderness: There is no abdominal tenderness. There is no guarding or rebound. Musculoskeletal:         General: No tenderness. Normal range of motion. Cervical back: Normal range of motion and neck supple. Skin:     General: Skin is warm and dry. Capillary Refill: Capillary refill takes less than 2 seconds. Neurological:      Mental Status:  alert and oriented to person, place, and time. Psychiatric:         Behavior: Behavior normal.         Thought Content: Thought content normal.         Judgment: Judgment normal.     LABORATORY EVALUATION & TESTING:    I have personally reviewed and interpreted the results of the following diagnostic testing      EKG and or Telemetry:  which was personally reviewed me:      EKG: normal EKG, normal sinus rhythm, nonspecific ST and T waves changes.       Cardiac Enzymes:   Recent Labs     01/07/23  1407 01/07/23  2118 01/08/23  0548   TROPONINI <0.01 <0.01 <0.01       CBC:   Recent Labs     01/06/23  0213 01/07/23 0219   WBC 5.3 3.7*   HGB 11.3* 11.2*   HCT 33.4* 33.5*   MCV 95.2 96.5   * 107*     BMP:   Recent Labs     01/06/23  0213 01/07/23 0219    137   K 3.9 3.9    104   CO2 22 16*   BUN 15 19   CREATININE 0.8 0.8 Cardiac Enzymes:   Recent Labs     01/07/23  1407 01/07/23  2118 01/08/23  0548   TROPONINI <0.01 <0.01 <0.01     PT/INR: Coag Panel: No results for input(s): INR, PROTIME, APTT in the last 72 hours. APTT: No results for input(s): APTT in the last 72 hours. Liver Profile:  Lab Results   Component Value Date/Time    AST 21 01/07/2023 02:19 AM    ALT 17 01/07/2023 02:19 AM    BILITOT 0.3 01/07/2023 02:19 AM    ALKPHOS 61 01/07/2023 02:19 AM    ALKPHOS 37 05/19/2011 05:23 AM     Lab Results   Component Value Date/Time    CHOL 205 05/09/2022 09:22 AM    HDL 66 05/09/2022 09:22 AM    TRIG 478 05/09/2022 09:22 AM     TSH:  Lab Results   Component Value Date/Time    TSH 5.530 05/09/2022 09:22 AM     UA:   Lab Results   Component Value Date/Time    COLORU YELLOW 01/07/2023 03:28 PM    PHUR 6.0 01/07/2023 03:28 PM    WBCUA 0 01/07/2023 03:28 PM    RBCUA 0 01/07/2023 03:28 PM    RBCUA 1 07/05/2015 11:50 PM    MUCUS 1+ 03/04/2013 08:35 AM    YEAST Present 01/04/2023 01:55 PM    BACTERIA NEGATIVE 01/07/2023 03:28 PM    CLARITYU Clear 01/07/2023 03:28 PM    SPECGRAV 1.016 01/07/2023 03:28 PM    LEUKOCYTESUR TRACE 01/07/2023 03:28 PM    UROBILINOGEN 0.2 01/07/2023 03:28 PM    BILIRUBINUR Negative 01/07/2023 03:28 PM    BLOODU Negative 01/07/2023 03:28 PM    GLUCOSEU Negative 01/07/2023 03:28 PM       IMAGING:  CT Head W/O Contrast    Result Date: 1/4/2023  NO PRIOR REPORT AVAILABLE Exam: CT OF THE BRAIN WITHOUT CONTRAST Clinical data: Possible fall. Confusion, headaches. History of subdural. Technique: Contiguous axial images are obtained from the skull base to vertex without intravenous contrast.  Reformatted/MPR images were performed. Radiation dose: CTDIvol = 44.85 mGy, DLP = 860 mGy x cm. Prior studies: MRI of brain dated 11/14/21 report. CT scan of brain dated 6/13/21 report. Findings:   There is mild atrophy and periventricular white matter lucency consistent with small vessel ischemic disease and/or age-related gliosis. There is no acute parenchymal hemorrhage, mass effect, extra-axial collection or acute territorial infarct. The ventricles are normal in size without shift or dilatation. The mastoid air cells well pneumatized. The calvarium appears intact. The paranasal sinuses are clear. Atrophy and periventricular white matter lucency consistent with small vessel ischemic disease and/or age-related gliosis. All CT scans at this facility utilize dose modulation, iterative reconstruction, and/or weight based dosing when appropriate to reduce radiation dose to as low as reasonably achievable. Dictated and Electronically Signed by Lizeth Gan MD at 04-Jan-2023 03:39:28 PM             XR CHEST PORTABLE    Result Date: 1/4/2023  NO PRIOR REPORT AVAILABLE Exam: X-RAY OF THECHEST Clinical data:Cough. Technique:Single view of the chest. Prior studies: Radiograph of the chest dated 11/13/2021 report. CT of the chest dated 10/27/2021 report. Findings: There is cervical fixation hardware. There are sternal wires. The aorta is calcified. Heart is normal in size. Mediastinal and pulmonary vascular shadows are normal.  There is no focal consolidation or effusion. No active pulmonary disease. Dictated and Electronically Signed by Lizeth Gan MD at 04-Jan-2023 03:46:00 PM               Assessment:  Chest pain- Angina equivalent  Shortness of breath  COVID-19 pneumonia  H/O CAD status post CABG in 2013 and said history of multiple stents. Altered mental status  Seizure-like activity  History of SLE  Type 2 diabetes  Hypertension  Hyperlipidemia  Normocytic bicytopenia  Dementia? Plan:  Troponin trend negative so far  Continue to monitor on telemetry  Echocardiogram   Lexiscan  Continue aspirin, statin, BB, ARB  Continue Imdur  Will hold off on any anticoagulation unless troponin keeps rising. Further plan will depend on the result of echocardiogram and Lexiscan.     I had a detailed discussion with the patient and / or family regarding the historical points, physical examination findings, and any diagnostic results supporting the admission diagnosis. The patient was educated on care and need for admission. questions were invited and answered. Patient and / or family shows understanding of admission information and agrees to follow. I have discussed the risks, benefits and options with the patient and her family. They appear to understand, have no questions, and wish to proceed. Discussed with patient and family and nursing. I greatly appreciate the opportunity and your confidence in allowing me to participate in the care of Vilma Strong    Electronically signed by Alex Sanderson DO on 1/7/23   Interventional cardiologist, [facility] [place].

## 2023-01-09 ENCOUNTER — APPOINTMENT (OUTPATIENT)
Dept: NUCLEAR MEDICINE | Age: 78
DRG: 177 | End: 2023-01-09
Payer: MEDICARE

## 2023-01-09 LAB
BLOOD CULTURE, ROUTINE: NORMAL
CULTURE, BLOOD 2: NORMAL
EKG P AXIS: 50 DEGREES
EKG P-R INTERVAL: 184 MS
EKG Q-T INTERVAL: 458 MS
EKG QRS DURATION: 104 MS
EKG QTC CALCULATION (BAZETT): 468 MS
EKG T AXIS: 136 DEGREES
GLUCOSE BLD-MCNC: 123 MG/DL (ref 70–99)
GLUCOSE BLD-MCNC: 129 MG/DL (ref 70–99)
GLUCOSE BLD-MCNC: 219 MG/DL (ref 70–99)
LV EF: 60 %
LVEF MODALITY: NORMAL
PERFORMED ON: ABNORMAL
TROPONIN: <0.01 NG/ML (ref 0–0.03)

## 2023-01-09 PROCEDURE — 94760 N-INVAS EAR/PLS OXIMETRY 1: CPT

## 2023-01-09 PROCEDURE — 36415 COLL VENOUS BLD VENIPUNCTURE: CPT

## 2023-01-09 PROCEDURE — 93010 ELECTROCARDIOGRAM REPORT: CPT | Performed by: INTERNAL MEDICINE

## 2023-01-09 PROCEDURE — 84484 ASSAY OF TROPONIN QUANT: CPT

## 2023-01-09 PROCEDURE — 6360000002 HC RX W HCPCS: Performed by: FAMILY MEDICINE

## 2023-01-09 PROCEDURE — C8929 TTE W OR WO FOL WCON,DOPPLER: HCPCS

## 2023-01-09 PROCEDURE — 6370000000 HC RX 637 (ALT 250 FOR IP): Performed by: PSYCHIATRY & NEUROLOGY

## 2023-01-09 PROCEDURE — 2580000003 HC RX 258: Performed by: EMERGENCY MEDICINE

## 2023-01-09 PROCEDURE — 99232 SBSQ HOSP IP/OBS MODERATE 35: CPT | Performed by: INTERNAL MEDICINE

## 2023-01-09 PROCEDURE — 82947 ASSAY GLUCOSE BLOOD QUANT: CPT

## 2023-01-09 PROCEDURE — 6360000004 HC RX CONTRAST MEDICATION: Performed by: FAMILY MEDICINE

## 2023-01-09 PROCEDURE — 1210000000 HC MED SURG R&B

## 2023-01-09 PROCEDURE — 99231 SBSQ HOSP IP/OBS SF/LOW 25: CPT | Performed by: PSYCHIATRY & NEUROLOGY

## 2023-01-09 PROCEDURE — 6370000000 HC RX 637 (ALT 250 FOR IP): Performed by: FAMILY MEDICINE

## 2023-01-09 RX ADMIN — TIZANIDINE 2 MG: 4 TABLET ORAL at 21:15

## 2023-01-09 RX ADMIN — AMITRIPTYLINE HYDROCHLORIDE 25 MG: 10 TABLET, FILM COATED ORAL at 21:15

## 2023-01-09 RX ADMIN — METOPROLOL SUCCINATE 50 MG: 50 TABLET, EXTENDED RELEASE ORAL at 09:26

## 2023-01-09 RX ADMIN — LOSARTAN POTASSIUM 100 MG: 100 TABLET, FILM COATED ORAL at 09:27

## 2023-01-09 RX ADMIN — RANOLAZINE 1000 MG: 500 TABLET, EXTENDED RELEASE ORAL at 21:15

## 2023-01-09 RX ADMIN — LEVETIRACETAM 1500 MG: 500 TABLET, FILM COATED ORAL at 21:15

## 2023-01-09 RX ADMIN — PERFLUTREN 1.5 ML: 6.52 INJECTION, SUSPENSION INTRAVENOUS at 09:08

## 2023-01-09 RX ADMIN — ZINC SULFATE 220 MG (50 MG) CAPSULE 50 MG: CAPSULE at 09:27

## 2023-01-09 RX ADMIN — SODIUM CHLORIDE, PRESERVATIVE FREE 10 ML: 5 INJECTION INTRAVENOUS at 21:16

## 2023-01-09 RX ADMIN — AMLODIPINE BESYLATE 2.5 MG: 2.5 TABLET ORAL at 21:15

## 2023-01-09 RX ADMIN — ENOXAPARIN SODIUM 30 MG: 100 INJECTION SUBCUTANEOUS at 21:16

## 2023-01-09 RX ADMIN — ENOXAPARIN SODIUM 30 MG: 100 INJECTION SUBCUTANEOUS at 09:28

## 2023-01-09 RX ADMIN — ATORVASTATIN CALCIUM 80 MG: 80 TABLET, FILM COATED ORAL at 21:16

## 2023-01-09 RX ADMIN — ASPIRIN 81 MG: 81 TABLET, COATED ORAL at 09:27

## 2023-01-09 RX ADMIN — SODIUM CHLORIDE, PRESERVATIVE FREE 10 ML: 5 INJECTION INTRAVENOUS at 09:28

## 2023-01-09 RX ADMIN — RANOLAZINE 1000 MG: 500 TABLET, EXTENDED RELEASE ORAL at 09:27

## 2023-01-09 RX ADMIN — GUAIFENESIN 1200 MG: 600 TABLET ORAL at 21:15

## 2023-01-09 RX ADMIN — AMLODIPINE BESYLATE 2.5 MG: 2.5 TABLET ORAL at 09:27

## 2023-01-09 RX ADMIN — GUAIFENESIN 1200 MG: 600 TABLET ORAL at 09:28

## 2023-01-09 RX ADMIN — Medication 1000 UNITS: at 09:27

## 2023-01-09 RX ADMIN — CLONAZEPAM 0.5 MG: 0.5 TABLET ORAL at 21:15

## 2023-01-09 RX ADMIN — ISOSORBIDE MONONITRATE 120 MG: 60 TABLET, EXTENDED RELEASE ORAL at 09:26

## 2023-01-09 RX ADMIN — ISOSORBIDE MONONITRATE 60 MG: 60 TABLET, EXTENDED RELEASE ORAL at 18:16

## 2023-01-09 RX ADMIN — DULOXETINE 60 MG: 60 CAPSULE, DELAYED RELEASE ORAL at 18:16

## 2023-01-09 RX ADMIN — ALBUTEROL SULFATE 2 PUFF: 90 AEROSOL, METERED RESPIRATORY (INHALATION) at 09:28

## 2023-01-09 RX ADMIN — LEVETIRACETAM 1500 MG: 500 TABLET, FILM COATED ORAL at 09:28

## 2023-01-09 ASSESSMENT — PAIN SCALES - GENERAL
PAINLEVEL_OUTOF10: 0

## 2023-01-09 NOTE — PROGRESS NOTES
Cardiology Progress Note Author Gabby DO     I personally saw the patient and rounded with:  RN, on  1/7/23      The observations documented in this note, including the assessment and plan are mine          Date of Admission:  1/4/2023  1:18 PM    Date of Initially Being Seen / Consultation:  1/7/23    Cardiologist:  Wale Haley 53 Attending: Dr. Ware Staff     PCP:  Paula Harrell MD    Reason for Consultation or Admission / Chief Complaint: Chest pain    Progress note  Subjective:  Patient is seen and examined. No acute event overnight. Chest pain is better with medical therapy. No respiratory distress. Denies any palpitation. Cardiovascular review of systems otherwise negative. SUBJECTIVE AND HISTORY OF PRESENT ILLNESS:    Source of the history:  Patient, family, previous inpatient and outpatient records in [facility]. Tha Mckeon is a 68 y.o. female who presents to [facility] with chief complaint of chest pain consulted to the cardiology team for further work-up. Symptoms started morning. States it's more like pressure and heaviness. Retrosternal, dull achy, relieved with nitroglycerin. Patient denies any diaphoresis. Denies any palpitation. Denies any leg swelling. Denies any focal weakness or numbness. Denies any hematemesis or melena or easy bruising or bleeding. No Nausea, vomiting or diarrhea  No abdominal pain  No upper back pain    Does have H/O CAD, stents, bypass. Denies any history of CHF or any valvular or electrical abnormality. Denies any recent or remote cardiac work-up. Does have recent cardiac work up. Cardiovascular review of system is otherwise negative.       Family present:      CARDIAC RISK PROFILE:    Risk Factor Yes / No / Unknown       Gender Female   Cigarette Use Former smoker   Family History of Cardiovascular Disease No   Diabetes Mellitus yes   Hypercholesteremia yes   Hypertension yes          Cardiac Specific Problems:    Specialty Problems          Cardiology Problems    Coronary artery disease of native artery of native heart with stable angina pectoris (HCC)        Unstable angina (HCC)        Chest pain        CAD (coronary artery disease)        Hyperlipidemia        Hypertension        Bilateral carotid artery stenosis        Atherosclerotic cardiovascular disease             PRIOR CARDIAC PROBLEM LIST  (IF APPLICABLE):    CAD, CABG      Past Medical History:    Past Medical History:   Diagnosis Date    Acute cystitis without hematuria 10/28/2020    Asthma     CAD (coronary artery disease)     Chest pain     Depression with anxiety     Diabetes mellitus (HCC)     GERD (gastroesophageal reflux disease)     Glucose intolerance (impaired glucose tolerance)     Hyperlipidemia     Cholesterol management per Figueroa Carmona M.D.     Hypertension     Lupus (systemic lupus erythematosus) ([facility] [address]     Malaise and fatigue 10/6/2017    Movement disorder     Sees pain management for neck pain and previous surgery    New onset seizure ([facility] [address]     Osteoarthritis     Palliative care patient 07/22/2020    S/P CABG x 3 03/05/2013    PACABG X 3, LIMA-LAD, SVG0OM, SVG-DIAG, RT EVH, DR PATEL    Subdural hematoma          Past Surgical History:    Past Surgical History:   Procedure Laterality Date    4 [facility]  05/18/2011    EF 60%    CARDIAC CATHETERIZATION  01/25/2016    drug eluting stent to RCA    CARDIAC CATHETERIZATION  01/25/2016    CARDIAC CATHETERIZATION  02/19/2016    CARDIAC CATHETERIZATION  08/24/2017    cutting balloon angioplasty ot LAD    CARDIAC CATHETERIZATION  04/13/2020    TWO Stents placed    CATARACT REMOVAL      COLON SURGERY  1992    Polypectomy - 2    COLON SURGERY  2003    Polypectomy - 4    COLON SURGERY  11/2008    Polypectomy - 2    COLON SURGERY  08/2012    Polypectomy - 3    COLONOSCOPY  03/01/2017    Dr Nicci Nick- normal per pt recall    COLONOSCOPY  07/20/2012 polyps, diverticulosis,  4yr recall    CORONARY ANGIOPLASTY  08/2017    cutting balloon angioplasty LAD diagonal    CORONARY ANGIOPLASTY WITH STENT PLACEMENT  03/22/2007    CORONARY ANGIOPLASTY WITH STENT PLACEMENT  08/2007    CORONARY ARTERY BYPASS GRAFT  03/05/2013    PACABG X 3, LIMA-LAD, SVG0OM, SVG-DIAG, RT EVH, DR PATEL    COSMETIC SURGERY      deviated septum and rhinoplasty    CYST REMOVAL  1970    DIAGNOSTIC CARDIAC CATH LAB PROCEDURE      EYE SURGERY  03/2019    cataracts    [address] (CERVIX STATUS UNKNOWN)  1983    Partial    NASAL SEPTUM SURGERY  1977    SINUS SURGERY  2003    Nasal Polyps Removed    SPINE SURGERY  03/06/2006    Lumbar Laminectomy L4&5    SPINE SURGERY  03/06/2006    Cervical Fusion - C4,5,6    TONSILLECTOMY  1968    UPPER GASTROINTESTINAL ENDOSCOPY  09/20/2006         Home Medications:   Prior to Admission medications    Medication Sig Start Date End Date Taking? Authorizing Provider   amLODIPine (NORVASC) 2.5 MG tablet TAKE 1 TABLET BY MOUTH TWICE DAILY  Patient taking differently: AM and at 5 pm 11/21/22   LEILA Woody   isosorbide mononitrate (IMDUR) 60 MG extended release tablet Take 120mg in the AM and 60 in the PM 10/19/22   LEILA Burnham   ranolazine (RANEXA) 1000 MG extended release tablet TAKE 1 TABLET TWICE A DAY 8/30/22   LEILA Ying   nitroGLYCERIN (NITROSTAT) 0.4 MG SL tablet up to max of 3 total doses. If no relief after 1 dose, call 911. 4/7/22   LEILA Burnham   clonazePAM (KLONOPIN) 0.5 MG tablet nightly.  3/28/22   Historical Provider, MD   levETIRAcetam (KEPPRA) 1000 MG tablet Take 1.5 tabs twice a day 4/4/22   Inna Landry MD   aspirin 81 MG EC tablet Take 81 mg by mouth daily At dinner    Historical Provider, MD   promethazine (PHENERGAN) 12.5 MG tablet Take 1 tablet by mouth every 6 hours as needed for Nausea 10/4/21   Inna Landry MD   amitriptyline (ELAVIL) 50 MG tablet Take 50 mg by mouth nightly 3/31/21   Historical Provider, MD   diphenhydrAMINE (BENADRYL) 25 MG tablet Take 25 mg by mouth every 6 hours as needed for Itching    Historical Provider, MD   DULoxetine (CYMBALTA) 60 MG extended release capsule Take 60 mg by mouth every evening 5 pm    Historical Provider, MD   tiZANidine (ZANAFLEX) 4 MG tablet Take 4 mg by mouth nightly 1/2 tablet nightly    Historical Provider, MD   atorvastatin (LIPITOR) 80 MG tablet Take 1 tablet by mouth nightly  Patient taking differently: Take 80 mg by mouth nightly 5 pm 11/11/20   Claudette Larson MD   losartan (COZAAR) 100 MG tablet Take 1 tablet by mouth daily  Patient taking differently: Take 100 mg by mouth daily In AM 11/12/20   Claudette Larson MD   metoprolol succinate (TOPROL XL) 50 MG extended release tablet Take 1 tablet by mouth daily 11/12/20   Claudette Larson MD   pantoprazole (PROTONIX) 40 MG tablet Take 1 tablet by mouth every morning (before breakfast) 11/12/20   Claudette Larson MD   albuterol (PROVENTIL HFA;VENTOLIN HFA) 108 (90 BASE) MCG/ACT inhaler Inhale 2 puffs into the lungs every 6 hours as needed. Historical Provider, MD        Facility Administered Medications:    enoxaparin  30 mg SubCUTAneous BID    Vitamin D  1,000 Units Oral Daily    zinc sulfate  50 mg Oral Daily    guaiFENesin  1,200 mg Oral BID    amitriptyline  25 mg Oral Nightly    sodium chloride flush  5-40 mL IntraVENous 2 times per day    amLODIPine  2.5 mg Oral BID    aspirin  81 mg Oral Daily    clonazePAM  0.5 mg Oral Nightly    DULoxetine  60 mg Oral QPM    levETIRAcetam  1,500 mg Oral BID    pantoprazole  40 mg Oral QAM AC    tiZANidine  2 mg Oral Nightly    ranolazine  1,000 mg Oral BID    metoprolol succinate  50 mg Oral Daily    losartan  100 mg Oral Daily    atorvastatin  80 mg Oral Nightly    isosorbide mononitrate  120 mg Oral QAM    isosorbide mononitrate  60 mg Oral QPM       Allergies:   Other, Nexium [esomeprazole], Codeine, Darvocet [propoxyphene n-acetaminophen], Hydrocodone-acetaminophen, Lyrica [pregabalin], Morphine, and Percocet [oxycodone-acetaminophen]     Social History:       Social History     Socioeconomic History    Marital status:      Spouse name: Not on file    Number of children: 2    Years of education: Not on file    Highest education level: Not on file   Occupational History    Not on file   Tobacco Use    Smoking status: Former     Packs/day: 1.00     Years: 1.00     Pack years: 1.00     Types: Cigarettes     Quit date: 1967     Years since quittin.9    Smokeless tobacco: Never   Vaping Use    Vaping Use: Never used   Substance and Sexual Activity    Alcohol use: No    Drug use: No    Sexual activity: Not Currently   Other Topics Concern    Not on file   Social History Narrative    Not on file     Social Determinants of Health     Financial Resource Strain: Not on file   Food Insecurity: Not on file   Transportation Needs: Not on file   Physical Activity: Not on file   Stress: Not on file   Social Connections: Not on file   Intimate Partner Violence: Not on file   Housing Stability: Not on file       Family History:     Family History   Problem Relation Age of Onset    Heart Disease Mother     High Blood Pressure Mother     Diabetes Mother     High Cholesterol Mother     Colon Polyps Mother     Diabetes Brother     Colon Polyps Maternal Grandmother     Colon Polyps Maternal Grandfather     Colon Cancer Neg Hx     Esophageal Cancer Neg Hx     Liver Cancer Neg Hx          REVIEW OF SYSTEMS:     Constitutional: Negative. Eyes: Negative. Respiratory: Cough and shortness of breath  Cardiac: Chest pain  Gastrointestinal: Negative. Genitourinary: Negative. Musculoskeletal: Negative. Skin: Negative. Neurological:  negative  Hematological: Negative. Psychiatric/Behavioral: Negative.           PHYSICAL EXAMINATION:     BP (!) 140/57   Pulse 65   Temp 97.2 °F (36.2 °C) (Temporal)   Resp 18   Ht 5' 5\" (1.651 m)   Wt 169 lb (76.7 kg)   SpO2 92%   BMI 28.12 kg/m²     Vitals and nursing note reviewed. Constitutional:       Appearance:  well-developed. HENT:      Head: Normocephalic and atraumatic. Eyes:      General:         Right eye: No discharge. Left eye: No discharge. Conjunctiva/sclera: Conjunctivae normal.      Pupils: Pupils are equal, round, and reactive to light. Cardiovascular:      Rate and Rhythm: Normal rate and regular rhythm. Heart sounds: No significant murmur. Pulmonary:      Effort: Pulmonary effort is normal. No respiratory distress. Breath sounds: Normal breath sounds. No wheezing or rales. Abdominal:      General: Bowel sounds are normal.      Palpations: Abdomen is soft. There is no mass. Tenderness: There is no abdominal tenderness. There is no guarding or rebound. Musculoskeletal:         General: No tenderness. Normal range of motion. Cervical back: Normal range of motion and neck supple. Skin:     General: Skin is warm and dry. Capillary Refill: Capillary refill takes less than 2 seconds. Neurological:      Mental Status:  alert and oriented to person, place, and time. Psychiatric:         Behavior: Behavior normal.         Thought Content: Thought content normal.         Judgment: Judgment normal.     LABORATORY EVALUATION & TESTING:    I have personally reviewed and interpreted the results of the following diagnostic testing      EKG and or Telemetry:  which was personally reviewed me:      EKG: normal EKG, normal sinus rhythm, nonspecific ST and T waves changes.       Cardiac Enzymes:   Recent Labs     01/08/23  1038 01/08/23  1705 01/08/23  2030   TROPONINI <0.01 <0.01 <0.01       CBC:   Recent Labs     01/06/23  0213 01/07/23 0219   WBC 5.3 3.7*   HGB 11.3* 11.2*   HCT 33.4* 33.5*   MCV 95.2 96.5   * 107*     BMP:   Recent Labs     01/06/23  0213 01/07/23 0219    137   K 3.9 3.9    104   CO2 22 16*   BUN 15 19   CREATININE 0.8 0.8     Cardiac Enzymes:   Recent Labs     01/08/23  1038 01/08/23  1705 01/08/23 2030   TROPONINI <0.01 <0.01 <0.01     PT/INR: Coag Panel: No results for input(s): INR, PROTIME, APTT in the last 72 hours. APTT: No results for input(s): APTT in the last 72 hours. Liver Profile:  Lab Results   Component Value Date/Time    AST 21 01/07/2023 02:19 AM    ALT 17 01/07/2023 02:19 AM    BILITOT 0.3 01/07/2023 02:19 AM    ALKPHOS 61 01/07/2023 02:19 AM    ALKPHOS 37 05/19/2011 05:23 AM     Lab Results   Component Value Date/Time    CHOL 205 05/09/2022 09:22 AM    HDL 66 05/09/2022 09:22 AM    TRIG 478 05/09/2022 09:22 AM     TSH:  Lab Results   Component Value Date/Time    TSH 5.530 05/09/2022 09:22 AM     UA:   Lab Results   Component Value Date/Time    COLORU YELLOW 01/07/2023 03:28 PM    PHUR 6.0 01/07/2023 03:28 PM    WBCUA 0 01/07/2023 03:28 PM    RBCUA 0 01/07/2023 03:28 PM    RBCUA 1 07/05/2015 11:50 PM    MUCUS 1+ 03/04/2013 08:35 AM    YEAST Present 01/04/2023 01:55 PM    BACTERIA NEGATIVE 01/07/2023 03:28 PM    CLARITYU Clear 01/07/2023 03:28 PM    SPECGRAV 1.016 01/07/2023 03:28 PM    LEUKOCYTESUR TRACE 01/07/2023 03:28 PM    UROBILINOGEN 0.2 01/07/2023 03:28 PM    BILIRUBINUR Negative 01/07/2023 03:28 PM    BLOODU Negative 01/07/2023 03:28 PM    GLUCOSEU Negative 01/07/2023 03:28 PM       IMAGING:  CT Head W/O Contrast    Result Date: 1/4/2023  NO PRIOR REPORT AVAILABLE Exam: CT OF THE BRAIN WITHOUT CONTRAST Clinical data: Possible fall. Confusion, headaches. History of subdural. Technique: Contiguous axial images are obtained from the skull base to vertex without intravenous contrast.  Reformatted/MPR images were performed. Radiation dose: CTDIvol = 44.85 mGy, DLP = 860 mGy x cm. Prior studies: MRI of brain dated 11/14/21 report. CT scan of brain dated 6/13/21 report. Findings:   There is mild atrophy and periventricular white matter lucency consistent with small vessel ischemic disease and/or age-related gliosis. There is no acute parenchymal hemorrhage, mass effect, extra-axial collection or acute territorial infarct. The ventricles are normal in size without shift or dilatation. The mastoid air cells well pneumatized. The calvarium appears intact. The paranasal sinuses are clear. Atrophy and periventricular white matter lucency consistent with small vessel ischemic disease and/or age-related gliosis. All CT scans at this facility utilize dose modulation, iterative reconstruction, and/or weight based dosing when appropriate to reduce radiation dose to as low as reasonably achievable. Dictated and Electronically Signed by Joslyn Alberto MD at 04-Jan-2023 03:39:28 PM             XR CHEST PORTABLE    Result Date: 1/4/2023  NO PRIOR REPORT AVAILABLE [facility] data:Cough. Technique:Single view of the chest. Prior studies: Radiograph of the chest dated 11/13/2021 report. CT of the chest dated 10/27/2021 report. Findings: There is cervical fixation hardware. There are sternal wires. The aorta is calcified. Heart is normal in size. Mediastinal and pulmonary vascular shadows are normal.  There is no focal consolidation or effusion. No active pulmonary disease. Dictated and Electronically Signed by Joslyn Alberto MD at 04-Jan-2023 03:46:00 PM               Assessment:  Chest pain- Angina equivalent  Shortness of breath  COVID-19 pneumonia  H/O CAD status post CABG in 2013 and said history of multiple stents. Altered mental status  Seizure-like activity  History of SLE  Type 2 diabetes  Hypertension  Hyperlipidemia  Normocytic bicytopenia  Dementia? Plan:  Troponin trend negative so far  Continue to monitor on telemetry  Echocardiogram   Lexiscan  Continue aspirin, statin, BB, ARB  Continue Imdur  Will hold off on any anticoagulation unless troponin keeps rising. Further plan will depend on the result of echocardiogram and Lexiscan.     I had a detailed discussion with the patient and / or family regarding the historical points, physical examination findings, and any diagnostic results supporting the admission diagnosis. The patient was educated on care and need for admission. questions were invited and answered. Patient and / or family shows understanding of admission information and agrees to follow. I have discussed the risks, benefits and options with the patient and her family. They appear to understand, have no questions, and wish to proceed. Discussed with patient and family and nursing. I greatly appreciate the opportunity and your confidence in allowing me to participate in the care of [facility]    Electronically signed by Afsaneh Breen DO on 1/7/23   Interventional cardiologist, [facility].

## 2023-01-09 NOTE — CARE COORDINATION
01/09/23 1541   IMM Letter   IMM Letter given to Patient/Family/Significant other/Guardian/POA/by: IMM letter given to Pt and at bedside Pt daugther by Riri & Juancho. IMM Letter date given: 01/09/23   IMM Letter time given: 0   Gave IMM letter to Pt and at bedside Pt daughter. Sw explained IMM letter to Pt and Pt daughter. No questions or concerns at this time.    Electronically signed by DOTTY Mccormack on 1/9/2023 at 3:46 PM

## 2023-01-09 NOTE — PLAN OF CARE
Problem: Skin/Tissue Integrity  Goal: Absence of new skin breakdown  Description: 1. Monitor for areas of redness and/or skin breakdown  2. Assess vascular access sites hourly  3. Every 4-6 hours minimum:  Change oxygen saturation probe site  4. Every 4-6 hours:  If on nasal continuous positive airway pressure, respiratory therapy assess nares and determine need for appliance change or resting period.   1/9/2023 0411 by Latricia Madera RN  Outcome: Progressing  1/8/2023 1614 by Girish Ramos RN  Outcome: Progressing     Problem: Chronic Conditions and Co-morbidities  Goal: Patient's chronic conditions and co-morbidity symptoms are monitored and maintained or improved  1/9/2023 0411 by Latricia Madera RN  Outcome: Progressing  1/8/2023 1614 by Girish Ramos RN  Outcome: Progressing     Problem: Discharge Planning  Goal: Discharge to home or other facility with appropriate resources  1/9/2023 0411 by Latricia Madera RN  Outcome: Progressing  1/8/2023 1614 by Girish Ramos RN  Outcome: Progressing     Problem: Safety - Adult  Goal: Free from fall injury  1/9/2023 0411 by Latricia Madera RN  Outcome: Progressing  1/8/2023 1614 by Girish Ramos RN  Outcome: Progressing     Problem: Pain  Goal: Verbalizes/displays adequate comfort level or baseline comfort level  1/9/2023 0411 by Latricia Madera RN  Outcome: Progressing  1/8/2023 1614 by Girish Ramos RN  Outcome: Progressing     Problem: ABCDS Injury Assessment  Goal: Absence of physical injury  1/9/2023 0411 by Latricia Madera RN  Outcome: Progressing  1/8/2023 1614 by Girish Ramos RN  Outcome: Progressing

## 2023-01-09 NOTE — PROGRESS NOTES
Patient:   Vilma Strong  MR#:    [id number]   Room:    [address] [place] [address]   YOB: 1945  Date of Progress Note: 1/9/2023  Time of Note                           9:50 AM  Consulting Physician:   Diana Livingston M.D. Attending Physician:  Christine Vogel MD     CHIEF COMPLAINT: Confusion  Subjective  This 68 y.o. female who presents with confusion and also positivity for COVID-19. Has had fever and chills along with a cough for the past couple of days. Has been having visual hallucinations and delusional thinking during that time as well. Has been admitted with the symptoms and is on the [facility] floor. Does have 3+ bacteria in her urine and a culture is pending. Routine blood studies otherwise okay. I have seen her in the office before for a seizure disorder for which she takes Keppra 1500 mg twice a day. Is also on some other medications including Zanaflex, Klonopin, Cymbalta and Elavil. I tried to get her to reduce some of those in the past for cognitive issues and balance. She continues on them for the most part. Patient denies any focal signs or symptoms to suggest stroke or seizure. No new issues over the weekend. REVIEW OF SYSTEMS:  Constitutional: No fevers No chills  Neck:No stiffness  Respiratory: No shortness of breath  Cardiovascular: No chest pain No palpitations  Gastrointestinal: No abdominal pain    Genitourinary: No Dysuria  Neurological: No headache, no seizures      PHYSICAL EXAM:  BP (!) 120/57   Pulse 70   Temp 97.5 °F (36.4 °C) (Temporal)   Resp 17   Ht 5' 5\" (1.651 m)   Wt 169 lb (76.7 kg)   SpO2 90%   BMI 28.12 kg/m²     Constitutional: she appears well-developed and well-nourished. Eyes - conjunctiva normal.  Pupils react to light  Ear, nose, throat -hearing intact to voice.  No scars, masses, or lesions over external nose or ears, no atrophy of tongue  Neck-symmetric, no masses noted, no jugular vein distension  Respiration- chest wall appears symmetric, good expansion,   normal effort without use of accessory muscles  Cardiovascular- RRR  Musculoskeletal - no significant wasting of muscles noted, no bony deformities, gait no gross ataxia  Extremities-no clubbing, cyanosis or edema  Skin - warm, dry, and intact. No rash, erythema, or pallor. Psychiatric - mood, affect, and behavior appear normal.      Neurology  NEUROLOGICAL EXAM:      Mental status   Awake, alert, fluent oriented x 3 appropriate affect  Attention and concentration appear appropriate  Recent and remote memory appears unremarkable  Speech normal without dysarthria  No clear issues with language  Able to follow complex commands. Cranial Nerves   CN II- Visual fields grossly unremarkable  CN III, IV,VI-EOMI, No nystagmus, conjugate eye movements, no ptosis  CN VII-no facial asymmetry  CN VIII-Hearing intact   CN IX and X- Palate elevates in midline  CN XI-good shoulder shrug  CN XII-Tongue midline with no fasciculations or fibrillations          Motor function  Antigravity x 4         Nursing/pcp notes, imaging,labs and vitals reviewed.          Lab Results   Component Value Date    WBC 3.7 (L) 01/07/2023    HGB 11.2 (L) 01/07/2023    HCT 33.5 (L) 01/07/2023    MCV 96.5 01/07/2023     (L) 01/07/2023     Lab Results   Component Value Date     01/07/2023    K 3.9 01/07/2023     01/07/2023    CO2 16 (L) 01/07/2023    BUN 19 01/07/2023    CREATININE 0.8 01/07/2023    GLUCOSE 163 (H) 01/07/2023    CALCIUM 8.5 (L) 01/07/2023    PROT 5.3 (L) 01/07/2023    LABALBU 3.7 01/07/2023    BILITOT 0.3 01/07/2023    ALKPHOS 61 01/07/2023    AST 21 01/07/2023    ALT 17 01/07/2023    LABGLOM >60 01/07/2023    GFRAA >59 05/09/2022    GLOB 1.8 11/19/2016     Lab Results   Component Value Date    INR 0.97 10/27/2020    INR 0.99 10/25/2020    INR 0.96 10/24/2020     Lab Results   Component Value Date    CRP 1.46 (H) 01/07/2023       PT,OT and/or speech notes reviewed:      RECORD REVIEW: Previous medical records, medications were reviewed at today's visit    IMPRESSION:   Encephalopathy. Has some baseline mild cognitive impairment and I suspect her recent difficulties are related to COVID. Amitriptyline has been reduced. Also being treated for UTI. No further work-up planned at this time. Please contact if needed. Otherwise she can follow-up in the office. Needs to increase her physical activity while here.

## 2023-01-09 NOTE — PROGRESS NOTES
01/09/23 1600   Subjective   Subjective Patient just had procedure cancelled. \"  Now this is the first I have eaten since yesterday\"   Patient declined gait wanted to eat. This therapist agrees with patient.    PT Plan of Care   Monday R         Electronically signed by Marci Ramirez PTA on 1/9/2023 at 4:19 PM

## 2023-01-09 NOTE — PROGRESS NOTES
Follow up visit with pt and pt's daughter. Pt is awaiting a test and is feeling tired. This  provided spiritual care with sustaining presence, nurtured hope, and prayer. Pt and daughter expressed gratitude for spiritual care. Also, pt's nurse Radha Noe asked this  to look over pt's EMS DNR and POA and double check if they were in Epic. The documents were in [facility] [address] Radha Noe says pt wants to be a DNR and she will message Dr. Chitra Lo about pt's code status. She expressed gratitude for assistance.      Electronically signed by Donovan Ansari on 1/9/2023 at 3:14 PM

## 2023-01-10 VITALS
HEART RATE: 61 BPM | TEMPERATURE: 96.6 F | WEIGHT: 169 LBS | HEIGHT: 65 IN | OXYGEN SATURATION: 92 % | SYSTOLIC BLOOD PRESSURE: 134 MMHG | DIASTOLIC BLOOD PRESSURE: 63 MMHG | RESPIRATION RATE: 17 BRPM | BODY MASS INDEX: 28.16 KG/M2

## 2023-01-10 LAB
GLUCOSE BLD-MCNC: 136 MG/DL (ref 70–99)
PERFORMED ON: ABNORMAL

## 2023-01-10 PROCEDURE — 94760 N-INVAS EAR/PLS OXIMETRY 1: CPT

## 2023-01-10 PROCEDURE — 82947 ASSAY GLUCOSE BLOOD QUANT: CPT

## 2023-01-10 PROCEDURE — 6360000002 HC RX W HCPCS: Performed by: FAMILY MEDICINE

## 2023-01-10 PROCEDURE — 6370000000 HC RX 637 (ALT 250 FOR IP): Performed by: EMERGENCY MEDICINE

## 2023-01-10 PROCEDURE — 2580000003 HC RX 258: Performed by: EMERGENCY MEDICINE

## 2023-01-10 PROCEDURE — 6370000000 HC RX 637 (ALT 250 FOR IP): Performed by: FAMILY MEDICINE

## 2023-01-10 RX ORDER — AMITRIPTYLINE HYDROCHLORIDE 25 MG/1
25 TABLET, FILM COATED ORAL NIGHTLY
Qty: 30 TABLET | Refills: 3 | Status: SHIPPED | OUTPATIENT
Start: 2023-01-10

## 2023-01-10 RX ORDER — GUAIFENESIN 600 MG/1
1200 TABLET, EXTENDED RELEASE ORAL 2 TIMES DAILY
Qty: 60 TABLET | Refills: 0 | Status: SHIPPED | OUTPATIENT
Start: 2023-01-10

## 2023-01-10 RX ORDER — CHOLECALCIFEROL (VITAMIN D3) 25 MCG
1000 TABLET ORAL DAILY
Qty: 60 TABLET | Refills: 0 | Status: SHIPPED | OUTPATIENT
Start: 2023-01-11 | End: 2023-02-21

## 2023-01-10 RX ORDER — ZINC SULFATE 50(220)MG
50 CAPSULE ORAL DAILY
Qty: 14 CAPSULE | Refills: 0 | COMMUNITY
Start: 2023-01-11

## 2023-01-10 RX ADMIN — RANOLAZINE 1000 MG: 500 TABLET, EXTENDED RELEASE ORAL at 11:18

## 2023-01-10 RX ADMIN — ENOXAPARIN SODIUM 30 MG: 100 INJECTION SUBCUTANEOUS at 11:20

## 2023-01-10 RX ADMIN — METOPROLOL SUCCINATE 50 MG: 50 TABLET, EXTENDED RELEASE ORAL at 11:18

## 2023-01-10 RX ADMIN — GUAIFENESIN 1200 MG: 600 TABLET ORAL at 11:18

## 2023-01-10 RX ADMIN — Medication 1000 UNITS: at 11:18

## 2023-01-10 RX ADMIN — AMLODIPINE BESYLATE 2.5 MG: 2.5 TABLET ORAL at 11:18

## 2023-01-10 RX ADMIN — SODIUM CHLORIDE, PRESERVATIVE FREE 10 ML: 5 INJECTION INTRAVENOUS at 11:19

## 2023-01-10 RX ADMIN — ASPIRIN 81 MG: 81 TABLET, COATED ORAL at 11:18

## 2023-01-10 RX ADMIN — PANTOPRAZOLE SODIUM 40 MG: 40 TABLET, DELAYED RELEASE ORAL at 05:17

## 2023-01-10 RX ADMIN — LOSARTAN POTASSIUM 100 MG: 100 TABLET, FILM COATED ORAL at 11:18

## 2023-01-10 RX ADMIN — LEVETIRACETAM 1500 MG: 500 TABLET, FILM COATED ORAL at 11:18

## 2023-01-10 RX ADMIN — ISOSORBIDE MONONITRATE 120 MG: 60 TABLET, EXTENDED RELEASE ORAL at 11:19

## 2023-01-10 RX ADMIN — ZINC SULFATE 220 MG (50 MG) CAPSULE 50 MG: CAPSULE at 11:19

## 2023-01-10 RX ADMIN — ACETAMINOPHEN 650 MG: 325 TABLET ORAL at 11:24

## 2023-01-10 ASSESSMENT — PAIN DESCRIPTION - LOCATION: LOCATION: BACK

## 2023-01-10 ASSESSMENT — PAIN SCALES - GENERAL: PAINLEVEL_OUTOF10: 3

## 2023-01-10 ASSESSMENT — PAIN DESCRIPTION - ORIENTATION: ORIENTATION: LEFT

## 2023-01-10 NOTE — DISCHARGE SUMMARY
Hospital Discharge Summary    Colton Wilburn  :  1945  MRN:  [id number]    Admit date:  2023  Discharge date:      Admitting Physician:    Dinora Landin MD    Discharge Diagnoses:    Principal Problem:    COVID-19 virus infection  Resolved Problems:    * No resolved hospital problems. *    AMS, improved    Dementia    UTI     CAD    Seizure D/O    Hospital Course:   She was admitted with AMS. She did test + for COVID. She was seen by Neurology and they do not feel that there is any issue. She has been treated conservatively for COVID and has done well. She has had no O2 requirement. She had an episode of CP and was seen by Cardiology. They have not recommended any additional treatment, testing. On d/c, her VS are stable. She is eating ok. She has no O2 requirement. Discharge Medications:         Medication List        START taking these medications      guaiFENesin 600 MG extended release tablet  Commonly known as: MUCINEX  Take 2 tablets by mouth 2 times daily     Vitamin D3 25 MCG Tabs  Take 1 tablet by mouth daily  Start taking on: 2023     zinc sulfate 220 (50 Zn) MG capsule  Commonly known as: ZINCATE  Take 1 capsule by mouth daily  Start taking on: 2023            CHANGE how you take these medications      amitriptyline 25 MG tablet  Commonly known as: ELAVIL  Take 1 tablet by mouth nightly  What changed:   medication strength  how much to take     amLODIPine 2.5 MG tablet  Commonly known as: NORVASC  TAKE 1 TABLET BY MOUTH TWICE DAILY  What changed: See the new instructions.      atorvastatin 80 MG tablet  Commonly known as: LIPITOR  Take 1 tablet by mouth nightly  What changed: additional instructions     losartan 100 MG tablet  Commonly known as: COZAAR  Take 1 tablet by mouth daily  What changed: additional instructions            CONTINUE taking these medications      albuterol sulfate  (90 Base) MCG/ACT inhaler  Commonly known as: PROVENTIL;VENTOLIN;PROAIR aspirin 81 MG EC tablet     clonazePAM 0.5 MG tablet  Commonly known as: KLONOPIN     diphenhydrAMINE 25 MG tablet  Commonly known as: BENADRYL     DULoxetine 60 MG extended release capsule  Commonly known as: CYMBALTA     isosorbide mononitrate 60 MG extended release tablet  Commonly known as: IMDUR  Take 120mg in the AM and 60 in the PM     levETIRAcetam 1000 MG tablet  Commonly known as: KEPPRA  Take 1.5 tabs twice a day     metoprolol succinate 50 MG extended release tablet  Commonly known as: TOPROL XL  Take 1 tablet by mouth daily     nitroGLYCERIN 0.4 MG SL tablet  Commonly known as: NITROSTAT  up to max of 3 total doses. If no relief after 1 dose, call 911. pantoprazole 40 MG tablet  Commonly known as: PROTONIX  Take 1 tablet by mouth every morning (before breakfast)     promethazine 12.5 MG tablet  Commonly known as: PHENERGAN  Take 1 tablet by mouth every 6 hours as needed for Nausea     ranolazine 1000 MG extended release tablet  Commonly known as: RANEXA  TAKE 1 TABLET TWICE A DAY     tiZANidine 4 MG tablet  Commonly known as: Donell Beverage               Where to Get Your Medications        These medications were sent to [id number], [phone] [facility]  [address] [facility], [address] [place] [id number]      Phone: [phone]   amitriptyline 25 MG tablet  guaiFENesin 600 MG extended release tablet  Vitamin D3 25 MCG Tabs       You can get these medications from any pharmacy    You don't need a prescription for these medications  zinc sulfate 220 (50 Zn) MG capsule         Consults:  Neurology, Cardiology    Significant Diagnostic Studies:  see complete admission record      Disposition:   home in stable condition  Follow up with Lisa Fried MD in 1-2 weeks. F/U with Cardiology and Neurology as they recommend. Diet: cardiac    Activity: as tolerated    Special Instructions: Set up Home Health if pt would like.  Monitor O2 sat and contact if < 92%      The patient or family are to call or return if there are any problems, questions, concerns or change in her condition.      Signed:  Kelly Cisneros MD MD  1/10/2023, 1:33 PM

## 2023-01-10 NOTE — PROGRESS NOTES
Cardiology Progress Note Brady Morris MD      Patient:  Elaine French  [id number]    Patient Active Problem List    Diagnosis Date Noted    Unstable angina [facility])      Priority: High    Chest discomfort 01/10/2019     Priority: High    Coronary artery disease of native artery of native heart with stable angina pectoris ([address]      Priority: High    COVID-19 virus infection 01/04/2023     Priority: Medium    Vascular dementia without behavioral disturbance ([address] 12/23/2021     Priority: Low    Chronic epigastric pain 12/15/2021     Priority: Low    Nausea 12/15/2021     Priority: Low    LLQ abdominal pain 12/15/2021     Priority: Low    Colitis 11/13/2021     Priority: Low    Status epilepticus ([address] 06/13/2021     Priority: Low    Acute cystitis without hematuria 10/28/2020     Priority: Low    Intracranial bleed ([address] 10/27/2020     Priority: Low    Post-traumatic headache 10/27/2020     Priority: Low    SLE (systemic lupus erythematosus) ([address] 10/25/2020     Priority: Low    Subarachnoid hemorrhage following injury, no loss of consciousness ([address] 10/25/2020     Priority: Low    Subarachnoid hemorrhage ([address] 10/24/2020     Priority: Low    Palliative care patient 07/22/2020     Priority: Low    Seizure ([address]      Priority: Low    Spondylolisthesis at L4-L5 level      Priority: Low    Atherosclerotic cardiovascular disease      Priority: Low    AMS (altered mental status) 06/11/2020     Priority: Low    Altered mental status 06/10/2020     Priority: Low    Intractable back pain 05/29/2020     Priority: Low    Acute exacerbation of chronic low back pain 05/28/2020     Priority: Low    Bilateral carotid artery stenosis 12/28/2017     Priority: Low    Aphasia 11/29/2017     Priority: Low    Ataxia 11/29/2017     Priority: Low    Vertigo 11/29/2017     Priority: Low    Malaise and fatigue 10/06/2017     Priority: Low    Chest pressure      Priority: Low    Fatigue 02/25/2016     Priority: Low    S/P right coronary artery (RCA) stent placement 02/25/2016     Priority: Low     Overview Note:     1/25/16 MARCELINO to RCA by Dr. Darrel Gya  Patent RCA stent on 2/29/16 cath by Dr. Kristen Wolfe      Hx of CABG 02/25/2016     Priority: Low    Hyperlipidemia      Priority: Low     Overview Note:     Dr Leslie Kitchen cks cholesterol      Hypertension      Priority: Low    CAD (coronary artery disease)      Priority: Low     Overview Note:     5/18/2011  Cath  Mild CAD, normal LVFX  2/5/2013  SE  Positive for clinical and EKG myocardial ischemia  2/8.2013  Cath  TVD with normal LVFX  3/5/2013  CABG x 3 LIMA-LAD, VG-diag, VG-Om1  07/06/2015  lexiscan  negative for myocardial ischemia, EF 84  %  1/25/16 MARCELINO to RCA by Dr. Darrel Gay  2/29/2016  Cath  Patent RCA stent on 2/29/16 cath by Dr. Kristen Wolfe  11/19/16  lexiscan Positive for possible apical myocardial ischemia, EF 79%, 1% ischemic myocardium on stress, low risk findings  11/10/2018  ACS PANKAJ RISK Score 4 (angina, age>65, cad>50, ASA ), AUC indication 3, AUC score 8   1/11/19 cath  Patent LIMA-LAD, patent VG-diag, patent VG-OM, patent RCA, normal LVFX        Chest pain      Priority: Low    Osteoarthritis      Priority: Low    GERD (gastroesophageal reflux disease)      Priority: Low    Asthma      Priority: Low    Glucose intolerance (impaired glucose tolerance)      Priority: Low    Depression with anxiety      Priority: Low       Admit Date:  1/4/2023    Admission Problem List: Present on Admission:   COVID-19 virus infection      Cardiac Specific Data:  Specialty Problems          Cardiology Problems    Coronary artery disease of native artery of native heart with stable angina pectoris (HCC)        Unstable angina (HCC)        Chest pain        CAD (coronary artery disease)        Hyperlipidemia        Hypertension        Bilateral carotid artery stenosis        Atherosclerotic cardiovascular disease         1.   Coronary artery disease, prior CABG 3/2013 with LIMA to distal LAD, SVG to first diagonal, SVG to small OM1, prior PCI to RCA 1/2016 (patent) with patent grafts by catheterization 1/2019 with distal left main significant stenosis feeding large OM 2 and septal branches, PCI 4/13/2022 left main into circumflex and proximal circumflex (MARCELINO x2), normal LV ejection fraction. 2.  Diabetes mellitus. 3.  SLE with normal renal functions. 4.  Chronic severe low back pain, limited mobility with walker. 5.  History of fall with right frontal small subarachnoid hemorrhage 10/24/2020 with recurrent seizures requiring admission 4/13/2021 and 11/14/2021. Subjective:  Ms. Brower Reveal apparent hallucinations, found on floor and admitted to the hospital and noted to be COVID-positive 1/4/2023. In hospital she had an episode of chest pain yesterday that responds to sublingual nitroglycerin. She does take nitroglycerin about 3 times a month with relief. This has been ongoing for over 2 years. Symptoms have been fairly stable. She has been suffering from chronic low back pain and is limited in ambulation using a walker. She had a traumatic fall with a small right frontal subarachnoid hemorrhage 10/24/2020. Since then she has had recurrent seizure activity requiring admissions. Objective:   BP (!) 130/56   Pulse 66   Temp 97.5 °F (36.4 °C) (Temporal)   Resp 17   Ht 5' 5\" (1.651 m)   Wt 169 lb (76.7 kg)   SpO2 90%   BMI 28.12 kg/m²       Intake/Output Summary (Last 24 hours) at 1/9/2023 1810  Last data filed at 1/9/2023 9148  Gross per 24 hour   Intake --   Output 900 ml   Net -900 ml       Prior to Admission medications    Medication Sig Start Date End Date Taking?  Authorizing Provider   amLODIPine (NORVASC) 2.5 MG tablet TAKE 1 TABLET BY MOUTH TWICE DAILY  Patient taking differently: AM and at 5 pm 11/21/22   LEILA Gutierrez   isosorbide mononitrate (IMDUR) 60 MG extended release tablet Take 120mg in the AM and 60 in the PM 10/19/22   LEILA Faust   ranolazine (RANEXA) 1000 MG extended release tablet TAKE 1 TABLET TWICE A DAY 8/30/22   LEILA Ying   nitroGLYCERIN (NITROSTAT) 0.4 MG SL tablet up to max of 3 total doses. If no relief after 1 dose, call 911. 4/7/22   LEILA Burnham   clonazePAM (KLONOPIN) 0.5 MG tablet nightly. 3/28/22   Historical Provider, MD   levETIRAcetam (KEPPRA) 1000 MG tablet Take 1.5 tabs twice a day 4/4/22   Inna Landry MD   aspirin 81 MG EC tablet Take 81 mg by mouth daily At dinner    Historical Provider, MD   promethazine (PHENERGAN) 12.5 MG tablet Take 1 tablet by mouth every 6 hours as needed for Nausea 10/4/21   Inna Landry MD   amitriptyline (ELAVIL) 50 MG tablet Take 50 mg by mouth nightly 3/31/21   Historical Provider, MD   diphenhydrAMINE (BENADRYL) 25 MG tablet Take 25 mg by mouth every 6 hours as needed for Itching    Historical Provider, MD   DULoxetine (CYMBALTA) 60 MG extended release capsule Take 60 mg by mouth every evening 5 pm    Historical Provider, MD   tiZANidine (ZANAFLEX) 4 MG tablet Take 4 mg by mouth nightly 1/2 tablet nightly    Historical Provider, MD   atorvastatin (LIPITOR) 80 MG tablet Take 1 tablet by mouth nightly  Patient taking differently: Take 80 mg by mouth nightly 5 pm 11/11/20   Inna Landry MD   losartan (COZAAR) 100 MG tablet Take 1 tablet by mouth daily  Patient taking differently: Take 100 mg by mouth daily In AM 11/12/20   Inna Landry MD   metoprolol succinate (TOPROL XL) 50 MG extended release tablet Take 1 tablet by mouth daily 11/12/20   Inna Landry MD   pantoprazole (PROTONIX) 40 MG tablet Take 1 tablet by mouth every morning (before breakfast) 11/12/20   Inna Landry MD   albuterol (PROVENTIL HFA;VENTOLIN HFA) 108 (90 BASE) MCG/ACT inhaler Inhale 2 puffs into the lungs every 6 hours as needed.     Historical Provider, MD        enoxaparin  30 mg SubCUTAneous BID    Vitamin D  1,000 Units Oral Daily    zinc sulfate  50 mg Oral Daily    guaiFENesin  1,200 mg Oral BID    amitriptyline  25 mg Oral Nightly    sodium chloride flush  5-40 mL IntraVENous 2 times per day    amLODIPine  2.5 mg Oral BID    aspirin  81 mg Oral Daily    clonazePAM  0.5 mg Oral Nightly    DULoxetine  60 mg Oral QPM    levETIRAcetam  1,500 mg Oral BID    pantoprazole  40 mg Oral QAM AC    tiZANidine  2 mg Oral Nightly    ranolazine  1,000 mg Oral BID    metoprolol succinate  50 mg Oral Daily    losartan  100 mg Oral Daily    atorvastatin  80 mg Oral Nightly    isosorbide mononitrate  120 mg Oral QAM    isosorbide mononitrate  60 mg Oral QPM       TELEMETRY: Sinus     Physical Exam:      Physical Exam  Constitutional:       General: She is not in acute distress. Appearance: She is obese. She is not diaphoretic. HENT:      Mouth/Throat:      Pharynx: No oropharyngeal exudate. Eyes:      General: No scleral icterus. Right eye: No discharge. Left eye: No discharge. Neck:      Thyroid: No thyromegaly. Vascular: No JVD. Cardiovascular:      Rate and Rhythm: Normal rate and regular rhythm. No extrasystoles are present. Heart sounds: Normal heart sounds, S1 normal and S2 normal. No murmur heard. No systolic murmur is present. No diastolic murmur is present. No friction rub. No gallop. No S3 or S4 sounds. Comments: JVD  No edema  No significant systolic or diastolic murmurs noted  Pulmonary:      Effort: Pulmonary effort is normal. No respiratory distress. Breath sounds: Normal breath sounds. No wheezing or rales. Chest:      Chest wall: No tenderness. Abdominal:      General: Bowel sounds are normal. There is no distension. Palpations: Abdomen is soft. There is no mass. Tenderness: There is no abdominal tenderness. There is no guarding or rebound. Hernia: No hernia is present. Comments: No palpable organomegaly   Musculoskeletal:         General: Normal range of motion. Skin:     General: Skin is warm. Coloration: Skin is not pale. Findings: No rash.    Neurological: Mental Status: She is alert and oriented to person, place, and time. Cranial Nerves: No cranial nerve deficit. Deep Tendon Reflexes: Reflexes normal.      Comments: Appears oriented at this time and able to explain issues clearly. Lab Data:  CBC:   Recent Labs     01/07/23 0219   WBC 3.7*   HGB 11.2*   HCT 33.5*   MCV 96.5   *     BMP:   Recent Labs     01/07/23 0219      K 3.9      CO2 16*   BUN 19   CREATININE 0.8     LIVER PROFILE:   Recent Labs     01/07/23 0219   AST 21   ALT 17   BILITOT 0.3   ALKPHOS 61     PT/INR: No results for input(s): PROTIME, INR in the last 72 hours. APTT: No results for input(s): APTT in the last 72 hours. BNP:  No results for input(s): BNP in the last 72 hours. CK, CKMB, Troponin: @LABRCNT (CKTOTAL:3, CKMB:3, TROPONINI:3)@    IMAGING:  CT Head W/O Contrast    Result Date: 1/4/2023  NO PRIOR REPORT AVAILABLE Exam: CT OF THE BRAIN WITHOUT CONTRAST Clinical data: Possible fall. Confusion, headaches. History of subdural. Technique: Contiguous axial images are obtained from the skull base to vertex without intravenous contrast.  Reformatted/MPR images were performed. Radiation dose: CTDIvol = 44.85 mGy, DLP = 860 mGy x cm. Prior studies: MRI of brain dated 11/14/21 report. CT scan of brain dated 6/13/21 report. Findings: There is mild atrophy and periventricular white matter lucency consistent with small vessel ischemic disease and/or age-related gliosis. There is no acute parenchymal hemorrhage, mass effect, extra-axial collection or acute territorial infarct. The ventricles are normal in size without shift or dilatation. The mastoid air cells well pneumatized. The calvarium appears intact. The paranasal sinuses are clear. Atrophy and periventricular white matter lucency consistent with small vessel ischemic disease and/or age-related gliosis.   All CT scans at this facility utilize dose modulation, iterative reconstruction, and/or weight based dosing when appropriate to reduce radiation dose to as low as reasonably achievable. Dictated and Electronically Signed by Howard Alexandra MD at 04-Jan-2023 03:39:28 PM             XR CHEST PORTABLE    Result Date: 1/4/2023  NO PRIOR REPORT AVAILABLE [facility] Clinical data:Cough. Technique:Single view of the chest. Prior studies: Radiograph of the chest dated 11/13/2021 report. CT of the chest dated 10/27/2021 report. Findings: There is cervical fixation hardware. There are sternal wires. The aorta is calcified. Heart is normal in size. Mediastinal and pulmonary vascular shadows are normal.  There is no focal consolidation or effusion. No active pulmonary disease. Dictated and Electronically Signed by Howard Alexandra MD at 04-Jan-2023 03:46:00 PM                 Assessment and Plan:    77-year-old female patient with past medical history of coronary artery disease, prior CABG 3/2013 with patent grafts by catheterization 1/2019 and 4/13/2020 with LIMA to distal LAD, SVG to small OM1, SVG to first diagonal, prior PCI to distal RCA with residual mid RCA 60%, PCI 4/13/20 to left main into circumflex and circumflex, normal LV ejection fraction, diabetes mellitus, SLE, chronic severe low back pain with traumatic fall and [address] 10/24/2020, recurrent seizure disorder admitted with hallucinations with COVID-positive status. 1.  Currently she appears oriented and able to converse accurately with issues. She has been having symptoms of anginal quality episodically over the 2 years and has been managing medically. She did not wish any intervention. She is on high-dose long-acting nitrates and Ranexa. Troponins are negative. Echo reviewed. EF is preserved with normal wall motion. At this time especially in light of COVID-positive status, in the absence of myocardial injury, with preserved EF and normal wall motion, would manage patient conservatively.   Can cancel [employer] study at this time. Continue medical management and can reevaluate as an outpatient if she desires to pursue an interventional strategy. She is limited in terms of functionality.         Sánchez Freeman MD, MD 1/9/2023 6:10 PM

## 2023-01-10 NOTE — PROGRESS NOTES
Progress Note  Jessica Raymond  1/9/2023 6:43 PM  Subjective:   Admit Date:   1/4/2023      CC/ADMIT DX:       Interval History:   Reviewed overnight events and nursing notes. She has no CP. She is feeling better,     I have reviewed all labs/diagnostics from the last 24hrs. ROS:   I have done a 10 point ROS and all are negative, except what is mentioned in the HPI. ADULT DIET; Regular; Safety Tray; Safety Tray (Disposables)    Medications:      sodium chloride        enoxaparin  30 mg SubCUTAneous BID    Vitamin D  1,000 Units Oral Daily    zinc sulfate  50 mg Oral Daily    guaiFENesin  1,200 mg Oral BID    amitriptyline  25 mg Oral Nightly    sodium chloride flush  5-40 mL IntraVENous 2 times per day    amLODIPine  2.5 mg Oral BID    aspirin  81 mg Oral Daily    clonazePAM  0.5 mg Oral Nightly    DULoxetine  60 mg Oral QPM    levETIRAcetam  1,500 mg Oral BID    pantoprazole  40 mg Oral QAM AC    tiZANidine  2 mg Oral Nightly    ranolazine  1,000 mg Oral BID    metoprolol succinate  50 mg Oral Daily    losartan  100 mg Oral Daily    atorvastatin  80 mg Oral Nightly    isosorbide mononitrate  120 mg Oral QAM    isosorbide mononitrate  60 mg Oral QPM           Objective:   Vitals: BP (!) 130/56   Pulse 66   Temp 97.5 °F (36.4 °C) (Temporal)   Resp 17   Ht 5' 5\" (1.651 m)   Wt 169 lb (76.7 kg)   SpO2 90%   BMI 28.12 kg/m²    Intake/Output Summary (Last 24 hours) at 1/9/2023 1843  Last data filed at 1/9/2023 0620  Gross per 24 hour   Intake --   Output 900 ml   Net -900 ml     General appearance: alert and cooperative with exam  Heart: RRR  Extremities: no C/C trace LE edema  Neurologic:  No obvious focal neurologic deficits. Assessment and Plan:   Principal Problem:    COVID-19 virus infection  Resolved Problems:    * No resolved hospital problems.  *    Seizure D/O    HTN    DM2    SLE    UTI    CP    Plan:   Continue present medication(s)    Follow with Cardiology   She has no O2 requirement Continue supportive care   PT/OT      Discharge planning:   her home     Reviewed treatment plans with the patient and/or family.              Electronically signed by Bianca Key MD on 1/9/2023 at 6:43 PM

## 2023-01-10 NOTE — PLAN OF CARE
Problem: Skin/Tissue Integrity  Goal: Absence of new skin breakdown  Description: 1. Monitor for areas of redness and/or skin breakdown  2. Assess vascular access sites hourly  3. Every 4-6 hours minimum:  Change oxygen saturation probe site  4. Every 4-6 hours:  If on nasal continuous positive airway pressure, respiratory therapy assess nares and determine need for appliance change or resting period.   Outcome: Progressing     Problem: Chronic Conditions and Co-morbidities  Goal: Patient's chronic conditions and co-morbidity symptoms are monitored and maintained or improved  Outcome: Progressing     Problem: Discharge Planning  Goal: Discharge to home or other facility with appropriate resources  Outcome: Progressing     Problem: Safety - Adult  Goal: Free from fall injury  Outcome: Progressing     Problem: Pain  Goal: Verbalizes/displays adequate comfort level or baseline comfort level  Outcome: Progressing     Problem: ABCDS Injury Assessment  Goal: Absence of physical injury  Outcome: Progressing

## 2023-02-07 ENCOUNTER — TELEPHONE (OUTPATIENT)
Dept: CARDIOLOGY CLINIC | Age: 78
End: 2023-02-07

## 2023-02-20 ENCOUNTER — TELEPHONE (OUTPATIENT)
Dept: CARDIOLOGY CLINIC | Age: 78
End: 2023-02-20

## 2023-02-21 ENCOUNTER — OFFICE VISIT (OUTPATIENT)
Dept: CARDIOLOGY CLINIC | Age: 78
End: 2023-02-21
Payer: MEDICARE

## 2023-02-21 VITALS
DIASTOLIC BLOOD PRESSURE: 64 MMHG | HEART RATE: 73 BPM | SYSTOLIC BLOOD PRESSURE: 138 MMHG | HEIGHT: 65 IN | BODY MASS INDEX: 27.82 KG/M2 | WEIGHT: 167 LBS | OXYGEN SATURATION: 94 %

## 2023-02-21 DIAGNOSIS — I10 ESSENTIAL HYPERTENSION: ICD-10-CM

## 2023-02-21 DIAGNOSIS — I25.118 CORONARY ARTERY DISEASE OF NATIVE ARTERY OF NATIVE HEART WITH STABLE ANGINA PECTORIS (HCC): Primary | ICD-10-CM

## 2023-02-21 DIAGNOSIS — E78.2 MIXED HYPERLIPIDEMIA: ICD-10-CM

## 2023-02-21 PROCEDURE — G8399 PT W/DXA RESULTS DOCUMENT: HCPCS | Performed by: CLINICAL NURSE SPECIALIST

## 2023-02-21 PROCEDURE — 1036F TOBACCO NON-USER: CPT | Performed by: CLINICAL NURSE SPECIALIST

## 2023-02-21 PROCEDURE — 1090F PRES/ABSN URINE INCON ASSESS: CPT | Performed by: CLINICAL NURSE SPECIALIST

## 2023-02-21 PROCEDURE — 99214 OFFICE O/P EST MOD 30 MIN: CPT | Performed by: CLINICAL NURSE SPECIALIST

## 2023-02-21 PROCEDURE — 3075F SYST BP GE 130 - 139MM HG: CPT | Performed by: CLINICAL NURSE SPECIALIST

## 2023-02-21 PROCEDURE — G8484 FLU IMMUNIZE NO ADMIN: HCPCS | Performed by: CLINICAL NURSE SPECIALIST

## 2023-02-21 PROCEDURE — 3078F DIAST BP <80 MM HG: CPT | Performed by: CLINICAL NURSE SPECIALIST

## 2023-02-21 PROCEDURE — G8427 DOCREV CUR MEDS BY ELIG CLIN: HCPCS | Performed by: CLINICAL NURSE SPECIALIST

## 2023-02-21 PROCEDURE — G8417 CALC BMI ABV UP PARAM F/U: HCPCS | Performed by: CLINICAL NURSE SPECIALIST

## 2023-02-21 PROCEDURE — 1123F ACP DISCUSS/DSCN MKR DOCD: CPT | Performed by: CLINICAL NURSE SPECIALIST

## 2023-02-21 NOTE — PATIENT INSTRUCTIONS
Maintain good blood pressure control-goal<130/80 at rest  Maintain good cholesterol control LDL goal<70 with arterial disease  If you are diabetic work to keep/obtain hemoglobin A1c< 7    Follow up in Aug With Dr. Jorge Garland   Call with any questions or concerns  Follow up with Jan Mcburney, MD for non cardiac problems  Report any new problems  Cardiovascular Fitness-Exercise as tolerated. Fall precautions-  Cardiac / Healthy Diet- Avoid processed high fat foods, maintain low sodium/salt   Continue current medications as directed  Continue plan of treatment  It is always recommended that you bring your medications bottles with you to each visit - this is for your safety!

## 2023-02-21 NOTE — PROGRESS NOTES
[address] [address] Karley Michelle [facility], [address] [address]  Phone: [phone]  Fax: [phone]    OFFICE VISIT:  2023    Kamlesh Diaz - : 1945    Reason For Visit:  Nany Poon is a 68 y.o. female who is here for Follow-up (HFU  no cardiac symptoms) and Coronary Artery Disease  Coronary disease with CABG x3 in . Underwent heart catheterization 2019 that showed    Patent LIMA-LAD, patent VG-diag, patent VG-OM, patent RCA, normal LVFX  Patient heart catheterization 2020 that showed patent bypass grafts with successful PCI to left main and PTCA of ostial LAD. 2D echo in 2021 showed preserved LV function with mild apical hypokinesis. No significant valvular disease  She follows with neurology for memory loss and generalized weakness and seizure disorder. This occurred after fall and subarachnoid hemorrhage in . She returns today for routine follow-up accompanied with her daughter  She was admitted in the hospital last month for COVID. Did not need oxygen requirement    She states overall she is doing fairly well. She wishes she was progressing better after hospitalization. She also has frequent UTIs. She is back wearing pull-ups for incontinence    Subjective  Nany Poon denies any worsening exertional chest pain, shortness of breath, orthopnea, paroxysmal nocturnal dyspnea, syncope, presyncope, arrhythmia,. The patient denies numbness or weakness to suggest cerebrovascular accident or transient ischemic attack. Susanna Hendricks MD is PCP and follows labs .   Kamlesh Diaz has the following history as recorded in [facility]:    Patient Active Problem List    Diagnosis Date Noted    Unstable angina [facility])     Chest discomfort 01/10/2019    Coronary artery disease of native artery of native heart with stable angina pectoris ([facility]     COVID-19 virus infection 2023    Vascular dementia without behavioral disturbance ([facility] 2021    Chronic epigastric pain 12/15/2021    Nausea 12/15/2021    LLQ abdominal pain 12/15/2021    Colitis 11/13/2021    Status epilepticus ([address] 06/13/2021    Acute cystitis without hematuria 10/28/2020    Intracranial bleed ([address] 10/27/2020    Post-traumatic headache 10/27/2020    SLE (systemic lupus erythematosus) ([address] 10/25/2020    Subarachnoid hemorrhage following injury, no loss of consciousness ([address] 10/25/2020    Subarachnoid hemorrhage ([address] 10/24/2020    Palliative care patient 07/22/2020    Seizure ([address]     Spondylolisthesis at L4-L5 level     Atherosclerotic cardiovascular disease     AMS (altered mental status) 06/11/2020    Altered mental status 06/10/2020    Intractable back pain 05/29/2020    Acute exacerbation of chronic low back pain 05/28/2020    Bilateral carotid artery stenosis 12/28/2017    Aphasia 11/29/2017    Ataxia 11/29/2017    Vertigo 11/29/2017    Malaise and fatigue 10/06/2017    Chest pressure     Fatigue 02/25/2016    S/P right coronary artery (RCA) stent placement 02/25/2016    Hx of CABG 02/25/2016    Hyperlipidemia     Hypertension     CAD (coronary artery disease)     Chest pain     Osteoarthritis     GERD (gastroesophageal reflux disease)     Asthma     Glucose intolerance (impaired glucose tolerance)     Depression with anxiety      Past Medical History:   Diagnosis Date    Acute cystitis without hematuria 10/28/2020    Asthma     CAD (coronary artery disease)     Chest pain     Depression with anxiety     Diabetes mellitus (HCC)     GERD (gastroesophageal reflux disease)     Glucose intolerance (impaired glucose tolerance)     Hyperlipidemia     Cholesterol management per Tracey Moran M.D.     Hypertension     Lupus (systemic lupus erythematosus) ([address]     Malaise and fatigue 10/6/2017    Movement disorder     Sees pain management for neck pain and previous surgery    New onset seizure ([address]     Osteoarthritis     Palliative care patient 07/22/2020    S/P CABG x 3 03/05/2013    PACABG X 3, LIMA-NGUYỄN, SVG0OM, SVG-DIAG, RT EVH, DR PATEL    Subdural hematoma      Past Surgical History:   Procedure Laterality Date    APPENDECTOMY  1965    BACK SURGERY      CARDIAC CATHETERIZATION  05/18/2011    EF 60%    CARDIAC CATHETERIZATION  01/25/2016    drug eluting stent to RCA    CARDIAC CATHETERIZATION  01/25/2016    CARDIAC CATHETERIZATION  02/19/2016    CARDIAC CATHETERIZATION  08/24/2017    cutting balloon angioplasty ot LAD    CARDIAC CATHETERIZATION  04/13/2020    TWO Stents placed    CATARACT REMOVAL      COLON SURGERY  1992    Polypectomy - 2    COLON SURGERY  2003    Polypectomy - 4    COLON SURGERY  11/2008    Polypectomy - 2    COLON SURGERY  08/2012    Polypectomy - 3    COLONOSCOPY  03/01/2017    Dr Nicci Nick- normal per pt recall    COLONOSCOPY  07/20/2012    polyps, diverticulosis,  4yr recall    CORONARY ANGIOPLASTY  08/2017    cutting balloon angioplasty LAD diagonal    CORONARY ANGIOPLASTY WITH STENT PLACEMENT  03/22/2007    CORONARY ANGIOPLASTY WITH STENT PLACEMENT  08/2007    CORONARY ARTERY BYPASS GRAFT  03/05/2013    PACABG X 3, LIMA-LAD, SVG0OM, SVG-DIAG, RT EVH, DR PATEL    COSMETIC SURGERY      deviated septum and rhinoplasty    CYST REMOVAL  1970    DIAGNOSTIC CARDIAC CATH LAB PROCEDURE      EYE SURGERY  03/2019    cataracts    HEMORRHOID SURGERY  1987    HYSTERECTOMY (CERVIX STATUS UNKNOWN)  1983    Partial    [address]    SINUS SURGERY  2003    Nasal Polyps Removed    SPINE SURGERY  03/06/2006    Lumbar Laminectomy L4&5    SPINE SURGERY  03/06/2006    Cervical Fusion - C4,5,6    TONSILLECTOMY  1968    UPPER GASTROINTESTINAL ENDOSCOPY  09/20/2006     Family History   Problem Relation Age of Onset    Heart Disease Mother     High Blood Pressure Mother     Diabetes Mother     High Cholesterol Mother     Colon Polyps Mother     Diabetes Brother     Colon Polyps Maternal Grandmother     Colon Polyps Maternal Grandfather     Colon Cancer Neg Hx     Esophageal Cancer Neg Hx     Liver Cancer Neg Hx      Social History     Tobacco Use    Smoking status: Former     Packs/day: 1.00     Years: 1.00     Pack years: 1.00     Types: Cigarettes     Quit date: 1967     Years since quittin.0    Smokeless tobacco: Never   Substance Use Topics    Alcohol use: No      Current Outpatient Medications   Medication Sig Dispense Refill    amitriptyline (ELAVIL) 25 MG tablet Take 1 tablet by mouth nightly 30 tablet 3    guaiFENesin (MUCINEX) 600 MG extended release tablet Take 2 tablets by mouth 2 times daily 60 tablet 0    amLODIPine (NORVASC) 2.5 MG tablet TAKE 1 TABLET BY MOUTH TWICE DAILY (Patient taking differently: AM and at 5 pm) 60 tablet 5    isosorbide mononitrate (IMDUR) 60 MG extended release tablet Take 120mg in the AM and 60 in the  tablet 3    ranolazine (RANEXA) 1000 MG extended release tablet TAKE 1 TABLET TWICE A  tablet 3    nitroGLYCERIN (NITROSTAT) 0.4 MG SL tablet up to max of 3 total doses. If no relief after 1 dose, call 911. 30 tablet 3    levETIRAcetam (KEPPRA) 1000 MG tablet Take 1.5 tabs twice a day 270 tablet 5    diphenhydrAMINE (BENADRYL) 25 MG tablet Take 25 mg by mouth every 6 hours as needed for Itching      DULoxetine (CYMBALTA) 60 MG extended release capsule Take 60 mg by mouth every evening 5 pm      tiZANidine (ZANAFLEX) 4 MG tablet Take 4 mg by mouth nightly 1/2 tablet nightly      atorvastatin (LIPITOR) 80 MG tablet Take 1 tablet by mouth nightly 30 tablet 0    losartan (COZAAR) 100 MG tablet Take 1 tablet by mouth daily 30 tablet 3    metoprolol succinate (TOPROL XL) 50 MG extended release tablet Take 1 tablet by mouth daily 30 tablet 3    pantoprazole (PROTONIX) 40 MG tablet Take 1 tablet by mouth every morning (before breakfast) 30 tablet 3    albuterol (PROVENTIL HFA;VENTOLIN HFA) 108 (90 BASE) MCG/ACT inhaler Inhale 2 puffs into the lungs every 6 hours as needed.       zinc sulfate (ZINCATE) 220 (50 Zn) MG capsule Take 1 capsule by mouth daily (Patient not taking: Reported on 2/21/2023) 14 capsule 0    aspirin 81 MG EC tablet Take 81 mg by mouth daily At dinner (Patient not taking: Reported on 2/21/2023)       No current facility-administered medications for this visit. Allergies: Other, Nexium [esomeprazole], Codeine, Darvocet [propoxyphene n-acetaminophen], Hydrocodone-acetaminophen, Lyrica [pregabalin], Morphine, and Percocet [oxycodone-acetaminophen]    Review of Systems  Constitutional - no significant activity change, appetite change, or unexpected weight change. No fever, chills or diaphoresis. + fatigue. HEENT - no significant rhinorrhea or epistaxis. No tinnitus or significant hearing loss. Eyes - no sudden vision change or amaurosis. Respiratory - no significant wheezing, stridor, apnea or cough. No dyspnea on exertion or shortness of breath. Cardiovascular - + exertional chest pain, no  orthopnea or PND. No sensation of arrhythmia or slow heart rate. No claudication + leg edema. Gastrointestinal - no abdominal swelling or pain. No blood in stool. No severe constipation, diarrhea, nausea, or vomiting. Genitourinary - no difficulty urinating, dysuria, frequency, or urgency. + Incontinence no flank pain or hematuria. Musculoskeletal - no back pain, gait -unsteady using walker. No recent falls  Skin - no color change or rash. No pallor. No new surgical incision. Neurologic - no speech difficulty, facial asymmetry or lateralizing weakness. No seizures, presyncope, syncope, or significant dizziness. Hematologic - no easy bruising or excessive bleeding. Psychiatric - no severe anxiety or insomnia. No confusion. All other review of systems are negative. Objective  Vital Signs - /64   Pulse 73   Ht 5' 5\" (1.651 m)   Wt 167 lb (75.8 kg)   SpO2 94%   BMI 27.79 kg/m²   General - Jonathan Hopes is alert, cooperative, and pleasant. Well groomed. No acute distress. Body habitus is overweight.   HEENT - The head is normocephalic. No circumoral cyanosis. Dentition is normal.   EYES -  No Xanthelasma, no arcus senilis, no conjunctival hemorrhages or discharge. Neck - Supple, without increased jugular venous pressures. No carotid bruits. No mass. Respiratory - Lungs are clear bilaterally. No wheezes or rales. Normal effort without use of accessory muscles. Cardiovascular - Heart has regular rhythm and rate. No murmurs, rubs or gallops. + pedal pulses and no varicosities. Abdominal -  Soft, nontender, nondistended. Bowel sounds are intact. Extremities - No clubbing, cyanosis, BLE edema. Musculoskeletal -  No clubbing . No Osler's nodes. Gait - using walker . No kyphosis or scoliosis. Skin -  no statis ulcers or dermatitis. Neurological - No focal signs are identified. Oriented to person, place and time. Psychiatric -  Appropriate affect and mood. Assessment:     Diagnosis Orders   1. Coronary artery disease of native artery of native heart with stable angina pectoris ([facility]        2. Essential hypertension        3. Mixed hyperlipidemia          Data:  BP Readings from Last 3 Encounters:   02/21/23 138/64   01/10/23 134/63   12/06/22 (!) 156/60    Pulse Readings from Last 3 Encounters:   02/21/23 73   01/10/23 61   12/06/22 72        Wt Readings from Last 3 Encounters:   02/21/23 167 lb (75.8 kg)   01/07/23 169 lb (76.7 kg)   10/19/22 166 lb (75.3 kg)     Blood pressure heart rate controlled. Medical management includes CCB, ARB and beta-blocker. Remains on long-acting nitrate. Also on Ranexa 1000 mg twice a day  Remains on statin  Stable exertional angina worse than prior. Still some residual weakness and fatigue. COVID infection and UTI last month. Seeing PCP later this week. Recommend discussing home health with some PT     Reviewed recent notes   Reviewed recent labs     States taking medications as prescribed  Stable cardiovascular status.  No evidence of overt heart failure, angina or dysrhythmia. 30 minutes were spent preparing, reviewing and seeing patient. All questions answered    Plan    Maintain good blood pressure control-goal<130/80 at rest  Maintain good cholesterol control LDL goal<70 with arterial disease  If you are diabetic work to keep/obtain hemoglobin A1c< 7    Follow up in Aug With Dr. Nilson Arellano   Call with any questions or concerns  Follow up with Norma Barragan MD for non cardiac problems  Report any new problems  Cardiovascular Fitness-Exercise as tolerated. Fall precautions-  Cardiac / Healthy Diet- Avoid processed high fat foods, maintain low sodium/salt   Continue current medications as directed  Continue plan of treatment  It is always recommended that you bring your medications bottles with you to each visit - this is for your safety! LEILA Huerta    EMR dragon/transcription disclaimer: Much of this encounter note is electronic transcription/translation of spoken language to printed tach. Electronic translation of spoken language may be erroneous, or at times, nonsensical words or phrases may be inadvertently transcribed.  Although, I have reviewed the note for such errors, some may still exist.

## 2023-03-15 LAB — HBA1C MFR BLD: 7.3 % (ref 4–6)

## 2023-03-17 ENCOUNTER — TELEPHONE (OUTPATIENT)
Dept: CARDIOLOGY CLINIC | Age: 78
End: 2023-03-17

## 2023-03-17 NOTE — TELEPHONE ENCOUNTER
Pharm wants to know if you want pt on lipitor and Ranexa together. Dr. Chitra Lo office called stating the pharm called them about taking these meds together and they called us wanting us to make that decision.

## 2023-04-25 ENCOUNTER — HOSPITAL ENCOUNTER (OUTPATIENT)
Dept: PAIN MANAGEMENT | Age: 78
Discharge: HOME | End: 2023-04-25
Payer: MEDICARE

## 2023-04-25 VITALS
DIASTOLIC BLOOD PRESSURE: 60 MMHG | HEART RATE: 67 BPM | SYSTOLIC BLOOD PRESSURE: 143 MMHG | OXYGEN SATURATION: 94 % | TEMPERATURE: 96.9 F | RESPIRATION RATE: 18 BRPM

## 2023-04-25 DIAGNOSIS — R52 PAIN MANAGEMENT: ICD-10-CM

## 2023-04-25 PROCEDURE — 2500000003 HC RX 250 WO HCPCS

## 2023-04-25 PROCEDURE — 64494 INJ PARAVERT F JNT L/S 2 LEV: CPT

## 2023-04-25 PROCEDURE — 64493 INJ PARAVERT F JNT L/S 1 LEV: CPT

## 2023-04-25 PROCEDURE — 6360000002 HC RX W HCPCS

## 2023-04-25 RX ORDER — METHYLPREDNISOLONE ACETATE 80 MG/ML
80 INJECTION, SUSPENSION INTRA-ARTICULAR; INTRALESIONAL; INTRAMUSCULAR; SOFT TISSUE ONCE
Status: DISCONTINUED | OUTPATIENT
Start: 2023-04-25 | End: 2023-04-27 | Stop reason: HOSPADM

## 2023-04-25 RX ORDER — LIDOCAINE HYDROCHLORIDE 10 MG/ML
20 INJECTION, SOLUTION EPIDURAL; INFILTRATION; INTRACAUDAL; PERINEURAL ONCE
Status: DISCONTINUED | OUTPATIENT
Start: 2023-04-25 | End: 2023-04-27 | Stop reason: HOSPADM

## 2023-04-25 RX ORDER — BUPIVACAINE HYDROCHLORIDE 5 MG/ML
5 INJECTION, SOLUTION EPIDURAL; INTRACAUDAL ONCE
Status: DISCONTINUED | OUTPATIENT
Start: 2023-04-25 | End: 2023-04-27 | Stop reason: HOSPADM

## 2023-04-25 ASSESSMENT — PAIN - FUNCTIONAL ASSESSMENT
PAIN_FUNCTIONAL_ASSESSMENT: NONE - DENIES PAIN
PAIN_FUNCTIONAL_ASSESSMENT: PREVENTS OR INTERFERES WITH MANY ACTIVE NOT PASSIVE ACTIVITIES
PAIN_FUNCTIONAL_ASSESSMENT: PREVENTS OR INTERFERES SOME ACTIVE ACTIVITIES AND ADLS

## 2023-04-25 ASSESSMENT — PAIN DESCRIPTION - DESCRIPTORS
DESCRIPTORS: ACHING;SHARP;BURNING
DESCRIPTORS: ACHING;BURNING;SHARP

## 2023-04-25 ASSESSMENT — PAIN DESCRIPTION - FREQUENCY: FREQUENCY: INTERMITTENT

## 2023-04-25 ASSESSMENT — PAIN DESCRIPTION - ORIENTATION: ORIENTATION: LOWER

## 2023-04-25 ASSESSMENT — PAIN SCALES - GENERAL: PAINLEVEL_OUTOF10: 7

## 2023-04-25 ASSESSMENT — PAIN DESCRIPTION - LOCATION: LOCATION: BACK

## 2023-04-25 ASSESSMENT — PAIN DESCRIPTION - PAIN TYPE: TYPE: CHRONIC PAIN

## 2023-04-25 NOTE — INTERVAL H&P NOTE
Update History & Physical    The patient's History and Physical  was reviewed with the patient and I examined the patient. There was no change. The surgical site was confirmed by the patient and me. Plan: The risks, benefits, expected outcome, and alternative to the recommended procedure have been discussed with the patient. Patient understands and wants to proceed with the procedure.      Electronically signed by Leisa Shrestha MD on 4/25/2023 at 9:30 AM

## 2023-04-27 ENCOUNTER — TELEPHONE (OUTPATIENT)
Dept: CARDIOLOGY CLINIC | Age: 78
End: 2023-04-27

## 2023-04-27 NOTE — TELEPHONE ENCOUNTER
Called and left VM with patient to reschedule 8/18 [facility] appt to GUSTAVO DEE 4/27

## 2023-07-11 RX ORDER — ISOSORBIDE MONONITRATE 60 MG/1
TABLET, EXTENDED RELEASE ORAL
Qty: 270 TABLET | Refills: 3 | Status: SHIPPED | OUTPATIENT
Start: 2023-07-11

## 2023-08-28 ENCOUNTER — OFFICE VISIT (OUTPATIENT)
Dept: CARDIOLOGY CLINIC | Age: 78
End: 2023-08-28
Payer: MEDICARE

## 2023-08-28 VITALS
OXYGEN SATURATION: 91 % | WEIGHT: 150 LBS | BODY MASS INDEX: 24.99 KG/M2 | SYSTOLIC BLOOD PRESSURE: 122 MMHG | HEART RATE: 61 BPM | HEIGHT: 65 IN | DIASTOLIC BLOOD PRESSURE: 62 MMHG

## 2023-08-28 DIAGNOSIS — N39.0 URINARY TRACT INFECTION WITHOUT HEMATURIA, SITE UNSPECIFIED: ICD-10-CM

## 2023-08-28 DIAGNOSIS — I10 ESSENTIAL HYPERTENSION: ICD-10-CM

## 2023-08-28 DIAGNOSIS — E78.2 MIXED HYPERLIPIDEMIA: ICD-10-CM

## 2023-08-28 DIAGNOSIS — I25.118 CORONARY ARTERY DISEASE OF NATIVE ARTERY OF NATIVE HEART WITH STABLE ANGINA PECTORIS (HCC): Primary | ICD-10-CM

## 2023-08-28 PROCEDURE — 1123F ACP DISCUSS/DSCN MKR DOCD: CPT | Performed by: CLINICAL NURSE SPECIALIST

## 2023-08-28 PROCEDURE — 1090F PRES/ABSN URINE INCON ASSESS: CPT | Performed by: CLINICAL NURSE SPECIALIST

## 2023-08-28 PROCEDURE — G8399 PT W/DXA RESULTS DOCUMENT: HCPCS | Performed by: CLINICAL NURSE SPECIALIST

## 2023-08-28 PROCEDURE — 99214 OFFICE O/P EST MOD 30 MIN: CPT | Performed by: CLINICAL NURSE SPECIALIST

## 2023-08-28 PROCEDURE — 1036F TOBACCO NON-USER: CPT | Performed by: CLINICAL NURSE SPECIALIST

## 2023-08-28 PROCEDURE — G8427 DOCREV CUR MEDS BY ELIG CLIN: HCPCS | Performed by: CLINICAL NURSE SPECIALIST

## 2023-08-28 PROCEDURE — G8420 CALC BMI NORM PARAMETERS: HCPCS | Performed by: CLINICAL NURSE SPECIALIST

## 2023-08-28 PROCEDURE — 3078F DIAST BP <80 MM HG: CPT | Performed by: CLINICAL NURSE SPECIALIST

## 2023-08-28 PROCEDURE — 3074F SYST BP LT 130 MM HG: CPT | Performed by: CLINICAL NURSE SPECIALIST

## 2023-08-28 RX ORDER — MEMANTINE HYDROCHLORIDE 5 MG/1
5 TABLET ORAL 2 TIMES DAILY
COMMUNITY
Start: 2023-08-18

## 2023-08-28 RX ORDER — CLONAZEPAM 0.5 MG/1
0.5 TABLET ORAL NIGHTLY PRN
COMMUNITY

## 2023-08-28 RX ORDER — LEVOTHYROXINE SODIUM 0.03 MG/1
25 TABLET ORAL DAILY
COMMUNITY
Start: 2023-07-02

## 2023-08-28 RX ORDER — SITAGLIPTIN 25 MG/1
25 TABLET, FILM COATED ORAL DAILY
COMMUNITY
Start: 2023-08-18

## 2023-08-28 NOTE — PATIENT INSTRUCTIONS
U/A   Maintain good blood pressure control-goal<130/80 at rest  Maintain good cholesterol control LDL goal<70 with arterial disease  If you are diabetic work to keep/obtain hemoglobin A1c< 7    Follow up in 6-9 mos  With Dr. Kamille Kenyon   Call with any questions or concerns  Follow up with Dipak Vazquez MD for non cardiac problems  Report any new problems  Cardiovascular Fitness-Exercise as tolerated. Fall precautions-  Cardiac / Healthy Diet- Avoid processed high fat foods, maintain low sodium/salt   Continue current medications as directed  Continue plan of treatment  It is always recommended that you bring your medications bottles with you to each visit - this is for your safety!

## 2023-08-28 NOTE — PROGRESS NOTES
kg)     Blood pressure heart rate controlled. Medical management includes CCB, ARB and beta-blocker. Remains on long-acting nitrate. Also on Ranexa 1000 mg twice a day  Remains on statin  Stable exertional angina  no worse than prior. Ongoing weakness and fatigue is multifactorial.  She is unsteady. Some memory loss thought to be secondary to arachnoid hemorrhage back in 2020 with subsequent seizures. She is on a high dose of Keppra. Daughter here with her today. Feels like she is at her baseline essentially. Concern for UTI however. She gets this frequently. Did not get checked when she went for her labs today. We will have her get 1 here at [facility] taking medications as prescribed  Stable cardiovascular status. No evidence of overt heart failure, angina or dysrhythmia. 30 minutes were spent preparing, reviewing and seeing patient. All questions answered    Plan    U/A today   Maintain good blood pressure control-goal<130/80 at rest  Maintain good cholesterol control LDL goal<70 with arterial disease  If you are diabetic work to keep/obtain hemoglobin A1c< 7    Follow up in 6-9 mos  With Dr. Poonam Saavedra   Call with any questions or concerns  Follow up with Shreya Uriarte MD for non cardiac problems  Report any new problems  Cardiovascular Fitness-Exercise as tolerated. Fall precautions-  Cardiac / Healthy Diet- Avoid processed high fat foods, maintain low sodium/salt   Continue current medications as directed  Continue plan of treatment  It is always recommended that you bring your medications bottles with you to each visit - this is for your safety! LEILA Shirley    EMR dragon/transcription disclaimer: Much of this encounter note is electronic transcription/translation of spoken language to printed tach. Electronic translation of spoken language may be erroneous, or at times, nonsensical words or phrases may be inadvertently transcribed.  Although, I have reviewed the note for

## 2023-08-29 ENCOUNTER — HOSPITAL ENCOUNTER (OUTPATIENT)
Dept: PAIN MANAGEMENT | Age: 78
Discharge: HOME | End: 2023-08-29
Payer: MEDICARE

## 2023-08-29 VITALS
SYSTOLIC BLOOD PRESSURE: 133 MMHG | HEART RATE: 70 BPM | DIASTOLIC BLOOD PRESSURE: 67 MMHG | TEMPERATURE: 96.4 F | RESPIRATION RATE: 18 BRPM | OXYGEN SATURATION: 95 %

## 2023-08-29 VITALS
SYSTOLIC BLOOD PRESSURE: 149 MMHG | HEART RATE: 68 BPM | RESPIRATION RATE: 18 BRPM | DIASTOLIC BLOOD PRESSURE: 64 MMHG | OXYGEN SATURATION: 96 %

## 2023-08-29 DIAGNOSIS — R52 PAIN MANAGEMENT: ICD-10-CM

## 2023-08-29 PROCEDURE — 2580000003 HC RX 258

## 2023-08-29 PROCEDURE — 62323 NJX INTERLAMINAR LMBR/SAC: CPT

## 2023-08-29 PROCEDURE — A4216 STERILE WATER/SALINE, 10 ML: HCPCS

## 2023-08-29 PROCEDURE — 6360000002 HC RX W HCPCS

## 2023-08-29 PROCEDURE — 2500000003 HC RX 250 WO HCPCS

## 2023-08-29 RX ORDER — METHYLPREDNISOLONE ACETATE 80 MG/ML
80 INJECTION, SUSPENSION INTRA-ARTICULAR; INTRALESIONAL; INTRAMUSCULAR; SOFT TISSUE ONCE
Status: DISCONTINUED | OUTPATIENT
Start: 2023-08-29 | End: 2023-08-31 | Stop reason: HOSPADM

## 2023-08-29 RX ORDER — SODIUM CHLORIDE 9 MG/ML
5 INJECTION INTRAVENOUS ONCE
Status: DISCONTINUED | OUTPATIENT
Start: 2023-08-29 | End: 2023-08-31 | Stop reason: HOSPADM

## 2023-08-29 RX ORDER — RANOLAZINE 1000 MG/1
TABLET, EXTENDED RELEASE ORAL
Qty: 180 TABLET | Refills: 3 | Status: SHIPPED | OUTPATIENT
Start: 2023-08-29

## 2023-08-29 RX ORDER — LIDOCAINE HYDROCHLORIDE 10 MG/ML
5 INJECTION, SOLUTION EPIDURAL; INFILTRATION; INTRACAUDAL; PERINEURAL ONCE
Status: DISCONTINUED | OUTPATIENT
Start: 2023-08-29 | End: 2023-08-31 | Stop reason: HOSPADM

## 2023-08-29 ASSESSMENT — PAIN SCALES - GENERAL: PAINLEVEL_OUTOF10: 9

## 2023-08-29 ASSESSMENT — PAIN - FUNCTIONAL ASSESSMENT: PAIN_FUNCTIONAL_ASSESSMENT: PREVENTS OR INTERFERES SOME ACTIVE ACTIVITIES AND ADLS

## 2023-08-29 ASSESSMENT — PAIN DESCRIPTION - LOCATION: LOCATION: BACK

## 2023-08-29 ASSESSMENT — PAIN DESCRIPTION - FREQUENCY: FREQUENCY: INTERMITTENT

## 2023-08-29 ASSESSMENT — PAIN DESCRIPTION - DESCRIPTORS: DESCRIPTORS: ACHING;DULL;DISCOMFORT

## 2023-08-29 ASSESSMENT — PAIN DESCRIPTION - ORIENTATION: ORIENTATION: LOWER

## 2023-08-29 ASSESSMENT — PAIN DESCRIPTION - PAIN TYPE: TYPE: CHRONIC PAIN

## 2023-08-29 NOTE — INTERVAL H&P NOTE
Update History & Physical    The patient's History and Physical  was reviewed with the patient and I examined the patient. There was no change. The surgical site was confirmed by the patient and me. Plan: The risks, benefits, expected outcome, and alternative to the recommended procedure have been discussed with the patient. Patient understands and wants to proceed with the procedure.      Electronically signed by Char Chen MD on 8/29/2023 at 12:14 PM

## 2023-09-25 RX ORDER — LEVETIRACETAM 1000 MG/1
TABLET ORAL
Qty: 270 TABLET | Refills: 5 | Status: SHIPPED | OUTPATIENT
Start: 2023-09-25

## 2023-09-25 NOTE — TELEPHONE ENCOUNTER
Requested Prescriptions     Pending Prescriptions Disp Refills    levETIRAcetam (KEPPRA) 1000 MG tablet 270 tablet 5     Sig: Take 1.5 tabs twice a day       Last Office Visit: 8/10/2022  Next Office Visit: Visit date not found  Last Medication Refill: 6/13/2023 with 5 RF       Moisés patient

## 2023-12-05 RX ORDER — ISOSORBIDE MONONITRATE 60 MG/1
TABLET, EXTENDED RELEASE ORAL
Qty: 270 TABLET | Refills: 3 | OUTPATIENT
Start: 2023-12-05

## 2023-12-11 RX ORDER — ISOSORBIDE MONONITRATE 60 MG/1
TABLET, EXTENDED RELEASE ORAL
Qty: 270 TABLET | Refills: 3 | Status: SHIPPED | OUTPATIENT
Start: 2023-12-11

## 2024-01-01 ENCOUNTER — TELEPHONE (OUTPATIENT)
Dept: CARDIOLOGY CLINIC | Age: 79
End: 2024-01-01

## 2024-01-08 ENCOUNTER — APPOINTMENT (OUTPATIENT)
Dept: GENERAL RADIOLOGY | Age: 79
End: 2024-01-08
Payer: MEDICARE

## 2024-01-08 ENCOUNTER — HOSPITAL ENCOUNTER (EMERGENCY)
Age: 79
Discharge: HOME | End: 2024-01-08
Attending: EMERGENCY MEDICINE
Payer: MEDICARE

## 2024-01-08 VITALS
TEMPERATURE: 97.8 F | OXYGEN SATURATION: 92 % | BODY MASS INDEX: 26.66 KG/M2 | DIASTOLIC BLOOD PRESSURE: 58 MMHG | RESPIRATION RATE: 14 BRPM | HEIGHT: 65 IN | SYSTOLIC BLOOD PRESSURE: 134 MMHG | WEIGHT: 160 LBS | HEART RATE: 56 BPM

## 2024-01-08 DIAGNOSIS — R07.9 CHEST PAIN, UNSPECIFIED TYPE: Primary | ICD-10-CM

## 2024-01-08 DIAGNOSIS — Z86.79 HISTORY OF CORONARY ARTERY DISEASE: ICD-10-CM

## 2024-01-08 LAB
ALBUMIN SERPL-MCNC: 4 G/DL (ref 3.5–5.2)
ALP SERPL-CCNC: 74 U/L (ref 35–104)
ALT SERPL-CCNC: 19 U/L (ref 5–33)
ANION GAP SERPL CALCULATED.3IONS-SCNC: 9 MMOL/L (ref 7–19)
AST SERPL-CCNC: 13 U/L (ref 5–32)
BASOPHILS # BLD: 0 K/UL (ref 0–0.2)
BASOPHILS NFR BLD: 0.7 % (ref 0–1)
BILIRUB SERPL-MCNC: 0.5 MG/DL (ref 0.2–1.2)
BUN SERPL-MCNC: 17 MG/DL (ref 8–23)
CALCIUM SERPL-MCNC: 9.2 MG/DL (ref 8.8–10.2)
CHLORIDE SERPL-SCNC: 107 MMOL/L (ref 98–111)
CO2 SERPL-SCNC: 22 MMOL/L (ref 22–29)
CREAT SERPL-MCNC: 0.7 MG/DL (ref 0.5–0.9)
D DIMER PPP FEU-MCNC: 0.55 UG/ML FEU (ref 0–0.48)
EOSINOPHIL # BLD: 0.1 K/UL (ref 0–0.6)
EOSINOPHIL NFR BLD: 1.8 % (ref 0–5)
ERYTHROCYTE [DISTWIDTH] IN BLOOD BY AUTOMATED COUNT: 13 % (ref 11.5–14.5)
GLUCOSE SERPL-MCNC: 188 MG/DL (ref 74–109)
HCT VFR BLD AUTO: 38.8 % (ref 37–47)
HGB BLD-MCNC: 12.9 G/DL (ref 12–16)
IMM GRANULOCYTES # BLD: 0 K/UL
LIPASE SERPL-CCNC: 23 U/L (ref 13–60)
LYMPHOCYTES # BLD: 1.8 K/UL (ref 1.1–4.5)
LYMPHOCYTES NFR BLD: 32.1 % (ref 20–40)
MCH RBC QN AUTO: 32.1 PG (ref 27–31)
MCHC RBC AUTO-ENTMCNC: 33.2 G/DL (ref 33–37)
MCV RBC AUTO: 96.5 FL (ref 81–99)
MONOCYTES # BLD: 0.6 K/UL (ref 0–0.9)
MONOCYTES NFR BLD: 10.6 % (ref 0–10)
NEUTROPHILS # BLD: 3 K/UL (ref 1.5–7.5)
NEUTS SEG NFR BLD: 54.4 % (ref 50–65)
PLATELET # BLD AUTO: 152 K/UL (ref 130–400)
PMV BLD AUTO: 8.8 FL (ref 9.4–12.3)
POTASSIUM SERPL-SCNC: 4.7 MMOL/L (ref 3.5–5)
PROCALCITONIN: 0.02 NG/ML (ref 0–0.09)
PROT SERPL-MCNC: 6.1 G/DL (ref 6.6–8.7)
RBC # BLD AUTO: 4.02 M/UL (ref 4.2–5.4)
SODIUM SERPL-SCNC: 138 MMOL/L (ref 136–145)
TROPONIN, HIGH SENSITIVITY: 19 NG/L (ref 0–14)
TROPONIN, HIGH SENSITIVITY: 19 NG/L (ref 0–14)
WBC # BLD AUTO: 5.5 K/UL (ref 4.8–10.8)

## 2024-01-08 PROCEDURE — 80053 COMPREHEN METABOLIC PANEL: CPT

## 2024-01-08 PROCEDURE — 84484 ASSAY OF TROPONIN QUANT: CPT

## 2024-01-08 PROCEDURE — 84145 PROCALCITONIN (PCT): CPT

## 2024-01-08 PROCEDURE — 85025 COMPLETE CBC W/AUTO DIFF WBC: CPT

## 2024-01-08 PROCEDURE — 85379 FIBRIN DEGRADATION QUANT: CPT

## 2024-01-08 PROCEDURE — 99285 EMERGENCY DEPT VISIT HI MDM: CPT

## 2024-01-08 PROCEDURE — 83690 ASSAY OF LIPASE: CPT

## 2024-01-08 PROCEDURE — 36415 COLL VENOUS BLD VENIPUNCTURE: CPT

## 2024-01-08 PROCEDURE — 93005 ELECTROCARDIOGRAM TRACING: CPT | Performed by: EMERGENCY MEDICINE

## 2024-01-08 PROCEDURE — 71045 X-RAY EXAM CHEST 1 VIEW: CPT

## 2024-01-08 ASSESSMENT — ENCOUNTER SYMPTOMS
EYES NEGATIVE: 1
RESPIRATORY NEGATIVE: 1
GASTROINTESTINAL NEGATIVE: 1

## 2024-01-08 ASSESSMENT — PAIN - FUNCTIONAL ASSESSMENT: PAIN_FUNCTIONAL_ASSESSMENT: 0-10

## 2024-01-08 ASSESSMENT — PAIN DESCRIPTION - LOCATION: LOCATION: CHEST

## 2024-01-08 ASSESSMENT — PAIN SCALES - GENERAL: PAINLEVEL_OUTOF10: 8

## 2024-01-08 NOTE — ED PROVIDER NOTES
ischemia, EF 79%, 1% ischemic myocardium on stress, low risk findings  11/10/2018  ACS PANKAJ RISK Score 4 (angina, age>65, cad>50, ASA ), AUC indication 3, AUC sc        HPI    NursingNotes were reviewed.    REVIEW OF SYSTEMS    (2-9 systems for level 4, 10 or more for level 5)     Review of Systems   Constitutional: Negative.    HENT: Negative.     Eyes: Negative.    Respiratory: Negative.     Cardiovascular:  Positive for chest pain.   Gastrointestinal: Negative.    Genitourinary: Negative.    Musculoskeletal: Negative.    Skin: Negative.    Neurological: Negative.    Hematological: Negative.    Psychiatric/Behavioral: Negative.         A complete review of systems was performed and is negative except as noted above in the HPI.       PAST MEDICAL HISTORY     Past Medical History:   Diagnosis Date    Acute cystitis without hematuria 10/28/2020    Asthma     CAD (coronary artery disease)     Chest pain     Depression with anxiety     Diabetes mellitus (HCC)     GERD (gastroesophageal reflux disease)     Glucose intolerance (impaired glucose tolerance)     Hyperlipidemia     Cholesterol management per pcpMICHELLE M.D.    Hypertension     Lupus (systemic lupus erythematosus) (HCC)     Malaise and fatigue 10/6/2017    Movement disorder     Sees pain management for neck pain and previous surgery    New onset seizure (HCC)     Osteoarthritis     Palliative care patient 07/22/2020    S/P CABG x 3 03/05/2013    PACABG X 3, LIMA-LAD, SVG0OM, SVG-DIAG, RT EVH, DR PATEL    Subdural hematoma (HCC)          SURGICAL HISTORY       Past Surgical History:   Procedure Laterality Date    APPENDECTOMY  1965    BACK SURGERY      CARDIAC CATHETERIZATION  05/18/2011    EF 60%    CARDIAC CATHETERIZATION  01/25/2016    drug eluting stent to RCA    CARDIAC CATHETERIZATION  01/25/2016    CARDIAC CATHETERIZATION  02/19/2016    CARDIAC CATHETERIZATION  08/24/2017    cutting balloon angioplasty ot LAD    CARDIAC CATHETERIZATION  04/13/2020

## 2024-01-09 LAB
EKG P AXIS: NORMAL DEGREES
EKG P-R INTERVAL: NORMAL MS
EKG Q-T INTERVAL: 506 MS
EKG QRS DURATION: 120 MS
EKG QTC CALCULATION (BAZETT): 494 MS
EKG T AXIS: 172 DEGREES

## 2024-01-09 PROCEDURE — 93010 ELECTROCARDIOGRAM REPORT: CPT | Performed by: INTERNAL MEDICINE

## 2024-01-15 RX ORDER — LEVETIRACETAM 1000 MG/1
TABLET ORAL
Qty: 270 TABLET | Refills: 5 | Status: SHIPPED | OUTPATIENT
Start: 2024-01-15

## 2024-01-15 NOTE — TELEPHONE ENCOUNTER
Requested Prescriptions     Pending Prescriptions Disp Refills    levETIRAcetam (KEPPRA) 1000 MG tablet 270 tablet 5     Sig: Take 1.5 tabs twice a day       Last Office Visit: 8/10/2022  Next Office Visit: Visit date not found  Last Medication Refill: 9/25/2023 with 5 RF

## 2024-03-06 ENCOUNTER — OFFICE VISIT (OUTPATIENT)
Dept: CARDIOLOGY CLINIC | Age: 79
End: 2024-03-06

## 2024-03-06 VITALS
HEART RATE: 73 BPM | HEIGHT: 65 IN | WEIGHT: 169 LBS | DIASTOLIC BLOOD PRESSURE: 56 MMHG | SYSTOLIC BLOOD PRESSURE: 114 MMHG | BODY MASS INDEX: 28.16 KG/M2

## 2024-03-06 DIAGNOSIS — I10 ESSENTIAL HYPERTENSION: ICD-10-CM

## 2024-03-06 DIAGNOSIS — I25.118 CORONARY ARTERY DISEASE OF NATIVE ARTERY OF NATIVE HEART WITH STABLE ANGINA PECTORIS (HCC): Primary | ICD-10-CM

## 2024-03-06 RX ORDER — LOSARTAN POTASSIUM 100 MG/1
50 TABLET ORAL DAILY
Qty: 30 TABLET | Refills: 3
Start: 2024-03-06

## 2024-03-06 ASSESSMENT — ENCOUNTER SYMPTOMS
SHORTNESS OF BREATH: 0
BLOOD IN STOOL: 0
CONSTIPATION: 0
ABDOMINAL DISTENTION: 0
COUGH: 0
WHEEZING: 0
EYE DISCHARGE: 0
VOMITING: 0
DIARRHEA: 0
BACK PAIN: 0

## 2024-03-06 NOTE — PROGRESS NOTES
[facility]  Cardiology Office Note  [address] [facility]  [address]  Phone: [phone]  Fax: [phone]                            Date:  3/6/2024  Patient: Rhea Ochoa  Age:  78 y.o., 1945    Referral: No ref. provider found      PROBLEM LIST:    Patient Active Problem List    Diagnosis Date Noted    Unstable angina (HCC)      Priority: High    Chest discomfort 01/10/2019     Priority: High    Coronary artery disease of native artery of native heart with stable angina pectoris (HCC)      Priority: High    COVID-19 virus infection 01/04/2023     Priority: Medium    Vascular dementia without behavioral disturbance (HCC) 12/23/2021     Priority: Low    Chronic epigastric pain 12/15/2021     Priority: Low    Nausea 12/15/2021     Priority: Low    LLQ abdominal pain 12/15/2021     Priority: Low    Colitis 11/13/2021     Priority: Low    Status epilepticus (HCC) 06/13/2021     Priority: Low    Acute cystitis without hematuria 10/28/2020     Priority: Low    Intracranial bleed (HCC) 10/27/2020     Priority: Low    Post-traumatic headache 10/27/2020     Priority: Low    SLE (systemic lupus erythematosus) (HCC) 10/25/2020     Priority: Low    Subarachnoid hemorrhage following injury, no loss of consciousness (HCC) 10/25/2020     Priority: Low    Subarachnoid hemorrhage (HCC) 10/24/2020     Priority: Low    Palliative care patient 07/22/2020     Priority: Low    Seizure ([facility])      Priority: Low    Spondylolisthesis at L4-L5 level      Priority: Low    Atherosclerotic cardiovascular disease      Priority: Low    AMS (altered mental status) 06/11/2020     Priority: Low    Altered mental status 06/10/2020     Priority: Low    Intractable back pain 05/29/2020     Priority: Low    Acute exacerbation of chronic low back pain 05/28/2020     Priority: Low    Bilateral carotid artery stenosis 12/28/2017     Priority: Low    Aphasia 11/29/2017     Priority: Low    Ataxia

## 2024-04-30 ENCOUNTER — HOSPITAL ENCOUNTER (OUTPATIENT)
Dept: PAIN MANAGEMENT | Age: 79
Discharge: HOME | End: 2024-04-30
Payer: MEDICARE

## 2024-04-30 VITALS
SYSTOLIC BLOOD PRESSURE: 152 MMHG | OXYGEN SATURATION: 96 % | DIASTOLIC BLOOD PRESSURE: 69 MMHG | RESPIRATION RATE: 18 BRPM | HEART RATE: 69 BPM | TEMPERATURE: 96.1 F

## 2024-04-30 DIAGNOSIS — R52 PAIN MANAGEMENT: ICD-10-CM

## 2024-04-30 PROCEDURE — 64494 INJ PARAVERT F JNT L/S 2 LEV: CPT

## 2024-04-30 PROCEDURE — 2500000003 HC RX 250 WO HCPCS

## 2024-04-30 PROCEDURE — 64493 INJ PARAVERT F JNT L/S 1 LEV: CPT

## 2024-04-30 PROCEDURE — 6360000002 HC RX W HCPCS

## 2024-04-30 RX ORDER — METHYLPREDNISOLONE ACETATE 80 MG/ML
80 INJECTION, SUSPENSION INTRA-ARTICULAR; INTRALESIONAL; INTRAMUSCULAR; SOFT TISSUE ONCE
Status: DISCONTINUED | OUTPATIENT
Start: 2024-04-30 | End: 2024-05-02 | Stop reason: HOSPADM

## 2024-04-30 RX ORDER — LIDOCAINE HYDROCHLORIDE 10 MG/ML
20 INJECTION, SOLUTION EPIDURAL; INFILTRATION; INTRACAUDAL; PERINEURAL ONCE
Status: DISCONTINUED | OUTPATIENT
Start: 2024-04-30 | End: 2024-05-02 | Stop reason: HOSPADM

## 2024-04-30 RX ORDER — BUPIVACAINE HYDROCHLORIDE 5 MG/ML
5 INJECTION, SOLUTION EPIDURAL; INTRACAUDAL ONCE
Status: DISCONTINUED | OUTPATIENT
Start: 2024-04-30 | End: 2024-05-02 | Stop reason: HOSPADM

## 2024-04-30 ASSESSMENT — PAIN SCALES - GENERAL: PAINLEVEL_OUTOF10: 8

## 2024-04-30 ASSESSMENT — PAIN DESCRIPTION - DIRECTION: RADIATING_TOWARDS: DOES NOT RADIATE

## 2024-04-30 ASSESSMENT — PAIN DESCRIPTION - ORIENTATION: ORIENTATION: LOWER

## 2024-04-30 ASSESSMENT — PAIN - FUNCTIONAL ASSESSMENT: PAIN_FUNCTIONAL_ASSESSMENT: PREVENTS OR INTERFERES SOME ACTIVE ACTIVITIES AND ADLS

## 2024-04-30 ASSESSMENT — PAIN DESCRIPTION - LOCATION: LOCATION: BACK

## 2024-04-30 ASSESSMENT — PAIN DESCRIPTION - DESCRIPTORS: DESCRIPTORS: ACHING;DISCOMFORT

## 2024-04-30 ASSESSMENT — PAIN DESCRIPTION - FREQUENCY: FREQUENCY: INTERMITTENT

## 2024-04-30 ASSESSMENT — PAIN DESCRIPTION - PAIN TYPE: TYPE: CHRONIC PAIN

## 2024-04-30 NOTE — INTERVAL H&P NOTE
Update History & Physical    The patient's History and Physical   was reviewed with the patient and I examined the patient. There was no change. The surgical site was confirmed by the patient and me.     Plan: The risks, benefits, expected outcome, and alternative to the recommended procedure have been discussed with the patient. Patient understands and wants to proceed with the procedure.     Electronically signed by Hany Jauregui MD on 4/30/2024 at 10:05 AM

## 2024-04-30 NOTE — PROGRESS NOTES
Procedure:  Level of Consciousness: [x]Alert [x]Oriented []Disoriented []Lethargic  Anxiety Level: [x]Calm []Anxious []Depressed []Other  Skin: [x]Warm [x]Dry []Cool []Moist []Intact []Other  Cardiovascular: [x]Palpitations: [x]Never []Occasionally []Frequently  Chest Pain: [x]No []Yes  Respiratory:  [x]Unlabored []Labored []Cough ([] Productive []Unproductive)  HCG Required: [x]No []Yes   Results: []Negative []Positive  Knowledge Level:        [x]Patient/Other verbalized understanding of pre-procedure instructions.        [x]Assessment of post-op care needs (transportation, responsible caregiver)        [x]Able to discuss health care problems and how to deal with it.  Factors that Affect Teaching:        Language Barrier: [x]No []Yes - why:        Hearing Loss:        [x]No []Yes            Corrective Device:  []Yes [x]No        Vision Loss:           [x]No []Yes            Corrective Device:  []Yes [x]No        Memory Loss:       [x]No []Yes            []Short Term []Long Term  Motivational Level:  [x]Asks Questions                  []Extremely Anxious       [x]Seems Interested               []Seems Uninterested                  [x]Denies need for Education  Risk for Injury:  [x]Patient oriented to person, place and time  []History of frequent falls/loss of balance  Nutritional:  []Change in appetite   []Weight Gain   []Weight Loss  Functional:  []Requires assistance with ADL's

## 2024-05-09 ENCOUNTER — TELEPHONE (OUTPATIENT)
Dept: CARDIOLOGY CLINIC | Age: 79
End: 2024-05-09

## 2024-05-14 ENCOUNTER — TELEPHONE (OUTPATIENT)
Dept: CARDIOLOGY CLINIC | Age: 79
End: 2024-05-14

## (undated) DEVICE — MSK O2 MD CONCENTR A/ LF 7FT 1P/U

## (undated) DEVICE — Device: Brand: DEFENDO AIR/WATER/SUCTION AND BIOPSY VALVE

## (undated) DEVICE — ENDOGATOR AUXILIARY WATER JET CONNECTOR: Brand: ENDOGATOR

## (undated) DEVICE — SENSR O2 OXIMAX FNGR A/ 18IN NONSTR

## (undated) DEVICE — TBG SMPL FLTR LINE NASL 02/C02 A/ BX/100

## (undated) DEVICE — THE CHANNEL CLEANING BRUSH IS A NYLON FLEXI BRUSH ATTACHED TO A FLEXIBLE PLASTIC SHEATH DESIGNED TO SAFELY REMOVE DEBRIS FROM FLEXIBLE ENDOSCOPES.

## (undated) DEVICE — CUFF,BP,DISP,1 TUBE,ADULT,HP: Brand: MEDLINE